# Patient Record
Sex: MALE | Race: WHITE | NOT HISPANIC OR LATINO | Employment: FULL TIME | URBAN - METROPOLITAN AREA
[De-identification: names, ages, dates, MRNs, and addresses within clinical notes are randomized per-mention and may not be internally consistent; named-entity substitution may affect disease eponyms.]

---

## 2018-07-10 ENCOUNTER — HOSPITAL ENCOUNTER (EMERGENCY)
Facility: HOSPITAL | Age: 18
Discharge: HOME | End: 2018-07-10
Attending: EMERGENCY MEDICINE

## 2018-07-10 VITALS
RESPIRATION RATE: 16 BRPM | DIASTOLIC BLOOD PRESSURE: 69 MMHG | HEART RATE: 71 BPM | BODY MASS INDEX: 24.25 KG/M2 | OXYGEN SATURATION: 97 % | TEMPERATURE: 98 F | WEIGHT: 160 LBS | HEIGHT: 68 IN | SYSTOLIC BLOOD PRESSURE: 131 MMHG

## 2018-07-10 DIAGNOSIS — K12.2 UVULITIS: ICD-10-CM

## 2018-07-10 DIAGNOSIS — T78.3XXA ANGIOEDEMA, INITIAL ENCOUNTER: Primary | ICD-10-CM

## 2018-07-10 PROCEDURE — 96374 THER/PROPH/DIAG INJ IV PUSH: CPT

## 2018-07-10 PROCEDURE — 99284 EMERGENCY DEPT VISIT MOD MDM: CPT | Mod: 25

## 2018-07-10 PROCEDURE — 63600175 PHARM REV CODE 636 W HCPCS: Performed by: EMERGENCY MEDICINE

## 2018-07-10 PROCEDURE — 96375 TX/PRO/DX INJ NEW DRUG ADDON: CPT

## 2018-07-10 PROCEDURE — 96372 THER/PROPH/DIAG INJ SC/IM: CPT

## 2018-07-10 PROCEDURE — S0028 INJECTION, FAMOTIDINE, 20 MG: HCPCS | Performed by: EMERGENCY MEDICINE

## 2018-07-10 PROCEDURE — 25000003 PHARM REV CODE 250: Performed by: EMERGENCY MEDICINE

## 2018-07-10 RX ORDER — FAMOTIDINE 10 MG/ML
20 INJECTION INTRAVENOUS
Status: COMPLETED | OUTPATIENT
Start: 2018-07-10 | End: 2018-07-10

## 2018-07-10 RX ORDER — EPINEPHRINE 0.3 MG/.3ML
0.3 INJECTION SUBCUTANEOUS
Status: COMPLETED | OUTPATIENT
Start: 2018-07-10 | End: 2018-07-10

## 2018-07-10 RX ORDER — METHYLPREDNISOLONE 4 MG/1
TABLET ORAL
Qty: 1 PACKAGE | Refills: 0 | Status: SHIPPED | OUTPATIENT
Start: 2018-07-10 | End: 2018-07-31

## 2018-07-10 RX ORDER — DEXAMETHASONE SODIUM PHOSPHATE 10 MG/ML
INJECTION INTRAMUSCULAR; INTRAVENOUS
Status: DISCONTINUED
Start: 2018-07-10 | End: 2018-07-10 | Stop reason: HOSPADM

## 2018-07-10 RX ORDER — EPINEPHRINE 1 MG/ML
INJECTION, SOLUTION INTRACARDIAC; INTRAMUSCULAR; INTRAVENOUS; SUBCUTANEOUS
Status: DISCONTINUED
Start: 2018-07-10 | End: 2018-07-10 | Stop reason: WASHOUT

## 2018-07-10 RX ORDER — DIPHENHYDRAMINE HYDROCHLORIDE 50 MG/ML
25 INJECTION INTRAMUSCULAR; INTRAVENOUS
Status: COMPLETED | OUTPATIENT
Start: 2018-07-10 | End: 2018-07-10

## 2018-07-10 RX ORDER — DEXAMETHASONE SODIUM PHOSPHATE 100 MG/10ML
8 INJECTION INTRAMUSCULAR; INTRAVENOUS
Status: COMPLETED | OUTPATIENT
Start: 2018-07-10 | End: 2018-07-10

## 2018-07-10 RX ORDER — EPINEPHRINE 0.3 MG/.3ML
INJECTION SUBCUTANEOUS
Status: DISCONTINUED
Start: 2018-07-10 | End: 2018-07-10 | Stop reason: HOSPADM

## 2018-07-10 RX ADMIN — DIPHENHYDRAMINE HYDROCHLORIDE 25 MG: 50 INJECTION, SOLUTION INTRAMUSCULAR; INTRAVENOUS at 03:07

## 2018-07-10 RX ADMIN — FAMOTIDINE 20 MG: 10 INJECTION, SOLUTION INTRAVENOUS at 03:07

## 2018-07-10 RX ADMIN — EPINEPHRINE 0.3 MG: 0.3 INJECTION INTRAMUSCULAR at 04:07

## 2018-07-10 RX ADMIN — DEXAMETHASONE SODIUM PHOSPHATE 8 MG: 10 INJECTION, SOLUTION INTRAMUSCULAR; INTRAVENOUS at 03:07

## 2018-07-10 NOTE — ED PROVIDER NOTES
Encounter Date: 7/10/2018       History     Chief Complaint   Patient presents with    Sore Throat     Patient then emergency room with chief complaint of swelling to his throat.  Patient awoke from sleep with difficulty on swallowing.  Patient stated that occurred while he was asleep felt that he had some back of his throat that he could get up.  Patient has been coughing and mom without sputum production. Patient stated it felt like her some pain in the back of his throat. Denies any new foods detergent on patient denies any new medications.          Review of patient's allergies indicates:   Allergen Reactions    Shrimp      History reviewed. No pertinent past medical history.  History reviewed. No pertinent surgical history.  History reviewed. No pertinent family history.  Social History   Substance Use Topics    Smoking status: Never Smoker    Smokeless tobacco: Never Used    Alcohol use No     Review of Systems   Constitutional: Negative for fever.   HENT: Positive for sore throat.    Respiratory: Negative for shortness of breath.    Cardiovascular: Negative for chest pain.   Gastrointestinal: Negative for nausea.   Genitourinary: Negative for dysuria.   Musculoskeletal: Negative for back pain.   Skin: Negative for rash.   Neurological: Negative for weakness.   Hematological: Does not bruise/bleed easily.       Physical Exam     Initial Vitals [07/10/18 0340]   BP Pulse Resp Temp SpO2   (!) 146/91 76 16 98 °F (36.7 °C) 100 %      MAP       --         Physical Exam    Constitutional: Vital signs are normal. He appears well-developed and well-nourished. He appears cachectic. He is Obese .   HENT:   Head: Normocephalic and atraumatic.   Patient did have edematous uvula throat showed no swelling the patient was able to tolerate saliva.   Eyes: Lids are normal. Lids are everted and swept, no foreign bodies found.   Neck: Trachea normal, normal range of motion and phonation normal. Neck supple.    Cardiovascular: Normal rate, regular rhythm and normal pulses.   Pulmonary/Chest: Breath sounds normal. No respiratory distress. He has no wheezes.   Abdominal: Soft. Normal appearance and bowel sounds are normal.   Musculoskeletal: Normal range of motion.   Lymphadenopathy:        Head (right side): No submental, no tonsillar, no preauricular, no posterior auricular and no occipital adenopathy present.        Head (left side): No tonsillar, no preauricular and no posterior auricular adenopathy present.     He has no cervical adenopathy.        Right cervical: No deep cervical adenopathy present.       Left cervical: No superficial cervical adenopathy present.   Neurological: He is alert and oriented to person, place, and time. No cranial nerve deficit or sensory deficit. GCS eye subscore is 4. GCS verbal subscore is 5. GCS motor subscore is 6.   Skin: Skin is warm, dry and intact.   Psychiatric: He has a normal mood and affect. His speech is normal and behavior is normal. Cognition and memory are normal.         ED Course   Procedures  Labs Reviewed - No data to display       Imaging Results    None          Medical Decision Making:   Initial Assessment:   Patient did have a swollen uvula that cause him to cough and have difficulty with swallowing.  Differential Diagnosis:   Uvulitis angioedema pharyngitis  ED Management:  Patient was given Benadryl and Solu-Medrol epi and Decadron as it did reduce to swelling patient was able to swallow without difficulty and swelling was considerably reduced                      Clinical Impression:   The primary encounter diagnosis was Angioedema, initial encounter. A diagnosis of Uvulitis was also pertinent to this visit.                             Seng Jordan DO  07/10/18 5369

## 2019-02-09 ENCOUNTER — HOSPITAL ENCOUNTER (EMERGENCY)
Facility: HOSPITAL | Age: 19
Discharge: HOME | End: 2019-02-09
Attending: EMERGENCY MEDICINE

## 2019-02-09 VITALS
SYSTOLIC BLOOD PRESSURE: 135 MMHG | BODY MASS INDEX: 23.7 KG/M2 | RESPIRATION RATE: 17 BRPM | HEART RATE: 86 BPM | OXYGEN SATURATION: 99 % | HEIGHT: 69 IN | DIASTOLIC BLOOD PRESSURE: 63 MMHG | TEMPERATURE: 98 F | WEIGHT: 160 LBS

## 2019-02-09 DIAGNOSIS — J02.9 PHARYNGITIS, UNSPECIFIED ETIOLOGY: Primary | ICD-10-CM

## 2019-02-09 DIAGNOSIS — R09.81 NASAL CONGESTION: ICD-10-CM

## 2019-02-09 LAB
DEPRECATED S PYO AG THROAT QL EIA: NEGATIVE
INFLUENZA A, MOLECULAR: NEGATIVE
INFLUENZA B, MOLECULAR: NEGATIVE
SPECIMEN SOURCE: NORMAL

## 2019-02-09 PROCEDURE — 87502 INFLUENZA DNA AMP PROBE: CPT

## 2019-02-09 PROCEDURE — 25000242 PHARM REV CODE 250 ALT 637 W/ HCPCS: Performed by: PHYSICIAN ASSISTANT

## 2019-02-09 PROCEDURE — 25000003 PHARM REV CODE 250: Performed by: PHYSICIAN ASSISTANT

## 2019-02-09 PROCEDURE — 99284 EMERGENCY DEPT VISIT MOD MDM: CPT

## 2019-02-09 PROCEDURE — 87880 STREP A ASSAY W/OPTIC: CPT

## 2019-02-09 PROCEDURE — 87081 CULTURE SCREEN ONLY: CPT

## 2019-02-09 RX ORDER — CETIRIZINE HYDROCHLORIDE 10 MG/1
10 TABLET ORAL DAILY
Qty: 30 TABLET | Refills: 0 | Status: SHIPPED | OUTPATIENT
Start: 2019-02-09 | End: 2022-04-13

## 2019-02-09 RX ORDER — CETIRIZINE HYDROCHLORIDE 10 MG/1
10 TABLET ORAL
Status: COMPLETED | OUTPATIENT
Start: 2019-02-09 | End: 2019-02-09

## 2019-02-09 RX ORDER — IBUPROFEN 600 MG/1
600 TABLET ORAL EVERY 8 HOURS PRN
Qty: 20 TABLET | Refills: 0 | Status: SHIPPED | OUTPATIENT
Start: 2019-02-09 | End: 2021-04-16 | Stop reason: CLARIF

## 2019-02-09 RX ORDER — FLUTICASONE PROPIONATE 50 MCG
1 SPRAY, SUSPENSION (ML) NASAL 2 TIMES DAILY PRN
Qty: 15 G | Refills: 0 | Status: SHIPPED | OUTPATIENT
Start: 2019-02-09 | End: 2021-04-16 | Stop reason: CLARIF

## 2019-02-09 RX ORDER — FLUTICASONE PROPIONATE 50 MCG
2 SPRAY, SUSPENSION (ML) NASAL ONCE
Status: COMPLETED | OUTPATIENT
Start: 2019-02-09 | End: 2019-02-09

## 2019-02-09 RX ORDER — IBUPROFEN 600 MG/1
600 TABLET ORAL
Status: COMPLETED | OUTPATIENT
Start: 2019-02-09 | End: 2019-02-09

## 2019-02-09 RX ADMIN — IBUPROFEN 600 MG: 600 TABLET, FILM COATED ORAL at 04:02

## 2019-02-09 RX ADMIN — CETIRIZINE HYDROCHLORIDE 10 MG: 10 TABLET, FILM COATED ORAL at 04:02

## 2019-02-09 RX ADMIN — FLUTICASONE PROPIONATE 100 MCG: 50 SPRAY, METERED NASAL at 04:02

## 2019-02-09 NOTE — ED NOTES
C/o ongoing sore throat with cough and ear fullness x 1.5 weeks states that sore throat has gotten worse today and that he has felt warm but has not measured his temperature. Even non labored respirations. Aware to notify nurse of needs or concerns.

## 2019-02-09 NOTE — ED PROVIDER NOTES
"Encounter Date: 2/9/2019    SCRIBE #1 NOTE: I, Gelacio Chela, am scribing for, and in the presence of, Chika Carrillo PA-C.       History     Chief Complaint   Patient presents with    Sore Throat     s/s x 1 & 1/2 weeks    Fatigue     Time seen by provider: 3:53 PM on 02/09/2019      Agus Horan is a 18 y.o. male with no PMHx who presents to the ED for further evaluation of a sore throat that started 1.5 weeks ago. The patient reports that he started with a left sided sore throat that radiates into the right side as well. He states that it is painful to swallow. He states "I looked back there and they looked swollen." Patient reports that he is having a cough that produces some blood as well. He relays that with the cough, the sore throat worsens as well. He relays that he is having some left ear fullness. Patient also admits to nasal congestion that worsened over the last day. Patient is uncertain about a fever, but admits that he has felt warm during the duration of the sore throat. He also states that he has had some generalized fatigue as well. Patient denies SOB, chest pain, abdominal pain, nausea, vomiting, and headache. The patient has no PSHx.      The history is provided by the patient.     Review of patient's allergies indicates:   Allergen Reactions    Shrimp      History reviewed. No pertinent past medical history.  History reviewed. No pertinent surgical history.  History reviewed. No pertinent family history.  Social History     Tobacco Use    Smoking status: Never Smoker    Smokeless tobacco: Never Used   Substance Use Topics    Alcohol use: No    Drug use: Yes     Frequency: 2.0 times per week     Types: Marijuana     Review of Systems   Constitutional: Positive for fatigue and fever. Negative for chills.   HENT: Positive for congestion, ear pain and sore throat. Negative for facial swelling and trouble swallowing.    Eyes: Negative for discharge.   Respiratory: Positive for cough. " Negative for chest tightness, shortness of breath and wheezing.    Cardiovascular: Negative for chest pain and palpitations.   Gastrointestinal: Negative for abdominal pain, diarrhea, nausea and vomiting.   Genitourinary: Negative for dysuria and hematuria.   Musculoskeletal: Negative for arthralgias, back pain, joint swelling, myalgias, neck pain and neck stiffness.   Skin: Negative for color change, pallor, rash and wound.   Neurological: Negative for dizziness, syncope, weakness, light-headedness, numbness and headaches.   Hematological: Does not bruise/bleed easily.   Psychiatric/Behavioral: The patient is not nervous/anxious.        Physical Exam     Initial Vitals [02/09/19 1529]   BP Pulse Resp Temp SpO2   135/63 86 17 97.9 °F (36.6 °C) 99 %      MAP       --         Physical Exam    Nursing note and vitals reviewed.  Constitutional: He appears well-developed and well-nourished. He is not diaphoretic. No distress.   HENT:   Head: Normocephalic and atraumatic.   Right Ear: External ear normal.   Left Ear: External ear normal.   Nasal congestion and rhinorrhea noted.  Mild erythema noted to posterior oropharynx without edema or exudate.    Eyes: Conjunctivae are normal.   Neck: Normal range of motion. Neck supple.   Cardiovascular: Normal rate, regular rhythm, normal heart sounds and intact distal pulses. Exam reveals no gallop and no friction rub.    No murmur heard.  Pulmonary/Chest: Breath sounds normal. No respiratory distress. He has no wheezes. He has no rhonchi. He has no rales.   Abdominal: Soft. He exhibits no distension and no mass. There is no tenderness.   Musculoskeletal: Normal range of motion. He exhibits no edema or tenderness.   Lymphadenopathy:     He has no cervical adenopathy.   Neurological: He is alert and oriented to person, place, and time. He has normal strength. No sensory deficit.   Skin: Skin is warm and dry. No rash and no abscess noted. No erythema.   Psychiatric: He has a normal  mood and affect.         ED Course   Procedures  Labs Reviewed   THROAT SCREEN, RAPID   INFLUENZA A & B BY MOLECULAR   CULTURE, STREP A,  THROAT          Imaging Results    None          Medical Decision Making:   History:   Old Medical Records: I decided to obtain old medical records.  Differential Diagnosis:   Influenza  Pneumonia  Strep pharyngitis  Meningitis  Viral syndrome    Clinical Tests:   Lab Tests: Ordered and Reviewed       APC / Resident Notes:   Influenza negative.  Rapid strep negative. Symptoms likely viral.  Low suspicion for peritonsillar abscess or other acute bacterial infection and no need for imaging or testing at this time.  He is discharged home to follow up with his primary care provider for re-evaluation and further treatment options.  He voices understanding and is agreeable to the plan.  He is given specific return precautions.       Scribe Attestation:   Scribe #1: I performed the above scribed service and the documentation accurately describes the services I performed. I attest to the accuracy of the note.    Attending Attestation:     Physician Attestation Statement for NP/PA:   I discussed this assessment and plan of this patient with the NP/PA, but I did not personally examine the patient. The face to face encounter was performed by the NP/PA.    Other NP/PA Attestation Additions:    History of Present Illness: I was not called upon to see this patient but was available for consultation and agree with the patient's disposition and management as it was presented to me by the APC.             I, Chika Carrillo PA-C, personally performed the services described in this documentation. All medical record entries made by the scribe were at my direction and in my presence.  I have reviewed the chart and agree that the record reflects my personal performance and is accurate and complete. Chika Carrillo PA-C.  9:55 PM 02/09/2019             Clinical Impression:   The primary encounter  diagnosis was Pharyngitis, unspecified etiology. A diagnosis of Nasal congestion was also pertinent to this visit.      Disposition:   Disposition: Discharged  Condition: Stable                        Chika Carrillo PA-C  02/09/19 9641       Reed Paz MD  02/09/19 5626

## 2019-02-12 LAB — BACTERIA THROAT CULT: NORMAL

## 2019-08-20 ENCOUNTER — HOSPITAL ENCOUNTER (EMERGENCY)
Facility: HOSPITAL | Age: 19
Discharge: HOME | End: 2019-08-20
Attending: EMERGENCY MEDICINE

## 2019-08-20 VITALS
TEMPERATURE: 99 F | OXYGEN SATURATION: 99 % | WEIGHT: 160 LBS | BODY MASS INDEX: 23.7 KG/M2 | DIASTOLIC BLOOD PRESSURE: 77 MMHG | HEIGHT: 69 IN | RESPIRATION RATE: 18 BRPM | HEART RATE: 70 BPM | SYSTOLIC BLOOD PRESSURE: 140 MMHG

## 2019-08-20 DIAGNOSIS — K12.2 UVULITIS: ICD-10-CM

## 2019-08-20 DIAGNOSIS — J02.9 PHARYNGITIS, UNSPECIFIED ETIOLOGY: Primary | ICD-10-CM

## 2019-08-20 LAB — DEPRECATED S PYO AG THROAT QL EIA: NEGATIVE

## 2019-08-20 PROCEDURE — 25000003 PHARM REV CODE 250: Performed by: EMERGENCY MEDICINE

## 2019-08-20 PROCEDURE — 87081 CULTURE SCREEN ONLY: CPT

## 2019-08-20 PROCEDURE — 99285 EMERGENCY DEPT VISIT HI MDM: CPT | Mod: 25

## 2019-08-20 PROCEDURE — 96372 THER/PROPH/DIAG INJ SC/IM: CPT | Mod: 59

## 2019-08-20 PROCEDURE — 63600175 PHARM REV CODE 636 W HCPCS: Performed by: EMERGENCY MEDICINE

## 2019-08-20 PROCEDURE — 87880 STREP A ASSAY W/OPTIC: CPT

## 2019-08-20 RX ORDER — IBUPROFEN 400 MG/1
800 TABLET ORAL
Status: COMPLETED | OUTPATIENT
Start: 2019-08-20 | End: 2019-08-20

## 2019-08-20 RX ORDER — HYDROCODONE BITARTRATE AND ACETAMINOPHEN 7.5; 325 MG/15ML; MG/15ML
15 SOLUTION ORAL EVERY 6 HOURS PRN
Qty: 250 ML | Refills: 0 | Status: ON HOLD | OUTPATIENT
Start: 2019-08-20 | End: 2020-06-24 | Stop reason: HOSPADM

## 2019-08-20 RX ORDER — AMOXICILLIN 875 MG/1
875 TABLET, FILM COATED ORAL 2 TIMES DAILY
Qty: 20 TABLET | Refills: 0 | Status: ON HOLD | OUTPATIENT
Start: 2019-08-20 | End: 2020-06-24 | Stop reason: SDUPTHER

## 2019-08-20 RX ADMIN — IBUPROFEN 800 MG: 400 TABLET ORAL at 10:08

## 2019-08-20 RX ADMIN — METHYLPREDNISOLONE SODIUM SUCCINATE 40 MG: 40 INJECTION, POWDER, FOR SOLUTION INTRAMUSCULAR; INTRAVENOUS at 11:08

## 2019-08-20 NOTE — DISCHARGE INSTRUCTIONS
Watch for any fever, worsening pain and trouble swallowing.  Watch for any trouble breathing.  Return to the ER as needed.

## 2019-08-20 NOTE — ED PROVIDER NOTES
Encounter Date: 8/20/2019       History     Chief Complaint   Patient presents with    Sore Throat     19-year-old male who has a benign past medical history, presents emergency room with a history that he awoke this morning at approximately 2:00 a.m. with difficulty swallowing and noticed that his uvula was swollen.  The patient was given Claritin but states he still has persistent symptoms.  He does complain of dysphagia.  No fever.  No hoarseness.  He does complain of some slight headache.  The patient denies any coughing, shortness of breath, chills. He had 1 episode of vomiting last night after eating but not subsequently.  Bowel habits have been normal. No complaint of neck pain.        Review of patient's allergies indicates:  No Known Allergies  Past Medical History:   Diagnosis Date    Allergy     AR    Asthma     mild intermittent     No past surgical history on file.  Family History   Problem Relation Age of Onset    Asthma Brother     Emphysema Maternal Grandmother     Hyperlipidemia Maternal Grandmother     Asthma Maternal Grandmother     Arthritis Maternal Grandmother     COPD Maternal Grandmother     Asthma Brother      Social History     Tobacco Use    Smoking status: Never Smoker   Substance Use Topics    Alcohol use: Not on file    Drug use: Not on file     Review of Systems   Constitutional: Negative for activity change, chills, diaphoresis, fatigue and fever.   HENT: Negative for congestion, dental problem, drooling, ear pain, facial swelling, mouth sores, postnasal drip, rhinorrhea, sinus pain and sore throat.    Eyes: Negative.  Negative for pain.   Respiratory: Negative for cough, choking, chest tightness, shortness of breath, wheezing and stridor.    Cardiovascular: Negative for chest pain.   Gastrointestinal: Positive for vomiting. Negative for abdominal pain, constipation, diarrhea and nausea.   Genitourinary: Negative for flank pain, frequency and hematuria.   Skin: Negative  for pallor, rash and wound.   Neurological: Negative for headaches.   All other systems reviewed and are negative.      Physical Exam     Initial Vitals [08/20/19 0805]   BP Pulse Resp Temp SpO2   (!) 141/90 93 17 98.8 °F (37.1 °C) 100 %      MAP       --         Physical Exam    Constitutional: He appears well-developed and well-nourished. He is not diaphoretic. No distress.   HENT:   Head: Normocephalic and atraumatic.   Right Ear: External ear normal.   Left Ear: External ear normal.   Nose: Nose normal.   Mouth/Throat: No oropharyngeal exudate.   Throat is erythematous with evidence of uvula edema. No uvular deviation.  No exudate present.   Eyes: Conjunctivae and EOM are normal. Pupils are equal, round, and reactive to light. Right eye exhibits no discharge. Left eye exhibits no discharge. No scleral icterus.   Neck: Normal range of motion. Neck supple. No tracheal deviation present. No JVD present.   Cardiovascular: Normal rate, regular rhythm, normal heart sounds and intact distal pulses. Exam reveals no gallop and no friction rub.    No murmur heard.  Pulmonary/Chest: Breath sounds normal. No stridor. No respiratory distress. He has no wheezes. He has no rhonchi. He has no rales. He exhibits no tenderness.   Abdominal: Soft. Bowel sounds are normal. He exhibits no distension and no mass. There is no tenderness. There is no rebound and no guarding.   Musculoskeletal: Normal range of motion. He exhibits no edema or tenderness.   Lymphadenopathy:     He has no cervical adenopathy.   Neurological: He is alert and oriented to person, place, and time. He has normal strength. No cranial nerve deficit or sensory deficit. GCS score is 15. GCS eye subscore is 4. GCS verbal subscore is 5. GCS motor subscore is 6.   Skin: Skin is warm and dry. Capillary refill takes less than 2 seconds. No rash noted. No erythema. No pallor.   Psychiatric: He has a normal mood and affect. His behavior is normal. Judgment and thought  content normal.         ED Course   Procedures  Labs Reviewed   THROAT SCREEN, RAPID          Imaging Results    None                       Attending Attestation:             Attending ED Notes:   Complaints of sore throat, is a negative strep screen.  Plain soft tissue neck films had some question as to whether 9 any epiglottitis was evident.  The patient was subsequently sent for CT scan which showed no evidence of an abscess of otitis.  The patient will be given a shot of steroids and placed on antibiotics analgesics as an outpatient.  He is to follow up with his primary physician.  Patient is to return to the ER if he has any worsening symptoms.             Clinical Impression:       ICD-10-CM ICD-9-CM   1. Pharyngitis, unspecified etiology J02.9 462   2. Uvulitis K12.2 528.3                                Danny Tamez Jr., MD  08/20/19 1128

## 2019-08-22 LAB — BACTERIA THROAT CULT: NORMAL

## 2019-11-22 ENCOUNTER — CLINICAL SUPPORT (OUTPATIENT)
Dept: FAMILY MEDICINE | Facility: CLINIC | Age: 19
End: 2019-11-22

## 2019-11-22 DIAGNOSIS — Z02.83 EMPLOYMENT-RELATED DRUG TESTING, ENCOUNTER FOR: Primary | ICD-10-CM

## 2019-11-22 PROCEDURE — 99000 PR SPECIMEN HANDLING,DR OFF->LAB: ICD-10-PCS | Mod: ,,, | Performed by: PREVENTIVE MEDICINE

## 2019-11-22 PROCEDURE — 99000 SPECIMEN HANDLING OFFICE-LAB: CPT | Mod: ,,, | Performed by: PREVENTIVE MEDICINE

## 2020-01-22 ENCOUNTER — HOSPITAL ENCOUNTER (EMERGENCY)
Facility: HOSPITAL | Age: 20
Discharge: HOME | End: 2020-01-22
Attending: EMERGENCY MEDICINE

## 2020-01-22 VITALS
WEIGHT: 160 LBS | BODY MASS INDEX: 23.7 KG/M2 | HEART RATE: 79 BPM | SYSTOLIC BLOOD PRESSURE: 141 MMHG | OXYGEN SATURATION: 98 % | RESPIRATION RATE: 17 BRPM | TEMPERATURE: 99 F | HEIGHT: 69 IN | DIASTOLIC BLOOD PRESSURE: 72 MMHG

## 2020-01-22 DIAGNOSIS — J02.9 VIRAL PHARYNGITIS: ICD-10-CM

## 2020-01-22 DIAGNOSIS — J02.9 SORE THROAT: Primary | ICD-10-CM

## 2020-01-22 LAB — DEPRECATED S PYO AG THROAT QL EIA: NEGATIVE

## 2020-01-22 PROCEDURE — 87880 STREP A ASSAY W/OPTIC: CPT

## 2020-01-22 PROCEDURE — 96372 THER/PROPH/DIAG INJ SC/IM: CPT

## 2020-01-22 PROCEDURE — 99284 EMERGENCY DEPT VISIT MOD MDM: CPT | Mod: 25

## 2020-01-22 PROCEDURE — 63600175 PHARM REV CODE 636 W HCPCS: Performed by: EMERGENCY MEDICINE

## 2020-01-22 PROCEDURE — 25000003 PHARM REV CODE 250: Performed by: EMERGENCY MEDICINE

## 2020-01-22 PROCEDURE — 87081 CULTURE SCREEN ONLY: CPT

## 2020-01-22 RX ORDER — IBUPROFEN 400 MG/1
400 TABLET ORAL
Status: COMPLETED | OUTPATIENT
Start: 2020-01-22 | End: 2020-01-22

## 2020-01-22 RX ADMIN — IBUPROFEN 400 MG: 400 TABLET ORAL at 06:01

## 2020-01-22 RX ADMIN — METHYLPREDNISOLONE SODIUM SUCCINATE 40 MG: 40 INJECTION, POWDER, FOR SOLUTION INTRAMUSCULAR; INTRAVENOUS at 06:01

## 2020-01-22 NOTE — ED PROVIDER NOTES
Encounter Date: 1/22/2020       History     Chief Complaint   Patient presents with    Sore Throat     sore throat beginning this am      The history is provided by the patient. No  was used.   Sore Throat   This is a new problem. The current episode started more than 2 days ago. The problem occurs constantly. The problem has been gradually worsening. Pertinent negatives include no chest pain, no abdominal pain, no headaches and no shortness of breath. Nothing aggravates the symptoms. Nothing relieves the symptoms. He has tried nothing for the symptoms. The treatment provided no relief.     Review of patient's allergies indicates:   Allergen Reactions    Shrimp      Past Medical History:   Diagnosis Date    Allergy     AR    Asthma     mild intermittent     No past surgical history on file.  Family History   Problem Relation Age of Onset    Asthma Brother     Emphysema Maternal Grandmother     Hyperlipidemia Maternal Grandmother     Asthma Maternal Grandmother     Arthritis Maternal Grandmother     COPD Maternal Grandmother     Asthma Brother      Social History     Tobacco Use    Smoking status: Never Smoker    Smokeless tobacco: Never Used   Substance Use Topics    Alcohol use: No    Drug use: Yes     Frequency: 2.0 times per week     Types: Marijuana     Review of Systems   Constitutional: Negative for activity change, diaphoresis and fever.   HENT: Positive for sore throat. Negative for dental problem, drooling, ear discharge, facial swelling, rhinorrhea, trouble swallowing and voice change.    Eyes: Negative for pain and visual disturbance.   Respiratory: Negative for cough, shortness of breath and stridor.    Cardiovascular: Negative for chest pain and leg swelling.   Gastrointestinal: Negative for abdominal distention, abdominal pain, constipation and vomiting.   Genitourinary: Negative for discharge, dysuria and hematuria.   Musculoskeletal: Negative for gait problem.    Skin: Negative for rash.   Neurological: Negative for seizures, facial asymmetry and headaches.   Psychiatric/Behavioral: Negative for hallucinations and suicidal ideas.       Physical Exam     Initial Vitals [01/22/20 0546]   BP Pulse Resp Temp SpO2   (!) 141/72 79 17 98.8 °F (37.1 °C) 98 %      MAP       --         Physical Exam    Nursing note and vitals reviewed.  Constitutional: He appears well-developed. No distress.   HENT:   Head: Normocephalic and atraumatic.   Nose: Nose normal.   Mouth/Throat: Uvula is midline, oropharynx is clear and moist and mucous membranes are normal. He does not have dentures. No oral lesions. No trismus in the jaw. Normal dentition. No dental abscesses, uvula swelling, lacerations or dental caries. No oropharyngeal exudate, posterior oropharyngeal edema, posterior oropharyngeal erythema or tonsillar abscesses.   Eyes: EOM are normal.   Neck: Neck supple. No tracheal deviation present. No JVD present.   Cardiovascular: Normal rate, regular rhythm, normal heart sounds and intact distal pulses. Exam reveals no gallop and no friction rub.    No murmur heard.  Pulmonary/Chest: Breath sounds normal. No respiratory distress. He has no wheezes. He has no rhonchi. He has no rales.   Abdominal: Soft. Bowel sounds are normal. There is no tenderness.   Musculoskeletal: Normal range of motion.   Neurological: He is alert and oriented to person, place, and time. No cranial nerve deficit.   Skin: Skin is warm and dry. Capillary refill takes less than 2 seconds. No rash noted.   Psychiatric: He has a normal mood and affect.         ED Course   Procedures  Labs Reviewed   THROAT SCREEN, RAPID   CULTURE, STREP A,  THROAT          Imaging Results    None          Medical Decision Making:   ED Management:  20 yo pt presenting with worsening of sore throat with reassuring exam.  No history of immunocompromise. Nontoxic appearance.  Patient euvolemic with no trismus and no airway compromise. Able to  tolerate PO. Unlikely PTA, RPA, Ludwigs, epiglottitis, acute HIV, or EBV. Neg strep test and centor negative for strep.  Given patient's recurrent episodes of pharyngitis will give referral to [facility].    Plan to DC home with prompt outpatient PCP follow up; return precautions discussed.                                   Clinical Impression:       ICD-10-CM ICD-9-CM   1. Sore throat J02.9 462                             Gamaliel Bush MD  01/22/20 0795

## 2020-01-22 NOTE — LETTER
"  [facility] [address] [place] [address]  Phone: [phone]   January 22, 2020    Patient: Agus Horan (Alex)   YOB: 2000   Date of Visit: 1/22/2020   Patient ID [id number]       To Whom It May Concern:    Agus Horan (Alex) was seen and treated in our emergency department on 1/22/2020. He may return to work on 1/23/20.      Sincerely,          ,       "

## 2020-01-24 LAB — BACTERIA THROAT CULT: NORMAL

## 2020-06-24 ENCOUNTER — ANESTHESIA (OUTPATIENT)
Dept: SURGERY | Facility: HOSPITAL | Age: 20
End: 2020-06-24

## 2020-06-24 ENCOUNTER — ANESTHESIA EVENT (OUTPATIENT)
Dept: SURGERY | Facility: HOSPITAL | Age: 20
End: 2020-06-24

## 2020-06-24 ENCOUNTER — HOSPITAL ENCOUNTER (OUTPATIENT)
Facility: HOSPITAL | Age: 20
Discharge: HOME | End: 2020-06-24
Attending: EMERGENCY MEDICINE | Admitting: ORTHOPAEDIC SURGERY

## 2020-06-24 VITALS
OXYGEN SATURATION: 96 % | RESPIRATION RATE: 15 BRPM | WEIGHT: 155 LBS | DIASTOLIC BLOOD PRESSURE: 89 MMHG | SYSTOLIC BLOOD PRESSURE: 161 MMHG | TEMPERATURE: 99 F | BODY MASS INDEX: 22.89 KG/M2 | HEART RATE: 106 BPM

## 2020-06-24 DIAGNOSIS — S91.312A LACERATION OF LEFT FOOT: ICD-10-CM

## 2020-06-24 DIAGNOSIS — S90.852A FOREIGN BODY IN LEFT FOOT, INITIAL ENCOUNTER: Primary | ICD-10-CM

## 2020-06-24 LAB — SARS-COV-2 RDRP RESP QL NAA+PROBE: NEGATIVE

## 2020-06-24 PROCEDURE — 71000033 HC RECOVERY, INTIAL HOUR: Performed by: ORTHOPAEDIC SURGERY

## 2020-06-24 PROCEDURE — 99204 OFFICE O/P NEW MOD 45 MIN: CPT | Mod: 25,,, | Performed by: ORTHOPAEDIC SURGERY

## 2020-06-24 PROCEDURE — 99900104 DSU ONLY-NO CHARGE-EA ADD'L HR (STAT): Performed by: ORTHOPAEDIC SURGERY

## 2020-06-24 PROCEDURE — U0002 COVID-19 LAB TEST NON-CDC: HCPCS

## 2020-06-24 PROCEDURE — 25000003 PHARM REV CODE 250: Performed by: EMERGENCY MEDICINE

## 2020-06-24 PROCEDURE — D9220A PRA ANESTHESIA: ICD-10-PCS | Mod: ,,, | Performed by: ANESTHESIOLOGY

## 2020-06-24 PROCEDURE — 71000015 HC POSTOP RECOV 1ST HR: Performed by: ORTHOPAEDIC SURGERY

## 2020-06-24 PROCEDURE — 99285 EMERGENCY DEPT VISIT HI MDM: CPT | Mod: 25

## 2020-06-24 PROCEDURE — 63600175 PHARM REV CODE 636 W HCPCS: Performed by: EMERGENCY MEDICINE

## 2020-06-24 PROCEDURE — 90715 TDAP VACCINE 7 YRS/> IM: CPT | Performed by: EMERGENCY MEDICINE

## 2020-06-24 PROCEDURE — 99204 PR OFFICE/OUTPT VISIT, NEW, LEVL IV, 45-59 MIN: ICD-10-PCS | Mod: 25,,, | Performed by: ORTHOPAEDIC SURGERY

## 2020-06-24 PROCEDURE — 90471 IMMUNIZATION ADMIN: CPT | Performed by: EMERGENCY MEDICINE

## 2020-06-24 PROCEDURE — 71000039 HC RECOVERY, EACH ADD'L HOUR: Performed by: ORTHOPAEDIC SURGERY

## 2020-06-24 PROCEDURE — 63600175 PHARM REV CODE 636 W HCPCS: Performed by: NURSE ANESTHETIST, CERTIFIED REGISTERED

## 2020-06-24 PROCEDURE — 25000003 PHARM REV CODE 250

## 2020-06-24 PROCEDURE — 99900103 DSU ONLY-NO CHARGE-INITIAL HR (STAT): Performed by: ORTHOPAEDIC SURGERY

## 2020-06-24 PROCEDURE — 37000009 HC ANESTHESIA EA ADD 15 MINS: Performed by: ORTHOPAEDIC SURGERY

## 2020-06-24 PROCEDURE — 28190 REMOVAL OF FOOT FOREIGN BODY: CPT | Mod: LT,,, | Performed by: ORTHOPAEDIC SURGERY

## 2020-06-24 PROCEDURE — D9220A PRA ANESTHESIA: Mod: ,,, | Performed by: ANESTHESIOLOGY

## 2020-06-24 PROCEDURE — 28190 PR REMV FOOT FOREIGN BODY,SUBCUTANEOUS: ICD-10-PCS | Mod: LT,,, | Performed by: ORTHOPAEDIC SURGERY

## 2020-06-24 PROCEDURE — 36000707: Performed by: ORTHOPAEDIC SURGERY

## 2020-06-24 PROCEDURE — 25000003 PHARM REV CODE 250: Performed by: NURSE ANESTHETIST, CERTIFIED REGISTERED

## 2020-06-24 PROCEDURE — 25000003 PHARM REV CODE 250: Performed by: ANESTHESIOLOGY

## 2020-06-24 PROCEDURE — 63600175 PHARM REV CODE 636 W HCPCS: Performed by: ANESTHESIOLOGY

## 2020-06-24 PROCEDURE — 37000008 HC ANESTHESIA 1ST 15 MINUTES: Performed by: ORTHOPAEDIC SURGERY

## 2020-06-24 PROCEDURE — 36000706: Performed by: ORTHOPAEDIC SURGERY

## 2020-06-24 RX ORDER — DEXAMETHASONE SODIUM PHOSPHATE 4 MG/ML
INJECTION, SOLUTION INTRA-ARTICULAR; INTRALESIONAL; INTRAMUSCULAR; INTRAVENOUS; SOFT TISSUE
Status: DISCONTINUED | OUTPATIENT
Start: 2020-06-24 | End: 2020-06-24

## 2020-06-24 RX ORDER — FENTANYL CITRATE 50 UG/ML
25 INJECTION, SOLUTION INTRAMUSCULAR; INTRAVENOUS EVERY 5 MIN PRN
Status: COMPLETED | OUTPATIENT
Start: 2020-06-24 | End: 2020-06-24

## 2020-06-24 RX ORDER — LIDOCAINE HCL/PF 100 MG/5ML
SYRINGE (ML) INTRAVENOUS
Status: DISCONTINUED | OUTPATIENT
Start: 2020-06-24 | End: 2020-06-24

## 2020-06-24 RX ORDER — ACETAMINOPHEN 10 MG/ML
INJECTION, SOLUTION INTRAVENOUS
Status: DISCONTINUED | OUTPATIENT
Start: 2020-06-24 | End: 2020-06-24

## 2020-06-24 RX ORDER — ONDANSETRON HYDROCHLORIDE 2 MG/ML
INJECTION, SOLUTION INTRAMUSCULAR; INTRAVENOUS
Status: DISCONTINUED | OUTPATIENT
Start: 2020-06-24 | End: 2020-06-24

## 2020-06-24 RX ORDER — OXYCODONE HYDROCHLORIDE 5 MG/1
5 TABLET ORAL ONCE AS NEEDED
Status: COMPLETED | OUTPATIENT
Start: 2020-06-24 | End: 2020-06-24

## 2020-06-24 RX ORDER — CIPROFLOXACIN 500 MG/1
500 TABLET ORAL
Status: COMPLETED | OUTPATIENT
Start: 2020-06-24 | End: 2020-06-24

## 2020-06-24 RX ORDER — AMOXICILLIN 875 MG/1
875 TABLET, FILM COATED ORAL 2 TIMES DAILY
Qty: 20 TABLET | Refills: 0 | Status: SHIPPED | OUTPATIENT
Start: 2020-06-24 | End: 2021-04-16 | Stop reason: CLARIF

## 2020-06-24 RX ORDER — FENTANYL CITRATE 50 UG/ML
INJECTION, SOLUTION INTRAMUSCULAR; INTRAVENOUS
Status: DISCONTINUED | OUTPATIENT
Start: 2020-06-24 | End: 2020-06-24

## 2020-06-24 RX ORDER — LIDOCAINE HYDROCHLORIDE 10 MG/ML
10 INJECTION INFILTRATION; PERINEURAL
Status: DISCONTINUED | OUTPATIENT
Start: 2020-06-24 | End: 2020-06-24 | Stop reason: HOSPADM

## 2020-06-24 RX ORDER — PROPOFOL 10 MG/ML
VIAL (ML) INTRAVENOUS
Status: DISCONTINUED | OUTPATIENT
Start: 2020-06-24 | End: 2020-06-24

## 2020-06-24 RX ORDER — ONDANSETRON 2 MG/ML
4 INJECTION INTRAMUSCULAR; INTRAVENOUS ONCE AS NEEDED
Status: DISCONTINUED | OUTPATIENT
Start: 2020-06-24 | End: 2020-06-24 | Stop reason: HOSPADM

## 2020-06-24 RX ORDER — HYDROCODONE BITARTRATE AND ACETAMINOPHEN 5; 325 MG/1; MG/1
1 TABLET ORAL EVERY 6 HOURS PRN
Qty: 10 TABLET | Refills: 0 | Status: SHIPPED | OUTPATIENT
Start: 2020-06-24 | End: 2020-07-04

## 2020-06-24 RX ORDER — KETOROLAC TROMETHAMINE 30 MG/ML
INJECTION, SOLUTION INTRAMUSCULAR; INTRAVENOUS
Status: DISCONTINUED | OUTPATIENT
Start: 2020-06-24 | End: 2020-06-24

## 2020-06-24 RX ORDER — KETAMINE HYDROCHLORIDE 100 MG/ML
INJECTION, SOLUTION INTRAMUSCULAR; INTRAVENOUS
Status: DISCONTINUED | OUTPATIENT
Start: 2020-06-24 | End: 2020-06-24

## 2020-06-24 RX ORDER — MIDAZOLAM HYDROCHLORIDE 1 MG/ML
INJECTION, SOLUTION INTRAMUSCULAR; INTRAVENOUS
Status: DISCONTINUED | OUTPATIENT
Start: 2020-06-24 | End: 2020-06-24

## 2020-06-24 RX ORDER — CIPROFLOXACIN 500 MG/1
TABLET ORAL
Status: COMPLETED
Start: 2020-06-24 | End: 2020-06-24

## 2020-06-24 RX ADMIN — ACETAMINOPHEN 1000 MG: 10 INJECTION, SOLUTION INTRAVENOUS at 07:06

## 2020-06-24 RX ADMIN — CIPROFLOXACIN 500 MG: 500 TABLET ORAL at 12:06

## 2020-06-24 RX ADMIN — FENTANYL CITRATE 25 MCG: 50 INJECTION INTRAMUSCULAR; INTRAVENOUS at 08:06

## 2020-06-24 RX ADMIN — PROPOFOL 200 MG: 10 INJECTION, EMULSION INTRAVENOUS at 07:06

## 2020-06-24 RX ADMIN — MIDAZOLAM 2 MG: 1 INJECTION INTRAMUSCULAR; INTRAVENOUS at 07:06

## 2020-06-24 RX ADMIN — FENTANYL CITRATE 100 MCG: 50 INJECTION, SOLUTION INTRAMUSCULAR; INTRAVENOUS at 07:06

## 2020-06-24 RX ADMIN — CEFAZOLIN 1 G: 330 INJECTION, POWDER, FOR SOLUTION INTRAMUSCULAR; INTRAVENOUS at 03:06

## 2020-06-24 RX ADMIN — SODIUM CHLORIDE, SODIUM GLUCONATE, SODIUM ACETATE, POTASSIUM CHLORIDE, MAGNESIUM CHLORIDE, SODIUM PHOSPHATE, DIBASIC, AND POTASSIUM PHOSPHATE: .53; .5; .37; .037; .03; .012; .00082 INJECTION, SOLUTION INTRAVENOUS at 07:06

## 2020-06-24 RX ADMIN — ONDANSETRON 4 MG: 2 INJECTION, SOLUTION INTRAMUSCULAR; INTRAVENOUS at 07:06

## 2020-06-24 RX ADMIN — CLOSTRIDIUM TETANI TOXOID ANTIGEN (FORMALDEHYDE INACTIVATED), CORYNEBACTERIUM DIPHTHERIAE TOXOID ANTIGEN (FORMALDEHYDE INACTIVATED), BORDETELLA PERTUSSIS TOXOID ANTIGEN (GLUTARALDEHYDE INACTIVATED), BORDETELLA PERTUSSIS FILAMENTOUS HEMAGGLUTININ ANTIGEN (FORMALDEHYDE INACTIVATED), BORDETELLA PERTUSSIS PERTACTIN ANTIGEN, AND BORDETELLA PERTUSSIS FIMBRIAE 2/3 ANTIGEN 0.5 ML: 5; 2; 2.5; 5; 3; 5 INJECTION, SUSPENSION INTRAMUSCULAR at 12:06

## 2020-06-24 RX ADMIN — CIPROFLOXACIN 500 MG: 500 TABLET, FILM COATED ORAL at 12:06

## 2020-06-24 RX ADMIN — OXYCODONE 5 MG: 5 TABLET ORAL at 08:06

## 2020-06-24 RX ADMIN — LIDOCAINE HYDROCHLORIDE 100 MG: 20 INJECTION, SOLUTION INTRAVENOUS at 07:06

## 2020-06-24 RX ADMIN — DEXAMETHASONE SODIUM PHOSPHATE 4 MG: 4 INJECTION, SOLUTION INTRAMUSCULAR; INTRAVENOUS at 07:06

## 2020-06-24 RX ADMIN — KETAMINE HYDROCHLORIDE 25 MG: 100 INJECTION, SOLUTION, CONCENTRATE INTRAMUSCULAR; INTRAVENOUS at 07:06

## 2020-06-24 RX ADMIN — KETOROLAC TROMETHAMINE 30 MG: 30 INJECTION, SOLUTION INTRAMUSCULAR; INTRAVENOUS at 07:06

## 2020-06-24 NOTE — HPI
A 19-year-old male who stepped on a piece of glass while out last night.  Date of injury was June 23, 2020.  I had pain and difficulty walking.  X-rays showed a piece of glass and the ER doctor was unable to get it out in the emergency room.

## 2020-06-24 NOTE — SUBJECTIVE & OBJECTIVE
Past Medical History:   Diagnosis Date    Allergy     AR    Asthma     mild intermittent       No past surgical history on file.    Review of patient's allergies indicates:   Allergen Reactions    Shrimp        Current Facility-Administered Medications   Medication    lidocaine HCL 10 mg/ml (1%) injection 10 mL     Family History     Problem Relation (Age of Onset)    Arthritis Maternal Grandmother    Asthma Brother, Maternal Grandmother, Brother    COPD Maternal Grandmother    Emphysema Maternal Grandmother    Hyperlipidemia Maternal Grandmother        Tobacco Use    Smoking status: Never Smoker    Smokeless tobacco: Never Used   Substance and Sexual Activity    Alcohol use: No    Drug use: Yes     Frequency: 2.0 times per week     Types: Marijuana    Sexual activity: Not on file     Review of Systems   Constitution: Negative for chills and fever.   HENT: Negative for congestion.    Eyes: Negative for blurred vision.   Cardiovascular: Negative for chest pain and syncope.   Respiratory: Negative for cough and shortness of breath.    Endocrine: Negative for polyuria.   Hematologic/Lymphatic: Negative for bleeding problem. Does not bruise/bleed easily.   Skin: Negative for rash.   Musculoskeletal: Positive for joint pain. Negative for falls, joint swelling, muscle cramps, muscle weakness and myalgias.   Gastrointestinal: Negative for abdominal pain, nausea and vomiting.   Genitourinary: Negative for flank pain.   Neurological: Negative for numbness and seizures.   Psychiatric/Behavioral: Negative for altered mental status.   Allergic/Immunologic: Negative for persistent infections.     Objective:     Vital Signs (Most Recent):  Temp: 98.8 °F (37.1 °C) (06/24/20 1743)  Pulse: 91 (06/24/20 1743)  Resp: 18 (06/24/20 1743)  BP: (!) 140/90 (06/24/20 1743)  SpO2: 97 % (06/24/20 1743) Vital Signs (24h Range):  Temp:  [98.3 °F (36.8 °C)-98.8 °F (37.1 °C)] 98.8 °F (37.1 °C)  Pulse:  [67-91] 91  Resp:  [18] 18  SpO2:   [97 %-99 %] 97 %  BP: (126-142)/(72-94) 140/90     Weight: 70.3 kg (155 lb)     Body mass index is 22.89 kg/m².    No intake or output data in the 24 hours ending 06/24/20 1757    General    Nursing note and vitals reviewed.  Constitutional: He is oriented to person, place, and time. He appears well-developed and well-nourished. No distress.   HENT:   Nose: Nose normal.   Eyes: EOM are normal.   Cardiovascular: Regular rhythm.    Pulmonary/Chest: Effort normal.   Abdominal: Soft.   Neurological: He is alert and oriented to person, place, and time.   Psychiatric: His behavior is normal.         Right Ankle/Foot Exam   Right ankle exam is normal.    Left Ankle/Foot Exam     Pain   The patient exhibits pain of the fifth metatarsal base.    Other   Sensation: normal    Comments:  Small laceration to the plantar surface underneath the MTP joint area of the small toe.  Mild bleeding.      Vascular Exam       Left Pulses  Dorsalis Pedis:      2+              Significant Labs: CBC: No results for input(s): WBC, HGB, HCT, PLT in the last 48 hours.  CMP: No results for input(s): NA, K, CL, CO2, GLU, BUN, CREATININE, CALCIUM, PROT, ALBUMIN, BILITOT, ALKPHOS, AST, ALT, ANIONGAP, EGFRNONAA in the last 48 hours.    Invalid input(s): ESTGFAFRICA  All pertinent labs within the past 24 hours have been reviewed.    Significant Imaging: X-Ray: I have reviewed all pertinent results/findings and my personal findings are:  retained foreign body, looks like glass beneath 5th MTP

## 2020-06-24 NOTE — CONSULTS
[facility]  Orthopedics  Consult Note    Patient Name: gAus Horan  MRN: [id number]  Admission Date: 6/24/2020  Hospital Length of Stay: 0 days  Attending Provider: No att. providers found  Primary Care Provider: Primary Doctor No    Patient information was obtained from patient, past medical records and ER records.     Inpatient consult to Orthopedic Surgery  Consult performed by: Dani Garcia MD  Consult ordered by: Gelacio Spear MD  Reason for consult: L foot laceration        Subjective:     Principal Problem:<principal problem not specified>    Chief Complaint:   Chief Complaint   Patient presents with    Laceration     to left foot last night        HPI: A 19-year-old male who stepped on a piece of glass while out last night.  Date of injury was June 23, 2020.  I had pain and difficulty walking.  X-rays showed a piece of glass and the ER doctor was unable to get it out in the emergency room.    Past Medical History:   Diagnosis Date    Allergy     AR    Asthma     mild intermittent       No past surgical history on file.    Review of patient's allergies indicates:   Allergen Reactions    Shrimp        Current Facility-Administered Medications   Medication    lidocaine HCL 10 mg/ml (1%) injection 10 mL     Family History     Problem Relation (Age of Onset)    Arthritis Maternal Grandmother    Asthma Brother, Maternal Grandmother, Brother    COPD Maternal Grandmother    Emphysema Maternal Grandmother    Hyperlipidemia Maternal Grandmother        Tobacco Use    Smoking status: Never Smoker    Smokeless tobacco: Never Used   Substance and Sexual Activity    Alcohol use: No    Drug use: Yes     Frequency: 2.0 times per week     Types: Marijuana    Sexual activity: Not on file     Review of Systems   Constitution: Negative for chills and fever.   HENT: Negative for congestion.    Eyes: Negative for blurred vision.   Cardiovascular: Negative for chest pain and syncope.    Respiratory: Negative for cough and shortness of breath.    Endocrine: Negative for polyuria.   Hematologic/Lymphatic: Negative for bleeding problem. Does not bruise/bleed easily.   Skin: Negative for rash.   Musculoskeletal: Positive for joint pain. Negative for falls, joint swelling, muscle cramps, muscle weakness and myalgias.   Gastrointestinal: Negative for abdominal pain, nausea and vomiting.   Genitourinary: Negative for flank pain.   Neurological: Negative for numbness and seizures.   Psychiatric/Behavioral: Negative for altered mental status.   Allergic/Immunologic: Negative for persistent infections.     Objective:     Vital Signs (Most Recent):  Temp: 98.8 °F (37.1 °C) (06/24/20 1743)  Pulse: 91 (06/24/20 1743)  Resp: 18 (06/24/20 1743)  BP: (!) 140/90 (06/24/20 1743)  SpO2: 97 % (06/24/20 1743) Vital Signs (24h Range):  Temp:  [98.3 °F (36.8 °C)-98.8 °F (37.1 °C)] 98.8 °F (37.1 °C)  Pulse:  [67-91] 91  Resp:  [18] 18  SpO2:  [97 %-99 %] 97 %  BP: (126-142)/(72-94) 140/90     Weight: 70.3 kg (155 lb)     Body mass index is 22.89 kg/m².    No intake or output data in the 24 hours ending 06/24/20 1757    General    Nursing note and vitals reviewed.  Constitutional: He is oriented to person, place, and time. He appears well-developed and well-nourished. No distress.   HENT:   Nose: Nose normal.   Eyes: EOM are normal.   Cardiovascular: Regular rhythm.    Pulmonary/Chest: Effort normal.   Abdominal: Soft.   Neurological: He is alert and oriented to person, place, and time.   Psychiatric: His behavior is normal.         Right Ankle/Foot Exam   Right ankle exam is normal.    Left Ankle/Foot Exam     Pain   The patient exhibits pain of the fifth metatarsal base.    Other   Sensation: normal    Comments:  Small laceration to the plantar surface underneath the MTP joint area of the small toe.  Mild bleeding.      Vascular Exam       Left Pulses  Dorsalis Pedis:      2+              Significant Labs: CBC: No  results for input(s): WBC, HGB, HCT, PLT in the last 48 hours.  CMP: No results for input(s): NA, K, CL, CO2, GLU, BUN, CREATININE, CALCIUM, PROT, ALBUMIN, BILITOT, ALKPHOS, AST, ALT, ANIONGAP, EGFRNONAA in the last 48 hours.    Invalid input(s): ESTGF[place]  All pertinent labs within the past 24 hours have been reviewed.    Significant Imaging: X-Ray: I have reviewed all pertinent results/findings and my personal findings are:  retained foreign body, looks like glass beneath 5th MTP    Assessment/Plan:     Foreign body in left foot  To OR for foreign body removal        Thank you for your consult. I will follow-up with patient. Please contact us if you have any additional questions.    Dani Garcia MD  Orthopedics  [facility]

## 2020-06-24 NOTE — ED PROVIDER NOTES
Encounter Date: 6/24/2020       History     Chief Complaint   Patient presents with    Laceration     to left foot last night     Patient is a 19-year-old male who presents emergency room for evaluation of a laceration to his left foot that happened last night.  He was wearing flip-flops and is drinking.  Does not feel like he has a foreign body but the lacerations becoming painful.  He is unclear of his last tetanus shot.  Pain is now radiating upwards towards his lateral foot.  Laceration is over the distal left lateral foot        Review of patient's allergies indicates:   Allergen Reactions    Shrimp      Past Medical History:   Diagnosis Date    Allergy     AR    Asthma     mild intermittent     No past surgical history on file.  Family History   Problem Relation Age of Onset    Asthma Brother     Emphysema Maternal Grandmother     Hyperlipidemia Maternal Grandmother     Asthma Maternal Grandmother     Arthritis Maternal Grandmother     COPD Maternal Grandmother     Asthma Brother      Social History     Tobacco Use    Smoking status: Never Smoker    Smokeless tobacco: Never Used   Substance Use Topics    Alcohol use: No    Drug use: Yes     Frequency: 2.0 times per week     Types: Marijuana     Review of Systems   Constitutional: Negative for fever.   Musculoskeletal: Positive for gait problem. Negative for back pain.   Skin: Positive for color change and wound.   Neurological: Negative for weakness and numbness.       Physical Exam     Initial Vitals [06/24/20 1156]   BP Pulse Resp Temp SpO2   (!) 142/94 75 18 98.3 °F (36.8 °C) 99 %      MAP       --         Physical Exam    Nursing note and vitals reviewed.  Constitutional: He appears well-developed and well-nourished. No distress.   Neurological: He is alert and oriented to person, place, and time. He has normal strength. No sensory deficit. GCS score is 15. GCS eye subscore is 4. GCS verbal subscore is 5. GCS motor subscore is 6.   Skin:  Skin is warm and dry.   1 cm laceration to the left foot just lateral to the ball of the foot near the 5th metatarsal.  Slight erythema to the lateral foot near the distal 5th metatarsal.  No purulent drainage from the wound.           ED Course   Foreign Body    Date/Time: 6/24/2020 8:51 PM  Performed by: Gelacio Spear MD  Authorized by: Gelacio Spear MD   Body area: skin  General location: lower extremity  Location details: left foot  Anesthesia: local infiltration    Anesthesia:  Local Anesthetic: lidocaine 1% without epinephrine  Anesthetic total: 5 mL  Patient sedated: no  Patient restrained: no  Localization method: ultrasound  Removal mechanism: hemostat and forceps  Tendon involvement: none  Depth: deep  Complexity: simple  Comments: I was unable to remove glass from the patient's foot.  He had to go to the operating room.  I spoke with the orthopedist, Dr. Garcia who took him to the OR.       Labs Reviewed - No data to display       Imaging Results          X-Ray Foot Complete Left (Final result)  Result time 06/24/20 12:36:44    Final result by Wayne Becerra Jr., MD (06/24/20 12:36:44)                 Impression:      7 mm long radiodense splinter seen in the soft tissues above the 5th metatarsophalangeal joint.      Electronically signed by: Wayne Becerra MD  Date:    06/24/2020  Time:    12:36             Narrative:    EXAMINATION:  XR FOOT COMPLETE 3 VIEW LEFT    CLINICAL HISTORY:  .  Laceration without foreign body, left foot, initial encounter    TECHNIQUE:  AP, lateral and oblique views of the left foot were performed.    COMPARISON:  None    FINDINGS:  A 7 mm long splinter of radiodense material is seen above the 5th metatarsophalangeal joint.  A fracture or other bone loss is not seen.  The rest of the bones of the left foot are intact.  Soft tissue swelling is not noted.                                                   ED Course as of Jun 24 1520   Wed Jun 24, 2020   6294 I spoke  with the orthopedist to will take the patient to the operating room to remove the foreign body.  I was unable to remove it at the bedside.    [JS]      ED Course User Index  [JS] Gelacio Spear MD                Clinical Impression:       ICD-10-CM ICD-9-CM   1. Laceration of left foot  S91.312A 892.0         Disposition:   Disposition: Placed in Observation     ED Disposition Condition    Observation                           Gelacio Spear MD  06/24/20 2052

## 2020-06-25 NOTE — PLAN OF CARE
Discharge instructions given to pt, verbalized understanding and questions answered. Handouts provided. Belongings given back to pt. IV removed. Pain tolerable. Ice pack given to pt. Ambulated well. Rx x2 given to pt. Discharge via wheelchair.

## 2020-06-25 NOTE — PLAN OF CARE
Released from [facility] when criteria met  2045 report to ONOFRE barrera rn pain controlled 3/10 vs reported to dr griffith no new orders   skin w+d No nausea No emesis dsg dry intact aaox4 encouraged deep breaths Pt has all belongings ace wrap to l foot clean and dry

## 2020-06-25 NOTE — DISCHARGE SUMMARY
[facility]  Discharge Note  Short Stay    Admit Date: 6/24/2020    Discharge Date and Time: 6/24/2020    Attending Physician: No att. providers found     Discharge Provider: Dani Garcia    Diagnoses:  Active Hospital Problems    Diagnosis  POA    *Foreign body in left foot [S90.852A]  Yes      Resolved Hospital Problems   No resolved problems to display.       Discharged Condition: good    Hospital Course: Patient was admitted for an outpatient procedure and tolerated the procedure well with no complications.    Final Diagnoses: Same as principal problem.    Disposition: Home or Self Care    Follow up/Patient Instructions:    Medications:  Reconciled Home Medications:      Medication List      START taking these medications    HYDROcodone-acetaminophen 5-325 mg per tablet  Commonly known as: NORCO  Take 1 tablet by mouth every 6 (six) hours as needed for Pain.  Replaces: hydrocodone-apap 7.5-325 MG/15 ML oral solution        CONTINUE taking these medications    albuterol 90 mcg/actuation inhaler  Commonly known as: PROVENTIL/VENTOLIN  Inhale 2 puffs into the lungs every 4 (four) hours as needed for Wheezing (cough).     amoxicillin 875 MG tablet  Commonly known as: AMOXIL  Take 1 tablet (875 mg total) by mouth 2 (two) times daily.     cetirizine 10 MG tablet  Commonly known as: ZYRTEC  Take 1 tablet (10 mg total) by mouth once daily.     fluticasone propionate 50 mcg/actuation nasal spray  Commonly known as: FLONASE  1 spray (50 mcg total) by Each Nare route 2 (two) times daily as needed.     ibuprofen 600 MG tablet  Commonly known as: ADVIL,MOTRIN  Take 1 tablet (600 mg total) by mouth every 8 (eight) hours as needed for Pain.        STOP taking these medications    hydrocodone-apap 7.5-325 MG/15 ML oral solution  Commonly known as: HYCET  Replaced by: HYDROcodone-acetaminophen 5-325 mg per tablet          Discharge Procedure Orders   Diet Adult Regular     Keep surgical extremity  elevated     Ice to affected area     Call MD for:  temperature >100.4     Call MD for:  severe uncontrolled pain     Call MD for:  redness, tenderness, or signs of infection (pain, swelling, redness, odor or green/yellow discharge around incision site)     Change dressing (specify)   Order Comments: Dressing change: One time per day using Waterproof Bandaids.     Remove dressing in 48 hours     Activity as tolerated     Shower on day dressing removed (No bath)     Weight bearing restrictions (specify):     Follow-up Information     Dani Garcia MD In 2 weeks.    Specialties: Sports Medicine, Orthopedic Surgery  Why: For suture removal  Contact information:  [address] [place] [address] [address] Agueda PANIAGUA [address]  [phone]                   Discharge Procedure Orders (must include Diet, Follow-up, Activity):   Discharge Procedure Orders (must include Diet, Follow-up, Activity)   Diet Adult Regular     Keep surgical extremity elevated     Ice to affected area     Call MD for:  temperature >100.4     Call MD for:  severe uncontrolled pain     Call MD for:  redness, tenderness, or signs of infection (pain, swelling, redness, odor or green/yellow discharge around incision site)     Change dressing (specify)   Order Comments: Dressing change: One time per day using Waterproof Bandaids.     Remove dressing in 48 hours     Activity as tolerated     Shower on day dressing removed (No bath)     Weight bearing restrictions (specify):

## 2020-06-25 NOTE — H&P
[facility]  Orthopedics  H&P Note     Patient Name: Agus Horan  MRN: [id number]  Admission Date: 6/24/2020  Hospital Length of Stay: 0 days  Attending Provider: No att. providers found  Primary Care Provider: Primary Doctor No     Patient information was obtained from patient, past medical records and ER records.      Inpatient consult to Orthopedic Surgery  Consult performed by: Dani Garcia MD  Consult ordered by: Gelacio Spear MD  Reason for consult: L foot laceration        Subjective:      Principal Problem:<principal problem not specified>     Chief Complaint:        Chief Complaint   Patient presents with    Laceration       to left foot last night         HPI: A 19-year-old male who stepped on a piece of glass while out last night.  Date of injury was June 23, 2020.  I had pain and difficulty walking.  X-rays showed a piece of glass and the ER doctor was unable to get it out in the emergency room.          Past Medical History:   Diagnosis Date    Allergy       AR    Asthma       mild intermittent         No past surgical history on file.          Review of patient's allergies indicates:   Allergen Reactions    Shrimp               Current Facility-Administered Medications   Medication    lidocaine HCL 10 mg/ml (1%) injection 10 mL           Family History      Problem Relation (Age of Onset)     Arthritis Maternal Grandmother     Asthma Brother, Maternal Grandmother, Brother     COPD Maternal Grandmother     Emphysema Maternal Grandmother     Hyperlipidemia Maternal Grandmother                Tobacco Use    Smoking status: Never Smoker    Smokeless tobacco: Never Used   Substance and Sexual Activity    Alcohol use: No    Drug use: Yes       Frequency: 2.0 times per week       Types: Marijuana    Sexual activity: Not on file      Review of Systems   Constitution: Negative for chills and fever.   HENT: Negative for congestion.    Eyes: Negative for blurred vision.    Cardiovascular: Negative for chest pain and syncope.   Respiratory: Negative for cough and shortness of breath.    Endocrine: Negative for polyuria.   Hematologic/Lymphatic: Negative for bleeding problem. Does not bruise/bleed easily.   Skin: Negative for rash.   Musculoskeletal: Positive for joint pain. Negative for falls, joint swelling, muscle cramps, muscle weakness and myalgias.   Gastrointestinal: Negative for abdominal pain, nausea and vomiting.   Genitourinary: Negative for flank pain.   Neurological: Negative for numbness and seizures.   Psychiatric/Behavioral: Negative for altered mental status.   Allergic/Immunologic: Negative for persistent infections.      Objective:      Vital Signs (Most Recent):  Temp: 98.8 °F (37.1 °C) (06/24/20 1743)  Pulse: 91 (06/24/20 1743)  Resp: 18 (06/24/20 1743)  BP: (!) 140/90 (06/24/20 1743)  SpO2: 97 % (06/24/20 1743) Vital Signs (24h Range):  Temp:  [98.3 °F (36.8 °C)-98.8 °F (37.1 °C)] 98.8 °F (37.1 °C)  Pulse:  [67-91] 91  Resp:  [18] 18  SpO2:  [97 %-99 %] 97 %  BP: (126-142)/(72-94) 140/90      Weight: 70.3 kg (155 lb)     Body mass index is 22.89 kg/m².     No intake or output data in the 24 hours ending 06/24/20 1757     General     Nursing note and vitals reviewed.  Constitutional: He is oriented to person, place, and time. He appears well-developed and well-nourished. No distress.   HENT:   Nose: Nose normal.   Eyes: EOM are normal.   Cardiovascular: Regular rhythm.    Pulmonary/Chest: Effort normal.   Abdominal: Soft.   Neurological: He is alert and oriented to person, place, and time.   Psychiatric: His behavior is normal.            Right Ankle/Foot Exam   Right ankle exam is normal.     Left Ankle/Foot Exam      Pain   The patient exhibits pain of the fifth metatarsal base.     Other   Sensation: normal     Comments:  Small laceration to the plantar surface underneath the MTP joint area of the small toe.  Mild bleeding.        Vascular Exam         Left  Pulses  Dorsalis Pedis:      2+                    Significant Labs: CBC: No results for input(s): WBC, HGB, HCT, PLT in the last 48 hours.  CMP: No results for input(s): NA, K, CL, CO2, GLU, BUN, CREATININE, CALCIUM, PROT, ALBUMIN, BILITOT, ALKPHOS, AST, ALT, ANIONGAP, EGFRNONAA in the last 48 hours.     Invalid input(s): ESTGF[place]  All pertinent labs within the past 24 hours have been reviewed.     Significant Imaging: X-Ray: I have reviewed all pertinent results/findings and my personal findings are:  retained foreign body, looks like glass beneath 5th MTP    Assessment/Plan:      Foreign body in left foot  To OR for foreign body removal          Thank you for your consult. I will follow-up with patient. Please contact us if you have any additional questions.     Dani Garcia MD  Orthopedics  [facility]

## 2020-06-25 NOTE — TRANSFER OF CARE
Anesthesia Transfer of Care Note    Patient: Agus Horan    Procedure(s) Performed: Procedure(s) (LRB):  REMOVAL, FOREIGN BODY, FOOT (Left)    Patient location: PACU    Anesthesia Type: general    Transport from OR: Transported from OR on 2-3 L/min O2 by NC with adequate spontaneous ventilation    Post pain: adequate analgesia    Post assessment: no apparent anesthetic complications and tolerated procedure well    Post vital signs: stable    Level of consciousness: sedated and responds to stimulation    Nausea/Vomiting: no nausea/vomiting    Complications: none    Transfer of care protocol was followed      Last vitals:   Visit Vitals  BP (!) 110/57   Pulse 110   Temp 37.1 °C (98.8 °F)   Resp 14   Wt 70.3 kg (155 lb)   SpO2 99%   BMI 22.89 kg/m²

## 2020-06-25 NOTE — OP NOTE
[facility]  Orthopedic Surgery  Operative Note    SUMMARY     Date of Procedure: 6/24/2020     Procedure: Procedure(s) (LRB):  REMOVAL, FOREIGN BODY, FOOT (Left)       Surgeon(s) and Role:     * Dani Garcia MD - Primary    Assistant:  None    Pre-Operative Diagnosis: Laceration of left foot [S91.312A]  Foreign body left foot    Post-Operative Diagnosis: Post-Op Diagnosis Codes:     * Laceration of left foot [S91.312A]  Foreign body left foot    Anesthesia:  General with LMA        Complications: No    Estimated Blood Loss (EBL): 1 mL  Implants: * No implants in log *    Specimens:   Specimen (12h ago, onward)    None                  Condition: Good    Disposition: PACU - hemodynamically stable.    Attestation: I was present and scrubbed for the entire procedure.    INDICATIONS FOR THE PROCEDURE:  This is a 19-year-old male who the night prior to the procedure stepped on some glass.  They were unable to remove it in the emergency room severe was taken back to the operating room for wound exploration and foreign body removal.    PROCEDURE IN DETAIL: Risks, benefits and alternatives of the procedure were   explained to the patient including, but not limited to damage to nerves,   arteries, blood vessels. Also explained risk of infection, DVT, PE, continued pain due to arthritis,  as well as   anesthetic complications including seizure, stroke, heart attack and death. They  understood this and signed informed consent. The patient's left foot was marked prior to coming to the Operating Room. Once there a formal   timeout was done in which correct patient, procedure and op site were all   correctly identified and confirmed by the entire operating team. Laryngeal mask anesthesia was induced. The   patient's Left lower extremity was prepped and draped in normal sterile fashion.   Patient had a laceration to the bottom of his foot under the 5th metatarsal head.  This likely was extended in the  emergency room.  We brought in fluoroscopy and were able to identify the glass under fluoroscopy and a hemostat was used to remove it without complication.  We then copiously irrigated out the wound.  Wound was then closed with 3 O nylon in simple interrupted fashion.  Sterile dressing was applied.

## 2020-06-25 NOTE — PLAN OF CARE
Called his cousin who he is living with right now and discuss discharge instructions with her, verbalized understanding. Questions answered.

## 2020-06-25 NOTE — ANESTHESIA POSTPROCEDURE EVALUATION
Anesthesia Post Evaluation    Patient: Agus Horan    Procedure(s) Performed: Procedure(s) (LRB):  REMOVAL, FOREIGN BODY, FOOT (Left)    Final Anesthesia Type: general    Patient location during evaluation: PACU  Patient participation: Yes- Able to Participate  Level of consciousness: awake and alert  Post-procedure vital signs: reviewed and stable  Pain management: adequate  Airway patency: patent    PONV status at discharge: No PONV  Anesthetic complications: no      Cardiovascular status: hemodynamically stable  Respiratory status: unassisted and room air  Hydration status: euvolemic  Follow-up not needed.          Vitals Value Taken Time   /91 06/24/20 2058   Temp 37.1 °C (98.8 °F) 06/24/20 2045   Pulse 108 06/24/20 2100   Resp 15 06/24/20 2049   SpO2 96 % 06/24/20 2100   Vitals shown include unvalidated device data.      Event Time   Out of Recovery 20:45:00         Pain/Rhonda Score: Pain Rating Prior to Med Admin: 5 (6/24/2020  8:50 PM)  Pain Rating Post Med Admin: 3 (6/24/2020  8:50 PM)  Rhonda Score: 10 (6/24/2020  9:00 PM)

## 2020-06-25 NOTE — ANESTHESIA PROCEDURE NOTES
Intubation  Performed by: Kristopher Martinez CRNA  Authorized by: Jonnathan Rodriguez MD     Intubation:     Induction:  Intravenous    Intubated:  Postinduction    Mask Ventilation:  N/a    Attempts:  1    Attempted By:  CRNA    Difficult Airway Encountered?: No      Complications:  None    Airway Device:  Supraglottic airway/LMA    Airway Device Size:  4.0    Style/Cuff Inflation:  Cuffed (inflated to minimal occlusive pressure)    Placement Verified By:  Capnometry    Complicating Factors:  None    Findings Post-Intubation:  BS equal bilateral and atraumatic/condition of teeth unchanged

## 2020-06-25 NOTE — DISCHARGE INSTRUCTIONS
"Discharge Instructions: After Your Surgery/Procedure  Youve just had surgery. During surgery you were given medicine called anesthesia to keep you relaxed and free of pain. After surgery you may have some pain or nausea. This is common. Here are some tips for feeling better and getting well after surgery.     Stay on schedule with your medication.   Going home  Your doctor or nurse will show you how to take care of yourself when you go home. He or she will also answer your questions. Have an adult family member or friend drive you home.      For your safety we recommend these precaution for the first 24 hours after your procedure:  · Do not drive or use heavy equipment.  · Do not make important decisions or sign legal papers.  · Do not drink alcohol.  · Have someone stay with you, if needed. He or she can watch for problems and help keep you safe.  · Your concentration, balance, coordination, and judgement may be impaired for many hours after anesthesia.  Use caution when ambulating or standing up.     · You may feel weak and "washed out" after anesthesia and surgery.      Subtle residual effects of general anesthesia or sedation with regional / local anesthesia can last more than 24 hours.  Rest for the remainder of the day or longer if your Doctor/Surgeon has advised you to do so.  Although you may feel normal within the first 24 hours, your reflexes and mental ability may be impaired without you realizing it.  You may feel dizzy, lightheaded or sleepy for 24 hours or longer.      Be sure to go to all follow-up visits with your doctor. And rest after your surgery for as long as your doctor tells you to.  Coping with pain  If you have pain after surgery, pain medicine will help you feel better. Take it as told, before pain becomes severe. Also, ask your doctor or pharmacist about other ways to control pain. This might be with heat, ice, or relaxation. And follow any other instructions your surgeon or nurse gives " you.  Tips for taking pain medicine  To get the best relief possible, remember these points:  · Pain medicines can upset your stomach. Taking them with a little food may help.  · Most pain relievers taken by mouth need at least 20 to 30 minutes to start to work.  · Taking medicine on a schedule can help you remember to take it. Try to time your medicine so that you can take it before starting an activity. This might be before you get dressed, go for a walk, or sit down for dinner.  · Constipation is a common side effect of pain medicines. Call your doctor before taking any medicines such as laxatives or stool softeners to help ease constipation. Also ask if you should skip any foods. Drinking lots of fluids and eating foods such as fruits and vegetables that are high in fiber can also help. Remember, do not take laxatives unless your surgeon has prescribed them.  · Drinking alcohol and taking pain medicine can cause dizziness and slow your breathing. It can even be deadly. Do not drink alcohol while taking pain medicine.  · Pain medicine can make you react more slowly to things. Do not drive or run machinery while taking pain medicine.  Your health care provider may tell you to take acetaminophen to help ease your pain. Ask him or her how much you are supposed to take each day. Acetaminophen or other pain relievers may interact with your prescription medicines or other over-the-counter (OTC) drugs. Some prescription medicines have acetaminophen and other ingredients. Using both prescription and OTC acetaminophen for pain can cause you to overdose. Read the labels on your OTC medicines with care. This will help you to clearly know the list of ingredients, how much to take, and any warnings. It may also help you not take too much acetaminophen. If you have questions or do not understand the information, ask your pharmacist or health care provider to explain it to you before you take the OTC medicine.  Managing  nausea  Some people have an upset stomach after surgery. This is often because of anesthesia, pain, or pain medicine, or the stress of surgery. These tips will help you handle nausea and eat healthy foods as you get better. If you were on a special food plan before surgery, ask your doctor if you should follow it while you get better. These tips may help:  · Do not push yourself to eat. Your body will tell you when to eat and how much.  · Start off with clear liquids and soup. They are easier to digest.  · Next try semi-solid foods, such as mashed potatoes, applesauce, and gelatin, as you feel ready.  · Slowly move to solid foods. Dont eat fatty, rich, or spicy foods at first.  · Do not force yourself to have 3 large meals a day. Instead eat smaller amounts more often.  · Take pain medicines with a small amount of solid food, such as crackers or toast, to avoid nausea.     Call your surgeon if  · You still have pain an hour after taking medicine. The medicine may not be strong enough.  · You feel too sleepy, dizzy, or groggy. The medicine may be too strong.  · You have side effects like nausea, vomiting, or skin changes, such as rash, itching, or hives.       If you have obstructive sleep apnea  You were given anesthesia medicine during surgery to keep you comfortable and free of pain. After surgery, you may have more apnea spells because of this medicine and other medicines you were given. The spells may last longer than usual.   At home:  · Keep using the continuous positive airway pressure (CPAP) device when you sleep. Unless your health care provider tells you not to, use it when you sleep, day or night. CPAP is a common device used to treat obstructive sleep apnea.  · Talk with your provider before taking any pain medicine, muscle relaxants, or sedatives. Your provider will tell you about the possible dangers of taking these medicines.  © 0580-7999 [employer]. [address] [place] [address]  [place] [address]. All rights reserved. This information is not intended as a substitute for professional medical care. Always follow your healthcare professional's instructions.      General Information:    1.  Do not drink alcoholic beverages including beer for 24 hours or as long as you are on pain medication..  2.  Do not drive a motor vehicle, operate machinery or power tools, or signs legal papers for 24 hours or as long as you are on pain medication.   3.  You may experience light-headedness, dizziness, and sleepiness following surgery. Please do not stay alone. A responsible adult should be with you for this 24 hour period.  4.  Go home and rest.    5. Progress slowly to a normal diet unless instructed.  Otherwise, begin with liquids such as soft drinks, then soup and crackers working up to solid foods. Drink plenty of nonalcoholic fluids.  6.  Certain anesthetics and pain medications produce nausea and vomiting in certain       individuals. If nausea becomes a problem at home, call you doctor.    7. A nurse will be calling you sometime after surgery. Do not be alarmed. This is our way of finding out how you are doing.    8. Several times every hour while you are awake, take 2-3 deep breaths and cough. If you had stomach surgery hold a pillow or rolled towel firmly against your stomach before you cough. This will help with any pain the cough might cause.  9. Several times every hour while you are awake, pump and flex your feet 5-6 times and do foot circles. This will help prevent blood clots.    10.Call your doctor for severe pain, bleeding, fever, or signs or symptoms of infection (pain, swelling, redness, foul odor, drainage).    Post op instructions for prevention of DVT  What is deep vein thrombosis?  Deep vein thrombosis (DVT) is the medical term for blood clots in the deep veins of the leg.  These blood clots can be dangerous.  A DVT can block a blood vessel and keep blood from getting where it needs to go.   Another problem is that the clot can travel to other parts of the body such as the lungs.  A clot that travels to the lungs is called a pulmonary embolus (PE) and can cause serious problems with breathing which can lead to death.  Am I at risk for DVT/PE?  If you are not very active, you are at risk of DVT.  Anyone confined to bed, sitting for long periods of time, recovering from surgery, etc. increases the risk of DVT.  Other risk factors are cancer diagnosis, certain medications, estrogen replacement in any form,older age, obesity, pregnancy, smoking, history of clotting disorders, and dehydration.  How will I know if I have a DVT?   Swelling in the lower leg   Pain   Warmth, redness, hardness or bulging of the vein  If you have any of these symptoms, call your doctors office right away.  Some people will not have any symptoms until the clot moves to the lungs.  What are the symptoms of a PE?   Panting, shortness of breath, or trouble breathing   Sharp, knife-like chest pain when you breathe   Coughing or coughing up blood   Rapid heartbeat  If you have any of these symptoms or get worse quickly, call 911 for emergency treatment.  How can I prevent a DVT?   Avoid long periods of inactivity and dont cross your legs--get up and walk around every hour or so.   Stay active--walking after surgery is highly encouraged.  This means you should get out of the house and walk in the neighborhood.  Going up and down stairs will not impair healing (unless advised against such activity by your doctor).     Drink plenty of noncaffeinated, nonalcoholic fluids each day to prevent dehydration.   Wear special support stockings, if they have been advised by your doctor.   If you travel, stop at least once an hour and walk around.   Avoid smoking (assistance with stopping is available through your healthcare provider)  Always notify your doctor if you are not able to follow the post operative instructions that are  given to you at the time of discharge.  It may be necessary to prescribe one of the medications available to prevent DVT.    We hope your stay was comfortable as you heal now, mend and rest.    For we have enjoyed taking care of you by giving your our best.    And as you get better, by regaining your health and strength;   We count it as a privilege to have served you and hope your time at [facility] was well spent.      Thank  You!!!

## 2020-07-09 ENCOUNTER — HOSPITAL ENCOUNTER (EMERGENCY)
Facility: HOSPITAL | Age: 20
Discharge: HOME | End: 2020-07-09
Attending: EMERGENCY MEDICINE

## 2020-07-09 VITALS
BODY MASS INDEX: 24.25 KG/M2 | HEART RATE: 82 BPM | TEMPERATURE: 99 F | OXYGEN SATURATION: 100 % | RESPIRATION RATE: 16 BRPM | WEIGHT: 160 LBS | HEIGHT: 68 IN

## 2020-07-09 DIAGNOSIS — Z48.02 VISIT FOR SUTURE REMOVAL: Primary | ICD-10-CM

## 2020-07-09 PROCEDURE — 99281 EMR DPT VST MAYX REQ PHY/QHP: CPT

## 2020-07-09 NOTE — Clinical Note
Agus Horan was seen and treated in our emergency department on 7/9/2020.  He may return to work on 07/10/2020.       If you have any questions or concerns, please don't hesitate to call.       RN
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
## 2021-04-11 ENCOUNTER — HOSPITAL ENCOUNTER (EMERGENCY)
Facility: HOSPITAL | Age: 21
Discharge: HOME | End: 2021-04-11
Attending: EMERGENCY MEDICINE
Payer: COMMERCIAL

## 2021-04-11 VITALS
TEMPERATURE: 99 F | SYSTOLIC BLOOD PRESSURE: 137 MMHG | RESPIRATION RATE: 20 BRPM | WEIGHT: 168 LBS | OXYGEN SATURATION: 95 % | BODY MASS INDEX: 24.88 KG/M2 | DIASTOLIC BLOOD PRESSURE: 71 MMHG | HEART RATE: 99 BPM | HEIGHT: 69 IN
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
## 2021-04-11 DIAGNOSIS — S09.90XA CLOSED HEAD INJURY, INITIAL ENCOUNTER: ICD-10-CM

## 2021-04-11 DIAGNOSIS — S09.93XA FACIAL INJURY, INITIAL ENCOUNTER: ICD-10-CM

## 2021-04-11 PROCEDURE — 99283 EMERGENCY DEPT VISIT LOW MDM: CPT

## 2021-04-16 ENCOUNTER — HOSPITAL ENCOUNTER (EMERGENCY)
Facility: HOSPITAL | Age: 21
Discharge: HOME | End: 2021-04-17
Attending: FAMILY MEDICINE

## 2021-04-16 DIAGNOSIS — T50.901A OVERDOSE: ICD-10-CM

## 2021-04-16 DIAGNOSIS — T50.901A ACCIDENTAL DRUG OVERDOSE, INITIAL ENCOUNTER: Primary | ICD-10-CM

## 2021-04-16 LAB
ALBUMIN SERPL BCP-MCNC: 4.4 G/DL (ref 3.5–5.2)
ALP SERPL-CCNC: 111 U/L (ref 55–135)
ALT SERPL W/O P-5'-P-CCNC: 365 U/L (ref 10–44)
AMPHET+METHAMPHET UR QL: NEGATIVE
ANION GAP SERPL CALC-SCNC: 14 MMOL/L (ref 8–16)
APAP SERPL-MCNC: <10 UG/ML (ref 10–20)
AST SERPL-CCNC: 424 U/L (ref 10–40)
BARBITURATES UR QL SCN>200 NG/ML: NEGATIVE
BASOPHILS # BLD AUTO: 0.06 K/UL (ref 0–0.2)
BASOPHILS NFR BLD: 0.3 % (ref 0–1.9)
BENZODIAZ UR QL SCN>200 NG/ML: NEGATIVE
BILIRUB SERPL-MCNC: 0.5 MG/DL (ref 0.1–1)
BUN SERPL-MCNC: 19 MG/DL (ref 6–20)
BZE UR QL SCN: NEGATIVE
CALCIUM SERPL-MCNC: 9.2 MG/DL (ref 8.7–10.5)
CANNABINOIDS UR QL SCN: NORMAL
CHLORIDE SERPL-SCNC: 103 MMOL/L (ref 95–110)
CO2 SERPL-SCNC: 25 MMOL/L (ref 23–29)
CREAT SERPL-MCNC: 1.4 MG/DL (ref 0.5–1.4)
CREAT UR-MCNC: 108.1 MG/DL (ref 23–375)
DIFFERENTIAL METHOD: ABNORMAL
EOSINOPHIL # BLD AUTO: 0.1 K/UL (ref 0–0.5)
EOSINOPHIL NFR BLD: 0.6 % (ref 0–8)
ERYTHROCYTE [DISTWIDTH] IN BLOOD BY AUTOMATED COUNT: 11.6 % (ref 11.5–14.5)
EST. GFR  (AFRICAN AMERICAN): >60 ML/MIN/1.73 M^2
EST. GFR  (NON AFRICAN AMERICAN): >60 ML/MIN/1.73 M^2
ETHANOL SERPL-MCNC: 6 MG/DL
GLUCOSE SERPL-MCNC: 233 MG/DL (ref 70–110)
HCT VFR BLD AUTO: 48.1 % (ref 40–54)
HGB BLD-MCNC: 15.7 G/DL (ref 14–18)
IMM GRANULOCYTES # BLD AUTO: 0.23 K/UL (ref 0–0.04)
IMM GRANULOCYTES NFR BLD AUTO: 1.1 % (ref 0–0.5)
LYMPHOCYTES # BLD AUTO: 2.5 K/UL (ref 1–4.8)
LYMPHOCYTES NFR BLD: 11.5 % (ref 18–48)
MCH RBC QN AUTO: 30.3 PG (ref 27–31)
MCHC RBC AUTO-ENTMCNC: 32.6 G/DL (ref 32–36)
MCV RBC AUTO: 93 FL (ref 82–98)
MONOCYTES # BLD AUTO: 0.6 K/UL (ref 0.3–1)
MONOCYTES NFR BLD: 2.7 % (ref 4–15)
NEUTROPHILS # BLD AUTO: 18 K/UL (ref 1.8–7.7)
NEUTROPHILS NFR BLD: 83.8 % (ref 38–73)
NRBC BLD-RTO: 0 /100 WBC
OPIATES UR QL SCN: NEGATIVE
PCP UR QL SCN>25 NG/ML: NEGATIVE
PLATELET # BLD AUTO: 225 K/UL (ref 150–450)
PMV BLD AUTO: 9.5 FL (ref 9.2–12.9)
POTASSIUM SERPL-SCNC: 4.5 MMOL/L (ref 3.5–5.1)
PROT SERPL-MCNC: 7.7 G/DL (ref 6–8.4)
RBC # BLD AUTO: 5.18 M/UL (ref 4.6–6.2)
SALICYLATES SERPL-MCNC: <4 MG/DL (ref 15–30)
SODIUM SERPL-SCNC: 142 MMOL/L (ref 136–145)
TOXICOLOGY INFORMATION: NORMAL
WBC # BLD AUTO: 21.45 K/UL (ref 3.9–12.7)

## 2021-04-16 PROCEDURE — 85025 COMPLETE CBC W/AUTO DIFF WBC: CPT | Performed by: FAMILY MEDICINE

## 2021-04-16 PROCEDURE — 80307 DRUG TEST PRSMV CHEM ANLYZR: CPT | Performed by: FAMILY MEDICINE

## 2021-04-16 PROCEDURE — 99285 EMERGENCY DEPT VISIT HI MDM: CPT | Mod: 25

## 2021-04-16 PROCEDURE — 71045 X-RAY EXAM CHEST 1 VIEW: CPT | Mod: TC,FY

## 2021-04-16 PROCEDURE — 80143 DRUG ASSAY ACETAMINOPHEN: CPT | Performed by: FAMILY MEDICINE

## 2021-04-16 PROCEDURE — 71045 X-RAY EXAM CHEST 1 VIEW: CPT | Mod: 26,,, | Performed by: RADIOLOGY

## 2021-04-16 PROCEDURE — 82077 ASSAY SPEC XCP UR&BREATH IA: CPT | Performed by: FAMILY MEDICINE

## 2021-04-16 PROCEDURE — 93005 ELECTROCARDIOGRAM TRACING: CPT

## 2021-04-16 PROCEDURE — 80053 COMPREHEN METABOLIC PANEL: CPT | Performed by: FAMILY MEDICINE

## 2021-04-16 PROCEDURE — 71045 XR CHEST AP PORTABLE: ICD-10-PCS | Mod: 26,,, | Performed by: RADIOLOGY

## 2021-04-16 PROCEDURE — 80179 DRUG ASSAY SALICYLATE: CPT | Performed by: FAMILY MEDICINE

## 2021-04-17 VITALS
SYSTOLIC BLOOD PRESSURE: 118 MMHG | BODY MASS INDEX: 24.44 KG/M2 | WEIGHT: 165 LBS | TEMPERATURE: 98 F | RESPIRATION RATE: 20 BRPM | OXYGEN SATURATION: 97 % | HEIGHT: 69 IN | HEART RATE: 84 BPM | DIASTOLIC BLOOD PRESSURE: 56 MMHG

## 2021-09-04 ENCOUNTER — HOSPITAL ENCOUNTER (EMERGENCY)
Facility: HOSPITAL | Age: 21
Discharge: HOME | End: 2021-09-04
Attending: EMERGENCY MEDICINE

## 2021-09-04 VITALS
TEMPERATURE: 98 F | WEIGHT: 170 LBS | DIASTOLIC BLOOD PRESSURE: 78 MMHG | HEIGHT: 69 IN | RESPIRATION RATE: 16 BRPM | SYSTOLIC BLOOD PRESSURE: 132 MMHG | OXYGEN SATURATION: 98 % | BODY MASS INDEX: 25.18 KG/M2 | HEART RATE: 95 BPM

## 2021-09-04 DIAGNOSIS — F43.0 ACUTE REACTION TO SITUATIONAL STRESS: ICD-10-CM

## 2021-09-04 DIAGNOSIS — T40.601A OPIATE OVERDOSE, ACCIDENTAL OR UNINTENTIONAL, INITIAL ENCOUNTER: Primary | ICD-10-CM

## 2021-09-04 PROCEDURE — 63600175 PHARM REV CODE 636 W HCPCS: Performed by: EMERGENCY MEDICINE

## 2021-09-04 PROCEDURE — 99284 EMERGENCY DEPT VISIT MOD MDM: CPT | Mod: 25

## 2021-09-04 PROCEDURE — 96374 THER/PROPH/DIAG INJ IV PUSH: CPT

## 2021-09-04 PROCEDURE — 96361 HYDRATE IV INFUSION ADD-ON: CPT

## 2021-09-04 PROCEDURE — 25000003 PHARM REV CODE 250: Performed by: EMERGENCY MEDICINE

## 2021-09-04 RX ORDER — NALOXONE HCL 0.4 MG/ML
0.4 VIAL (ML) INJECTION
Status: COMPLETED | OUTPATIENT
Start: 2021-09-04 | End: 2021-09-04

## 2021-09-04 RX ORDER — NALOXONE HYDROCHLORIDE 4 MG/.1ML
SPRAY NASAL
Qty: 2 EACH | Refills: 0 | Status: SHIPPED | OUTPATIENT
Start: 2021-09-04 | End: 2024-03-27

## 2021-09-04 RX ADMIN — NALOXONE HYDROCHLORIDE 0.4 MG: 0.4 INJECTION, SOLUTION INTRAMUSCULAR; INTRAVENOUS; SUBCUTANEOUS at 12:09

## 2021-09-04 RX ADMIN — SODIUM CHLORIDE 500 ML: 0.9 INJECTION, SOLUTION INTRAVENOUS at 12:09

## 2022-04-09 ENCOUNTER — HOSPITAL ENCOUNTER (EMERGENCY)
Facility: HOSPITAL | Age: 22
Discharge: HOME | End: 2022-04-09
Attending: EMERGENCY MEDICINE
Payer: MEDICAID

## 2022-04-09 VITALS
OXYGEN SATURATION: 97 % | RESPIRATION RATE: 19 BRPM | TEMPERATURE: 98 F | WEIGHT: 170 LBS | HEART RATE: 95 BPM | BODY MASS INDEX: 25.1 KG/M2 | SYSTOLIC BLOOD PRESSURE: 143 MMHG | DIASTOLIC BLOOD PRESSURE: 82 MMHG

## 2022-04-09 DIAGNOSIS — T63.461A WASP STING, ACCIDENTAL OR UNINTENTIONAL, INITIAL ENCOUNTER: ICD-10-CM

## 2022-04-09 DIAGNOSIS — T50.901A ACCIDENTAL DRUG OVERDOSE, INITIAL ENCOUNTER: Primary | ICD-10-CM

## 2022-04-09 PROCEDURE — 36415 COLL VENOUS BLD VENIPUNCTURE: CPT | Performed by: EMERGENCY MEDICINE

## 2022-04-09 PROCEDURE — 86803 HEPATITIS C AB TEST: CPT | Performed by: EMERGENCY MEDICINE

## 2022-04-09 PROCEDURE — 87389 HIV-1 AG W/HIV-1&-2 AB AG IA: CPT | Performed by: EMERGENCY MEDICINE

## 2022-04-09 PROCEDURE — 99283 EMERGENCY DEPT VISIT LOW MDM: CPT

## 2022-04-09 RX ORDER — NALOXONE HYDROCHLORIDE 1 MG/ML
INJECTION INTRAMUSCULAR; INTRAVENOUS; SUBCUTANEOUS
Qty: 2 ML | Refills: 11 | Status: SHIPPED | OUTPATIENT
Start: 2022-04-09 | End: 2022-04-13

## 2022-04-10 ENCOUNTER — HOSPITAL ENCOUNTER (EMERGENCY)
Facility: HOSPITAL | Age: 22
Discharge: HOME | End: 2022-04-10
Attending: EMERGENCY MEDICINE
Payer: MEDICAID

## 2022-04-10 VITALS
RESPIRATION RATE: 19 BRPM | WEIGHT: 170 LBS | BODY MASS INDEX: 25.18 KG/M2 | SYSTOLIC BLOOD PRESSURE: 117 MMHG | HEIGHT: 69 IN | HEART RATE: 63 BPM | OXYGEN SATURATION: 98 % | TEMPERATURE: 97 F | DIASTOLIC BLOOD PRESSURE: 64 MMHG

## 2022-04-10 DIAGNOSIS — S05.12XA PERIORBITAL CONTUSION OF LEFT EYE, INITIAL ENCOUNTER: ICD-10-CM
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
## 2022-04-10 DIAGNOSIS — S86.811A STRAIN OF CALF MUSCLE, RIGHT, INITIAL ENCOUNTER: ICD-10-CM

## 2022-04-10 DIAGNOSIS — S39.012A STRAIN OF LUMBAR REGION, INITIAL ENCOUNTER: Primary | ICD-10-CM

## 2022-04-10 PROCEDURE — 96372 THER/PROPH/DIAG INJ SC/IM: CPT | Performed by: PHYSICIAN ASSISTANT

## 2022-04-10 PROCEDURE — 99285 EMERGENCY DEPT VISIT HI MDM: CPT | Mod: 25

## 2022-04-10 PROCEDURE — 63600175 PHARM REV CODE 636 W HCPCS: Performed by: PHYSICIAN ASSISTANT

## 2022-04-10 PROCEDURE — 25000003 PHARM REV CODE 250: Performed by: PHYSICIAN ASSISTANT

## 2022-04-10 RX ORDER — ONDANSETRON 4 MG/1
8 TABLET, ORALLY DISINTEGRATING ORAL
Status: COMPLETED | OUTPATIENT
Start: 2022-04-10 | End: 2022-04-10

## 2022-04-10 RX ORDER — IBUPROFEN 600 MG/1
600 TABLET ORAL EVERY 8 HOURS PRN
Qty: 20 TABLET | Refills: 0 | Status: SHIPPED | OUTPATIENT
Start: 2022-04-10 | End: 2022-04-13

## 2022-04-10 RX ORDER — ACETAMINOPHEN 500 MG
1000 TABLET ORAL
Status: COMPLETED | OUTPATIENT
Start: 2022-04-10 | End: 2022-04-10

## 2022-04-10 RX ORDER — KETOROLAC TROMETHAMINE 30 MG/ML
30 INJECTION, SOLUTION INTRAMUSCULAR; INTRAVENOUS
Status: COMPLETED | OUTPATIENT
Start: 2022-04-10 | End: 2022-04-10

## 2022-04-10 RX ADMIN — ONDANSETRON 8 MG: 4 TABLET, ORALLY DISINTEGRATING ORAL at 03:04

## 2022-04-10 RX ADMIN — ACETAMINOPHEN 1000 MG: 500 TABLET ORAL at 03:04

## 2022-04-10 RX ADMIN — KETOROLAC TROMETHAMINE 30 MG: 30 INJECTION, SOLUTION INTRAMUSCULAR at 03:04

## 2022-04-10 NOTE — ED NOTES
Airway remained patent; pt aaox4; speech clear; steady gait without assist; significant other present; NADN note.Kaushik Mccauley

## 2022-04-10 NOTE — ED NOTES
Pt appears to be anxious and is hyperventilating. Pt was coached on breathing techniques and encouraged to slow down breathing. Pt notes muscle spasms to legs. Pts breathing improved with coaching.
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
## 2022-04-10 NOTE — ED NOTES
Assumed care:  Agus Horan is awake, alert and oriented x 3, skin warm and dry, in NAD with friend at bedside.  Patient found on the floor this morning with no memory of falling.  CO low back pain and right leg pain.  Right calf painful and hard.    Patient identifiers for Agus Horan checked and correct.  LOC:  Agus Horan is awake, alert, and aware of environment with an appropriate affect. He is oriented x 3 and speaking appropriately.  APPEARANCE:  He is resting comfortably and in no acute distress. He is clean and well groomed, patient's clothing is properly fastened.  SKIN:  The skin is warm and dry. He has normal skin turgor and moist mucus membranes. Skin is intact; no bruising or breakdown noted.  MUSCULOSKELETAL:  He is moving all extremities well, no obvious deformities noted. Pulses intact. RLE pain, low back pain  RESPIRATORY:  Airway is open and patent. Respirations are spontaneous and non-labored with normal effort and rate.  CARDIAC:  He has a normal rate and rhythm. No peripheral edema noted. Capillary refill < 3 seconds.  ABDOMEN:  No distention noted.  Soft and non-tender upon palpation.  NEUROLOGICAL:  PERRL. Facial expression is symmetrical. Hand grasps are equal bilaterally. Normal sensation in all extremities when touched with finger.  Allergies reported:    Review of patient's allergies indicates:   Allergen Reactions    Shrimp      OTHER NOTES:

## 2022-04-10 NOTE — ED PROVIDER NOTES
"Encounter Date: 4/10/2022    SCRIBE #1 NOTE: Onelia DUNLAP, am scribing for, and in the presence of, Chika Carrillo PA-C.       History     Chief Complaint   Patient presents with    Leg Injury    Facial Injury     Fall last night      Time seen by provider: 2:33 PM on 04/10/2022    Agus Horan is a 21 y.o. male who presents to the ED with an onset of RLE pain that is emphasized in the hip, thigh and calf.  Patient was seen last night for LOC secondary to an OD on "Oxy" and was discharged to home after Tx.  Patient's friend reports waking up this morning near 8 am to the patient on the floor and in pain.  Patient states current pain is so great that he feels nauseous.  His friend had to assist him down stairs and in walking secondary to pain.  Patient expresses associated RLE numbness and back pain.  No OTC medications taken for management of pain.  The patient denies vomiting, neck pain or any other symptoms at this time.  Patient has a PMHx of asthma and no pertinent PSHx.      The history is provided by the patient and a friend.     Review of patient's allergies indicates:   Allergen Reactions    Shrimp      Past Medical History:   Diagnosis Date    Allergy     AR    Asthma     mild intermittent     Past Surgical History:   Procedure Laterality Date    REMOVAL OF FOREIGN BODY FROM FOOT Left 6/24/2020    Procedure: REMOVAL, FOREIGN BODY, FOOT;  Surgeon: Dani Garcia MD;  Location: [facility];  Service: Orthopedics;  Laterality: Left;     Family History   Problem Relation Age of Onset    Asthma Brother     Emphysema Maternal Grandmother     Hyperlipidemia Maternal Grandmother     Asthma Maternal Grandmother     Arthritis Maternal Grandmother     COPD Maternal Grandmother     Asthma Brother      Social History     Tobacco Use    Smoking status: Never Smoker    Smokeless tobacco: Never Used   Substance Use Topics    Alcohol use: Yes     Comment: last night    Drug use: Yes     " Frequency: 2.0 times per week     Types: Marijuana     Comment: yesterday     Review of Systems   Constitutional: Negative for chills and fever.   HENT: Negative for facial swelling and trouble swallowing.    Eyes: Negative for photophobia, pain, discharge and redness.   Respiratory: Negative for cough, chest tightness, shortness of breath and wheezing.    Cardiovascular: Negative for chest pain and palpitations.   Gastrointestinal: Positive for nausea. Negative for abdominal pain, diarrhea and vomiting.   Genitourinary: Negative for dysuria and hematuria.   Musculoskeletal: Positive for arthralgias, back pain, gait problem and myalgias. Negative for joint swelling, neck pain and neck stiffness.   Skin: Positive for color change. Negative for pallor, rash and wound.   Neurological: Positive for numbness. Negative for dizziness, syncope, weakness, light-headedness and headaches.   Hematological: Does not bruise/bleed easily.   Psychiatric/Behavioral: The patient is not nervous/anxious.        Physical Exam     Initial Vitals [04/10/22 1423]   BP Pulse Resp Temp SpO2   (!) 93/45 109 20 97.3 °F (36.3 °C) 97 %      MAP       --         Physical Exam    Nursing note and vitals reviewed.  Constitutional: He appears well-developed and well-nourished. He is not diaphoretic. No distress.   HENT:   Head: Normocephalic.   Right Ear: External ear normal.   Left Ear: External ear normal.   Nose: Nose normal.   Mouth/Throat: Oropharynx is clear and moist.   Ecchymosis and mild swelling noted to left periorbital region and left eyebrow.    Eyes: Conjunctivae and EOM are normal. Pupils are equal, round, and reactive to light.   Neck: Neck supple.   No midline spinous process tenderness noted to cervical spine.    Normal range of motion.  Cardiovascular: Normal rate, regular rhythm, normal heart sounds and intact distal pulses. Exam reveals no gallop and no friction rub.    No murmur heard.  Pulmonary/Chest: Breath sounds normal. No  respiratory distress. He has no wheezes. He has no rhonchi. He has no rales.   Abdominal: Abdomen is soft. He exhibits no distension and no mass. There is no abdominal tenderness.   Musculoskeletal:         General: Tenderness present. No edema. Normal range of motion.      Cervical back: Normal range of motion and neck supple.      Comments: Multiple areas of TTP noted to right leg, including right hip, right thigh and right lower leg.  Increased pain with flexion and extension of right lower leg and hip, but no decreased ROM, decreased strength or loss of sensation to RLE.  Palpable 2+ pedal pulse.      Neurological: He is alert and oriented to person, place, and time. He has normal strength. No cranial nerve deficit or sensory deficit. GCS score is 15. GCS eye subscore is 4. GCS verbal subscore is 5. GCS motor subscore is 6.   Skin: Skin is warm and dry. No rash and no abscess noted. No erythema.   Psychiatric: He has a normal mood and affect.         ED Course   Procedures  Labs Reviewed - No data to display       Imaging Results          CT Maxillofacial Without Contrast (Final result)  Result time 04/10/22 15:25:41    Final result by Kira Francis MD (04/10/22 15:25:41)                 Impression:      No facial bone fracture.  Minimal sinus disease.      Electronically signed by: Kira Francis  Date:    04/10/2022  Time:    15:25             Narrative:    EXAMINATION:  CT MAXILLOFACIAL WITHOUT CONTRAST    CLINICAL HISTORY:  Facial trauma, blunt;    TECHNIQUE:  Low dose axial images, sagittal and coronal reformations were obtained through the face.  Contrast was not administered.    COMPARISON:  None    FINDINGS:  No hematoma formation identified in the soft tissues of the face.  There is a suggestion of mild subcutaneous edema over the mid face bilaterally.  The globes are intact.  There is no retrobulbar hematoma.  There is no foreign body.    There are no air-fluid levels in the paranasal  sinuses.  There is minimal mucosal thickening in the left maxillary antrum, also in 1 of the right anterior ethmoid air cells.  There is no evidence of a facial bone fracture.  The mandible in the visualized portions of the cervical spine are intact.                               CT Head Without Contrast (Final result)  Result time 04/10/22 15:23:21    Final result by Kira Francis MD (04/10/22 15:23:21)                 Impression:      No acute abnormality.  Minimal sinus disease.      Electronically signed by: Kira Francis  Date:    04/10/2022  Time:    15:23             Narrative:    EXAMINATION:  CT HEAD WITHOUT CONTRAST    CLINICAL HISTORY:  head trauma, headache;    TECHNIQUE:  Low dose axial images were obtained through the head.  Coronal and sagittal reformations were also performed. Contrast was not administered.    COMPARISON:  None    FINDINGS:  There is no intra or extra-axial hemorrhage.  No mass effect, edema or midline shift is present.  The ventricular system and cortical sulcal markings are appropriate for the patient's age.  There is no acute or chronic infarction.    There is no skull fracture.  The visualized paranasal sinuses are clear aside from 1 small focus of mucosal thickening in the left maxillary antrum..                               X-Ray Hips Bilateral 2 View Incl AP Pelvis (Final result)  Result time 04/10/22 15:20:34    Final result by Kira Francis MD (04/10/22 15:20:34)                 Impression:      Normal study.      Electronically signed by: Kira Francis  Date:    04/10/2022  Time:    15:20             Narrative:    EXAMINATION:  XR HIPS BILATERAL 2 VIEW INCL AP PELVIS    CLINICAL HISTORY:  Unspecified fall, initial encounter    TECHNIQUE:  AP view of the pelvis and frogleg lateral views of both hips were performed.    COMPARISON:  None.    FINDINGS:  The hips, pubic symphysis and sacroiliac joints are intact.  There is no evidence of a pelvic  fracture.  Specifically, there is no evidence of a fracture involving either hip.  There is normal bony mineralization with no significant degenerative changes.                               X-Ray Femur Ap/Lat Right (Final result)  Result time 04/10/22 15:19:42    Final result by Kira Francis MD (04/10/22 15:19:42)                 Impression:      Normal study.      Electronically signed by: Kira Francis  Date:    04/10/2022  Time:    15:19             Narrative:    EXAMINATION:  XR FEMUR 2 VIEW RIGHT    CLINICAL HISTORY:  Unspecified fall, initial encounter    TECHNIQUE:  AP and lateral views of the right femur were performed.    COMPARISON:  None    FINDINGS:  The right femur is intact with no evidence of a fracture.  No abnormalities seen at the hip or knee.  Soft tissues are normal in appearance.                               X-Ray Tibia Fibula 2 View Right (Final result)  Result time 04/10/22 15:18:58    Final result by Kira Francis MD (04/10/22 15:18:58)                 Impression:      Normal study.      Electronically signed by: Kira Francis  Date:    04/10/2022  Time:    15:18             Narrative:    EXAMINATION:  XR TIBIA FIBULA 2 VIEW RIGHT    CLINICAL HISTORY:  Unspecified fall, initial encounter    TECHNIQUE:  AP and lateral views of the right tibia and fibula were performed.    COMPARISON:  08/06/2011    FINDINGS:  The tibia and fibula are intact with no evidence of a fracture.  No abnormalities are seen at the knee or ankle.  There are no foreign bodies in the soft tissues.                               X-Ray Lumbar Spine Ap And Lateral (Final result)  Result time 04/10/22 15:16:54    Final result by Kira Francis MD (04/10/22 15:16:54)                 Impression:      Normal study.      Electronically signed by: Kira Francis  Date:    04/10/2022  Time:    15:16             Narrative:    EXAMINATION:  XR LUMBAR SPINE AP AND LATERAL    CLINICAL  HISTORY:  fall;    TECHNIQUE:  AP, lateral and spot images were performed of the lumbar spine.    COMPARISON:  None    FINDINGS:  There are 5 lumbar type vertebra, normally aligned without fracture, subluxation.  There are no significant degenerative changes.  There is normal bony mineralization.                                 Medications   acetaminophen tablet 1,000 mg (1,000 mg Oral Given 4/10/22 1516)   ketorolac injection 30 mg (30 mg Intramuscular Given 4/10/22 1553)   ondansetron disintegrating tablet 8 mg (8 mg Oral Given 4/10/22 1553)     Medical Decision Making:   History:   Old Medical Records: I decided to obtain old medical records.  Differential Diagnosis:   Fracture  Dislocation  Sprain  Contusion  Strain  Spasm  Closed head injury  Overdose     Independently Interpreted Test(s):   I have ordered and independently interpreted X-rays - see prior notes.  Clinical Tests:   Radiological Study: Ordered and Reviewed       APC / Resident Notes:   Head CT and Maxillofacial CT negative.  Xrays of lumbar spine, bilateral hips, pelvis, right femur and right tib/fib negative for acute bony abnormalities, fractures or dislocations.  He will be discharged home with crutches.  He has had three overdoses on narcotics in the last year, most recently yesterday.  No narcotics given here in the ED.  He is highly encouraged to ONLY take Tylenol or Ibuprofen for any pain.  He is encouraged to seek addiction treatment and counseling from [facility] in [place].  He voices understanding and is agreeable to the plan.  He is given specific return precautions.        Scribe Attestation:   Scribe #1: I performed the above scribed service and the documentation accurately describes the services I performed. I attest to the accuracy of the note.               I, Chika Carrillo PA-C, personally performed the services described in this documentation. All medical record entries made by the scribe were at my direction and in my presence.   I have reviewed the chart and agree that the record reflects my personal performance and is accurate and complete. Chika Carrillo PA-C.  5:35 PM 04/10/2022      Clinical Impression:   Final diagnoses:  [W19.XXXA] Fall  [S39.012A] Strain of lumbar region, initial encounter (Primary)  [S86.811A] Strain of calf muscle, right, initial encounter  [S05.12XA] Periorbital contusion of left eye, initial encounter          ED Disposition Condition    Discharge Stable        ED Prescriptions     Medication Sig Dispense Start Date End Date Auth. Provider    ibuprofen (ADVIL,MOTRIN) 600 MG tablet Take 1 tablet (600 mg total) by mouth every 8 (eight) hours as needed for Pain. 20 tablet 4/10/2022  Chika Carrillo PA-C        Follow-up Information     Follow up With Specialties Details Why Contact Info    [facility] Emergency Medicine  As needed, If symptoms worsen [address] [place] [id number] [phone]    [facility], Psychiatry  for therapy/counseling and drug rehab options [id number] [place] [id number]  [phone]             Chika Carrillo PA-C  04/10/22 1738

## 2022-04-10 NOTE — ED NOTES
D/C instructions and Rx in pt possession along with belongings. No other needs at this time. Pt AAOx4, Abc's intact. NADN. No adverse reaction to medication given. Pt assisted to friends vehicle with his boyfriend.

## 2022-04-11 ENCOUNTER — PATIENT OUTREACH (OUTPATIENT)
Dept: EMERGENCY MEDICINE | Facility: HOSPITAL | Age: 22
End: 2022-04-11

## 2022-04-11 LAB
HCV AB SERPL QL IA: NEGATIVE
HIV 1+2 AB+HIV1 P24 AG SERPL QL IA: NEGATIVE

## 2022-04-13 ENCOUNTER — ANESTHESIA EVENT (OUTPATIENT)
Dept: SURGERY | Facility: HOSPITAL | Age: 22
DRG: 957 | End: 2022-04-13
Payer: MEDICAID

## 2022-04-13 ENCOUNTER — ANESTHESIA (OUTPATIENT)
Dept: SURGERY | Facility: HOSPITAL | Age: 22
DRG: 957 | End: 2022-04-13
Payer: MEDICAID

## 2022-04-13 ENCOUNTER — HOSPITAL ENCOUNTER (INPATIENT)
Facility: HOSPITAL | Age: 22
LOS: 28 days | Discharge: NURSING, REHAB OR LONG-TERM CARE | DRG: 957 | End: 2022-05-11
Attending: EMERGENCY MEDICINE | Admitting: HOSPITALIST
Payer: MEDICAID

## 2022-04-13 DIAGNOSIS — E83.52 HYPERCALCEMIA: ICD-10-CM

## 2022-04-13 DIAGNOSIS — T79.A0XA COMPARTMENT SYNDROME: Primary | ICD-10-CM

## 2022-04-13 DIAGNOSIS — T79.6XXA TRAUMATIC RHABDOMYOLYSIS, INITIAL ENCOUNTER: ICD-10-CM

## 2022-04-13 DIAGNOSIS — R06.02 SHORTNESS OF BREATH: ICD-10-CM

## 2022-04-13 DIAGNOSIS — N17.9 ACUTE RENAL FAILURE, UNSPECIFIED ACUTE RENAL FAILURE TYPE: ICD-10-CM

## 2022-04-13 DIAGNOSIS — R00.0 TACHYCARDIA: ICD-10-CM

## 2022-04-13 DIAGNOSIS — E87.5 HYPERKALEMIA: ICD-10-CM

## 2022-04-13 DIAGNOSIS — N17.9 ACUTE RENAL FAILURE: ICD-10-CM

## 2022-04-13 DIAGNOSIS — M79.A21 NON-TRAUMATIC COMPARTMENT SYNDROME OF RIGHT LOWER EXTREMITY: ICD-10-CM

## 2022-04-13 DIAGNOSIS — N17.0 ACUTE RENAL FAILURE WITH TUBULAR NECROSIS: ICD-10-CM

## 2022-04-13 DIAGNOSIS — N17.9 AKI (ACUTE KIDNEY INJURY): ICD-10-CM

## 2022-04-13 LAB
ALBUMIN SERPL BCP-MCNC: 3 G/DL (ref 3.5–5.2)
ALP SERPL-CCNC: 108 U/L (ref 55–135)
ALT SERPL W/O P-5'-P-CCNC: 1611 U/L (ref 10–44)
ANION GAP SERPL CALC-SCNC: 23 MMOL/L (ref 8–16)
ANION GAP SERPL CALC-SCNC: 25 MMOL/L (ref 8–16)
APTT BLDCRRT: 25.4 SEC (ref 21–32)
AST SERPL-CCNC: 3980 U/L (ref 10–40)
BASOPHILS # BLD AUTO: 0.02 K/UL (ref 0–0.2)
BASOPHILS NFR BLD: 0.1 % (ref 0–1.9)
BILIRUB SERPL-MCNC: 0.5 MG/DL (ref 0.1–1)
BUN SERPL-MCNC: 124 MG/DL (ref 6–20)
BUN SERPL-MCNC: 124 MG/DL (ref 6–20)
BUN SERPL-MCNC: 125 MG/DL (ref 6–20)
BUN SERPL-MCNC: 126 MG/DL (ref 6–20)
CALCIUM SERPL-MCNC: 6.7 MG/DL (ref 8.7–10.5)
CALCIUM SERPL-MCNC: 6.7 MG/DL (ref 8.7–10.5)
CALCIUM SERPL-MCNC: 7.7 MG/DL (ref 8.7–10.5)
CALCIUM SERPL-MCNC: 7.7 MG/DL (ref 8.7–10.5)
CHLORIDE SERPL-SCNC: 85 MMOL/L (ref 95–110)
CHLORIDE SERPL-SCNC: 86 MMOL/L (ref 95–110)
CHLORIDE SERPL-SCNC: 91 MMOL/L (ref 95–110)
CHLORIDE SERPL-SCNC: 91 MMOL/L (ref 95–110)
CK SERPL-CCNC: ABNORMAL U/L (ref 20–200)
CO2 SERPL-SCNC: 12 MMOL/L (ref 23–29)
CO2 SERPL-SCNC: 12 MMOL/L (ref 23–29)
CO2 SERPL-SCNC: 17 MMOL/L (ref 23–29)
CO2 SERPL-SCNC: 19 MMOL/L (ref 23–29)
CREAT SERPL-MCNC: 12.7 MG/DL (ref 0.5–1.4)
CREAT SERPL-MCNC: 12.7 MG/DL (ref 0.5–1.4)
CREAT SERPL-MCNC: 13.4 MG/DL (ref 0.5–1.4)
CREAT SERPL-MCNC: 13.5 MG/DL (ref 0.5–1.4)
DIFFERENTIAL METHOD: ABNORMAL
EOSINOPHIL # BLD AUTO: 0 K/UL (ref 0–0.5)
EOSINOPHIL NFR BLD: 0 % (ref 0–8)
ERYTHROCYTE [DISTWIDTH] IN BLOOD BY AUTOMATED COUNT: 12.3 % (ref 11.5–14.5)
EST. GFR  (AFRICAN AMERICAN): 5 ML/MIN/1.73 M^2
EST. GFR  (AFRICAN AMERICAN): 5 ML/MIN/1.73 M^2
EST. GFR  (AFRICAN AMERICAN): 6 ML/MIN/1.73 M^2
EST. GFR  (AFRICAN AMERICAN): 6 ML/MIN/1.73 M^2
EST. GFR  (NON AFRICAN AMERICAN): 5 ML/MIN/1.73 M^2
GLUCOSE SERPL-MCNC: 48 MG/DL (ref 70–110)
GLUCOSE SERPL-MCNC: 48 MG/DL (ref 70–110)
GLUCOSE SERPL-MCNC: 65 MG/DL (ref 70–110)
GLUCOSE SERPL-MCNC: 93 MG/DL (ref 70–110)
HCT VFR BLD AUTO: 46.4 % (ref 40–54)
HGB BLD-MCNC: 16 G/DL (ref 14–18)
IMM GRANULOCYTES # BLD AUTO: 0.14 K/UL (ref 0–0.04)
IMM GRANULOCYTES NFR BLD AUTO: 0.8 % (ref 0–0.5)
INR PPP: 1 (ref 0.8–1.2)
LYMPHOCYTES # BLD AUTO: 1 K/UL (ref 1–4.8)
LYMPHOCYTES NFR BLD: 5.3 % (ref 18–48)
MCH RBC QN AUTO: 30.1 PG (ref 27–31)
MCHC RBC AUTO-ENTMCNC: 34.5 G/DL (ref 32–36)
MCV RBC AUTO: 87 FL (ref 82–98)
MONOCYTES # BLD AUTO: 1.1 K/UL (ref 0.3–1)
MONOCYTES NFR BLD: 6.1 % (ref 4–15)
NEUTROPHILS # BLD AUTO: 15.9 K/UL (ref 1.8–7.7)
NEUTROPHILS NFR BLD: 87.7 % (ref 38–73)
NRBC BLD-RTO: 0 /100 WBC
PHOSPHATE SERPL-MCNC: 6.9 MG/DL (ref 2.7–4.5)
PLATELET # BLD AUTO: 344 K/UL (ref 150–450)
PMV BLD AUTO: 9.8 FL (ref 9.2–12.9)
POCT GLUCOSE: 118 MG/DL (ref 70–110)
POCT GLUCOSE: 135 MG/DL (ref 70–110)
POCT GLUCOSE: 69 MG/DL (ref 70–110)
POTASSIUM SERPL-SCNC: 6.9 MMOL/L (ref 3.5–5.1)
POTASSIUM SERPL-SCNC: 7.1 MMOL/L (ref 3.5–5.1)
PROT SERPL-MCNC: 7.2 G/DL (ref 6–8.4)
PROTHROMBIN TIME: 11 SEC (ref 9–12.5)
RBC # BLD AUTO: 5.32 M/UL (ref 4.6–6.2)
SODIUM SERPL-SCNC: 127 MMOL/L (ref 136–145)
SODIUM SERPL-SCNC: 128 MMOL/L (ref 136–145)
WBC # BLD AUTO: 18.14 K/UL (ref 3.9–12.7)

## 2022-04-13 PROCEDURE — 20000000 HC ICU ROOM

## 2022-04-13 PROCEDURE — 96374 THER/PROPH/DIAG INJ IV PUSH: CPT

## 2022-04-13 PROCEDURE — 37000008 HC ANESTHESIA 1ST 15 MINUTES: Performed by: ORTHOPAEDIC SURGERY

## 2022-04-13 PROCEDURE — 25000003 PHARM REV CODE 250: Performed by: NURSE ANESTHETIST, CERTIFIED REGISTERED

## 2022-04-13 PROCEDURE — 93005 ELECTROCARDIOGRAM TRACING: CPT

## 2022-04-13 PROCEDURE — 25000003 PHARM REV CODE 250: Performed by: NURSE PRACTITIONER

## 2022-04-13 PROCEDURE — 27894: ICD-10-PCS | Mod: RT,,, | Performed by: ORTHOPAEDIC SURGERY

## 2022-04-13 PROCEDURE — 63600175 PHARM REV CODE 636 W HCPCS: Performed by: NURSE ANESTHETIST, CERTIFIED REGISTERED

## 2022-04-13 PROCEDURE — 85730 THROMBOPLASTIN TIME PARTIAL: CPT | Performed by: EMERGENCY MEDICINE

## 2022-04-13 PROCEDURE — 80048 BASIC METABOLIC PNL TOTAL CA: CPT | Mod: 91,XB | Performed by: ANESTHESIOLOGY

## 2022-04-13 PROCEDURE — 36415 COLL VENOUS BLD VENIPUNCTURE: CPT | Performed by: ANESTHESIOLOGY

## 2022-04-13 PROCEDURE — 25000003 PHARM REV CODE 250: Performed by: EMERGENCY MEDICINE

## 2022-04-13 PROCEDURE — 80053 COMPREHEN METABOLIC PANEL: CPT | Performed by: EMERGENCY MEDICINE

## 2022-04-13 PROCEDURE — 71000033 HC RECOVERY, INTIAL HOUR: Performed by: ORTHOPAEDIC SURGERY

## 2022-04-13 PROCEDURE — 84100 ASSAY OF PHOSPHORUS: CPT | Performed by: HOSPITALIST

## 2022-04-13 PROCEDURE — D9220A PRA ANESTHESIA: ICD-10-PCS | Mod: CRNA,,, | Performed by: NURSE ANESTHETIST, CERTIFIED REGISTERED

## 2022-04-13 PROCEDURE — 82962 GLUCOSE BLOOD TEST: CPT

## 2022-04-13 PROCEDURE — D9220A PRA ANESTHESIA: Mod: ANES,,, | Performed by: ANESTHESIOLOGY

## 2022-04-13 PROCEDURE — 25000003 PHARM REV CODE 250: Performed by: INTERNAL MEDICINE

## 2022-04-13 PROCEDURE — 63600175 PHARM REV CODE 636 W HCPCS: Performed by: EMERGENCY MEDICINE

## 2022-04-13 PROCEDURE — 82550 ASSAY OF CK (CPK): CPT | Performed by: EMERGENCY MEDICINE

## 2022-04-13 PROCEDURE — 36415 COLL VENOUS BLD VENIPUNCTURE: CPT | Performed by: INTERNAL MEDICINE

## 2022-04-13 PROCEDURE — D9220A PRA ANESTHESIA: Mod: CRNA,,, | Performed by: NURSE ANESTHETIST, CERTIFIED REGISTERED

## 2022-04-13 PROCEDURE — 96375 TX/PRO/DX INJ NEW DRUG ADDON: CPT

## 2022-04-13 PROCEDURE — 36000707: Performed by: ORTHOPAEDIC SURGERY

## 2022-04-13 PROCEDURE — 36415 COLL VENOUS BLD VENIPUNCTURE: CPT | Performed by: HOSPITALIST

## 2022-04-13 PROCEDURE — 85025 COMPLETE CBC W/AUTO DIFF WBC: CPT | Performed by: EMERGENCY MEDICINE

## 2022-04-13 PROCEDURE — 87040 BLOOD CULTURE FOR BACTERIA: CPT | Performed by: INTERNAL MEDICINE

## 2022-04-13 PROCEDURE — 36000706: Performed by: ORTHOPAEDIC SURGERY

## 2022-04-13 PROCEDURE — 27894 DECOMPRESSION OF LEG: CPT | Mod: RT,,, | Performed by: ORTHOPAEDIC SURGERY

## 2022-04-13 PROCEDURE — 80048 BASIC METABOLIC PNL TOTAL CA: CPT | Mod: XB | Performed by: HOSPITALIST

## 2022-04-13 PROCEDURE — 25000003 PHARM REV CODE 250: Performed by: ORTHOPAEDIC SURGERY

## 2022-04-13 PROCEDURE — 36415 COLL VENOUS BLD VENIPUNCTURE: CPT | Performed by: EMERGENCY MEDICINE

## 2022-04-13 PROCEDURE — 85610 PROTHROMBIN TIME: CPT | Performed by: EMERGENCY MEDICINE

## 2022-04-13 PROCEDURE — 37000009 HC ANESTHESIA EA ADD 15 MINS: Performed by: ORTHOPAEDIC SURGERY

## 2022-04-13 PROCEDURE — D9220A PRA ANESTHESIA: ICD-10-PCS | Mod: ANES,,, | Performed by: ANESTHESIOLOGY

## 2022-04-13 PROCEDURE — 63600175 PHARM REV CODE 636 W HCPCS: Performed by: HOSPITALIST

## 2022-04-13 PROCEDURE — 99291 CRITICAL CARE FIRST HOUR: CPT | Mod: 25

## 2022-04-13 RX ORDER — HEPARIN 100 UNIT/ML
5 SYRINGE INTRAVENOUS ONCE
Status: COMPLETED | OUTPATIENT
Start: 2022-04-14 | End: 2022-04-14

## 2022-04-13 RX ORDER — CLINDAMYCIN PHOSPHATE 600 MG/50ML
600 INJECTION, SOLUTION INTRAVENOUS
Status: DISCONTINUED | OUTPATIENT
Start: 2022-04-13 | End: 2022-04-23

## 2022-04-13 RX ORDER — FENTANYL CITRATE 50 UG/ML
25 INJECTION, SOLUTION INTRAMUSCULAR; INTRAVENOUS EVERY 5 MIN PRN
Status: DISCONTINUED | OUTPATIENT
Start: 2022-04-13 | End: 2022-04-13 | Stop reason: HOSPADM

## 2022-04-13 RX ORDER — HYDROMORPHONE HYDROCHLORIDE 2 MG/ML
0.2 INJECTION, SOLUTION INTRAMUSCULAR; INTRAVENOUS; SUBCUTANEOUS EVERY 5 MIN PRN
Status: DISCONTINUED | OUTPATIENT
Start: 2022-04-13 | End: 2022-04-13 | Stop reason: HOSPADM

## 2022-04-13 RX ORDER — LIDOCAINE HYDROCHLORIDE 10 MG/ML
0.5 INJECTION, SOLUTION EPIDURAL; INFILTRATION; INTRACAUDAL; PERINEURAL ONCE
Status: DISCONTINUED | OUTPATIENT
Start: 2022-04-13 | End: 2022-04-13 | Stop reason: HOSPADM

## 2022-04-13 RX ORDER — IBUPROFEN 200 MG
16 TABLET ORAL
Status: DISCONTINUED | OUTPATIENT
Start: 2022-04-13 | End: 2022-05-11 | Stop reason: HOSPADM

## 2022-04-13 RX ORDER — LIDOCAINE HCL/PF 100 MG/5ML
SYRINGE (ML) INTRAVENOUS
Status: DISCONTINUED | OUTPATIENT
Start: 2022-04-13 | End: 2022-04-13

## 2022-04-13 RX ORDER — INDOMETHACIN 25 MG/1
50 CAPSULE ORAL
Status: COMPLETED | OUTPATIENT
Start: 2022-04-13 | End: 2022-04-13

## 2022-04-13 RX ORDER — MIDAZOLAM HYDROCHLORIDE 1 MG/ML
INJECTION INTRAMUSCULAR; INTRAVENOUS
Status: DISCONTINUED | OUTPATIENT
Start: 2022-04-13 | End: 2022-04-13

## 2022-04-13 RX ORDER — ONDANSETRON 2 MG/ML
4 INJECTION INTRAMUSCULAR; INTRAVENOUS DAILY PRN
Status: DISCONTINUED | OUTPATIENT
Start: 2022-04-13 | End: 2022-04-13 | Stop reason: HOSPADM

## 2022-04-13 RX ORDER — SODIUM CHLORIDE 9 MG/ML
1000 INJECTION, SOLUTION INTRAVENOUS
Status: COMPLETED | OUTPATIENT
Start: 2022-04-13 | End: 2022-04-13

## 2022-04-13 RX ORDER — CEFAZOLIN SODIUM 1 G/3ML
INJECTION, POWDER, FOR SOLUTION INTRAMUSCULAR; INTRAVENOUS
Status: DISCONTINUED | OUTPATIENT
Start: 2022-04-13 | End: 2022-04-13

## 2022-04-13 RX ORDER — DIPHENHYDRAMINE HYDROCHLORIDE 50 MG/ML
25 INJECTION INTRAMUSCULAR; INTRAVENOUS EVERY 6 HOURS PRN
Status: DISCONTINUED | OUTPATIENT
Start: 2022-04-13 | End: 2022-04-13 | Stop reason: HOSPADM

## 2022-04-13 RX ORDER — SODIUM CHLORIDE 0.9 % (FLUSH) 0.9 %
3 SYRINGE (ML) INJECTION EVERY 8 HOURS
Status: DISCONTINUED | OUTPATIENT
Start: 2022-04-13 | End: 2022-04-13 | Stop reason: HOSPADM

## 2022-04-13 RX ORDER — OXYCODONE HYDROCHLORIDE 5 MG/1
5 TABLET ORAL
Status: DISCONTINUED | OUTPATIENT
Start: 2022-04-13 | End: 2022-04-13 | Stop reason: HOSPADM

## 2022-04-13 RX ORDER — MUPIROCIN 20 MG/G
OINTMENT TOPICAL 2 TIMES DAILY
Status: COMPLETED | OUTPATIENT
Start: 2022-04-13 | End: 2022-04-18

## 2022-04-13 RX ORDER — FENTANYL CITRATE 50 UG/ML
INJECTION, SOLUTION INTRAMUSCULAR; INTRAVENOUS
Status: DISCONTINUED | OUTPATIENT
Start: 2022-04-13 | End: 2022-04-13

## 2022-04-13 RX ORDER — DEXTROSE MONOHYDRATE 100 MG/ML
INJECTION, SOLUTION INTRAVENOUS CONTINUOUS
Status: DISCONTINUED | OUTPATIENT
Start: 2022-04-13 | End: 2022-04-18

## 2022-04-13 RX ORDER — SODIUM CHLORIDE 9 MG/ML
INJECTION, SOLUTION INTRAVENOUS CONTINUOUS
Status: DISCONTINUED | OUTPATIENT
Start: 2022-04-13 | End: 2022-04-15

## 2022-04-13 RX ORDER — NALOXONE HCL 0.4 MG/ML
0.02 VIAL (ML) INJECTION
Status: DISCONTINUED | OUTPATIENT
Start: 2022-04-13 | End: 2022-05-11 | Stop reason: HOSPADM

## 2022-04-13 RX ORDER — CEFAZOLIN SODIUM 2 G/50ML
2 SOLUTION INTRAVENOUS
Status: DISCONTINUED | OUTPATIENT
Start: 2022-04-13 | End: 2022-04-13

## 2022-04-13 RX ORDER — MORPHINE SULFATE 2 MG/ML
6 INJECTION, SOLUTION INTRAMUSCULAR; INTRAVENOUS EVERY 4 HOURS PRN
Status: DISCONTINUED | OUTPATIENT
Start: 2022-04-13 | End: 2022-04-16

## 2022-04-13 RX ORDER — SODIUM CHLORIDE, SODIUM LACTATE, POTASSIUM CHLORIDE, CALCIUM CHLORIDE 600; 310; 30; 20 MG/100ML; MG/100ML; MG/100ML; MG/100ML
INJECTION, SOLUTION INTRAVENOUS CONTINUOUS
Status: DISCONTINUED | OUTPATIENT
Start: 2022-04-13 | End: 2022-04-14

## 2022-04-13 RX ORDER — BUPIVACAINE HYDROCHLORIDE 5 MG/ML
10 INJECTION, SOLUTION EPIDURAL; INTRACAUDAL
Status: DISPENSED | OUTPATIENT
Start: 2022-04-13 | End: 2022-04-14

## 2022-04-13 RX ORDER — SODIUM CHLORIDE 0.9 % (FLUSH) 0.9 %
3 SYRINGE (ML) INJECTION
Status: DISCONTINUED | OUTPATIENT
Start: 2022-04-13 | End: 2022-04-13 | Stop reason: HOSPADM

## 2022-04-13 RX ORDER — CALCIUM GLUCONATE 20 MG/ML
1 INJECTION, SOLUTION INTRAVENOUS ONCE
Status: COMPLETED | OUTPATIENT
Start: 2022-04-13 | End: 2022-04-14

## 2022-04-13 RX ORDER — DEXAMETHASONE SODIUM PHOSPHATE 4 MG/ML
INJECTION, SOLUTION INTRA-ARTICULAR; INTRALESIONAL; INTRAMUSCULAR; INTRAVENOUS; SOFT TISSUE
Status: DISCONTINUED | OUTPATIENT
Start: 2022-04-13 | End: 2022-04-13

## 2022-04-13 RX ORDER — IBUPROFEN 200 MG
24 TABLET ORAL
Status: DISCONTINUED | OUTPATIENT
Start: 2022-04-13 | End: 2022-05-11 | Stop reason: HOSPADM

## 2022-04-13 RX ORDER — PROPOFOL 10 MG/ML
VIAL (ML) INTRAVENOUS
Status: DISCONTINUED | OUTPATIENT
Start: 2022-04-13 | End: 2022-04-13

## 2022-04-13 RX ORDER — ONDANSETRON 2 MG/ML
4 INJECTION INTRAMUSCULAR; INTRAVENOUS EVERY 8 HOURS PRN
Status: DISCONTINUED | OUTPATIENT
Start: 2022-04-13 | End: 2022-05-11 | Stop reason: HOSPADM

## 2022-04-13 RX ORDER — MEPERIDINE HYDROCHLORIDE 50 MG/ML
12.5 INJECTION INTRAMUSCULAR; INTRAVENOUS; SUBCUTANEOUS ONCE AS NEEDED
Status: DISCONTINUED | OUTPATIENT
Start: 2022-04-13 | End: 2022-04-13 | Stop reason: HOSPADM

## 2022-04-13 RX ORDER — ONDANSETRON 2 MG/ML
INJECTION INTRAMUSCULAR; INTRAVENOUS
Status: DISCONTINUED | OUTPATIENT
Start: 2022-04-13 | End: 2022-04-13

## 2022-04-13 RX ORDER — GLUCAGON 1 MG
1 KIT INJECTION
Status: DISCONTINUED | OUTPATIENT
Start: 2022-04-13 | End: 2022-05-11 | Stop reason: HOSPADM

## 2022-04-13 RX ADMIN — SODIUM BICARBONATE 50 MEQ: 84 INJECTION, SOLUTION INTRAVENOUS at 11:04

## 2022-04-13 RX ADMIN — LIDOCAINE HYDROCHLORIDE 100 MG: 20 INJECTION INTRAVENOUS at 07:04

## 2022-04-13 RX ADMIN — MIDAZOLAM HYDROCHLORIDE 2 MG: 1 INJECTION, SOLUTION INTRAMUSCULAR; INTRAVENOUS at 07:04

## 2022-04-13 RX ADMIN — FENTANYL CITRATE 100 MCG: 50 INJECTION, SOLUTION INTRAMUSCULAR; INTRAVENOUS at 07:04

## 2022-04-13 RX ADMIN — SODIUM CHLORIDE, SODIUM GLUCONATE, SODIUM ACETATE, POTASSIUM CHLORIDE, MAGNESIUM CHLORIDE, SODIUM PHOSPHATE, DIBASIC, AND POTASSIUM PHOSPHATE: .53; .5; .37; .037; .03; .012; .00082 INJECTION, SOLUTION INTRAVENOUS at 07:04

## 2022-04-13 RX ADMIN — MUPIROCIN: 20 OINTMENT TOPICAL at 10:04

## 2022-04-13 RX ADMIN — DEXTROSE MONOHYDRATE 25 G: 25 INJECTION, SOLUTION INTRAVENOUS at 10:04

## 2022-04-13 RX ADMIN — CALCIUM GLUCONATE 1 G: 98 INJECTION, SOLUTION INTRAVENOUS at 07:04

## 2022-04-13 RX ADMIN — ONDANSETRON 8 MG: 2 INJECTION INTRAMUSCULAR; INTRAVENOUS at 08:04

## 2022-04-13 RX ADMIN — DEXTROSE MONOHYDRATE 25 G: 25 INJECTION, SOLUTION INTRAVENOUS at 06:04

## 2022-04-13 RX ADMIN — DEXAMETHASONE SODIUM PHOSPHATE 4 MG: 4 INJECTION, SOLUTION INTRA-ARTICULAR; INTRALESIONAL; INTRAMUSCULAR; INTRAVENOUS; SOFT TISSUE at 08:04

## 2022-04-13 RX ADMIN — SODIUM CHLORIDE: 0.9 INJECTION, SOLUTION INTRAVENOUS at 09:04

## 2022-04-13 RX ADMIN — SODIUM CHLORIDE 1000 ML: 0.9 INJECTION, SOLUTION INTRAVENOUS at 06:04

## 2022-04-13 RX ADMIN — FENTANYL CITRATE 50 MCG: 50 INJECTION, SOLUTION INTRAMUSCULAR; INTRAVENOUS at 08:04

## 2022-04-13 RX ADMIN — MORPHINE SULFATE 6 MG: 2 INJECTION, SOLUTION INTRAMUSCULAR; INTRAVENOUS at 11:04

## 2022-04-13 RX ADMIN — SODIUM BICARBONATE 50 MEQ: 84 INJECTION, SOLUTION INTRAVENOUS at 06:04

## 2022-04-13 RX ADMIN — INSULIN HUMAN 5 UNITS: 100 INJECTION, SOLUTION PARENTERAL at 06:04

## 2022-04-13 RX ADMIN — CALCIUM GLUCONATE 1 G: 20 INJECTION, SOLUTION INTRAVENOUS at 11:04

## 2022-04-13 RX ADMIN — ONDANSETRON 4 MG: 2 INJECTION INTRAMUSCULAR; INTRAVENOUS at 11:04

## 2022-04-13 RX ADMIN — PROPOFOL 200 MG: 10 INJECTION, EMULSION INTRAVENOUS at 07:04

## 2022-04-13 RX ADMIN — CEFAZOLIN 2 G: 1 INJECTION, POWDER, FOR SOLUTION INTRAVENOUS at 07:04

## 2022-04-13 NOTE — PHARMACY MED REC
"Admission Medication History     The home medication history was taken by Elizabet Haney CPhT.    Medication history obtained from Patient     You may go to "Admission" then "Reconcile Home Medications" tabs to review and/or act upon these items.      The home medication list has been updated by the Pharmacy department.    Please read ALL comments highlighted in yellow.    Please address this information as you see fit.     Feel free to contact us if you have any questions or require assistance.      The medications listed below were removed from the home medication list.  Please reorder if appropriate:  Patient reports no longer taking the following medication(s):   Albuterol Inhaler   Cetirizine 10mg   Ibuprofen 600mg      Potential issues to be addressed PRIOR TO DISCHARGE   Drug cost interfering with therapy (provide alternatives if possible)   Patient request for refills   Prescription could not be filled prior to admission   Patient lacks understanding of therapy      Elizabte Haney CPhT.  [phone]        .          "

## 2022-04-13 NOTE — ED PROVIDER NOTES
Encounter Date: 4/13/2022    SCRIBE #1 NOTE: IOnelia, am scribing for, and in the presence of, Anil Acevedo MD.       History     Chief Complaint   Patient presents with    Leg Pain     Pain in the right leg muscle calf is hard, nausea vomiting, patient was seen here over the weekend for an overdose      Time seen by provider: 4:53 PM on 04/13/2022    Agus Horan is a 21 y.o. male who presents to the ED with an onset of right leg pain and swelling. Patient was seen here in the ED on 4/9 for a drug overdose of oxycodone. He was discharged home after treatment. The next morning (4/10) patient returned to the ED after he fell and hit his left eye on a piece of furniture but is unsure of a specific injury to the right leg or why he developed the leg pain. Patient had an XR of his R leg at that time which was negative for acute fracture or dislocation. Patient states the current pain in his right thigh and calf is so severe that he feels nauseas. He is unable to ambulate secondary to the pain. He did take Meloxicam today with minimal relief. He is not anticoagulated, no PMHx of PE or DVT. Patient has a PMHx of asthma and no pertinent PSHx. The patient denies vomiting, chest pain, abdominal pain, or any other symptoms at this time.     The history is provided by the patient and a friend.     Review of patient's allergies indicates:   Allergen Reactions    Shrimp      Past Medical History:   Diagnosis Date    Allergy     AR    Asthma     mild intermittent     Past Surgical History:   Procedure Laterality Date    REMOVAL OF FOREIGN BODY FROM FOOT Left 6/24/2020    Procedure: REMOVAL, FOREIGN BODY, FOOT;  Surgeon: Dani Garcia MD;  Location: [facility];  Service: Orthopedics;  Laterality: Left;     Family History   Problem Relation Age of Onset    Asthma Brother     Emphysema Maternal Grandmother     Hyperlipidemia Maternal Grandmother     Asthma Maternal Grandmother     Arthritis Maternal  Grandmother     COPD Maternal Grandmother     Asthma Brother      Social History     Tobacco Use    Smoking status: Never Smoker    Smokeless tobacco: Never Used   Substance Use Topics    Alcohol use: Yes     Comment: last night    Drug use: Yes     Frequency: 2.0 times per week     Types: Marijuana     Comment: yesterday     Review of Systems   Constitutional: Negative for activity change, appetite change, chills, fatigue and fever.   Eyes: Negative for visual disturbance.        Positive for left eye ecchymosis.   Respiratory: Negative for apnea and shortness of breath.    Cardiovascular: Negative for chest pain and palpitations.   Gastrointestinal: Positive for nausea. Negative for abdominal distention, abdominal pain and vomiting.   Genitourinary: Negative for difficulty urinating.   Musculoskeletal: Positive for arthralgias, gait problem, joint swelling and myalgias. Negative for neck pain.        Positive for right leg pain and swelling.   Skin: Negative for pallor and rash.   Neurological: Positive for syncope. Negative for headaches.   Hematological: Does not bruise/bleed easily.   Psychiatric/Behavioral: Negative for agitation.       Physical Exam     Initial Vitals [04/13/22 1632]   BP Pulse Resp Temp SpO2   (!) 180/93 99 20 98 °F (36.7 °C) 99 %      MAP       --         Physical Exam    Nursing note and vitals reviewed.  Constitutional: He appears well-developed and well-nourished.   HENT:   Head: Normocephalic and atraumatic.   Eyes: Conjunctivae are normal. Pupils are equal, round, and reactive to light.   Neck: Neck supple.   Normal range of motion.  Cardiovascular: Normal rate, regular rhythm and normal heart sounds. Exam reveals no gallop and no friction rub.    No murmur heard.  Pulmonary/Chest: Breath sounds normal. No respiratory distress. He has no wheezes. He has no rhonchi. He has no rales.   Abdominal: Abdomen is soft. He exhibits no distension. There is no abdominal tenderness.    Musculoskeletal:         General: Normal range of motion.      Cervical back: Normal range of motion and neck supple.      Right upper leg: Swelling and tenderness present.      Left upper leg: Normal.      Right lower leg: Swelling and tenderness present.      Left lower leg: Normal.      Comments: Right thigh and right calf tenderness and swelling.     Neurological: He is alert and oriented to person, place, and time.   Skin: Skin is warm and dry. Ecchymosis (left periorbital) noted.   Psychiatric: He has a normal mood and affect.         ED Course   Critical Care    Date/Time: 4/13/2022 10:00 PM  Performed by: Anil Acevedo III, MD  Authorized by: Andrei Gar MD   Direct patient critical care time: 120 minutes  Total critical care time (exclusive of procedural time) : 120 minutes  Critical care was necessary to treat or prevent imminent or life-threatening deterioration of the following conditions: renal failure and metabolic crisis (Compartment syndrome).  Critical care was time spent personally by me on the following activities: review of old charts, pulse oximetry, ordering and review of laboratory studies, obtaining history from patient or surrogate, evaluation of patient's response to treatment, discussions with consultants, development of treatment plan with patient or surrogate, discussions with primary provider, examination of patient, ordering and performing treatments and interventions, ordering and review of radiographic studies and re-evaluation of patient's condition.  Subsequent provider of critical care: I assumed direction of critical care for this patient from another provider of my specialty.    Central Line    Date/Time: 4/14/2022 12:07 AM  Performed by: Anil Acevedo III, MD  Authorized by: Andrei Gar MD     Location procedure was performed:  [facility] ICU  Indications:  Hemodialysis  Anesthesia:  Local infiltration  Local anesthetic:  Lidocaine 1% without  epinephrine  Anesthetic total (ml):  2  Preparation:  Skin prepped with ChloraPrep  Skin prep agent dried: Skin prep agent completely dried prior to procedure    Sterile barriers: All five maximal sterile barriers used - gloves, gown, cap, mask and large sterile sheet    Hand hygiene: Hand hygiene performed immediately prior to central venous catheter insertion    Location:  Right internal jugular  Catheter type:  Trialysis  Catheter size:  7.5 Fr  Ultrasound guidance: Yes    Vessel Caliber:  Medium   patent  Comprressibility:  Normal  Doppler:  Not done  Needle advanced into vessel with real time ultrasound guidance.    Steril sheath on probe.    Sterile gel used.  Manometry: No    Number of attempts:  1  Securement:  Line sutured, sterile dressing applied and blood return through all ports  XRay:  Placement verified by x-ray and successful placement  Adverse Events:  NoneTermination Site: superior vena cava      Labs Reviewed   CBC W/ AUTO DIFFERENTIAL - Abnormal; Notable for the following components:       Result Value    WBC 18.14 (*)     Immature Granulocytes 0.8 (*)     Gran # (ANC) 15.9 (*)     Immature Grans (Abs) 0.14 (*)     Mono # 1.1 (*)     Gran % 87.7 (*)     Lymph % 5.3 (*)     All other components within normal limits   COMPREHENSIVE METABOLIC PANEL - Abnormal; Notable for the following components:    Sodium 128 (*)     Potassium 6.9 (*)     Chloride 86 (*)     CO2 19 (*)     Glucose 65 (*)      (*)     Creatinine 13.4 (*)     Calcium 7.7 (*)     Albumin 3.0 (*)     AST 3,980 (*)     ALT 1,611 (*)     Anion Gap 23 (*)     eGFR if  5 (*)     eGFR if non  5 (*)     All other components within normal limits    Narrative:      K  critical result(s) called and verbal readback obtained from Richard Mccauley by [facility] 04/13/2022 18:15   POCT GLUCOSE - Abnormal; Notable for the following components:    POCT Glucose 118 (*)     All other components within normal limits   APTT    PROTIME-INR     EKG Readings: (Independently Interpreted)   Rhythm: Normal Sinus Rhythm. Heart Rate: 70. Ectopy: No Ectopy. Conduction: Normal. ST Segments: Normal ST Segments. T Waves: Normal. Axis: Normal. Clinical Impression: Normal Sinus Rhythm Other Impression: Peaked T-waves       Imaging Results          US Lower Extremity Veins Right (Final result)  Result time 04/13/22 17:38:56    Final result by Bryon Palmer MD (04/13/22 17:38:56)                 Impression:      No evidence of deep venous thrombosis in the right lower extremity.      Electronically signed by: Bryon Palmer  Date:    04/13/2022  Time:    17:38             Narrative:    EXAMINATION:  US LOWER EXTREMITY VEINS RIGHT    CLINICAL HISTORY:  right leg swelling;    TECHNIQUE:  Duplex and color flow Doppler evaluation and graded compression of the right lower extremity veins was performed.    COMPARISON:  None    FINDINGS:  Right thigh veins: The common femoral, femoral, popliteal, upper greater saphenous, and deep femoral veins are patent and free of thrombus. The veins are normally compressible and have normal phasic flow and augmentation response.    Right calf veins: The visualized calf veins are patent.    Contralateral CFV: The contralateral (left) common femoral vein is patent and free of thrombus.    Miscellaneous: None                                 Medications   BUPivacaine (PF) 0.5% (5 mg/mL) injection 50 mg (0 mg Subcutaneous Hold 4/13/22 1800)   naloxone 0.4 mg/mL injection 0.02 mg (has no administration in time range)   morphine injection 6 mg (6 mg Intravenous Given 4/13/22 2357)   ondansetron injection 4 mg (4 mg Intravenous Given 4/13/22 2358)   electrolyte-S (ISOLYTE) ( Intravenous Canceled Entry 4/13/22 2100)   lactated ringers infusion ( Intravenous Canceled Entry 4/13/22 2100)   0.9%  NaCl infusion ( Intravenous Rate/Dose Change 4/13/22 2223)   mupirocin 2 % ointment ( Nasal Given 4/13/22 2216)   sodium zirconium  cyclosilicate packet 5 g (has no administration in time range)   calcium gluconat 1 g in NS IVPB (premixed) (1 g Intravenous New Bag 4/13/22 2342)   vancomycin - pharmacy to dose (has no administration in time range)   clindamycin in D5W 600 mg/50 mL IVPB 600 mg (has no administration in time range)   dextrose 10 % infusion ( Intravenous New Bag 4/14/22 0003)   glucose chewable tablet 16 g (has no administration in time range)   glucose chewable tablet 24 g (has no administration in time range)   glucagon (human recombinant) injection 1 mg (has no administration in time range)   dextrose 10% bolus 125 mL (has no administration in time range)   dextrose 10% bolus 250 mL (has no administration in time range)   vancomycin 1.25 g in dextrose 5% 250 mL IVPB (ready to mix) (has no administration in time range)   piperacillin-tazobactam 3.375 g in dextrose 5 % 50 mL IVPB (ready to mix system) (has no administration in time range)   insulin regular injection 5 Units (5 Units Intravenous Given 4/13/22 1856)   sodium bicarbonate solution 50 mEq (50 mEq Intravenous Given 4/13/22 1853)   dextrose 50% injection 25 g (25 g Intravenous Given 4/13/22 1852)   0.9%  NaCl infusion (1,000 mLs Intravenous New Bag 4/13/22 1853)   calcium gluconate 1 g in dextrose 5 % 50 mL IVPB (0 g Intravenous Stopped 4/13/22 2030)   dextrose 50% injection 25 g (25 g Intravenous Given 4/13/22 2216)   sodium bicarbonate solution 50 mEq (50 mEq Intravenous Given 4/13/22 2340)   heparin, porcine (PF) 100 unit/mL injection flush 500 Units (500 Units Intravenous Given 4/14/22 0004)     Medical Decision Making:   History:   Old Medical Records: I decided to obtain old medical records.  Clinical Tests:   Lab Tests: Ordered and Reviewed  Radiological Study: Ordered and Reviewed  ED Management:  21-year-old male presents with right leg pain and swelling.  The exam is extremely concerning for compartment syndrome.  He is admitted for emergent fasciotomy.  He is  found have acute renal failure from rhabdomyolysis.  CPK is greater than 40,000 with a creatinine of 13.  Initial potassium is 6.9.  He has spiked T-waves but no QRS widening.  He is given sodium bicarbonate, insulin with D50 and calcium gluconate.  I discussed the case with Dr. Castaneda who will admit the patient for close monitoring of potassium and fluid administration.       APC / Resident Notes:   I, Dr. Anil Acevedo III, personally performed the services described in this documentation. All medical record entries made by the scribe were at my direction and in my presence.  I have reviewed the chart and agree that the record reflects my personal performance and is accurate and complete     Scribe Attestation:   Scribe #1: I performed the above scribed service and the documentation accurately describes the services I performed. I attest to the accuracy of the note.                 Clinical Impression:   Final diagnoses:  [E87.5] Hyperkalemia  [T79.A0XA] Compartment syndrome (Primary)  [N17.9] SANDRA (acute kidney injury)  [T79.6XXA] Traumatic rhabdomyolysis, initial encounter          ED Disposition Condition    Admit               Anil Acevedo III, MD  04/13/22 2202       Anil Acevedo III, MD  04/13/22 2203       Anil Acevedo III, MD  04/14/22 0009

## 2022-04-13 NOTE — LETTER
"Agus Velasquez" Marline is currently hospitalized and being treated in [facility],  Was admitted on 4/12/2022. Patient is critical and being treated in the intensive care unit for his condition      If you have any questions or concerns, please don't hesitate to call.          Marcus Johnson  Department of Hospital medicine  [facility] [address]  [place] [id number]    "

## 2022-04-14 PROBLEM — N17.9 ACUTE RENAL FAILURE: Status: ACTIVE | Noted: 2022-04-14

## 2022-04-14 PROBLEM — E87.5 HYPERKALEMIA: Status: ACTIVE | Noted: 2022-04-14

## 2022-04-14 PROBLEM — R74.8 ELEVATED LIVER ENZYMES: Status: ACTIVE | Noted: 2022-04-14

## 2022-04-14 PROBLEM — N17.9 AKI (ACUTE KIDNEY INJURY): Status: ACTIVE | Noted: 2022-04-14

## 2022-04-14 PROBLEM — T79.A0XA COMPARTMENT SYNDROME: Status: ACTIVE | Noted: 2022-04-14

## 2022-04-14 PROBLEM — M62.82 RHABDOMYOLYSIS: Status: ACTIVE | Noted: 2022-04-14

## 2022-04-14 LAB
ALBUMIN SERPL BCP-MCNC: 2.4 G/DL (ref 3.5–5.2)
ALP SERPL-CCNC: 92 U/L (ref 55–135)
ALT SERPL W/O P-5'-P-CCNC: 1120 U/L (ref 10–44)
ANION GAP SERPL CALC-SCNC: 14 MMOL/L (ref 8–16)
ANION GAP SERPL CALC-SCNC: 14 MMOL/L (ref 8–16)
ANION GAP SERPL CALC-SCNC: 15 MMOL/L (ref 8–16)
ANION GAP SERPL CALC-SCNC: 17 MMOL/L (ref 8–16)
AST SERPL-CCNC: 2656 U/L (ref 10–40)
BASOPHILS # BLD AUTO: 0.01 K/UL (ref 0–0.2)
BASOPHILS NFR BLD: 0.1 % (ref 0–1.9)
BILIRUB SERPL-MCNC: 0.5 MG/DL (ref 0.1–1)
BUN SERPL-MCNC: 63 MG/DL (ref 6–20)
BUN SERPL-MCNC: 69 MG/DL (ref 6–20)
BUN SERPL-MCNC: 75 MG/DL (ref 6–20)
BUN SERPL-MCNC: 92 MG/DL (ref 6–20)
CA-I BLDV-SCNC: 0.79 MMOL/L (ref 1.06–1.42)
CALCIUM SERPL-MCNC: 6.7 MG/DL (ref 8.7–10.5)
CALCIUM SERPL-MCNC: 6.9 MG/DL (ref 8.7–10.5)
CHLORIDE SERPL-SCNC: 90 MMOL/L (ref 95–110)
CHLORIDE SERPL-SCNC: 93 MMOL/L (ref 95–110)
CHLORIDE SERPL-SCNC: 93 MMOL/L (ref 95–110)
CHLORIDE SERPL-SCNC: 95 MMOL/L (ref 95–110)
CK SERPL-CCNC: ABNORMAL U/L (ref 20–200)
CK SERPL-CCNC: ABNORMAL U/L (ref 20–200)
CO2 SERPL-SCNC: 24 MMOL/L (ref 23–29)
CO2 SERPL-SCNC: 25 MMOL/L (ref 23–29)
CO2 SERPL-SCNC: 25 MMOL/L (ref 23–29)
CO2 SERPL-SCNC: 28 MMOL/L (ref 23–29)
CREAT SERPL-MCNC: 7.3 MG/DL (ref 0.5–1.4)
CREAT SERPL-MCNC: 8 MG/DL (ref 0.5–1.4)
CREAT SERPL-MCNC: 8.6 MG/DL (ref 0.5–1.4)
CREAT SERPL-MCNC: 9 MG/DL (ref 0.5–1.4)
DIFFERENTIAL METHOD: ABNORMAL
EOSINOPHIL # BLD AUTO: 0 K/UL (ref 0–0.5)
EOSINOPHIL NFR BLD: 0 % (ref 0–8)
ERYTHROCYTE [DISTWIDTH] IN BLOOD BY AUTOMATED COUNT: 12.2 % (ref 11.5–14.5)
EST. GFR  (AFRICAN AMERICAN): 10 ML/MIN/1.73 M^2
EST. GFR  (AFRICAN AMERICAN): 11 ML/MIN/1.73 M^2
EST. GFR  (AFRICAN AMERICAN): 9 ML/MIN/1.73 M^2
EST. GFR  (AFRICAN AMERICAN): 9 ML/MIN/1.73 M^2
EST. GFR  (NON AFRICAN AMERICAN): 10 ML/MIN/1.73 M^2
EST. GFR  (NON AFRICAN AMERICAN): 7 ML/MIN/1.73 M^2
EST. GFR  (NON AFRICAN AMERICAN): 8 ML/MIN/1.73 M^2
EST. GFR  (NON AFRICAN AMERICAN): 9 ML/MIN/1.73 M^2
GLUCOSE SERPL-MCNC: 104 MG/DL (ref 70–110)
GLUCOSE SERPL-MCNC: 66 MG/DL (ref 70–110)
GLUCOSE SERPL-MCNC: 86 MG/DL (ref 70–110)
GLUCOSE SERPL-MCNC: 93 MG/DL (ref 70–110)
HBV CORE AB SERPL QL IA: NEGATIVE
HBV SURFACE AB SER-ACNC: NEGATIVE M[IU]/ML
HBV SURFACE AG SERPL QL IA: NEGATIVE
HCT VFR BLD AUTO: 38.7 % (ref 40–54)
HGB BLD-MCNC: 13.5 G/DL (ref 14–18)
IMM GRANULOCYTES # BLD AUTO: 0.18 K/UL (ref 0–0.04)
IMM GRANULOCYTES NFR BLD AUTO: 1 % (ref 0–0.5)
LYMPHOCYTES # BLD AUTO: 0.7 K/UL (ref 1–4.8)
LYMPHOCYTES NFR BLD: 4.1 % (ref 18–48)
MCH RBC QN AUTO: 30.5 PG (ref 27–31)
MCHC RBC AUTO-ENTMCNC: 34.9 G/DL (ref 32–36)
MCV RBC AUTO: 87 FL (ref 82–98)
MONOCYTES # BLD AUTO: 1.6 K/UL (ref 0.3–1)
MONOCYTES NFR BLD: 8.8 % (ref 4–15)
NEUTROPHILS # BLD AUTO: 15.6 K/UL (ref 1.8–7.7)
NEUTROPHILS NFR BLD: 86 % (ref 38–73)
NRBC BLD-RTO: 0 /100 WBC
PHOSPHATE SERPL-MCNC: 4.9 MG/DL (ref 2.7–4.5)
PHOSPHATE SERPL-MCNC: 6.4 MG/DL (ref 2.7–4.5)
PLATELET # BLD AUTO: 282 K/UL (ref 150–450)
PMV BLD AUTO: 10.2 FL (ref 9.2–12.9)
POCT GLUCOSE: 106 MG/DL (ref 70–110)
POCT GLUCOSE: 107 MG/DL (ref 70–110)
POCT GLUCOSE: 113 MG/DL (ref 70–110)
POCT GLUCOSE: 96 MG/DL (ref 70–110)
POCT GLUCOSE: 99 MG/DL (ref 70–110)
POTASSIUM SERPL-SCNC: 5 MMOL/L (ref 3.5–5.1)
POTASSIUM SERPL-SCNC: 5.2 MMOL/L (ref 3.5–5.1)
POTASSIUM SERPL-SCNC: 5.3 MMOL/L (ref 3.5–5.1)
POTASSIUM SERPL-SCNC: 5.3 MMOL/L (ref 3.5–5.1)
PROT SERPL-MCNC: 5.8 G/DL (ref 6–8.4)
RBC # BLD AUTO: 4.43 M/UL (ref 4.6–6.2)
SODIUM SERPL-SCNC: 131 MMOL/L (ref 136–145)
SODIUM SERPL-SCNC: 133 MMOL/L (ref 136–145)
SODIUM SERPL-SCNC: 134 MMOL/L (ref 136–145)
SODIUM SERPL-SCNC: 135 MMOL/L (ref 136–145)
VANCOMYCIN SERPL-MCNC: 15.5 UG/ML
WBC # BLD AUTO: 18.14 K/UL (ref 3.9–12.7)

## 2022-04-14 PROCEDURE — 63600175 PHARM REV CODE 636 W HCPCS: Performed by: INTERNAL MEDICINE

## 2022-04-14 PROCEDURE — 80048 BASIC METABOLIC PNL TOTAL CA: CPT | Mod: 91,XB | Performed by: INTERNAL MEDICINE

## 2022-04-14 PROCEDURE — 25000003 PHARM REV CODE 250: Performed by: INTERNAL MEDICINE

## 2022-04-14 PROCEDURE — 80100014 HC HEMODIALYSIS 1:1

## 2022-04-14 PROCEDURE — 63600175 PHARM REV CODE 636 W HCPCS: Performed by: ORTHOPAEDIC SURGERY

## 2022-04-14 PROCEDURE — 80048 BASIC METABOLIC PNL TOTAL CA: CPT | Mod: XB | Performed by: INTERNAL MEDICINE

## 2022-04-14 PROCEDURE — A4216 STERILE WATER/SALINE, 10 ML: HCPCS | Performed by: ORTHOPAEDIC SURGERY

## 2022-04-14 PROCEDURE — 25000003 PHARM REV CODE 250: Performed by: ORTHOPAEDIC SURGERY

## 2022-04-14 PROCEDURE — 86704 HEP B CORE ANTIBODY TOTAL: CPT | Performed by: INTERNAL MEDICINE

## 2022-04-14 PROCEDURE — 94761 N-INVAS EAR/PLS OXIMETRY MLT: CPT

## 2022-04-14 PROCEDURE — 85025 COMPLETE CBC W/AUTO DIFF WBC: CPT | Performed by: HOSPITALIST

## 2022-04-14 PROCEDURE — 80202 ASSAY OF VANCOMYCIN: CPT | Performed by: HOSPITALIST

## 2022-04-14 PROCEDURE — 82550 ASSAY OF CK (CPK): CPT | Performed by: INTERNAL MEDICINE

## 2022-04-14 PROCEDURE — 87340 HEPATITIS B SURFACE AG IA: CPT | Performed by: INTERNAL MEDICINE

## 2022-04-14 PROCEDURE — 84100 ASSAY OF PHOSPHORUS: CPT | Mod: 91 | Performed by: INTERNAL MEDICINE

## 2022-04-14 PROCEDURE — 63600175 PHARM REV CODE 636 W HCPCS: Performed by: HOSPITALIST

## 2022-04-14 PROCEDURE — 80053 COMPREHEN METABOLIC PANEL: CPT | Performed by: INTERNAL MEDICINE

## 2022-04-14 PROCEDURE — 20000000 HC ICU ROOM

## 2022-04-14 PROCEDURE — 25000003 PHARM REV CODE 250: Performed by: HOSPITALIST

## 2022-04-14 PROCEDURE — 86706 HEP B SURFACE ANTIBODY: CPT | Performed by: INTERNAL MEDICINE

## 2022-04-14 PROCEDURE — 82330 ASSAY OF CALCIUM: CPT | Performed by: INTERNAL MEDICINE

## 2022-04-14 PROCEDURE — 36415 COLL VENOUS BLD VENIPUNCTURE: CPT | Performed by: INTERNAL MEDICINE

## 2022-04-14 RX ORDER — HYDROMORPHONE HYDROCHLORIDE 1 MG/ML
1 INJECTION, SOLUTION INTRAMUSCULAR; INTRAVENOUS; SUBCUTANEOUS EVERY 4 HOURS PRN
Status: DISCONTINUED | OUTPATIENT
Start: 2022-04-14 | End: 2022-04-16 | Stop reason: SDUPTHER

## 2022-04-14 RX ORDER — SODIUM CHLORIDE 9 MG/ML
INJECTION, SOLUTION INTRAVENOUS ONCE
Status: DISCONTINUED | OUTPATIENT
Start: 2022-04-14 | End: 2022-04-14

## 2022-04-14 RX ORDER — CALCIUM GLUCONATE 20 MG/ML
1 INJECTION, SOLUTION INTRAVENOUS ONCE
Status: COMPLETED | OUTPATIENT
Start: 2022-04-14 | End: 2022-04-14

## 2022-04-14 RX ORDER — CEFAZOLIN SODIUM 1 G/3ML
500 INJECTION, POWDER, FOR SOLUTION INTRAMUSCULAR; INTRAVENOUS
Status: DISCONTINUED | OUTPATIENT
Start: 2022-04-14 | End: 2022-04-14

## 2022-04-14 RX ORDER — LOPERAMIDE HYDROCHLORIDE 2 MG/1
2 CAPSULE ORAL CONTINUOUS PRN
Status: DISCONTINUED | OUTPATIENT
Start: 2022-04-14 | End: 2022-04-21 | Stop reason: SDUPTHER

## 2022-04-14 RX ORDER — HYDROCODONE BITARTRATE AND ACETAMINOPHEN 5; 325 MG/1; MG/1
1 TABLET ORAL EVERY 4 HOURS PRN
Status: DISCONTINUED | OUTPATIENT
Start: 2022-04-14 | End: 2022-04-21 | Stop reason: SDUPTHER

## 2022-04-14 RX ORDER — HEPARIN SODIUM 1000 [USP'U]/ML
4000 INJECTION, SOLUTION INTRAVENOUS; SUBCUTANEOUS
Status: DISCONTINUED | OUTPATIENT
Start: 2022-04-14 | End: 2022-05-11 | Stop reason: HOSPADM

## 2022-04-14 RX ORDER — ONDANSETRON 2 MG/ML
4 INJECTION INTRAMUSCULAR; INTRAVENOUS EVERY 12 HOURS PRN
Status: DISCONTINUED | OUTPATIENT
Start: 2022-04-14 | End: 2022-04-21 | Stop reason: SDUPTHER

## 2022-04-14 RX ORDER — SODIUM CHLORIDE 0.9 % (FLUSH) 0.9 %
2 SYRINGE (ML) INJECTION EVERY 6 HOURS
Status: DISCONTINUED | OUTPATIENT
Start: 2022-04-14 | End: 2022-04-21

## 2022-04-14 RX ORDER — SODIUM CHLORIDE 9 MG/ML
INJECTION, SOLUTION INTRAVENOUS
Status: DISCONTINUED | OUTPATIENT
Start: 2022-04-14 | End: 2022-04-21

## 2022-04-14 RX ORDER — AMOXICILLIN 250 MG
1 CAPSULE ORAL 2 TIMES DAILY
Status: DISCONTINUED | OUTPATIENT
Start: 2022-04-14 | End: 2022-04-21 | Stop reason: SDUPTHER

## 2022-04-14 RX ADMIN — HEPARIN 500 UNITS: 100 SYRINGE at 12:04

## 2022-04-14 RX ADMIN — CLINDAMYCIN IN 5 PERCENT DEXTROSE 600 MG: 12 INJECTION, SOLUTION INTRAVENOUS at 12:04

## 2022-04-14 RX ADMIN — HYDROMORPHONE HYDROCHLORIDE 1 MG: 1 INJECTION, SOLUTION INTRAMUSCULAR; INTRAVENOUS; SUBCUTANEOUS at 08:04

## 2022-04-14 RX ADMIN — SODIUM CHLORIDE: 0.9 INJECTION, SOLUTION INTRAVENOUS at 10:04

## 2022-04-14 RX ADMIN — VANCOMYCIN HYDROCHLORIDE 1250 MG: 1.25 INJECTION, POWDER, LYOPHILIZED, FOR SOLUTION INTRAVENOUS at 12:04

## 2022-04-14 RX ADMIN — Medication 2 ML: at 11:04

## 2022-04-14 RX ADMIN — MORPHINE SULFATE 6 MG: 2 INJECTION, SOLUTION INTRAMUSCULAR; INTRAVENOUS at 03:04

## 2022-04-14 RX ADMIN — DOCUSATE SODIUM AND SENNOSIDES 1 TABLET: 8.6; 5 TABLET, FILM COATED ORAL at 08:04

## 2022-04-14 RX ADMIN — HYDROMORPHONE HYDROCHLORIDE 1 MG: 1 INJECTION, SOLUTION INTRAMUSCULAR; INTRAVENOUS; SUBCUTANEOUS at 10:04

## 2022-04-14 RX ADMIN — CLINDAMYCIN IN 5 PERCENT DEXTROSE 600 MG: 12 INJECTION, SOLUTION INTRAVENOUS at 09:04

## 2022-04-14 RX ADMIN — MUPIROCIN: 20 OINTMENT TOPICAL at 09:04

## 2022-04-14 RX ADMIN — HYDROCODONE BITARTRATE AND ACETAMINOPHEN 1 TABLET: 5; 325 TABLET ORAL at 09:04

## 2022-04-14 RX ADMIN — DEXTROSE 500 MG: 50 INJECTION, SOLUTION INTRAVENOUS at 02:04

## 2022-04-14 RX ADMIN — MORPHINE SULFATE 6 MG: 2 INJECTION, SOLUTION INTRAMUSCULAR; INTRAVENOUS at 12:04

## 2022-04-14 RX ADMIN — DEXTROSE: 10 SOLUTION INTRAVENOUS at 12:04

## 2022-04-14 RX ADMIN — MORPHINE SULFATE 6 MG: 2 INJECTION, SOLUTION INTRAMUSCULAR; INTRAVENOUS at 08:04

## 2022-04-14 RX ADMIN — Medication 2 ML: at 05:04

## 2022-04-14 RX ADMIN — CLINDAMYCIN IN 5 PERCENT DEXTROSE 600 MG: 12 INJECTION, SOLUTION INTRAVENOUS at 03:04

## 2022-04-14 RX ADMIN — SODIUM ZIRCONIUM CYCLOSILICATE 10 G: 10 POWDER, FOR SUSPENSION ORAL at 05:04

## 2022-04-14 RX ADMIN — HYDROCODONE BITARTRATE AND ACETAMINOPHEN 1 TABLET: 5; 325 TABLET ORAL at 10:04

## 2022-04-14 RX ADMIN — MUPIROCIN: 20 OINTMENT TOPICAL at 08:04

## 2022-04-14 RX ADMIN — CALCIUM GLUCONATE 1 G: 20 INJECTION, SOLUTION INTRAVENOUS at 02:04

## 2022-04-14 RX ADMIN — PIPERACILLIN AND TAZOBACTAM 3.38 G: 3; .375 INJECTION, POWDER, LYOPHILIZED, FOR SOLUTION INTRAVENOUS; PARENTERAL at 09:04

## 2022-04-14 NOTE — PROGRESS NOTES
[facility] Hospital Medicine  Progress Note    Patient Name: Agus Horan  MRN: [id number]  Patient Class: IP- Inpatient   Admission Date: 4/13/2022  Length of Stay: 1 days  Attending Physician: Marcus Johnson MD  Primary Care Provider: Primary Doctor No        Subjective:     Principal Problem:Acute renal failure        HPI:  Agus Horan is a 20 y/o male with PMHx of mild asthma who presents with right lower extremity pain and swelling x few days. Patient was seen in the ED on 4/9/22 for oxycodone overdose and again on 4/10/22 for left eye pain after falling on a piece of furniture. Patient states he is unsure how he injured his leg and denies IV drug use. Xray of his right leg did not show acute fracture. Patietn states RLE pain and swelling increased over the past few days and he is unable to walk secondary to pain. US of his lower extremity performed today did not show DVT. Lab work revealed elevated Creatinine 13.5 & K+ 7.1, elevated AST/ALT 3980/1611 & WBC 18.14.  CPK>40,0000.  Patient unable to dorsiflex and plantar flex his RLE and symptoms are concerning for compartment syndrome per ED. ED provider discussed with Dr. Byers and Dr. Fraser, nephrology. Patient was referred to hospital medicine and seen in the ED with his friend at bedside. Patient complaining of RLE pain, tenderness and swelling. He denies SOB, N/V/D, chest pain and headaches.  Patient will be admitted to hospital medicine for orthopedic consultation and further management.      Overview/Hospital Course:  No notes on file    Interval History: s/p  compartment syndrome and was taking to the OR emergently for fasciotomy on 4/13. Had  emergent HD overnight for hyperkalemia and metabolic acidosis refractory to medical management; pain controlled, RLE wrapped, no edema in LLE. Potassium better       Review of Systems   Constitutional:  Negative for activity change, appetite change, chills and fever.   HENT:   Negative for congestion, sore throat and trouble swallowing.    Eyes:  Negative for photophobia and visual disturbance.   Respiratory:  Negative for cough, chest tightness and shortness of breath.    Cardiovascular:  Negative for chest pain, palpitations and leg swelling.   Gastrointestinal:  Negative for abdominal pain, diarrhea and nausea.   Genitourinary:  Negative for dysuria, flank pain and hematuria.   Musculoskeletal:  Positive for gait problem and myalgias. Negative for back pain.   Skin:  Positive for color change.   Neurological:  Negative for dizziness, weakness and headaches.   Psychiatric/Behavioral:  Negative for confusion.    Objective:     Vital Signs (Most Recent):  Temp: 98.2 °F (36.8 °C) (04/14/22 0800)  Pulse: 69 (04/14/22 1030)  Resp: 19 (04/14/22 1030)  BP: (!) 155/85 (04/14/22 1030)  SpO2: 98 % (04/14/22 1030)   Vital Signs (24h Range):  Temp:  [96.1 °F (35.6 °C)-98.8 °F (37.1 °C)] 98.2 °F (36.8 °C)  Pulse:  [53-99] 69  Resp:  [12-38] 19  SpO2:  [96 %-100 %] 98 %  BP: (136-180)/(58-93) 155/85     Weight: 78 kg (171 lb 15.3 oz)  Body mass index is 25.39 kg/m².    Intake/Output Summary (Last 24 hours) at 4/14/2022 1047  Last data filed at 4/14/2022 0640  Gross per 24 hour   Intake 6552.72 ml   Output 300 ml   Net 6252.72 ml      Physical Exam  Vitals and nursing note reviewed.   Constitutional:       Appearance: He is normal weight. He is ill-appearing.   HENT:      Head: Normocephalic.      Mouth/Throat:      Mouth: Mucous membranes are moist.      Pharynx: Oropharynx is clear.   Eyes:      Pupils: Pupils are equal, round, and reactive to light.      Comments: Left orbital ecchymosis   Cardiovascular:      Rate and Rhythm: Regular rhythm. Tachycardia present.      Pulses: Normal pulses.      Heart sounds: Normal heart sounds.   Pulmonary:      Effort: Pulmonary effort is normal.      Breath sounds: Normal breath sounds.   Abdominal:      General: Bowel sounds are normal.      Palpations:  Abdomen is soft.   Musculoskeletal:         General: Normal range of motion.      Cervical back: Normal range of motion.      Right upper leg: Swelling present.      Right lower leg: Swelling present.      Comments: RLE warm and TTP   Skin:     General: Skin is warm and dry.   Neurological:      Mental Status: He is alert and oriented to person, place, and time. Mental status is at baseline.   Psychiatric:         Mood and Affect: Mood normal.       Significant Labs: All pertinent labs within the past 24 hours have been reviewed.  CBC:   Recent Labs   Lab 04/13/22 1700 04/14/22 0339   WBC 18.14* 18.14*   HGB 16.0 13.5*   HCT 46.4 38.7*    282     CMP:   Recent Labs   Lab 04/13/22  1700 04/13/22  1821 04/13/22 2117 04/14/22 0339 04/14/22  0742   *   < > 128*  128* 131* 133*   K 6.9*   < > 7.1*  7.1* 5.3* 5.0   CL 86*   < > 91*  91* 90* 93*   CO2 19*   < > 12*  12* 24 25   GLU 65*   < > 48*  48* 93 86   *   < > 124*  124* 92* 63*   CREATININE 13.4*   < > 12.7*  12.7* 9.0* 7.3*   CALCIUM 7.7*   < > 6.7*  6.7* 6.7* 6.7*   PROT 7.2  --   --  5.8*  --    ALBUMIN 3.0*  --   --  2.4*  --    BILITOT 0.5  --   --  0.5  --    ALKPHOS 108  --   --  92  --    AST 3,980*  --   --  2,656*  --    ALT 1,611*  --   --  1,120*  --    ANIONGAP 23*   < > 25*  25* 17* 15   EGFRNONAA 5*   < > 5*  5* 7* 10*    < > = values in this interval not displayed.       Significant Imaging: I have reviewed all pertinent imaging results/findings within the past 24 hours.      Assessment/Plan:      * Acute renal failure  Cr.13.5 likely due to rhabdomyolysis     nephrology-recommendations appreciated  Avoid nephrotoxins  IV fluids  BMP q4  Telemetry  Underwent emergent HD overnight for hyperkalemia and metabolic acidosis refractory to medical management;        Compartment syndrome  Underwent fasciotomy on 4/13     Elevated liver enzymes  Elevated AST &ALT likely due to rhabdomyolysis  Trending down  Avoid hepatoxic  medications  Monitor CMP  See plan for rhabdomyolysis        Rhabdomyolysis  CPK >40,000 with RLE edema   S/p fasciotomy on 4/13 for compartment syndrome.  Underwent emergent HD overnight for hyperkalemia and metabolic acidosis refractory to medical management;      IV fluids  Monitor BMP   appreciate nephrology recs      Hyperkalemia  Potassium better  Underwent emergent HD overnight for hyperkalemia    nephrology-recommendations appreciated      History of asthma  Hx of asthma, stable    Monitor for acute changes        VTE Risk Mitigation (From admission, onward)         Ordered     heparin (porcine) injection 4,000 Units  As needed (PRN)         04/14/22 0137     Reason for No Pharmacological VTE Prophylaxis  Once        Question:  Reasons:  Answer:  Risk of Bleeding    04/13/22 1913     IP VTE HIGH RISK PATIENT  Once         04/13/22 1913     Place sequential compression device  Until discontinued         04/13/22 1913                Discharge Planning   JARAD:      Code Status: Full Code   Is the patient medically ready for discharge?:     Reason for patient still in hospital (select all that apply): Patient trending condition and Treatment  Discharge Plan A: Home with family            Critical care time spent on the evaluation and treatment of severe organ dysfunction, review of pertinent labs and imaging studies, discussions with consulting providers and discussions with patient/family: 60 minutes.      Marcus Johnson MD  Department of Hospital Medicine   [facility]

## 2022-04-14 NOTE — ANESTHESIA POSTPROCEDURE EVALUATION
Anesthesia Post Evaluation    Patient: Agus Horan    Procedure(s) Performed: Procedure(s) (LRB):  FASCIOTOMY (Right)    Final Anesthesia Type: general      Patient location during evaluation: PACU  Patient participation: Yes- Able to Participate  Level of consciousness: awake and alert and oriented  Post-procedure vital signs: reviewed and stable  Pain management: adequate  Airway patency: patent    PONV status at discharge: No PONV  Anesthetic complications: no      Cardiovascular status: blood pressure returned to baseline and stable  Respiratory status: unassisted and spontaneous ventilation  Hydration status: euvolemic  Follow-up not needed.          Vitals Value Taken Time   /69 04/14/22 0833   Temp 36.7 °C (98.1 °F) 04/14/22 0640   Pulse 64 04/14/22 0833   Resp 22 04/14/22 0833   SpO2 99 % 04/14/22 0833   Vitals shown include unvalidated device data.      Event Time   Out of Recovery 21:05:00         Pain/Rhonda Score: Pain Rating Prior to Med Admin: 8 (4/14/2022  8:25 AM)  Pain Rating Post Med Admin: 4 (4/14/2022  4:21 AM)  Rhonda Score: 8 (4/13/2022  8:55 PM)

## 2022-04-14 NOTE — CONSULTS
INPATIENT NEPHROLOGY CONSULT   [facility]    Patient Name: Agus Horan  Date: 04/14/2022    Reason for consultation: SANDRA    Chief Complaint:   Chief Complaint   Patient presents with    Leg Pain     Pain in the right leg muscle calf is hard, nausea vomiting, patient was seen here over the weekend for an overdose        History of Present Illness:  20 y/o M with a history of asthma recently here on 4/9 with oxycodone overdose who p/w RLE pain and swelling after a fall on 4/10. He reports severe pain, constant, associated with nausea with inability to bear weight.  He took meloxicam without relief. He was found to have compartment syndrome and was taking to the OR emergently for fasciotomy on 4/13. We are consulted for SANDRA with metabolic derangements.    Interval History:  4/14- did emergent HD overnight for hyperkalemia and metabolic acidosis refractory to medical management; pain controlled, RLE wrapped, no edema in LLE    Plan of Care:    Assessment:  Traumatic rhabdomyolysis with compartment syndrome s/p fasciotomy on 4/13  SANDRA due to toxic ATN s/p emergent HD on 4/14   Hyponatremia  Hyperkalemia  Acidosis  Hypocalcemia  Shock liver    Plan:    - Trend daily CK.  - Ordered q4 BMP- will decide if he needs further RRT for clearance.   - Continue NS 200cc/hr. He is currently oliguric. Ok with -160s for renal perfusion. No NSAIDs or IV contrast. Dose meds for CrCl < 10.  - Check Ical.  - Trend daily LFTs.     Thank you for allowing us to participate in this patient's care. We will continue to follow.    Vital Signs:  Temp Readings from Last 3 Encounters:   04/14/22 98.1 °F (36.7 °C) (Oral)   04/10/22 97.3 °F (36.3 °C)   04/09/22 98.4 °F (36.9 °C)       Pulse Readings from Last 3 Encounters:   04/14/22 74   04/10/22 63   04/09/22 95       BP Readings from Last 3 Encounters:   04/14/22 (!) 162/69   04/10/22 117/64   04/09/22 (!) 143/82       Weight:  Wt Readings from Last 3 Encounters:    04/14/22 78 kg (171 lb 15.3 oz)   04/10/22 77.1 kg (170 lb)   04/09/22 77.1 kg (170 lb)       INS/OUTS:  I/O last 3 completed shifts:  In: 6552.7 [I.V.:4025.1; Other:500; IV Piggyback:2027.6]  Out: 300 [Urine:300]  No intake/output data recorded.    Past Medical & Surgical History:  Past Medical History:   Diagnosis Date    Allergy     AR    Asthma     mild intermittent       Past Surgical History:   Procedure Laterality Date    REMOVAL OF FOREIGN BODY FROM FOOT Left 6/24/2020    Procedure: REMOVAL, FOREIGN BODY, FOOT;  Surgeon: Dani Garcia MD;  Location: [facility] to [facility];  Service: Orthopedics;  Laterality: Left;       Past Social History:  Social History     Socioeconomic History    Marital status: Single   Tobacco Use    Smoking status: Never Smoker    Smokeless tobacco: Never Used   Substance and Sexual Activity    Alcohol use: Yes     Comment: last night    Drug use: Yes     Frequency: 2.0 times per week     Types: Marijuana     Comment: yesterday   Social History Narrative    ** Merged History Encounter **         Lives with mom, 2 brothers.  No smokers.  +Dogs/chickens.  9th grader.       Medications:  Scheduled Meds:   sodium chloride 0.9%   Intravenous Once    clindamycin (CLEOCIN) IVPB  600 mg Intravenous Q8H    mupirocin   Nasal BID    piperacillin-tazobactam (ZOSYN) IVPB  3.375 g Intravenous Q12H    senna-docusate 8.6-50 mg  1 tablet Oral BID    sodium chloride 0.9%  2 mL Intravenous Q6H    sodium zirconium cyclosilicate  5 g Oral Once     Continuous Infusions:   sodium chloride 0.9% 200 mL/hr at 04/14/22 0621    dextrose 10 % in water (D10W) 25 mL/hr at 04/14/22 0621    loperamide       PRN Meds:.sodium chloride 0.9%, dextrose 10%, dextrose 10%, glucagon (human recombinant), glucose, glucose, heparin (porcine), HYDROcodone-acetaminophen, HYDROmorphone, loperamide, morphine, naloxone, ondansetron, ondansetron, sodium chloride 0.9%, Pharmacy to dose Vancomycin consult **AND**  "vancomycin - pharmacy to dose  No current facility-administered medications on file prior to encounter.     Current Outpatient Medications on File Prior to Encounter   Medication Sig Dispense Refill    naloxone (NARCAN) 4 mg/actuation Spry 4mg by nasal route as needed for opioid overdose; may repeat every 2-3 minutes in alternating nostrils until medical help arrives. Call 911 2 each 0       Allergies:  Shrimp    Past Family History:  Reviewed; refer to Hospitalist Admission Note    Review of Systems:  Review of Systems - All 14 systems reviewed and negative, except as noted in HPI    Physical Exam:  BP (!) 162/69   Pulse 74   Temp 98.1 °F (36.7 °C) (Oral)   Resp (!) 24   Ht 5' 9" (1.753 m)   Wt 78 kg (171 lb 15.3 oz)   SpO2 98%   BMI 25.39 kg/m²     General Appearance:    NAD, AAO x 3, cooperative, appears stated age   Head:    Normocephalic, atraumatic   Eyes:    PER, EOMI, and conjunctiva/sclera clear bilaterally        Mouth:   Moist mucus membranes, no thrush or oral lesions, normal      dentition   Back:     No CVA tenderness   Lungs:     Clear to auscultation bilaterally, no wheeze, crackles, rales      or rhonchi, symmetric air movement, respirations unlabored   Heart:    Regular rate and rhythm, S1 and S2 normal, no murmur, rub   or gallop   Abdomen:     Soft, non-tender, non-distended, bowel sounds active all four   quadrants, no RT or guarding, no masses, no organomegaly   Extremities:   RLE wrapped, LLE neg for edema   MSK:   RLE tender   Skin:   Skin color, texture, turgor normal, no rashes or lesions   Neurologic/Psychiatric:   CNII-XII intact, normal strength and sensation       throughout, no asterixis; normal affect, memory, judgement     and insight     Results:  Lab Results   Component Value Date     (L) 04/14/2022    K 5.3 (H) 04/14/2022    CL 90 (L) 04/14/2022    CO2 24 04/14/2022    BUN 92 (H) 04/14/2022    CREATININE 9.0 (H) 04/14/2022    CALCIUM 6.7 (LL) 04/14/2022    ANIONGAP " 17 (H) 04/14/2022    ESTGFRAFRICA 9 (A) 04/14/2022    EGFRNONAA 7 (A) 04/14/2022       Lab Results   Component Value Date    CALCIUM 6.7 (LL) 04/14/2022    PHOS 6.9 (H) 04/13/2022       Recent Labs   Lab 04/14/22  0339   WBC 18.14*   RBC 4.43*   HGB 13.5*   HCT 38.7*      MCV 87   MCH 30.5   MCHC 34.9       I have personally reviewed pertinent radiological imaging and reports.    I have spent > 70 minutes providing care for this patient for the above diagnoses. These services have included chart/data/imaging review, evaluation, exam, formulation of plan, , note preparation, and discussions with staff involved in this patient's care.    Chantelle Fraser MD MPH  [facility]  [address] [place] [id number]  [phone] (p)  [phone] (f)

## 2022-04-14 NOTE — PROGRESS NOTES
Agus Horan [id number] is a 21 y.o. male who has been consulted for vancomycin dosing.    Pharmacy consult for vancomycin dosing in no longer required.  Vancomycin was discontinued.    Thank you for allowing us to participate in this patient's care.     Sebastian So, PharmD  [phone]

## 2022-04-14 NOTE — HPI
Agus Horan is a 22 y/o male with PMHx of mild asthma who presents with right lower extremity pain and swelling x few days. Patient was seen in the ED on 4/9/22 for oxycodone overdose and again on 4/10/22 for left eye pain after falling on a piece of furniture. Patient states he is unsure how he injured his leg and denies IV drug use. Xray of his right leg did not show acute fracture. Patietn states RLE pain and swelling increased over the past few days and he is unable to walk secondary to pain. US of his lower extremity performed today did not show DVT. Lab work revealed elevated Creatinine 13.5 & K+ 7.1, elevated AST/ALT 3980/1611 & WBC 18.14.  CPK>40,0000.  Patient unable to dorsiflex and plantar flex his RLE and symptoms are concerning for compartment syndrome per ED. ED provider discussed with Dr. Byers and Dr. Fraser, nephrology. Patient was referred to hospital medicine and seen in the ED with his friend at bedside. Patient complaining of RLE pain, tenderness and swelling. He denies SOB, N/V/D, chest pain and headaches.  Patient will be admitted to hospital medicine for orthopedic consultation and further management.

## 2022-04-14 NOTE — CARE UPDATE
Spoke to Dr. Fraser regarding patient's elevated creatinine and potassium. Per Dr. Fraser, patient will require dialysis tonight

## 2022-04-14 NOTE — TRANSFER OF CARE
"Anesthesia Transfer of Care Note    Patient: Agus Horan    Procedure(s) Performed: Procedure(s) (LRB):  FASCIOTOMY (Right)    Patient location: GI    Anesthesia Type: general    Transport from OR: Transported from OR on 2-3 L/min O2 by NC with adequate spontaneous ventilation    Post pain: adequate analgesia    Post assessment: no apparent anesthetic complications    Post vital signs: stable    Level of consciousness: sedated    Nausea/Vomiting: no nausea/vomiting    Complications: none    Transfer of care protocol was followed      Last vitals:   Visit Vitals  BP (!) 144/63   Pulse 94   Temp 37.1 °C (98.8 °F)   Resp 15   Ht 5' 9" (1.753 m)   Wt 72.6 kg (160 lb)   SpO2 100%   BMI 23.63 kg/m²     "

## 2022-04-14 NOTE — ANESTHESIA PROCEDURE NOTES
Intubation    Date/Time: 4/13/2022 7:53 PM  Performed by: Huey Stone CRNA  Authorized by: Wayne Bautista MD     Intubation:     Induction:  Intravenous    Intubated:  Postinduction    Mask Ventilation:  N/a    Attempts:  1    Attempted By:  CRNA    Difficult Airway Encountered?: No      Complications:  None    Airway Device:  Supraglottic airway/LMA    Airway Device Size:  4.0    Style/Cuff Inflation:  Cuffed    Secured at:  The lips    Placement Verified By:  Capnometry    Complicating Factors:  None    Findings Post-Intubation:  BS equal bilateral

## 2022-04-14 NOTE — ASSESSMENT & PLAN NOTE
Cr.13.5 likely due to rhabdomyolysis     nephrology-recommendations appreciated  Avoid nephrotoxins  IV fluids  BMP q4  Telemetry  Underwent emergent HD overnight for hyperkalemia and metabolic acidosis refractory to medical management;
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
## 2022-04-14 NOTE — H&P
[facility] Hospital Medicine  History & Physical    Patient Name: Agus Horan  MRN: [id number]  Patient Class: IP- Inpatient  Admission Date: 4/13/2022  Attending Physician: Andrei Gar MD   Primary Care Provider: Primary Doctor No         Patient information was obtained from patient, caregiver / friend, past medical records and ER records.     Subjective:     Principal Problem:Acute renal failure    Chief Complaint:   Chief Complaint   Patient presents with    Leg Pain     Pain in the right leg muscle calf is hard, nausea vomiting, patient was seen here over the weekend for an overdose         HPI: Agus Horan is a 22 y/o male with PMHx of mild asthma who presents with right lower extremity pain and swelling x few days. Patient was seen in the ED on 4/9/22 for oxycodone overdose and again on 4/10/22 for left eye pain after falling on a piece of furniture. Patient states he is unsure how he injured his leg and denies IV drug use. Xray of his right leg did not show acute fracture. Patietn states RLE pain and swelling increased over the past few days and he is unable to walk secondary to pain. US of his lower extremity performed today did not show DVT. Lab work revealed elevated Creatinine 13.5 & K+ 7.1, elevated AST/ALT 3980/1611 & WBC 18.14.  CPK>40,0000.  Patient unable to dorsiflex and plantar flex his RLE and symptoms are concerning for compartment syndrome per ED. ED provider discussed with Dr. Byers and Dr. Fraser, nephrology. Patient was referred to hospital medicine and seen in the ED with his friend at bedside. Patient complaining of RLE pain, tenderness and swelling. He denies SOB, N/V/D, chest pain and headaches.  Patient will be admitted to hospital medicine for orthopedic consultation and further management.      Past Medical History:   Diagnosis Date    Allergy     AR    Asthma     mild intermittent       Past Surgical History:   Procedure Laterality Date     REMOVAL OF FOREIGN BODY FROM FOOT Left 6/24/2020    Procedure: REMOVAL, FOREIGN BODY, FOOT;  Surgeon: Dani Garcia MD;  Location: [facility];  Service: Orthopedics;  Laterality: Left;       Review of patient's allergies indicates:   Allergen Reactions    Shrimp        No current facility-administered medications on file prior to encounter.     Current Outpatient Medications on File Prior to Encounter   Medication Sig    naloxone (NARCAN) 4 mg/actuation Spry 4mg by nasal route as needed for opioid overdose; may repeat every 2-3 minutes in alternating nostrils until medical help arrives. Call 911     Family History       Problem Relation (Age of Onset)    Arthritis Maternal Grandmother    Asthma Brother, Maternal Grandmother, Brother    COPD Maternal Grandmother    Emphysema Maternal Grandmother    Hyperlipidemia Maternal Grandmother          Tobacco Use    Smoking status: Never Smoker    Smokeless tobacco: Never Used   Substance and Sexual Activity    Alcohol use: Yes     Comment: last night    Drug use: Yes     Frequency: 2.0 times per week     Types: Marijuana     Comment: yesterday    Sexual activity: Not on file     Review of Systems   Constitutional:  Negative for activity change, appetite change, chills and fever.   HENT:  Negative for congestion, sore throat and trouble swallowing.    Eyes:  Negative for photophobia and visual disturbance.   Respiratory:  Negative for cough, chest tightness and shortness of breath.    Cardiovascular:  Negative for chest pain, palpitations and leg swelling.   Gastrointestinal:  Negative for abdominal pain, diarrhea and nausea.   Genitourinary:  Negative for dysuria, flank pain and hematuria.   Musculoskeletal:  Positive for gait problem and myalgias. Negative for back pain.   Skin:  Positive for color change.   Neurological:  Negative for dizziness, weakness and headaches.   Psychiatric/Behavioral:  Negative for confusion.    Objective:     Vital Signs (Most  Recent):  Temp: 97.8 °F (36.6 °C) (04/13/22 2105)  Pulse: 78 (04/13/22 2105)  Resp: 14 (04/13/22 2357)  BP: (!) 154/71 (04/13/22 2105)  SpO2: 100 % (04/13/22 2105)   Vital Signs (24h Range):  Temp:  [97.8 °F (36.6 °C)-98.8 °F (37.1 °C)] 97.8 °F (36.6 °C)  Pulse:  [67-99] 78  Resp:  [12-20] 14  SpO2:  [98 %-100 %] 100 %  BP: (144-180)/(62-93) 154/71     Weight: 72.6 kg (160 lb)  Body mass index is 23.63 kg/m².    Physical Exam  Vitals reviewed.   Constitutional:       Appearance: He is normal weight. He is ill-appearing.   HENT:      Head: Normocephalic.      Mouth/Throat:      Mouth: Mucous membranes are moist.      Pharynx: Oropharynx is clear.   Eyes:      Pupils: Pupils are equal, round, and reactive to light.      Comments: Left orbital ecchymosis   Cardiovascular:      Rate and Rhythm: Regular rhythm. Tachycardia present.      Pulses: Normal pulses.      Heart sounds: Normal heart sounds.   Pulmonary:      Effort: Pulmonary effort is normal.      Breath sounds: Normal breath sounds.   Abdominal:      General: Bowel sounds are normal.      Palpations: Abdomen is soft.   Musculoskeletal:         General: Normal range of motion.      Cervical back: Normal range of motion.      Right upper leg: Swelling present.      Right lower leg: Swelling present.      Comments: RLE warm and TTP   Skin:     General: Skin is warm and dry.   Neurological:      Mental Status: He is alert and oriented to person, place, and time. Mental status is at baseline.   Psychiatric:         Mood and Affect: Mood normal.         CRANIAL NERVES     CN III, IV, VI   Pupils are equal, round, and reactive to light.     Significant Labs: All pertinent labs within the past 24 hours have been reviewed.  CBC:   Recent Labs   Lab 04/13/22 1700 04/14/22  0339   WBC 18.14* 18.14*   HGB 16.0 13.5*   HCT 46.4 38.7*    282     CMP:   Recent Labs   Lab 04/13/22  1700 04/13/22  1821 04/13/22 2117 04/14/22  0339   * 127* 128*  128* 131*   K  6.9* 7.1* 7.1*  7.1* 5.3*   CL 86* 85* 91*  91* 90*   CO2 19* 17* 12*  12* 24   GLU 65* 93 48*  48* 93   * 126* 124*  124* 92*   CREATININE 13.4* 13.5* 12.7*  12.7* 9.0*   CALCIUM 7.7* 7.7* 6.7*  6.7* 6.7*   PROT 7.2  --   --  5.8*   ALBUMIN 3.0*  --   --  2.4*   BILITOT 0.5  --   --  0.5   ALKPHOS 108  --   --  92   AST 3,980*  --   --  2,656*   ALT 1,611*  --   --  1,120*   ANIONGAP 23* 25* 25*  25* 17*   EGFRNONAA 5* 5* 5*  5* 7*       Significant Imaging: I have reviewed all pertinent imaging results/findings within the past 24 hours.  Imaging Results              US Lower Extremity Veins Right (Final result)  Result time 04/13/22 17:38:56      Final result by Bryon Palmer MD (04/13/22 17:38:56)                   Impression:      No evidence of deep venous thrombosis in the right lower extremity.      Electronically signed by: Bryon Palmer  Date:    04/13/2022  Time:    17:38               Narrative:    EXAMINATION:  US LOWER EXTREMITY VEINS RIGHT    CLINICAL HISTORY:  right leg swelling;    TECHNIQUE:  Duplex and color flow Doppler evaluation and graded compression of the right lower extremity veins was performed.    COMPARISON:  None    FINDINGS:  Right thigh veins: The common femoral, femoral, popliteal, upper greater saphenous, and deep femoral veins are patent and free of thrombus. The veins are normally compressible and have normal phasic flow and augmentation response.    Right calf veins: The visualized calf veins are patent.    Contralateral CFV: The contralateral (left) common femoral vein is patent and free of thrombus.    Miscellaneous: None                                       Assessment/Plan:     * Acute renal failure  Cr.13.5 likely due to rhabdomyolysis    Consult nephrology-recommendations appreciated  Avoid nephrotoxins  IV fluids  BMP q4  Telemetry      Hyperkalemia  K+ 7.1    K+ shifted in ED  Repeat K+   Consult nephrology-recommendations appreciated      Elevated  liver enzymes  Elevated AST &ALT likely due to rhabdomyolysis    Avoid hepatoxic medications  Monitor CMP  See plan for rhabdomyolysis        Rhabdomyolysis  CPK >40,000 with RLE edema     IV fluids  Monitor BMP q4  Consult nephrology      History of asthma  Hx of asthma, stable    Monitor for acute changes        VTE Risk Mitigation (From admission, onward)         Ordered     heparin (porcine) injection 4,000 Units  As needed (PRN)         04/14/22 0137     Reason for No Pharmacological VTE Prophylaxis  Once        Question:  Reasons:  Answer:  Risk of Bleeding    04/13/22 1913     IP VTE HIGH RISK PATIENT  Once         04/13/22 1913     Place sequential compression device  Until discontinued         04/13/22 1913              Critical care time spent on the evaluation and treatment of severe organ dysfunction, review of pertinent labs and imaging studies, discussions with consulting providers and discussions with patient/family: 65 minutes.     Brittni Ann NP  Department of Hospital Medicine   [facility]

## 2022-04-14 NOTE — SUBJECTIVE & OBJECTIVE
Interval History: s/p  compartment syndrome and was taking to the OR emergently for fasciotomy on 4/13. Had  emergent HD overnight for hyperkalemia and metabolic acidosis refractory to medical management; pain controlled, RLE wrapped, no edema in LLE. Potassium better       Review of Systems   Constitutional:  Negative for activity change, appetite change, chills and fever.   HENT:  Negative for congestion, sore throat and trouble swallowing.    Eyes:  Negative for photophobia and visual disturbance.   Respiratory:  Negative for cough, chest tightness and shortness of breath.    Cardiovascular:  Negative for chest pain, palpitations and leg swelling.   Gastrointestinal:  Negative for abdominal pain, diarrhea and nausea.   Genitourinary:  Negative for dysuria, flank pain and hematuria.   Musculoskeletal:  Positive for gait problem and myalgias. Negative for back pain.   Skin:  Positive for color change.   Neurological:  Negative for dizziness, weakness and headaches.   Psychiatric/Behavioral:  Negative for confusion.    Objective:     Vital Signs (Most Recent):  Temp: 98.2 °F (36.8 °C) (04/14/22 0800)  Pulse: 69 (04/14/22 1030)  Resp: 19 (04/14/22 1030)  BP: (!) 155/85 (04/14/22 1030)  SpO2: 98 % (04/14/22 1030)   Vital Signs (24h Range):  Temp:  [96.1 °F (35.6 °C)-98.8 °F (37.1 °C)] 98.2 °F (36.8 °C)  Pulse:  [53-99] 69  Resp:  [12-38] 19  SpO2:  [96 %-100 %] 98 %  BP: (136-180)/(58-93) 155/85     Weight: 78 kg (171 lb 15.3 oz)  Body mass index is 25.39 kg/m².    Intake/Output Summary (Last 24 hours) at 4/14/2022 1047  Last data filed at 4/14/2022 0640  Gross per 24 hour   Intake 6552.72 ml   Output 300 ml   Net 6252.72 ml      Physical Exam  Vitals and nursing note reviewed.   Constitutional:       Appearance: He is normal weight. He is ill-appearing.   HENT:      Head: Normocephalic.      Mouth/Throat:      Mouth: Mucous membranes are moist.      Pharynx: Oropharynx is clear.   Eyes:      Pupils: Pupils are equal,  round, and reactive to light.      Comments: Left orbital ecchymosis   Cardiovascular:      Rate and Rhythm: Regular rhythm. Tachycardia present.      Pulses: Normal pulses.      Heart sounds: Normal heart sounds.   Pulmonary:      Effort: Pulmonary effort is normal.      Breath sounds: Normal breath sounds.   Abdominal:      General: Bowel sounds are normal.      Palpations: Abdomen is soft.   Musculoskeletal:         General: Normal range of motion.      Cervical back: Normal range of motion.      Right upper leg: Swelling present.      Right lower leg: Swelling present.      Comments: RLE warm and TTP   Skin:     General: Skin is warm and dry.   Neurological:      Mental Status: He is alert and oriented to person, place, and time. Mental status is at baseline.   Psychiatric:         Mood and Affect: Mood normal.       Significant Labs: All pertinent labs within the past 24 hours have been reviewed.  CBC:   Recent Labs   Lab 04/13/22  1700 04/14/22  0339   WBC 18.14* 18.14*   HGB 16.0 13.5*   HCT 46.4 38.7*    282     CMP:   Recent Labs   Lab 04/13/22  1700 04/13/22  1821 04/13/22  2117 04/14/22  0339 04/14/22  0742   *   < > 128*  128* 131* 133*   K 6.9*   < > 7.1*  7.1* 5.3* 5.0   CL 86*   < > 91*  91* 90* 93*   CO2 19*   < > 12*  12* 24 25   GLU 65*   < > 48*  48* 93 86   *   < > 124*  124* 92* 63*   CREATININE 13.4*   < > 12.7*  12.7* 9.0* 7.3*   CALCIUM 7.7*   < > 6.7*  6.7* 6.7* 6.7*   PROT 7.2  --   --  5.8*  --    ALBUMIN 3.0*  --   --  2.4*  --    BILITOT 0.5  --   --  0.5  --    ALKPHOS 108  --   --  92  --    AST 3,980*  --   --  2,656*  --    ALT 1,611*  --   --  1,120*  --    ANIONGAP 23*   < > 25*  25* 17* 15   EGFRNONAA 5*   < > 5*  5* 7* 10*    < > = values in this interval not displayed.       Significant Imaging: I have reviewed all pertinent imaging results/findings within the past 24 hours.

## 2022-04-14 NOTE — ASSESSMENT & PLAN NOTE
Cr.13.5 likely due to rhabdomyolysis    Consult nephrology-recommendations appreciated  Avoid nephrotoxins  IV fluids  BMP q4  Telemetry

## 2022-04-14 NOTE — PROGRESS NOTES
HD tx complete, 0L net UF removed. Dressing change not completed during HD d/t patient having pain, site is  post surgery.        04/14/22 0640   Required for all Hemodialysis Patients   Hepatitis Status other (see comments)  (Labs drawn during HD)   Handoff Report   Received From [facility]   Given To Gladys [facility]   Treatment Type   Treatment Type Acute   Vital Signs   Temp 98.1 °F (36.7 °C)   Temp src Oral   Pulse 63   Heart Rate Source Monitor   Resp 15   SpO2 99 %   O2 Device (Oxygen Therapy) room air   BP (!) 162/69   Assessments (Pre/Post)   Consent Obtained yes   Date Hepatitis Profile Obtained 04/14/22   Blood Liters Processed (BLP) 56   Transport Modality bed   Level of Consciousness (AVPU) alert   Dialyzer Clearance moderately streaked   Trialysis (Dialysis) Catheter 04/14/22 right internal jugular   Placement Date: 04/14/22   Present Prior to Hospital Arrival?: No  Location: right internal jugular   Site Assessment No redness;No swelling   Line Securement Device Secured with sutures   Dressing Type Transparent (Tegaderm)   Dressing Status Clean;Dry;Intact   Dressing Intervention Integrity maintained   Date on Dressing 04/13/22   Dressing Due to be Changed 04/14/22   Venous Patency/Care flushed w/o difficulty   Arterial Patency/Care flushed w/o difficulty   Distal Patency/Care normal saline locked   Flows Good   Line Necessity Review CRRT/HD   Post-Hemodialysis Assessment   Rinseback Volume (mL) 250 mL   Blood Volume Processed (Liters) 56 L   Dialyzer Clearance Moderately streaked   Duration of Treatment (minutes) 240 minutes   Additional Fluid Intake (mL) 500 mL   Total UF (mL) 0 mL   Net Fluid Removal -500   Patient Response to Treatment Tolerated well   Post-Treatment Weight 78 kg (171 lb 15.3 oz)   Treatment Weight Change 5.4   Post-Hemodialysis Comments Tx completed per protocol, all blood returned without incident. VSS, NAD.   Edema   Edema generalized

## 2022-04-14 NOTE — SUBJECTIVE & OBJECTIVE
Past Medical History:   Diagnosis Date    Allergy     AR    Asthma     mild intermittent       Past Surgical History:   Procedure Laterality Date    REMOVAL OF FOREIGN BODY FROM FOOT Left 6/24/2020    Procedure: REMOVAL, FOREIGN BODY, FOOT;  Surgeon: Dani Garcia MD;  Location: [facility];  Service: Orthopedics;  Laterality: Left;       Review of patient's allergies indicates:   Allergen Reactions    Shrimp        No current facility-administered medications on file prior to encounter.     Current Outpatient Medications on File Prior to Encounter   Medication Sig    naloxone (NARCAN) 4 mg/actuation Spry 4mg by nasal route as needed for opioid overdose; may repeat every 2-3 minutes in alternating nostrils until medical help arrives. Call 911     Family History       Problem Relation (Age of Onset)    Arthritis Maternal Grandmother    Asthma Brother, Maternal Grandmother, Brother    COPD Maternal Grandmother    Emphysema Maternal Grandmother    Hyperlipidemia Maternal Grandmother          Tobacco Use    Smoking status: Never Smoker    Smokeless tobacco: Never Used   Substance and Sexual Activity    Alcohol use: Yes     Comment: last night    Drug use: Yes     Frequency: 2.0 times per week     Types: Marijuana     Comment: yesterday    Sexual activity: Not on file     Review of Systems   Constitutional:  Negative for activity change, appetite change, chills and fever.   HENT:  Negative for congestion, sore throat and trouble swallowing.    Eyes:  Negative for photophobia and visual disturbance.   Respiratory:  Negative for cough, chest tightness and shortness of breath.    Cardiovascular:  Negative for chest pain, palpitations and leg swelling.   Gastrointestinal:  Negative for abdominal pain, diarrhea and nausea.   Genitourinary:  Negative for dysuria, flank pain and hematuria.   Musculoskeletal:  Positive for gait problem and myalgias. Negative for back pain.   Skin:  Positive for color change.   Neurological:   Negative for dizziness, weakness and headaches.   Psychiatric/Behavioral:  Negative for confusion.    Objective:     Vital Signs (Most Recent):  Temp: 97.8 °F (36.6 °C) (04/13/22 2105)  Pulse: 78 (04/13/22 2105)  Resp: 14 (04/13/22 2357)  BP: (!) 154/71 (04/13/22 2105)  SpO2: 100 % (04/13/22 2105)   Vital Signs (24h Range):  Temp:  [97.8 °F (36.6 °C)-98.8 °F (37.1 °C)] 97.8 °F (36.6 °C)  Pulse:  [67-99] 78  Resp:  [12-20] 14  SpO2:  [98 %-100 %] 100 %  BP: (144-180)/(62-93) 154/71     Weight: 72.6 kg (160 lb)  Body mass index is 23.63 kg/m².    Physical Exam  Vitals reviewed.   Constitutional:       Appearance: He is normal weight. He is ill-appearing.   HENT:      Head: Normocephalic.      Mouth/Throat:      Mouth: Mucous membranes are moist.      Pharynx: Oropharynx is clear.   Eyes:      Pupils: Pupils are equal, round, and reactive to light.      Comments: Left orbital ecchymosis   Cardiovascular:      Rate and Rhythm: Regular rhythm. Tachycardia present.      Pulses: Normal pulses.      Heart sounds: Normal heart sounds.   Pulmonary:      Effort: Pulmonary effort is normal.      Breath sounds: Normal breath sounds.   Abdominal:      General: Bowel sounds are normal.      Palpations: Abdomen is soft.   Musculoskeletal:         General: Normal range of motion.      Cervical back: Normal range of motion.      Right upper leg: Swelling present.      Right lower leg: Swelling present.      Comments: RLE warm and TTP   Skin:     General: Skin is warm and dry.   Neurological:      Mental Status: He is alert and oriented to person, place, and time. Mental status is at baseline.   Psychiatric:         Mood and Affect: Mood normal.         CRANIAL NERVES     CN III, IV, VI   Pupils are equal, round, and reactive to light.     Significant Labs: All pertinent labs within the past 24 hours have been reviewed.  CBC:   Recent Labs   Lab 04/13/22  1700 04/14/22  0339   WBC 18.14* 18.14*   HGB 16.0 13.5*   HCT 46.4 38.7*   PLT  344 282     CMP:   Recent Labs   Lab 04/13/22  1700 04/13/22  1821 04/13/22  2117 04/14/22  0339   * 127* 128*  128* 131*   K 6.9* 7.1* 7.1*  7.1* 5.3*   CL 86* 85* 91*  91* 90*   CO2 19* 17* 12*  12* 24   GLU 65* 93 48*  48* 93   * 126* 124*  124* 92*   CREATININE 13.4* 13.5* 12.7*  12.7* 9.0*   CALCIUM 7.7* 7.7* 6.7*  6.7* 6.7*   PROT 7.2  --   --  5.8*   ALBUMIN 3.0*  --   --  2.4*   BILITOT 0.5  --   --  0.5   ALKPHOS 108  --   --  92   AST 3,980*  --   --  2,656*   ALT 1,611*  --   --  1,120*   ANIONGAP 23* 25* 25*  25* 17*   EGFRNONAA 5* 5* 5*  5* 7*       Significant Imaging: I have reviewed all pertinent imaging results/findings within the past 24 hours.  Imaging Results              US Lower Extremity Veins Right (Final result)  Result time 04/13/22 17:38:56      Final result by Bryon Palmer MD (04/13/22 17:38:56)                   Impression:      No evidence of deep venous thrombosis in the right lower extremity.      Electronically signed by: Bryon Palmer  Date:    04/13/2022  Time:    17:38               Narrative:    EXAMINATION:  US LOWER EXTREMITY VEINS RIGHT    CLINICAL HISTORY:  right leg swelling;    TECHNIQUE:  Duplex and color flow Doppler evaluation and graded compression of the right lower extremity veins was performed.    COMPARISON:  None    FINDINGS:  Right thigh veins: The common femoral, femoral, popliteal, upper greater saphenous, and deep femoral veins are patent and free of thrombus. The veins are normally compressible and have normal phasic flow and augmentation response.    Right calf veins: The visualized calf veins are patent.    Contralateral CFV: The contralateral (left) common femoral vein is patent and free of thrombus.    Miscellaneous: None

## 2022-04-14 NOTE — PLAN OF CARE
Care plan reviewed. Safety maintained. Patient AA&O x4. Medicated several times today for c/o RLE pain. Dressing remains intact, leg elevated up on 3 pillows with bed also elevated. Right foot is warm, toes are pink, patient is able to wriggle them. Noted serosang drainage. Dr. Byers is aware. IV antibiotics for prophylaxis. Afebrile. No new drainage noted. Patient has fair appetite. Brown with only 7 ml dark tea colored urine. Patient to have HD tomorrow. Potassium increased with last lab, calcium remains low, Dr. Fraser notified. No BM this shift. Patients mom came to visit for a while today. She reports that patient has had a total 9 overdoses in two years, with 4 of them the last nine months. Patients S/O visiting. Patient had 3 other friends visit today. Patient seen by Dr. Johnson, Dr. Fraser and Dr. Byers.

## 2022-04-14 NOTE — ASSESSMENT & PLAN NOTE
Potassium better  Underwent emergent HD overnight for hyperkalemia    nephrology-recommendations appreciated

## 2022-04-14 NOTE — ASSESSMENT & PLAN NOTE
Elevated AST &ALT likely due to rhabdomyolysis  Trending down  Avoid hepatoxic medications  Monitor CMP  See plan for rhabdomyolysis

## 2022-04-14 NOTE — PROGRESS NOTES
Postop day 1 his hematocrit is 38 patient has some movement in his toes has good plantar flexion of the toes some slight dorsiflexion of the toe has good capillary filling and has good sensation to the toes of the right foot.  Patient has some serous angle drainage from the fasciotomy sites.  Plan is to continue him with elevation of leg eventually will return to surgery for irrigation of his wounds and possible closure of his fasciotomy sites.  Continue medical management of the patient at this time.

## 2022-04-14 NOTE — ASSESSMENT & PLAN NOTE
CPK >40,000 with RLE edema   S/p fasciotomy on 4/13 for compartment syndrome.  Underwent emergent HD overnight for hyperkalemia and metabolic acidosis refractory to medical management;      IV fluids  Monitor BMP   appreciate nephrology recs

## 2022-04-14 NOTE — OP NOTE
[facility]  Orthopedic Surgery   Operative Note    SUMMARY     Date of Procedure: 4/13/2022     Procedure: Procedure(s) (LRB):  FASCIOTOMY (Right)   dual incision fasciotomy posterior medial incision and anterolateral incision right lower leg    Surgeon(s) and Role:     * Leonardo Byers MD - Primary    Assistant:  None    Pre-Operative Diagnosis: Compartment syndrome [T79.A0XA] right lower extremity    Post-Operative Diagnosis: Post-Op Diagnosis Codes:     * Compartment syndrome [T79.A0XA] right lower extremity    Anesthesia: Choice      Estimated Blood Loss (EBL): * No values recorded between 4/13/2022  8:10 PM and 4/13/2022  8:38 PM *           Implants: * No implants in log *    Specimens:   Specimen (24h ago, onward)            None           Complications:  None           Description of the Procedure:  The patient was given a general anesthetic and placed in a supine position a tourniquet was placed on right upper leg the right lower leg was then prepped Hibiclens and ChloraPrep and draped in a sterile manner inspection of the leg showed a very tense in her old anterior and lateral compartment of the leg a very firm posterior compartment the leg was elevated and a tourniquet was placed to 300 mmHg a lateral incision was then made proximally 2 cm anterior to the fibula it extended about 4 cm from the fibular neck distally about 15 cm the anterior compartment was identified the fascia overlying the anti our anterior compartment was then incised with a knife blade scissors was then used to complete the fasciotomy underneath the skin incision distally and proximally the neurovascular structures were protected throughout the case there was muscle which bulged out of the anterior compartment with a fasciotomy was performed then another incision was made over the lateral compartment with a knife blade and extended with scissors along the length of the incision releasing the lateral  compartment there was also very tight tense lateral compartment a muscle extruded out from the lateral compartment after the fasciotomy the muscle itself looked rather ischemic a Bovie was used to touch the muscle and did not contract a lot of there was some slight contraction but was not a brisk contraction with time the muscle appeared to have a left ski fritz look in had more blood supply quickly the attention was turned to posterior compartment a longitudinal incision was made about 2 cm posterior the posterior medial edge of the tibia the hemostasis was secured with the Bovie the skin flaps were elevated along the anterior and posterior edge of both of the incisions then with a knife blade the posterior superficial compartment was incised approximately 15 cm through the entire part of the incision and then going undermining of the skin for about another 2-3 cm proximally and distally the posterior deep compartment was also released with a knife blade and then scissors the posterior compartment appeared to be adequately decompressed decompressed after the fasciotomies the muscle bellies contracted very well with the Bovie and the blood supply look good into the muscle of the posterior deep and posterior superficial compartment.   The tourniquet was immediately released it was only up for about 10 minutes and then hemostasis was secured with the Bovie the wound was irrigated with normal saline solution and then Adaptic was applied to both of the incisions along with fluffs and ABD pads soft dressing was applied along with a posterior splint the patient was stable to recovery.

## 2022-04-14 NOTE — EICU
EICU BRIEF ADMIT NOTE:    HISTORY: Please refer to H/P and ER notes for detail. Status post fasciotomy.  Currently, denies any pain    CAMERA ASSESSMENT: Two way audiovisual assessment was done: no distress.  Vitals stable    Telemetry was reviewed. Medical records including notes, labs and imaging were reviewed.    DISCUSSED with bedside nurse.    ASSESSMENT AND PLAN:    # Compartment syndrome, right lower leg status post fasciotomy  --pain management  --Blood culture  --Broad-spectrum antibiotics.      # Acute renal failure.  Rhabdomyolysis. Associated with severe hyperkalemia and metabolic acidosis  --nephrology consulted.  ER physician to place dialysis access.  Dialysis planned for tonight  --We will give calcium gluconate and bicarbonate pending dialysis  --follow-up lab post dialysis.   --Continue IV fluids.     # Hypoglycemia.  Hypoglycemia protocol initiated.  Monitor blood glucose levels.    BEST PRACTICES REVIEW:    STRESS ULCER PROPHYLAXIS: not indicated  DVT PROPHYLAXIS:   We will start subcu heparin once okay with surgical service    Thank You for allowing EICU to participate in the care of the patient. Please call as needed      Seth Fallon MD  [facility]  [phone]

## 2022-04-14 NOTE — PLAN OF CARE
Patient admitted to ICU following surgical fasciotomy due to compartment syndrome of the right lower extremity. Patient received with surgical dressing in place, Serosanguinous drainage noted throughout the night. Pad placed. POC reviewed with patient and friend, Rakan. Nephrology consulted due to Acute kidney failure, Spoke with Dr. Oakley who ordered emergent dialysis. Patient's mother, Cheryl Horan, called for consent for trialysis catheter and emergency dialysis due to patient's recent anaesthesia. 2 Nurse verified consent. Trialysis catheter placed by Dr. Acevedo and verified by xray. Emergency dialysis x 4 hours. Patient tolerated well. Repeat labs to be drawn. Patient complaining of continued pain to right lower extremity, PRN pain medication being administered. Accucheck hourly due to hypoglycemia, patient started on D10NS due to NPO status. Safety maintained, bed low and locked, bed alarm activated.       Problem: Adult Inpatient Plan of Care  Goal: Plan of Care Review  Outcome: Ongoing, Progressing  Goal: Patient-Specific Goal (Individualized)  Outcome: Ongoing, Progressing  Goal: Absence of Hospital-Acquired Illness or Injury  Outcome: Ongoing, Progressing  Goal: Optimal Comfort and Wellbeing  Outcome: Ongoing, Progressing  Goal: Readiness for Transition of Care  Outcome: Ongoing, Progressing     Problem: Infection  Goal: Absence of Infection Signs and Symptoms  Outcome: Ongoing, Progressing     Problem: Fall Injury Risk  Goal: Absence of Fall and Fall-Related Injury  Outcome: Ongoing, Progressing     Problem: Skin Injury Risk Increased  Goal: Skin Health and Integrity  Outcome: Ongoing, Progressing     Problem: Device-Related Complication Risk (Hemodialysis)  Goal: Safe, Effective Therapy Delivery  Outcome: Ongoing, Progressing     Problem: Hemodynamic Instability (Hemodialysis)  Goal: Effective Tissue Perfusion  Outcome: Ongoing, Progressing     Problem: Infection (Hemodialysis)  Goal: Absence of  Infection Signs and Symptoms  Outcome: Ongoing, Progressing

## 2022-04-14 NOTE — ASSESSMENT & PLAN NOTE
Elevated AST &ALT likely due to rhabdomyolysis    Avoid hepatoxic medications  Monitor CMP  See plan for rhabdomyolysis

## 2022-04-14 NOTE — CONSULTS
[facility] - Emergency Dept  Consult Note  4/13/2022      Past Medical History:   Diagnosis Date    Allergy     AR    Asthma     mild intermittent       Past Surgical History:   Procedure Laterality Date    REMOVAL OF FOREIGN BODY FROM FOOT Left 6/24/2020    Procedure: REMOVAL, FOREIGN BODY, FOOT;  Surgeon: Dani Garcia MD;  Location: [facility], 1928–1983;  Service: Orthopedics;  Laterality: Left;         Current Facility-Administered Medications:     BUPivacaine (PF) 0.5% (5 mg/mL) injection 50 mg, 10 mL, Subcutaneous, ED 1 Time, Anil Acevedo III, MD    calcium gluconat 1 g 1 g IVPB, 1 g, Intravenous, ED 1 Time, Anil Acevedo III, MD    dextrose 10% bolus 125 mL, 12.5 g, Intravenous, PRN, Anil Acevedo III, MD    dextrose 10% bolus 250 mL, 25 g, Intravenous, PRN, Anil Acevedo III, MD    Current Outpatient Medications:     naloxone (NARCAN) 4 mg/actuation Spry, 4mg by nasal route as needed for opioid overdose; may repeat every 2-3 minutes in alternating nostrils until medical help arrives. Call 911, Disp: 2 each, Rfl: 0    Allergies as of 04/13/2022 - Reviewed 04/13/2022   Allergen Reaction Noted    Shrimp  07/10/2018       Family History   Problem Relation Age of Onset    Asthma Brother     Emphysema Maternal Grandmother     Hyperlipidemia Maternal Grandmother     Asthma Maternal Grandmother     Arthritis Maternal Grandmother     COPD Maternal Grandmother     Asthma Brother        Social History     Socioeconomic History    Marital status: Single   Tobacco Use    Smoking status: Never Smoker    Smokeless tobacco: Never Used   Substance and Sexual Activity    Alcohol use: Yes     Comment: last night    Drug use: Yes     Frequency: 2.0 times per week     Types: Marijuana     Comment: yesterday   Social History Narrative    ** Merged History Encounter **         Lives with mom, 2 brothers.  No smokers.  +Dogs/chickens.  7th grader.         HPI:  Patient is a 21-year-old male who  initially presented to the emergency room on 04/09/2022 after sustaining a drug overdose.  The patient was discharged that day and returned the next day on 04/10/2022 complaining of some right leg pain the patient was discharged that day.  The patient continued to have right leg pain the patient's partner wanted him to return to the emergency room but the patient did not return to the emergency room until today on 04/13/2022.  The patient is now about 5 days status post complaints of pain in his right lower leg he denies any history of any injury.  The patient's partner said that he started complaining of pain in his leg on 04/10/2022.  The patient is now being seen in emergency room with complaint of swelling in his right leg severe pain and inability to dorsiflex and plantar flex his right lower extremity.      Review of Systems   Constitution: Negative for chills and fever.   HENT: Negative for headaches and blurry vision.   Cardiovascular: Negative for chest pain, irregular heartbeat, leg swelling and palpitations.   Respiratory: Negative for cough and shortness of breath.   Endocrine: Negative for polyuria.   Hematologic/Lymphatic: Negative for bleeding problem. Does not bruise/bleed easily.   Skin: Negative for poor wound healing and rash.   Musculoskeletal: Negative for joint pain. Negative for arthritis, joint swelling, muscle weakness and myalgias.   Gastrointestinal: Negative for abdominal pain, heartburn, melena, nausea and vomiting.   Genitourinary: Negative for bladder incontinence and dysuria.   Neurological: Negative for numbness.   Psychiatric/Behavioral: Negative for depression. The patient is not nervous/anxious.     PE:  Temp:  [98 °F (36.7 °C)] 98 °F (36.7 °C)  Pulse:  [67-99] 67  Resp:  [19-20] 19  SpO2:  [98 %-100 %] 100 %  BP: (165-180)/(72-93) 167/75    A&Ox3, NAD  Neck-supple with no pain  RUE-no swelling or deformity  LUE-no swelling or deformity  Pelvis-no pelvic pain hip pain  Back-no  back pain  RLE-examination of the right lower extremity the patient has a swollen right lower extremity his compartment in his posterior compartment of his right lower leg is tense the compartment in his anterior compartment of his right lower leg is tense to palpation the patient complains of pain with passive flexion and passive extension of the ankle patient has good capillary filling in his toes he has palpable pulses in the dorsalis pedis and posterior tibialis.  Patient is unable to dorsiflex nor is he able to plantar flex his ankle actively.  LLE-patient has no swelling or pain in the left lower extremity    Xrays:  X-rays of his right lower extremity which were performed on 04/10/2022 showed no fractures or dislocations  Imaging Results          US Lower Extremity Veins Right (Final result)  Result time 04/13/22 17:38:56    Final result by Bryon Palmer MD (04/13/22 17:38:56)                 Impression:      No evidence of deep venous thrombosis in the right lower extremity.      Electronically signed by: Bryon Palmer  Date:    04/13/2022  Time:    17:38             Narrative:    EXAMINATION:  US LOWER EXTREMITY VEINS RIGHT    CLINICAL HISTORY:  right leg swelling;    TECHNIQUE:  Duplex and color flow Doppler evaluation and graded compression of the right lower extremity veins was performed.    COMPARISON:  None    FINDINGS:  Right thigh veins: The common femoral, femoral, popliteal, upper greater saphenous, and deep femoral veins are patent and free of thrombus. The veins are normally compressible and have normal phasic flow and augmentation response.    Right calf veins: The visualized calf veins are patent.    Contralateral CFV: The contralateral (left) common femoral vein is patent and free of thrombus.    Miscellaneous: None                                Labs:    Recent Results (from the past 24 hour(s))   CBC Auto Differential    Collection Time: 04/13/22  5:00 PM   Result Value Ref Range     WBC 18.14 (H) 3.90 - 12.70 K/uL    RBC 5.32 4.60 - 6.20 M/uL    Hemoglobin 16.0 14.0 - 18.0 g/dL    Hematocrit 46.4 40.0 - 54.0 %    MCV 87 82 - 98 fL    MCH 30.1 27.0 - 31.0 pg    MCHC 34.5 32.0 - 36.0 g/dL    RDW 12.3 11.5 - 14.5 %    Platelets 344 150 - 450 K/uL    MPV 9.8 9.2 - 12.9 fL    Immature Granulocytes 0.8 (H) 0.0 - 0.5 %    Gran # (ANC) 15.9 (H) 1.8 - 7.7 K/uL    Immature Grans (Abs) 0.14 (H) 0.00 - 0.04 K/uL    Lymph # 1.0 1.0 - 4.8 K/uL    Mono # 1.1 (H) 0.3 - 1.0 K/uL    Eos # 0.0 0.0 - 0.5 K/uL    Baso # 0.02 0.00 - 0.20 K/uL    nRBC 0 0 /100 WBC    Gran % 87.7 (H) 38.0 - 73.0 %    Lymph % 5.3 (L) 18.0 - 48.0 %    Mono % 6.1 4.0 - 15.0 %    Eosinophil % 0.0 0.0 - 8.0 %    Basophil % 0.1 0.0 - 1.9 %    Differential Method Automated    Comprehensive Metabolic Panel    Collection Time: 04/13/22  5:00 PM   Result Value Ref Range    Sodium 128 (L) 136 - 145 mmol/L    Potassium 6.9 (HH) 3.5 - 5.1 mmol/L    Chloride 86 (L) 95 - 110 mmol/L    CO2 19 (L) 23 - 29 mmol/L    Glucose 65 (L) 70 - 110 mg/dL     (H) 6 - 20 mg/dL    Creatinine 13.4 (H) 0.5 - 1.4 mg/dL    Calcium 7.7 (L) 8.7 - 10.5 mg/dL    Total Protein 7.2 6.0 - 8.4 g/dL    Albumin 3.0 (L) 3.5 - 5.2 g/dL    Total Bilirubin 0.5 0.1 - 1.0 mg/dL    Alkaline Phosphatase 108 55 - 135 U/L    AST 3,980 (H) 10 - 40 U/L    ALT 1,611 (H) 10 - 44 U/L    Anion Gap 23 (H) 8 - 16 mmol/L    eGFR if African American 5 (A) >60 mL/min/1.73 m^2    eGFR if non African American 5 (A) >60 mL/min/1.73 m^2   APTT    Collection Time: 04/13/22  5:00 PM   Result Value Ref Range    aPTT 25.4 21.0 - 32.0 sec   Protime-INR    Collection Time: 04/13/22  5:00 PM   Result Value Ref Range    Prothrombin Time 11.0 9.0 - 12.5 sec    INR 1.0 0.8 - 1.2   POCT glucose    Collection Time: 04/13/22  6:49 PM   Result Value Ref Range    POCT Glucose 118 (H) 70 - 110 mg/dL       Assessment/Plan:  Impression is that of a compartment syndrome to the right lower leg patient has inability  to dorsiflex or plantar flex his ankle he has good capillary filling and pulses to the lower extremity but again has no muscle activity he has very tense posterior anterior and lateral compartments of the right lower leg.  Plan is to bring this patient to surgery for emergency fasciotomy of his right lower extremity.  The patient understands the procedure and has signed a consent to go to surgery.

## 2022-04-14 NOTE — CONSULTS
Pharmacokinetic Initial Assessment: IV Vancomycin    Assessment/Plan:    Initiate intravenous vancomycin with dose of 1250 mg once with subsequent doses when random concentrations are less than 20 mcg/mL  Desired empiric serum trough concentration is 15 to 20 mcg/mL  Draw vancomycin random level on 04/14/22 at 1600.  Pharmacy will continue to follow and monitor vancomycin.      Please contact pharmacy at extension [phone] with any questions regarding this assessment.     Thank you for the consult,   Donny Santosuyen       Patient brief summary:  Agus Horan is a 21 y.o. male initiated on antimicrobial therapy with IV Vancomycin for treatment of suspected skin & soft tissue infection    Drug Allergies:   Review of patient's allergies indicates:   Allergen Reactions    Shrimp        Actual Body Weight:   72.6kg    Renal Function:   Estimated Creatinine Clearance: 9 mL/min (A) (based on SCr of 12.7 mg/dL (H)).,     Dialysis Method (if applicable):  intermittent HD    CBC (last 72 hours):  Recent Labs   Lab Result Units 04/13/22  1700   WBC K/uL 18.14*   Hemoglobin g/dL 16.0   Hematocrit % 46.4   Platelets K/uL 344   Gran % % 87.7*   Lymph % % 5.3*   Mono % % 6.1   Eosinophil % % 0.0   Basophil % % 0.1   Differential Method  Automated       Metabolic Panel (last 72 hours):  Recent Labs   Lab Result Units 04/13/22  1700 04/13/22  1821 04/13/22  2117   Sodium mmol/L 128* 127* 128*  128*   Potassium mmol/L 6.9* 7.1* 7.1*  7.1*   Chloride mmol/L 86* 85* 91*  91*   CO2 mmol/L 19* 17* 12*  12*   Glucose mg/dL 65* 93 48*  48*   BUN mg/dL 125* 126* 124*  124*   Creatinine mg/dL 13.4* 13.5* 12.7*  12.7*   Albumin g/dL 3.0*  --   --    Total Bilirubin mg/dL 0.5  --   --    Alkaline Phosphatase U/L 108  --   --    AST U/L 3,980*  --   --    ALT U/L 1,611*  --   --    Phosphorus mg/dL  --  6.9*  --        Drug levels (last 3 results):  No results for input(s): VANCOMYCINRA, VANCOMYCINPE, VANCOMYCINTR in the last 72  hours.    Microbiologic Results:  Microbiology Results (last 7 days)       Procedure Component Value Units Date/Time    Blood culture [[id number]] Collected: 04/13/22 2337    Order Status: Sent Specimen: Blood from Antecubital, Right Hand Updated: 04/13/22 2337

## 2022-04-14 NOTE — ANESTHESIA PREPROCEDURE EVALUATION
04/13/2022  Agus Horan is a 21 y.o., male.      Pre-op Assessment    I have reviewed the Patient Summary Reports.     I have reviewed the Nursing Notes. I have reviewed the NPO Status.   I have reviewed the Medications.     Review of Systems  Anesthesia Hx:  No problems with previous Anesthesia    Social:  Non-Smoker    Cardiovascular:  Cardiovascular Normal     Pulmonary:   Asthma asymptomatic    Renal/:  Renal/ Normal     Neurological:  Neurology Normal    Endocrine:  Endocrine Normal        Physical Exam  General: Well nourished, Cooperative, Alert and Oriented    Airway:  Mallampati: I   Mouth Opening: Normal  Neck ROM: Normal ROM        Anesthesia Plan  Type of Anesthesia, risks & benefits discussed:    Anesthesia Type: Gen ETT, Gen Supraglottic Airway, Gen Natural Airway, MAC  Intra-op Monitoring Plan: Standard ASA Monitors  Post Op Pain Control Plan: multimodal analgesia  Induction:  IV  Airway Plan: Direct, Video and Fiberoptic, Post-Induction  Informed Consent: Informed consent signed with the Patient and all parties understand the risks and agree with anesthesia plan.  All questions answered.   ASA Score: 2 Emergent    Ready For Surgery From Anesthesia Perspective.     .

## 2022-04-15 ENCOUNTER — TELEPHONE (OUTPATIENT)
Dept: RADIOLOGY | Facility: HOSPITAL | Age: 22
End: 2022-04-15

## 2022-04-15 LAB
ANION GAP SERPL CALC-SCNC: 14 MMOL/L (ref 8–16)
ANION GAP SERPL CALC-SCNC: 15 MMOL/L (ref 8–16)
BASOPHILS # BLD AUTO: 0.01 K/UL (ref 0–0.2)
BASOPHILS NFR BLD: 0.1 % (ref 0–1.9)
BUN SERPL-MCNC: 84 MG/DL (ref 6–20)
BUN SERPL-MCNC: 90 MG/DL (ref 6–20)
CA-I BLDV-SCNC: 0.85 MMOL/L (ref 1.06–1.42)
CALCIUM SERPL-MCNC: 6.7 MG/DL (ref 8.7–10.5)
CALCIUM SERPL-MCNC: 6.8 MG/DL (ref 8.7–10.5)
CHLORIDE SERPL-SCNC: 94 MMOL/L (ref 95–110)
CHLORIDE SERPL-SCNC: 95 MMOL/L (ref 95–110)
CK SERPL-CCNC: ABNORMAL U/L (ref 20–200)
CO2 SERPL-SCNC: 22 MMOL/L (ref 23–29)
CO2 SERPL-SCNC: 25 MMOL/L (ref 23–29)
CREAT SERPL-MCNC: 9.5 MG/DL (ref 0.5–1.4)
CREAT SERPL-MCNC: 9.6 MG/DL (ref 0.5–1.4)
DIFFERENTIAL METHOD: ABNORMAL
EOSINOPHIL # BLD AUTO: 0.1 K/UL (ref 0–0.5)
EOSINOPHIL NFR BLD: 0.5 % (ref 0–8)
ERYTHROCYTE [DISTWIDTH] IN BLOOD BY AUTOMATED COUNT: 12.4 % (ref 11.5–14.5)
EST. GFR  (AFRICAN AMERICAN): 8 ML/MIN/1.73 M^2
EST. GFR  (AFRICAN AMERICAN): 8 ML/MIN/1.73 M^2
EST. GFR  (NON AFRICAN AMERICAN): 7 ML/MIN/1.73 M^2
EST. GFR  (NON AFRICAN AMERICAN): 7 ML/MIN/1.73 M^2
GLUCOSE SERPL-MCNC: 101 MG/DL (ref 70–110)
GLUCOSE SERPL-MCNC: 95 MG/DL (ref 70–110)
HCT VFR BLD AUTO: 43.9 % (ref 40–54)
HGB BLD-MCNC: 14.6 G/DL (ref 14–18)
IMM GRANULOCYTES # BLD AUTO: 0.1 K/UL (ref 0–0.04)
IMM GRANULOCYTES NFR BLD AUTO: 0.7 % (ref 0–0.5)
LYMPHOCYTES # BLD AUTO: 1.4 K/UL (ref 1–4.8)
LYMPHOCYTES NFR BLD: 10.3 % (ref 18–48)
MCH RBC QN AUTO: 29.8 PG (ref 27–31)
MCHC RBC AUTO-ENTMCNC: 33.3 G/DL (ref 32–36)
MCV RBC AUTO: 90 FL (ref 82–98)
MONOCYTES # BLD AUTO: 0.9 K/UL (ref 0.3–1)
MONOCYTES NFR BLD: 6.3 % (ref 4–15)
NEUTROPHILS # BLD AUTO: 11.5 K/UL (ref 1.8–7.7)
NEUTROPHILS NFR BLD: 82.1 % (ref 38–73)
NRBC BLD-RTO: 0 /100 WBC
PLATELET # BLD AUTO: 236 K/UL (ref 150–450)
PMV BLD AUTO: 9.7 FL (ref 9.2–12.9)
POCT GLUCOSE: 104 MG/DL (ref 70–110)
POCT GLUCOSE: 110 MG/DL (ref 70–110)
POTASSIUM SERPL-SCNC: 4.9 MMOL/L (ref 3.5–5.1)
POTASSIUM SERPL-SCNC: 5.1 MMOL/L (ref 3.5–5.1)
RBC # BLD AUTO: 4.9 M/UL (ref 4.6–6.2)
SODIUM SERPL-SCNC: 132 MMOL/L (ref 136–145)
SODIUM SERPL-SCNC: 133 MMOL/L (ref 136–145)
WBC # BLD AUTO: 13.96 K/UL (ref 3.9–12.7)

## 2022-04-15 PROCEDURE — 63600175 PHARM REV CODE 636 W HCPCS: Performed by: INTERNAL MEDICINE

## 2022-04-15 PROCEDURE — 82550 ASSAY OF CK (CPK): CPT | Performed by: INTERNAL MEDICINE

## 2022-04-15 PROCEDURE — 25000003 PHARM REV CODE 250: Performed by: INTERNAL MEDICINE

## 2022-04-15 PROCEDURE — 80048 BASIC METABOLIC PNL TOTAL CA: CPT | Performed by: INTERNAL MEDICINE

## 2022-04-15 PROCEDURE — 94761 N-INVAS EAR/PLS OXIMETRY MLT: CPT

## 2022-04-15 PROCEDURE — 63600175 PHARM REV CODE 636 W HCPCS: Performed by: ORTHOPAEDIC SURGERY

## 2022-04-15 PROCEDURE — 25000003 PHARM REV CODE 250: Performed by: ORTHOPAEDIC SURGERY

## 2022-04-15 PROCEDURE — 99223 1ST HOSP IP/OBS HIGH 75: CPT | Mod: ,,, | Performed by: ORTHOPAEDIC SURGERY

## 2022-04-15 PROCEDURE — 80100014 HC HEMODIALYSIS 1:1

## 2022-04-15 PROCEDURE — 36415 COLL VENOUS BLD VENIPUNCTURE: CPT | Performed by: INTERNAL MEDICINE

## 2022-04-15 PROCEDURE — 36415 COLL VENOUS BLD VENIPUNCTURE: CPT | Performed by: ORTHOPAEDIC SURGERY

## 2022-04-15 PROCEDURE — 82330 ASSAY OF CALCIUM: CPT | Performed by: INTERNAL MEDICINE

## 2022-04-15 PROCEDURE — 80048 BASIC METABOLIC PNL TOTAL CA: CPT | Mod: 91 | Performed by: INTERNAL MEDICINE

## 2022-04-15 PROCEDURE — 20000000 HC ICU ROOM

## 2022-04-15 PROCEDURE — 85025 COMPLETE CBC W/AUTO DIFF WBC: CPT | Performed by: ORTHOPAEDIC SURGERY

## 2022-04-15 PROCEDURE — A4216 STERILE WATER/SALINE, 10 ML: HCPCS | Performed by: ORTHOPAEDIC SURGERY

## 2022-04-15 PROCEDURE — 99223 PR INITIAL HOSPITAL CARE,LEVL III: ICD-10-PCS | Mod: ,,, | Performed by: ORTHOPAEDIC SURGERY

## 2022-04-15 RX ORDER — SODIUM CHLORIDE 9 MG/ML
INJECTION, SOLUTION INTRAVENOUS ONCE
Status: CANCELLED | OUTPATIENT
Start: 2022-04-15 | End: 2022-04-15

## 2022-04-15 RX ADMIN — CLINDAMYCIN IN 5 PERCENT DEXTROSE 600 MG: 12 INJECTION, SOLUTION INTRAVENOUS at 08:04

## 2022-04-15 RX ADMIN — Medication 2 ML: at 11:04

## 2022-04-15 RX ADMIN — HYDROCODONE BITARTRATE AND ACETAMINOPHEN 1 TABLET: 5; 325 TABLET ORAL at 04:04

## 2022-04-15 RX ADMIN — MORPHINE SULFATE 6 MG: 2 INJECTION, SOLUTION INTRAMUSCULAR; INTRAVENOUS at 10:04

## 2022-04-15 RX ADMIN — DEXTROSE: 10 SOLUTION INTRAVENOUS at 07:04

## 2022-04-15 RX ADMIN — Medication 2 ML: at 06:04

## 2022-04-15 RX ADMIN — CLINDAMYCIN IN 5 PERCENT DEXTROSE 600 MG: 12 INJECTION, SOLUTION INTRAVENOUS at 12:04

## 2022-04-15 RX ADMIN — CLINDAMYCIN IN 5 PERCENT DEXTROSE 600 MG: 12 INJECTION, SOLUTION INTRAVENOUS at 04:04

## 2022-04-15 RX ADMIN — HEPARIN SODIUM 4000 UNITS: 1000 INJECTION, SOLUTION INTRAVENOUS; SUBCUTANEOUS at 03:04

## 2022-04-15 RX ADMIN — DEXTROSE 500 MG: 50 INJECTION, SOLUTION INTRAVENOUS at 02:04

## 2022-04-15 RX ADMIN — MORPHINE SULFATE 6 MG: 2 INJECTION, SOLUTION INTRAMUSCULAR; INTRAVENOUS at 07:04

## 2022-04-15 RX ADMIN — Medication 2 ML: at 07:04

## 2022-04-15 RX ADMIN — ONDANSETRON 4 MG: 2 INJECTION INTRAMUSCULAR; INTRAVENOUS at 12:04

## 2022-04-15 RX ADMIN — Medication 2 ML: at 12:04

## 2022-04-15 RX ADMIN — MUPIROCIN: 20 OINTMENT TOPICAL at 08:04

## 2022-04-15 RX ADMIN — SODIUM CHLORIDE: 0.9 INJECTION, SOLUTION INTRAVENOUS at 12:04

## 2022-04-15 RX ADMIN — HYDROCODONE BITARTRATE AND ACETAMINOPHEN 1 TABLET: 5; 325 TABLET ORAL at 11:04

## 2022-04-15 RX ADMIN — DEXTROSE 500 MG: 50 INJECTION, SOLUTION INTRAVENOUS at 03:04

## 2022-04-15 RX ADMIN — MORPHINE SULFATE 6 MG: 2 INJECTION, SOLUTION INTRAMUSCULAR; INTRAVENOUS at 02:04

## 2022-04-15 RX ADMIN — CLINDAMYCIN IN 5 PERCENT DEXTROSE 600 MG: 12 INJECTION, SOLUTION INTRAVENOUS at 11:04

## 2022-04-15 RX ADMIN — ONDANSETRON 4 MG: 2 INJECTION INTRAMUSCULAR; INTRAVENOUS at 02:04

## 2022-04-15 NOTE — TELEPHONE ENCOUNTER
Ultrasound tech wanting clarification of orders. Spoke with Zeny. Due to fasciotomies- limited repeat US of upper leg. Exam began after clarification.

## 2022-04-15 NOTE — TELEPHONE ENCOUNTER
9:30a Spoke with Xuan (nurse). Confirmed that Dr. Byers wanted MRI of right leg instead of ultrasound. MRI ordered.

## 2022-04-15 NOTE — ASSESSMENT & PLAN NOTE
due to rhabdomyolysis  Trend CPK daily, will need HD till CPK < 5000   nephrology-recommendations appreciated  Avoid nephrotoxins  IV fluids stopped due to increase in swelling at right thigh  Telemetry  Underwent emergent HD overnight for hyperkalemia and metabolic acidosis refractory to medical management;

## 2022-04-15 NOTE — PROGRESS NOTES
INPATIENT NEPHROLOGY Progress Note  [facility]    Patient Name: Agus Horan  Date: 04/15/2022    Reason for consultation: SANDRA    Chief Complaint:   Chief Complaint   Patient presents with    Leg Pain     Pain in the right leg muscle calf is hard, nausea vomiting, patient was seen here over the weekend for an overdose        History of Present Illness:  20 y/o M with a history of asthma recently here on 4/9 with oxycodone overdose who p/w RLE pain and swelling after a fall on 4/10. He reports severe pain, constant, associated with nausea with inability to bear weight.  He took meloxicam without relief. He was found to have compartment syndrome and was taking to the OR emergently for fasciotomy on 4/13. We are consulted for SANDRA with metabolic derangements.    Interval History:  4/14- did emergent HD overnight for hyperkalemia and metabolic acidosis refractory to medical management; pain controlled, RLE wrapped, no edema in LLE  4/15- worsening pain/edema in RLE- going for MRI- oligoanuric- stop NS IVFs- will do HD/UF today and tomorrow    Plan of Care:    Assessment:  Traumatic rhabdomyolysis with compartment syndrome s/p fasciotomy on 4/13  SANRDA due to toxic ATN s/p emergent HD on 4/14- remains oligoanuric and HD dependent  Edema  Hyponatremia  Hyperkalemia  Acidosis  Hypocalcemia  Shock liver    Plan:    - Trend daily CK. Will continue RRT until CPK < 5000.   - He remains HD dependent. Ordered HD today and tomorrow. Ordered renal u/s and UA for completeness.   - Stop IVFs since he is oligoanuric and developing edema. Continue grier through the weekend. Start some UF with HD. Ok with -160s for renal perfusion. No NSAIDs or IV contrast. Dose meds for CrCl < 10.  - Advise renal diet with 1.5L fluid restriction. Will adjust dialysate for all metabolic derangements. Will replete calcium PRN.  - Trend daily LFTs.   - Change to daily labs.    Thank you for allowing us to participate in this  "patient's care. We will continue to follow.    Vital Signs:  Temp Readings from Last 3 Encounters:   04/15/22 98.7 °F (37.1 °C) (Oral)   04/10/22 97.3 °F (36.3 °C)   04/09/22 98.4 °F (36.9 °C)       Pulse Readings from Last 3 Encounters:   04/15/22 62   04/10/22 63   04/09/22 95       BP Readings from Last 3 Encounters:   04/15/22 (!) 157/84   04/10/22 117/64   04/09/22 (!) 143/82       Weight:  Wt Readings from Last 3 Encounters:   04/14/22 78 kg (171 lb 15.3 oz)   04/10/22 77.1 kg (170 lb)   04/09/22 77.1 kg (170 lb)       INS/OUTS:  I/O last 3 completed shifts:  In: 61304.9 [I.V.:8087.6; Other:500; IV Piggyback:2275.4]  Out: 338 [Urine:338]  No intake/output data recorded.    Medications:  Scheduled Meds:   ceFAZolin (ANCEF) IVPB  500 mg Intravenous Q12H    clindamycin (CLEOCIN) IVPB  600 mg Intravenous Q8H    mupirocin   Nasal BID    senna-docusate 8.6-50 mg  1 tablet Oral BID    sodium chloride 0.9%  2 mL Intravenous Q6H     Continuous Infusions:   sodium chloride 0.9% 100 mL/hr at 04/15/22 0628    dextrose 10 % in water (D10W) 25 mL/hr at 04/15/22 0628    loperamide       PRN Meds:.sodium chloride 0.9%, dextrose 10%, dextrose 10%, glucagon (human recombinant), glucose, glucose, heparin (porcine), HYDROcodone-acetaminophen, HYDROmorphone, loperamide, morphine, naloxone, ondansetron, ondansetron, sodium chloride 0.9%  No current facility-administered medications on file prior to encounter.     Current Outpatient Medications on File Prior to Encounter   Medication Sig Dispense Refill    naloxone (NARCAN) 4 mg/actuation Spry 4mg by nasal route as needed for opioid overdose; may repeat every 2-3 minutes in alternating nostrils until medical help arrives. Call 911 2 each 0       Review of Systems:  Neg    Physical Exam:  BP (!) 157/84   Pulse 62   Temp 98.7 °F (37.1 °C) (Oral)   Resp 15   Ht 5' 9" (1.753 m)   Wt 78 kg (171 lb 15.3 oz)   SpO2 100%   BMI 25.39 kg/m²     Constitutional: nad, aao x 3, " depressed affect  Heart: rrr, no m/r/g, wwp, RLE eedema  Lungs: ctab, no w/r/r/c, no lb  Abdomen: s/nt/nd, +BS    Results:  Lab Results   Component Value Date     (L) 04/15/2022    K 4.9 04/15/2022    CL 95 04/15/2022    CO2 22 (L) 04/15/2022    BUN 90 (H) 04/15/2022    CREATININE 9.6 (H) 04/15/2022    CALCIUM 6.8 (LL) 04/15/2022    ANIONGAP 15 04/15/2022    ESTGFRAFRICA 8 (A) 04/15/2022    EGFRNONAA 7 (A) 04/15/2022       Lab Results   Component Value Date    CALCIUM 6.8 (LL) 04/15/2022    PHOS 6.4 (H) 04/14/2022       Recent Labs   Lab 04/15/22  0354   WBC 13.96*   RBC 4.90   HGB 14.6   HCT 43.9      MCV 90   MCH 29.8   MCHC 33.3       I have personally reviewed pertinent radiological imaging and reports.    I have spent > 35 minutes providing care for this patient for the above diagnoses. These services have included chart/data/imaging review, evaluation, exam, formulation of plan, , note preparation, and discussions with staff involved in this patient's care.    Chantelle Fraser MD MPH  [facility] [address]  [place] [address]  [phone] (p)  [phone] (f)

## 2022-04-15 NOTE — PROGRESS NOTES
[facility] Medicine  Progress Note    Patient Name: Agus Horan  MRN: [id number]  Patient Class: IP- Inpatient   Admission Date: 4/13/2022  Length of Stay: 2 days  Attending Physician: Marcus Johnson MD  Primary Care Provider: Primary Doctor No        Subjective:     Principal Problem:Acute renal failure        HPI:  Agus Horan is a 20 y/o male with PMHx of mild asthma who presents with right lower extremity pain and swelling x few days. Patient was seen in the ED on 4/9/22 for oxycodone overdose and again on 4/10/22 for left eye pain after falling on a piece of furniture. Patient states he is unsure how he injured his leg and denies IV drug use. Xray of his right leg did not show acute fracture. Patietn states RLE pain and swelling increased over the past few days and he is unable to walk secondary to pain. US of his lower extremity performed today did not show DVT. Lab work revealed elevated Creatinine 13.5 & K+ 7.1, elevated AST/ALT 3980/1611 & WBC 18.14.  CPK>40,0000.  Patient unable to dorsiflex and plantar flex his RLE and symptoms are concerning for compartment syndrome per ED. ED provider discussed with Dr. Byers and Dr. Fraser, nephrology. Patient was referred to hospital medicine and seen in the ED with his friend at bedside. Patient complaining of RLE pain, tenderness and swelling. He denies SOB, N/V/D, chest pain and headaches.  Patient will be admitted to hospital medicine for orthopedic consultation and further management.      Overview/Hospital Course:  No notes on file    Interval History: S/p fasciotomy day 2 , last night pt started complaining of worsening pain/edema in RLE.  MRI was done showed - oligoanuric- off   IVFs      Review of Systems   Constitutional:  Negative for activity change, appetite change, chills and fever.   HENT:  Negative for congestion, sore throat and trouble swallowing.    Eyes:  Negative for photophobia and visual disturbance.    Respiratory:  Negative for cough, chest tightness and shortness of breath.    Cardiovascular:  Negative for chest pain, palpitations and leg swelling.   Gastrointestinal:  Negative for abdominal pain, diarrhea and nausea.   Genitourinary:  Negative for dysuria, flank pain and hematuria.   Musculoskeletal:  Positive for gait problem and myalgias. Negative for back pain.   Skin:  Positive for color change.   Neurological:  Negative for dizziness, weakness and headaches.   Psychiatric/Behavioral:  Negative for confusion.    Objective:     Vital Signs (Most Recent):  Temp: 98.4 °F (36.9 °C) (04/15/22 1210)  Pulse: 75 (04/15/22 1245)  Resp: (P) 18 (04/15/22 1248)  BP: (!) 180/84 (04/15/22 1245)  SpO2: 99 % (04/15/22 1245)   Vital Signs (24h Range):  Temp:  [97.9 °F (36.6 °C)-99.1 °F (37.3 °C)] 98.4 °F (36.9 °C)  Pulse:  [53-78] 75  Resp:  [9-37] (P) 18  SpO2:  [93 %-100 %] 99 %  BP: (140-199)/(65-96) 180/84     Weight: 78 kg (171 lb 15.3 oz)  Body mass index is 25.39 kg/m².    Intake/Output Summary (Last 24 hours) at 4/15/2022 1317  Last data filed at 4/15/2022 0628  Gross per 24 hour   Intake 3920.02 ml   Output 31 ml   Net 3889.02 ml      Physical Exam  Vitals and nursing note reviewed.   Constitutional:       Appearance: He is normal weight. He is ill-appearing.   HENT:      Head: Normocephalic.      Mouth/Throat:      Mouth: Mucous membranes are moist.      Pharynx: Oropharynx is clear.   Eyes:      Pupils: Pupils are equal, round, and reactive to light.      Comments: Left orbital ecchymosis   Cardiovascular:      Rate and Rhythm: Regular rhythm. Tachycardia present.      Pulses: Normal pulses.      Heart sounds: Normal heart sounds.   Pulmonary:      Effort: Pulmonary effort is normal.      Breath sounds: Normal breath sounds.   Abdominal:      General: Bowel sounds are normal.      Palpations: Abdomen is soft.   Musculoskeletal:         General: Swelling (r Thigh swellin noted) present. Normal range of motion.       Cervical back: Normal range of motion.      Right upper leg: Swelling present.      Right lower leg: Swelling present.      Comments:     good plantar flexion of his toes actively.  he has no pain on passive extension of the toe the patient has very little extension actively of the toe great toes and smaller toes.    Skin:     General: Skin is warm and dry.   Neurological:      Mental Status: He is alert and oriented to person, place, and time. Mental status is at baseline.   Psychiatric:         Mood and Affect: Mood normal.       Significant Labs: All pertinent labs within the past 24 hours have been reviewed.  CBC:   Recent Labs   Lab 04/13/22  1700 04/14/22  0339 04/15/22  0354   WBC 18.14* 18.14* 13.96*   HGB 16.0 13.5* 14.6   HCT 46.4 38.7* 43.9    282 236     CMP:   Recent Labs   Lab 04/13/22  1700 04/13/22  1821 04/14/22  0339 04/14/22  0742 04/14/22  1522 04/15/22  0033 04/15/22  0733   *   < > 131*   < > 135* 133* 132*   K 6.9*   < > 5.3*   < > 5.3* 5.1 4.9   CL 86*   < > 90*   < > 93* 94* 95   CO2 19*   < > 24   < > 28 25 22*   GLU 65*   < > 93   < > 66* 101 95   *   < > 92*   < > 75* 84* 90*   CREATININE 13.4*   < > 9.0*   < > 8.6* 9.5* 9.6*   CALCIUM 7.7*   < > 6.7*   < > 6.9* 6.7* 6.8*   PROT 7.2  --  5.8*  --   --   --   --    ALBUMIN 3.0*  --  2.4*  --   --   --   --    BILITOT 0.5  --  0.5  --   --   --   --    ALKPHOS 108  --  92  --   --   --   --    AST 3,980*  --  2,656*  --   --   --   --    ALT 1,611*  --  1,120*  --   --   --   --    ANIONGAP 23*   < > 17*   < > 14 14 15   EGFRNONAA 5*   < > 7*   < > 8* 7* 7*    < > = values in this interval not displayed.       Significant Imaging: I have reviewed all pertinent imaging results/findings within the past 24 hours.      Assessment/Plan:      * Acute renal failure   due to rhabdomyolysis  Trend CPK daily, will need HD till CPK < 5000   nephrology-recommendations appreciated  Avoid nephrotoxins  IV fluids stopped due to  increase in swelling at right thigh  Telemetry  Underwent emergent HD overnight for hyperkalemia and metabolic acidosis refractory to medical management;        Compartment syndrome  Underwent fasciotomy on 4/13   MRI right thigh done, results pending    Elevated liver enzymes  Elevated AST &ALT likely due to rhabdomyolysis  Trending down  Avoid hepatoxic medications  Monitor CMP  See plan for rhabdomyolysis        Rhabdomyolysis  CPK >40,000 with RLE edema   S/p fasciotomy on 4/13 for compartment syndrome.  Underwent emergent HD overnight for hyperkalemia and metabolic acidosis refractory to medical management;      IV fluids  Monitor BMP   appreciate nephrology recs      Hyperkalemia  Potassium better  Underwent emergent HD overnight for hyperkalemia    nephrology-recommendations appreciated      History of asthma  Hx of asthma, stable    Monitor for acute changes        VTE Risk Mitigation (From admission, onward)         Ordered     heparin (porcine) injection 4,000 Units  As needed (PRN)         04/14/22 0137     Reason for No Pharmacological VTE Prophylaxis  Once        Question:  Reasons:  Answer:  Risk of Bleeding    04/13/22 1913     IP VTE HIGH RISK PATIENT  Once         04/13/22 1913     Place sequential compression device  Until discontinued         04/13/22 1913                Discharge Planning   JARAD: 4/18/2022     Code Status: Full Code   Is the patient medically ready for discharge?:     Reason for patient still in hospital (select all that apply): Patient trending condition and Treatment  Discharge Plan A: Home with family            Critical care time spent on the evaluation and treatment of severe organ dysfunction, review of pertinent labs and imaging studies, discussions with consulting providers and discussions with patient/family: 60 minutes.      Marcus Johnson MD  Department of Hospital Medicine   [facility]

## 2022-04-15 NOTE — PROGRESS NOTES
Had an ultrasound done just now reviewed the ultrasound patient appears to have no deep vein thrombosis has good arterial pulses in the popliteus in the posterior tibial and anterior tibialis has no evidence of any DVT in the lower calf region.  The patient on his physical examination has good plantar flexion of his toes actively.  he has no pain on passive extension of the toe the patient has very little extension actively of the toe great toes and smaller toes.  He does not express any pain on passive dorsiflexion of the toe or plantar flexion of the toes he has good capillary filling to the toes.  Patient is able to lift his leg from the bed with no pain I am able to flex his knee he has no anterior thigh pain I am able to extend the knee and he has no sh no pain in the posterior thigh.  Also the ultrasound showed some slight edema in the proximal thigh muscles no significant edema in the muscles of the anterior and poor posterior thigh he had some edema in the subcutaneous tissues.  The patient's anterior posterior and medial compartments of his thigh feels supple he has no pain on deep compression of the of any of his compartments in his right thigh.  We will continue to monitor him keep his leg elevated.

## 2022-04-15 NOTE — PROGRESS NOTES
MRI of the buttocks and right thigh with the radiologist the patient has some diffused involvement of the gluteus muscle of the right pelvis he also has involvement of a myositis in the medial compartment and partially in the anterior compartment partially in the posterior compartment of the thigh.  Clinically the patient's thigh is not tense the patient is sitting up in bed comfortably requesting the eat something he is not complaining of a lot of pain he is able to flex his knee and his and able to extend the knee he has no severe pain on passive flexion or on extension of the knee.  Good pulses in his lower foot he has good sensation in his foot he is able to flex the toes well he does not have any extension of his toes he has no severe pain with hyperextension of flexion of the toes or ankle.    At this time I do not see any evidence of a compartment syndrome in his thigh or in his buttocks area.  Patient is presently undergoing dialysis at this time and is not complaining of severe pain.

## 2022-04-15 NOTE — PLAN OF CARE
POC reviewed with patient. Patient aware that he is to receive dialysis again today. Patient continues to c/o pain to his right lower extremity, right upper leg, thigh and groin area that is severe at times. Receiving moderate relief with pain medication. Patient's upper thigh, buttocks, groin and scrutum with increased swelling and pain. Spoke with Dr. Byers about it, continue to monitor and check cap refill and pulses to RLE. Dr. Byers to round early in the morning to evaluate swellling. Trending labs continued. Poor urine output noted. Urine color improving. IV fluid rate decreased per Dr. Oakley due to swelling. Patient remains on D10NS, Receiving IV antibiotics as ordered. Dressing remains intact to RLE, serosanguinous drainage noted. Safety maintained, bed low and locked, bed alarm on.       Problem: Adult Inpatient Plan of Care  Goal: Plan of Care Review  Outcome: Ongoing, Progressing  Goal: Patient-Specific Goal (Individualized)  Outcome: Ongoing, Progressing  Goal: Absence of Hospital-Acquired Illness or Injury  Outcome: Ongoing, Progressing  Goal: Optimal Comfort and Wellbeing  Outcome: Ongoing, Progressing  Goal: Readiness for Transition of Care  Outcome: Ongoing, Progressing     Problem: Infection  Goal: Absence of Infection Signs and Symptoms  Outcome: Ongoing, Progressing     Problem: Fall Injury Risk  Goal: Absence of Fall and Fall-Related Injury  Outcome: Ongoing, Progressing     Problem: Skin Injury Risk Increased  Goal: Skin Health and Integrity  Outcome: Ongoing, Progressing     Problem: Device-Related Complication Risk (Hemodialysis)  Goal: Safe, Effective Therapy Delivery  Outcome: Ongoing, Progressing     Problem: Hemodynamic Instability (Hemodialysis)  Goal: Effective Tissue Perfusion  Outcome: Ongoing, Progressing     Problem: Infection (Hemodialysis)  Goal: Absence of Infection Signs and Symptoms  Outcome: Ongoing, Progressing     Problem: Fluid and Electrolyte Imbalance (Acute Kidney  Injury/Impairment)  Goal: Fluid and Electrolyte Balance  Outcome: Ongoing, Progressing     Problem: Oral Intake Inadequate (Acute Kidney Injury/Impairment)  Goal: Optimal Nutrition Intake  Outcome: Ongoing, Progressing     Problem: Renal Function Impairment (Acute Kidney Injury/Impairment)  Goal: Effective Renal Function  Outcome: Ongoing, Progressing

## 2022-04-15 NOTE — CARE UPDATE
04/15/22 0740   Patient Assessment/Suction   Level of Consciousness (AVPU) alert   PRE-TX-O2   O2 Device (Oxygen Therapy) room air   SpO2 100 %   Pulse Oximetry Type Continuous   $ Pulse Oximetry - Multiple Charge Pulse Oximetry - Multiple   Pulse 62   Resp 15

## 2022-04-15 NOTE — NURSING
Consent and hep b status verified, removed 1000 uf net, no issues with catheter, dressing CDI, patient stable throughout treatment, report given to HAMILTON Noonan

## 2022-04-15 NOTE — CONSULTS
CC:  21-year-old male with a history of drug abuse and recent overdose to has unfortunately developed rhabdomyolysis and compartment syndrome in his right lower extremity.  He required fasciotomies done by Dr. Byers a few days ago.  The patient has had some swelling in the right thigh and an MRI was obtained of the buttock and right thigh that showed some diffuse involvement of the gluteus muscle the right pelvis and myositis of the right thigh.  There was concern if the patient need to be returned to the OR for fasciotomies of his thigh and buttock.  Dr. Byers asked me to evaluate the patient for 2nd opinion on whether this patient might have a compartment syndrome of his thigh and buttock.    Past Medical History:   Diagnosis Date    Allergy     AR    Asthma     mild intermittent       Past Surgical History:   Procedure Laterality Date    FASCIOTOMY Right 4/13/2022    Procedure: FASCIOTOMY;  Surgeon: Leonardo Byers MD;  Location: [facility];  Service: Orthopedics;  Laterality: Right;    REMOVAL OF FOREIGN BODY FROM FOOT Left 6/24/2020    Procedure: REMOVAL, FOREIGN BODY, FOOT;  Surgeon: Dani Garcia MD;  Location: [facility] OR;  Service: Orthopedics;  Laterality: Left;       No current facility-administered medications on file prior to encounter.     Current Outpatient Medications on File Prior to Encounter   Medication Sig Dispense Refill    naloxone (NARCAN) 4 mg/actuation Spry 4mg by nasal route as needed for opioid overdose; may repeat every 2-3 minutes in alternating nostrils until medical help arrives. Call 911 2 each 0       ROS:    Constitution: Denies chills, fever, and sweats.  HENT: Denies headaches or blurry vision.  Cardiovascular: Denies chest pain or irregular heart beat.  Respiratory: Denies cough or shortness of breath.  Gastrointestinal: Denies abdominal pain, nausea, or vomiting.  Genitourinary:  Denies urinary incontinence, bladder and kidney issues  Musculoskeletal:  Denies  muscle cramps.  Neurological: Denies dizziness or focal weakness.  Psychiatric/Behavioral: Normal mental status.  Hematologic/Lymphatic: Denies bleeding problem or easy bruising/bleeding.  Skin: Denies rash or suspicious lesions.    Physical examination     Gen - No acute distress, well nourished, well groomed   Eyes - Extraoccular motions intact, pupils equally round and reactive to light and accommodation   ENT - normocephalic, atruamtic, oropharynx clear   Neck - Supple, no abnormal masses   Cardiovascular - regular rate and rhythm   Pulmonary - clear to auscultation bilaterally, no wheezes, ronchi, or rales   Abdomen - soft, non-tender, non-distended, positive bowel sounds   Psych - The patient is alert and oriented x3 with normal mood and affect    RLE:  Bandages from the patient's lower extremity fasciotomy are clean, dry, and intact  Grossly intact motor and sensory function in the right lower extremity  Plus 2 distal pulses  Swelling of the right thigh and but noted  No excessive tenderness to palpation over the right thigh or buttock  No excessive pain with passive or active range of motion of the knee and hip    Dx:  No evidence of compartment syndrome of the thigh buttock at this time    Plan:  Recommendations for continued observation.  The patient is currently undergoing dialysis as part of his treatment for rhabdomyolysis.

## 2022-04-15 NOTE — SUBJECTIVE & OBJECTIVE
Interval History: S/p fasciotomy day 2 , last night pt started complaining of worsening pain/edema in RLE.  MRI was done showed - oligoanuric- off   IVFs      Review of Systems   Constitutional:  Negative for activity change, appetite change, chills and fever.   HENT:  Negative for congestion, sore throat and trouble swallowing.    Eyes:  Negative for photophobia and visual disturbance.   Respiratory:  Negative for cough, chest tightness and shortness of breath.    Cardiovascular:  Negative for chest pain, palpitations and leg swelling.   Gastrointestinal:  Negative for abdominal pain, diarrhea and nausea.   Genitourinary:  Negative for dysuria, flank pain and hematuria.   Musculoskeletal:  Positive for gait problem and myalgias. Negative for back pain.   Skin:  Positive for color change.   Neurological:  Negative for dizziness, weakness and headaches.   Psychiatric/Behavioral:  Negative for confusion.    Objective:     Vital Signs (Most Recent):  Temp: 98.4 °F (36.9 °C) (04/15/22 1210)  Pulse: 75 (04/15/22 1245)  Resp: (P) 18 (04/15/22 1248)  BP: (!) 180/84 (04/15/22 1245)  SpO2: 99 % (04/15/22 1245)   Vital Signs (24h Range):  Temp:  [97.9 °F (36.6 °C)-99.1 °F (37.3 °C)] 98.4 °F (36.9 °C)  Pulse:  [53-78] 75  Resp:  [9-37] (P) 18  SpO2:  [93 %-100 %] 99 %  BP: (140-199)/(65-96) 180/84     Weight: 78 kg (171 lb 15.3 oz)  Body mass index is 25.39 kg/m².    Intake/Output Summary (Last 24 hours) at 4/15/2022 1317  Last data filed at 4/15/2022 0628  Gross per 24 hour   Intake 3920.02 ml   Output 31 ml   Net 3889.02 ml      Physical Exam  Vitals and nursing note reviewed.   Constitutional:       Appearance: He is normal weight. He is ill-appearing.   HENT:      Head: Normocephalic.      Mouth/Throat:      Mouth: Mucous membranes are moist.      Pharynx: Oropharynx is clear.   Eyes:      Pupils: Pupils are equal, round, and reactive to light.      Comments: Left orbital ecchymosis   Cardiovascular:      Rate and Rhythm:  Problem: Chemotherapy Effects (Adult)  Goal: Signs and Symptoms of Listed Potential Problems Will be Absent, Minimized or Managed (Chemotherapy Effects)  Signs and symptoms of listed potential problems will be absent, minimized or managed by discharge/transition of care (reference Chemotherapy Effects (Adult) CPG).   Outcome: Ongoing (interventions implemented as appropriate)  Patient here for [place].  Assessment complete and labs reviewed.  VSS.  Chair reclined and blanket offered.  No needs expressed at this time.  Will continue to monitor.        Regular rhythm. Tachycardia present.      Pulses: Normal pulses.      Heart sounds: Normal heart sounds.   Pulmonary:      Effort: Pulmonary effort is normal.      Breath sounds: Normal breath sounds.   Abdominal:      General: Bowel sounds are normal.      Palpations: Abdomen is soft.   Musculoskeletal:         General: Swelling (r Thigh swellin noted) present. Normal range of motion.      Cervical back: Normal range of motion.      Right upper leg: Swelling present.      Right lower leg: Swelling present.      Comments:     good plantar flexion of his toes actively.  he has no pain on passive extension of the toe the patient has very little extension actively of the toe great toes and smaller toes.    Skin:     General: Skin is warm and dry.   Neurological:      Mental Status: He is alert and oriented to person, place, and time. Mental status is at baseline.   Psychiatric:         Mood and Affect: Mood normal.       Significant Labs: All pertinent labs within the past 24 hours have been reviewed.  CBC:   Recent Labs   Lab 04/13/22  1700 04/14/22  0339 04/15/22  0354   WBC 18.14* 18.14* 13.96*   HGB 16.0 13.5* 14.6   HCT 46.4 38.7* 43.9    282 236     CMP:   Recent Labs   Lab 04/13/22  1700 04/13/22  1821 04/14/22  0339 04/14/22  0742 04/14/22  1522 04/15/22  0033 04/15/22  0733   *   < > 131*   < > 135* 133* 132*   K 6.9*   < > 5.3*   < > 5.3* 5.1 4.9   CL 86*   < > 90*   < > 93* 94* 95   CO2 19*   < > 24   < > 28 25 22*   GLU 65*   < > 93   < > 66* 101 95   *   < > 92*   < > 75* 84* 90*   CREATININE 13.4*   < > 9.0*   < > 8.6* 9.5* 9.6*   CALCIUM 7.7*   < > 6.7*   < > 6.9* 6.7* 6.8*   PROT 7.2  --  5.8*  --   --   --   --    ALBUMIN 3.0*  --  2.4*  --   --   --   --    BILITOT 0.5  --  0.5  --   --   --   --    ALKPHOS 108  --  92  --   --   --   --    AST 3,980*  --  2,656*  --   --   --   --    ALT 1,611*  --  1,120*  --   --   --   --    ANIONGAP 23*   < > 17*   < > 14 14 15   EGFRNONAA 5*    < > 7*   < > 8* 7* 7*    < > = values in this interval not displayed.       Significant Imaging: I have reviewed all pertinent imaging results/findings within the past 24 hours.

## 2022-04-15 NOTE — NURSING
Spoke with Dr. Byers. Orders received for STAT MRI of the right thigh and hip.  Notified radiology, spoke with Dawit.  States that MRI tech is in route and aware of order.

## 2022-04-15 NOTE — PROGRESS NOTES
On further examination of the patient's buttock region he has some tenderness over the lateral buttocks area there is some swelling in the subcutaneous tissues the compartment itself in the buttocks appears to be supple does not have the feeling and if any tenseness.  As mentioned before the patient does have active plantar flexion flexion of the toes.

## 2022-04-15 NOTE — PROGRESS NOTES
Is postop day 2 secondary to his fasciotomies to his right lower leg patient has been having some swelling in the right upper thigh region and into the scrotal area the patient is able lift his leg off the bed I am able to passively flex his knee and extend his knee he does not have any pain he has hamstring function and quad function.  His foot compartments in the upper thigh in the thigh area do not feel tense he does have some edema in the legs though.  Examined his fasciotomy wounds the wounds appear to be clean his fasciotomies are open there is no purulent drainage dressings were changed patient does have a lot of edema and swelling in the right lower leg he has good capillary filling in the toes he has palpable pulses he has good sensation to the foot.  I am going to go order a venous Doppler to his right lower extremity rule out DVT.  I do not think the patient has of thigh compartment syndrome at this time.

## 2022-04-16 PROBLEM — E87.5 HYPERKALEMIA: Status: RESOLVED | Noted: 2022-04-14 | Resolved: 2022-04-16

## 2022-04-16 LAB
ALBUMIN SERPL BCP-MCNC: 1.8 G/DL (ref 3.5–5.2)
AMORPH CRY URNS QL MICRO: ABNORMAL
ANION GAP SERPL CALC-SCNC: 14 MMOL/L (ref 8–16)
BACTERIA #/AREA URNS HPF: ABNORMAL /HPF
BASOPHILS # BLD AUTO: 0.02 K/UL (ref 0–0.2)
BASOPHILS NFR BLD: 0.1 % (ref 0–1.9)
BILIRUB UR QL STRIP: ABNORMAL
BUN SERPL-MCNC: 69 MG/DL (ref 6–20)
CALCIUM SERPL-MCNC: 6.8 MG/DL (ref 8.7–10.5)
CHLORIDE SERPL-SCNC: 92 MMOL/L (ref 95–110)
CK SERPL-CCNC: ABNORMAL U/L (ref 20–200)
CLARITY UR: ABNORMAL
CO2 SERPL-SCNC: 25 MMOL/L (ref 23–29)
COLOR UR: ABNORMAL
CREAT SERPL-MCNC: 8.6 MG/DL (ref 0.5–1.4)
DIFFERENTIAL METHOD: ABNORMAL
EOSINOPHIL # BLD AUTO: 0.2 K/UL (ref 0–0.5)
EOSINOPHIL NFR BLD: 1.3 % (ref 0–8)
ERYTHROCYTE [DISTWIDTH] IN BLOOD BY AUTOMATED COUNT: 12.2 % (ref 11.5–14.5)
EST. GFR  (AFRICAN AMERICAN): 9 ML/MIN/1.73 M^2
EST. GFR  (NON AFRICAN AMERICAN): 8 ML/MIN/1.73 M^2
GLUCOSE SERPL-MCNC: 102 MG/DL (ref 70–110)
GLUCOSE UR QL STRIP: ABNORMAL
HCT VFR BLD AUTO: 42.7 % (ref 40–54)
HGB BLD-MCNC: 14.3 G/DL (ref 14–18)
HGB UR QL STRIP: ABNORMAL
HYALINE CASTS #/AREA URNS LPF: 0 /LPF
IMM GRANULOCYTES # BLD AUTO: 0.19 K/UL (ref 0–0.04)
IMM GRANULOCYTES NFR BLD AUTO: 1.3 % (ref 0–0.5)
KETONES UR QL STRIP: NEGATIVE
LEUKOCYTE ESTERASE UR QL STRIP: ABNORMAL
LYMPHOCYTES # BLD AUTO: 1.5 K/UL (ref 1–4.8)
LYMPHOCYTES NFR BLD: 10.6 % (ref 18–48)
MCH RBC QN AUTO: 30.1 PG (ref 27–31)
MCHC RBC AUTO-ENTMCNC: 33.5 G/DL (ref 32–36)
MCV RBC AUTO: 90 FL (ref 82–98)
MICROSCOPIC COMMENT: ABNORMAL
MONOCYTES # BLD AUTO: 1 K/UL (ref 0.3–1)
MONOCYTES NFR BLD: 6.9 % (ref 4–15)
NEUTROPHILS # BLD AUTO: 11.5 K/UL (ref 1.8–7.7)
NEUTROPHILS NFR BLD: 79.8 % (ref 38–73)
NITRITE UR QL STRIP: NEGATIVE
NRBC BLD-RTO: 0 /100 WBC
PH UR STRIP: 6 [PH] (ref 5–8)
PHOSPHATE SERPL-MCNC: 6.9 MG/DL (ref 2.7–4.5)
PLATELET # BLD AUTO: 201 K/UL (ref 150–450)
PMV BLD AUTO: 9.7 FL (ref 9.2–12.9)
POTASSIUM SERPL-SCNC: 5 MMOL/L (ref 3.5–5.1)
PROT UR QL STRIP: ABNORMAL
RBC # BLD AUTO: 4.75 M/UL (ref 4.6–6.2)
RBC #/AREA URNS HPF: >100 /HPF (ref 0–4)
SODIUM SERPL-SCNC: 131 MMOL/L (ref 136–145)
SP GR UR STRIP: >=1.03 (ref 1–1.03)
SQUAMOUS #/AREA URNS HPF: 7 /HPF
URATE CRY URNS QL MICRO: ABNORMAL
URN SPEC COLLECT METH UR: ABNORMAL
UROBILINOGEN UR STRIP-ACNC: NEGATIVE EU/DL
WBC # BLD AUTO: 14.41 K/UL (ref 3.9–12.7)
WBC #/AREA URNS HPF: >100 /HPF (ref 0–5)

## 2022-04-16 PROCEDURE — 36415 COLL VENOUS BLD VENIPUNCTURE: CPT | Performed by: INTERNAL MEDICINE

## 2022-04-16 PROCEDURE — 85025 COMPLETE CBC W/AUTO DIFF WBC: CPT | Performed by: ORTHOPAEDIC SURGERY

## 2022-04-16 PROCEDURE — 25000003 PHARM REV CODE 250: Performed by: ORTHOPAEDIC SURGERY

## 2022-04-16 PROCEDURE — 63600175 PHARM REV CODE 636 W HCPCS: Performed by: ORTHOPAEDIC SURGERY

## 2022-04-16 PROCEDURE — 80069 RENAL FUNCTION PANEL: CPT | Performed by: INTERNAL MEDICINE

## 2022-04-16 PROCEDURE — 63600175 PHARM REV CODE 636 W HCPCS: Performed by: INTERNAL MEDICINE

## 2022-04-16 PROCEDURE — 25000003 PHARM REV CODE 250: Performed by: INTERNAL MEDICINE

## 2022-04-16 PROCEDURE — 80100014 HC HEMODIALYSIS 1:1

## 2022-04-16 PROCEDURE — 94761 N-INVAS EAR/PLS OXIMETRY MLT: CPT

## 2022-04-16 PROCEDURE — 82550 ASSAY OF CK (CPK): CPT | Performed by: INTERNAL MEDICINE

## 2022-04-16 PROCEDURE — 81000 URINALYSIS NONAUTO W/SCOPE: CPT | Performed by: INTERNAL MEDICINE

## 2022-04-16 PROCEDURE — A4216 STERILE WATER/SALINE, 10 ML: HCPCS | Performed by: ORTHOPAEDIC SURGERY

## 2022-04-16 PROCEDURE — 20000000 HC ICU ROOM

## 2022-04-16 RX ORDER — MORPHINE SULFATE 2 MG/ML
2 INJECTION, SOLUTION INTRAMUSCULAR; INTRAVENOUS EVERY 4 HOURS PRN
Status: DISCONTINUED | OUTPATIENT
Start: 2022-04-16 | End: 2022-04-21

## 2022-04-16 RX ADMIN — LOPERAMIDE HYDROCHLORIDE 2 MG: 2 CAPSULE ORAL at 07:04

## 2022-04-16 RX ADMIN — Medication 2 ML: at 05:04

## 2022-04-16 RX ADMIN — Medication 2 ML: at 06:04

## 2022-04-16 RX ADMIN — HEPARIN SODIUM 4000 UNITS: 1000 INJECTION, SOLUTION INTRAVENOUS; SUBCUTANEOUS at 04:04

## 2022-04-16 RX ADMIN — DEXTROSE 500 MG: 50 INJECTION, SOLUTION INTRAVENOUS at 01:04

## 2022-04-16 RX ADMIN — HYDROCODONE BITARTRATE AND ACETAMINOPHEN 1 TABLET: 5; 325 TABLET ORAL at 10:04

## 2022-04-16 RX ADMIN — HYDROCODONE BITARTRATE AND ACETAMINOPHEN 1 TABLET: 5; 325 TABLET ORAL at 05:04

## 2022-04-16 RX ADMIN — MUPIROCIN: 20 OINTMENT TOPICAL at 09:04

## 2022-04-16 RX ADMIN — MORPHINE SULFATE 6 MG: 2 INJECTION, SOLUTION INTRAMUSCULAR; INTRAVENOUS at 11:04

## 2022-04-16 RX ADMIN — HYDROMORPHONE HYDROCHLORIDE 1 MG: 1 INJECTION, SOLUTION INTRAMUSCULAR; INTRAVENOUS; SUBCUTANEOUS at 12:04

## 2022-04-16 RX ADMIN — MORPHINE SULFATE 2 MG: 2 INJECTION, SOLUTION INTRAMUSCULAR; INTRAVENOUS at 07:04

## 2022-04-16 RX ADMIN — CLINDAMYCIN IN 5 PERCENT DEXTROSE 600 MG: 12 INJECTION, SOLUTION INTRAVENOUS at 05:04

## 2022-04-16 RX ADMIN — CLINDAMYCIN IN 5 PERCENT DEXTROSE 600 MG: 12 INJECTION, SOLUTION INTRAVENOUS at 07:04

## 2022-04-16 RX ADMIN — DEXTROSE 500 MG: 50 INJECTION, SOLUTION INTRAVENOUS at 02:04

## 2022-04-16 RX ADMIN — HYDROMORPHONE HYDROCHLORIDE 1 MG: 1 INJECTION, SOLUTION INTRAMUSCULAR; INTRAVENOUS; SUBCUTANEOUS at 08:04

## 2022-04-16 RX ADMIN — Medication 2 ML: at 11:04

## 2022-04-16 NOTE — PLAN OF CARE
POC reviewed with patient. Alert and oriented. RLE dressing with moist serosang dressing , +pulses. C/o pain moderately relieved with PRN pain medication. Reports having a bad dream this night - bit his lip while asleep, mild swelling noted and scab; mouth cleaned, vaseline applied. Trialysis dressing changed d/t old dressing coming off. Brown catheter in place, 10ml UOP - sent for ordered UA. Swelling noted to RLE to hip and also noted to lower back. X2 BM this shift. Renal diet. 1500ml fluid restriction. Plan for HD today. Safety measures in place.      Latest Reference Range & Units 04/16/22 03:44   Calcium 8.7 - 10.5 mg/dL 6.8 (LL) [1]   (LL): Data is critically low  [1] Calcium    critical result(s) called and verbal readback obtained    from Denia Mendez RN.  by TH1 04/16/2022 05:01     Called in to nephrology on call services, and left voicemail about above critical results. Patient planned for HD today, and above result appears to have been trending similar to this result.

## 2022-04-16 NOTE — PROGRESS NOTES
[facility] Hospital Medicine  Progress Note    Patient Name: Agus Horan  MRN: [id number]  Patient Class: IP- Inpatient   Admission Date: 4/13/2022  Length of Stay: 3 days  Attending Physician: Marcus Johnson MD  Primary Care Provider: Primary Doctor No        Subjective:     Principal Problem:Acute renal failure        HPI:  Agus Horan is a 20 y/o male with PMHx of mild asthma who presents with right lower extremity pain and swelling x few days. Patient was seen in the ED on 4/9/22 for oxycodone overdose and again on 4/10/22 for left eye pain after falling on a piece of furniture. Patient states he is unsure how he injured his leg and denies IV drug use. Xray of his right leg did not show acute fracture. Patietn states RLE pain and swelling increased over the past few days and he is unable to walk secondary to pain. US of his lower extremity performed today did not show DVT. Lab work revealed elevated Creatinine 13.5 & K+ 7.1, elevated AST/ALT 3980/1611 & WBC 18.14.  CPK>40,0000.  Patient unable to dorsiflex and plantar flex his RLE and symptoms are concerning for compartment syndrome per ED. ED provider discussed with Dr. Byers and Dr. Fraser, nephrology. Patient was referred to hospital medicine and seen in the ED with his friend at bedside. Patient complaining of RLE pain, tenderness and swelling. He denies SOB, N/V/D, chest pain and headaches.  Patient will be admitted to hospital medicine for orthopedic consultation and further management.      Overview/Hospital Course:  No notes on file    Interval History: CPK trending down, Vitals stable. Edema in right thigh better. WBC 14. Sodium 131. Denies any new symptoms    Review of Systems   Constitutional:  Negative for activity change, appetite change, chills and fever.   HENT:  Negative for congestion, sore throat and trouble swallowing.    Eyes:  Negative for photophobia and visual disturbance.   Respiratory:  Negative for  cough, chest tightness and shortness of breath.    Cardiovascular:  Negative for chest pain, palpitations and leg swelling.   Gastrointestinal:  Negative for abdominal pain, diarrhea and nausea.   Genitourinary:  Negative for dysuria, flank pain and hematuria.   Musculoskeletal:  Positive for gait problem and myalgias. Negative for back pain.   Skin:  Positive for color change.   Neurological:  Negative for dizziness, weakness and headaches.   Psychiatric/Behavioral:  Negative for confusion.    Objective:     Vital Signs (Most Recent):  Temp: 98.7 °F (37.1 °C) (04/16/22 0800)  Pulse: 78 (04/16/22 0900)  Resp: 20 (04/16/22 1105)  BP: (!) 166/71 (04/16/22 0900)  SpO2: 95 % (04/16/22 0900)   Vital Signs (24h Range):  Temp:  [97.9 °F (36.6 °C)-98.7 °F (37.1 °C)] 98.7 °F (37.1 °C)  Pulse:  [66-85] 78  Resp:  [11-24] 20  SpO2:  [95 %-100 %] 95 %  BP: (146-204)/(59-89) 166/71     Weight: 82.5 kg (181 lb 14.1 oz)  Body mass index is 26.86 kg/m².    Intake/Output Summary (Last 24 hours) at 4/16/2022 1110  Last data filed at 4/16/2022 1000  Gross per 24 hour   Intake 2648.65 ml   Output 1518 ml   Net 1130.65 ml      Physical Exam  Vitals and nursing note reviewed.   Constitutional:       Appearance: He is normal weight. He is ill-appearing.   HENT:      Head: Normocephalic.      Mouth/Throat:      Mouth: Mucous membranes are moist.      Pharynx: Oropharynx is clear.   Eyes:      Pupils: Pupils are equal, round, and reactive to light.      Comments: Left orbital ecchymosis   Cardiovascular:      Rate and Rhythm: Regular rhythm. Tachycardia present.      Pulses: Normal pulses.      Heart sounds: Normal heart sounds.   Pulmonary:      Effort: Pulmonary effort is normal.      Breath sounds: Normal breath sounds.   Abdominal:      General: Bowel sounds are normal.      Palpations: Abdomen is soft.   Musculoskeletal:         General: Swelling (r Thigh swellin noted) present. Normal range of motion.      Cervical back: Normal range  of motion.      Right upper leg: Swelling present.      Right lower leg: Swelling present.      Comments:     good plantar flexion of his toes actively.  he has no pain on passive extension of the toe the patient has very little extension actively of the toe great toes and smaller toes.    Skin:     General: Skin is warm and dry.   Neurological:      Mental Status: He is alert and oriented to person, place, and time. Mental status is at baseline.   Psychiatric:         Mood and Affect: Mood normal.       Significant Labs: All pertinent labs within the past 24 hours have been reviewed.  CBC:   Recent Labs   Lab 04/15/22  0354 04/16/22  0344   WBC 13.96* 14.41*   HGB 14.6 14.3   HCT 43.9 42.7    201     CMP:   Recent Labs   Lab 04/15/22  0033 04/15/22  0733 04/16/22  0344   * 132* 131*   K 5.1 4.9 5.0   CL 94* 95 92*   CO2 25 22* 25    95 102   BUN 84* 90* 69*   CREATININE 9.5* 9.6* 8.6*   CALCIUM 6.7* 6.8* 6.8*   ALBUMIN  --   --  1.8*   ANIONGAP 14 15 14   EGFRNONAA 7* 7* 8*       Significant Imaging: I have reviewed all pertinent imaging results/findings within the past 24 hours.      Assessment/Plan:      * Acute renal failure   due to rhabdomyolysis  Trend CPK daily, will need HD till CPK < 5000   nephrology-recommendations appreciated  Avoid nephrotoxins  IV fluids stopped due to increase in swelling at right thigh  Telemetry       Compartment syndrome  Underwent fasciotomy on 4/13   Appreciate ortho recs    Elevated liver enzymes  Elevated AST &ALT likely due to rhabdomyolysis  Trending down  Avoid hepatoxic medications  Monitor CMP  See plan for rhabdomyolysis        Rhabdomyolysis  CPK trending down  S/p fasciotomy on 4/13 for compartment syndrome.  Underwent emergent HD overnight for hyperkalemia and metabolic acidosis refractory to medical management;      IV fluids  Monitor BMP   appreciate nephrology recs      History of asthma  Hx of asthma, stable    Monitor for acute  changes        VTE Risk Mitigation (From admission, onward)         Ordered     heparin (porcine) injection 4,000 Units  As needed (PRN)         04/14/22 0137     Reason for No Pharmacological VTE Prophylaxis  Once        Question:  Reasons:  Answer:  Risk of Bleeding    04/13/22 1913     IP VTE HIGH RISK PATIENT  Once         04/13/22 1913     Place sequential compression device  Until discontinued         04/13/22 1913                Discharge Planning   JARAD: 4/18/2022     Code Status: Full Code   Is the patient medically ready for discharge?:     Reason for patient still in hospital (select all that apply): Patient trending condition and Treatment  Discharge Plan A: Home with family            Critical care time spent on the evaluation and treatment of severe organ dysfunction, review of pertinent labs and imaging studies, discussions with consulting providers and discussions with patient/family: 60 minutes.      Marcus Johnson MD  Department of Hospital Medicine   [facility]

## 2022-04-16 NOTE — SUBJECTIVE & OBJECTIVE
Interval History: CPK trending down, Vitals stable. Edema in right thigh better. WBC 14. Sodium 131. Denies any new symptoms    Review of Systems   Constitutional:  Negative for activity change, appetite change, chills and fever.   HENT:  Negative for congestion, sore throat and trouble swallowing.    Eyes:  Negative for photophobia and visual disturbance.   Respiratory:  Negative for cough, chest tightness and shortness of breath.    Cardiovascular:  Negative for chest pain, palpitations and leg swelling.   Gastrointestinal:  Negative for abdominal pain, diarrhea and nausea.   Genitourinary:  Negative for dysuria, flank pain and hematuria.   Musculoskeletal:  Positive for gait problem and myalgias. Negative for back pain.   Skin:  Positive for color change.   Neurological:  Negative for dizziness, weakness and headaches.   Psychiatric/Behavioral:  Negative for confusion.    Objective:     Vital Signs (Most Recent):  Temp: 98.7 °F (37.1 °C) (04/16/22 0800)  Pulse: 78 (04/16/22 0900)  Resp: 20 (04/16/22 1105)  BP: (!) 166/71 (04/16/22 0900)  SpO2: 95 % (04/16/22 0900)   Vital Signs (24h Range):  Temp:  [97.9 °F (36.6 °C)-98.7 °F (37.1 °C)] 98.7 °F (37.1 °C)  Pulse:  [66-85] 78  Resp:  [11-24] 20  SpO2:  [95 %-100 %] 95 %  BP: (146-204)/(59-89) 166/71     Weight: 82.5 kg (181 lb 14.1 oz)  Body mass index is 26.86 kg/m².    Intake/Output Summary (Last 24 hours) at 4/16/2022 1110  Last data filed at 4/16/2022 1000  Gross per 24 hour   Intake 2648.65 ml   Output 1518 ml   Net 1130.65 ml      Physical Exam  Vitals and nursing note reviewed.   Constitutional:       Appearance: He is normal weight. He is ill-appearing.   HENT:      Head: Normocephalic.      Mouth/Throat:      Mouth: Mucous membranes are moist.      Pharynx: Oropharynx is clear.   Eyes:      Pupils: Pupils are equal, round, and reactive to light.      Comments: Left orbital ecchymosis   Cardiovascular:      Rate and Rhythm: Regular rhythm. Tachycardia  present.      Pulses: Normal pulses.      Heart sounds: Normal heart sounds.   Pulmonary:      Effort: Pulmonary effort is normal.      Breath sounds: Normal breath sounds.   Abdominal:      General: Bowel sounds are normal.      Palpations: Abdomen is soft.   Musculoskeletal:         General: Swelling (r Thigh swellin noted) present. Normal range of motion.      Cervical back: Normal range of motion.      Right upper leg: Swelling present.      Right lower leg: Swelling present.      Comments:     good plantar flexion of his toes actively.  he has no pain on passive extension of the toe the patient has very little extension actively of the toe great toes and smaller toes.    Skin:     General: Skin is warm and dry.   Neurological:      Mental Status: He is alert and oriented to person, place, and time. Mental status is at baseline.   Psychiatric:         Mood and Affect: Mood normal.       Significant Labs: All pertinent labs within the past 24 hours have been reviewed.  CBC:   Recent Labs   Lab 04/15/22  0354 04/16/22  0344   WBC 13.96* 14.41*   HGB 14.6 14.3   HCT 43.9 42.7    201     CMP:   Recent Labs   Lab 04/15/22  0033 04/15/22  0733 04/16/22  0344   * 132* 131*   K 5.1 4.9 5.0   CL 94* 95 92*   CO2 25 22* 25    95 102   BUN 84* 90* 69*   CREATININE 9.5* 9.6* 8.6*   CALCIUM 6.7* 6.8* 6.8*   ALBUMIN  --   --  1.8*   ANIONGAP 14 15 14   EGFRNONAA 7* 7* 8*       Significant Imaging: I have reviewed all pertinent imaging results/findings within the past 24 hours.

## 2022-04-16 NOTE — PROGRESS NOTES
04/16/22 0735   Patient Assessment/Suction   Level of Consciousness (AVPU) alert   Respiratory Effort Normal;Unlabored   Expansion/Accessory Muscles/Retractions expansion symmetric   All Lung Fields Breath Sounds clear   Rhythm/Pattern, Respiratory depth regular;pattern regular   PRE-TX-O2   O2 Device (Oxygen Therapy) room air   SpO2 100 %   Pulse Oximetry Type Continuous   $ Pulse Oximetry - Multiple Charge Pulse Oximetry - Multiple   Oximetry Probe Site Intact   Pulse 82   Resp 20   BP (!) 159/74

## 2022-04-16 NOTE — PROGRESS NOTES
HD; consent and hepatitis status confirmed prior to HD, pt stable, tx completed, lines flushed, clamped, and capped. NET UF: 2L. Report given to Baylee.

## 2022-04-16 NOTE — CARE UPDATE
04/15/22 1932   Patient Assessment/Suction   Level of Consciousness (AVPU) alert   Respiratory Effort Normal   Expansion/Accessory Muscles/Retractions no use of accessory muscles   PRE-TX-O2   O2 Device (Oxygen Therapy) room air   SpO2 100 %   Pulse 79   Resp 14

## 2022-04-16 NOTE — PROGRESS NOTES
Resting comfortably requiring minimal pain medications patient neurologically is unchanged he has good sensation to his toes he is able to plantar flex his toes well he does not have any dorsiflexion of his toes patient is having some serous drainage from his fasciotomy sites he is presently afebrile his compartments in his thigh appeared to be less swollen there is no tenseness in the anterior posterior or medial compartment he is able to actively flex the hamstrings and extend his quad he has no pain on flexion and extension of the knee passively.  Patient is buttocks area he does have some subcutaneous edema the compartments do not feel in the buttocks tight any head has no severe pain in the area.  Patient is presently being treated for his renal failure plans are to continue medical management of the patient we are going to continues elevate his right leg and plans are to bring him back on Monday for a washout of his fasciotomy incisions and possible closure.

## 2022-04-16 NOTE — ASSESSMENT & PLAN NOTE
due to rhabdomyolysis  Trend CPK daily, will need HD till CPK < 5000   nephrology-recommendations appreciated  Avoid nephrotoxins  IV fluids stopped due to increase in swelling at right thigh  Telemetry

## 2022-04-16 NOTE — PLAN OF CARE
Problem: Adult Inpatient Plan of Care  Goal: Plan of Care Review  Outcome: Ongoing, Progressing  POC reviewed with pt who verbalized understanding, trialysis to right side of neck, 1L fluid removed during dialysis. Dr Fraser aware of decrease in urinary output and dark straw color. edema noted to right leg, buttock, and scrotum. ultrasound and MRI done of thigh and hip done on today results discussed with pt by Dr Freedman. Pt started on renal diet with 1500ml fluid restriction. Pain controlled with pain meds, call light and personal items at bedside in reach, safety maintained

## 2022-04-16 NOTE — ASSESSMENT & PLAN NOTE
CPK trending down  S/p fasciotomy on 4/13 for compartment syndrome.  Underwent emergent HD overnight for hyperkalemia and metabolic acidosis refractory to medical management;      IV fluids  Monitor BMP   appreciate nephrology recs

## 2022-04-16 NOTE — PROGRESS NOTES
INPATIENT NEPHROLOGY Progress Note  [facility]    Patient Name: Agus Horan  Date: 04/16/2022    Reason for consultation: SANDRA    Chief Complaint:   Chief Complaint   Patient presents with    Leg Pain     Pain in the right leg muscle calf is hard, nausea vomiting, patient was seen here over the weekend for an overdose        History of Present Illness:  22 y/o M with a history of asthma recently here on 4/9 with oxycodone overdose who p/w RLE pain and swelling after a fall on 4/10. He reports severe pain, constant, associated with nausea with inability to bear weight.  He took meloxicam without relief. He was found to have compartment syndrome and was taking to the OR emergently for fasciotomy on 4/13. We are consulted for SANDRA with metabolic derangements.    Interval History:  4/14- did emergent HD overnight for hyperkalemia and metabolic acidosis refractory to medical management; pain controlled, RLE wrapped, no edema in LLE  4/15- worsening pain/edema in RLE- going for MRI- oligoanuric- stop NS IVFs- will do HD/UF today and tomorrow  4/16  Seen on dialysis.  No distress    Plan of Care:    Assessment:  Traumatic rhabdomyolysis with compartment syndrome s/p fasciotomy on 4/13  SANDRA due to toxic ATN s/p emergent HD on 4/14- remains oligoanuric and HD dependent  Edema  Hyponatremia  Hyperkalemia  Acidosis  Hypocalcemia  Shock liver    Plan:    - Trend daily CK. Will continue RRT until CPK < 5000.   - He remains HD dependent. Ordered HD today and tomorrow. Ordered renal u/s and UA for completeness.   - Stop IVFs since he is oligoanuric and developing edema. Continue grier through the weekend. Start some UF with HD. Ok with -160s for renal perfusion. No NSAIDs or IV contrast. Dose meds for CrCl < 10.  - Advise renal diet with 1.5L fluid restriction. Will adjust dialysate for all metabolic derangements. Will replete calcium PRN.  - Trend daily LFTs.   - Change to daily labs.    Thank you  "for allowing us to participate in this patient's care. We will continue to follow.    Vital Signs:  Temp Readings from Last 3 Encounters:   04/16/22 98.5 °F (36.9 °C)   04/10/22 97.3 °F (36.3 °C)   04/09/22 98.4 °F (36.9 °C)       Pulse Readings from Last 3 Encounters:   04/16/22 86   04/10/22 63   04/09/22 95       BP Readings from Last 3 Encounters:   04/16/22 (!) 165/79   04/10/22 117/64   04/09/22 (!) 143/82       Weight:  Wt Readings from Last 3 Encounters:   04/16/22 82.5 kg (181 lb 14.1 oz)   04/10/22 77.1 kg (170 lb)   04/09/22 77.1 kg (170 lb)       INS/OUTS:  I/O last 3 completed shifts:  In: 6056.6 [P.O.:735; I.V.:4524.5; Other:500; IV Piggyback:297.2]  Out: 1544 [Urine:44; Other:1500]  I/O this shift:  In: 512 [I.V.:366.4; IV Piggyback:145.6]  Out: 3 [Urine:3]    Medications:  Scheduled Meds:   ceFAZolin (ANCEF) IVPB  500 mg Intravenous Q12H    clindamycin (CLEOCIN) IVPB  600 mg Intravenous Q8H    mupirocin   Nasal BID    senna-docusate 8.6-50 mg  1 tablet Oral BID    sodium chloride 0.9%  2 mL Intravenous Q6H     Continuous Infusions:   dextrose 10 % in water (D10W) 25 mL/hr at 04/16/22 1000    loperamide       PRN Meds:.sodium chloride 0.9%, dextrose 10%, dextrose 10%, glucagon (human recombinant), glucose, glucose, heparin (porcine), HYDROcodone-acetaminophen, loperamide, morphine, naloxone, ondansetron, ondansetron, sodium chloride 0.9%  No current facility-administered medications on file prior to encounter.     Current Outpatient Medications on File Prior to Encounter   Medication Sig Dispense Refill    naloxone (NARCAN) 4 mg/actuation Spry 4mg by nasal route as needed for opioid overdose; may repeat every 2-3 minutes in alternating nostrils until medical help arrives. Call 911 2 each 0       Review of Systems:  Neg    Physical Exam:  BP (!) 165/79   Pulse 86   Temp 98.5 °F (36.9 °C)   Resp 12   Ht 5' 9" (1.753 m)   Wt 82.5 kg (181 lb 14.1 oz)   SpO2 96%   BMI 26.86 kg/m² "     Constitutional: nad, aao x 3, depressed affect  Heart: rrr, no m/r/g, wwp, RLE eedema  Lungs: ctab, no w/r/r/c, no lb  Abdomen: s/nt/nd, +BS    Results:  Lab Results   Component Value Date     (L) 04/16/2022    K 5.0 04/16/2022    CL 92 (L) 04/16/2022    CO2 25 04/16/2022    BUN 69 (H) 04/16/2022    CREATININE 8.6 (H) 04/16/2022    CALCIUM 6.8 (LL) 04/16/2022    ANIONGAP 14 04/16/2022    ESTGFRAFRICA 9 (A) 04/16/2022    EGFRNONAA 8 (A) 04/16/2022       Lab Results   Component Value Date    CALCIUM 6.8 (LL) 04/16/2022    PHOS 6.9 (H) 04/16/2022       Recent Labs   Lab 04/16/22  0344   WBC 14.41*   RBC 4.75   HGB 14.3   HCT 42.7      MCV 90   MCH 30.1   MCHC 33.5       I have personally reviewed pertinent radiological imaging and reports.    I have spent > 35 minutes providing care for this patient for the above diagnoses. These services have included chart/data/imaging review, evaluation, exam, formulation of plan, , note preparation, and discussions with staff involved in this patient's care.    Chantelle Fraser MD MPH  [facility] [address] [address]  [phone] (p)  [phone] (f)

## 2022-04-17 LAB
ALBUMIN SERPL BCP-MCNC: 1.8 G/DL (ref 3.5–5.2)
ALP SERPL-CCNC: 53 U/L (ref 55–135)
ALT SERPL W/O P-5'-P-CCNC: 102 U/L (ref 10–44)
ANION GAP SERPL CALC-SCNC: 14 MMOL/L (ref 8–16)
AST SERPL-CCNC: 572 U/L (ref 10–40)
BASOPHILS # BLD AUTO: 0.03 K/UL (ref 0–0.2)
BASOPHILS NFR BLD: 0.2 % (ref 0–1.9)
BILIRUB SERPL-MCNC: 0.5 MG/DL (ref 0.1–1)
BUN SERPL-MCNC: 67 MG/DL (ref 6–20)
CALCIUM SERPL-MCNC: 7.2 MG/DL (ref 8.7–10.5)
CHLORIDE SERPL-SCNC: 95 MMOL/L (ref 95–110)
CK SERPL-CCNC: ABNORMAL U/L (ref 20–200)
CO2 SERPL-SCNC: 21 MMOL/L (ref 23–29)
CREAT SERPL-MCNC: 9.1 MG/DL (ref 0.5–1.4)
DIFFERENTIAL METHOD: ABNORMAL
EOSINOPHIL # BLD AUTO: 0.3 K/UL (ref 0–0.5)
EOSINOPHIL NFR BLD: 2 % (ref 0–8)
ERYTHROCYTE [DISTWIDTH] IN BLOOD BY AUTOMATED COUNT: 12.3 % (ref 11.5–14.5)
EST. GFR  (AFRICAN AMERICAN): 8 ML/MIN/1.73 M^2
EST. GFR  (NON AFRICAN AMERICAN): 7 ML/MIN/1.73 M^2
GLUCOSE SERPL-MCNC: 102 MG/DL (ref 70–110)
HCT VFR BLD AUTO: 40.5 % (ref 40–54)
HGB BLD-MCNC: 13.6 G/DL (ref 14–18)
IMM GRANULOCYTES # BLD AUTO: 0.44 K/UL (ref 0–0.04)
IMM GRANULOCYTES NFR BLD AUTO: 3 % (ref 0–0.5)
LYMPHOCYTES # BLD AUTO: 1.5 K/UL (ref 1–4.8)
LYMPHOCYTES NFR BLD: 10.4 % (ref 18–48)
MCH RBC QN AUTO: 30.4 PG (ref 27–31)
MCHC RBC AUTO-ENTMCNC: 33.6 G/DL (ref 32–36)
MCV RBC AUTO: 91 FL (ref 82–98)
MONOCYTES # BLD AUTO: 1.2 K/UL (ref 0.3–1)
MONOCYTES NFR BLD: 8 % (ref 4–15)
NEUTROPHILS # BLD AUTO: 11.2 K/UL (ref 1.8–7.7)
NEUTROPHILS NFR BLD: 76.4 % (ref 38–73)
NRBC BLD-RTO: 0 /100 WBC
PLATELET # BLD AUTO: 150 K/UL (ref 150–450)
PMV BLD AUTO: 9.3 FL (ref 9.2–12.9)
POTASSIUM SERPL-SCNC: 5.3 MMOL/L (ref 3.5–5.1)
PROT SERPL-MCNC: 5.1 G/DL (ref 6–8.4)
RBC # BLD AUTO: 4.47 M/UL (ref 4.6–6.2)
SODIUM SERPL-SCNC: 130 MMOL/L (ref 136–145)
WBC # BLD AUTO: 14.66 K/UL (ref 3.9–12.7)

## 2022-04-17 PROCEDURE — 80053 COMPREHEN METABOLIC PANEL: CPT | Performed by: NURSE PRACTITIONER

## 2022-04-17 PROCEDURE — 25000003 PHARM REV CODE 250: Performed by: INTERNAL MEDICINE

## 2022-04-17 PROCEDURE — 94761 N-INVAS EAR/PLS OXIMETRY MLT: CPT

## 2022-04-17 PROCEDURE — 25000003 PHARM REV CODE 250: Performed by: ORTHOPAEDIC SURGERY

## 2022-04-17 PROCEDURE — 36415 COLL VENOUS BLD VENIPUNCTURE: CPT | Performed by: NURSE PRACTITIONER

## 2022-04-17 PROCEDURE — 82550 ASSAY OF CK (CPK): CPT | Performed by: NURSE PRACTITIONER

## 2022-04-17 PROCEDURE — A4216 STERILE WATER/SALINE, 10 ML: HCPCS | Performed by: ORTHOPAEDIC SURGERY

## 2022-04-17 PROCEDURE — 20000000 HC ICU ROOM

## 2022-04-17 PROCEDURE — 63600175 PHARM REV CODE 636 W HCPCS: Performed by: INTERNAL MEDICINE

## 2022-04-17 PROCEDURE — 85025 COMPLETE CBC W/AUTO DIFF WBC: CPT | Performed by: NURSE PRACTITIONER

## 2022-04-17 RX ORDER — HEPARIN SODIUM 5000 [USP'U]/ML
5000 INJECTION, SOLUTION INTRAVENOUS; SUBCUTANEOUS
Status: CANCELLED | OUTPATIENT
Start: 2022-04-17

## 2022-04-17 RX ORDER — SODIUM CHLORIDE 9 MG/ML
INJECTION, SOLUTION INTRAVENOUS ONCE
Status: CANCELLED | OUTPATIENT
Start: 2022-04-17 | End: 2022-04-17

## 2022-04-17 RX ORDER — SODIUM CHLORIDE 9 MG/ML
INJECTION, SOLUTION INTRAVENOUS
Status: CANCELLED | OUTPATIENT
Start: 2022-04-17

## 2022-04-17 RX ADMIN — Medication 2 ML: at 12:04

## 2022-04-17 RX ADMIN — HYDROCODONE BITARTRATE AND ACETAMINOPHEN 1 TABLET: 5; 325 TABLET ORAL at 08:04

## 2022-04-17 RX ADMIN — CLINDAMYCIN IN 5 PERCENT DEXTROSE 600 MG: 12 INJECTION, SOLUTION INTRAVENOUS at 07:04

## 2022-04-17 RX ADMIN — MUPIROCIN: 20 OINTMENT TOPICAL at 08:04

## 2022-04-17 RX ADMIN — CLINDAMYCIN IN 5 PERCENT DEXTROSE 600 MG: 12 INJECTION, SOLUTION INTRAVENOUS at 03:04

## 2022-04-17 RX ADMIN — MORPHINE SULFATE 2 MG: 2 INJECTION, SOLUTION INTRAMUSCULAR; INTRAVENOUS at 07:04

## 2022-04-17 RX ADMIN — MORPHINE SULFATE 2 MG: 2 INJECTION, SOLUTION INTRAMUSCULAR; INTRAVENOUS at 01:04

## 2022-04-17 RX ADMIN — DEXTROSE 500 MG: 50 INJECTION, SOLUTION INTRAVENOUS at 03:04

## 2022-04-17 RX ADMIN — CLINDAMYCIN IN 5 PERCENT DEXTROSE 600 MG: 12 INJECTION, SOLUTION INTRAVENOUS at 12:04

## 2022-04-17 RX ADMIN — HYDROCODONE BITARTRATE AND ACETAMINOPHEN 1 TABLET: 5; 325 TABLET ORAL at 03:04

## 2022-04-17 RX ADMIN — MORPHINE SULFATE 2 MG: 2 INJECTION, SOLUTION INTRAMUSCULAR; INTRAVENOUS at 04:04

## 2022-04-17 RX ADMIN — Medication 2 ML: at 06:04

## 2022-04-17 RX ADMIN — DEXTROSE 500 MG: 50 INJECTION, SOLUTION INTRAVENOUS at 02:04

## 2022-04-17 NOTE — PROGRESS NOTES
[facility] Hospital Medicine  Progress Note    Patient Name: Agus Horan  MRN: [id number]  Patient Class: IP- Inpatient   Admission Date: 4/13/2022  Length of Stay: 4 days  Attending Physician: Marcus Johnson MD  Primary Care Provider: Primary Doctor No        Subjective:     Principal Problem:Acute renal failure        HPI:  Agus Horan is a 20 y/o male with PMHx of mild asthma who presents with right lower extremity pain and swelling x few days. Patient was seen in the ED on 4/9/22 for oxycodone overdose and again on 4/10/22 for left eye pain after falling on a piece of furniture. Patient states he is unsure how he injured his leg and denies IV drug use. Xray of his right leg did not show acute fracture. Patietn states RLE pain and swelling increased over the past few days and he is unable to walk secondary to pain. US of his lower extremity performed today did not show DVT. Lab work revealed elevated Creatinine 13.5 & K+ 7.1, elevated AST/ALT 3980/1611 & WBC 18.14.  CPK>40,0000.  Patient unable to dorsiflex and plantar flex his RLE and symptoms are concerning for compartment syndrome per ED. ED provider discussed with Dr. Byers and Dr. Fraser, nephrology. Patient was referred to hospital medicine and seen in the ED with his friend at bedside. Patient complaining of RLE pain, tenderness and swelling. He denies SOB, N/V/D, chest pain and headaches.  Patient will be admitted to hospital medicine for orthopedic consultation and further management.      Overview/Hospital Course:  No notes on file    Interval History: CPK trending down. LFTs trending down. Scheduled for a washout tomorrow. Serosang drainage continues. No acute events overnight. Patient had several episodes of diarrhea today    Review of Systems   Constitutional:  Negative for activity change, appetite change, chills and fever.   HENT:  Negative for congestion, sore throat and trouble swallowing.    Eyes:  Negative for  photophobia and visual disturbance.   Respiratory:  Negative for cough, chest tightness and shortness of breath.    Cardiovascular:  Negative for chest pain, palpitations and leg swelling.   Gastrointestinal:  Negative for abdominal pain, diarrhea and nausea.   Genitourinary:  Negative for dysuria, flank pain and hematuria.   Musculoskeletal:  Positive for gait problem and myalgias. Negative for back pain.   Skin:  Positive for color change.   Neurological:  Negative for dizziness, weakness and headaches.   Psychiatric/Behavioral:  Negative for confusion.    Objective:     Vital Signs (Most Recent):  Temp: 98.9 °F (37.2 °C) (04/17/22 1200)  Pulse: 76 (04/17/22 1209)  Resp: 19 (04/17/22 1209)  BP: (!) 164/76 (04/17/22 1200)  SpO2: 99 % (04/17/22 1209)   Vital Signs (24h Range):  Temp:  [98.4 °F (36.9 °C)-99.1 °F (37.3 °C)] 98.9 °F (37.2 °C)  Pulse:  [72-93] 76  Resp:  [11-38] 19  SpO2:  [95 %-100 %] 99 %  BP: (145-178)/(65-87) 164/76     Weight: 84.1 kg (185 lb 6.5 oz)  Body mass index is 27.38 kg/m².    Intake/Output Summary (Last 24 hours) at 4/17/2022 1338  Last data filed at 4/17/2022 1000  Gross per 24 hour   Intake 1512.17 ml   Output 2507 ml   Net -994.83 ml      Physical Exam  Vitals and nursing note reviewed.   Constitutional:       Appearance: He is normal weight. He is ill-appearing.   HENT:      Head: Normocephalic.      Mouth/Throat:      Mouth: Mucous membranes are moist.      Pharynx: Oropharynx is clear.   Eyes:      Pupils: Pupils are equal, round, and reactive to light.      Comments: Left orbital ecchymosis   Cardiovascular:      Rate and Rhythm: Regular rhythm. Tachycardia present.      Pulses: Normal pulses.      Heart sounds: Normal heart sounds.   Pulmonary:      Effort: Pulmonary effort is normal.      Breath sounds: Normal breath sounds.   Abdominal:      General: Bowel sounds are normal.      Palpations: Abdomen is soft.   Musculoskeletal:         General: Swelling (r Thigh swellin noted)  present. Normal range of motion.      Cervical back: Normal range of motion.      Right upper leg: Swelling present.      Right lower leg: Swelling present.      Comments:     good plantar flexion of his toes actively.  he has no pain on passive extension of the toe the patient has very little extension actively of the toe great toes and smaller toes.    Skin:     General: Skin is warm and dry.   Neurological:      Mental Status: He is alert and oriented to person, place, and time. Mental status is at baseline.   Psychiatric:         Mood and Affect: Mood normal.       Significant Labs: All pertinent labs within the past 24 hours have been reviewed.  CBC:   Recent Labs   Lab 04/16/22 0344 04/17/22 0428   WBC 14.41* 14.66*   HGB 14.3 13.6*   HCT 42.7 40.5    150     CMP:   Recent Labs   Lab 04/16/22 0344 04/17/22 0428   * 130*   K 5.0 5.3*   CL 92* 95   CO2 25 21*    102   BUN 69* 67*   CREATININE 8.6* 9.1*   CALCIUM 6.8* 7.2*   PROT  --  5.1*   ALBUMIN 1.8* 1.8*   BILITOT  --  0.5   ALKPHOS  --  53*   AST  --  572*   ALT  --  102*   ANIONGAP 14 14   EGFRNONAA 8* 7*       Significant Imaging: I have reviewed all pertinent imaging results/findings within the past 24 hours.      Assessment/Plan:      * Acute renal failure   due to rhabdomyolysis  Trend CPK daily, will need HD till CPK < 5000   nephrology-recommendations appreciated  Avoid nephrotoxins  IV fluids stopped due to increase in swelling at right thigh  Telemetry       Compartment syndrome  Underwent fasciotomy on 4/13   Appreciate ortho recs    Elevated liver enzymes  Elevated AST &ALT likely due to rhabdomyolysis  Trending down  Avoid hepatoxic medications  Monitor CMP  See plan for rhabdomyolysis        Rhabdomyolysis  CPK trending down  S/p fasciotomy on 4/13 for compartment syndrome.  Underwent emergent HD overnight for hyperkalemia and metabolic acidosis refractory to medical management;    IV fluids  Monitor BMP   appreciate  nephrology recs      History of asthma  Hx of asthma, stable    Monitor for acute changes        VTE Risk Mitigation (From admission, onward)         Ordered     heparin (porcine) injection 4,000 Units  As needed (PRN)         04/14/22 0137     Reason for No Pharmacological VTE Prophylaxis  Once        Question:  Reasons:  Answer:  Risk of Bleeding    04/13/22 1913     IP VTE HIGH RISK PATIENT  Once         04/13/22 1913     Place sequential compression device  Until discontinued         04/13/22 1913                Discharge Planning   JARAD: 4/18/2022     Code Status: Full Code   Is the patient medically ready for discharge?:     Reason for patient still in hospital (select all that apply): Patient trending condition and Treatment  Discharge Plan A: Home with family            Critical care time spent on the evaluation and treatment of severe organ dysfunction, review of pertinent labs and imaging studies, discussions with consulting providers and discussions with patient/family: 60 minutes.      Marcus Johnson MD  Department of Hospital Medicine   [facility]

## 2022-04-17 NOTE — SUBJECTIVE & OBJECTIVE
Interval History: CPK trending down. LFTs trending down. Scheduled for a washout tomorrow. Serosang drainage continues. No acute events overnight. Patient had several episodes of diarrhea today    Review of Systems   Constitutional:  Negative for activity change, appetite change, chills and fever.   HENT:  Negative for congestion, sore throat and trouble swallowing.    Eyes:  Negative for photophobia and visual disturbance.   Respiratory:  Negative for cough, chest tightness and shortness of breath.    Cardiovascular:  Negative for chest pain, palpitations and leg swelling.   Gastrointestinal:  Negative for abdominal pain, diarrhea and nausea.   Genitourinary:  Negative for dysuria, flank pain and hematuria.   Musculoskeletal:  Positive for gait problem and myalgias. Negative for back pain.   Skin:  Positive for color change.   Neurological:  Negative for dizziness, weakness and headaches.   Psychiatric/Behavioral:  Negative for confusion.    Objective:     Vital Signs (Most Recent):  Temp: 98.9 °F (37.2 °C) (04/17/22 1200)  Pulse: 76 (04/17/22 1209)  Resp: 19 (04/17/22 1209)  BP: (!) 164/76 (04/17/22 1200)  SpO2: 99 % (04/17/22 1209)   Vital Signs (24h Range):  Temp:  [98.4 °F (36.9 °C)-99.1 °F (37.3 °C)] 98.9 °F (37.2 °C)  Pulse:  [72-93] 76  Resp:  [11-38] 19  SpO2:  [95 %-100 %] 99 %  BP: (145-178)/(65-87) 164/76     Weight: 84.1 kg (185 lb 6.5 oz)  Body mass index is 27.38 kg/m².    Intake/Output Summary (Last 24 hours) at 4/17/2022 1338  Last data filed at 4/17/2022 1000  Gross per 24 hour   Intake 1512.17 ml   Output 2507 ml   Net -994.83 ml      Physical Exam  Vitals and nursing note reviewed.   Constitutional:       Appearance: He is normal weight. He is ill-appearing.   HENT:      Head: Normocephalic.      Mouth/Throat:      Mouth: Mucous membranes are moist.      Pharynx: Oropharynx is clear.   Eyes:      Pupils: Pupils are equal, round, and reactive to light.      Comments: Left orbital ecchymosis    Cardiovascular:      Rate and Rhythm: Regular rhythm. Tachycardia present.      Pulses: Normal pulses.      Heart sounds: Normal heart sounds.   Pulmonary:      Effort: Pulmonary effort is normal.      Breath sounds: Normal breath sounds.   Abdominal:      General: Bowel sounds are normal.      Palpations: Abdomen is soft.   Musculoskeletal:         General: Swelling (r Thigh swellin noted) present. Normal range of motion.      Cervical back: Normal range of motion.      Right upper leg: Swelling present.      Right lower leg: Swelling present.      Comments:     good plantar flexion of his toes actively.  he has no pain on passive extension of the toe the patient has very little extension actively of the toe great toes and smaller toes.    Skin:     General: Skin is warm and dry.   Neurological:      Mental Status: He is alert and oriented to person, place, and time. Mental status is at baseline.   Psychiatric:         Mood and Affect: Mood normal.       Significant Labs: All pertinent labs within the past 24 hours have been reviewed.  CBC:   Recent Labs   Lab 04/16/22 0344 04/17/22  0428   WBC 14.41* 14.66*   HGB 14.3 13.6*   HCT 42.7 40.5    150     CMP:   Recent Labs   Lab 04/16/22  0344 04/17/22  0428   * 130*   K 5.0 5.3*   CL 92* 95   CO2 25 21*    102   BUN 69* 67*   CREATININE 8.6* 9.1*   CALCIUM 6.8* 7.2*   PROT  --  5.1*   ALBUMIN 1.8* 1.8*   BILITOT  --  0.5   ALKPHOS  --  53*   AST  --  572*   ALT  --  102*   ANIONGAP 14 14   EGFRNONAA 8* 7*       Significant Imaging: I have reviewed all pertinent imaging results/findings within the past 24 hours.

## 2022-04-17 NOTE — PROGRESS NOTES
INPATIENT NEPHROLOGY Progress Note  [facility]    Patient Name: Agus Horan  Date: 04/17/2022    Reason for consultation: SANDRA    Chief Complaint:   Chief Complaint   Patient presents with    Leg Pain     Pain in the right leg muscle calf is hard, nausea vomiting, patient was seen here over the weekend for an overdose        History of Present Illness:  22 y/o M with a history of asthma recently here on 4/9 with oxycodone overdose who p/w RLE pain and swelling after a fall on 4/10. He reports severe pain, constant, associated with nausea with inability to bear weight.  He took meloxicam without relief. He was found to have compartment syndrome and was taking to the OR emergently for fasciotomy on 4/13. We are consulted for SANDRA with metabolic derangements.    Interval History:  4/14- did emergent HD overnight for hyperkalemia and metabolic acidosis refractory to medical management; pain controlled, RLE wrapped, no edema in LLE  4/15- worsening pain/edema in RLE- going for MRI- oligoanuric- stop NS IVFs- will do HD/UF today and tomorrow  4/16  Seen on dialysis.  No distress  4/17  Remains oliguric.  AFVSS.  Has back pain.  Leg pain a little better        Plan of Care:    Assessment:  Traumatic rhabdomyolysis with compartment syndrome s/p fasciotomy on 4/13  SANDRA due to toxic ATN s/p emergent HD on 4/14- remains oligoanuric and HD dependent  Edema  Hyponatremia  Hyperkalemia  Acidosis  Hypocalcemia  Shock liver    Plan:    - Trend daily CK.   cpk is coming down  - He remains HD dependent. Ordered HD for tomorrow. - Stop IVFs since he is oligoanuric and developing edema. Continue grier  Ok with -160s for renal perfusion. No NSAIDs or IV contrast. Dose meds for CrCl < 10.  - Advise renal diet with 1.5L fluid restriction. Will adjust dialysate for all metabolic derangements. Will replete calcium PRN.  - Trend daily LFTs.   - Change to daily labs.    Thank you for allowing us to participate  "in this patient's care. We will continue to follow.    Vital Signs:  Temp Readings from Last 3 Encounters:   04/17/22 98.9 °F (37.2 °C) (Oral)   04/10/22 97.3 °F (36.3 °C)   04/09/22 98.4 °F (36.9 °C)       Pulse Readings from Last 3 Encounters:   04/17/22 80   04/10/22 63   04/09/22 95       BP Readings from Last 3 Encounters:   04/17/22 (!) 178/81   04/10/22 117/64   04/09/22 (!) 143/82       Weight:  Wt Readings from Last 3 Encounters:   04/17/22 84.1 kg (185 lb 6.5 oz)   04/10/22 77.1 kg (170 lb)   04/09/22 77.1 kg (170 lb)       INS/OUTS:  I/O last 3 completed shifts:  In: 3235.9 [P.O.:600; I.V.:1726.5; Other:500; IV Piggyback:409.4]  Out: 2520 [Urine:20; Other:2500]  I/O this shift:  In: 100 [P.O.:100]  Out: -     Medications:  Scheduled Meds:   ceFAZolin (ANCEF) IVPB  500 mg Intravenous Q12H    clindamycin (CLEOCIN) IVPB  600 mg Intravenous Q8H    mupirocin   Nasal BID    senna-docusate 8.6-50 mg  1 tablet Oral BID    sodium chloride 0.9%  2 mL Intravenous Q6H     Continuous Infusions:   dextrose 10 % in water (D10W) 25 mL/hr at 04/17/22 0631    loperamide       PRN Meds:.sodium chloride 0.9%, dextrose 10%, dextrose 10%, glucagon (human recombinant), glucose, glucose, heparin (porcine), HYDROcodone-acetaminophen, loperamide, morphine, naloxone, ondansetron, ondansetron, sodium chloride 0.9%  No current facility-administered medications on file prior to encounter.     Current Outpatient Medications on File Prior to Encounter   Medication Sig Dispense Refill    naloxone (NARCAN) 4 mg/actuation Spry 4mg by nasal route as needed for opioid overdose; may repeat every 2-3 minutes in alternating nostrils until medical help arrives. Call 911 2 each 0       Review of Systems:  Neg    Physical Exam:  BP (!) 178/81   Pulse 80   Temp 98.9 °F (37.2 °C) (Oral)   Resp 14   Ht 5' 9" (1.753 m)   Wt 84.1 kg (185 lb 6.5 oz)   SpO2 97%   BMI 27.38 kg/m²     Constitutional: nad, aao x 3, depressed affect  Heart: " rrr, no m/r/g, wwp, RLE eedema  Lungs: ctab, no w/r/r/c, no lb  Abdomen: s/nt/nd, +BS    Results:  Lab Results   Component Value Date     (L) 04/17/2022    K 5.3 (H) 04/17/2022    CL 95 04/17/2022    CO2 21 (L) 04/17/2022    BUN 67 (H) 04/17/2022    CREATININE 9.1 (H) 04/17/2022    CALCIUM 7.2 (L) 04/17/2022    ANIONGAP 14 04/17/2022    ESTGFRAFRICA 8 (A) 04/17/2022    EGFRNONAA 7 (A) 04/17/2022       Lab Results   Component Value Date    CALCIUM 7.2 (L) 04/17/2022    PHOS 6.9 (H) 04/16/2022       Recent Labs   Lab 04/17/22  0428   WBC 14.66*   RBC 4.47*   HGB 13.6*   HCT 40.5      MCV 91   MCH 30.4   MCHC 33.6       I have personally reviewed pertinent radiological imaging and reports.    I have spent > 35 minutes providing care for this patient for the above diagnoses. These services have included chart/data/imaging review, evaluation, exam, formulation of plan, , note preparation, and discussions with staff involved in this patient's care.    Chantelle Fraser MD MPH  [facility] [address] [address]  [phone] (p)  [phone] (f)

## 2022-04-17 NOTE — PLAN OF CARE
Care plan reviewed. Safety maintained. Patient had HD today. 2500 ml removed. Patient testicles elevated for comfort. RLE remains elevated up on pillows. Serosang drainage continues. Plan remains for patient to go back to surgery on Monday. See Dr. Vela notes. Patient had several episodes of diarrhea today, Imodium given per PRN order. Dr. Johnson is aware. Patient with fair appetite.  Patient had multiple visitors today. Patient seen by Dr. Johnson, Dr. Ríos and Dr. Byers.

## 2022-04-17 NOTE — CARE UPDATE
04/16/22 2104   Patient Assessment/Suction   Level of Consciousness (AVPU) alert   Respiratory Effort Normal;Unlabored   Rhythm/Pattern, Respiratory unlabored;pattern regular   PRE-TX-O2   O2 Device (Oxygen Therapy) room air   SpO2 98 %   Pulse Oximetry Type Continuous   $ Pulse Oximetry - Multiple Charge Pulse Oximetry - Multiple   Pulse 82   Resp 14   BP (!) 177/87

## 2022-04-17 NOTE — PLAN OF CARE
CICU DAILY GOALS       A: Awake    RASS: Goal -  0 alert and calm  Actual - RASS (Munoz Agitation-Sedation Scale): 0-->alert and calm   Restraint necessity:  NA  B: Breath   SBT: Not intubated; Room air  C: Coordinate A & B, analgesics/sedatives   Pain: managed    SAT: Not intubated  D: Delirium   CAM-ICU: Overall CAM-ICU: Negative  E: Early(intubated/ Progressive (non-intubated) Mobility   MOVE Screen:  total bedrest   Activity: Activity Management: Arm raise - L1  FAS: Feeding/Nutrition   Diet order: Diet/Nutrition Received: restrict fluids (1.5 L), Specialty Diet/Nutrition Received: renal diet Fluid restriction:  1.5L  T: Thrombus   DVT prophylaxis: VTE Required Core Measure: (TEDs) Compression stocking therapy initiated/maintained  H: HOB Elevation   Head of Bed (HOB) Positioning: HOB at 30 degrees  U: Ulcer Prophylaxis   GI: yes  G: Glucose control   managed    S: Skin   Bundle compliance: yes    Bathing/Skin Care: dressed/undressed; CHG bath complete Date: 4/17/2022  B: Bowel Function   diarrhea; LBM 4/16/2022  I: Indwelling Catheters   Brown necessity:      Urethral Catheter 04/13/22 Straight-tip;Latex 16 Fr.-Reason for Continuing Urinary Catheterization: Critically ill in ICU and requiring hourly monitoring of intake/output   CVC necessity: Yes; HD     D: De-escalation Antibx   Yes  Plan for the day   Manage pain. Antibiotics.   Family/Goals of care/Code Status   Code Status: Full Code     No acute events throughout day, VS and assessment per flow sheet, patient progressing towards goals as tolerated, plan of care reviewed with Agus Horan and family, all concerns addressed, will continue to monitor.

## 2022-04-17 NOTE — CARE UPDATE
04/17/22 0758   Patient Assessment/Suction   Level of Consciousness (AVPU) alert   Respiratory Effort Normal;Unlabored   Rhythm/Pattern, Respiratory unlabored   PRE-TX-O2   O2 Device (Oxygen Therapy) room air   SpO2 99 %   Pulse Oximetry Type Continuous   $ Pulse Oximetry - Multiple Charge Pulse Oximetry - Multiple   Pulse 73   Resp 14

## 2022-04-17 NOTE — PLAN OF CARE
POC reviewed w/ pt. Demonstrated understanding. Pt rested comfortably most of the day. Pt denies numbness or tingling in RLE. Cap refill <3 sec, palpable pulses, warm toes. Pt able to wiggle toes. Pain meds appear to be adequate. VSS, afebrile. Poor appetite, BM x 1 today - diarrhea. Brown in place - approx 15cc UO this shift so far, light brown/jassi in color. Dialysis planned for tomorrow. Pt also supposed to return to OR for washout and possible closure tomorrow. Pt in locked and low bed, bed alarm on, call light in reach, side rails up.     Problem: Adult Inpatient Plan of Care  Goal: Plan of Care Review  Outcome: Ongoing, Progressing  Goal: Absence of Hospital-Acquired Illness or Injury  Outcome: Ongoing, Progressing  Goal: Optimal Comfort and Wellbeing  Outcome: Ongoing, Progressing     Problem: Fall Injury Risk  Goal: Absence of Fall and Fall-Related Injury  Outcome: Ongoing, Progressing     Problem: Skin Injury Risk Increased  Goal: Skin Health and Integrity  Outcome: Ongoing, Progressing     Problem: Device-Related Complication Risk (Hemodialysis)  Goal: Safe, Effective Therapy Delivery  Outcome: Ongoing, Progressing     Problem: Hemodynamic Instability (Hemodialysis)  Goal: Effective Tissue Perfusion  Outcome: Ongoing, Progressing     Problem: Infection (Hemodialysis)  Goal: Absence of Infection Signs and Symptoms  Outcome: Ongoing, Progressing

## 2022-04-18 ENCOUNTER — ANESTHESIA (OUTPATIENT)
Dept: SURGERY | Facility: HOSPITAL | Age: 22
DRG: 957 | End: 2022-04-18
Payer: MEDICAID

## 2022-04-18 ENCOUNTER — ANESTHESIA EVENT (OUTPATIENT)
Dept: SURGERY | Facility: HOSPITAL | Age: 22
DRG: 957 | End: 2022-04-18
Payer: MEDICAID

## 2022-04-18 LAB
ALBUMIN SERPL BCP-MCNC: 1.8 G/DL (ref 3.5–5.2)
ALP SERPL-CCNC: 49 U/L (ref 55–135)
ALT SERPL W/O P-5'-P-CCNC: 55 U/L (ref 10–44)
ANION GAP SERPL CALC-SCNC: 14 MMOL/L (ref 8–16)
AST SERPL-CCNC: 482 U/L (ref 10–40)
BASOPHILS # BLD AUTO: 0.03 K/UL (ref 0–0.2)
BASOPHILS NFR BLD: 0.2 % (ref 0–1.9)
BILIRUB SERPL-MCNC: 0.4 MG/DL (ref 0.1–1)
BUN SERPL-MCNC: 98 MG/DL (ref 6–20)
CALCIUM SERPL-MCNC: 6.6 MG/DL (ref 8.7–10.5)
CHLORIDE SERPL-SCNC: 93 MMOL/L (ref 95–110)
CK SERPL-CCNC: ABNORMAL U/L (ref 20–200)
CO2 SERPL-SCNC: 20 MMOL/L (ref 23–29)
CREAT SERPL-MCNC: 11.5 MG/DL (ref 0.5–1.4)
DIFFERENTIAL METHOD: ABNORMAL
EOSINOPHIL # BLD AUTO: 0.4 K/UL (ref 0–0.5)
EOSINOPHIL NFR BLD: 2.8 % (ref 0–8)
ERYTHROCYTE [DISTWIDTH] IN BLOOD BY AUTOMATED COUNT: 12.1 % (ref 11.5–14.5)
EST. GFR  (AFRICAN AMERICAN): 6 ML/MIN/1.73 M^2
EST. GFR  (NON AFRICAN AMERICAN): 6 ML/MIN/1.73 M^2
GLUCOSE SERPL-MCNC: 102 MG/DL (ref 70–110)
HCT VFR BLD AUTO: 36.2 % (ref 40–54)
HGB BLD-MCNC: 12.5 G/DL (ref 14–18)
IMM GRANULOCYTES # BLD AUTO: 0.52 K/UL (ref 0–0.04)
IMM GRANULOCYTES NFR BLD AUTO: 3.5 % (ref 0–0.5)
LYMPHOCYTES # BLD AUTO: 1.5 K/UL (ref 1–4.8)
LYMPHOCYTES NFR BLD: 9.7 % (ref 18–48)
MCH RBC QN AUTO: 30.3 PG (ref 27–31)
MCHC RBC AUTO-ENTMCNC: 34.5 G/DL (ref 32–36)
MCV RBC AUTO: 88 FL (ref 82–98)
MONOCYTES # BLD AUTO: 1.3 K/UL (ref 0.3–1)
MONOCYTES NFR BLD: 8.8 % (ref 4–15)
NEUTROPHILS # BLD AUTO: 11.3 K/UL (ref 1.8–7.7)
NEUTROPHILS NFR BLD: 75 % (ref 38–73)
NRBC BLD-RTO: 0 /100 WBC
PLATELET # BLD AUTO: 119 K/UL (ref 150–450)
PMV BLD AUTO: 9.5 FL (ref 9.2–12.9)
POTASSIUM SERPL-SCNC: 5.5 MMOL/L (ref 3.5–5.1)
PROT SERPL-MCNC: 5 G/DL (ref 6–8.4)
RBC # BLD AUTO: 4.12 M/UL (ref 4.6–6.2)
SODIUM SERPL-SCNC: 127 MMOL/L (ref 136–145)
WBC # BLD AUTO: 15.06 K/UL (ref 3.9–12.7)

## 2022-04-18 PROCEDURE — 71000033 HC RECOVERY, INTIAL HOUR: Performed by: ORTHOPAEDIC SURGERY

## 2022-04-18 PROCEDURE — 20000000 HC ICU ROOM

## 2022-04-18 PROCEDURE — 36000706: Performed by: ORTHOPAEDIC SURGERY

## 2022-04-18 PROCEDURE — 88305 TISSUE EXAM BY PATHOLOGIST: ICD-10-PCS | Mod: 26,,, | Performed by: PATHOLOGY

## 2022-04-18 PROCEDURE — 99900035 HC TECH TIME PER 15 MIN (STAT)

## 2022-04-18 PROCEDURE — 80100014 HC HEMODIALYSIS 1:1

## 2022-04-18 PROCEDURE — A4216 STERILE WATER/SALINE, 10 ML: HCPCS | Performed by: ORTHOPAEDIC SURGERY

## 2022-04-18 PROCEDURE — 63600175 PHARM REV CODE 636 W HCPCS: Performed by: ORTHOPAEDIC SURGERY

## 2022-04-18 PROCEDURE — 63600175 PHARM REV CODE 636 W HCPCS: Performed by: REGISTERED NURSE

## 2022-04-18 PROCEDURE — 94761 N-INVAS EAR/PLS OXIMETRY MLT: CPT

## 2022-04-18 PROCEDURE — 25000003 PHARM REV CODE 250: Performed by: REGISTERED NURSE

## 2022-04-18 PROCEDURE — 80053 COMPREHEN METABOLIC PANEL: CPT | Performed by: ORTHOPAEDIC SURGERY

## 2022-04-18 PROCEDURE — 88305 TISSUE EXAM BY PATHOLOGIST: CPT | Performed by: PATHOLOGY

## 2022-04-18 PROCEDURE — 27894: ICD-10-PCS | Mod: 58,RT,, | Performed by: ORTHOPAEDIC SURGERY

## 2022-04-18 PROCEDURE — 36000707: Performed by: ORTHOPAEDIC SURGERY

## 2022-04-18 PROCEDURE — 63600175 PHARM REV CODE 636 W HCPCS: Performed by: INTERNAL MEDICINE

## 2022-04-18 PROCEDURE — 27894 DECOMPRESSION OF LEG: CPT | Mod: 58,RT,, | Performed by: ORTHOPAEDIC SURGERY

## 2022-04-18 PROCEDURE — A4216 STERILE WATER/SALINE, 10 ML: HCPCS | Performed by: REGISTERED NURSE

## 2022-04-18 PROCEDURE — 85025 COMPLETE CBC W/AUTO DIFF WBC: CPT | Performed by: ORTHOPAEDIC SURGERY

## 2022-04-18 PROCEDURE — 25000003 PHARM REV CODE 250: Performed by: ORTHOPAEDIC SURGERY

## 2022-04-18 PROCEDURE — 37000008 HC ANESTHESIA 1ST 15 MINUTES: Performed by: ORTHOPAEDIC SURGERY

## 2022-04-18 PROCEDURE — 37000009 HC ANESTHESIA EA ADD 15 MINS: Performed by: ORTHOPAEDIC SURGERY

## 2022-04-18 PROCEDURE — 71000039 HC RECOVERY, EACH ADD'L HOUR: Performed by: ORTHOPAEDIC SURGERY

## 2022-04-18 PROCEDURE — D9220A PRA ANESTHESIA: ICD-10-PCS | Mod: CRNA,,, | Performed by: REGISTERED NURSE

## 2022-04-18 PROCEDURE — 82550 ASSAY OF CK (CPK): CPT | Performed by: ORTHOPAEDIC SURGERY

## 2022-04-18 PROCEDURE — 88305 TISSUE EXAM BY PATHOLOGIST: CPT | Mod: 26,,, | Performed by: PATHOLOGY

## 2022-04-18 PROCEDURE — D9220A PRA ANESTHESIA: Mod: ANES,,, | Performed by: ANESTHESIOLOGY

## 2022-04-18 PROCEDURE — 94799 UNLISTED PULMONARY SVC/PX: CPT

## 2022-04-18 PROCEDURE — 25000003 PHARM REV CODE 250: Performed by: INTERNAL MEDICINE

## 2022-04-18 PROCEDURE — D9220A PRA ANESTHESIA: Mod: CRNA,,, | Performed by: REGISTERED NURSE

## 2022-04-18 PROCEDURE — D9220A PRA ANESTHESIA: ICD-10-PCS | Mod: ANES,,, | Performed by: ANESTHESIOLOGY

## 2022-04-18 RX ORDER — CALCIUM GLUCONATE 98 MG/ML
INJECTION, SOLUTION INTRAVENOUS
Status: DISCONTINUED | OUTPATIENT
Start: 2022-04-18 | End: 2022-04-18

## 2022-04-18 RX ORDER — DIPHENHYDRAMINE HYDROCHLORIDE 50 MG/ML
12.5 INJECTION INTRAMUSCULAR; INTRAVENOUS ONCE AS NEEDED
Status: DISCONTINUED | OUTPATIENT
Start: 2022-04-18 | End: 2022-04-20

## 2022-04-18 RX ORDER — HYDROMORPHONE HYDROCHLORIDE 2 MG/ML
0.2 INJECTION, SOLUTION INTRAMUSCULAR; INTRAVENOUS; SUBCUTANEOUS EVERY 5 MIN PRN
Status: DISCONTINUED | OUTPATIENT
Start: 2022-04-18 | End: 2022-04-20

## 2022-04-18 RX ORDER — DEXMEDETOMIDINE HYDROCHLORIDE 100 UG/ML
INJECTION, SOLUTION INTRAVENOUS
Status: DISCONTINUED | OUTPATIENT
Start: 2022-04-18 | End: 2022-04-18

## 2022-04-18 RX ORDER — LORAZEPAM 2 MG/ML
0.25 INJECTION INTRAMUSCULAR ONCE AS NEEDED
Status: DISCONTINUED | OUTPATIENT
Start: 2022-04-18 | End: 2022-04-20

## 2022-04-18 RX ORDER — CALCIUM GLUCONATE 20 MG/ML
1 INJECTION, SOLUTION INTRAVENOUS ONCE
Status: DISCONTINUED | OUTPATIENT
Start: 2022-04-18 | End: 2022-04-23

## 2022-04-18 RX ORDER — ONDANSETRON 2 MG/ML
4 INJECTION INTRAMUSCULAR; INTRAVENOUS ONCE AS NEEDED
Status: DISCONTINUED | OUTPATIENT
Start: 2022-04-18 | End: 2022-04-20

## 2022-04-18 RX ORDER — HYDROMORPHONE HYDROCHLORIDE 1 MG/ML
1 INJECTION, SOLUTION INTRAMUSCULAR; INTRAVENOUS; SUBCUTANEOUS EVERY 6 HOURS PRN
Status: DISCONTINUED | OUTPATIENT
Start: 2022-04-18 | End: 2022-04-26

## 2022-04-18 RX ORDER — PROPOFOL 10 MG/ML
VIAL (ML) INTRAVENOUS CONTINUOUS PRN
Status: DISCONTINUED | OUTPATIENT
Start: 2022-04-18 | End: 2022-04-18

## 2022-04-18 RX ORDER — KETAMINE HYDROCHLORIDE 10 MG/ML
INJECTION, SOLUTION INTRAMUSCULAR; INTRAVENOUS
Status: DISCONTINUED | OUTPATIENT
Start: 2022-04-18 | End: 2022-04-18

## 2022-04-18 RX ORDER — OXYCODONE HYDROCHLORIDE 5 MG/1
5 TABLET ORAL ONCE AS NEEDED
Status: COMPLETED | OUTPATIENT
Start: 2022-04-18 | End: 2022-04-19

## 2022-04-18 RX ORDER — MEPERIDINE HYDROCHLORIDE 50 MG/ML
12.5 INJECTION INTRAMUSCULAR; INTRAVENOUS; SUBCUTANEOUS ONCE AS NEEDED
Status: ACTIVE | OUTPATIENT
Start: 2022-04-18 | End: 2022-04-19

## 2022-04-18 RX ORDER — FENTANYL CITRATE 50 UG/ML
INJECTION, SOLUTION INTRAMUSCULAR; INTRAVENOUS
Status: DISCONTINUED | OUTPATIENT
Start: 2022-04-18 | End: 2022-04-18

## 2022-04-18 RX ORDER — PROCHLORPERAZINE EDISYLATE 5 MG/ML
5 INJECTION INTRAMUSCULAR; INTRAVENOUS EVERY 30 MIN PRN
Status: DISCONTINUED | OUTPATIENT
Start: 2022-04-18 | End: 2022-04-20

## 2022-04-18 RX ORDER — FENTANYL CITRATE 50 UG/ML
25 INJECTION, SOLUTION INTRAMUSCULAR; INTRAVENOUS EVERY 5 MIN PRN
Status: DISCONTINUED | OUTPATIENT
Start: 2022-04-18 | End: 2022-04-20

## 2022-04-18 RX ADMIN — MORPHINE SULFATE 2 MG: 2 INJECTION, SOLUTION INTRAMUSCULAR; INTRAVENOUS at 02:04

## 2022-04-18 RX ADMIN — HYDROMORPHONE HYDROCHLORIDE 1 MG: 1 INJECTION, SOLUTION INTRAMUSCULAR; INTRAVENOUS; SUBCUTANEOUS at 03:04

## 2022-04-18 RX ADMIN — FENTANYL CITRATE 50 MCG: 50 INJECTION, SOLUTION INTRAMUSCULAR; INTRAVENOUS at 08:04

## 2022-04-18 RX ADMIN — HYDROCODONE BITARTRATE AND ACETAMINOPHEN 1 TABLET: 5; 325 TABLET ORAL at 11:04

## 2022-04-18 RX ADMIN — HYDROCODONE BITARTRATE AND ACETAMINOPHEN 1 TABLET: 5; 325 TABLET ORAL at 05:04

## 2022-04-18 RX ADMIN — CLINDAMYCIN IN 5 PERCENT DEXTROSE 600 MG: 12 INJECTION, SOLUTION INTRAVENOUS at 03:04

## 2022-04-18 RX ADMIN — FENTANYL CITRATE 25 MCG: 50 INJECTION, SOLUTION INTRAMUSCULAR; INTRAVENOUS at 09:04

## 2022-04-18 RX ADMIN — PROPOFOL 100 MCG/KG/MIN: 10 INJECTION, EMULSION INTRAVENOUS at 09:04

## 2022-04-18 RX ADMIN — KETAMINE HYDROCHLORIDE 20 MG: 10 INJECTION, SOLUTION INTRAMUSCULAR; INTRAVENOUS at 09:04

## 2022-04-18 RX ADMIN — DEXMEDETOMIDINE HYDROCHLORIDE 0.7 MCG/KG/HR: 100 INJECTION, SOLUTION, CONCENTRATE INTRAVENOUS at 09:04

## 2022-04-18 RX ADMIN — MORPHINE SULFATE 2 MG: 2 INJECTION, SOLUTION INTRAMUSCULAR; INTRAVENOUS at 12:04

## 2022-04-18 RX ADMIN — CALCIUM GLUCONATE 1 G: 98 INJECTION, SOLUTION INTRAVENOUS at 08:04

## 2022-04-18 RX ADMIN — DEXTROSE 500 MG: 50 INJECTION, SOLUTION INTRAVENOUS at 02:04

## 2022-04-18 RX ADMIN — HYDROCODONE BITARTRATE AND ACETAMINOPHEN 1 TABLET: 5; 325 TABLET ORAL at 07:04

## 2022-04-18 RX ADMIN — Medication 2 ML: at 06:04

## 2022-04-18 RX ADMIN — Medication 2 ML: at 12:04

## 2022-04-18 RX ADMIN — MORPHINE SULFATE 2 MG: 2 INJECTION, SOLUTION INTRAMUSCULAR; INTRAVENOUS at 06:04

## 2022-04-18 RX ADMIN — MORPHINE SULFATE 2 MG: 2 INJECTION, SOLUTION INTRAMUSCULAR; INTRAVENOUS at 10:04

## 2022-04-18 RX ADMIN — MUPIROCIN: 20 OINTMENT TOPICAL at 08:04

## 2022-04-18 RX ADMIN — MORPHINE SULFATE 2 MG: 2 INJECTION, SOLUTION INTRAMUSCULAR; INTRAVENOUS at 08:04

## 2022-04-18 RX ADMIN — ONDANSETRON 4 MG: 2 INJECTION INTRAMUSCULAR; INTRAVENOUS at 03:04

## 2022-04-18 RX ADMIN — MORPHINE SULFATE 2 MG: 2 INJECTION, SOLUTION INTRAMUSCULAR; INTRAVENOUS at 04:04

## 2022-04-18 RX ADMIN — CLINDAMYCIN IN 5 PERCENT DEXTROSE 600 MG: 12 INJECTION, SOLUTION INTRAVENOUS at 07:04

## 2022-04-18 RX ADMIN — DEXMEDETOMIDINE HYDROCHLORIDE 80 MCG: 100 INJECTION, SOLUTION INTRAVENOUS at 08:04

## 2022-04-18 RX ADMIN — CLINDAMYCIN IN 5 PERCENT DEXTROSE 600 MG: 12 INJECTION, SOLUTION INTRAVENOUS at 11:04

## 2022-04-18 RX ADMIN — DEXTROSE 500 MG: 50 INJECTION, SOLUTION INTRAVENOUS at 01:04

## 2022-04-18 RX ADMIN — CLINDAMYCIN IN 5 PERCENT DEXTROSE 600 MG: 12 INJECTION, SOLUTION INTRAVENOUS at 12:04

## 2022-04-18 RX ADMIN — HYDROCODONE BITARTRATE AND ACETAMINOPHEN 1 TABLET: 5; 325 TABLET ORAL at 02:04

## 2022-04-18 RX ADMIN — SODIUM CHLORIDE: 0.9 INJECTION, SOLUTION INTRAVENOUS at 08:04

## 2022-04-18 NOTE — CARE UPDATE
04/17/22 1936   Patient Assessment/Suction   Level of Consciousness (AVPU) alert   Respiratory Effort Normal;Unlabored   Expansion/Accessory Muscles/Retractions no use of accessory muscles   PRE-TX-O2   O2 Device (Oxygen Therapy) room air   SpO2 99 %   Pulse Oximetry Type Continuous   $ Pulse Oximetry - Multiple Charge Pulse Oximetry - Multiple   Pulse 86   Resp 15

## 2022-04-18 NOTE — PROGRESS NOTES
INPATIENT NEPHROLOGY Progress Note  [facility]    Patient Name: Agus Horan  Date: 04/18/2022    Reason for consultation: SANDRA    Chief Complaint:   Chief Complaint   Patient presents with    Leg Pain     Pain in the right leg muscle calf is hard, nausea vomiting, patient was seen here over the weekend for an overdose        History of Present Illness:  20 y/o M with a history of asthma recently here on 4/9 with oxycodone overdose who p/w RLE pain and swelling after a fall on 4/10. He reports severe pain, constant, associated with nausea with inability to bear weight.  He took meloxicam without relief. He was found to have compartment syndrome and was taking to the OR emergently for fasciotomy on 4/13. We are consulted for SANDRA with metabolic derangements.    Interval History:  4/14- did emergent HD overnight for hyperkalemia and metabolic acidosis refractory to medical management; pain controlled, RLE wrapped, no edema in LLE  4/15- worsening pain/edema in RLE- going for MRI- oligoanuric- stop NS IVFs- will do HD/UF today and tomorrow  4/16  Seen on dialysis.  No distress  4/17  Remains oliguric.  AFVSS.  Has back pain.  Leg pain a little better  4/18  In the OR.  Still oliguric.        Plan of Care:    Assessment:  Traumatic rhabdomyolysis with compartment syndrome s/p fasciotomy on 4/13  SANDRA due to toxic ATN s/p emergent HD on 4/14- remains oligoanuric and HD dependent  Edema  Hyponatremia  Hyperkalemia  Acidosis  Hypocalcemia  Shock liver    Plan:    - Trend daily CK.   cpk slowing coming down  - He remains HD dependent.  . - Stop IVFs since he is oligoanuric and developing edema. Continue grier  Ok with -160s for renal perfusion. No NSAIDs or IV contrast. Dose meds for CrCl < 10.  - Advise renal diet with 1.5L fluid restriction. Will adjust dialysate for all metabolic derangements. Will replete calcium PRN.  - Trend daily LFTs.   - Change to daily labs.  --replete calcium.  Need  to do calcium repletion judiciously in pt with rhabdomyolysis.  Will check ionized calcium in the AM    Thank you for allowing us to participate in this patient's care. We will continue to follow.    Vital Signs:  Temp Readings from Last 3 Encounters:   04/18/22 97.9 °F (36.6 °C)   04/10/22 97.3 °F (36.3 °C)   04/09/22 98.4 °F (36.9 °C)       Pulse Readings from Last 3 Encounters:   04/18/22 74   04/10/22 63   04/09/22 95       BP Readings from Last 3 Encounters:   04/18/22 (!) 110/54   04/10/22 117/64   04/09/22 (!) 143/82       Weight:  Wt Readings from Last 3 Encounters:   04/18/22 84.7 kg (186 lb 11.7 oz)   04/10/22 77.1 kg (170 lb)   04/09/22 77.1 kg (170 lb)       INS/OUTS:  I/O last 3 completed shifts:  In: 1612 [P.O.:340; I.V.:920.1; IV Piggyback:351.9]  Out: 45 [Urine:45]  I/O this shift:  In: 184.5 [I.V.:35.9; IV Piggyback:148.6]  Out: 5 [Urine:5]    Medications:  Scheduled Meds:   calcium gluconate IVPB  1 g Intravenous Once    ceFAZolin (ANCEF) IVPB  500 mg Intravenous Q12H    clindamycin (CLEOCIN) IVPB  600 mg Intravenous Q8H    senna-docusate 8.6-50 mg  1 tablet Oral BID    sodium chloride 0.9%  2 mL Intravenous Q6H     Continuous Infusions:   dextrose 10 % in water (D10W) Stopped (04/18/22 0835)    loperamide       PRN Meds:.sodium chloride 0.9%, dextrose 10%, dextrose 10%, diphenhydrAMINE, fentaNYL, glucagon (human recombinant), glucose, glucose, heparin (porcine), HYDROcodone-acetaminophen, HYDROmorphone, loperamide, lorazepam, meperidine, morphine, naloxone, ondansetron, ondansetron, ondansetron, oxyCODONE, prochlorperazine, sodium chloride 0.9%  No current facility-administered medications on file prior to encounter.     Current Outpatient Medications on File Prior to Encounter   Medication Sig Dispense Refill    naloxone (NARCAN) 4 mg/actuation Spry 4mg by nasal route as needed for opioid overdose; may repeat every 2-3 minutes in alternating nostrils until medical help arrives. Call [phone]  "each 0       Review of Systems:  Neg    Physical Exam:  BP (!) 110/54   Pulse 74   Temp 97.9 °F (36.6 °C)   Resp 13   Ht 5' 9" (1.753 m)   Wt 84.7 kg (186 lb 11.7 oz)   SpO2 99%   BMI 27.58 kg/m²     Constitutional: nad, aao x 3, depressed affect  Heart: rrr, no m/r/g, wwp, RLE eedema  Lungs: ctab, no w/r/r/c, no lb  Abdomen: s/nt/nd, +BS    Results:  Lab Results   Component Value Date     (L) 04/18/2022    K 5.5 (H) 04/18/2022    CL 93 (L) 04/18/2022    CO2 20 (L) 04/18/2022    BUN 98 (H) 04/18/2022    CREATININE 11.5 (H) 04/18/2022    CALCIUM 6.6 (LL) 04/18/2022    ANIONGAP 14 04/18/2022    ESTGFRAFRICA 6 (A) 04/18/2022    EGFRNONAA 6 (A) 04/18/2022       Lab Results   Component Value Date    CALCIUM 6.6 (LL) 04/18/2022    PHOS 6.9 (H) 04/16/2022       Recent Labs   Lab 04/18/22  0418   WBC 15.06*   RBC 4.12*   HGB 12.5*   HCT 36.2*   *   MCV 88   MCH 30.3   MCHC 34.5       I have personally reviewed pertinent radiological imaging and reports.    I have spent > 35 minutes providing care for this patient for the above diagnoses. These services have included chart/data/imaging review, evaluation, exam, formulation of plan, , note preparation, and discussions with staff involved in this patient's care.    Chantelle Fraser MD MPH  [facility] [address] [address]  [phone] (p)  [phone] (f)  "

## 2022-04-18 NOTE — CONSULTS
"  [facility]  Adult Nutrition  Consult Note    SUMMARY   Intervention: sodium/phosphorus/potassium modified diet     Recommendations  Recommendation/Intervention:   1.) When appropriate per MD, advance to Renal diet + Novasource renal BID   Goals: 1.) diet will advance within 48 hrs  Nutrition Goal Status: new  Communication of RD Recs: reviewed with RN    1. Compartment syndrome    2. Hyperkalemia    3. SANDRA (acute kidney injury)    4. Traumatic rhabdomyolysis, initial encounter    5. Acute renal failure    6. Non-traumatic compartment syndrome of right lower extremity      Past Medical History:   Diagnosis Date    Allergy     AR    Asthma     mild intermittent    Hyperkalemia 2022         Assessment and Plan  Nutrition Problem  altered nutrition related lab values    Related to (etiology):   Renal dysfunction    Signs and Symptoms (as evidenced by):   Phos: 6.9   K: 5.5  Na: 127    Interventions/Recommendations (treatment strategy):  Advance to renal diet, educate on diet on f/u     Nutrition Diagnosis Status:   New           Malnutrition Assessment  Unable to assess (pt out of room)       Reason for Assessment  Reason For Assessment: consult  General Information Comments: admits with acute renal failure. Pt in OR during RD rounds for washout. RD consulted for new HD pt. Provided renal diet education to RN. Will f/u with education. unable to assess any weight loss.    Nutrition Risk Screen  Nutrition Risk Screen: no indicators present    Nutrition/Diet History  Food Allergies: NKFA  Factors Affecting Nutritional Intake: NPO    Anthropometrics  Temp: 97.9 °F (36.6 °C)  Height Method: Stated  Height: 5' 9"  Height (inches): 69 in  Weight Method: Bed Scale  Weight: 84.7 kg (186 lb 11.7 oz)  Weight (lb): 186.73 lb  Ideal Body Weight (IBW), Male: 160 lb  % Ideal Body Weight, Male (lb): 116.71 %  BMI (Calculated): 27.6  BMI Grade: 25 - 29.9 - overweight  Usual Body Weight (UBW), k.1 kg " (April 2022)  % Usual Body Weight: 110.09  % Weight Change From Usual Weight: 9.86 %       Lab/Procedures/Meds  Pertinent Labs Reviewed: reviewed  BMP  Lab Results   Component Value Date     (L) 04/18/2022    K 5.5 (H) 04/18/2022    CL 93 (L) 04/18/2022    CO2 20 (L) 04/18/2022    BUN 98 (H) 04/18/2022    CREATININE 11.5 (H) 04/18/2022    CALCIUM 6.6 (LL) 04/18/2022    ANIONGAP 14 04/18/2022    ESTGFRAFRICA 6 (A) 04/18/2022    EGFRNONAA 6 (A) 04/18/2022     Lab Results   Component Value Date    ALBUMIN 1.8 (L) 04/18/2022     Lab Results   Component Value Date    CALCIUM 6.6 (LL) 04/18/2022    PHOS 6.9 (H) 04/16/2022     No results for input(s): POCTGLUCOSE in the last 24 hours.    Pertinent Medications Reviewed: reviewed  Scheduled Meds:   calcium gluconate IVPB  1 g Intravenous Once    ceFAZolin (ANCEF) IVPB  500 mg Intravenous Q12H    clindamycin (CLEOCIN) IVPB  600 mg Intravenous Q8H    senna-docusate 8.6-50 mg  1 tablet Oral BID    sodium chloride 0.9%  2 mL Intravenous Q6H     Continuous Infusions:   dextrose 10 % in water (D10W) Stopped (04/18/22 0835)    loperamide         Estimated/Assessed Needs  Weight Used For Calorie Calculations: 78 kg (171 lb 15.3 oz) (NHANES II)  Energy Calorie Requirements (kcal): 5229-1503 kcals  Energy Need Method: Kcal/kg  Protein Requirements: 80-95g  Weight Used For Protein Calculations: 78 kg (171 lb 15.3 oz) (NHANES II)  Fluid Requirements (mL): UOP + 1000 mls  Estimated Fluid Requirement Method: other (see comments)  RDA Method (mL): 2300         Nutrition Prescription Ordered  Current Diet Order: NPO  Nutrition Order Comments: for procedure today    Evaluation of Received Nutrient/Fluid Intake  Energy Calories Required: not meeting needs  % Intake of Estimated Energy Needs: 0 - 25 %  % Meal Intake: NPO    Nutrition Risk  Level of Risk/Frequency of Follow-up:  (2 weekly)       Monitor and Evaluation  Food and Nutrient Intake: energy intake, food and beverage  intake  Food and Nutrient Adminstration: diet order  Knowledge/Beliefs/Attitudes: food and nutrition knowledge/skill  Anthropometric Measurements: weight, weight change, body mass index  Biochemical Data, Medical Tests and Procedures: electrolyte and renal panel, glucose/endocrine profile, lipid profile  Nutrition-Focused Physical Findings: overall appearance       Nutrition Follow-Up  RD Follow-up?: Yes

## 2022-04-18 NOTE — ANESTHESIA PREPROCEDURE EVALUATION
04/18/2022  Agus Horan is a 21 y.o., male.      Pre-op Assessment    I have reviewed the Patient Summary Reports.     I have reviewed the Nursing Notes. I have reviewed the NPO Status.   I have reviewed the Medications.     Review of Systems  Anesthesia Hx:  No problems with previous Anesthesia    Social:  Non-Smoker    Cardiovascular:  Cardiovascular Normal     Pulmonary:   Asthma asymptomatic    Renal/:  Renal/ Normal     Neurological:  Neurology Normal    Endocrine:  Endocrine Normal        Physical Exam  General: Well nourished, Cooperative, Alert and Oriented    Airway:  Mallampati: I   Mouth Opening: Normal  Neck ROM: Normal ROM        Anesthesia Plan  Type of Anesthesia, risks & benefits discussed:    Anesthesia Type: Gen ETT, Gen Supraglottic Airway, Gen Natural Airway, MAC  Intra-op Monitoring Plan: Standard ASA Monitors  Post Op Pain Control Plan: multimodal analgesia  Induction:  IV  Airway Plan: Direct, Video and Fiberoptic, Post-Induction  Informed Consent: Informed consent signed with the Patient and all parties understand the risks and agree with anesthesia plan.  All questions answered.   ASA Score: 2 Emergent    Ready For Surgery From Anesthesia Perspective.     .                                                                                                                 04/18/2022  Agus Horan is a 21 y.o., male.      Pre-op Assessment    I have reviewed the Patient Summary Reports.     I have reviewed the Nursing Notes. I have reviewed the NPO Status.   I have reviewed the Medications.     Review of Systems  Neurological:   Neuromuscular Disease,        Physical Exam  General: Well nourished, Cooperative, Alert and Oriented    Airway:  Mallampati: II / II  Mouth Opening: Normal  TM Distance: 4 - 6 cm  Tongue: Normal    Dental:  Intact    Chest/Lungs:  Clear to  auscultation, Normal Respiratory Rate    Heart:  Rate: Normal  Rhythm: Regular Rhythm  Sounds: Normal        Anesthesia Plan  Type of Anesthesia, risks & benefits discussed:    Anesthesia Type: Gen Natural Airway, Gen Supraglottic Airway  Intra-op Monitoring Plan: Standard ASA Monitors  Induction:  IV  Informed Consent: Informed consent signed with the Patient and all parties understand the risks and agree with anesthesia plan.  All questions answered.   ASA Score: 2 Emergent    Ready For Surgery From Anesthesia Perspective.     .

## 2022-04-18 NOTE — ANESTHESIA POSTPROCEDURE EVALUATION
Anesthesia Post Evaluation    Patient: Agus Horan    Procedure(s) Performed: Procedure(s) (LRB):  DEBRIDEMENT, LOWER EXTREMITY (Right)    Final Anesthesia Type: MAC      Patient location during evaluation: PACU  Patient participation: Yes- Able to Participate  Level of consciousness: awake and alert and oriented  Post-procedure vital signs: reviewed and stable  Pain management: adequate  Airway patency: patent    PONV status at discharge: No PONV  Anesthetic complications: no      Cardiovascular status: blood pressure returned to baseline  Respiratory status: unassisted, spontaneous ventilation and room air  Hydration status: euvolemic  Follow-up not needed.          Vitals Value Taken Time   /77 04/18/22 1117   Temp 35.8 °C (96.44 °F) 04/18/22 1107   Pulse 68 04/18/22 1126   Resp 11 04/18/22 1126   SpO2 100 % 04/18/22 1126   Vitals shown include unvalidated device data.      Event Time   Out of Recovery 04/18/2022 10:55:00         Pain/Rhonda Score: Pain Rating Prior to Med Admin: 10 (4/18/2022 11:19 AM)  Pain Rating Post Med Admin: 5 (4/18/2022  9:01 AM)  Rhonda Score: 10 (4/18/2022 10:50 AM)

## 2022-04-18 NOTE — TRANSFER OF CARE
"Anesthesia Transfer of Care Note    Patient: Agus Horan    Procedure(s) Performed: Procedure(s) (LRB):  DEBRIDEMENT, LOWER EXTREMITY (Right)    Patient location: PACU    Anesthesia Type: general    Transport from OR: Transported from OR on 6-10 L/min O2 by face mask with adequate spontaneous ventilation    Post pain: adequate analgesia    Post assessment: tolerated procedure well and no apparent anesthetic complications    Post vital signs: stable    Level of consciousness: sedated    Nausea/Vomiting: no nausea/vomiting    Complications: none    Transfer of care protocol was followed      Last vitals:   Visit Vitals  BP (!) 109/50   Pulse 80   Temp 36.6 °C (97.9 °F)   Resp (!) 21   Ht 5' 9" (1.753 m)   Wt 84.7 kg (186 lb 11.7 oz)   SpO2 100%   BMI 27.58 kg/m²     "

## 2022-04-18 NOTE — CARE UPDATE
04/18/22 1146   Patient Assessment/Suction   Level of Consciousness (AVPU) alert   Respiratory Effort Normal;Unlabored   Expansion/Accessory Muscles/Retractions expansion symmetric   All Lung Fields Breath Sounds clear;diminished   Rhythm/Pattern, Respiratory depth regular;pattern regular   PRE-TX-O2   O2 Device (Oxygen Therapy) room air   SpO2 100 %   Pulse Oximetry Type Continuous   $ Pulse Oximetry - Multiple Charge Pulse Oximetry - Multiple   Oximetry Probe Site Intact   Pulse 71   Resp 13   Incentive Spirometer   $ Incentive Spirometer Charges postop instruction;done with encouragement   Incentive Spirometer Predicted Level (mL) 3350   Administration (IS) instruction provided, initial   Number of Repetitions (IS) 10   Level Incentive Spirometer (mL) 1000   Patient Tolerance (IS) good

## 2022-04-18 NOTE — PROGRESS NOTES
Patient arrived from PACU via ICU bed. Waking up slightly. Patient lightly moaning. Requested water. Call light in reach.

## 2022-04-18 NOTE — PLAN OF CARE
Intervention: sodium/phosphorus/potassium modified diet      Recommendations  Recommendation/Intervention:   1.) When appropriate per MD, advance to Renal diet + Novasource renal BID   Goals: 1.) diet will advance within 48 hrs  Nutrition Goal Status: new  Communication of RD Recs: reviewed with RN

## 2022-04-18 NOTE — NURSING
0845: OR nurse at bedside to take pt for right leg clean out/posisble closure of fasciotomy. Spoke with Dr. Ríos; pt has HD scheduled today for electrolyte correction. Okay to give 1g Ca gluconate during surgery - sent with crna.

## 2022-04-18 NOTE — OP NOTE
[facility]  Orthopedic Surgery   Operative Note    SUMMARY     Date of Procedure: 4/18/2022     Procedure: Procedure(s) (LRB):  DEBRIDEMENT, LOWER EXTREMITY (Right)   closure of medial fasciotomy incision right leg debridement of lateral fasciotomy with biopsy of anterior muscle compartment removal of deep muscle anterior compartment right lower leg    Surgeon(s) and Role:     * Leonardo Byers MD - Primary    Assistant:  Tierney Ruffin    Pre-Operative Diagnosis: Hyperkalemia [E87.5] status post fasciotomy of the right lower extremity secondary to compartment syndrome  Acute renal failure [N17.9]    Post-Operative Diagnosis: Post-Op Diagnosis Codes:     * Hyperkalemia [E87.5]     * Acute renal failure [N17.9]  Compartment syndrome right lower extremity  Anesthesia: General      Estimated Blood Loss (EBL): * No values recorded between 4/18/2022  9:30 AM and 4/18/2022 10:06 AM *           Implants: * No implants in log *    Specimens:   Specimen (24h ago, onward)             Start     Ordered    04/18/22 0937  Specimen to Pathology, Surgery Orthopedics  Once        Comments: Pre-op Diagnosis: Hyperkalemia [E87.5]Acute renal failure [N17.9]Procedure(s):FASCIOTOMYDEBRIDEMENT, LOWER EXTREMITY Number of specimens: 1Name of specimens: 1. MUSCLE ANTERIOR COMPARTMENT RIGHT LOWER LEG     References:    Click here for ordering Quick Tip   Question Answer Comment   Procedure Type: Orthopedics    Specimen Class: Routine/Screening    Release to patient Immediate        04/18/22 0959                 Complications:  None           Description of the Procedure:  The patient was given a ketamine anesthesia.  The dressing was removed from the right lower leg the right leg was then prepped with Hibiclens and draped in a sterile manner inspection of the medial wound the medial wound looked clean the muscle appeared to be viable and contracted with the Bovie the lateral compartments inspected the lateral  musculature especially in the anterior compartment appeared to be grayish in the dark in appearance it did not contract well with the Bovie the lateral compartment musculature appear to contract and appeared more viable than the anterior compartment a muscle biopsy was sent in of the anterior musculature of the anterior compartment also some debridement of the muscle was for appeared to be nonviable.  Hemostasis was secured with the Bovie and then both wounds were then irrigated with normal saline solution the medial complete a medial incision was able to be closed with interrupted 2. Nylon stitch the incision did not appear to be tight in appeared to close nicely.  The lateral incision was unable to be closed due to severe swellin Adaptic 4 x 4 was and then a posterior splint was applied to the leg to support the foot in neutral position.  g the wound was irrigated with normal saline solution and then the incision was covered with

## 2022-04-18 NOTE — PLAN OF CARE
POC reviewed. VSS, afebrile. To OR this morning for wash out & possible closure of fasciotomy. Surgeon only able to close medial fasc; biopsy of lateral, anterior muscle sent. Plans to go back to OR Friday with Dr. Byers. Pain controlled with prn meds. Ca+ replaced with 1g IVPB per nephro orders - orders for ionized calcium in am. HD adjusted per nephrology for electrolyte management. Brown in place - oliguric. IVF dc'd per nephro. Right leg/hip/scrotum continue with significant edema; tender to touch. Phlebitis noted to left arm where his am above IV - blood return present/easily flushed but causing discomfort. New PIV started. Tolerating diet. 2x episode of loose bm. Comfort promoted, safety maintained.

## 2022-04-18 NOTE — PLAN OF CARE
poc reviewed. Alert and oriented. Afebrile. Pain  Medication given around the clock - leg and back hurting. Spoke with Dr. Byers early in shift and updated on patient current status, and to have patient NPO as he will try to get patient in to OR this AM for washout and closure. HD ordered for today. Patient has been NPO since midnight. 20 ml brown UOP overnight. CHG bath given in prep for surgery. RLE elevated. Safety maintained.

## 2022-04-18 NOTE — NURSING
Pt back to  via bed. VSS. Moaning - complaining of 10/10 pain. Hydrocodone admin. Dressing to RLE (gauze, ACE wrap) CDI. Will continue to monitor.

## 2022-04-19 LAB
ALBUMIN SERPL BCP-MCNC: 1.9 G/DL (ref 3.5–5.2)
ALP SERPL-CCNC: 55 U/L (ref 55–135)
ALT SERPL W/O P-5'-P-CCNC: 43 U/L (ref 10–44)
ANION GAP SERPL CALC-SCNC: 16 MMOL/L (ref 8–16)
AST SERPL-CCNC: 383 U/L (ref 10–40)
BACTERIA BLD CULT: NORMAL
BASOPHILS # BLD AUTO: 0.04 K/UL (ref 0–0.2)
BASOPHILS NFR BLD: 0.2 % (ref 0–1.9)
BILIRUB SERPL-MCNC: 0.4 MG/DL (ref 0.1–1)
BUN SERPL-MCNC: 93 MG/DL (ref 6–20)
CA-I BLDV-SCNC: 0.8 MMOL/L (ref 1.06–1.42)
CALCIUM SERPL-MCNC: 6.4 MG/DL (ref 8.7–10.5)
CHLORIDE SERPL-SCNC: 93 MMOL/L (ref 95–110)
CO2 SERPL-SCNC: 19 MMOL/L (ref 23–29)
CREAT SERPL-MCNC: 10.9 MG/DL (ref 0.5–1.4)
DIFFERENTIAL METHOD: ABNORMAL
EOSINOPHIL # BLD AUTO: 0.3 K/UL (ref 0–0.5)
EOSINOPHIL NFR BLD: 1.6 % (ref 0–8)
ERYTHROCYTE [DISTWIDTH] IN BLOOD BY AUTOMATED COUNT: 12.1 % (ref 11.5–14.5)
EST. GFR  (AFRICAN AMERICAN): 7 ML/MIN/1.73 M^2
EST. GFR  (NON AFRICAN AMERICAN): 6 ML/MIN/1.73 M^2
GLUCOSE SERPL-MCNC: 99 MG/DL (ref 70–110)
HCT VFR BLD AUTO: 33.2 % (ref 40–54)
HGB BLD-MCNC: 11.6 G/DL (ref 14–18)
IMM GRANULOCYTES # BLD AUTO: 0.64 K/UL (ref 0–0.04)
IMM GRANULOCYTES NFR BLD AUTO: 3.8 % (ref 0–0.5)
LYMPHOCYTES # BLD AUTO: 1.2 K/UL (ref 1–4.8)
LYMPHOCYTES NFR BLD: 7.2 % (ref 18–48)
MCH RBC QN AUTO: 30.8 PG (ref 27–31)
MCHC RBC AUTO-ENTMCNC: 34.9 G/DL (ref 32–36)
MCV RBC AUTO: 88 FL (ref 82–98)
MONOCYTES # BLD AUTO: 1.6 K/UL (ref 0.3–1)
MONOCYTES NFR BLD: 9.4 % (ref 4–15)
NEUTROPHILS # BLD AUTO: 13.1 K/UL (ref 1.8–7.7)
NEUTROPHILS NFR BLD: 77.8 % (ref 38–73)
NRBC BLD-RTO: 0 /100 WBC
PLATELET # BLD AUTO: 128 K/UL (ref 150–450)
PMV BLD AUTO: 9.9 FL (ref 9.2–12.9)
POTASSIUM SERPL-SCNC: 5.6 MMOL/L (ref 3.5–5.1)
PROT SERPL-MCNC: 5.1 G/DL (ref 6–8.4)
RBC # BLD AUTO: 3.77 M/UL (ref 4.6–6.2)
SODIUM SERPL-SCNC: 128 MMOL/L (ref 136–145)
WBC # BLD AUTO: 16.86 K/UL (ref 3.9–12.7)

## 2022-04-19 PROCEDURE — 63600175 PHARM REV CODE 636 W HCPCS: Performed by: INTERNAL MEDICINE

## 2022-04-19 PROCEDURE — A4216 STERILE WATER/SALINE, 10 ML: HCPCS | Performed by: ORTHOPAEDIC SURGERY

## 2022-04-19 PROCEDURE — 25000003 PHARM REV CODE 250: Performed by: ANESTHESIOLOGY

## 2022-04-19 PROCEDURE — 94799 UNLISTED PULMONARY SVC/PX: CPT

## 2022-04-19 PROCEDURE — 99900035 HC TECH TIME PER 15 MIN (STAT)

## 2022-04-19 PROCEDURE — 25000003 PHARM REV CODE 250: Performed by: INTERNAL MEDICINE

## 2022-04-19 PROCEDURE — 20000000 HC ICU ROOM

## 2022-04-19 PROCEDURE — 25000003 PHARM REV CODE 250: Performed by: ORTHOPAEDIC SURGERY

## 2022-04-19 PROCEDURE — 82330 ASSAY OF CALCIUM: CPT | Performed by: INTERNAL MEDICINE

## 2022-04-19 PROCEDURE — 85025 COMPLETE CBC W/AUTO DIFF WBC: CPT | Performed by: NURSE PRACTITIONER

## 2022-04-19 PROCEDURE — 80100014 HC HEMODIALYSIS 1:1

## 2022-04-19 PROCEDURE — 80053 COMPREHEN METABOLIC PANEL: CPT | Performed by: NURSE PRACTITIONER

## 2022-04-19 PROCEDURE — 94761 N-INVAS EAR/PLS OXIMETRY MLT: CPT

## 2022-04-19 RX ORDER — SODIUM CHLORIDE 9 MG/ML
INJECTION, SOLUTION INTRAVENOUS
Status: DISCONTINUED | OUTPATIENT
Start: 2022-04-19 | End: 2022-04-21

## 2022-04-19 RX ORDER — CALCIUM GLUCONATE 20 MG/ML
1 INJECTION, SOLUTION INTRAVENOUS ONCE
Status: COMPLETED | OUTPATIENT
Start: 2022-04-19 | End: 2022-04-19

## 2022-04-19 RX ORDER — SODIUM CHLORIDE 9 MG/ML
INJECTION, SOLUTION INTRAVENOUS
Status: DISCONTINUED | OUTPATIENT
Start: 2022-04-19 | End: 2022-04-30

## 2022-04-19 RX ORDER — SODIUM CHLORIDE 9 MG/ML
INJECTION, SOLUTION INTRAVENOUS ONCE
Status: DISCONTINUED | OUTPATIENT
Start: 2022-04-19 | End: 2022-04-21

## 2022-04-19 RX ADMIN — MORPHINE SULFATE 2 MG: 2 INJECTION, SOLUTION INTRAMUSCULAR; INTRAVENOUS at 01:04

## 2022-04-19 RX ADMIN — MORPHINE SULFATE 2 MG: 2 INJECTION, SOLUTION INTRAMUSCULAR; INTRAVENOUS at 08:04

## 2022-04-19 RX ADMIN — LOPERAMIDE HYDROCHLORIDE 2 MG: 2 CAPSULE ORAL at 08:04

## 2022-04-19 RX ADMIN — CLINDAMYCIN IN 5 PERCENT DEXTROSE 600 MG: 12 INJECTION, SOLUTION INTRAVENOUS at 04:04

## 2022-04-19 RX ADMIN — DEXTROSE 500 MG: 50 INJECTION, SOLUTION INTRAVENOUS at 02:04

## 2022-04-19 RX ADMIN — CALCIUM GLUCONATE 1 G: 20 INJECTION, SOLUTION INTRAVENOUS at 09:04

## 2022-04-19 RX ADMIN — HYDROCODONE BITARTRATE AND ACETAMINOPHEN 1 TABLET: 5; 325 TABLET ORAL at 04:04

## 2022-04-19 RX ADMIN — OXYCODONE 5 MG: 5 TABLET ORAL at 02:04

## 2022-04-19 RX ADMIN — MORPHINE SULFATE 2 MG: 2 INJECTION, SOLUTION INTRAMUSCULAR; INTRAVENOUS at 06:04

## 2022-04-19 RX ADMIN — HYDROCODONE BITARTRATE AND ACETAMINOPHEN 1 TABLET: 5; 325 TABLET ORAL at 09:04

## 2022-04-19 RX ADMIN — Medication 2 ML: at 06:04

## 2022-04-19 RX ADMIN — CLINDAMYCIN IN 5 PERCENT DEXTROSE 600 MG: 12 INJECTION, SOLUTION INTRAVENOUS at 08:04

## 2022-04-19 RX ADMIN — HEPARIN SODIUM 4000 UNITS: 1000 INJECTION, SOLUTION INTRAVENOUS; SUBCUTANEOUS at 12:04

## 2022-04-19 NOTE — PLAN OF CARE
Patient is alert & oriented, vital signs stable except tachycardia and elevated blood pressure, on room air, denies distress, received prn pain medication x2 for complain of right leg pain, x2 incontinences bowel movements, grier in place, trialysis catheter & PIV also in place, on fluid restrictions, dialysis yesterday at 1900, will re-evaluates today, needs assessed, safety sweep done, will continue to monitor.

## 2022-04-19 NOTE — PROGRESS NOTES
INPATIENT NEPHROLOGY Progress Note  [facility]    Patient Name: Agus Horan  Date: 04/19/2022    Reason for consultation: SANDRA    Chief Complaint:   Chief Complaint   Patient presents with    Leg Pain     Pain in the right leg muscle calf is hard, nausea vomiting, patient was seen here over the weekend for an overdose        History of Present Illness:  22 y/o M with a history of asthma recently here on 4/9 with oxycodone overdose who p/w RLE pain and swelling after a fall on 4/10. He reports severe pain, constant, associated with nausea with inability to bear weight.  He took meloxicam without relief. He was found to have compartment syndrome and was taking to the OR emergently for fasciotomy on 4/13. We are consulted for SANDRA with metabolic derangements.    Interval History:  4/14- did emergent HD overnight for hyperkalemia and metabolic acidosis refractory to medical management; pain controlled, RLE wrapped, no edema in LLE  4/15- worsening pain/edema in RLE- going for MRI- oligoanuric- stop NS IVFs- will do HD/UF today and tomorrow  4/16  Seen on dialysis.  No distress  4/17  Remains oliguric.  AFVSS.  Has back pain.  Leg pain a little better  4/18  In the OR.  Still oliguric.    4/19  Seen on dialysis.  No distress.        Plan of Care:    Assessment:  Traumatic rhabdomyolysis with compartment syndrome s/p fasciotomy on 4/13  SANDRA due to toxic ATN s/p emergent HD on 4/14- remains oligoanuric and HD dependent  Edema  Hyponatremia  Hyperkalemia  Acidosis  Hypocalcemia  Shock liver    Plan:    - Trend daily CK.  Not done today.   cpk slowing coming down  - He remains HD dependent.  . - Stop IVFs since he is oligoanuric and developing edema. Continue grier  Ok with -160s for renal perfusion. No NSAIDs or IV contrast. Dose meds for CrCl < 10.  - Advise renal diet with 1.5L fluid restriction. Will adjust dialysate for all metabolic derangements. Will replete calcium PRN.  - Trend daily  LFTs.   - Change to daily labs.  --replete calcium.  Need to do calcium repletion judiciously in pt with rhabdomyolysis.  Will check ionized   --11 liters positive.   Supplemental isolated uf tomorrow    Thank you for allowing us to participate in this patient's care. We will continue to follow.    Vital Signs:  Temp Readings from Last 3 Encounters:   04/19/22 98.3 °F (36.8 °C) (Oral)   04/10/22 97.3 °F (36.3 °C)   04/09/22 98.4 °F (36.9 °C)       Pulse Readings from Last 3 Encounters:   04/19/22 98   04/10/22 63   04/09/22 95       BP Readings from Last 3 Encounters:   04/19/22 (!) 161/81   04/10/22 117/64   04/09/22 (!) 143/82       Weight:  Wt Readings from Last 3 Encounters:   04/19/22 84.2 kg (185 lb 10 oz)   04/10/22 77.1 kg (170 lb)   04/09/22 77.1 kg (170 lb)       INS/OUTS:  I/O last 3 completed shifts:  In: 2393 [P.O.:620; I.V.:817.9; Other:375; IV Piggyback:580.1]  Out: 1777 [Urine:52; Other:1725]  No intake/output data recorded.    Medications:  Scheduled Meds:   sodium chloride 0.9%   Intravenous Once    calcium gluconate IVPB  1 g Intravenous Once    ceFAZolin (ANCEF) IVPB  500 mg Intravenous Q12H    clindamycin (CLEOCIN) IVPB  600 mg Intravenous Q8H    senna-docusate 8.6-50 mg  1 tablet Oral BID    sodium chloride 0.9%  2 mL Intravenous Q6H     Continuous Infusions:   loperamide       PRN Meds:.sodium chloride 0.9%, sodium chloride 0.9%, dextrose 10%, dextrose 10%, diphenhydrAMINE, fentaNYL, glucagon (human recombinant), glucose, glucose, heparin (porcine), HYDROcodone-acetaminophen, HYDROmorphone, HYDROmorphone, loperamide, lorazepam, morphine, naloxone, ondansetron, ondansetron, ondansetron, prochlorperazine, sodium chloride 0.9%, sodium chloride 0.9%  No current facility-administered medications on file prior to encounter.     Current Outpatient Medications on File Prior to Encounter   Medication Sig Dispense Refill    naloxone (NARCAN) 4 mg/actuation Spry 4mg by nasal route as needed for  "opioid overdose; may repeat every 2-3 minutes in alternating nostrils until medical help arrives. Call 911 2 each 0       Review of Systems:  Neg    Physical Exam:  BP (!) 161/81   Pulse 98   Temp 98.3 °F (36.8 °C) (Oral)   Resp (!) 22   Ht 5' 9" (1.753 m)   Wt 84.2 kg (185 lb 10 oz)   SpO2 98%   BMI 27.41 kg/m²     Constitutional: nad, aao x 3, depressed affect  Heart: rrr, no m/r/g, wwp, RLE eedema  Lungs: ctab, no w/r/r/c, no lb  Abdomen: s/nt/nd, +BS    Results:  Lab Results   Component Value Date     (L) 04/19/2022    K 5.6 (H) 04/19/2022    CL 93 (L) 04/19/2022    CO2 19 (L) 04/19/2022    BUN 93 (H) 04/19/2022    CREATININE 10.9 (H) 04/19/2022    CALCIUM 6.4 (LL) 04/19/2022    ANIONGAP 16 04/19/2022    ESTGFRAFRICA 7 (A) 04/19/2022    EGFRNONAA 6 (A) 04/19/2022       Lab Results   Component Value Date    CALCIUM 6.4 (LL) 04/19/2022    PHOS 6.9 (H) 04/16/2022       Recent Labs   Lab 04/19/22  0432   WBC 16.86*   RBC 3.77*   HGB 11.6*   HCT 33.2*   *   MCV 88   MCH 30.8   MCHC 34.9       I have personally reviewed pertinent radiological imaging and reports.    I have spent > 35 minutes providing care for this patient for the above diagnoses. These services have included chart/data/imaging review, evaluation, exam, formulation of plan, , note preparation, and discussions with staff involved in this patient's care.    Chantelle Farser MD MPH  [facility] [place]  [address] [place] [id number]  [phone] (p)  [phone] (f)  "

## 2022-04-19 NOTE — PROGRESS NOTES
[facility] Hospital Medicine  Progress Note    Patient Name: Agus Horan  MRN: [id number]  Patient Class: IP- Inpatient   Admission Date: 4/13/2022  Length of Stay: 5 days  Attending Physician: Marcus Johnson MD  Primary Care Provider: Primary Doctor No        Subjective:     Principal Problem:Acute renal failure        HPI:  Agus Horan is a 22 y/o male with PMHx of mild asthma who presents with right lower extremity pain and swelling x few days. Patient was seen in the ED on 4/9/22 for oxycodone overdose and again on 4/10/22 for left eye pain after falling on a piece of furniture. Patient states he is unsure how he injured his leg and denies IV drug use. Xray of his right leg did not show acute fracture. Patietn states RLE pain and swelling increased over the past few days and he is unable to walk secondary to pain. US of his lower extremity performed today did not show DVT. Lab work revealed elevated Creatinine 13.5 & K+ 7.1, elevated AST/ALT 3980/1611 & WBC 18.14.  CPK>40,0000.  Patient unable to dorsiflex and plantar flex his RLE and symptoms are concerning for compartment syndrome per ED. ED provider discussed with Dr. Byers and Dr. Fraser, nephrology. Patient was referred to hospital medicine and seen in the ED with his friend at bedside. Patient complaining of RLE pain, tenderness and swelling. He denies SOB, N/V/D, chest pain and headaches.  Patient will be admitted to hospital medicine for orthopedic consultation and further management.      Overview/Hospital Course:  No notes on file    Interval History: Underwent DEBRIDEMENT, LOWER EXTREMITY (Right)   closure of medial fasciotomy incision right leg debridement of lateral fasciotomy with biopsy of anterior muscle compartment removal of deep muscle anterior compartment right lower leg. Afebrile   Still oliguric. LFTs improved    Review of Systems   Unable to perform ROS: Mental status change (post op)   Objective:     Vital  Signs (Most Recent):  Temp: 98.6 °F (37 °C) (04/19/22 1315)  Pulse: 100 (04/19/22 1315)  Resp: (P) 16 (04/19/22 1359)  BP: (!) 165/69 (04/19/22 1315)  SpO2: 99 % (04/19/22 1315)   Vital Signs (24h Range):  Temp:  [97.5 °F (36.4 °C)-98.8 °F (37.1 °C)] 98.6 °F (37 °C)  Pulse:  [] 100  Resp:  [10-41] (P) 16  SpO2:  [98 %-100 %] 99 %  BP: (145-201)/(67-93) 165/69     Weight: 84.2 kg (185 lb 10 oz)  Body mass index is 27.41 kg/m².    Intake/Output Summary (Last 24 hours) at 4/19/2022 1533  Last data filed at 4/19/2022 1315  Gross per 24 hour   Intake 1644.78 ml   Output 5352 ml   Net -3707.22 ml      Physical Exam  Vitals and nursing note reviewed.   Constitutional:       Appearance: He is normal weight. He is ill-appearing.   HENT:      Head: Normocephalic.      Mouth/Throat:      Mouth: Mucous membranes are moist.      Pharynx: Oropharynx is clear.   Eyes:      Pupils: Pupils are equal, round, and reactive to light.      Comments: Left orbital ecchymosis   Cardiovascular:      Rate and Rhythm: Regular rhythm. Tachycardia present.      Pulses: Normal pulses.      Heart sounds: Normal heart sounds.   Pulmonary:      Effort: Pulmonary effort is normal.      Breath sounds: Normal breath sounds.   Abdominal:      General: Bowel sounds are normal.      Palpations: Abdomen is soft.   Musculoskeletal:         General: Swelling (r Thigh swellin noted) present. Normal range of motion.      Cervical back: Normal range of motion.      Right upper leg: Swelling present.      Right lower leg: Swelling present.      Comments:     good plantar flexion of his toes actively.  he has no pain on passive extension of the toe the patient has very little extension actively of the toe great toes and smaller toes.    Skin:     General: Skin is warm and dry.   Neurological:      Mental Status: He is alert and oriented to person, place, and time. Mental status is at baseline.   Psychiatric:         Mood and Affect: Mood normal.        Significant Labs: All pertinent labs within the past 24 hours have been reviewed.  CBC:   Recent Labs   Lab 04/18/22 0418 04/19/22  0432   WBC 15.06* 16.86*   HGB 12.5* 11.6*   HCT 36.2* 33.2*   * 128*     CMP:   Recent Labs   Lab 04/18/22 0418 04/19/22  0432   * 128*   K 5.5* 5.6*   CL 93* 93*   CO2 20* 19*    99   BUN 98* 93*   CREATININE 11.5* 10.9*   CALCIUM 6.6* 6.4*   PROT 5.0* 5.1*   ALBUMIN 1.8* 1.9*   BILITOT 0.4 0.4   ALKPHOS 49* 55   * 383*   ALT 55* 43   ANIONGAP 14 16   EGFRNONAA 6* 6*       Significant Imaging: I have reviewed all pertinent imaging results/findings within the past 24 hours.      Assessment/Plan:      * Acute renal failure   due to rhabdomyolysis  Trend CPK daily, will need HD till CPK < 5000   nephrology-recommendations appreciated  Avoid nephrotoxins  IV fluids stopped due to increase in swelling at right thigh  Telemetry       Compartment syndrome  Underwent fasciotomy on 4/13   Appreciate ortho recs    Elevated liver enzymes  Elevated AST &ALT likely due to rhabdomyolysis  Trending down  Avoid hepatoxic medications  Monitor CMP  See plan for rhabdomyolysis        Rhabdomyolysis  CPK trending down  S/p fasciotomy on 4/13 for compartment syndrome.  Underwent emergent HD overnight for hyperkalemia and metabolic acidosis refractory to medical management;    IV fluids  Monitor BMP   appreciate nephrology recs      History of asthma  Hx of asthma, stable    Monitor for acute changes        VTE Risk Mitigation (From admission, onward)         Ordered     heparin (porcine) injection 4,000 Units  As needed (PRN)         04/14/22 0137     Reason for No Pharmacological VTE Prophylaxis  Once        Question:  Reasons:  Answer:  Risk of Bleeding    04/13/22 1913     IP VTE HIGH RISK PATIENT  Once         04/13/22 1913     Place sequential compression device  Until discontinued         04/13/22 1913                Discharge Planning   JARAD:      Code Status: Full  Code   Is the patient medically ready for discharge?:     Reason for patient still in hospital (select all that apply): Patient trending condition and Treatment  Discharge Plan A: Home with family            Critical care time spent on the evaluation and treatment of severe organ dysfunction, review of pertinent labs and imaging studies, discussions with consulting providers and discussions with patient/family: 60 minutes.      Marcus Johnson MD  Department of Hospital Medicine   [facility]

## 2022-04-19 NOTE — SUBJECTIVE & OBJECTIVE
Interval History: Underwent DEBRIDEMENT, LOWER EXTREMITY (Right)   closure of medial fasciotomy incision right leg debridement of lateral fasciotomy with biopsy of anterior muscle compartment removal of deep muscle anterior compartment right lower leg. Afebrile   Still oliguric. LFTs improved    Review of Systems   Unable to perform ROS: Mental status change (post op)   Objective:     Vital Signs (Most Recent):  Temp: 98.6 °F (37 °C) (04/19/22 1315)  Pulse: 100 (04/19/22 1315)  Resp: (P) 16 (04/19/22 1359)  BP: (!) 165/69 (04/19/22 1315)  SpO2: 99 % (04/19/22 1315)   Vital Signs (24h Range):  Temp:  [97.5 °F (36.4 °C)-98.8 °F (37.1 °C)] 98.6 °F (37 °C)  Pulse:  [] 100  Resp:  [10-41] (P) 16  SpO2:  [98 %-100 %] 99 %  BP: (145-201)/(67-93) 165/69     Weight: 84.2 kg (185 lb 10 oz)  Body mass index is 27.41 kg/m².    Intake/Output Summary (Last 24 hours) at 4/19/2022 1533  Last data filed at 4/19/2022 1315  Gross per 24 hour   Intake 1644.78 ml   Output 5352 ml   Net -3707.22 ml      Physical Exam  Vitals and nursing note reviewed.   Constitutional:       Appearance: He is normal weight. He is ill-appearing.   HENT:      Head: Normocephalic.      Mouth/Throat:      Mouth: Mucous membranes are moist.      Pharynx: Oropharynx is clear.   Eyes:      Pupils: Pupils are equal, round, and reactive to light.      Comments: Left orbital ecchymosis   Cardiovascular:      Rate and Rhythm: Regular rhythm. Tachycardia present.      Pulses: Normal pulses.      Heart sounds: Normal heart sounds.   Pulmonary:      Effort: Pulmonary effort is normal.      Breath sounds: Normal breath sounds.   Abdominal:      General: Bowel sounds are normal.      Palpations: Abdomen is soft.   Musculoskeletal:         General: Swelling (r Thigh swellin noted) present. Normal range of motion.      Cervical back: Normal range of motion.      Right upper leg: Swelling present.      Right lower leg: Swelling present.      Comments:     good  plantar flexion of his toes actively.  he has no pain on passive extension of the toe the patient has very little extension actively of the toe great toes and smaller toes.    Skin:     General: Skin is warm and dry.   Neurological:      Mental Status: He is alert and oriented to person, place, and time. Mental status is at baseline.   Psychiatric:         Mood and Affect: Mood normal.       Significant Labs: All pertinent labs within the past 24 hours have been reviewed.  CBC:   Recent Labs   Lab 04/18/22 0418 04/19/22 0432   WBC 15.06* 16.86*   HGB 12.5* 11.6*   HCT 36.2* 33.2*   * 128*     CMP:   Recent Labs   Lab 04/18/22 0418 04/19/22 0432   * 128*   K 5.5* 5.6*   CL 93* 93*   CO2 20* 19*    99   BUN 98* 93*   CREATININE 11.5* 10.9*   CALCIUM 6.6* 6.4*   PROT 5.0* 5.1*   ALBUMIN 1.8* 1.9*   BILITOT 0.4 0.4   ALKPHOS 49* 55   * 383*   ALT 55* 43   ANIONGAP 14 16   EGFRNONAA 6* 6*       Significant Imaging: I have reviewed all pertinent imaging results/findings within the past 24 hours.

## 2022-04-19 NOTE — CARE UPDATE
04/18/22 1913   Patient Assessment/Suction   Level of Consciousness (AVPU) alert   Respiratory Effort Unlabored;Normal   Expansion/Accessory Muscles/Retractions no use of accessory muscles;expansion symmetric;no retractions   Rhythm/Pattern, Respiratory unlabored;pattern regular;depth regular   PRE-TX-O2   O2 Device (Oxygen Therapy) room air   Pulse Oximetry Type Continuous   $ Pulse Oximetry - Multiple Charge Pulse Oximetry - Multiple   Incentive Spirometer   $ Incentive Spirometer Charges unable to perform  (currently getting dialysis)

## 2022-04-19 NOTE — PLAN OF CARE
POC reviewed. VSS, afebrile. Constant pain moderately controlled with prn meds. RLE dressing CDI. Scant serosanguinous drainage noted on pad. Per Dr. Byers do not change dressing; leave on for 2-3 more days then can be changed. Plans to take pt back to OR later this week or early next week for another clean out. HD completed (3L). 1g calcium gluconate given per nephrology orders. Brown to gravity - remains oliguric. Difficulty getting comfortable - complaining of back pain/cramps. Repositioning/heat packs utilized. Remains edematous to RLE/hip/scrotum. Tolerating diet. Loose bm x2; imodium given this am. Comfort promoted, safety maintained.     Problem: Adult Inpatient Plan of Care  Goal: Plan of Care Review  Outcome: Ongoing, Progressing

## 2022-04-19 NOTE — ASSESSMENT & PLAN NOTE
CPK trending down  S/p fasciotomy on 4/13 for compartment syndrome.  On 4/18 S/p DEBRIDEMENT, LOWER EXTREMITY (Right)   closure of medial fasciotomy incision right leg debridement of lateral fasciotomy with biopsy of anterior muscle compartment removal of deep muscle anterior compartment right lower leg  Scheduled for HD hyperkalemia and metabolic acidosis refractory to medical management;    IV fluids  Monitor BMP   appreciate nephrology recs

## 2022-04-19 NOTE — PROGRESS NOTES
[facility] Hospital Medicine  Progress Note    Patient Name: Agus Horan  MRN: [id number]  Patient Class: IP- Inpatient   Admission Date: 4/13/2022  Length of Stay: 6 days  Attending Physician: Marcus Johnson MD  Primary Care Provider: Primary Doctor No        Subjective:     Principal Problem:Acute renal failure        HPI:  Agus Horan is a 20 y/o male with PMHx of mild asthma who presents with right lower extremity pain and swelling x few days. Patient was seen in the ED on 4/9/22 for oxycodone overdose and again on 4/10/22 for left eye pain after falling on a piece of furniture. Patient states he is unsure how he injured his leg and denies IV drug use. Xray of his right leg did not show acute fracture. Patietn states RLE pain and swelling increased over the past few days and he is unable to walk secondary to pain. US of his lower extremity performed today did not show DVT. Lab work revealed elevated Creatinine 13.5 & K+ 7.1, elevated AST/ALT 3980/1611 & WBC 18.14.  CPK>40,0000.  Patient unable to dorsiflex and plantar flex his RLE and symptoms are concerning for compartment syndrome per ED. ED provider discussed with Dr. Byers and Dr. Fraser, nephrology. Patient was referred to hospital medicine and seen in the ED with his friend at bedside. Patient complaining of RLE pain, tenderness and swelling. He denies SOB, N/V/D, chest pain and headaches.  Patient will be admitted to hospital medicine for orthopedic consultation and further management.      Overview/Hospital Course:  No notes on file    Interval History: Post DEBRIDEMENT, LOWER EXTREMITY (Right)   closure of medial fasciotomy incision right leg debridement of lateral fasciotomy with biopsy of anterior muscle compartment removal of deep muscle anterior compartment right lower leg done 4/18. LFT trending down. CPK trending down    Review of Systems   Reason unable to perform ROS: post op.   Constitutional:  Negative for  activity change, appetite change, chills and fever.   HENT:  Negative for congestion, sore throat and trouble swallowing.    Eyes:  Negative for photophobia and visual disturbance.   Respiratory:  Negative for cough, chest tightness and shortness of breath.    Cardiovascular:  Negative for chest pain, palpitations and leg swelling.   Gastrointestinal:  Negative for abdominal pain, diarrhea and nausea.   Genitourinary:  Negative for dysuria, flank pain and hematuria.   Musculoskeletal:  Positive for gait problem and myalgias. Negative for back pain.   Skin:  Positive for color change.   Neurological:  Negative for dizziness, weakness and headaches.   Psychiatric/Behavioral:  Negative for confusion.    Objective:     Vital Signs (Most Recent):  Temp: 98.6 °F (37 °C) (04/19/22 1315)  Pulse: 100 (04/19/22 1315)  Resp: (P) 16 (04/19/22 1359)  BP: (!) 165/69 (04/19/22 1315)  SpO2: 99 % (04/19/22 1315)   Vital Signs (24h Range):  Temp:  [97.5 °F (36.4 °C)-98.8 °F (37.1 °C)] 98.6 °F (37 °C)  Pulse:  [] 100  Resp:  [10-41] (P) 16  SpO2:  [98 %-100 %] 99 %  BP: (145-201)/(67-93) 165/69     Weight: 84.2 kg (185 lb 10 oz)  Body mass index is 27.41 kg/m².    Intake/Output Summary (Last 24 hours) at 4/19/2022 1547  Last data filed at 4/19/2022 1315  Gross per 24 hour   Intake 1644.78 ml   Output 5352 ml   Net -3707.22 ml      Physical Exam  Vitals and nursing note reviewed.   Constitutional:       Appearance: He is normal weight. He is ill-appearing.   HENT:      Head: Normocephalic.      Mouth/Throat:      Mouth: Mucous membranes are moist.      Pharynx: Oropharynx is clear.   Eyes:      Pupils: Pupils are equal, round, and reactive to light.      Comments: Left orbital ecchymosis   Cardiovascular:      Rate and Rhythm: Regular rhythm. Tachycardia present.      Pulses: Normal pulses.      Heart sounds: Normal heart sounds.   Pulmonary:      Effort: Pulmonary effort is normal.      Breath sounds: Normal breath sounds.    Abdominal:      General: Bowel sounds are normal.      Palpations: Abdomen is soft.   Musculoskeletal:         General: Swelling (r Thigh swellin noted) present. Normal range of motion.      Cervical back: Normal range of motion.      Right upper leg: Swelling present.      Right lower leg: Swelling present.      Comments:     good plantar flexion of his toes actively.  he has no pain on passive extension of the toe the patient has very little extension actively of the toe great toes and smaller toes.    Skin:     General: Skin is warm and dry.   Neurological:      Mental Status: He is alert and oriented to person, place, and time. Mental status is at baseline.   Psychiatric:         Mood and Affect: Mood normal.       Significant Labs: All pertinent labs within the past 24 hours have been reviewed.  CBC:   Recent Labs   Lab 04/18/22 0418 04/19/22  0432   WBC 15.06* 16.86*   HGB 12.5* 11.6*   HCT 36.2* 33.2*   * 128*     CMP:   Recent Labs   Lab 04/18/22 0418 04/19/22 0432   * 128*   K 5.5* 5.6*   CL 93* 93*   CO2 20* 19*    99   BUN 98* 93*   CREATININE 11.5* 10.9*   CALCIUM 6.6* 6.4*   PROT 5.0* 5.1*   ALBUMIN 1.8* 1.9*   BILITOT 0.4 0.4   ALKPHOS 49* 55   * 383*   ALT 55* 43   ANIONGAP 14 16   EGFRNONAA 6* 6*       Significant Imaging: I have reviewed all pertinent imaging results/findings within the past 24 hours.      Assessment/Plan:      * Acute renal failure  Continues to be ollguric   due to rhabdomyolysis  Trend CPK daily, will need HD till CPK < 5000   nephrology-recommendations appreciated  Avoid nephrotoxins  IV fluids stopped due to increase in swelling at right thigh  Telemetry       Compartment syndrome  Underwent fasciotomy on 4/13   Appreciate ortho recs    Elevated liver enzymes  Elevated AST &ALT likely due to rhabdomyolysis  Trending down  Avoid hepatoxic medications  Monitor CMP  See plan for rhabdomyolysis        Rhabdomyolysis  CPK trending down  S/p fasciotomy  on 4/13 for compartment syndrome.  On 4/18 S/p DEBRIDEMENT, LOWER EXTREMITY (Right)   closure of medial fasciotomy incision right leg debridement of lateral fasciotomy with biopsy of anterior muscle compartment removal of deep muscle anterior compartment right lower leg  Scheduled for HD hyperkalemia and metabolic acidosis refractory to medical management;    IV fluids  Monitor BMP   appreciate nephrology recs      History of asthma  Hx of asthma, stable    Monitor for acute changes        VTE Risk Mitigation (From admission, onward)         Ordered     heparin (porcine) injection 4,000 Units  As needed (PRN)         04/14/22 0137     Reason for No Pharmacological VTE Prophylaxis  Once        Question:  Reasons:  Answer:  Risk of Bleeding    04/13/22 1913     IP VTE HIGH RISK PATIENT  Once         04/13/22 1913     Place sequential compression device  Until discontinued         04/13/22 1913                Discharge Planning   JARAD:      Code Status: Full Code   Is the patient medically ready for discharge?:     Reason for patient still in hospital (select all that apply): Patient trending condition and Treatment  Discharge Plan A: Home with family            Critical care time spent on the evaluation and treatment of severe organ dysfunction, review of pertinent labs and imaging studies, discussions with consulting providers and discussions with patient/family: 60 minutes.      Marcus Johnson MD  Department of Hospital Medicine   [facility]

## 2022-04-19 NOTE — PROGRESS NOTES
1425ml net uf removed, system clotted, Dr Fraser notified, stated we could stop and reassess in am   04/18/22 2115   Vital Signs   Temp 97.5 °F (36.4 °C)   Pulse 85   Resp 18   SpO2 100 %   BP (!) 173/84   MAP (mmHg) 120   Post-Hemodialysis Assessment   Rinseback Volume (mL) 125 mL   Blood Volume Processed (Liters) 30.1 L   Dialyzer Clearance Heavily streaked   Duration of Treatment (minutes) 120 minutes   Additional Fluid Intake (mL) 375 mL   Total UF (mL) 1725 mL   Net Fluid Removal 1425   Patient Response to Treatment tolerated   Post-Treatment Weight 79 kg (174 lb 2.6 oz)   Treatment Weight Change -5.7   Post-Hemodialysis Comments system clotted, tx stopped

## 2022-04-19 NOTE — PROGRESS NOTES
Tolerated HD well  Net UF 3L       04/19/22 1315   Handoff Report   Received From [facility]   Given To [facility]   Vital Signs   Temp 98.6 °F (37 °C)   Temp src Oral   Pulse 100   Resp 16   SpO2 99 %   BP (!) 165/69   MAP (mmHg) 106   Assessments (Pre/Post)   Blood Liters Processed (BLP) 67   Transport Modality not applicable   Level of Consciousness (AVPU) alert   Dialyzer Clearance moderately streaked   Trialysis (Dialysis) Catheter 04/14/22 right internal jugular   Placement Date: 04/14/22   Present Prior to Hospital Arrival?: No  Location: right internal jugular   Dressing Status Clean;Dry;Intact   Venous Patency/Care flushed w/o difficulty;heparin locked   Arterial Patency/Care flushed w/o difficulty;heparin locked   Post-Hemodialysis Assessment   Rinseback Volume (mL) 250 mL   Blood Volume Processed (Liters) 67 L   Dialyzer Clearance Moderately streaked   Duration of Treatment (minutes) 180 minutes   Additional Fluid Intake (mL) 600 mL   Total UF (mL) 3600 mL   Net Fluid Removal 3000   Patient Response to Treatment tolerated well   Post-Treatment Weight 81.2 kg (179 lb 0.2 oz)   Treatment Weight Change -3   Post-Hemodialysis Comments Tx complete, pt reinfused per protocol

## 2022-04-19 NOTE — ASSESSMENT & PLAN NOTE
Continues to be ollguric   due to rhabdomyolysis  Trend CPK daily, will need HD till CPK < 5000   nephrology-recommendations appreciated  Avoid nephrotoxins  IV fluids stopped due to increase in swelling at right thigh  Telemetry

## 2022-04-19 NOTE — SUBJECTIVE & OBJECTIVE
Interval History: Post DEBRIDEMENT, LOWER EXTREMITY (Right)   closure of medial fasciotomy incision right leg debridement of lateral fasciotomy with biopsy of anterior muscle compartment removal of deep muscle anterior compartment right lower leg done 4/18. LFT trending down. CPK trending down    Review of Systems   Reason unable to perform ROS: post op.   Constitutional:  Negative for activity change, appetite change, chills and fever.   HENT:  Negative for congestion, sore throat and trouble swallowing.    Eyes:  Negative for photophobia and visual disturbance.   Respiratory:  Negative for cough, chest tightness and shortness of breath.    Cardiovascular:  Negative for chest pain, palpitations and leg swelling.   Gastrointestinal:  Negative for abdominal pain, diarrhea and nausea.   Genitourinary:  Negative for dysuria, flank pain and hematuria.   Musculoskeletal:  Positive for gait problem and myalgias. Negative for back pain.   Skin:  Positive for color change.   Neurological:  Negative for dizziness, weakness and headaches.   Psychiatric/Behavioral:  Negative for confusion.    Objective:     Vital Signs (Most Recent):  Temp: 98.6 °F (37 °C) (04/19/22 1315)  Pulse: 100 (04/19/22 1315)  Resp: (P) 16 (04/19/22 1359)  BP: (!) 165/69 (04/19/22 1315)  SpO2: 99 % (04/19/22 1315)   Vital Signs (24h Range):  Temp:  [97.5 °F (36.4 °C)-98.8 °F (37.1 °C)] 98.6 °F (37 °C)  Pulse:  [] 100  Resp:  [10-41] (P) 16  SpO2:  [98 %-100 %] 99 %  BP: (145-201)/(67-93) 165/69     Weight: 84.2 kg (185 lb 10 oz)  Body mass index is 27.41 kg/m².    Intake/Output Summary (Last 24 hours) at 4/19/2022 1547  Last data filed at 4/19/2022 1315  Gross per 24 hour   Intake 1644.78 ml   Output 5352 ml   Net -3707.22 ml      Physical Exam  Vitals and nursing note reviewed.   Constitutional:       Appearance: He is normal weight. He is ill-appearing.   HENT:      Head: Normocephalic.      Mouth/Throat:      Mouth: Mucous membranes are moist.       Pharynx: Oropharynx is clear.   Eyes:      Pupils: Pupils are equal, round, and reactive to light.      Comments: Left orbital ecchymosis   Cardiovascular:      Rate and Rhythm: Regular rhythm. Tachycardia present.      Pulses: Normal pulses.      Heart sounds: Normal heart sounds.   Pulmonary:      Effort: Pulmonary effort is normal.      Breath sounds: Normal breath sounds.   Abdominal:      General: Bowel sounds are normal.      Palpations: Abdomen is soft.   Musculoskeletal:         General: Swelling (r Thigh swellin noted) present. Normal range of motion.      Cervical back: Normal range of motion.      Right upper leg: Swelling present.      Right lower leg: Swelling present.      Comments:     good plantar flexion of his toes actively.  he has no pain on passive extension of the toe the patient has very little extension actively of the toe great toes and smaller toes.    Skin:     General: Skin is warm and dry.   Neurological:      Mental Status: He is alert and oriented to person, place, and time. Mental status is at baseline.   Psychiatric:         Mood and Affect: Mood normal.       Significant Labs: All pertinent labs within the past 24 hours have been reviewed.  CBC:   Recent Labs   Lab 04/18/22 0418 04/19/22 0432   WBC 15.06* 16.86*   HGB 12.5* 11.6*   HCT 36.2* 33.2*   * 128*     CMP:   Recent Labs   Lab 04/18/22 0418 04/19/22 0432   * 128*   K 5.5* 5.6*   CL 93* 93*   CO2 20* 19*    99   BUN 98* 93*   CREATININE 11.5* 10.9*   CALCIUM 6.6* 6.4*   PROT 5.0* 5.1*   ALBUMIN 1.8* 1.9*   BILITOT 0.4 0.4   ALKPHOS 49* 55   * 383*   ALT 55* 43   ANIONGAP 14 16   EGFRNONAA 6* 6*       Significant Imaging: I have reviewed all pertinent imaging results/findings within the past 24 hours.

## 2022-04-20 LAB
ALBUMIN SERPL BCP-MCNC: 2 G/DL (ref 3.5–5.2)
ALP SERPL-CCNC: 54 U/L (ref 55–135)
ALT SERPL W/O P-5'-P-CCNC: 27 U/L (ref 10–44)
ANION GAP SERPL CALC-SCNC: 15 MMOL/L (ref 8–16)
AST SERPL-CCNC: 261 U/L (ref 10–40)
BASOPHILS # BLD AUTO: 0.05 K/UL (ref 0–0.2)
BASOPHILS NFR BLD: 0.4 % (ref 0–1.9)
BILIRUB SERPL-MCNC: 0.3 MG/DL (ref 0.1–1)
BUN SERPL-MCNC: 76 MG/DL (ref 6–20)
CALCIUM SERPL-MCNC: 6.3 MG/DL (ref 8.7–10.5)
CHLORIDE SERPL-SCNC: 96 MMOL/L (ref 95–110)
CK SERPL-CCNC: 8494 U/L (ref 20–200)
CO2 SERPL-SCNC: 22 MMOL/L (ref 23–29)
CREAT SERPL-MCNC: 9.6 MG/DL (ref 0.5–1.4)
DIFFERENTIAL METHOD: ABNORMAL
EOSINOPHIL # BLD AUTO: 0.4 K/UL (ref 0–0.5)
EOSINOPHIL NFR BLD: 2.5 % (ref 0–8)
ERYTHROCYTE [DISTWIDTH] IN BLOOD BY AUTOMATED COUNT: 12.2 % (ref 11.5–14.5)
EST. GFR  (AFRICAN AMERICAN): 8 ML/MIN/1.73 M^2
EST. GFR  (NON AFRICAN AMERICAN): 7 ML/MIN/1.73 M^2
GLUCOSE SERPL-MCNC: 104 MG/DL (ref 70–110)
HCT VFR BLD AUTO: 29.5 % (ref 40–54)
HGB BLD-MCNC: 10.1 G/DL (ref 14–18)
IMM GRANULOCYTES # BLD AUTO: 0.37 K/UL (ref 0–0.04)
IMM GRANULOCYTES NFR BLD AUTO: 2.6 % (ref 0–0.5)
LYMPHOCYTES # BLD AUTO: 0.9 K/UL (ref 1–4.8)
LYMPHOCYTES NFR BLD: 6.1 % (ref 18–48)
MCH RBC QN AUTO: 30.5 PG (ref 27–31)
MCHC RBC AUTO-ENTMCNC: 34.2 G/DL (ref 32–36)
MCV RBC AUTO: 89 FL (ref 82–98)
MONOCYTES # BLD AUTO: 1.6 K/UL (ref 0.3–1)
MONOCYTES NFR BLD: 11.7 % (ref 4–15)
NEUTROPHILS # BLD AUTO: 10.8 K/UL (ref 1.8–7.7)
NEUTROPHILS NFR BLD: 76.7 % (ref 38–73)
NRBC BLD-RTO: 0 /100 WBC
PLATELET # BLD AUTO: 152 K/UL (ref 150–450)
PMV BLD AUTO: 9.7 FL (ref 9.2–12.9)
POTASSIUM SERPL-SCNC: 4.9 MMOL/L (ref 3.5–5.1)
PROT SERPL-MCNC: 5.3 G/DL (ref 6–8.4)
RBC # BLD AUTO: 3.31 M/UL (ref 4.6–6.2)
SODIUM SERPL-SCNC: 133 MMOL/L (ref 136–145)
WBC # BLD AUTO: 14.07 K/UL (ref 3.9–12.7)

## 2022-04-20 PROCEDURE — 25000003 PHARM REV CODE 250: Performed by: ORTHOPAEDIC SURGERY

## 2022-04-20 PROCEDURE — 94799 UNLISTED PULMONARY SVC/PX: CPT

## 2022-04-20 PROCEDURE — 82550 ASSAY OF CK (CPK): CPT | Performed by: INTERNAL MEDICINE

## 2022-04-20 PROCEDURE — 80100014 HC HEMODIALYSIS 1:1

## 2022-04-20 PROCEDURE — A4216 STERILE WATER/SALINE, 10 ML: HCPCS | Performed by: ORTHOPAEDIC SURGERY

## 2022-04-20 PROCEDURE — 25000003 PHARM REV CODE 250: Performed by: INTERNAL MEDICINE

## 2022-04-20 PROCEDURE — 85025 COMPLETE CBC W/AUTO DIFF WBC: CPT | Performed by: INTERNAL MEDICINE

## 2022-04-20 PROCEDURE — 11000001 HC ACUTE MED/SURG PRIVATE ROOM

## 2022-04-20 PROCEDURE — 94761 N-INVAS EAR/PLS OXIMETRY MLT: CPT

## 2022-04-20 PROCEDURE — 63600175 PHARM REV CODE 636 W HCPCS: Performed by: INTERNAL MEDICINE

## 2022-04-20 PROCEDURE — 63600175 PHARM REV CODE 636 W HCPCS: Performed by: ORTHOPAEDIC SURGERY

## 2022-04-20 PROCEDURE — 80053 COMPREHEN METABOLIC PANEL: CPT | Performed by: INTERNAL MEDICINE

## 2022-04-20 RX ORDER — HYDRALAZINE HYDROCHLORIDE 25 MG/1
25 TABLET, FILM COATED ORAL EVERY 8 HOURS
Status: DISCONTINUED | OUTPATIENT
Start: 2022-04-20 | End: 2022-05-05

## 2022-04-20 RX ORDER — LABETALOL HYDROCHLORIDE 5 MG/ML
20 INJECTION, SOLUTION INTRAVENOUS EVERY 4 HOURS PRN
Status: DISCONTINUED | OUTPATIENT
Start: 2022-04-20 | End: 2022-05-11 | Stop reason: HOSPADM

## 2022-04-20 RX ORDER — CYCLOBENZAPRINE HCL 5 MG
10 TABLET ORAL 3 TIMES DAILY PRN
Status: DISCONTINUED | OUTPATIENT
Start: 2022-04-20 | End: 2022-05-11 | Stop reason: HOSPADM

## 2022-04-20 RX ORDER — SODIUM CHLORIDE 0.9 % (FLUSH) 0.9 %
2 SYRINGE (ML) INJECTION EVERY 6 HOURS
Status: DISCONTINUED | OUTPATIENT
Start: 2022-04-20 | End: 2022-05-11 | Stop reason: HOSPADM

## 2022-04-20 RX ADMIN — Medication 2 ML: at 11:04

## 2022-04-20 RX ADMIN — CYCLOBENZAPRINE 10 MG: 10 TABLET, FILM COATED ORAL at 03:04

## 2022-04-20 RX ADMIN — Medication 2 ML: at 06:04

## 2022-04-20 RX ADMIN — MORPHINE SULFATE 2 MG: 2 INJECTION, SOLUTION INTRAMUSCULAR; INTRAVENOUS at 08:04

## 2022-04-20 RX ADMIN — MORPHINE SULFATE 2 MG: 2 INJECTION, SOLUTION INTRAMUSCULAR; INTRAVENOUS at 02:04

## 2022-04-20 RX ADMIN — HEPARIN SODIUM 4000 UNITS: 1000 INJECTION, SOLUTION INTRAVENOUS; SUBCUTANEOUS at 11:04

## 2022-04-20 RX ADMIN — Medication 2 ML: at 12:04

## 2022-04-20 RX ADMIN — LOPERAMIDE HYDROCHLORIDE 2 MG: 2 CAPSULE ORAL at 02:04

## 2022-04-20 RX ADMIN — LOPERAMIDE HYDROCHLORIDE 2 MG: 2 CAPSULE ORAL at 07:04

## 2022-04-20 RX ADMIN — HYDRALAZINE HYDROCHLORIDE 25 MG: 25 TABLET, FILM COATED ORAL at 02:04

## 2022-04-20 RX ADMIN — ONDANSETRON 4 MG: 2 INJECTION INTRAMUSCULAR; INTRAVENOUS at 03:04

## 2022-04-20 RX ADMIN — CLINDAMYCIN IN 5 PERCENT DEXTROSE 600 MG: 12 INJECTION, SOLUTION INTRAVENOUS at 04:04

## 2022-04-20 RX ADMIN — LOPERAMIDE HYDROCHLORIDE 2 MG: 2 CAPSULE ORAL at 12:04

## 2022-04-20 RX ADMIN — HYDROCODONE BITARTRATE AND ACETAMINOPHEN 1 TABLET: 5; 325 TABLET ORAL at 04:04

## 2022-04-20 RX ADMIN — HYDROMORPHONE HYDROCHLORIDE 1 MG: 1 INJECTION, SOLUTION INTRAMUSCULAR; INTRAVENOUS; SUBCUTANEOUS at 07:04

## 2022-04-20 RX ADMIN — DOCUSATE SODIUM AND SENNOSIDES 1 TABLET: 8.6; 5 TABLET, FILM COATED ORAL at 08:04

## 2022-04-20 RX ADMIN — CLINDAMYCIN IN 5 PERCENT DEXTROSE 600 MG: 12 INJECTION, SOLUTION INTRAVENOUS at 11:04

## 2022-04-20 RX ADMIN — CLINDAMYCIN IN 5 PERCENT DEXTROSE 600 MG: 12 INJECTION, SOLUTION INTRAVENOUS at 07:04

## 2022-04-20 RX ADMIN — HYDROMORPHONE HYDROCHLORIDE 1 MG: 1 INJECTION, SOLUTION INTRAMUSCULAR; INTRAVENOUS; SUBCUTANEOUS at 02:04

## 2022-04-20 RX ADMIN — HYDROCODONE BITARTRATE AND ACETAMINOPHEN 1 TABLET: 5; 325 TABLET ORAL at 12:04

## 2022-04-20 RX ADMIN — HYDRALAZINE HYDROCHLORIDE 25 MG: 25 TABLET, FILM COATED ORAL at 09:04

## 2022-04-20 RX ADMIN — CLINDAMYCIN IN 5 PERCENT DEXTROSE 600 MG: 12 INJECTION, SOLUTION INTRAVENOUS at 12:04

## 2022-04-20 NOTE — PLAN OF CARE
POC reviewed. VSS, afebrile. In better spirits today. Dr. Byers assessing daily; likely taking pt early next week for second wash out/closure attempt. Pain to RLE moderately controlled with PRN meds. Pt asked that PO hydrocodone be adjusted; MD notified. Hydralazine started for htn. Dr. Ríos ordered ionized calcium for am - no replacement needed today. Continues with creamy, frequent stools. Imodium prn. Tolerating diet. Dressing to RLE CDI. Left arm redness improving. Edema ethan but is improving. Visitors throughout day. Comfort promoted, safety maintained.     Problem: Adult Inpatient Plan of Care  Goal: Plan of Care Review  Outcome: Ongoing, Progressing

## 2022-04-20 NOTE — PROGRESS NOTES
Patient is presently afebrile he is resting comfortably in bed he has decreased swelling in his right thigh he is able to plantar flex his toes he still cannot dorsiflex his toes he has good capillary filling in the right foot.    The patient has swelling in his leg appears to be subsiding.  We sent a muscle biopsy recently during his last debridement we are waiting results.  A lot of the muscle in the anterior compartment looked rather ischemic during the debridement.  The patient also has a lot of swelling in the anterior and lateral compartment the muscles in the lateral compartment and posterior compartment appeared to be functioning I am concerned though about the anterior compartment.  We will continue to elevate his right leg he will continue his dialysis and plans are to bring him back to surgery on Monday for debridement and possible closure of his lateral fasciotomy wound.

## 2022-04-20 NOTE — PLAN OF CARE
Patient is alert & oriented, vital signs stable except elevated blood pressure, on room air, denies distress, received prn pain medication x2 for complain of right leg pain, no bowel movements overnight, grier in place, trialysis catheter & PIV also in place, on fluid restrictions, daily dialysis, needs assessed, safety sweep done, will continue to monitor.

## 2022-04-20 NOTE — PROGRESS NOTES
3000 ml net uf removed   04/20/22 1215   Vital Signs   Temp 98.6 °F (37 °C)   Pulse 79   Resp 12   SpO2 100 %   BP (!) 203/86   Pre-Hemodialysis Assessment   Treatment Status Completed   Trialysis (Dialysis) Catheter 04/14/22 right internal jugular   Placement Date: 04/14/22   Present Prior to Hospital Arrival?: No  Location: right internal jugular   Venous Patency/Care heparin locked   Arterial Patency/Care heparin locked   Post-Hemodialysis Assessment   Rinseback Volume (mL) 250 mL   Blood Volume Processed (Liters) 0 L   Dialyzer Clearance Moderately streaked   Duration of Treatment (minutes) 180 minutes   Additional Fluid Intake (mL) 500 mL   Total UF (mL) 3500 mL   Net Fluid Removal 3000   Patient Response to Treatment tolerated well   Post-Treatment Weight 78.3 kg (172 lb 9.9 oz)   Treatment Weight Change -3   Post-Hemodialysis Comments tx completed

## 2022-04-20 NOTE — ASSESSMENT & PLAN NOTE
Continues to be anuric  Had a cycle of hemodialysis this morning   due to rhabdomyolysis  Trend CPK daily, will need HD till CPK < 5000   nephrology-recommendations appreciated  Avoid nephrotoxins  IV fluids stopped due to increase in swelling at right thigh  Telemetry

## 2022-04-20 NOTE — SUBJECTIVE & OBJECTIVE
Interval History: LFT trending down. CPK trending down.  No acute events overnight.  Urine output in the last 24 hours 48 cc.  Patient had hemodialysis this morning net fluid removal 3 L    Review of Systems   Constitutional:  Negative for activity change, appetite change, chills and fever.   HENT:  Negative for congestion, sore throat and trouble swallowing.    Eyes:  Negative for photophobia and visual disturbance.   Respiratory:  Negative for cough, chest tightness and shortness of breath.    Cardiovascular:  Negative for chest pain, palpitations and leg swelling.   Gastrointestinal:  Negative for abdominal pain, diarrhea and nausea.   Genitourinary:  Negative for dysuria, flank pain and hematuria.   Musculoskeletal:  Positive for gait problem and myalgias. Negative for back pain.   Skin:  Positive for color change.   Neurological:  Negative for dizziness, weakness and headaches.   Psychiatric/Behavioral:  Negative for confusion.    Objective:     Vital Signs (Most Recent):  Temp: 98.6 °F (37 °C) (04/20/22 1215)  Pulse: 79 (04/20/22 1215)  Resp: 18 (04/20/22 1217)  BP: (!) 203/86 (04/20/22 1215)  SpO2: 100 % (04/20/22 1215)   Vital Signs (24h Range):  Temp:  [98.6 °F (37 °C)-99.2 °F (37.3 °C)] 98.6 °F (37 °C)  Pulse:  [] 79  Resp:  [11-34] 18  SpO2:  [98 %-100 %] 100 %  BP: (152-237)/() 203/86     Weight: 81.3 kg (179 lb 3.7 oz)  Body mass index is 26.47 kg/m².    Intake/Output Summary (Last 24 hours) at 4/20/2022 1352  Last data filed at 4/20/2022 1215  Gross per 24 hour   Intake 1511.57 ml   Output 3560 ml   Net -2048.43 ml      Physical Exam  Vitals and nursing note reviewed.   Constitutional:       Appearance: He is normal weight. He is ill-appearing.   HENT:      Head: Normocephalic.      Mouth/Throat:      Mouth: Mucous membranes are moist.      Pharynx: Oropharynx is clear.   Eyes:      Pupils: Pupils are equal, round, and reactive to light.      Comments: Left orbital ecchymosis    Cardiovascular:      Rate and Rhythm: Regular rhythm. Tachycardia present.      Pulses: Normal pulses.      Heart sounds: Normal heart sounds.   Pulmonary:      Effort: Pulmonary effort is normal.      Breath sounds: Normal breath sounds.   Abdominal:      General: Bowel sounds are normal.      Palpations: Abdomen is soft.   Musculoskeletal:         General: Swelling (r Thigh swellin noted) present. Normal range of motion.      Cervical back: Normal range of motion.      Right upper leg: Swelling present.      Right lower leg: Swelling present.      Comments:     good plantar flexion of his toes actively.  he has no pain on passive extension of the toe the patient has very little extension actively of the toe great toes and smaller toes.    Skin:     General: Skin is warm and dry.   Neurological:      Mental Status: He is alert and oriented to person, place, and time. Mental status is at baseline.   Psychiatric:         Mood and Affect: Mood normal.       Significant Labs: All pertinent labs within the past 24 hours have been reviewed.  CBC:   Recent Labs   Lab 04/19/22  0432 04/20/22  0834   WBC 16.86* 14.07*   HGB 11.6* 10.1*   HCT 33.2* 29.5*   * 152     CMP:   Recent Labs   Lab 04/19/22  0432 04/20/22  0834   * 133*   K 5.6* 4.9   CL 93* 96   CO2 19* 22*   GLU 99 104   BUN 93* 76*   CREATININE 10.9* 9.6*   CALCIUM 6.4* 6.3*   PROT 5.1* 5.3*   ALBUMIN 1.9* 2.0*   BILITOT 0.4 0.3   ALKPHOS 55 54*   * 261*   ALT 43 27   ANIONGAP 16 15   EGFRNONAA 6* 7*       Significant Imaging: I have reviewed all pertinent imaging results/findings within the past 24 hours.

## 2022-04-20 NOTE — PHYSICIAN QUERY
"PT Name: Agus Horan  MR #: [id number]    DOCUMENTATION CLARIFICATION     CDS/: Janina Alanis RN              Contact information: [email or web address]  This form is a permanent document in the medical record.    Query Date: April 20, 2022  By submitting this query, we are merely seeking further clarification of documentation. Please utilize your independent clinical judgment when addressing the question(s) below.    The Medical Record contains the following:   Indicator Supporting Clinical Findings Location in Medical Record   x Documentation of "Debridement" Procedure: Procedure:  DEBRIDEMENT, LOWER EXTREMITY (Right)       closure of medial fasciotomy incision right leg debridement of lateral fasciotomy with biopsy of anterior muscle compartment removal of deep muscle anterior compartment right lower leg       Post-Operative Diagnosis: Post-Op Diagnosis Codes:   Hyperkalemia  Acute renal failure   Compartment syndrome right lower extremity        4/18 OP Note    Documentation of "I&D"      Other       Excisional debridement is the surgical removal or cutting away of such tissue, necrosis, or slough and is classified to the root operation "Excision." Use of a sharp instrument does not always indicate that an excisional debridement was performed. Minor removal of loose fragments with scissors or using a sharp instrument to scrape away tissue is not an excisional debridement. Excisional debridement involves the use of a scalpel to remove devitalized tissue.    Nonexcisional debridement is the nonoperative brushing, irrigating, scrubbing, or washing of devitalized tissue, necrosis, slough, or foreign material. Most nonexcisional debridement procedures are classified to the root operation "Extraction" (pulling or stripping out or off all or a portion of a body part by the use of force).     Provider, please provide further clarification on the procedure performed on Right Lower Extremity :    [   x] " Excisional Debridement of skin   [  x ] Excisional Debridement of subcutaneous tissue/fascia   [   x] Excisional Debridement of muscle        [   ] Nonexcisional Debridement of skin   [   ] Nonexcisional Debridement of subcutaneous tissue/fascia   [   ] Nonexcisional Debridement of muscle       [   ] Other Procedure (please specify): _____________   [  ] Clinically Undetermined     Reference:    ICD-10-CM/PCS Coding Clinic Third Quarter ICD-10, Effective with discharges: October 7, 2015 [facility] [employer] § Excisional and nonexcisional debridement (2015).    Form No. 65866

## 2022-04-20 NOTE — PROGRESS NOTES
[facility] Hospital Medicine  Progress Note    Patient Name: Agus Horan  MRN: [id number]  Patient Class: IP- Inpatient   Admission Date: 4/13/2022  Length of Stay: 7 days  Attending Physician: Marcus Johnson MD  Primary Care Provider: Primary Doctor No        Subjective:     Principal Problem:Acute renal failure        HPI:  Agus Horan is a 22 y/o male with PMHx of mild asthma who presents with right lower extremity pain and swelling x few days. Patient was seen in the ED on 4/9/22 for oxycodone overdose and again on 4/10/22 for left eye pain after falling on a piece of furniture. Patient states he is unsure how he injured his leg and denies IV drug use. Xray of his right leg did not show acute fracture. Patietn states RLE pain and swelling increased over the past few days and he is unable to walk secondary to pain. US of his lower extremity performed today did not show DVT. Lab work revealed elevated Creatinine 13.5 & K+ 7.1, elevated AST/ALT 3980/1611 & WBC 18.14.  CPK>40,0000.  Patient unable to dorsiflex and plantar flex his RLE and symptoms are concerning for compartment syndrome per ED. ED provider discussed with Dr. Byers and Dr. Fraser, nephrology. Patient was referred to hospital medicine and seen in the ED with his friend at bedside. Patient complaining of RLE pain, tenderness and swelling. He denies SOB, N/V/D, chest pain and headaches.  Patient will be admitted to hospital medicine for orthopedic consultation and further management.      Overview/Hospital Course:  No notes on file    Interval History: LFT trending down. CPK trending down.  No acute events overnight.  Urine output in the last 24 hours 48 cc.  Patient had hemodialysis this morning net fluid removal 3 L    Review of Systems   Constitutional:  Negative for activity change, appetite change, chills and fever.   HENT:  Negative for congestion, sore throat and trouble swallowing.    Eyes:  Negative for  photophobia and visual disturbance.   Respiratory:  Negative for cough, chest tightness and shortness of breath.    Cardiovascular:  Negative for chest pain, palpitations and leg swelling.   Gastrointestinal:  Negative for abdominal pain, diarrhea and nausea.   Genitourinary:  Negative for dysuria, flank pain and hematuria.   Musculoskeletal:  Positive for gait problem and myalgias. Negative for back pain.   Skin:  Positive for color change.   Neurological:  Negative for dizziness, weakness and headaches.   Psychiatric/Behavioral:  Negative for confusion.    Objective:     Vital Signs (Most Recent):  Temp: 98.6 °F (37 °C) (04/20/22 1215)  Pulse: 79 (04/20/22 1215)  Resp: 18 (04/20/22 1217)  BP: (!) 203/86 (04/20/22 1215)  SpO2: 100 % (04/20/22 1215)   Vital Signs (24h Range):  Temp:  [98.6 °F (37 °C)-99.2 °F (37.3 °C)] 98.6 °F (37 °C)  Pulse:  [] 79  Resp:  [11-34] 18  SpO2:  [98 %-100 %] 100 %  BP: (152-237)/() 203/86     Weight: 81.3 kg (179 lb 3.7 oz)  Body mass index is 26.47 kg/m².    Intake/Output Summary (Last 24 hours) at 4/20/2022 1352  Last data filed at 4/20/2022 1215  Gross per 24 hour   Intake 1511.57 ml   Output 3560 ml   Net -2048.43 ml      Physical Exam  Vitals and nursing note reviewed.   Constitutional:       Appearance: He is normal weight. He is ill-appearing.   HENT:      Head: Normocephalic.      Mouth/Throat:      Mouth: Mucous membranes are moist.      Pharynx: Oropharynx is clear.   Eyes:      Pupils: Pupils are equal, round, and reactive to light.      Comments: Left orbital ecchymosis   Cardiovascular:      Rate and Rhythm: Regular rhythm. Tachycardia present.      Pulses: Normal pulses.      Heart sounds: Normal heart sounds.   Pulmonary:      Effort: Pulmonary effort is normal.      Breath sounds: Normal breath sounds.   Abdominal:      General: Bowel sounds are normal.      Palpations: Abdomen is soft.   Musculoskeletal:         General: Swelling (r Thigh swellin noted)  present. Normal range of motion.      Cervical back: Normal range of motion.      Right upper leg: Swelling present.      Right lower leg: Swelling present.      Comments:     good plantar flexion of his toes actively.  he has no pain on passive extension of the toe the patient has very little extension actively of the toe great toes and smaller toes.    Skin:     General: Skin is warm and dry.   Neurological:      Mental Status: He is alert and oriented to person, place, and time. Mental status is at baseline.   Psychiatric:         Mood and Affect: Mood normal.       Significant Labs: All pertinent labs within the past 24 hours have been reviewed.  CBC:   Recent Labs   Lab 04/19/22 0432 04/20/22  0834   WBC 16.86* 14.07*   HGB 11.6* 10.1*   HCT 33.2* 29.5*   * 152     CMP:   Recent Labs   Lab 04/19/22 0432 04/20/22  0834   * 133*   K 5.6* 4.9   CL 93* 96   CO2 19* 22*   GLU 99 104   BUN 93* 76*   CREATININE 10.9* 9.6*   CALCIUM 6.4* 6.3*   PROT 5.1* 5.3*   ALBUMIN 1.9* 2.0*   BILITOT 0.4 0.3   ALKPHOS 55 54*   * 261*   ALT 43 27   ANIONGAP 16 15   EGFRNONAA 6* 7*       Significant Imaging: I have reviewed all pertinent imaging results/findings within the past 24 hours.      Assessment/Plan:      * Acute renal failure  Continues to be anuric  Had a cycle of hemodialysis this morning   due to rhabdomyolysis  Trend CPK daily, will need HD till CPK < 5000   nephrology-recommendations appreciated  Avoid nephrotoxins  IV fluids stopped due to increase in swelling at right thigh  Telemetry       Compartment syndrome  Underwent fasciotomy on 4/13   Appreciate ortho recs    Elevated liver enzymes  Elevated AST &ALT likely due to rhabdomyolysis  Trending down  Avoid hepatoxic medications  Monitor CMP  See plan for rhabdomyolysis        Rhabdomyolysis  CPK trending down  S/p fasciotomy on 4/13 for compartment syndrome.  On 4/18 S/p DEBRIDEMENT, LOWER EXTREMITY (Right)   closure of medial fasciotomy  incision right leg debridement of lateral fasciotomy with biopsy of anterior muscle compartment removal of deep muscle anterior compartment right lower leg  Scheduled for HD hyperkalemia and metabolic acidosis refractory to medical management;    IV fluids  Monitor BMP   appreciate nephrology recs      History of asthma  Hx of asthma, stable    Monitor for acute changes        VTE Risk Mitigation (From admission, onward)         Ordered     heparin (porcine) injection 4,000 Units  As needed (PRN)         04/14/22 0137     Reason for No Pharmacological VTE Prophylaxis  Once        Question:  Reasons:  Answer:  Risk of Bleeding    04/13/22 1913     IP VTE HIGH RISK PATIENT  Once         04/13/22 1913     Place sequential compression device  Until discontinued         04/13/22 1913                Discharge Planning   JARAD: 4/22/2022     Code Status: Full Code   Is the patient medically ready for discharge?:     Reason for patient still in hospital (select all that apply): Patient trending condition and Treatment  Discharge Plan A: Home with family            Critical care time spent on the evaluation and treatment of severe organ dysfunction, review of pertinent labs and imaging studies, discussions with consulting providers and discussions with patient/family: 60 minutes.      Marcus Johnson MD  Department of Hospital Medicine   [facility]

## 2022-04-20 NOTE — HOSPITAL COURSE
Pt admitted for SANDRA, rhabdomyolisis, and RLE compartment syndrome. Pt was taken to the OR by Dr. Byers for emergent fasciotomy on 4/13. CPK at the time of arrival was more than 40,000. Pt was initially started on IV fluids and required emergent hemodialysis on 4/14 for hyperkalemia and metabolic acidosis refractory to medical management. Pt returned to OR with Dr. Byers on 4/18 and underwent debridement of RLE with closure of medial and lateral fasciotomy incision and biopsy of anterior muscle compartment. Patient's CPK level was monitored closely and continued to trend down.  Patient underwent repeat debridement of deep right lateral fasciotomy incision right lower leg with excisional  debridement of dead anterior tibialis muscle and biopsy of muscle with application of wound VAC on April 25, 2022. Pts pain was not well controlled and medications were adjusted. On 5/1 pt developed persistent tachycardia with mild hypoxemia with room air sat 90%.  Patient unable to get CTA chest due to acute renal failure and hemodialysis.  Chest x-ray obtained and negative.  Patient encouraged to utilize IS and O2 sats improved.  US of bilat LE obtained and neg for DVT with some limitation to RLE due to drsg intact. Decision was made to initiate heparin drip for high suspicion of PE until V/Q scan could be performed.  Patient also developed low-grade fever and Infectious Disease was consulted for further assistance and recommendations.  Patient was continued on IV antibiotics.  V/Q scan was obtained on 05/02 and neg for PE.  Heparin drip was stopped evening of 5/2 and pt was placed on SQ heparin for VTE prophylaixis.   Wound VAC was changed on 05/02 by Dr. Byers and wound care nurse with findings of some necrotic tissue with purulent drainage.  Antibiotics were tailored by Infectious Disease.  Patient to OR for right lower extremity surgical site washout on 05/05/2022. Monitored Kidney function throughout course of stay. Kidney  function improved throughout hospital course. Received 1 unit of RBC for low H&H on 5/10. H&H stable at time of discharge. Hemosplit removed on 5/10 with General surgery once cleared by nephrology. PICC line inserted on 5/11 for IV antibiotics following discharge. Patient pain controlled with little prn pain medication at the time of discharge. Wound vac removed on day of discharge and is to be replaced at LTAC. Patient was cleared for discharge to LTAC by ID and ortho. Plan to send patient to [facility] in [place]. Patient scheduled to follow up with Dr. Corbin in one week and ID on 5/25/22. Patient verbalized understanding of discharge instructions and return precautions.     Physical Exam:  General- Patient alert and oriented x3 in NAD  HEENT- PERRLA, EOMI, OP clear, MMM  CV- Regular rate and rhythm, No Murmur/kesha/rubs  Resp- Lungs CTA Bilaterally, No increased WOB  GI- Non tender/non-distended, BS normoactive x4 quads, no HSM  Extrem- No cyanosis, clubbing, edema. Pulses 2+ and symmetric  Neuro- Strength 5/5 flexors/extensors, DTRs 2+ and symmetric, Intact sensation to light touch grossly  Skin-  Wound vac in place to RLE. Braced and dressings CDI

## 2022-04-20 NOTE — NURSING TRANSFER
Nursing Transfer Note      4/20/2022     Reason patient is being transferred: step down    Transfer from 513 to 215    Transfer via bed    Transfer with personal belongings, phone, chargers, $5 cash    Transported by HAMILTON Coburn    Medicines sent: n/a    Any special needs or follow-up needed: pain     Chart send with patient: yes    Notified: HAMILTON Lozano / HAMILTON Wong    Patient reassessed at: 1740    Upon arrival to floor: Rn at bedside. Bedside report done. Pt oriented to new room. Call light within reach.

## 2022-04-20 NOTE — PROGRESS NOTES
INPATIENT NEPHROLOGY Progress Note  [facility]    Patient Name: Agus Horan  Date: 04/20/2022    Reason for consultation: SANDRA    Chief Complaint:   Chief Complaint   Patient presents with    Leg Pain     Pain in the right leg muscle calf is hard, nausea vomiting, patient was seen here over the weekend for an overdose        History of Present Illness:  22 y/o M with a history of asthma recently here on 4/9 with oxycodone overdose who p/w RLE pain and swelling after a fall on 4/10. He reports severe pain, constant, associated with nausea with inability to bear weight.  He took meloxicam without relief. He was found to have compartment syndrome and was taking to the OR emergently for fasciotomy on 4/13. We are consulted for SANDRA with metabolic derangements.    Interval History:  4/14- did emergent HD overnight for hyperkalemia and metabolic acidosis refractory to medical management; pain controlled, RLE wrapped, no edema in LLE  4/15- worsening pain/edema in RLE- going for MRI- oligoanuric- stop NS IVFs- will do HD/UF today and tomorrow  4/16  Seen on dialysis.  No distress  4/17  Remains oliguric.  AFVSS.  Has back pain.  Leg pain a little better  4/18  In the OR.  Still oliguric.    4/19  Seen on dialysis.  No distress.    4/20  Still not making much urine.  cpk coming down.  Cp very high      Plan of Care:    Assessment:  Traumatic rhabdomyolysis with compartment syndrome s/p fasciotomy on 4/13  SANDRA due to toxic ATN s/p emergent HD on 4/14- remains oligoanuric and HD dependent  Edema  Hyponatremia  Hyperkalemia  Acidosis  Hypocalcemia  Shock liver    Plan:    - Trend daily CK.  Not done today.   cpk slowing coming down  - He remains HD dependent.  . - Stop IVFs since he is oligoanuric and developing edema. Continue grier  Ok with -160s for renal perfusion. No NSAIDs or IV contrast. Dose meds for CrCl < 10.  - Advise renal diet with 1.5L fluid restriction. Will adjust dialysate for  all metabolic derangements. Will replete calcium PRN.  - Trend daily LFTs.   - Change to daily labs.  --replete calcium.  Need to do calcium repletion judiciously in pt with rhabdomyolysis.  Will check ionized   --add hydralazine    Thank you for allowing us to participate in this patient's care. We will continue to follow.    Vital Signs:  Temp Readings from Last 3 Encounters:   04/20/22 98.6 °F (37 °C)   04/10/22 97.3 °F (36.3 °C)   04/09/22 98.4 °F (36.9 °C)       Pulse Readings from Last 3 Encounters:   04/20/22 79   04/10/22 63   04/09/22 95       BP Readings from Last 3 Encounters:   04/20/22 (!) 203/86   04/10/22 117/64   04/09/22 (!) 143/82       Weight:  Wt Readings from Last 3 Encounters:   04/20/22 81.3 kg (179 lb 3.7 oz)   04/10/22 77.1 kg (170 lb)   04/09/22 77.1 kg (170 lb)       INS/OUTS:  I/O last 3 completed shifts:  In: 2446.6 [P.O.:1000; I.V.:175; Other:975; IV Piggyback:296.6]  Out: 5397 [Urine:60; Other:5325; Blood:12]  I/O this shift:  In: 870 [P.O.:370; Other:500]  Out: 3500 [Other:3500]    Medications:  Scheduled Meds:   sodium chloride 0.9%   Intravenous Once    sodium chloride 0.9%   Intravenous Once    calcium gluconate IVPB  1 g Intravenous Once    clindamycin (CLEOCIN) IVPB  600 mg Intravenous Q8H    senna-docusate 8.6-50 mg  1 tablet Oral BID    sodium chloride 0.9%  2 mL Intravenous Q6H     Continuous Infusions:   loperamide       PRN Meds:.sodium chloride 0.9%, sodium chloride 0.9%, sodium chloride 0.9%, dextrose 10%, dextrose 10%, diphenhydrAMINE, fentaNYL, glucagon (human recombinant), glucose, glucose, heparin (porcine), HYDROcodone-acetaminophen, HYDROmorphone, HYDROmorphone, loperamide, lorazepam, morphine, naloxone, ondansetron, ondansetron, ondansetron, prochlorperazine, sodium chloride 0.9%, sodium chloride 0.9%, sodium chloride 0.9%  No current facility-administered medications on file prior to encounter.     Current Outpatient Medications on File Prior to Encounter  "  Medication Sig Dispense Refill    naloxone (NARCAN) 4 mg/actuation Spry 4mg by nasal route as needed for opioid overdose; may repeat every 2-3 minutes in alternating nostrils until medical help arrives. Call 911 2 each 0       Review of Systems:  Neg    Physical Exam:  BP (!) 203/86   Pulse 79   Temp 98.6 °F (37 °C)   Resp 18   Ht 5' 9" (1.753 m)   Wt 81.3 kg (179 lb 3.7 oz)   SpO2 100%   BMI 26.47 kg/m²     Constitutional: nad, aao x 3, depressed affect  Heart: rrr, no m/r/g, wwp, RLE eedema  Lungs: ctab, no w/r/r/c, no lb  Abdomen: s/nt/nd, +BS    Results:  Lab Results   Component Value Date     (L) 04/20/2022    K 4.9 04/20/2022    CL 96 04/20/2022    CO2 22 (L) 04/20/2022    BUN 76 (H) 04/20/2022    CREATININE 9.6 (H) 04/20/2022    CALCIUM 6.3 (LL) 04/20/2022    ANIONGAP 15 04/20/2022    ESTGFRAFRICA 8 (A) 04/20/2022    EGFRNONAA 7 (A) 04/20/2022       Lab Results   Component Value Date    CALCIUM 6.3 (LL) 04/20/2022    PHOS 6.9 (H) 04/16/2022       Recent Labs   Lab 04/20/22  0834   WBC 14.07*   RBC 3.31*   HGB 10.1*   HCT 29.5*      MCV 89   MCH 30.5   MCHC 34.2       I have personally reviewed pertinent radiological imaging and reports.    I have spent > 35 minutes providing care for this patient for the above diagnoses. These services have included chart/data/imaging review, evaluation, exam, formulation of plan, , note preparation, and discussions with staff involved in this patient's care.    Chantelle Fraser MD MPH  [facility] [address]  [place] [address]  [phone] (p)  [phone] (f)  "

## 2022-04-21 LAB
ANION GAP SERPL CALC-SCNC: 15 MMOL/L (ref 8–16)
BASOPHILS # BLD AUTO: 0.06 K/UL (ref 0–0.2)
BASOPHILS NFR BLD: 0.4 % (ref 0–1.9)
BUN SERPL-MCNC: 101 MG/DL (ref 6–20)
CA-I BLDV-SCNC: 0.84 MMOL/L (ref 1.06–1.42)
CALCIUM SERPL-MCNC: 6.4 MG/DL (ref 8.7–10.5)
CHLORIDE SERPL-SCNC: 95 MMOL/L (ref 95–110)
CK SERPL-CCNC: 6200 U/L (ref 20–200)
CO2 SERPL-SCNC: 19 MMOL/L (ref 23–29)
CREAT SERPL-MCNC: 11.8 MG/DL (ref 0.5–1.4)
DIFFERENTIAL METHOD: ABNORMAL
EOSINOPHIL # BLD AUTO: 0.5 K/UL (ref 0–0.5)
EOSINOPHIL NFR BLD: 3.5 % (ref 0–8)
ERYTHROCYTE [DISTWIDTH] IN BLOOD BY AUTOMATED COUNT: 12.5 % (ref 11.5–14.5)
EST. GFR  (AFRICAN AMERICAN): 6 ML/MIN/1.73 M^2
EST. GFR  (NON AFRICAN AMERICAN): 5 ML/MIN/1.73 M^2
GLUCOSE SERPL-MCNC: 92 MG/DL (ref 70–110)
HCT VFR BLD AUTO: 27.1 % (ref 40–54)
HGB BLD-MCNC: 9.3 G/DL (ref 14–18)
IMM GRANULOCYTES # BLD AUTO: 0.32 K/UL (ref 0–0.04)
IMM GRANULOCYTES NFR BLD AUTO: 2.2 % (ref 0–0.5)
LYMPHOCYTES # BLD AUTO: 1.2 K/UL (ref 1–4.8)
LYMPHOCYTES NFR BLD: 7.9 % (ref 18–48)
MCH RBC QN AUTO: 31 PG (ref 27–31)
MCHC RBC AUTO-ENTMCNC: 34.3 G/DL (ref 32–36)
MCV RBC AUTO: 90 FL (ref 82–98)
MONOCYTES # BLD AUTO: 1.5 K/UL (ref 0.3–1)
MONOCYTES NFR BLD: 10.5 % (ref 4–15)
NEUTROPHILS # BLD AUTO: 11 K/UL (ref 1.8–7.7)
NEUTROPHILS NFR BLD: 75.5 % (ref 38–73)
NRBC BLD-RTO: 0 /100 WBC
PLATELET # BLD AUTO: 182 K/UL (ref 150–450)
PMV BLD AUTO: 9.8 FL (ref 9.2–12.9)
POTASSIUM SERPL-SCNC: 5.4 MMOL/L (ref 3.5–5.1)
RBC # BLD AUTO: 3 M/UL (ref 4.6–6.2)
SODIUM SERPL-SCNC: 129 MMOL/L (ref 136–145)
WBC # BLD AUTO: 14.6 K/UL (ref 3.9–12.7)

## 2022-04-21 PROCEDURE — 63600175 PHARM REV CODE 636 W HCPCS: Performed by: ORTHOPAEDIC SURGERY

## 2022-04-21 PROCEDURE — 25000003 PHARM REV CODE 250: Performed by: ORTHOPAEDIC SURGERY

## 2022-04-21 PROCEDURE — 94799 UNLISTED PULMONARY SVC/PX: CPT

## 2022-04-21 PROCEDURE — 82330 ASSAY OF CALCIUM: CPT | Performed by: INTERNAL MEDICINE

## 2022-04-21 PROCEDURE — 36415 COLL VENOUS BLD VENIPUNCTURE: CPT | Performed by: INTERNAL MEDICINE

## 2022-04-21 PROCEDURE — 94761 N-INVAS EAR/PLS OXIMETRY MLT: CPT

## 2022-04-21 PROCEDURE — 25000003 PHARM REV CODE 250: Performed by: INTERNAL MEDICINE

## 2022-04-21 PROCEDURE — 80100014 HC HEMODIALYSIS 1:1

## 2022-04-21 PROCEDURE — 11000001 HC ACUTE MED/SURG PRIVATE ROOM

## 2022-04-21 PROCEDURE — 85025 COMPLETE CBC W/AUTO DIFF WBC: CPT | Performed by: ORTHOPAEDIC SURGERY

## 2022-04-21 PROCEDURE — A4216 STERILE WATER/SALINE, 10 ML: HCPCS | Performed by: ORTHOPAEDIC SURGERY

## 2022-04-21 PROCEDURE — 80048 BASIC METABOLIC PNL TOTAL CA: CPT | Performed by: ORTHOPAEDIC SURGERY

## 2022-04-21 PROCEDURE — 51798 US URINE CAPACITY MEASURE: CPT

## 2022-04-21 PROCEDURE — 82550 ASSAY OF CK (CPK): CPT | Performed by: INTERNAL MEDICINE

## 2022-04-21 PROCEDURE — 63600175 PHARM REV CODE 636 W HCPCS: Performed by: INTERNAL MEDICINE

## 2022-04-21 RX ORDER — AMOXICILLIN 250 MG
1 CAPSULE ORAL 2 TIMES DAILY
Status: DISCONTINUED | OUTPATIENT
Start: 2022-04-21 | End: 2022-04-21

## 2022-04-21 RX ORDER — LOPERAMIDE HYDROCHLORIDE 2 MG/1
2 CAPSULE ORAL CONTINUOUS PRN
Status: DISCONTINUED | OUTPATIENT
Start: 2022-04-21 | End: 2022-04-21

## 2022-04-21 RX ORDER — HYDROMORPHONE HYDROCHLORIDE 1 MG/ML
1 INJECTION, SOLUTION INTRAMUSCULAR; INTRAVENOUS; SUBCUTANEOUS EVERY 4 HOURS PRN
Status: DISCONTINUED | OUTPATIENT
Start: 2022-04-21 | End: 2022-04-21

## 2022-04-21 RX ORDER — HYDROCODONE BITARTRATE AND ACETAMINOPHEN 5; 325 MG/1; MG/1
1 TABLET ORAL EVERY 4 HOURS PRN
Status: DISCONTINUED | OUTPATIENT
Start: 2022-04-21 | End: 2022-04-22

## 2022-04-21 RX ORDER — ONDANSETRON 2 MG/ML
4 INJECTION INTRAMUSCULAR; INTRAVENOUS EVERY 12 HOURS PRN
Status: DISCONTINUED | OUTPATIENT
Start: 2022-04-21 | End: 2022-04-21

## 2022-04-21 RX ORDER — LOPERAMIDE HYDROCHLORIDE 2 MG/1
2 CAPSULE ORAL CONTINUOUS PRN
Status: DISCONTINUED | OUTPATIENT
Start: 2022-04-21 | End: 2022-05-11 | Stop reason: HOSPADM

## 2022-04-21 RX ADMIN — HEPARIN SODIUM 4000 UNITS: 1000 INJECTION, SOLUTION INTRAVENOUS; SUBCUTANEOUS at 12:04

## 2022-04-21 RX ADMIN — HYDRALAZINE HYDROCHLORIDE 25 MG: 25 TABLET, FILM COATED ORAL at 06:04

## 2022-04-21 RX ADMIN — CYCLOBENZAPRINE 10 MG: 10 TABLET, FILM COATED ORAL at 08:04

## 2022-04-21 RX ADMIN — CYCLOBENZAPRINE 10 MG: 10 TABLET, FILM COATED ORAL at 02:04

## 2022-04-21 RX ADMIN — Medication 2 ML: at 05:04

## 2022-04-21 RX ADMIN — ONDANSETRON 4 MG: 2 INJECTION INTRAMUSCULAR; INTRAVENOUS at 02:04

## 2022-04-21 RX ADMIN — HYDRALAZINE HYDROCHLORIDE 25 MG: 25 TABLET, FILM COATED ORAL at 09:04

## 2022-04-21 RX ADMIN — CYCLOBENZAPRINE 10 MG: 10 TABLET, FILM COATED ORAL at 10:04

## 2022-04-21 RX ADMIN — Medication 2 ML: at 11:04

## 2022-04-21 RX ADMIN — HYDROMORPHONE HYDROCHLORIDE 1 MG: 1 INJECTION, SOLUTION INTRAMUSCULAR; INTRAVENOUS; SUBCUTANEOUS at 09:04

## 2022-04-21 RX ADMIN — Medication 2 ML: at 06:04

## 2022-04-21 RX ADMIN — HYDROCODONE BITARTRATE AND ACETAMINOPHEN 1 TABLET: 5; 325 TABLET ORAL at 06:04

## 2022-04-21 RX ADMIN — MORPHINE SULFATE 2 MG: 2 INJECTION, SOLUTION INTRAMUSCULAR; INTRAVENOUS at 02:04

## 2022-04-21 RX ADMIN — HYDROCODONE BITARTRATE AND ACETAMINOPHEN 1 TABLET: 5; 325 TABLET ORAL at 10:04

## 2022-04-21 RX ADMIN — HYDROCODONE BITARTRATE AND ACETAMINOPHEN 1 TABLET: 5; 325 TABLET ORAL at 02:04

## 2022-04-21 RX ADMIN — HYDROMORPHONE HYDROCHLORIDE 1 MG: 1 INJECTION, SOLUTION INTRAMUSCULAR; INTRAVENOUS; SUBCUTANEOUS at 08:04

## 2022-04-21 RX ADMIN — HYDRALAZINE HYDROCHLORIDE 25 MG: 25 TABLET, FILM COATED ORAL at 02:04

## 2022-04-21 RX ADMIN — CLINDAMYCIN IN 5 PERCENT DEXTROSE 600 MG: 12 INJECTION, SOLUTION INTRAVENOUS at 05:04

## 2022-04-21 RX ADMIN — HYDROMORPHONE HYDROCHLORIDE 1 MG: 1 INJECTION, SOLUTION INTRAMUSCULAR; INTRAVENOUS; SUBCUTANEOUS at 03:04

## 2022-04-21 NOTE — PLAN OF CARE
Pt in bed awake and alert, no distress noted. Pt had 100ml  of urine output this shift, bladder scan is done and residual is 56ml. Pt was dialyzed yesterday  Problem: Adult Inpatient Plan of Care  Goal: Plan of Care Review  4/21/2022 0636 by Pilar Ramirez RN  Outcome: Ongoing, Progressing  4/21/2022 0636 by Pilar Ramirez RN  Outcome: Ongoing, Progressing  Goal: Patient-Specific Goal (Individualized)  4/21/2022 0636 by Pilar Ramirez RN  Outcome: Ongoing, Progressing  4/21/2022 0636 by Pilar Ramirez RN  Outcome: Ongoing, Progressing  Goal: Absence of Hospital-Acquired Illness or Injury  4/21/2022 0636 by Pilar Ramirez RN  Outcome: Ongoing, Progressing  4/21/2022 0636 by Pilar Ramirez RN  Outcome: Ongoing, Progressing  Goal: Optimal Comfort and Wellbeing  4/21/2022 0636 by Pilar Ramirez RN  Outcome: Ongoing, Progressing  4/21/2022 0636 by Pilar Ramirez RN  Outcome: Ongoing, Progressing  Goal: Readiness for Transition of Care  4/21/2022 0636 by Pilar Ramirez RN  Outcome: Ongoing, Progressing  4/21/2022 0636 by Pilar Ramirez RN  Outcome: Ongoing, Progressing     Problem: Infection  Goal: Absence of Infection Signs and Symptoms  4/21/2022 0636 by Pilar Ramirez RN  Outcome: Ongoing, Progressing  4/21/2022 0636 by Pilar Ramirez RN  Outcome: Ongoing, Progressing     Problem: Fall Injury Risk  Goal: Absence of Fall and Fall-Related Injury  4/21/2022 0636 by Pilar Ramirez RN  Outcome: Ongoing, Progressing  4/21/2022 0636 by Pilar Ramirez RN  Outcome: Ongoing, Progressing     Problem: Skin Injury Risk Increased  Goal: Skin Health and Integrity  4/21/2022 0636 by Pilar Ramirez RN  Outcome: Ongoing, Progressing  4/21/2022 0636 by Pilar Ramirez RN  Outcome: Ongoing, Progressing     Problem: Device-Related Complication Risk (Hemodialysis)  Goal: Safe, Effective Therapy Delivery  4/21/2022 0636 by Pilar Ramirez RN  Outcome: Ongoing,  Progressing  4/21/2022 0636 by Pilar Ramirez RN  Outcome: Ongoing, Progressing     Problem: Hemodynamic Instability (Hemodialysis)  Goal: Effective Tissue Perfusion  4/21/2022 0636 by Pilar Ramirez RN  Outcome: Ongoing, Progressing  4/21/2022 0636 by Pilar Ramirez RN  Outcome: Ongoing, Progressing     Problem: Infection (Hemodialysis)  Goal: Absence of Infection Signs and Symptoms  4/21/2022 0636 by Pilar Ramirez RN  Outcome: Ongoing, Progressing  4/21/2022 0636 by Pilar Ramirez RN  Outcome: Ongoing, Progressing     Problem: Fluid and Electrolyte Imbalance (Acute Kidney Injury/Impairment)  Goal: Fluid and Electrolyte Balance  4/21/2022 0636 by Pilar Ramirez RN  Outcome: Ongoing, Progressing  4/21/2022 0636 by Pilar Ramirez RN  Outcome: Ongoing, Progressing     Problem: Oral Intake Inadequate (Acute Kidney Injury/Impairment)  Goal: Optimal Nutrition Intake  4/21/2022 0636 by Pilar Ramirez RN  Outcome: Ongoing, Progressing  4/21/2022 0636 by Pilar Ramirez RN  Outcome: Ongoing, Progressing     Problem: Renal Function Impairment (Acute Kidney Injury/Impairment)  Goal: Effective Renal Function  4/21/2022 0636 by Pilar Ramirez RN  Outcome: Ongoing, Progressing  4/21/2022 0636 by Pilar Ramirez RN  Outcome: Ongoing, Progressing   , 4/20/22 and three liters of fluid taken out. Pt was also medicated for pain on the right foot and medication is effective.

## 2022-04-21 NOTE — NURSING
Spoke with Dr. Byers regarding wound plan of care. At this time plan to meet with Dr. Byers at bedside tomorrow at 1pm to do dressing change and assess right leg. Wound care nurse will coordinate with Dr. Byers if any changes.

## 2022-04-21 NOTE — PROGRESS NOTES
INPATIENT NEPHROLOGY Progress Note  [facility]    Patient Name: Agus Horan  Date: 04/21/2022    Reason for consultation: SANDRA    Chief Complaint:   Chief Complaint   Patient presents with    Leg Pain     Pain in the right leg muscle calf is hard, nausea vomiting, patient was seen here over the weekend for an overdose        History of Present Illness:  20 y/o M with a history of asthma recently here on 4/9 with oxycodone overdose who p/w RLE pain and swelling after a fall on 4/10. He reports severe pain, constant, associated with nausea with inability to bear weight.  He took meloxicam without relief. He was found to have compartment syndrome and was taking to the OR emergently for fasciotomy on 4/13. We are consulted for SANDRA with metabolic derangements.    Interval History:  4/14- did emergent HD overnight for hyperkalemia and metabolic acidosis refractory to medical management; pain controlled, RLE wrapped, no edema in LLE  4/15- worsening pain/edema in RLE- going for MRI- oligoanuric- stop NS IVFs- will do HD/UF today and tomorrow  4/16  Seen on dialysis.  No distress  4/17  Remains oliguric.  AFVSS.  Has back pain.  Leg pain a little better  4/18  In the OR.  Still oliguric.    4/19  Seen on dialysis.  No distress.    4/20  Still not making much urine.  cpk coming down.     4/21  Afebrile.  BP a little better.  uop up a bit    Plan of Care:    Assessment:  Traumatic rhabdomyolysis with compartment syndrome s/p fasciotomy on 4/13  SANDRA due to toxic ATN s/p emergent HD on 4/14- remains oligoanuric and HD dependent  Edema  Hyponatremia  Hyperkalemia  Acidosis  Hypocalcemia  Shock liver    Plan:    - Trend daily CK.     cpk slowing coming down  - He remains HD dependent.  . - Stop IVFs since he is oligoanuric and developing edema. Continue grier  Ok with -160s for renal perfusion. No NSAIDs or IV contrast. Dose meds for CrCl < 10.  - Advise renal diet with 1.5L fluid restriction. Will  adjust dialysate for all metabolic derangements. Will replete calcium PRN.  - Trend daily LFTs.   - Change to daily labs.  --replete calcium.  Need to do calcium repletion judiciously in pt with rhabdomyolysis.    --added hydralazine    Thank you for allowing us to participate in this patient's care. We will continue to follow.    Vital Signs:  Temp Readings from Last 3 Encounters:   04/21/22 98.8 °F (37.1 °C) (Oral)   04/10/22 97.3 °F (36.3 °C)   04/09/22 98.4 °F (36.9 °C)       Pulse Readings from Last 3 Encounters:   04/21/22 92   04/10/22 63   04/09/22 95       BP Readings from Last 3 Encounters:   04/21/22 (!) 158/75   04/10/22 117/64   04/09/22 (!) 143/82       Weight:  Wt Readings from Last 3 Encounters:   04/20/22 81.3 kg (179 lb 3.7 oz)   04/10/22 77.1 kg (170 lb)   04/09/22 77.1 kg (170 lb)       INS/OUTS:  I/O last 3 completed shifts:  In: 2760.5 [P.O.:1290; I.V.:125; Other:1100; IV Piggyback:245.5]  Out: 7185 [Urine:73; Other:7100; Blood:12]  I/O this shift:  In: 100 [P.O.:100]  Out: 200 [Urine:200]    Medications:  Scheduled Meds:   sodium chloride 0.9%   Intravenous Once    sodium chloride 0.9%   Intravenous Once    calcium gluconate IVPB  1 g Intravenous Once    clindamycin (CLEOCIN) IVPB  600 mg Intravenous Q8H    hydrALAZINE  25 mg Oral Q8H    senna-docusate 8.6-50 mg  1 tablet Oral BID    sodium chloride 0.9%  2 mL Intravenous Q6H    sodium chloride 0.9%  2 mL Intravenous Q6H     Continuous Infusions:   loperamide       PRN Meds:.sodium chloride 0.9%, sodium chloride 0.9%, sodium chloride 0.9%, cyclobenzaprine, dextrose 10%, dextrose 10%, glucagon (human recombinant), glucose, glucose, heparin (porcine), HYDROcodone-acetaminophen, HYDROmorphone, labetaloL, loperamide, morphine, naloxone, ondansetron, ondansetron, sodium chloride 0.9%, sodium chloride 0.9%, sodium chloride 0.9%  No current facility-administered medications on file prior to encounter.     Current Outpatient Medications on  "File Prior to Encounter   Medication Sig Dispense Refill    naloxone (NARCAN) 4 mg/actuation Spry 4mg by nasal route as needed for opioid overdose; may repeat every 2-3 minutes in alternating nostrils until medical help arrives. Call 911 2 each 0       Review of Systems:  Neg    Physical Exam:  BP (!) 158/75 (BP Location: Left arm, Patient Position: Lying)   Pulse 92   Temp 98.8 °F (37.1 °C) (Oral)   Resp 18   Ht 5' 9" (1.753 m)   Wt 81.3 kg (179 lb 3.7 oz)   SpO2 99%   BMI 26.47 kg/m²     Constitutional: nad, aao x 3, depressed affect  Heart: rrr, no m/r/g, wwp, RLE eedema  Lungs: ctab, no w/r/r/c, no lb  Abdomen: s/nt/nd, +BS    Results:  Lab Results   Component Value Date     (L) 04/20/2022    K 4.9 04/20/2022    CL 96 04/20/2022    CO2 22 (L) 04/20/2022    BUN 76 (H) 04/20/2022    CREATININE 9.6 (H) 04/20/2022    CALCIUM 6.3 (LL) 04/20/2022    ANIONGAP 15 04/20/2022    ESTGFRAFRICA 8 (A) 04/20/2022    EGFRNONAA 7 (A) 04/20/2022       Lab Results   Component Value Date    CALCIUM 6.3 (LL) 04/20/2022    PHOS 6.9 (H) 04/16/2022       Recent Labs   Lab 04/20/22  0834   WBC 14.07*   RBC 3.31*   HGB 10.1*   HCT 29.5*      MCV 89   MCH 30.5   MCHC 34.2       I have personally reviewed pertinent radiological imaging and reports.    I have spent > 35 minutes providing care for this patient for the above diagnoses. These services have included chart/data/imaging review, evaluation, exam, formulation of plan, , note preparation, and discussions with staff involved in this patient's care.    Chantelle Fraser MD MPH  [facility] [address] [place] [address]  [phone] (p)  [phone] (f)  "

## 2022-04-21 NOTE — ASSESSMENT & PLAN NOTE
Underwent fasciotomy on 4/13   Appreciate ortho rec  Bedside nurse notified surgeon to re-evaluate the RLE wound, consulting WC nurse as well.

## 2022-04-21 NOTE — CARE UPDATE
04/20/22 1950   Patient Assessment/Suction   Level of Consciousness (AVPU) alert   PRE-TX-O2   O2 Device (Oxygen Therapy) room air   SpO2 99 %   Pulse Oximetry Type Intermittent   Incentive Spirometer   $ Incentive Spirometer Charges done with encouragement   Administration (IS) proper technique demonstrated;self-administered   Number of Repetitions (IS) 10   Level Incentive Spirometer (mL) 2500   Patient Tolerance (IS) good

## 2022-04-21 NOTE — CONSULTS
Consulted for wound care recommendations to right lower leg. Patient is being seen by orthopedics Dr. Byers for compartment syndrome and has had gone to surgery twice thus far this hospital stay. Messaged Dr Byers and per Dr. Byers wet to dry dressings daily and wound care to follow for further recommendations. Wound care will see this patient today.

## 2022-04-21 NOTE — PLAN OF CARE
Per MDR's pt is still requiring Hemodialysis and discharge will likely not be until next week. CM following for discharge needs.    04/21/22 1236   Discharge Assessment   Assessment Type Discharge Planning Reassessment

## 2022-04-21 NOTE — PROGRESS NOTES
[facility] Hospital Medicine  Progress Note    Patient Name: Agus Horan  MRN: [id number]  Patient Class: IP- Inpatient   Admission Date: 4/13/2022  Length of Stay: 8 days  Attending Physician: Owen Zamorano MD  Primary Care Provider: Primary Doctor No        Subjective:     Principal Problem:Acute renal failure        HPI:  Agus Horan is a 20 y/o male with PMHx of mild asthma who presents with right lower extremity pain and swelling x few days. Patient was seen in the ED on 4/9/22 for oxycodone overdose and again on 4/10/22 for left eye pain after falling on a piece of furniture. Patient states he is unsure how he injured his leg and denies IV drug use. Xray of his right leg did not show acute fracture. Patietn states RLE pain and swelling increased over the past few days and he is unable to walk secondary to pain. US of his lower extremity performed today did not show DVT. Lab work revealed elevated Creatinine 13.5 & K+ 7.1, elevated AST/ALT 3980/1611 & WBC 18.14.  CPK>40,0000.  Patient unable to dorsiflex and plantar flex his RLE and symptoms are concerning for compartment syndrome per ED. ED provider discussed with Dr. Byers and Dr. Fraser, nephrology. Patient was referred to hospital medicine and seen in the ED with his friend at bedside. Patient complaining of RLE pain, tenderness and swelling. He denies SOB, N/V/D, chest pain and headaches.  Patient will be admitted to hospital medicine for orthopedic consultation and further management.      Overview/Hospital Course:  He was found to have compartment syndrome and was taking to the OR emergently for fasciotomy on 4/13. We are consulted for SANDRA with metabolic derangements.  Cpk at the time of arrival was more than 40,000. Initially was started on IV fluids and started on hemodialysis. on 4/14- did emergent HD overnight for hyperkalemia and metabolic acidosis refractory to medical management; pain controlled, patient underwent  DEBRIDEMENT, LOWER EXTREMITY (Right)   closure of medial fasciotomy incision right leg debridement of lateral fasciotomy with biopsy of anterior muscle compartment removal of deep muscle anterior compartment right lower leg on 04/18/2022.  Patient's CPK h continue to trend down.  Was continued on hemodialysis      Interval History:  Patient is currently receiving hemodialysis therapy.  Complaining of for right lower extremity pain and possible numbness.  No discoloration noted.  Capillary refill of right lower foot adequate, toes are pink.  Denies any chest pain or shortness of breath.  Slight improvement in urine output noted.  CPK level trending down.      Review of Systems   Constitutional:  Negative for activity change, appetite change, chills and fever.   HENT:  Negative for congestion, sore throat and trouble swallowing.    Eyes:  Negative for photophobia and visual disturbance.   Respiratory:  Negative for cough, chest tightness and shortness of breath.    Cardiovascular:  Negative for chest pain, palpitations and leg swelling.   Gastrointestinal:  Negative for abdominal pain, diarrhea and nausea.   Genitourinary:  Negative for dysuria, flank pain and hematuria.   Musculoskeletal:  Positive for gait problem and myalgias. Negative for back pain.   Skin:  Positive for color change.   Neurological:  Negative for dizziness, weakness and headaches.   Psychiatric/Behavioral:  Negative for confusion.    Objective:     Vital Signs (Most Recent):  Temp: 97 °F (36.1 °C) (04/21/22 0745)  Pulse: 95 (04/21/22 0745)  Resp: 17 (04/21/22 0811)  BP: (!) 140/75 (04/21/22 0745)  SpO2: 97 % (04/21/22 0745)   Vital Signs (24h Range):  Temp:  [97 °F (36.1 °C)-99.1 °F (37.3 °C)] 97 °F (36.1 °C)  Pulse:  [] 95  Resp:  [12-39] 17  SpO2:  [97 %-100 %] 97 %  BP: (140-237)/() 140/75     Weight: 81.3 kg (179 lb 3.7 oz)  Body mass index is 26.47 kg/m².    Intake/Output Summary (Last 24 hours) at 4/21/2022 0897  Last data filed  at 4/21/2022 0600  Gross per 24 hour   Intake 1008.91 ml   Output 3725 ml   Net -2716.09 ml        Physical Exam  Vitals and nursing note reviewed.   Constitutional:       Appearance: He is normal weight. He is ill-appearing.   HENT:      Head: Normocephalic.      Mouth/Throat:      Mouth: Mucous membranes are moist.      Pharynx: Oropharynx is clear.   Eyes:      Pupils: Pupils are equal, round, and reactive to light.      Comments: Left orbital ecchymosis   Cardiovascular:      Rate and Rhythm: Regular rhythm. Tachycardia present.      Pulses: Normal pulses.      Heart sounds: Normal heart sounds.   Pulmonary:      Effort: Pulmonary effort is normal.      Breath sounds: Normal breath sounds.   Abdominal:      General: Bowel sounds are normal.      Palpations: Abdomen is soft.   Musculoskeletal:         General: Swelling (r Thigh swellin noted) present. Normal range of motion.      Cervical back: Normal range of motion.      Right upper leg: Swelling present.      Right lower leg: Swelling present.      Comments:     good plantar flexion of his toes actively.  he has no pain on passive extension of the toe the patient has very little extension actively of the toe great toes and smaller toes.    Skin:     General: Skin is warm and dry.   Neurological:      Mental Status: He is alert and oriented to person, place, and time. Mental status is at baseline.   Psychiatric:         Mood and Affect: Mood normal.       Significant Labs: All pertinent labs within the past 24 hours have been reviewed.  CBC:   Recent Labs   Lab 04/20/22  0834   WBC 14.07*   HGB 10.1*   HCT 29.5*          CMP:   Recent Labs   Lab 04/20/22  0834   *   K 4.9   CL 96   CO2 22*      BUN 76*   CREATININE 9.6*   CALCIUM 6.3*   PROT 5.3*   ALBUMIN 2.0*   BILITOT 0.3   ALKPHOS 54*   *   ALT 27   ANIONGAP 15   EGFRNONAA 7*       Microbiology Results (last 7 days)       Procedure Component Value Units Date/Time    Blood culture  [[id number]] Collected: 04/13/22 2337    Order Status: Completed Specimen: Blood from Antecubital, Right Hand Updated: 04/19/22 1212     Blood Culture, Routine No growth after 5 days.          Significant Imaging:   X-ray lumbar spine: Normal study.    Right tibia and fibula: Normal study.    Right femur x-ray: Normal study.    Pelvis x-ray: Normal study.    CT Head: No acute abnormality.  Minimal sinus disease.    CT Maxillofacial scan: No facial bone fracture.  Minimal sinus disease.    RLE venous doppler scan: No evidence of deep venous thrombosis in the right lower extremity.    CXR: Central line placement with the tip over the SVC.  No acute detrimental changes.    RLE venous doppler scan:  No evidence of deep venous thrombosis in the right lower extremity. Edema in the soft tissues of the right lower extremity      Assessment/Plan:      * Acute renal failure  Continues to be anuric  Had a cycle of hemodialysis this morning   due to rhabdomyolysis  Trend CPK daily, will need HD till CPK < 5000   nephrology-recommendations appreciated  Avoid nephrotoxins  IV fluids stopped due to increase in swelling at right thigh  Telemetry       Compartment syndrome  Underwent fasciotomy on 4/13   Appreciate ortho rec  Bedside nurse notified surgeon to re-evaluate the RLE wound, consulting WC nurse as well.     Elevated liver enzymes  Elevated AST &ALT likely due to rhabdomyolysis  Trending down  Avoid hepatoxic medications  Monitor CMP  See plan for rhabdomyolysis        Rhabdomyolysis  CPK trending down  S/p fasciotomy on 4/13 for compartment syndrome.  On 4/18 S/p DEBRIDEMENT, LOWER EXTREMITY (Right)   closure of medial fasciotomy incision right leg debridement of lateral fasciotomy with biopsy of anterior muscle compartment removal of deep muscle anterior compartment right lower leg  Scheduled for HD hyperkalemia and metabolic acidosis refractory to medical management;    IV fluids  Monitor BMP   appreciate nephrology  recs      History of asthma  Hx of asthma, stable    Monitor for acute changes        VTE Risk Mitigation (From admission, onward)         Ordered     heparin (porcine) injection 4,000 Units  As needed (PRN)         04/14/22 0137     Reason for No Pharmacological VTE Prophylaxis  Once        Question:  Reasons:  Answer:  Risk of Bleeding    04/13/22 1913     IP VTE HIGH RISK PATIENT  Once         04/13/22 1913     Place sequential compression device  Until discontinued         04/13/22 1913                Discharge Planning   JARAD: 4/24/2022     Code Status: Full Code   Is the patient medically ready for discharge?:     Reason for patient still in hospital (select all that apply): Patient trending condition  Discharge Plan A: Home with family        Owen Zamorano MD  Department of Hospital Medicine   [facility]

## 2022-04-21 NOTE — CARE UPDATE
04/21/22 0717   Patient Assessment/Suction   Level of Consciousness (AVPU) alert   Respiratory Effort Normal;Unlabored   Expansion/Accessory Muscles/Retractions no use of accessory muscles   PRE-TX-O2   O2 Device (Oxygen Therapy) room air   SpO2 99 %   Pulse Oximetry Type Intermittent   $ Pulse Oximetry - Multiple Charge Pulse Oximetry - Multiple   Pulse 89   Resp 18   Incentive Spirometer   $ Incentive Spirometer Charges done with encouragement   Incentive Spirometer Predicted Level (mL) 3350   Administration (IS) self-administered

## 2022-04-21 NOTE — SUBJECTIVE & OBJECTIVE
Interval History:  Patient is currently receiving hemodialysis therapy.  Complaining of for right lower extremity pain and possible numbness.  No discoloration noted.  Capillary refill of right lower foot adequate, toes are pink.  Denies any chest pain or shortness of breath.  Slight improvement in urine output noted.  CPK level trending down.      Review of Systems   Constitutional:  Negative for activity change, appetite change, chills and fever.   HENT:  Negative for congestion, sore throat and trouble swallowing.    Eyes:  Negative for photophobia and visual disturbance.   Respiratory:  Negative for cough, chest tightness and shortness of breath.    Cardiovascular:  Negative for chest pain, palpitations and leg swelling.   Gastrointestinal:  Negative for abdominal pain, diarrhea and nausea.   Genitourinary:  Negative for dysuria, flank pain and hematuria.   Musculoskeletal:  Positive for gait problem and myalgias. Negative for back pain.   Skin:  Positive for color change.   Neurological:  Negative for dizziness, weakness and headaches.   Psychiatric/Behavioral:  Negative for confusion.    Objective:     Vital Signs (Most Recent):  Temp: 97 °F (36.1 °C) (04/21/22 0745)  Pulse: 95 (04/21/22 0745)  Resp: 17 (04/21/22 0811)  BP: (!) 140/75 (04/21/22 0745)  SpO2: 97 % (04/21/22 0745)   Vital Signs (24h Range):  Temp:  [97 °F (36.1 °C)-99.1 °F (37.3 °C)] 97 °F (36.1 °C)  Pulse:  [] 95  Resp:  [12-39] 17  SpO2:  [97 %-100 %] 97 %  BP: (140-237)/() 140/75     Weight: 81.3 kg (179 lb 3.7 oz)  Body mass index is 26.47 kg/m².    Intake/Output Summary (Last 24 hours) at 4/21/2022 0858  Last data filed at 4/21/2022 0600  Gross per 24 hour   Intake 1008.91 ml   Output 3725 ml   Net -2716.09 ml        Physical Exam  Vitals and nursing note reviewed.   Constitutional:       Appearance: He is normal weight. He is ill-appearing.   HENT:      Head: Normocephalic.      Mouth/Throat:      Mouth: Mucous membranes are  moist.      Pharynx: Oropharynx is clear.   Eyes:      Pupils: Pupils are equal, round, and reactive to light.      Comments: Left orbital ecchymosis   Cardiovascular:      Rate and Rhythm: Regular rhythm. Tachycardia present.      Pulses: Normal pulses.      Heart sounds: Normal heart sounds.   Pulmonary:      Effort: Pulmonary effort is normal.      Breath sounds: Normal breath sounds.   Abdominal:      General: Bowel sounds are normal.      Palpations: Abdomen is soft.   Musculoskeletal:         General: Swelling (r Thigh swellin noted) present. Normal range of motion.      Cervical back: Normal range of motion.      Right upper leg: Swelling present.      Right lower leg: Swelling present.      Comments:     good plantar flexion of his toes actively.  he has no pain on passive extension of the toe the patient has very little extension actively of the toe great toes and smaller toes.    Skin:     General: Skin is warm and dry.   Neurological:      Mental Status: He is alert and oriented to person, place, and time. Mental status is at baseline.   Psychiatric:         Mood and Affect: Mood normal.       Significant Labs: All pertinent labs within the past 24 hours have been reviewed.  CBC:   Recent Labs   Lab 04/20/22  0834   WBC 14.07*   HGB 10.1*   HCT 29.5*          CMP:   Recent Labs   Lab 04/20/22  0834   *   K 4.9   CL 96   CO2 22*      BUN 76*   CREATININE 9.6*   CALCIUM 6.3*   PROT 5.3*   ALBUMIN 2.0*   BILITOT 0.3   ALKPHOS 54*   *   ALT 27   ANIONGAP 15   EGFRNONAA 7*       Microbiology Results (last 7 days)       Procedure Component Value Units Date/Time    Blood culture [[id number]] Collected: 04/13/22 1527    Order Status: Completed Specimen: Blood from Antecubital, Right Hand Updated: 04/19/22 1212     Blood Culture, Routine No growth after 5 days.          Significant Imaging:   X-ray lumbar spine: Normal study.    Right tibia and fibula: Normal study.    Right femur x-ray:  Normal study.    Pelvis x-ray: Normal study.    CT Head: No acute abnormality.  Minimal sinus disease.    CT Maxillofacial scan: No facial bone fracture.  Minimal sinus disease.    RLE venous doppler scan: No evidence of deep venous thrombosis in the right lower extremity.    CXR: Central line placement with the tip over the SVC.  No acute detrimental changes.    RLE venous doppler scan:  No evidence of deep venous thrombosis in the right lower extremity. Edema in the soft tissues of the right lower extremity

## 2022-04-21 NOTE — PROGRESS NOTES
[facility]  Adult Nutrition  Consult Note    SUMMARY   Intervention: sodium/phosphorus/potassium modified diet     Recommendations  Recommendation/Intervention:   1.) Continue with Renal diet + Novasource renal BID   Goals: 1.) diet will advance within 48 hrs 2.) pt will meet >50% of EEN during admit   Nutrition Goal Status: 1.) goal met 2.) new   Communication of RD Recs: reviewed with RN    1. Compartment syndrome    2. Hyperkalemia    3. SANDRA (acute kidney injury)    4. Traumatic rhabdomyolysis, initial encounter    5. Acute renal failure    6. Non-traumatic compartment syndrome of right lower extremity      Past Medical History:   Diagnosis Date    Allergy     AR    Asthma     mild intermittent    Hyperkalemia 4/14/2022         Assessment and Plan  Nutrition Problem  altered nutrition related lab values    Related to (etiology):   Renal dysfunction    Signs and Symptoms (as evidenced by):   Phos: 6.9   K: 5.5  Na: 127    Interventions/Recommendations (treatment strategy):  Advance to renal diet, educate on diet on f/u     Nutrition Diagnosis Status:   Continues           Malnutrition Assessment  Stable weight.       Reason for Assessment  Reason For Assessment: consult  General Information Comments: admits with acute renal failure. Pt in OR during RD rounds for washout. RD consulted for new HD pt. Provided renal diet education to RN. Will f/u with education. unable to assess any weight loss.  4/21: pt receiving HD during RD rounds. Just receive Irish toast for breakfast which he was excited about. States he's willing to try novasource with meals. Encouraged protein intake with meals and importance of maintaining his nutrition status while receiving HD. Reports a UBW of ~165lbs. Provided SANDRA diet education, left at bedside with RD email.     Nutrition Risk Screen  Nutrition Risk Screen: no indicators present    Nutrition/Diet History  Food Allergies: shrimp  Factors Affecting Nutritional  "Intake: NPO    Anthropometrics  Temp: 98.2 °F (36.8 °C)  Height Method: Stated  Height: 5' 9"  Height (inches): 69 in  Weight Method: Bed Scale  Weight: 81.3 kg (179 lb 3.7 oz)  Weight (lb): 179.24 lb  Ideal Body Weight (IBW), Male: 160 lb  % Ideal Body Weight, Male (lb): 116.71 %  BMI (Calculated): 26.5  BMI Grade: 25 - 29.9 - overweight  Usual Body Weight (UBW), k.1 kg (2022)  % Usual Body Weight: 110.09  % Weight Change From Usual Weight: 9.86 %       Lab/Procedures/Meds  Pertinent Labs Reviewed: reviewed  BMP  Lab Results   Component Value Date     (L) 2022    K 5.4 (H) 2022    CL 95 2022    CO2 19 (L) 2022     (H) 2022    CREATININE 11.8 (H) 2022    CALCIUM 6.4 (LL) 2022    ANIONGAP 15 2022    ESTGFRAFRICA 6 (A) 2022    EGFRNONAA 5 (A) 2022     Lab Results   Component Value Date    ALBUMIN 2.0 (L) 2022     Lab Results   Component Value Date    CALCIUM 6.4 (LL) 2022    PHOS 6.9 (H) 2022     No results for input(s): POCTGLUCOSE in the last 24 hours.    Pertinent Medications Reviewed: reviewed  Scheduled Meds:   calcium gluconate IVPB  1 g Intravenous Once    clindamycin (CLEOCIN) IVPB  600 mg Intravenous Q8H    hydrALAZINE  25 mg Oral Q8H    sodium chloride 0.9%  2 mL Intravenous Q6H     Continuous Infusions:   loperamide         Estimated/Assessed Needs  Weight Used For Calorie Calculations: 78 kg (171 lb 15.3 oz) (NHANES II)  Energy Calorie Requirements (kcal): 4162-8462 kcals  Energy Need Method: Kcal/kg  Protein Requirements: 80-95g  Weight Used For Protein Calculations: 78 kg (171 lb 15.3 oz) (NHANES II)  Fluid Requirements (mL): UOP + 1000 mls  Estimated Fluid Requirement Method: other (see comments)  RDA Method (mL): 2300         Nutrition Prescription Ordered  Current Diet Order: Renal diet, 1500 mL fluid restriction      Evaluation of Received Nutrient/Fluid Intake  Energy Calories Required: not " meeting needs  % Intake of Estimated Energy Needs: 50 - 75 %  % Meal Intake: 50 - 75 %    Nutrition Risk  Level of Risk/Frequency of Follow-up:  (2 weekly)       Monitor and Evaluation  Food and Nutrient Intake: energy intake, food and beverage intake  Food and Nutrient Adminstration: diet order  Knowledge/Beliefs/Attitudes: food and nutrition knowledge/skill  Anthropometric Measurements: weight, weight change, body mass index  Biochemical Data, Medical Tests and Procedures: electrolyte and renal panel, glucose/endocrine profile, lipid profile  Nutrition-Focused Physical Findings: overall appearance       Nutrition Follow-Up  RD Follow-up?: Yes

## 2022-04-22 LAB
ALBUMIN SERPL BCP-MCNC: 2.2 G/DL (ref 3.5–5.2)
ALP SERPL-CCNC: 56 U/L (ref 55–135)
ALT SERPL W/O P-5'-P-CCNC: 18 U/L (ref 10–44)
ANION GAP SERPL CALC-SCNC: 11 MMOL/L (ref 8–16)
AST SERPL-CCNC: 137 U/L (ref 10–40)
BASOPHILS # BLD AUTO: 0.04 K/UL (ref 0–0.2)
BASOPHILS NFR BLD: 0.3 % (ref 0–1.9)
BILIRUB SERPL-MCNC: 0.3 MG/DL (ref 0.1–1)
BUN SERPL-MCNC: 77 MG/DL (ref 6–20)
CA-I BLDV-SCNC: 0.92 MMOL/L (ref 1.06–1.42)
CALCIUM SERPL-MCNC: 7 MG/DL (ref 8.7–10.5)
CHLORIDE SERPL-SCNC: 100 MMOL/L (ref 95–110)
CO2 SERPL-SCNC: 22 MMOL/L (ref 23–29)
CREAT SERPL-MCNC: 9.9 MG/DL (ref 0.5–1.4)
DIFFERENTIAL METHOD: ABNORMAL
EOSINOPHIL # BLD AUTO: 0.5 K/UL (ref 0–0.5)
EOSINOPHIL NFR BLD: 3.3 % (ref 0–8)
ERYTHROCYTE [DISTWIDTH] IN BLOOD BY AUTOMATED COUNT: 12.6 % (ref 11.5–14.5)
EST. GFR  (AFRICAN AMERICAN): 8 ML/MIN/1.73 M^2
EST. GFR  (NON AFRICAN AMERICAN): 7 ML/MIN/1.73 M^2
FINAL PATHOLOGIC DIAGNOSIS: NORMAL
GLUCOSE SERPL-MCNC: 97 MG/DL (ref 70–110)
GROSS: NORMAL
HCT VFR BLD AUTO: 25.9 % (ref 40–54)
HGB BLD-MCNC: 8.8 G/DL (ref 14–18)
IMM GRANULOCYTES # BLD AUTO: 0.29 K/UL (ref 0–0.04)
IMM GRANULOCYTES NFR BLD AUTO: 2 % (ref 0–0.5)
LYMPHOCYTES # BLD AUTO: 1 K/UL (ref 1–4.8)
LYMPHOCYTES NFR BLD: 6.9 % (ref 18–48)
Lab: NORMAL
MAGNESIUM SERPL-MCNC: 2.3 MG/DL (ref 1.6–2.6)
MCH RBC QN AUTO: 30.9 PG (ref 27–31)
MCHC RBC AUTO-ENTMCNC: 34 G/DL (ref 32–36)
MCV RBC AUTO: 91 FL (ref 82–98)
MONOCYTES # BLD AUTO: 1.5 K/UL (ref 0.3–1)
MONOCYTES NFR BLD: 10.3 % (ref 4–15)
NEUTROPHILS # BLD AUTO: 11 K/UL (ref 1.8–7.7)
NEUTROPHILS NFR BLD: 77.2 % (ref 38–73)
NRBC BLD-RTO: 0 /100 WBC
PHOSPHATE SERPL-MCNC: 7.4 MG/DL (ref 2.7–4.5)
PLATELET # BLD AUTO: 211 K/UL (ref 150–450)
PMV BLD AUTO: 9.4 FL (ref 9.2–12.9)
POCT GLUCOSE: 102 MG/DL (ref 70–110)
POTASSIUM SERPL-SCNC: 5.2 MMOL/L (ref 3.5–5.1)
PROT SERPL-MCNC: 5.8 G/DL (ref 6–8.4)
RBC # BLD AUTO: 2.85 M/UL (ref 4.6–6.2)
SODIUM SERPL-SCNC: 133 MMOL/L (ref 136–145)
WBC # BLD AUTO: 14.26 K/UL (ref 3.9–12.7)

## 2022-04-22 PROCEDURE — 25000003 PHARM REV CODE 250: Performed by: ORTHOPAEDIC SURGERY

## 2022-04-22 PROCEDURE — 63600175 PHARM REV CODE 636 W HCPCS: Performed by: INTERNAL MEDICINE

## 2022-04-22 PROCEDURE — 99900035 HC TECH TIME PER 15 MIN (STAT)

## 2022-04-22 PROCEDURE — A4216 STERILE WATER/SALINE, 10 ML: HCPCS | Performed by: ORTHOPAEDIC SURGERY

## 2022-04-22 PROCEDURE — 25000003 PHARM REV CODE 250: Performed by: INTERNAL MEDICINE

## 2022-04-22 PROCEDURE — 97530 THERAPEUTIC ACTIVITIES: CPT

## 2022-04-22 PROCEDURE — 80053 COMPREHEN METABOLIC PANEL: CPT | Performed by: INTERNAL MEDICINE

## 2022-04-22 PROCEDURE — 36415 COLL VENOUS BLD VENIPUNCTURE: CPT | Performed by: INTERNAL MEDICINE

## 2022-04-22 PROCEDURE — 82330 ASSAY OF CALCIUM: CPT | Performed by: INTERNAL MEDICINE

## 2022-04-22 PROCEDURE — 94761 N-INVAS EAR/PLS OXIMETRY MLT: CPT

## 2022-04-22 PROCEDURE — 25000003 PHARM REV CODE 250

## 2022-04-22 PROCEDURE — 94799 UNLISTED PULMONARY SVC/PX: CPT

## 2022-04-22 PROCEDURE — 84100 ASSAY OF PHOSPHORUS: CPT | Performed by: INTERNAL MEDICINE

## 2022-04-22 PROCEDURE — 83735 ASSAY OF MAGNESIUM: CPT | Performed by: INTERNAL MEDICINE

## 2022-04-22 PROCEDURE — 85025 COMPLETE CBC W/AUTO DIFF WBC: CPT | Performed by: INTERNAL MEDICINE

## 2022-04-22 PROCEDURE — 97161 PT EVAL LOW COMPLEX 20 MIN: CPT

## 2022-04-22 PROCEDURE — 11000001 HC ACUTE MED/SURG PRIVATE ROOM

## 2022-04-22 RX ORDER — SODIUM CHLORIDE 9 MG/ML
INJECTION, SOLUTION INTRAVENOUS ONCE
Status: CANCELLED | OUTPATIENT
Start: 2022-04-22 | End: 2022-04-22

## 2022-04-22 RX ORDER — SODIUM CHLORIDE 9 MG/ML
INJECTION, SOLUTION INTRAVENOUS
Status: CANCELLED | OUTPATIENT
Start: 2022-04-22

## 2022-04-22 RX ORDER — DIPHENHYDRAMINE HCL 25 MG
25 CAPSULE ORAL EVERY 6 HOURS PRN
Status: DISCONTINUED | OUTPATIENT
Start: 2022-04-22 | End: 2022-05-11 | Stop reason: HOSPADM

## 2022-04-22 RX ORDER — HYDROCODONE BITARTRATE AND ACETAMINOPHEN 10; 325 MG/1; MG/1
1 TABLET ORAL EVERY 4 HOURS PRN
Status: DISCONTINUED | OUTPATIENT
Start: 2022-04-22 | End: 2022-04-25 | Stop reason: SDUPTHER

## 2022-04-22 RX ADMIN — HYDROCODONE BITARTRATE AND ACETAMINOPHEN 1 TABLET: 5; 325 TABLET ORAL at 09:04

## 2022-04-22 RX ADMIN — CYCLOBENZAPRINE 10 MG: 10 TABLET, FILM COATED ORAL at 09:04

## 2022-04-22 RX ADMIN — HYDROCODONE BITARTRATE AND ACETAMINOPHEN 1 TABLET: 10; 325 TABLET ORAL at 02:04

## 2022-04-22 RX ADMIN — DIPHENHYDRAMINE HYDROCHLORIDE 25 MG: 25 CAPSULE ORAL at 10:04

## 2022-04-22 RX ADMIN — Medication 2 ML: at 06:04

## 2022-04-22 RX ADMIN — HYDRALAZINE HYDROCHLORIDE 25 MG: 25 TABLET, FILM COATED ORAL at 02:04

## 2022-04-22 RX ADMIN — CLINDAMYCIN IN 5 PERCENT DEXTROSE 600 MG: 12 INJECTION, SOLUTION INTRAVENOUS at 12:04

## 2022-04-22 RX ADMIN — CLINDAMYCIN IN 5 PERCENT DEXTROSE 600 MG: 12 INJECTION, SOLUTION INTRAVENOUS at 09:04

## 2022-04-22 RX ADMIN — HYDROMORPHONE HYDROCHLORIDE 1 MG: 1 INJECTION, SOLUTION INTRAMUSCULAR; INTRAVENOUS; SUBCUTANEOUS at 06:04

## 2022-04-22 RX ADMIN — CLINDAMYCIN IN 5 PERCENT DEXTROSE 600 MG: 12 INJECTION, SOLUTION INTRAVENOUS at 04:04

## 2022-04-22 RX ADMIN — HYDROCODONE BITARTRATE AND ACETAMINOPHEN 1 TABLET: 10; 325 TABLET ORAL at 09:04

## 2022-04-22 RX ADMIN — HYDROMORPHONE HYDROCHLORIDE 1 MG: 1 INJECTION, SOLUTION INTRAMUSCULAR; INTRAVENOUS; SUBCUTANEOUS at 12:04

## 2022-04-22 RX ADMIN — Medication 2 ML: at 12:04

## 2022-04-22 RX ADMIN — HYDRALAZINE HYDROCHLORIDE 25 MG: 25 TABLET, FILM COATED ORAL at 09:04

## 2022-04-22 RX ADMIN — HYDRALAZINE HYDROCHLORIDE 25 MG: 25 TABLET, FILM COATED ORAL at 06:04

## 2022-04-22 RX ADMIN — HYDROCODONE BITARTRATE AND ACETAMINOPHEN 1 TABLET: 5; 325 TABLET ORAL at 04:04

## 2022-04-22 NOTE — PROGRESS NOTES
[facility] Hospital Medicine  Progress Note    Patient Name: Agus Horan  MRN: [id number]  Patient Class: IP- Inpatient   Admission Date: 4/13/2022  Length of Stay: 9 days  Attending Physician: Owen Zamorano MD  Primary Care Provider: Primary Doctor No        Subjective:     Principal Problem:Acute renal failure        HPI:  Agus Horan is a 20 y/o male with PMHx of mild asthma who presents with right lower extremity pain and swelling x few days. Patient was seen in the ED on 4/9/22 for oxycodone overdose and again on 4/10/22 for left eye pain after falling on a piece of furniture. Patient states he is unsure how he injured his leg and denies IV drug use. Xray of his right leg did not show acute fracture. Patietn states RLE pain and swelling increased over the past few days and he is unable to walk secondary to pain. US of his lower extremity performed today did not show DVT. Lab work revealed elevated Creatinine 13.5 & K+ 7.1, elevated AST/ALT 3980/1611 & WBC 18.14.  CPK>40,0000.  Patient unable to dorsiflex and plantar flex his RLE and symptoms are concerning for compartment syndrome per ED. ED provider discussed with Dr. Byers and Dr. Fraser, nephrology. Patient was referred to hospital medicine and seen in the ED with his friend at bedside. Patient complaining of RLE pain, tenderness and swelling. He denies SOB, N/V/D, chest pain and headaches.  Patient will be admitted to hospital medicine for orthopedic consultation and further management.      Overview/Hospital Course:  He was found to have compartment syndrome and was taking to the OR emergently for fasciotomy on 4/13. We are consulted for SANDRA with metabolic derangements.  Cpk at the time of arrival was more than 40,000. Initially was started on IV fluids and started on hemodialysis. on 4/14- did emergent HD overnight for hyperkalemia and metabolic acidosis refractory to medical management; pain controlled, patient underwent  DEBRIDEMENT, LOWER EXTREMITY (Right)   closure of medial fasciotomy incision right leg debridement of lateral fasciotomy with biopsy of anterior muscle compartment removal of deep muscle anterior compartment right lower leg on 04/18/2022.  Patient's CPK h continue to trend down.  Was continued on hemodialysis      Interval History:  No new complaints reported overnight.  Patient is tolerating hemodialysis therapy.  Slowly urine output is improving.  Patient is expected to undergo orthopedic surgery for possible wound closure on Monday by Dr. Byers.  Patient continues to complain right leg pain requiring narcotics for pain control.  Patient denies chest pain or shortness of breath.    Review of Systems   Constitutional:  Negative for activity change, appetite change, chills and fever.   HENT:  Negative for congestion, sore throat and trouble swallowing.    Eyes:  Negative for photophobia and visual disturbance.   Respiratory:  Negative for cough, chest tightness and shortness of breath.    Cardiovascular:  Negative for chest pain, palpitations and leg swelling.   Gastrointestinal:  Negative for abdominal pain, diarrhea and nausea.   Genitourinary:  Negative for dysuria, flank pain and hematuria.   Musculoskeletal:  Positive for gait problem and myalgias. Negative for back pain.   Skin:  Positive for color change.   Neurological:  Negative for dizziness, weakness and headaches.   Psychiatric/Behavioral:  Negative for confusion.    Objective:     Vital Signs (Most Recent):  Temp: 98 °F (36.7 °C) (04/22/22 0736)  Pulse: 105 (04/22/22 0736)  Resp: 17 (04/22/22 0736)  BP: 136/65 (04/22/22 0736)  SpO2: 98 % (04/22/22 0736)   Vital Signs (24h Range):  Temp:  [97 °F (36.1 °C)-98.4 °F (36.9 °C)] 98 °F (36.7 °C)  Pulse:  [] 105  Resp:  [14-18] 17  SpO2:  [97 %-100 %] 98 %  BP: (126-175)/(64-99) 136/65     Weight: 81.3 kg (179 lb 3.7 oz)  Body mass index is 26.47 kg/m².    Intake/Output Summary (Last 24 hours) at  4/22/2022 0821  Last data filed at 4/21/2022 1800  Gross per 24 hour   Intake 980 ml   Output 3600 ml   Net -2620 ml        Physical Exam  Vitals and nursing note reviewed.   Constitutional:       Appearance: He is normal weight. He is ill-appearing.   HENT:      Head: Normocephalic.      Mouth/Throat:      Mouth: Mucous membranes are moist.      Pharynx: Oropharynx is clear.   Eyes:      Pupils: Pupils are equal, round, and reactive to light.      Comments: Left orbital ecchymosis   Cardiovascular:      Rate and Rhythm: Regular rhythm. Tachycardia present.      Pulses: Normal pulses.      Heart sounds: Normal heart sounds.   Pulmonary:      Effort: Pulmonary effort is normal.      Breath sounds: Normal breath sounds.   Abdominal:      General: Bowel sounds are normal.      Palpations: Abdomen is soft.   Musculoskeletal:         General: Swelling (r Thigh swellin noted) present. Normal range of motion.      Cervical back: Normal range of motion.      Right upper leg: Swelling present.      Right lower leg: Swelling present.      Comments:     good plantar flexion of his toes actively.  he has no pain on passive extension of the toe the patient has very little extension actively of the toe great toes and smaller toes.    Skin:     General: Skin is warm and dry.   Neurological:      Mental Status: He is alert and oriented to person, place, and time. Mental status is at baseline.   Psychiatric:         Mood and Affect: Mood normal.       Significant Labs: All pertinent labs within the past 24 hours have been reviewed.  CBC:   Recent Labs   Lab 04/20/22  0834 04/21/22  0901   WBC 14.07* 14.60*   HGB 10.1* 9.3*   HCT 29.5* 27.1*    182       CMP:   Recent Labs   Lab 04/20/22  0834 04/21/22  0901   * 129*   K 4.9 5.4*   CL 96 95   CO2 22* 19*    92   BUN 76* 101*   CREATININE 9.6* 11.8*   CALCIUM 6.3* 6.4*   PROT 5.3*  --    ALBUMIN 2.0*  --    BILITOT 0.3  --    ALKPHOS 54*  --    *  --    ALT  27  --    ANIONGAP 15 15   EGFRNONAA 7* 5*       Microbiology Results (last 7 days)       Procedure Component Value Units Date/Time    Blood culture [[id number]] Collected: 04/13/22 0757    Order Status: Completed Specimen: Blood from Antecubital, Right Hand Updated: 04/19/22 1212     Blood Culture, Routine No growth after 5 days.          Significant Imaging:   X-ray lumbar spine: Normal study.    Right tibia and fibula: Normal study.    Right femur x-ray: Normal study.    Pelvis x-ray: Normal study.    CT Head: No acute abnormality.  Minimal sinus disease.    CT Maxillofacial scan: No facial bone fracture.  Minimal sinus disease.    RLE venous doppler scan: No evidence of deep venous thrombosis in the right lower extremity.    CXR: Central line placement with the tip over the SVC.  No acute detrimental changes.    RLE venous doppler scan:  No evidence of deep venous thrombosis in the right lower extremity. Edema in the soft tissues of the right lower extremity      Assessment/Plan:      * Acute renal failure  Continues to be anuric  Hemodialysis therapy as per Nephrology team.   due to rhabdomyolysis  Trend CPK daily.  Follow Nephrology recommendations.  Avoid nephrotoxins  Telemetry       Compartment syndrome  Underwent fasciotomy on 4/13   Appreciate ortho rec.  Discussed with Dr. Byers who is planning to perform wound closure surgery possibly on Monday.  Wound care nurse performing dressing changes as per recommendations by Orthopedics.    Elevated liver enzymes  Elevated AST &ALT likely due to rhabdomyolysis  Trending down  Avoid hepatoxic medications  Monitor CMP  See plan for rhabdomyolysis        Rhabdomyolysis  CPK trending down  S/p fasciotomy on 4/13 for compartment syndrome.  On 4/18 S/p DEBRIDEMENT, LOWER EXTREMITY (Right)   closure of medial fasciotomy incision right leg debridement of lateral fasciotomy with biopsy of anterior muscle compartment removal of deep muscle anterior compartment right lower  leg  Scheduled for HD hyperkalemia and metabolic acidosis refractory to medical management;    IV fluids  Monitor BMP   appreciate nephrology recs      History of asthma  Hx of asthma, stable    Monitor for acute changes        VTE Risk Mitigation (From admission, onward)         Ordered     heparin (porcine) injection 4,000 Units  As needed (PRN)         04/14/22 0137     Reason for No Pharmacological VTE Prophylaxis  Once        Question:  Reasons:  Answer:  Risk of Bleeding    04/13/22 1913     IP VTE HIGH RISK PATIENT  Once         04/13/22 1913     Place sequential compression device  Until discontinued         04/13/22 1913                Discharge Planning   JARAD: 4/25/2022     Code Status: Full Code   Is the patient medically ready for discharge?:     Reason for patient still in hospital (select all that apply): Patient trending condition and Consult recommendations  Discharge Plan A: Home with family                  Owen Zamorano MD  Department of Hospital Medicine   [facility]

## 2022-04-22 NOTE — PLAN OF CARE
Per [facility]'s pt has a repeat surgery Monday and is not ready for discharge. CM following.    04/22/22 3482   Discharge Assessment   Assessment Type Discharge Planning Reassessment

## 2022-04-22 NOTE — SUBJECTIVE & OBJECTIVE
Interval History:  No new complaints reported overnight.  Patient is tolerating hemodialysis therapy.  Slowly urine output is improving.  Patient is expected to undergo orthopedic surgery for possible wound closure on Monday by Dr. Byers.  Patient continues to complain right leg pain requiring narcotics for pain control.  Patient denies chest pain or shortness of breath.    Review of Systems   Constitutional:  Negative for activity change, appetite change, chills and fever.   HENT:  Negative for congestion, sore throat and trouble swallowing.    Eyes:  Negative for photophobia and visual disturbance.   Respiratory:  Negative for cough, chest tightness and shortness of breath.    Cardiovascular:  Negative for chest pain, palpitations and leg swelling.   Gastrointestinal:  Negative for abdominal pain, diarrhea and nausea.   Genitourinary:  Negative for dysuria, flank pain and hematuria.   Musculoskeletal:  Positive for gait problem and myalgias. Negative for back pain.   Skin:  Positive for color change.   Neurological:  Negative for dizziness, weakness and headaches.   Psychiatric/Behavioral:  Negative for confusion.    Objective:     Vital Signs (Most Recent):  Temp: 98 °F (36.7 °C) (04/22/22 0736)  Pulse: 105 (04/22/22 0736)  Resp: 17 (04/22/22 0736)  BP: 136/65 (04/22/22 0736)  SpO2: 98 % (04/22/22 0736)   Vital Signs (24h Range):  Temp:  [97 °F (36.1 °C)-98.4 °F (36.9 °C)] 98 °F (36.7 °C)  Pulse:  [] 105  Resp:  [14-18] 17  SpO2:  [97 %-100 %] 98 %  BP: (126-175)/(64-99) 136/65     Weight: 81.3 kg (179 lb 3.7 oz)  Body mass index is 26.47 kg/m².    Intake/Output Summary (Last 24 hours) at 4/22/2022 0821  Last data filed at 4/21/2022 1800  Gross per 24 hour   Intake 980 ml   Output 3600 ml   Net -2620 ml        Physical Exam  Vitals and nursing note reviewed.   Constitutional:       Appearance: He is normal weight. He is ill-appearing.   HENT:      Head: Normocephalic.      Mouth/Throat:      Mouth: Mucous  membranes are moist.      Pharynx: Oropharynx is clear.   Eyes:      Pupils: Pupils are equal, round, and reactive to light.      Comments: Left orbital ecchymosis   Cardiovascular:      Rate and Rhythm: Regular rhythm. Tachycardia present.      Pulses: Normal pulses.      Heart sounds: Normal heart sounds.   Pulmonary:      Effort: Pulmonary effort is normal.      Breath sounds: Normal breath sounds.   Abdominal:      General: Bowel sounds are normal.      Palpations: Abdomen is soft.   Musculoskeletal:         General: Swelling (r Thigh swellin noted) present. Normal range of motion.      Cervical back: Normal range of motion.      Right upper leg: Swelling present.      Right lower leg: Swelling present.      Comments:     good plantar flexion of his toes actively.  he has no pain on passive extension of the toe the patient has very little extension actively of the toe great toes and smaller toes.    Skin:     General: Skin is warm and dry.   Neurological:      Mental Status: He is alert and oriented to person, place, and time. Mental status is at baseline.   Psychiatric:         Mood and Affect: Mood normal.       Significant Labs: All pertinent labs within the past 24 hours have been reviewed.  CBC:   Recent Labs   Lab 04/20/22  0834 04/21/22  0901   WBC 14.07* 14.60*   HGB 10.1* 9.3*   HCT 29.5* 27.1*    182       CMP:   Recent Labs   Lab 04/20/22  0834 04/21/22  0901   * 129*   K 4.9 5.4*   CL 96 95   CO2 22* 19*    92   BUN 76* 101*   CREATININE 9.6* 11.8*   CALCIUM 6.3* 6.4*   PROT 5.3*  --    ALBUMIN 2.0*  --    BILITOT 0.3  --    ALKPHOS 54*  --    *  --    ALT 27  --    ANIONGAP 15 15   EGFRNONAA 7* 5*       Microbiology Results (last 7 days)       Procedure Component Value Units Date/Time    Blood culture [[id number]] Collected: 04/13/22 4717    Order Status: Completed Specimen: Blood from Antecubital, Right Hand Updated: 04/19/22 1212     Blood Culture, Routine No growth after  5 days.          Significant Imaging:   X-ray lumbar spine: Normal study.    Right tibia and fibula: Normal study.    Right femur x-ray: Normal study.    Pelvis x-ray: Normal study.    CT Head: No acute abnormality.  Minimal sinus disease.    CT Maxillofacial scan: No facial bone fracture.  Minimal sinus disease.    RLE venous doppler scan: No evidence of deep venous thrombosis in the right lower extremity.    CXR: Central line placement with the tip over the SVC.  No acute detrimental changes.    RLE venous doppler scan:  No evidence of deep venous thrombosis in the right lower extremity. Edema in the soft tissues of the right lower extremity

## 2022-04-22 NOTE — ASSESSMENT & PLAN NOTE
Underwent fasciotomy on 4/13   Appreciate ortho rec.  Discussed with Dr. Byers who is planning to perform wound closure surgery possibly on Monday.  Wound care nurse performing dressing changes as per recommendations by Orthopedics.

## 2022-04-22 NOTE — PLAN OF CARE
Problem: Physical Therapy  Goal: Physical Therapy Goal  Description: Goals to be met by: 5/15/2022     Patient will increase functional independence with mobility by performin). Supine to sit with Modified [place]  2). Sit to supine with Modified [place]  3). Sit to stand transfer with Modified [place]  4). Bed to chair transfer with Modified [place] using LRAD.  5). Gait  x > 25 feet with Modified [place] using LRAD.    Outcome: Ongoing, Progressing

## 2022-04-22 NOTE — PT/OT/SLP EVAL
Physical Therapy Evaluation    Patient Name:  Agus Horan   MRN:  [id number]    Recommendations:     Discharge Recommendations:  home   Discharge Equipment Recommendations:  (TBD)   Barriers to discharge: not yet entirely safe ambulating NWB RLE, still at risk of fall, will probably progress quickly due to age    Assessment:     Agus Horan is a 21 y.o. male admitted with a medical diagnosis of Acute renal failure.  He presents with the following impairments/functional limitations:  weakness, impaired endurance, orthopedic precautions, impaired functional mobilty, gait instability, impaired balance, decreased lower extremity function  .    Rehab Prognosis: Good; patient would benefit from acute skilled PT services to address these deficits and reach maximum level of function.    Recent Surgery: Procedure(s) (LRB):  DEBRIDEMENT, LOWER EXTREMITY (Right) 4 Days Post-Op    Plan:     During this hospitalization, patient to be seen  (1-2x/day) to address the identified rehab impairments via gait training, therapeutic activities, therapeutic exercises and progress toward the following goals:    · Plan of Care Expires:  05/15/22    Subjective     Patient/Family Comments/goals: wants to get to bathroom    Living Environment:  Will live with father in [place], 1 small step to enter house  Prior to admission, patients level of function was independent.  Equipment used at home: crutches, axillary (had fall using crutches).   Upon discharge, patient will have assistance from family.    Objective:     Communicated with charge RN (Meir) prior to session.  Patient found supine with grier catheter  upon PT entry to room.    General Precautions: Standard, fall   Orthopedic Precautions:RLE non weight bearing   Braces:  (R ankle splint)  Respiratory Status: Room air    Functional Mobility training:  · Bed Mobility:     · Rolling Left:  minimum assistance  · Supine to Sit: minimum assistance  · Transfers:     · Sit to Stand:   minimum assistance with rolling walker and  x 3 reps  · Bed to Chair: minimum assistance with  rolling walker and NWB RLE  using  Stand Pivot  · Toilet Transfer: minimum assistance with  rolling walker and grab bars  using  Stand Pivot and NWB RLE (to/from toilet)  · Gait: 10' x 2 with RW with min A NWB RLE    Therapeutic Activities and Exercises:   mobility training as above with cues for technique including NWB RLE at all times, verbalizes understanding  Also rec'd to elevate RLE    AM-PAC 6 CLICK MOBILITY  Total Score:16     Patient left up in chair with RLE elevated on chair and pillows all lines intact, call button in reach, chair alarm on and nursing student present.    GOALS:   Multidisciplinary Problems     Physical Therapy Goals        Problem: Physical Therapy    Goal Priority Disciplines Outcome Goal Variances Interventions   Physical Therapy Goal     PT, PT/OT Ongoing, Progressing     Description: Goals to be met by: 5/15/2022     Patient will increase functional independence with mobility by performin). Supine to sit with Modified [place]  2). Sit to supine with Modified [place]  3). Sit to stand transfer with Modified [place]  4). Bed to chair transfer with Modified [place] using LRAD.  5). Gait  x > 25 feet with Modified [place] using LRAD.                     History:     Past Medical History:   Diagnosis Date    Allergy     AR    Asthma     mild intermittent    Hyperkalemia 2022       Past Surgical History:   Procedure Laterality Date    DEBRIDEMENT OF LOWER EXTREMITY Right 2022    Procedure: DEBRIDEMENT, LOWER EXTREMITY;  Surgeon: Leonardo Byers MD;  Location: [facility] OR;  Service: Orthopedics;  Laterality: Right;    FASCIOTOMY Right 2022    Procedure: FASCIOTOMY;  Surgeon: Leonardo Byers MD;  Location: [facility] OR;  Service: Orthopedics;  Laterality: Right;    REMOVAL OF FOREIGN BODY FROM FOOT Left 2020    Procedure: REMOVAL, FOREIGN  BODY, FOOT;  Surgeon: Dani Garcia MD;  Location: [facility];  Service: Orthopedics;  Laterality: Left;       Time Tracking:     PT Received On: 04/22/22  PT Start Time: 0835     PT Stop Time: 0905  PT Total Time (min): 30 min     Billable Minutes: Evaluation 15 and Therapeutic Activity 10      04/22/2022

## 2022-04-22 NOTE — PROGRESS NOTES
INPATIENT NEPHROLOGY Progress Note  [facility]    Patient Name: Agus Horan  Date: 04/22/2022    Reason for consultation: SANDRA    Chief Complaint:   Chief Complaint   Patient presents with    Leg Pain     Pain in the right leg muscle calf is hard, nausea vomiting, patient was seen here over the weekend for an overdose        History of Present Illness:  20 y/o M with a history of asthma recently here on 4/9 with oxycodone overdose who p/w RLE pain and swelling after a fall on 4/10. He reports severe pain, constant, associated with nausea with inability to bear weight.  He took meloxicam without relief. He was found to have compartment syndrome and was taking to the OR emergently for fasciotomy on 4/13. We are consulted for SANDRA with metabolic derangements.    Interval History:  4/14- did emergent HD overnight for hyperkalemia and metabolic acidosis refractory to medical management; pain controlled, RLE wrapped, no edema in LLE  4/15- worsening pain/edema in RLE- going for MRI- oligoanuric- stop NS IVFs- will do HD/UF today and tomorrow  4/16  Seen on dialysis.  No distress  4/17  Remains oliguric.  AFVSS.  Has back pain.  Leg pain a little better  4/18  In the OR.  Still oliguric.    4/19  Seen on dialysis.  No distress.    4/20  Still not making much urine.  cpk coming down.     4/21  Afebrile.  BP a little better.  uop up a bit  422   Sleeping.  AFVSS.  I/Os not appropriately recorded despite being ordered for twice      Plan of Care:    Assessment:  Traumatic rhabdomyolysis with compartment syndrome s/p fasciotomy on 4/13  SANDRA due to toxic ATN s/p emergent HD on 4/14- remains oligoanuric and HD dependent  Edema  Hyponatremia  Hyperkalemia  Acidosis  Hypocalcemia  Shock liver    Plan:    - Trend daily CK.     cpk slowing coming down  - He remains HD dependent.  . - Stop IVFs since he is oligoanuric and developing edema. Continue grier  Ok with -160s for renal perfusion. No NSAIDs or  IV contrast. Dose meds for CrCl < 10.  - Advise renal diet with 1.5L fluid restriction. Will adjust dialysate for all metabolic derangements. Will replete calcium PRN.  - Trend daily LFTs.   - Change to daily labs.  --replete calcium.  Need to do calcium repletion judiciously in pt with rhabdomyolysis.    --added hydralazine  --dialysis in am    Will reorder accurate I/Os.  Hopefully it will get done.  I am trying to monitor for renal recovery and assessment of urine output is very important.  Fingers crossed the orders will get followed....    Thank you for allowing us to participate in this patient's care. We will continue to follow.    Vital Signs:  Temp Readings from Last 3 Encounters:   04/22/22 99 °F (37.2 °C) (Oral)   04/10/22 97.3 °F (36.3 °C)   04/09/22 98.4 °F (36.9 °C)       Pulse Readings from Last 3 Encounters:   04/22/22 108   04/10/22 63   04/09/22 95       BP Readings from Last 3 Encounters:   04/22/22 139/69   04/10/22 117/64   04/09/22 (!) 143/82       Weight:  Wt Readings from Last 3 Encounters:   04/20/22 81.3 kg (179 lb 3.7 oz)   04/10/22 77.1 kg (170 lb)   04/09/22 77.1 kg (170 lb)       INS/OUTS:  I/O last 3 completed shifts:  In: 1440 [P.O.:940; Other:500]  Out: 3800 [Urine:300; Other:3500]  I/O this shift:  In: 251 [P.O.:241; I.V.:10]  Out: -     Medications:  Scheduled Meds:   calcium gluconate IVPB  1 g Intravenous Once    clindamycin (CLEOCIN) IVPB  600 mg Intravenous Q8H    hydrALAZINE  25 mg Oral Q8H    sodium chloride 0.9%  2 mL Intravenous Q6H     Continuous Infusions:   loperamide       PRN Meds:.sodium chloride 0.9%, cyclobenzaprine, dextrose 10%, dextrose 10%, glucagon (human recombinant), glucose, glucose, heparin (porcine), HYDROcodone-acetaminophen, HYDROmorphone, labetaloL, loperamide, naloxone, ondansetron, sodium chloride 0.9%  No current facility-administered medications on file prior to encounter.     Current Outpatient Medications on File Prior to Encounter  "  Medication Sig Dispense Refill    naloxone (NARCAN) 4 mg/actuation Spry 4mg by nasal route as needed for opioid overdose; may repeat every 2-3 minutes in alternating nostrils until medical help arrives. Call 911 2 each 0       Review of Systems:  Neg    Physical Exam:  /69   Pulse 108   Temp 99 °F (37.2 °C) (Oral)   Resp 14   Ht 5' 9" (1.753 m)   Wt 81.3 kg (179 lb 3.7 oz)   SpO2 100%   BMI 26.47 kg/m²     Constitutional: nad, aao x 3, depressed affect  Heart: rrr, no m/r/g, wwp, RLE eedema  Lungs: ctab, no w/r/r/c, no lb  Abdomen: s/nt/nd, +BS    Results:  Lab Results   Component Value Date     (L) 04/22/2022    K 5.2 (H) 04/22/2022     04/22/2022    CO2 22 (L) 04/22/2022    BUN 77 (H) 04/22/2022    CREATININE 9.9 (H) 04/22/2022    CALCIUM 7.0 (L) 04/22/2022    ANIONGAP 11 04/22/2022    ESTGFRAFRICA 8 (A) 04/22/2022    EGFRNONAA 7 (A) 04/22/2022       Lab Results   Component Value Date    CALCIUM 7.0 (L) 04/22/2022    PHOS 7.4 (H) 04/22/2022       Recent Labs   Lab 04/22/22  0833   WBC 14.26*   RBC 2.85*   HGB 8.8*   HCT 25.9*      MCV 91   MCH 30.9   MCHC 34.0       I have personally reviewed pertinent radiological imaging and reports.    I have spent > 35 minutes providing care for this patient for the above diagnoses. These services have included chart/data/imaging review, evaluation, exam, formulation of plan, , note preparation, and discussions with staff involved in this patient's care.    Chantelle Fraser MD MPH  [facility] [address]  [place] [address]  [phone] (p)  [phone] (f)  "

## 2022-04-22 NOTE — PLAN OF CARE
Problem: Adult Inpatient Plan of Care  Goal: Plan of Care Review  Outcome: Ongoing, Progressing  Goal: Patient-Specific Goal (Individualized)  Outcome: Ongoing, Progressing  Goal: Absence of Hospital-Acquired Illness or Injury  Outcome: Ongoing, Progressing  Goal: Optimal Comfort and Wellbeing  Outcome: Ongoing, Progressing  Goal: Readiness for Transition of Care  Outcome: Ongoing, Progressing     Problem: Infection  Goal: Absence of Infection Signs and Symptoms  Outcome: Ongoing, Progressing     Problem: Fall Injury Risk  Goal: Absence of Fall and Fall-Related Injury  Outcome: Ongoing, Progressing     Problem: Skin Injury Risk Increased  Goal: Skin Health and Integrity  Outcome: Ongoing, Progressing     Problem: Device-Related Complication Risk (Hemodialysis)  Goal: Safe, Effective Therapy Delivery  Outcome: Ongoing, Progressing     Problem: Hemodynamic Instability (Hemodialysis)  Goal: Effective Tissue Perfusion  Outcome: Ongoing, Progressing     Problem: Infection (Hemodialysis)  Goal: Absence of Infection Signs and Symptoms  Outcome: Ongoing, Progressing     Problem: Fluid and Electrolyte Imbalance (Acute Kidney Injury/Impairment)  Goal: Fluid and Electrolyte Balance  Outcome: Ongoing, Progressing     Problem: Oral Intake Inadequate (Acute Kidney Injury/Impairment)  Goal: Optimal Nutrition Intake  Outcome: Ongoing, Progressing     Problem: Renal Function Impairment (Acute Kidney Injury/Impairment)  Goal: Effective Renal Function  Outcome: Ongoing, Progressing

## 2022-04-22 NOTE — PROGRESS NOTES
Remove the dressing from the right lower leg and inspected the wounds today.  The patient appears to have more motor activity in the anterior compartment today the muscle appeared to be viable there was some areas of some darkened muscle both the most part looked better the lateral compartment muscle looked good.  Medial fasciotomy is healing well with no drainage.  The patient appears to have less edema in the lower extremity.  Also the thigh is less swollen.  He has good quadriceps and hamstring motor function.    Plans are to wash the fasciotomy wound today and dress the fasciotomy wound.  We will continue elevation of the right leg.  And will bring the patient to surgery on Monday to washout the fasciotomy and try to do some closure of the fasciotomy incision.

## 2022-04-22 NOTE — CARE UPDATE
04/22/22 0805   Patient Assessment/Suction   Level of Consciousness (AVPU) alert   PRE-TX-O2   O2 Device (Oxygen Therapy) room air   SpO2 100 %   Pulse Oximetry Type Intermittent   $ Pulse Oximetry - Multiple Charge Pulse Oximetry - Multiple   Incentive Spirometer   $ Incentive Spirometer Charges done with encouragement   Administration (IS) self-administered

## 2022-04-22 NOTE — NURSING
At bedside with Dr. Byers to assess the wounds to the right lower leg. Removed the dressing from the right leg. There is good blood flow to the right lateral open wound. Cleaned wounds with normal saline and gently patted dry. Applied Urgotul (silver mesh) non adherent dressing over the right lateral lower leg and right medial lower leg then covered with abd pads and kerlix. Then re-applied the right lower leg splint and secured with with ace wraps x2. This dressing is to remain in place. Dr. Byers plans to take the patient to surgery on Monday. Plan of care discussed with the patient and patient verbalized understanding. Wound care will follow this patient for wound care needs in coordination with Dr. Byers.      Right lateral lower leg    Right medial lower leg

## 2022-04-22 NOTE — ASSESSMENT & PLAN NOTE
Continues to be anuric  Hemodialysis therapy as per Nephrology team.   due to rhabdomyolysis  Trend CPK daily.  Follow Nephrology recommendations.  Avoid nephrotoxins  Telemetry

## 2022-04-22 NOTE — PLAN OF CARE
Patient AOX4 resting in bed, appears asleep, eyes closed, respirations even and unlabored. NAD. Denies SOB. Repeated requests for pain medication. VSS. Afebrile. Cardiac monitoring maintained. Normal SR. Up with assistance. Room air. Medications administered per MD/NP order. Bed alarm active. Side Rails up X2. Plan of care reviewed with patient. Verbalizes understanding. Frequent checks performed Q2H. Call light in reach. Pt free from fall or injury. Will monitor.

## 2022-04-23 PROBLEM — L27.0 DRUG ERUPTION: Status: ACTIVE | Noted: 2022-04-23

## 2022-04-23 LAB
25(OH)D3+25(OH)D2 SERPL-MCNC: 16 NG/ML (ref 30–96)
ALBUMIN SERPL BCP-MCNC: 2 G/DL (ref 3.5–5.2)
ALP SERPL-CCNC: 55 U/L (ref 55–135)
ALT SERPL W/O P-5'-P-CCNC: 21 U/L (ref 10–44)
ANION GAP SERPL CALC-SCNC: 13 MMOL/L (ref 8–16)
AST SERPL-CCNC: 111 U/L (ref 10–40)
BASOPHILS # BLD AUTO: 0.03 K/UL (ref 0–0.2)
BASOPHILS NFR BLD: 0.2 % (ref 0–1.9)
BILIRUB SERPL-MCNC: 0.3 MG/DL (ref 0.1–1)
BUN SERPL-MCNC: 102 MG/DL (ref 6–20)
CA-I BLDV-SCNC: 0.95 MMOL/L (ref 1.06–1.42)
CALCIUM SERPL-MCNC: 7 MG/DL (ref 8.7–10.5)
CHLORIDE SERPL-SCNC: 99 MMOL/L (ref 95–110)
CK SERPL-CCNC: 3079 U/L (ref 20–200)
CO2 SERPL-SCNC: 20 MMOL/L (ref 23–29)
CREAT SERPL-MCNC: 11.8 MG/DL (ref 0.5–1.4)
DIFFERENTIAL METHOD: ABNORMAL
EOSINOPHIL # BLD AUTO: 0.5 K/UL (ref 0–0.5)
EOSINOPHIL NFR BLD: 3.2 % (ref 0–8)
ERYTHROCYTE [DISTWIDTH] IN BLOOD BY AUTOMATED COUNT: 12.6 % (ref 11.5–14.5)
EST. GFR  (AFRICAN AMERICAN): 6 ML/MIN/1.73 M^2
EST. GFR  (NON AFRICAN AMERICAN): 5 ML/MIN/1.73 M^2
GLUCOSE SERPL-MCNC: 97 MG/DL (ref 70–110)
HCT VFR BLD AUTO: 23.8 % (ref 40–54)
HGB BLD-MCNC: 8 G/DL (ref 14–18)
IMM GRANULOCYTES # BLD AUTO: 0.21 K/UL (ref 0–0.04)
IMM GRANULOCYTES NFR BLD AUTO: 1.4 % (ref 0–0.5)
LYMPHOCYTES # BLD AUTO: 1 K/UL (ref 1–4.8)
LYMPHOCYTES NFR BLD: 6.6 % (ref 18–48)
MAGNESIUM SERPL-MCNC: 2.3 MG/DL (ref 1.6–2.6)
MCH RBC QN AUTO: 30.7 PG (ref 27–31)
MCHC RBC AUTO-ENTMCNC: 33.6 G/DL (ref 32–36)
MCV RBC AUTO: 91 FL (ref 82–98)
MONOCYTES # BLD AUTO: 1.3 K/UL (ref 0.3–1)
MONOCYTES NFR BLD: 8.7 % (ref 4–15)
NEUTROPHILS # BLD AUTO: 11.6 K/UL (ref 1.8–7.7)
NEUTROPHILS NFR BLD: 79.9 % (ref 38–73)
NRBC BLD-RTO: 0 /100 WBC
PHOSPHATE SERPL-MCNC: 8 MG/DL (ref 2.7–4.5)
PLATELET # BLD AUTO: 235 K/UL (ref 150–450)
PMV BLD AUTO: 9.3 FL (ref 9.2–12.9)
POCT GLUCOSE: 103 MG/DL (ref 70–110)
POTASSIUM SERPL-SCNC: 5.5 MMOL/L (ref 3.5–5.1)
PROT SERPL-MCNC: 5.6 G/DL (ref 6–8.4)
PTH-INTACT SERPL-MCNC: 901.6 PG/ML (ref 9–77)
RBC # BLD AUTO: 2.61 M/UL (ref 4.6–6.2)
SODIUM SERPL-SCNC: 132 MMOL/L (ref 136–145)
WBC # BLD AUTO: 14.55 K/UL (ref 3.9–12.7)

## 2022-04-23 PROCEDURE — 25000003 PHARM REV CODE 250: Performed by: INTERNAL MEDICINE

## 2022-04-23 PROCEDURE — 83970 ASSAY OF PARATHORMONE: CPT | Performed by: INTERNAL MEDICINE

## 2022-04-23 PROCEDURE — 11000001 HC ACUTE MED/SURG PRIVATE ROOM

## 2022-04-23 PROCEDURE — 63600175 PHARM REV CODE 636 W HCPCS: Performed by: INTERNAL MEDICINE

## 2022-04-23 PROCEDURE — 36415 COLL VENOUS BLD VENIPUNCTURE: CPT | Performed by: INTERNAL MEDICINE

## 2022-04-23 PROCEDURE — 25000003 PHARM REV CODE 250: Performed by: ORTHOPAEDIC SURGERY

## 2022-04-23 PROCEDURE — 94761 N-INVAS EAR/PLS OXIMETRY MLT: CPT

## 2022-04-23 PROCEDURE — 83735 ASSAY OF MAGNESIUM: CPT | Performed by: INTERNAL MEDICINE

## 2022-04-23 PROCEDURE — 82550 ASSAY OF CK (CPK): CPT | Performed by: INTERNAL MEDICINE

## 2022-04-23 PROCEDURE — 99900035 HC TECH TIME PER 15 MIN (STAT)

## 2022-04-23 PROCEDURE — 82330 ASSAY OF CALCIUM: CPT | Performed by: INTERNAL MEDICINE

## 2022-04-23 PROCEDURE — 80100014 HC HEMODIALYSIS 1:1

## 2022-04-23 PROCEDURE — 82306 VITAMIN D 25 HYDROXY: CPT | Performed by: INTERNAL MEDICINE

## 2022-04-23 PROCEDURE — A4216 STERILE WATER/SALINE, 10 ML: HCPCS | Performed by: ORTHOPAEDIC SURGERY

## 2022-04-23 PROCEDURE — 84100 ASSAY OF PHOSPHORUS: CPT | Performed by: INTERNAL MEDICINE

## 2022-04-23 PROCEDURE — 63600175 PHARM REV CODE 636 W HCPCS: Performed by: ORTHOPAEDIC SURGERY

## 2022-04-23 PROCEDURE — 80053 COMPREHEN METABOLIC PANEL: CPT | Performed by: INTERNAL MEDICINE

## 2022-04-23 PROCEDURE — 85025 COMPLETE CBC W/AUTO DIFF WBC: CPT | Performed by: INTERNAL MEDICINE

## 2022-04-23 PROCEDURE — 25000003 PHARM REV CODE 250

## 2022-04-23 PROCEDURE — 94799 UNLISTED PULMONARY SVC/PX: CPT

## 2022-04-23 RX ORDER — DIPHENHYDRAMINE HYDROCHLORIDE 50 MG/ML
25 INJECTION INTRAMUSCULAR; INTRAVENOUS ONCE
Status: COMPLETED | OUTPATIENT
Start: 2022-04-23 | End: 2022-04-23

## 2022-04-23 RX ORDER — SEVELAMER CARBONATE 800 MG/1
800 TABLET, FILM COATED ORAL
Status: DISCONTINUED | OUTPATIENT
Start: 2022-04-23 | End: 2022-05-05

## 2022-04-23 RX ORDER — METHYLPREDNISOLONE SOD SUCC 125 MG
125 VIAL (EA) INJECTION ONCE
Status: COMPLETED | OUTPATIENT
Start: 2022-04-23 | End: 2022-04-23

## 2022-04-23 RX ORDER — DOCUSATE SODIUM 100 MG/1
100 CAPSULE, LIQUID FILLED ORAL DAILY
Status: DISCONTINUED | OUTPATIENT
Start: 2022-04-23 | End: 2022-04-25 | Stop reason: SDUPTHER

## 2022-04-23 RX ADMIN — Medication 2 ML: at 06:04

## 2022-04-23 RX ADMIN — HYDROMORPHONE HYDROCHLORIDE 1 MG: 1 INJECTION, SOLUTION INTRAMUSCULAR; INTRAVENOUS; SUBCUTANEOUS at 12:04

## 2022-04-23 RX ADMIN — CYCLOBENZAPRINE 10 MG: 10 TABLET, FILM COATED ORAL at 02:04

## 2022-04-23 RX ADMIN — CLINDAMYCIN IN 5 PERCENT DEXTROSE 600 MG: 12 INJECTION, SOLUTION INTRAVENOUS at 12:04

## 2022-04-23 RX ADMIN — HYDROCODONE BITARTRATE AND ACETAMINOPHEN 1 TABLET: 10; 325 TABLET ORAL at 06:04

## 2022-04-23 RX ADMIN — HYDRALAZINE HYDROCHLORIDE 25 MG: 25 TABLET, FILM COATED ORAL at 02:04

## 2022-04-23 RX ADMIN — HYDROMORPHONE HYDROCHLORIDE 1 MG: 1 INJECTION, SOLUTION INTRAMUSCULAR; INTRAVENOUS; SUBCUTANEOUS at 08:04

## 2022-04-23 RX ADMIN — HYDROCODONE BITARTRATE AND ACETAMINOPHEN 1 TABLET: 10; 325 TABLET ORAL at 05:04

## 2022-04-23 RX ADMIN — Medication 2 ML: at 12:04

## 2022-04-23 RX ADMIN — DOCUSATE SODIUM 100 MG: 100 CAPSULE, LIQUID FILLED ORAL at 02:04

## 2022-04-23 RX ADMIN — CEFAZOLIN 500 MG: 1 INJECTION, POWDER, FOR SOLUTION INTRAVENOUS at 02:04

## 2022-04-23 RX ADMIN — DIPHENHYDRAMINE HYDROCHLORIDE 25 MG: 25 CAPSULE ORAL at 05:04

## 2022-04-23 RX ADMIN — CYCLOBENZAPRINE 10 MG: 10 TABLET, FILM COATED ORAL at 09:04

## 2022-04-23 RX ADMIN — DIPHENHYDRAMINE HYDROCHLORIDE 25 MG: 25 CAPSULE ORAL at 09:04

## 2022-04-23 RX ADMIN — HYDROCODONE BITARTRATE AND ACETAMINOPHEN 1 TABLET: 10; 325 TABLET ORAL at 09:04

## 2022-04-23 RX ADMIN — SEVELAMER CARBONATE 800 MG: 800 TABLET, FILM COATED ORAL at 05:04

## 2022-04-23 RX ADMIN — HYDRALAZINE HYDROCHLORIDE 25 MG: 25 TABLET, FILM COATED ORAL at 09:04

## 2022-04-23 RX ADMIN — HYDRALAZINE HYDROCHLORIDE 25 MG: 25 TABLET, FILM COATED ORAL at 06:04

## 2022-04-23 RX ADMIN — DIPHENHYDRAMINE HYDROCHLORIDE 25 MG: 50 INJECTION, SOLUTION INTRAMUSCULAR; INTRAVENOUS at 10:04

## 2022-04-23 RX ADMIN — METHYLPREDNISOLONE SODIUM SUCCINATE 125 MG: 125 INJECTION, POWDER, FOR SOLUTION INTRAMUSCULAR; INTRAVENOUS at 12:04

## 2022-04-23 RX ADMIN — HYDROCODONE BITARTRATE AND ACETAMINOPHEN 1 TABLET: 10; 325 TABLET ORAL at 12:04

## 2022-04-23 RX ADMIN — HEPARIN SODIUM 4000 UNITS: 1000 INJECTION, SOLUTION INTRAVENOUS; SUBCUTANEOUS at 11:04

## 2022-04-23 NOTE — NURSING
Pt with rash multiple areas of body left ac left upper arm entire back abdomen RLE.  Pt c/o itching entire body x 2 days.  Dr Zamorano called will see pt.

## 2022-04-23 NOTE — SUBJECTIVE & OBJECTIVE
Interval History:  Patient is complaining of severe generalized itching and since yesterday has developed diffuse maculopapular rash all over.  Patient historically has allergy to shrimp ingestion but no medication allergy.  Patient has been receiving intravenous clindamycin during hospital stay.  No lip or tongue swelling or difficulty breathing or swallowing reported.  Right lower extremity pain is stable. Patient is tolerating hemodialysis therapy. Patient is expected to undergo Lower extremity fasciotomy wound washing on Monday by Dr. Byers.  Patient continues to complain right leg pain requiring narcotics for pain control.  Patient denies chest pain or shortness of breath.    Review of Systems   Constitutional:  Negative for activity change, appetite change, chills and fever.   HENT:  Negative for congestion, sore throat and trouble swallowing.    Eyes:  Negative for photophobia and visual disturbance.   Respiratory:  Negative for cough, chest tightness and shortness of breath.    Cardiovascular:  Negative for chest pain, palpitations and leg swelling.   Gastrointestinal:  Negative for abdominal pain, diarrhea and nausea.   Genitourinary:  Negative for dysuria, flank pain and hematuria.   Musculoskeletal:  Positive for gait problem and myalgias. Negative for back pain.   Skin:  Positive for color change.   Neurological:  Negative for dizziness, weakness and headaches.   Psychiatric/Behavioral:  Negative for confusion.    Objective:     Vital Signs (Most Recent):  Temp: 97.8 °F (36.6 °C) (04/23/22 0756)  Pulse: 94 (04/23/22 0756)  Resp: 16 (04/23/22 0756)  BP: 133/63 (04/23/22 0756)  SpO2: 97 % (04/23/22 0756)   Vital Signs (24h Range):  Temp:  [97.8 °F (36.6 °C)-99 °F (37.2 °C)] 97.8 °F (36.6 °C)  Pulse:  [] 94  Resp:  [14-20] 16  SpO2:  [97 %-100 %] 97 %  BP: (129-166)/(63-93) 133/63     Weight: 81.3 kg (179 lb 3.7 oz)  Body mass index is 26.47 kg/m².    Intake/Output Summary (Last 24 hours) at  4/23/2022 0812  Last data filed at 4/23/2022 0627  Gross per 24 hour   Intake 841 ml   Output 225 ml   Net 616 ml        Physical Exam  Vitals and nursing note reviewed.   Constitutional:       Appearance: He is normal weight. He is ill-appearing.   HENT:      Head: Normocephalic.      Mouth/Throat:      Mouth: Mucous membranes are moist.      Pharynx: Oropharynx is clear.   Eyes:      Pupils: Pupils are equal, round, and reactive to light.      Comments: Left orbital ecchymosis   Cardiovascular:      Rate and Rhythm: Regular rhythm. Tachycardia present.      Pulses: Normal pulses.      Heart sounds: Normal heart sounds.   Pulmonary:      Effort: Pulmonary effort is normal.      Breath sounds: Normal breath sounds.   Abdominal:      General: Bowel sounds are normal.      Palpations: Abdomen is soft.   Musculoskeletal:         General: Swelling (r Thigh swellin noted) present. Normal range of motion.      Cervical back: Normal range of motion.      Right upper leg: Swelling present.      Right lower leg: Swelling present.      Comments:     good plantar flexion of his toes actively.  he has no pain on passive extension of the toe the patient has very little extension actively of the toe great toes and smaller toes.    Skin:     General: Skin is warm and dry.   Neurological:      Mental Status: He is alert and oriented to person, place, and time. Mental status is at baseline.   Psychiatric:         Mood and Affect: Mood normal.       Significant Labs: All pertinent labs within the past 24 hours have been reviewed.  CBC:   Recent Labs   Lab 04/21/22  0901 04/22/22  0833 04/23/22  0305   WBC 14.60* 14.26* 14.55*   HGB 9.3* 8.8* 8.0*   HCT 27.1* 25.9* 23.8*    211 235       CMP:   Recent Labs   Lab 04/21/22  0901 04/22/22  0833 04/23/22  0305   * 133* 132*   K 5.4* 5.2* 5.5*   CL 95 100 99   CO2 19* 22* 20*   GLU 92 97 97   * 77* 102*   CREATININE 11.8* 9.9* 11.8*   CALCIUM 6.4* 7.0* 7.0*   PROT  --   5.8* 5.6*   ALBUMIN  --  2.2* 2.0*   BILITOT  --  0.3 0.3   ALKPHOS  --  56 55   AST  --  137* 111*   ALT  --  18 21   ANIONGAP 15 11 13   EGFRNONAA 5* 7* 5*       Microbiology Results (last 7 days)       Procedure Component Value Units Date/Time    Blood culture [[id number]] Collected: 04/13/22 3352    Order Status: Completed Specimen: Blood from Antecubital, Right Hand Updated: 04/19/22 1212     Blood Culture, Routine No growth after 5 days.          Significant Imaging:   X-ray lumbar spine: Normal study.    Right tibia and fibula: Normal study.    Right femur x-ray: Normal study.    Pelvis x-ray: Normal study.    CT Head: No acute abnormality.  Minimal sinus disease.    CT Maxillofacial scan: No facial bone fracture.  Minimal sinus disease.    RLE venous doppler scan: No evidence of deep venous thrombosis in the right lower extremity.    CXR: Central line placement with the tip over the SVC.  No acute detrimental changes.    RLE venous doppler scan:  No evidence of deep venous thrombosis in the right lower extremity. Edema in the soft tissues of the right lower extremity

## 2022-04-23 NOTE — PROGRESS NOTES
[facility] Hospital Medicine  Progress Note    Patient Name: Agus Horan  MRN: [id number]  Patient Class: IP- Inpatient   Admission Date: 4/13/2022  Length of Stay: 10 days  Attending Physician: Owen Zamorano MD  Primary Care Provider: Primary Doctor No        Subjective:     Principal Problem:Acute renal failure        HPI:  Agus Horan is a 22 y/o male with PMHx of mild asthma who presents with right lower extremity pain and swelling x few days. Patient was seen in the ED on 4/9/22 for oxycodone overdose and again on 4/10/22 for left eye pain after falling on a piece of furniture. Patient states he is unsure how he injured his leg and denies IV drug use. Xray of his right leg did not show acute fracture. Patietn states RLE pain and swelling increased over the past few days and he is unable to walk secondary to pain. US of his lower extremity performed today did not show DVT. Lab work revealed elevated Creatinine 13.5 & K+ 7.1, elevated AST/ALT 3980/1611 & WBC 18.14.  CPK>40,0000.  Patient unable to dorsiflex and plantar flex his RLE and symptoms are concerning for compartment syndrome per ED. ED provider discussed with Dr. Byers and Dr. Fraser, nephrology. Patient was referred to hospital medicine and seen in the ED with his friend at bedside. Patient complaining of RLE pain, tenderness and swelling. He denies SOB, N/V/D, chest pain and headaches.  Patient will be admitted to hospital medicine for orthopedic consultation and further management.      Overview/Hospital Course:  He was found to have compartment syndrome and was taking to the OR emergently for fasciotomy on 4/13. We are consulted for SANDRA with metabolic derangements.  Cpk at the time of arrival was more than 40,000. Initially was started on IV fluids and started on hemodialysis. on 4/14- did emergent HD overnight for hyperkalemia and metabolic acidosis refractory to medical management; pain controlled, patient underwent  DEBRIDEMENT, LOWER EXTREMITY (Right)   closure of medial fasciotomy incision right leg debridement of lateral fasciotomy with biopsy of anterior muscle compartment removal of deep muscle anterior compartment right lower leg on 04/18/2022.  Patient's CPK h continue to trend down.  Was continued on hemodialysis.       Interval History:  Patient is complaining of severe generalized itching and since yesterday has developed diffuse maculopapular rash all over.  Patient historically has allergy to shrimp ingestion but no medication allergy.  Patient has been receiving intravenous clindamycin during hospital stay.  No lip or tongue swelling or difficulty breathing or swallowing reported.  Right lower extremity pain is stable. Patient is tolerating hemodialysis therapy. Patient is expected to undergo Lower extremity fasciotomy wound washing on Monday by Dr. Byers.  Patient continues to complain right leg pain requiring narcotics for pain control.  Patient denies chest pain or shortness of breath.    Review of Systems   Constitutional:  Negative for activity change, appetite change, chills and fever.   HENT:  Negative for congestion, sore throat and trouble swallowing.    Eyes:  Negative for photophobia and visual disturbance.   Respiratory:  Negative for cough, chest tightness and shortness of breath.    Cardiovascular:  Negative for chest pain, palpitations and leg swelling.   Gastrointestinal:  Negative for abdominal pain, diarrhea and nausea.   Genitourinary:  Negative for dysuria, flank pain and hematuria.   Musculoskeletal:  Positive for gait problem and myalgias. Negative for back pain.   Skin:  Positive for color change.   Neurological:  Negative for dizziness, weakness and headaches.   Psychiatric/Behavioral:  Negative for confusion.    Objective:     Vital Signs (Most Recent):  Temp: 97.8 °F (36.6 °C) (04/23/22 0756)  Pulse: 94 (04/23/22 0756)  Resp: 16 (04/23/22 0756)  BP: 133/63 (04/23/22 0756)  SpO2: 97 %  (04/23/22 0756)   Vital Signs (24h Range):  Temp:  [97.8 °F (36.6 °C)-99 °F (37.2 °C)] 97.8 °F (36.6 °C)  Pulse:  [] 94  Resp:  [14-20] 16  SpO2:  [97 %-100 %] 97 %  BP: (129-166)/(63-93) 133/63     Weight: 81.3 kg (179 lb 3.7 oz)  Body mass index is 26.47 kg/m².    Intake/Output Summary (Last 24 hours) at 4/23/2022 0812  Last data filed at 4/23/2022 0627  Gross per 24 hour   Intake 841 ml   Output 225 ml   Net 616 ml        Physical Exam  Vitals and nursing note reviewed.   Constitutional:       Appearance: He is normal weight. He is ill-appearing.   HENT:      Head: Normocephalic.      Mouth/Throat:      Mouth: Mucous membranes are moist.      Pharynx: Oropharynx is clear.   Eyes:      Pupils: Pupils are equal, round, and reactive to light.      Comments: Left orbital ecchymosis   Cardiovascular:      Rate and Rhythm: Regular rhythm. Tachycardia present.      Pulses: Normal pulses.      Heart sounds: Normal heart sounds.   Pulmonary:      Effort: Pulmonary effort is normal.      Breath sounds: Normal breath sounds.   Abdominal:      General: Bowel sounds are normal.      Palpations: Abdomen is soft.   Musculoskeletal:         General: Swelling (r Thigh swellin noted) present. Normal range of motion.      Cervical back: Normal range of motion.      Right upper leg: Swelling present.      Right lower leg: Swelling present.      Comments:     good plantar flexion of his toes actively.  he has no pain on passive extension of the toe the patient has very little extension actively of the toe great toes and smaller toes.    Skin:     General: Skin is warm and dry.   Neurological:      Mental Status: He is alert and oriented to person, place, and time. Mental status is at baseline.   Psychiatric:         Mood and Affect: Mood normal.       Significant Labs: All pertinent labs within the past 24 hours have been reviewed.  CBC:   Recent Labs   Lab 04/21/22  0901 04/22/22  0833 04/23/22  0305   WBC 14.60* 14.26* 14.55*    HGB 9.3* 8.8* 8.0*   HCT 27.1* 25.9* 23.8*    211 235       CMP:   Recent Labs   Lab 04/21/22  0901 04/22/22  0833 04/23/22  0305   * 133* 132*   K 5.4* 5.2* 5.5*   CL 95 100 99   CO2 19* 22* 20*   GLU 92 97 97   * 77* 102*   CREATININE 11.8* 9.9* 11.8*   CALCIUM 6.4* 7.0* 7.0*   PROT  --  5.8* 5.6*   ALBUMIN  --  2.2* 2.0*   BILITOT  --  0.3 0.3   ALKPHOS  --  56 55   AST  --  137* 111*   ALT  --  18 21   ANIONGAP 15 11 13   EGFRNONAA 5* 7* 5*       Microbiology Results (last 7 days)       Procedure Component Value Units Date/Time    Blood culture [[id number]] Collected: 04/13/22 1402    Order Status: Completed Specimen: Blood from Antecubital, Right Hand Updated: 04/19/22 1212     Blood Culture, Routine No growth after 5 days.          Significant Imaging:   X-ray lumbar spine: Normal study.    Right tibia and fibula: Normal study.    Right femur x-ray: Normal study.    Pelvis x-ray: Normal study.    CT Head: No acute abnormality.  Minimal sinus disease.    CT Maxillofacial scan: No facial bone fracture.  Minimal sinus disease.    RLE venous doppler scan: No evidence of deep venous thrombosis in the right lower extremity.    CXR: Central line placement with the tip over the SVC.  No acute detrimental changes.    RLE venous doppler scan:  No evidence of deep venous thrombosis in the right lower extremity. Edema in the soft tissues of the right lower extremity      Assessment/Plan:      * Acute renal failure  Continues to be anuric  Hemodialysis therapy as per Nephrology team.   due to rhabdomyolysis  Trend CPK daily.  Follow Nephrology recommendations.  Avoid nephrotoxins  Telemetry       Drug eruption  No definite etiology apparent.  Possibly clindamycin use.  Will discontinue intravenous clindamycin and replace with intravenous Ancef.  Patient to receive a dose of intravenous Solu-Medrol 125 mg and Benadryl 25 mg IV now.  Subsequently oral Benadryl 25 mg q.6 hours p.r.n. patient will be  monitored closely.      Compartment syndrome  Underwent fasciotomy on 4/13   Appreciate ortho rec.  Discussed with Dr. Byers who is planning to perform wound closure surgery possibly on Monday.  Wound care nurse performing dressing changes as per recommendations by Orthopedics.    Elevated liver enzymes  Elevated AST &ALT likely due to rhabdomyolysis  Trending down  Avoid hepatoxic medications  Monitor CMP  See plan for rhabdomyolysis        Rhabdomyolysis  CPK trending down  S/p fasciotomy on 4/13 for compartment syndrome.  On 4/18 S/p DEBRIDEMENT, LOWER EXTREMITY (Right)   closure of medial fasciotomy incision right leg debridement of lateral fasciotomy with biopsy of anterior muscle compartment removal of deep muscle anterior compartment right lower leg  Scheduled for HD hyperkalemia and metabolic acidosis refractory to medical management;    IV fluids  Monitor BMP   appreciate nephrology recs      History of asthma  Hx of asthma, stable    Monitor for acute changes        VTE Risk Mitigation (From admission, onward)         Ordered     heparin (porcine) injection 4,000 Units  As needed (PRN)         04/14/22 0137     Reason for No Pharmacological VTE Prophylaxis  Once        Question:  Reasons:  Answer:  Risk of Bleeding    04/13/22 1913     IP VTE HIGH RISK PATIENT  Once         04/13/22 1913     Place sequential compression device  Until discontinued         04/13/22 1913                Discharge Planning   JARAD: 4/27/2022     Code Status: Full Code   Is the patient medically ready for discharge?:     Reason for patient still in hospital (select all that apply): Patient trending condition  Discharge Plan A: Home with family                  Owen Zamorano MD  Department of Hospital Medicine   [facility]

## 2022-04-23 NOTE — CARE UPDATE
04/23/22 0800   Patient Assessment/Suction   Level of Consciousness (AVPU) alert   PRE-TX-O2   O2 Device (Oxygen Therapy) room air   SpO2 100 %   Pulse Oximetry Type Intermittent   $ Pulse Oximetry - Multiple Charge Pulse Oximetry - Multiple   Incentive Spirometer   $ Incentive Spirometer Charges done with encouragement   Administration (IS) self-administered

## 2022-04-23 NOTE — PROGRESS NOTES
INPATIENT NEPHROLOGY Progress Note  [facility]    Patient Name: Agus Horan  Date: 04/23/2022    Reason for consultation: SANDRA    Chief Complaint:   Chief Complaint   Patient presents with    Leg Pain     Pain in the right leg muscle calf is hard, nausea vomiting, patient was seen here over the weekend for an overdose        History of Present Illness:  20 y/o M with a history of asthma recently here on 4/9 with oxycodone overdose who p/w RLE pain and swelling after a fall on 4/10. He reports severe pain, constant, associated with nausea with inability to bear weight.  He took meloxicam without relief. He was found to have compartment syndrome and was taking to the OR emergently for fasciotomy on 4/13. We are consulted for SANDRA with metabolic derangements.    Interval History:  4/14- did emergent HD overnight for hyperkalemia and metabolic acidosis refractory to medical management; pain controlled, RLE wrapped, no edema in LLE  4/15- worsening pain/edema in RLE- going for MRI- oligoanuric- stop NS IVFs- will do HD/UF today and tomorrow  4/16  Seen on dialysis.  No distress  4/17  Remains oliguric.  AFVSS.  Has back pain.  Leg pain a little better  4/18  In the OR.  Still oliguric.    4/19  Seen on dialysis.  No distress.    4/20  Still not making much urine.  cpk coming down.     4/21  Afebrile.  BP a little better.  uop up a bit  422   Sleeping.  AFVSS.  I/Os not appropriately recorded despite being ordered for twice  4/23  225 cc UOP recorded.  K+ 5.5, HD today.  No renal recovery, CPK improving.  C/o Dr. Lona allison at bedside placed orders.  Seen on dialysis, tolerating tx well.      Plan of Care:    Assessment:  Traumatic rhabdomyolysis with compartment syndrome s/p fasciotomy on 4/13  SANDRA due to toxic ATN s/p emergent HD on 4/14- remains oligoanuric and HD dependent  Edema  Hyponatremia  Hyperkalemia  Acidosis  Hypocalcemia  Shock liver    Plan:    - Trend daily CK.     cpk slowing  coming down  - He remains HD dependent. Continue grier.  Ok with -160s for renal perfusion. No NSAIDs or IV contrast. Dose meds for CrCl < 10.  - Advise renal diet with 1.5L fluid restriction. Will adjust dialysate for all metabolic derangements. Will replete calcium PRN.  - Trend daily LFTs.   - Change to daily labs.  --replete calcium.  Need to do calcium repletion judiciously in pt with rhabdomyolysis.    --added hydralazine  --dialysis as needed, monitor for recovery    Will reorder accurate I/Os.  Hopefully it will get done.  I am trying to monitor for renal recovery and assessment of urine output is very important.  Fingers crossed the orders will get followed....    Thank you for allowing us to participate in this patient's care. We will continue to follow.    Vital Signs:  Temp Readings from Last 3 Encounters:   04/23/22 97.8 °F (36.6 °C) (Oral)   04/10/22 97.3 °F (36.3 °C)   04/09/22 98.4 °F (36.9 °C)       Pulse Readings from Last 3 Encounters:   04/23/22 94   04/10/22 63   04/09/22 95       BP Readings from Last 3 Encounters:   04/23/22 133/63   04/10/22 117/64   04/09/22 (!) 143/82       Weight:  Wt Readings from Last 3 Encounters:   04/20/22 81.3 kg (179 lb 3.7 oz)   04/10/22 77.1 kg (170 lb)   04/09/22 77.1 kg (170 lb)       INS/OUTS:  I/O last 3 completed shifts:  In: 842 [P.O.:822; I.V.:20]  Out: 225 [Urine:225]  No intake/output data recorded.    Medications:  Scheduled Meds:   calcium gluconate IVPB  1 g Intravenous Once    clindamycin (CLEOCIN) IVPB  600 mg Intravenous Q8H    hydrALAZINE  25 mg Oral Q8H    sodium chloride 0.9%  2 mL Intravenous Q6H     Continuous Infusions:   loperamide       PRN Meds:.sodium chloride 0.9%, cyclobenzaprine, dextrose 10%, dextrose 10%, diphenhydrAMINE, glucagon (human recombinant), glucose, glucose, heparin (porcine), HYDROcodone-acetaminophen, HYDROmorphone, labetaloL, loperamide, naloxone, ondansetron, sodium chloride 0.9%  No current  "facility-administered medications on file prior to encounter.     Current Outpatient Medications on File Prior to Encounter   Medication Sig Dispense Refill    naloxone (NARCAN) 4 mg/actuation Spry 4mg by nasal route as needed for opioid overdose; may repeat every 2-3 minutes in alternating nostrils until medical help arrives. Call 911 2 each 0       Review of Systems:  Neg    Physical Exam:  /63 (BP Location: Left arm, Patient Position: Lying)   Pulse 94   Temp 97.8 °F (36.6 °C) (Oral)   Resp 16   Ht 5' 9" (1.753 m)   Wt 81.3 kg (179 lb 3.7 oz)   SpO2 100%   BMI 26.47 kg/m²     Constitutional: nad, aao x 3, depressed affect  Heart: rrr, no m/r/g, wwp, RLE eedema  Lungs: ctab, no w/r/r/c, no lb  Abdomen: s/nt/nd, +BS    Results:  Lab Results   Component Value Date     (L) 04/23/2022    K 5.5 (H) 04/23/2022    CL 99 04/23/2022    CO2 20 (L) 04/23/2022     (H) 04/23/2022    CREATININE 11.8 (H) 04/23/2022    CALCIUM 7.0 (L) 04/23/2022    ANIONGAP 13 04/23/2022    ESTGFRAFRICA 6 (A) 04/23/2022    EGFRNONAA 5 (A) 04/23/2022       Lab Results   Component Value Date    CALCIUM 7.0 (L) 04/23/2022    PHOS 8.0 (H) 04/23/2022       Recent Labs   Lab 04/23/22  0305   WBC 14.55*   RBC 2.61*   HGB 8.0*   HCT 23.8*      MCV 91   MCH 30.7   MCHC 33.6       I have personally reviewed pertinent radiological imaging and reports.    I have spent > 35 minutes providing care for this patient for the above diagnoses. These services have included chart/data/imaging review, evaluation, exam, formulation of plan, , note preparation, and discussions with staff involved in this patient's care.    Chantelle Fraser MD MPH  [facility] [place] [address]  [phone] (p)  [phone] (f)  "

## 2022-04-23 NOTE — PT/OT/SLP PROGRESS
Physical Therapy      Patient Name:  Agus Horan   MRN:  [id number]    Patient not seen today secondary to Dialysis.  Will follow-up 4/24.

## 2022-04-23 NOTE — PLAN OF CARE
Pt in bed awake and alert, no distress noted. Pt has generalized rashes and c/o itching, the hospitalist on call was notified and Benadryl was ordered as per pt's request. Medication is effective.  Problem: Adult Inpatient Plan of Care  Goal: Plan of Care Review  Outcome: Ongoing, Progressing  Goal: Patient-Specific Goal (Individualized)  Outcome: Ongoing, Progressing  Goal: Absence of Hospital-Acquired Illness or Injury  Outcome: Ongoing, Progressing  Goal: Optimal Comfort and Wellbeing  Outcome: Ongoing, Progressing  Goal: Readiness for Transition of Care  Outcome: Ongoing, Progressing     Problem: Infection  Goal: Absence of Infection Signs and Symptoms  Outcome: Ongoing, Progressing     Problem: Fall Injury Risk  Goal: Absence of Fall and Fall-Related Injury  Outcome: Ongoing, Progressing     Problem: Skin Injury Risk Increased  Goal: Skin Health and Integrity  Outcome: Ongoing, Progressing     Problem: Device-Related Complication Risk (Hemodialysis)  Goal: Safe, Effective Therapy Delivery  Outcome: Ongoing, Progressing     Problem: Hemodynamic Instability (Hemodialysis)  Goal: Effective Tissue Perfusion  Outcome: Ongoing, Progressing     Problem: Infection (Hemodialysis)  Goal: Absence of Infection Signs and Symptoms  Outcome: Ongoing, Progressing     Problem: Fluid and Electrolyte Imbalance (Acute Kidney Injury/Impairment)  Goal: Fluid and Electrolyte Balance  Outcome: Ongoing, Progressing     Problem: Oral Intake Inadequate (Acute Kidney Injury/Impairment)  Goal: Optimal Nutrition Intake  Outcome: Ongoing, Progressing     Problem: Renal Function Impairment (Acute Kidney Injury/Impairment)  Goal: Effective Renal Function  Outcome: Ongoing, Progressing

## 2022-04-23 NOTE — ASSESSMENT & PLAN NOTE
No definite etiology apparent.  Possibly clindamycin use.  Will discontinue intravenous clindamycin and replace with intravenous Ancef.  Patient to receive a dose of intravenous Solu-Medrol 125 mg and Benadryl 25 mg IV now.  Subsequently oral Benadryl 25 mg q.6 hours p.r.n. patient will be monitored closely.

## 2022-04-23 NOTE — PROGRESS NOTES
3000 ml net uf removed   04/23/22 1200   Vital Signs   Temp 98.2 °F (36.8 °C)   Pulse 92   Resp 18   BP (!) 198/104   Pre-Hemodialysis Assessment   Treatment Status Completed   Trialysis (Dialysis) Catheter 04/14/22 right internal jugular   Placement Date: 04/14/22   Present Prior to Hospital Arrival?: No  Location: right internal jugular   Venous Patency/Care heparin locked   Arterial Patency/Care heparin locked   Post-Hemodialysis Assessment   Rinseback Volume (mL) 250 mL   Blood Volume Processed (Liters) 55.7 L   Dialyzer Clearance Lightly streaked   Duration of Treatment (minutes) 180 minutes   Additional Fluid Intake (mL) 500 mL   Total UF (mL) 3500 mL   Net Fluid Removal 3000   Patient Response to Treatment tolerated well   Post-Treatment Weight 78.3 kg (172 lb 9.9 oz)   Treatment Weight Change -3

## 2022-04-24 LAB — CA-I BLDV-SCNC: 0.89 MMOL/L (ref 1.06–1.42)

## 2022-04-24 PROCEDURE — 63600175 PHARM REV CODE 636 W HCPCS: Performed by: INTERNAL MEDICINE

## 2022-04-24 PROCEDURE — A4216 STERILE WATER/SALINE, 10 ML: HCPCS | Performed by: ORTHOPAEDIC SURGERY

## 2022-04-24 PROCEDURE — 99221 PR INITIAL HOSPITAL CARE,LEVL I: ICD-10-PCS | Mod: ,,, | Performed by: SURGERY

## 2022-04-24 PROCEDURE — 25000003 PHARM REV CODE 250: Performed by: INTERNAL MEDICINE

## 2022-04-24 PROCEDURE — 94761 N-INVAS EAR/PLS OXIMETRY MLT: CPT

## 2022-04-24 PROCEDURE — 36415 COLL VENOUS BLD VENIPUNCTURE: CPT | Performed by: INTERNAL MEDICINE

## 2022-04-24 PROCEDURE — 25000003 PHARM REV CODE 250

## 2022-04-24 PROCEDURE — 99900035 HC TECH TIME PER 15 MIN (STAT)

## 2022-04-24 PROCEDURE — 11000001 HC ACUTE MED/SURG PRIVATE ROOM

## 2022-04-24 PROCEDURE — 82330 ASSAY OF CALCIUM: CPT | Performed by: INTERNAL MEDICINE

## 2022-04-24 PROCEDURE — 25000003 PHARM REV CODE 250: Performed by: ORTHOPAEDIC SURGERY

## 2022-04-24 PROCEDURE — 99221 1ST HOSP IP/OBS SF/LOW 40: CPT | Mod: ,,, | Performed by: SURGERY

## 2022-04-24 PROCEDURE — 97116 GAIT TRAINING THERAPY: CPT

## 2022-04-24 RX ORDER — ERGOCALCIFEROL 1.25 MG/1
50000 CAPSULE ORAL
Status: DISCONTINUED | OUTPATIENT
Start: 2022-04-25 | End: 2022-04-30

## 2022-04-24 RX ORDER — CALCITRIOL 0.25 UG/1
0.25 CAPSULE ORAL DAILY
Status: DISCONTINUED | OUTPATIENT
Start: 2022-04-25 | End: 2022-04-30

## 2022-04-24 RX ADMIN — CEFAZOLIN 500 MG: 1 INJECTION, POWDER, FOR SOLUTION INTRAVENOUS at 03:04

## 2022-04-24 RX ADMIN — CYCLOBENZAPRINE 10 MG: 10 TABLET, FILM COATED ORAL at 09:04

## 2022-04-24 RX ADMIN — SEVELAMER CARBONATE 800 MG: 800 TABLET, FILM COATED ORAL at 09:04

## 2022-04-24 RX ADMIN — HYDROMORPHONE HYDROCHLORIDE 1 MG: 1 INJECTION, SOLUTION INTRAMUSCULAR; INTRAVENOUS; SUBCUTANEOUS at 07:04

## 2022-04-24 RX ADMIN — Medication 2 ML: at 05:04

## 2022-04-24 RX ADMIN — DIPHENHYDRAMINE HYDROCHLORIDE 25 MG: 25 CAPSULE ORAL at 03:04

## 2022-04-24 RX ADMIN — DIPHENHYDRAMINE HYDROCHLORIDE 25 MG: 25 CAPSULE ORAL at 09:04

## 2022-04-24 RX ADMIN — HYDROCODONE BITARTRATE AND ACETAMINOPHEN 1 TABLET: 10; 325 TABLET ORAL at 02:04

## 2022-04-24 RX ADMIN — Medication 2 ML: at 12:04

## 2022-04-24 RX ADMIN — Medication 2 ML: at 06:04

## 2022-04-24 RX ADMIN — HYDRALAZINE HYDROCHLORIDE 25 MG: 25 TABLET, FILM COATED ORAL at 09:04

## 2022-04-24 RX ADMIN — SEVELAMER CARBONATE 800 MG: 800 TABLET, FILM COATED ORAL at 12:04

## 2022-04-24 RX ADMIN — Medication 2 ML: at 01:04

## 2022-04-24 RX ADMIN — HYDRALAZINE HYDROCHLORIDE 25 MG: 25 TABLET, FILM COATED ORAL at 05:04

## 2022-04-24 RX ADMIN — CEFAZOLIN 500 MG: 1 INJECTION, POWDER, FOR SOLUTION INTRAVENOUS at 02:04

## 2022-04-24 RX ADMIN — HYDROMORPHONE HYDROCHLORIDE 1 MG: 1 INJECTION, SOLUTION INTRAMUSCULAR; INTRAVENOUS; SUBCUTANEOUS at 05:04

## 2022-04-24 RX ADMIN — CYCLOBENZAPRINE 10 MG: 10 TABLET, FILM COATED ORAL at 05:04

## 2022-04-24 RX ADMIN — HYDROMORPHONE HYDROCHLORIDE 1 MG: 1 INJECTION, SOLUTION INTRAMUSCULAR; INTRAVENOUS; SUBCUTANEOUS at 12:04

## 2022-04-24 RX ADMIN — HYDRALAZINE HYDROCHLORIDE 25 MG: 25 TABLET, FILM COATED ORAL at 02:04

## 2022-04-24 RX ADMIN — DOCUSATE SODIUM 100 MG: 100 CAPSULE, LIQUID FILLED ORAL at 09:04

## 2022-04-24 RX ADMIN — DIPHENHYDRAMINE HYDROCHLORIDE 25 MG: 25 CAPSULE ORAL at 05:04

## 2022-04-24 RX ADMIN — HYDROCODONE BITARTRATE AND ACETAMINOPHEN 1 TABLET: 10; 325 TABLET ORAL at 09:04

## 2022-04-24 RX ADMIN — SEVELAMER CARBONATE 800 MG: 800 TABLET, FILM COATED ORAL at 05:04

## 2022-04-24 NOTE — CONSULTS
GENERAL SURGERY  INPATIENT CONSULT    REASON FOR CONSULT:  Tunneled hemodialysis catheter placement    HPI: Agus Horan is a 21 y.o. male with history asthma and oxycodone use who presented to the emergency room with worsening right lower extremity pain and swelling.  Found have right lower extremity compartment syndrome and underwent emergent fasciotomy on 04/13.  Also developed an SANDRA and required emergent hemodialysis for hyperkalemia and metabolic acidosis through a right IJ Trialysis catheter.  Underwent re-exploration on 04/18 over the right lower extremity.  Has ongoing need for hemodialysis and General surgery has been consulted for placement of a tunneled line.  Patient is tentative plans to undergo washout of right lower extremity tomorrow with orthopedics.  Patient is questioning how long he will need dialysis.  I informed minor uncertain of his prognosis at this time but we did discussed indication for the tunneled line.    I have reviewed the patient's chart including prior progress notes, procedures and testing.     ROS:   Review of Systems   Constitutional: Positive for activity change. Negative for fever.   Respiratory: Negative for apnea, chest tightness and shortness of breath.    Cardiovascular: Positive for leg swelling. Negative for chest pain and palpitations.   Gastrointestinal: Negative for abdominal distention and abdominal pain.   Musculoskeletal: Positive for arthralgias and myalgias.   Skin: Positive for rash and wound.       PROBLEM LIST:  Patient Active Problem List   Diagnosis    Allergy    Asthma    History of asthma    Foreign body in left foot    Rhabdomyolysis    Acute renal failure    Elevated liver enzymes    Compartment syndrome    Drug eruption         HISTORY  Past Medical History:   Diagnosis Date    Allergy     AR    Asthma     mild intermittent    Hyperkalemia 4/14/2022       Past Surgical History:   Procedure Laterality Date    DEBRIDEMENT OF LOWER  EXTREMITY Right 4/18/2022    Procedure: DEBRIDEMENT, LOWER EXTREMITY;  Surgeon: Leonardo Byers MD;  Location: [facility] OR;  Service: Orthopedics;  Laterality: Right;    FASCIOTOMY Right 4/13/2022    Procedure: FASCIOTOMY;  Surgeon: Leonardo Byers MD;  Location: [facility] OR;  Service: Orthopedics;  Laterality: Right;    REMOVAL OF FOREIGN BODY FROM FOOT Left 6/24/2020    Procedure: REMOVAL, FOREIGN BODY, FOOT;  Surgeon: Dani Garcia MD;  Location: [facility] OR;  Service: Orthopedics;  Laterality: Left;       Social History     Tobacco Use    Smoking status: Never Smoker    Smokeless tobacco: Never Used   Substance Use Topics    Alcohol use: Yes     Comment: last night    Drug use: Yes     Frequency: 2.0 times per week     Types: Marijuana     Comment: yesterday       Family History   Problem Relation Age of Onset    Asthma Brother     Emphysema Maternal Grandmother     Hyperlipidemia Maternal Grandmother     Asthma Maternal Grandmother     Arthritis Maternal Grandmother     COPD Maternal Grandmother     Asthma Brother          MEDS:  No current facility-administered medications on file prior to encounter.     Current Outpatient Medications on File Prior to Encounter   Medication Sig Dispense Refill    naloxone (NARCAN) 4 mg/actuation Spry 4mg by nasal route as needed for opioid overdose; may repeat every 2-3 minutes in alternating nostrils until medical help arrives. Call 911 2 each 0       ALLERGIES:  Review of patient's allergies indicates:   Allergen Reactions    Shrimp          VITALS:  Temp:  [97.6 °F (36.4 °C)-98.9 °F (37.2 °C)] 97.6 °F (36.4 °C)  Pulse:  [] 95  Resp:  [14-19] 18  SpO2:  [96 %-100 %] 96 %  BP: (128-161)/(62-93) 129/82    I/O last 3 completed shifts:  In: 1040 [P.O.:540; Other:500]  Out: 4075 [Urine:575; Other:3500]      PHYSICAL EXAM:  Physical Exam  Vitals reviewed.   Constitutional:       General: He is not in acute distress.     Appearance: Normal appearance. He  is well-developed.   HENT:      Head: Normocephalic and atraumatic.   Eyes:      General: No scleral icterus.  Neck:      Trachea: No tracheal deviation.      Comments: Right IJ Trialysis catheter in place without signs of infection  Cardiovascular:      Rate and Rhythm: Normal rate and regular rhythm.      Pulses: Normal pulses.   Pulmonary:      Effort: Pulmonary effort is normal. No respiratory distress.      Breath sounds: Normal breath sounds.   Abdominal:      General: There is no distension.      Palpations: Abdomen is soft.      Tenderness: There is no abdominal tenderness.   Musculoskeletal:         General: Signs of injury (Right lower extremity bandage) present. No swelling or tenderness. Normal range of motion.      Cervical back: Normal range of motion and neck supple. No rigidity.   Skin:     General: Skin is warm and dry.      Coloration: Skin is not jaundiced.      Findings: Erythema and rash present.   Neurological:      General: No focal deficit present.      Mental Status: He is alert and oriented to person, place, and time. He is not disoriented.      Motor: No weakness or abnormal muscle tone.   Psychiatric:         Mood and Affect: Mood normal.         Behavior: Behavior normal.         Thought Content: Thought content normal.         Judgment: Judgment normal.           LABS:  Lab Results   Component Value Date    WBC 14.55 (H) 04/23/2022    RBC 2.61 (L) 04/23/2022    HGB 8.0 (L) 04/23/2022    HCT 23.8 (L) 04/23/2022     04/23/2022     Lab Results   Component Value Date    GLU 97 04/23/2022     (L) 04/23/2022    K 5.5 (H) 04/23/2022    CL 99 04/23/2022    CO2 20 (L) 04/23/2022     (H) 04/23/2022    CREATININE 11.8 (H) 04/23/2022    CALCIUM 7.0 (L) 04/23/2022     Lab Results   Component Value Date    ALT 21 04/23/2022     (H) 04/23/2022    ALKPHOS 55 04/23/2022    BILITOT 0.3 04/23/2022     Lab Results   Component Value Date    MG 2.3 04/23/2022    PHOS 8.0 (H)  04/23/2022       STUDIES:  Images and reports were personally reviewed.    Chest x-ray 04/13/2022  FINDINGS:  There is been placement of a right jugular central line with the tip overlying the SVC.  There is no evidence of pneumothorax.     The cardiomediastinal silhouette is stable and nonenlarged.  The lungs appear clear.  There is no pleural effusion.     Impression:     Central line placement with the tip over the SVC.  No acute detrimental changes.    ASSESSMENT & PLAN:  21 y.o. male with right lower extremity compartment syndrome, rhabdomyolysis, acute kidney injury with renal failure  - discussed with the patient conversion of Trialysis catheter to tunneled hemodialysis catheter  - hopefully this can be performed under the same anesthetic event as the washout with orthopedics  - will post for right IJ tunneled catheter tomorrow  - NPO at midnight    Please call with any questions or concerns.    Sundeep Rahman MD  General Surgery  [phone]

## 2022-04-24 NOTE — ASSESSMENT & PLAN NOTE
No definite etiology apparent.  Possibly clindamycin use.  Off intravenous clindamycin.  Use oral Benadryl 25 mg q.6 hours p.r.n. patient will be monitored closely.

## 2022-04-24 NOTE — CARE UPDATE
04/24/22 0830   Patient Assessment/Suction   Level of Consciousness (AVPU) alert   PRE-TX-O2   O2 Device (Oxygen Therapy) room air   SpO2 100 %   Pulse Oximetry Type Intermittent   $ Pulse Oximetry - Multiple Charge Pulse Oximetry - Multiple   Incentive Spirometer   $ Incentive Spirometer Charges done independently per patient

## 2022-04-24 NOTE — ASSESSMENT & PLAN NOTE
Continues to be anuric  Hemodialysis therapy as per Nephrology team.   due to rhabdomyolysis  Trend CPK daily.  Follow Nephrology recommendations.  Avoid nephrotoxins  Telemetry.  Consulting general surgeon for he must plate catheter placement tomorrow.

## 2022-04-24 NOTE — SUBJECTIVE & OBJECTIVE
Interval History: Generalized itching has markedly improved however diffuse maculopapular rash is still present.  Patient denies any further itching.  Right lower extremity pain is stable. Patient is tolerating hemodialysis therapy. Patient is expected to undergo Lower extremity fasciotomy wound washing on Monday by Dr. Byers.  Patient continues to complain right leg pain requiring narcotics for pain control.  Patient denies chest pain or shortness of breath.    Review of Systems   Constitutional:  Negative for activity change, appetite change, chills and fever.   HENT:  Negative for congestion, sore throat and trouble swallowing.    Eyes:  Negative for photophobia and visual disturbance.   Respiratory:  Negative for cough, chest tightness and shortness of breath.    Cardiovascular:  Negative for chest pain, palpitations and leg swelling.   Gastrointestinal:  Negative for abdominal pain, diarrhea and nausea.   Genitourinary:  Negative for dysuria, flank pain and hematuria.   Musculoskeletal:  Positive for gait problem and myalgias. Negative for back pain.   Skin:  Positive for color change.   Neurological:  Negative for dizziness, weakness and headaches.   Psychiatric/Behavioral:  Negative for confusion.    Objective:     Vital Signs (Most Recent):  Temp: 98 °F (36.7 °C) (04/24/22 0755)  Pulse: 88 (04/24/22 0755)  Resp: 18 (04/24/22 0755)  BP: (!) 161/93 (04/24/22 0755)  SpO2: 100 % (04/24/22 0755)   Vital Signs (24h Range):  Temp:  [97.9 °F (36.6 °C)-98.9 °F (37.2 °C)] 98 °F (36.7 °C)  Pulse:  [] 88  Resp:  [18-19] 18  SpO2:  [96 %-100 %] 100 %  BP: (128-203)/() 161/93     Weight: 81.3 kg (179 lb 3.7 oz)  Body mass index is 26.47 kg/m².    Intake/Output Summary (Last 24 hours) at 4/24/2022 0847  Last data filed at 4/24/2022 0600  Gross per 24 hour   Intake 700 ml   Output 3950 ml   Net -3250 ml        Physical Exam  Vitals and nursing note reviewed.   Constitutional:       Appearance: He is normal  weight. He is ill-appearing.   HENT:      Head: Normocephalic.      Mouth/Throat:      Mouth: Mucous membranes are moist.      Pharynx: Oropharynx is clear.   Eyes:      Pupils: Pupils are equal, round, and reactive to light.      Comments: Left orbital ecchymosis   Cardiovascular:      Rate and Rhythm: Regular rhythm. Tachycardia present.      Pulses: Normal pulses.      Heart sounds: Normal heart sounds.   Pulmonary:      Effort: Pulmonary effort is normal.      Breath sounds: Normal breath sounds.   Abdominal:      General: Bowel sounds are normal.      Palpations: Abdomen is soft.   Musculoskeletal:         General: Swelling (r Thigh swellin noted) present. Normal range of motion.      Cervical back: Normal range of motion.      Right upper leg: Swelling present.      Right lower leg: Swelling present.      Comments:     good plantar flexion of his toes actively.  he has no pain on passive extension of the toe the patient has very little extension actively of the toe great toes and smaller toes.    Skin:     General: Skin is warm and dry.      Comments: Diffuse fine maculopapular rash all over extremities and trunk.   Neurological:      Mental Status: He is alert and oriented to person, place, and time. Mental status is at baseline.   Psychiatric:         Mood and Affect: Mood normal.       Significant Labs: All pertinent labs within the past 24 hours have been reviewed.  CBC:   Recent Labs   Lab 04/23/22  0305   WBC 14.55*   HGB 8.0*   HCT 23.8*          CMP:   Recent Labs   Lab 04/23/22  0305   *   K 5.5*   CL 99   CO2 20*   GLU 97   *   CREATININE 11.8*   CALCIUM 7.0*   PROT 5.6*   ALBUMIN 2.0*   BILITOT 0.3   ALKPHOS 55   *   ALT 21   ANIONGAP 13   EGFRNONAA 5*       Microbiology Results (last 7 days)       Procedure Component Value Units Date/Time    Blood culture [[id number]] Collected: 04/13/22 6788    Order Status: Completed Specimen: Blood from Antecubital, Right Hand Updated:  04/19/22 1212     Blood Culture, Routine No growth after 5 days.          Significant Imaging:   X-ray lumbar spine: Normal study.    Right tibia and fibula: Normal study.    Right femur x-ray: Normal study.    Pelvis x-ray: Normal study.    CT Head: No acute abnormality.  Minimal sinus disease.    CT Maxillofacial scan: No facial bone fracture.  Minimal sinus disease.    RLE venous doppler scan: No evidence of deep venous thrombosis in the right lower extremity.    CXR: Central line placement with the tip over the SVC.  No acute detrimental changes.    RLE venous doppler scan:  No evidence of deep venous thrombosis in the right lower extremity. Edema in the soft tissues of the right lower extremity

## 2022-04-24 NOTE — ASSESSMENT & PLAN NOTE
Underwent fasciotomy on 4/13   Appreciate ortho rec.  Wound wash and wound closure surgery possibly on Monday.  NPO after midnight.  Wound care nurse performing dressing changes as per recommendations by Orthopedics.

## 2022-04-24 NOTE — PROGRESS NOTES
INPATIENT NEPHROLOGY Progress Note  [facility]    Patient Name: Agus Horan  Date: 04/24/2022    Reason for consultation: SANDRA    Chief Complaint:   Chief Complaint   Patient presents with    Leg Pain     Pain in the right leg muscle calf is hard, nausea vomiting, patient was seen here over the weekend for an overdose        History of Present Illness:  22 y/o M with a history of asthma recently here on 4/9 with oxycodone overdose who p/w RLE pain and swelling after a fall on 4/10. He reports severe pain, constant, associated with nausea with inability to bear weight.  He took meloxicam without relief. He was found to have compartment syndrome and was taking to the OR emergently for fasciotomy on 4/13. We are consulted for SANDRA with metabolic derangements.    Interval History:  4/14- did emergent HD overnight for hyperkalemia and metabolic acidosis refractory to medical management; pain controlled, RLE wrapped, no edema in LLE  4/15- worsening pain/edema in RLE- going for MRI- oligoanuric- stop NS IVFs- will do HD/UF today and tomorrow  4/16  Seen on dialysis.  No distress  4/17  Remains oliguric.  AFVSS.  Has back pain.  Leg pain a little better  4/18  In the OR.  Still oliguric.    4/19  Seen on dialysis.  No distress.    4/20  Still not making much urine.  cpk coming down.     4/21  Afebrile.  BP a little better.  uop up a bit  422   Sleeping.  AFVSS.  I/Os not appropriately recorded despite being ordered for twice  4/23  225 cc UOP recorded.  K+ 5.5, HD today.  No renal recovery, CPK improving.  C/o rash, Dr. Zamorano at bedside placed orders.  Seen on dialysis, tolerating tx well.  4/24   No new lab results, next lab draw in am.   Still has rash and c/o itching.  No other complaints.  450 cc UOP recorded.      Plan of Care:    Assessment:  Traumatic rhabdomyolysis with compartment syndrome s/p fasciotomy on 4/13  SANDRA due to toxic ATN s/p emergent HD on 4/14- remains oligoanuric and HD  dependent  Edema  Hyponatremia  Hyperkalemia  Acidosis  Hypocalcemia  Shock liver    Plan:    - Trend daily CK.     cpk slowing coming down  - He remains HD dependent. Continue grier.  Ok with -160s for renal perfusion. No NSAIDs or IV contrast. Dose meds for CrCl < 10.  MWF dialysis  - Advise renal diet with 1.5L fluid restriction. Will adjust dialysate for all metabolic derangements. Will replete calcium PRN.  - Trend LFTs.   - f/u labs  --replete calcium.  Need to do calcium repletion judiciously in pt with rhabdomyolysis.    --added hydralazine  --dialysis as needed, monitor for recovery    Will reorder accurate I/Os.  Hopefully it will get done.  I am trying to monitor for renal recovery and assessment of urine output is very important.  Fingers crossed the orders will get followed....    Thank you for allowing us to participate in this patient's care. We will continue to follow.    Vital Signs:  Temp Readings from Last 3 Encounters:   04/24/22 98 °F (36.7 °C)   04/10/22 97.3 °F (36.3 °C)   04/09/22 98.4 °F (36.9 °C)       Pulse Readings from Last 3 Encounters:   04/24/22 88   04/10/22 63   04/09/22 95       BP Readings from Last 3 Encounters:   04/24/22 (!) 161/93   04/10/22 117/64   04/09/22 (!) 143/82       Weight:  Wt Readings from Last 3 Encounters:   04/20/22 81.3 kg (179 lb 3.7 oz)   04/10/22 77.1 kg (170 lb)   04/09/22 77.1 kg (170 lb)       INS/OUTS:  I/O last 3 completed shifts:  In: 1040 [P.O.:540; Other:500]  Out: 4075 [Urine:575; Other:3500]  No intake/output data recorded.    Medications:  Scheduled Meds:   ceFAZolin (ANCEF) IVPB  500 mg Intravenous Q12H    docusate sodium  100 mg Oral Daily    [START ON 4/25/2022] epoetin leidy-epbx  50 Units/kg Intravenous Every Mon, Wed, Fri    hydrALAZINE  25 mg Oral Q8H    sevelamer carbonate  800 mg Oral TID WM    sodium chloride 0.9%  2 mL Intravenous Q6H     Continuous Infusions:   loperamide       PRN Meds:.sodium chloride 0.9%,  "cyclobenzaprine, dextrose 10%, dextrose 10%, diphenhydrAMINE, glucagon (human recombinant), glucose, glucose, heparin (porcine), HYDROcodone-acetaminophen, HYDROmorphone, labetaloL, loperamide, naloxone, ondansetron, sodium chloride 0.9%  No current facility-administered medications on file prior to encounter.     Current Outpatient Medications on File Prior to Encounter   Medication Sig Dispense Refill    naloxone (NARCAN) 4 mg/actuation Spry 4mg by nasal route as needed for opioid overdose; may repeat every 2-3 minutes in alternating nostrils until medical help arrives. Call 911 2 each 0       Review of Systems:  Neg    Physical Exam:  BP (!) 161/93   Pulse 88   Temp 98 °F (36.7 °C)   Resp 18   Ht 5' 9" (1.753 m)   Wt 81.3 kg (179 lb 3.7 oz)   SpO2 100%   BMI 26.47 kg/m²     Constitutional: nad, aao x 3, depressed affect  Heart: rrr, no m/r/g, wwp, RLE eedema  Lungs: ctab, no w/r/r/c, no lb  Abdomen: s/nt/nd, +BS    Results:  Lab Results   Component Value Date     (L) 04/23/2022    K 5.5 (H) 04/23/2022    CL 99 04/23/2022    CO2 20 (L) 04/23/2022     (H) 04/23/2022    CREATININE 11.8 (H) 04/23/2022    CALCIUM 7.0 (L) 04/23/2022    ANIONGAP 13 04/23/2022    ESTGFRAFRICA 6 (A) 04/23/2022    EGFRNONAA 5 (A) 04/23/2022       Lab Results   Component Value Date    CALCIUM 7.0 (L) 04/23/2022    PHOS 8.0 (H) 04/23/2022       No results for input(s): WBC, RBC, HGB, HCT, PLT, MCV, MCH, MCHC in the last 24 hours.    I have personally reviewed pertinent radiological imaging and reports.    I have spent > 35 minutes providing care for this patient for the above diagnoses. These services have included chart/data/imaging review, evaluation, exam, formulation of plan, , note preparation, and discussions with staff involved in this patient's care.    Chantelle Fraser MD MPH  [facility] [address] [place] [address]  [phone] (p)  [phone] (f)  "

## 2022-04-24 NOTE — PROGRESS NOTES
[facility] Hospital Medicine  Progress Note    Patient Name: Agus Horan  MRN: [id number]  Patient Class: IP- Inpatient   Admission Date: 4/13/2022  Length of Stay: 11 days  Attending Physician: Owen Zamorano MD  Primary Care Provider: Primary Doctor No        Subjective:     Principal Problem:Acute renal failure        HPI:  Agus Horan is a 20 y/o male with PMHx of mild asthma who presents with right lower extremity pain and swelling x few days. Patient was seen in the ED on 4/9/22 for oxycodone overdose and again on 4/10/22 for left eye pain after falling on a piece of furniture. Patient states he is unsure how he injured his leg and denies IV drug use. Xray of his right leg did not show acute fracture. Patietn states RLE pain and swelling increased over the past few days and he is unable to walk secondary to pain. US of his lower extremity performed today did not show DVT. Lab work revealed elevated Creatinine 13.5 & K+ 7.1, elevated AST/ALT 3980/1611 & WBC 18.14.  CPK>40,0000.  Patient unable to dorsiflex and plantar flex his RLE and symptoms are concerning for compartment syndrome per ED. ED provider discussed with Dr. Byers and Dr. Fraser, nephrology. Patient was referred to hospital medicine and seen in the ED with his friend at bedside. Patient complaining of RLE pain, tenderness and swelling. He denies SOB, N/V/D, chest pain and headaches.  Patient will be admitted to hospital medicine for orthopedic consultation and further management.      Overview/Hospital Course:  He was found to have compartment syndrome and was taking to the OR emergently for fasciotomy on 4/13. We are consulted for SANDRA with metabolic derangements.  Cpk at the time of arrival was more than 40,000. Initially was started on IV fluids and started on hemodialysis. on 4/14- did emergent HD overnight for hyperkalemia and metabolic acidosis refractory to medical management; pain controlled, patient underwent  DEBRIDEMENT, LOWER EXTREMITY (Right)   closure of medial fasciotomy incision right leg debridement of lateral fasciotomy with biopsy of anterior muscle compartment removal of deep muscle anterior compartment right lower leg on 04/18/2022.  Patient's CPK h continue to trend down.  Was continued on hemodialysis.       Interval History: Generalized itching has markedly improved however diffuse maculopapular rash is still present.  Patient denies any further itching.  Right lower extremity pain is stable. Patient is tolerating hemodialysis therapy. Patient is expected to undergo Lower extremity fasciotomy wound washing on Monday by Dr. Byers.  Patient continues to complain right leg pain requiring narcotics for pain control.  Patient denies chest pain or shortness of breath.    Review of Systems   Constitutional:  Negative for activity change, appetite change, chills and fever.   HENT:  Negative for congestion, sore throat and trouble swallowing.    Eyes:  Negative for photophobia and visual disturbance.   Respiratory:  Negative for cough, chest tightness and shortness of breath.    Cardiovascular:  Negative for chest pain, palpitations and leg swelling.   Gastrointestinal:  Negative for abdominal pain, diarrhea and nausea.   Genitourinary:  Negative for dysuria, flank pain and hematuria.   Musculoskeletal:  Positive for gait problem and myalgias. Negative for back pain.   Skin:  Positive for color change.   Neurological:  Negative for dizziness, weakness and headaches.   Psychiatric/Behavioral:  Negative for confusion.    Objective:     Vital Signs (Most Recent):  Temp: 98 °F (36.7 °C) (04/24/22 0755)  Pulse: 88 (04/24/22 0755)  Resp: 18 (04/24/22 0755)  BP: (!) 161/93 (04/24/22 0755)  SpO2: 100 % (04/24/22 0755)   Vital Signs (24h Range):  Temp:  [97.9 °F (36.6 °C)-98.9 °F (37.2 °C)] 98 °F (36.7 °C)  Pulse:  [] 88  Resp:  [18-19] 18  SpO2:  [96 %-100 %] 100 %  BP: (128-203)/() 161/93     Weight: 81.3 kg  (179 lb 3.7 oz)  Body mass index is 26.47 kg/m².    Intake/Output Summary (Last 24 hours) at 4/24/2022 0847  Last data filed at 4/24/2022 0600  Gross per 24 hour   Intake 700 ml   Output 3950 ml   Net -3250 ml        Physical Exam  Vitals and nursing note reviewed.   Constitutional:       Appearance: He is normal weight. He is ill-appearing.   HENT:      Head: Normocephalic.      Mouth/Throat:      Mouth: Mucous membranes are moist.      Pharynx: Oropharynx is clear.   Eyes:      Pupils: Pupils are equal, round, and reactive to light.      Comments: Left orbital ecchymosis   Cardiovascular:      Rate and Rhythm: Regular rhythm. Tachycardia present.      Pulses: Normal pulses.      Heart sounds: Normal heart sounds.   Pulmonary:      Effort: Pulmonary effort is normal.      Breath sounds: Normal breath sounds.   Abdominal:      General: Bowel sounds are normal.      Palpations: Abdomen is soft.   Musculoskeletal:         General: Swelling (r Thigh swellin noted) present. Normal range of motion.      Cervical back: Normal range of motion.      Right upper leg: Swelling present.      Right lower leg: Swelling present.      Comments:     good plantar flexion of his toes actively.  he has no pain on passive extension of the toe the patient has very little extension actively of the toe great toes and smaller toes.    Skin:     General: Skin is warm and dry.      Comments: Diffuse fine maculopapular rash all over extremities and trunk.   Neurological:      Mental Status: He is alert and oriented to person, place, and time. Mental status is at baseline.   Psychiatric:         Mood and Affect: Mood normal.       Significant Labs: All pertinent labs within the past 24 hours have been reviewed.  CBC:   Recent Labs   Lab 04/23/22  0305   WBC 14.55*   HGB 8.0*   HCT 23.8*          CMP:   Recent Labs   Lab 04/23/22  0305   *   K 5.5*   CL 99   CO2 20*   GLU 97   *   CREATININE 11.8*   CALCIUM 7.0*   PROT 5.6*    ALBUMIN 2.0*   BILITOT 0.3   ALKPHOS 55   *   ALT 21   ANIONGAP 13   EGFRNONAA 5*       Microbiology Results (last 7 days)       Procedure Component Value Units Date/Time    Blood culture [[id number]] Collected: 04/13/22 7519    Order Status: Completed Specimen: Blood from Antecubital, Right Hand Updated: 04/19/22 1212     Blood Culture, Routine No growth after 5 days.          Significant Imaging:   X-ray lumbar spine: Normal study.    Right tibia and fibula: Normal study.    Right femur x-ray: Normal study.    Pelvis x-ray: Normal study.    CT Head: No acute abnormality.  Minimal sinus disease.    CT Maxillofacial scan: No facial bone fracture.  Minimal sinus disease.    RLE venous doppler scan: No evidence of deep venous thrombosis in the right lower extremity.    CXR: Central line placement with the tip over the SVC.  No acute detrimental changes.    RLE venous doppler scan:  No evidence of deep venous thrombosis in the right lower extremity. Edema in the soft tissues of the right lower extremity      Assessment/Plan:      * Acute renal failure  Continues to be anuric  Hemodialysis therapy as per Nephrology team.   due to rhabdomyolysis  Trend CPK daily.  Follow Nephrology recommendations.  Avoid nephrotoxins  Telemetry.  Consulting general surgeon for he must plate catheter placement tomorrow.       Drug eruption  No definite etiology apparent.  Possibly clindamycin use.  Off intravenous clindamycin.  Use oral Benadryl 25 mg q.6 hours p.r.n. patient will be monitored closely.      Compartment syndrome  Underwent fasciotomy on 4/13   Appreciate ortho rec.  Wound wash and wound closure surgery possibly on Monday.  NPO after midnight.  Wound care nurse performing dressing changes as per recommendations by Orthopedics.    Elevated liver enzymes  Elevated AST &ALT likely due to rhabdomyolysis  Trending down  Avoid hepatoxic medications  Monitor CMP  See plan for rhabdomyolysis        Rhabdomyolysis  CPK  trending down  S/p fasciotomy on 4/13 for compartment syndrome.  On 4/18 S/p DEBRIDEMENT, LOWER EXTREMITY (Right)   closure of medial fasciotomy incision right leg debridement of lateral fasciotomy with biopsy of anterior muscle compartment removal of deep muscle anterior compartment right lower leg  Scheduled for HD hyperkalemia and metabolic acidosis refractory to medical management;    IV fluids  Monitor BMP   appreciate nephrology recs      History of asthma  Hx of asthma, stable    Monitor for acute changes      VTE Risk Mitigation (From admission, onward)         Ordered     heparin (porcine) injection 4,000 Units  As needed (PRN)         04/14/22 0137     Reason for No Pharmacological VTE Prophylaxis  Once        Question:  Reasons:  Answer:  Risk of Bleeding    04/13/22 1913     IP VTE HIGH RISK PATIENT  Once         04/13/22 1913     Place sequential compression device  Until discontinued         04/13/22 1913                Discharge Planning   JARAD: 4/25/2022     Code Status: Full Code   Is the patient medically ready for discharge?:     Reason for patient still in hospital (select all that apply): Patient trending condition  Discharge Plan A: Home with family                  Owen Zamorano MD  Department of Hospital Medicine   [facility]

## 2022-04-24 NOTE — PT/OT/SLP PROGRESS
"Physical Therapy Treatment    Patient Name:  Agus Horan   MRN:  [id number]    Recommendations:     Discharge Recommendations:  home   Discharge Equipment Recommendations:  (TBD)     Assessment:     Agus Horan is a 21 y.o. male admitted with a medical diagnosis of Acute renal failure.  He presents with the following impairments/functional limitations:  weakness, impaired endurance, orthopedic precautions, impaired functional mobilty, decreased lower extremity function, gait instability, impaired balance  .    Rehab Prognosis: Good; patient would benefit from acute skilled PT services to address these deficits and reach maximum level of function.    Recent Surgery: Procedure(s) (LRB):  DEBRIDEMENT, LOWER EXTREMITY (Right) 6 Days Post-Op    Plan:     During this hospitalization, patient to be seen  (1-2 x/day) to address the identified rehab impairments via gait training, therapeutic activities, therapeutic exercises and progress toward the following goals:    · Plan of Care Expires:  05/15/22    Subjective     Patient/Family Comments/goals: agrees to work with PT to walk in dan[place]; reports that he lifted right leg and bent and straightened knee/hip "40 times"    Objective:     Communicated with nurse (Sylvia) prior to session.  Patient found HOB elevated with grier catheter, bed alarm upon PT entry to room.     General Precautions: Standard, fall   Orthopedic Precautions:RLE non weight bearing   Braces:  (R ankle splint in place throughout session)  Respiratory Status: Room air     Functional Mobility training:  · Bed Mobility:     · Rolling Left:  stand by assistance  · Supine to Sit: stand by assistance  · Sit to Supine: stand by assistance  · Transfers:     · Sit to Stand:  contact guard assistance with rolling walker and from mod height bed  · Gait: 35' with RW NWB RLE with CG and cues      AM-PAC 6 CLICK MOBILITY  Turning over in bed (including adjusting bedclothes, sheets and blankets)?: 3  Sitting down " on and standing up from a chair with arms (e.g., wheelchair, bedside commode, etc.): 3  Moving from lying on back to sitting on the side of the bed?: 3  Moving to and from a bed to a chair (including a wheelchair)?: 3  Need to walk in hospital room?: 3  Climbing 3-5 steps with a railing?: 1  Basic Mobility Total Score: 16       Therapeutic Activities and Exercises:   mobility training as above with cues for technique  Declines second walk.  Instructed NWB RLE at all times, keep RLE elevated on pillows in bed    Patient left supine with all lines intact, call button in reach, bed alarm on and RLE elevated on multiple pillows.    GOALS:   Multidisciplinary Problems     Physical Therapy Goals        Problem: Physical Therapy    Goal Priority Disciplines Outcome Goal Variances Interventions   Physical Therapy Goal     PT, PT/OT Ongoing, Progressing     Description: Goals to be met by: 5/15/2022     Patient will increase functional independence with mobility by performin). Supine to sit with Modified [place]  2). Sit to supine with Modified [place]  3). Sit to stand transfer with Modified [place]  4). Bed to chair transfer with Modified [place] using LRAD.  5). Gait  x > 25 feet with Modified [place] using LRAD.                     Time Tracking:     PT Received On: 22  PT Start Time: 827     PT Stop Time: 843  PT Total Time (min): 16 min     Billable Minutes: Gait Training 15    Treatment Type: Treatment  PT/PTA: PT     PTA Visit Number: 0     2022

## 2022-04-24 NOTE — PLAN OF CARE
Pt in bed awake and alert, no distress noted. Pain meds are effective and rash is improving.  Problem: Adult Inpatient Plan of Care  Goal: Plan of Care Review  Outcome: Ongoing, Progressing  Goal: Patient-Specific Goal (Individualized)  Outcome: Ongoing, Progressing  Goal: Absence of Hospital-Acquired Illness or Injury  Outcome: Ongoing, Progressing  Goal: Optimal Comfort and Wellbeing  Outcome: Ongoing, Progressing  Goal: Readiness for Transition of Care  Outcome: Ongoing, Progressing     Problem: Infection  Goal: Absence of Infection Signs and Symptoms  Outcome: Ongoing, Progressing     Problem: Fall Injury Risk  Goal: Absence of Fall and Fall-Related Injury  Outcome: Ongoing, Progressing     Problem: Skin Injury Risk Increased  Goal: Skin Health and Integrity  Outcome: Ongoing, Progressing     Problem: Device-Related Complication Risk (Hemodialysis)  Goal: Safe, Effective Therapy Delivery  Outcome: Ongoing, Progressing     Problem: Hemodynamic Instability (Hemodialysis)  Goal: Effective Tissue Perfusion  Outcome: Ongoing, Progressing     Problem: Infection (Hemodialysis)  Goal: Absence of Infection Signs and Symptoms  Outcome: Ongoing, Progressing     Problem: Fluid and Electrolyte Imbalance (Acute Kidney Injury/Impairment)  Goal: Fluid and Electrolyte Balance  Outcome: Ongoing, Progressing     Problem: Oral Intake Inadequate (Acute Kidney Injury/Impairment)  Goal: Optimal Nutrition Intake  Outcome: Ongoing, Progressing     Problem: Renal Function Impairment (Acute Kidney Injury/Impairment)  Goal: Effective Renal Function  Outcome: Ongoing, Progressing

## 2022-04-25 ENCOUNTER — ANESTHESIA EVENT (OUTPATIENT)
Dept: SURGERY | Facility: HOSPITAL | Age: 22
DRG: 957 | End: 2022-04-25
Payer: MEDICAID

## 2022-04-25 ENCOUNTER — ANESTHESIA (OUTPATIENT)
Dept: SURGERY | Facility: HOSPITAL | Age: 22
DRG: 957 | End: 2022-04-25
Payer: MEDICAID

## 2022-04-25 LAB
ALBUMIN SERPL BCP-MCNC: 2.3 G/DL (ref 3.5–5.2)
ALP SERPL-CCNC: 61 U/L (ref 55–135)
ALT SERPL W/O P-5'-P-CCNC: 17 U/L (ref 10–44)
ANION GAP SERPL CALC-SCNC: 16 MMOL/L (ref 8–16)
AST SERPL-CCNC: 65 U/L (ref 10–40)
BASOPHILS # BLD AUTO: 0.04 K/UL (ref 0–0.2)
BASOPHILS NFR BLD: 0.3 % (ref 0–1.9)
BILIRUB SERPL-MCNC: 0.3 MG/DL (ref 0.1–1)
BUN SERPL-MCNC: 110 MG/DL (ref 6–20)
CA-I BLDV-SCNC: 0.97 MMOL/L (ref 1.06–1.42)
CALCIUM SERPL-MCNC: 7.2 MG/DL (ref 8.7–10.5)
CHLORIDE SERPL-SCNC: 98 MMOL/L (ref 95–110)
CO2 SERPL-SCNC: 19 MMOL/L (ref 23–29)
CREAT SERPL-MCNC: 11.2 MG/DL (ref 0.5–1.4)
DIFFERENTIAL METHOD: ABNORMAL
EOSINOPHIL # BLD AUTO: 0.8 K/UL (ref 0–0.5)
EOSINOPHIL NFR BLD: 5.8 % (ref 0–8)
ERYTHROCYTE [DISTWIDTH] IN BLOOD BY AUTOMATED COUNT: 12.5 % (ref 11.5–14.5)
EST. GFR  (AFRICAN AMERICAN): 7 ML/MIN/1.73 M^2
EST. GFR  (NON AFRICAN AMERICAN): 6 ML/MIN/1.73 M^2
GLUCOSE SERPL-MCNC: 88 MG/DL (ref 70–110)
HCT VFR BLD AUTO: 22.3 % (ref 40–54)
HGB BLD-MCNC: 7.3 G/DL (ref 14–18)
IMM GRANULOCYTES # BLD AUTO: 0.11 K/UL (ref 0–0.04)
IMM GRANULOCYTES NFR BLD AUTO: 0.8 % (ref 0–0.5)
LYMPHOCYTES # BLD AUTO: 1 K/UL (ref 1–4.8)
LYMPHOCYTES NFR BLD: 7.4 % (ref 18–48)
MAGNESIUM SERPL-MCNC: 2.3 MG/DL (ref 1.6–2.6)
MCH RBC QN AUTO: 30 PG (ref 27–31)
MCHC RBC AUTO-ENTMCNC: 32.7 G/DL (ref 32–36)
MCV RBC AUTO: 92 FL (ref 82–98)
MONOCYTES # BLD AUTO: 1.2 K/UL (ref 0.3–1)
MONOCYTES NFR BLD: 9.2 % (ref 4–15)
NEUTROPHILS # BLD AUTO: 9.9 K/UL (ref 1.8–7.7)
NEUTROPHILS NFR BLD: 76.5 % (ref 38–73)
NRBC BLD-RTO: 0 /100 WBC
PHOSPHATE SERPL-MCNC: 8.9 MG/DL (ref 2.7–4.5)
PLATELET # BLD AUTO: 272 K/UL (ref 150–450)
PMV BLD AUTO: 8.9 FL (ref 9.2–12.9)
POTASSIUM SERPL-SCNC: 5.3 MMOL/L (ref 3.5–5.1)
PROT SERPL-MCNC: 6 G/DL (ref 6–8.4)
RBC # BLD AUTO: 2.43 M/UL (ref 4.6–6.2)
SODIUM SERPL-SCNC: 133 MMOL/L (ref 136–145)
WBC # BLD AUTO: 12.97 K/UL (ref 3.9–12.7)

## 2022-04-25 PROCEDURE — 11043 PR DEBRIDEMENT, SKIN, SUB-Q TISSUE,MUSCLE,=<20 SQ CM: ICD-10-PCS | Mod: 58,,, | Performed by: ORTHOPAEDIC SURGERY

## 2022-04-25 PROCEDURE — 80053 COMPREHEN METABOLIC PANEL: CPT | Performed by: INTERNAL MEDICINE

## 2022-04-25 PROCEDURE — A4216 STERILE WATER/SALINE, 10 ML: HCPCS | Performed by: ORTHOPAEDIC SURGERY

## 2022-04-25 PROCEDURE — 36589 PR REMOVAL TUNNELED CV CATH W/O SUBQ PORT OR PUMP: ICD-10-PCS | Mod: RT,,, | Performed by: SURGERY

## 2022-04-25 PROCEDURE — C1750 CATH, HEMODIALYSIS,LONG-TERM: HCPCS | Performed by: ORTHOPAEDIC SURGERY

## 2022-04-25 PROCEDURE — 27200651 HC AIRWAY, LMA: Performed by: ANESTHESIOLOGY

## 2022-04-25 PROCEDURE — 25000003 PHARM REV CODE 250: Performed by: NURSE ANESTHETIST, CERTIFIED REGISTERED

## 2022-04-25 PROCEDURE — 11000001 HC ACUTE MED/SURG PRIVATE ROOM

## 2022-04-25 PROCEDURE — 63600175 PHARM REV CODE 636 W HCPCS: Performed by: INTERNAL MEDICINE

## 2022-04-25 PROCEDURE — 63600175 PHARM REV CODE 636 W HCPCS: Performed by: ORTHOPAEDIC SURGERY

## 2022-04-25 PROCEDURE — 36000706: Performed by: ORTHOPAEDIC SURGERY

## 2022-04-25 PROCEDURE — 84100 ASSAY OF PHOSPHORUS: CPT | Performed by: INTERNAL MEDICINE

## 2022-04-25 PROCEDURE — 88305 TISSUE EXAM BY PATHOLOGIST: CPT | Mod: 26,,, | Performed by: PATHOLOGY

## 2022-04-25 PROCEDURE — 25000003 PHARM REV CODE 250: Performed by: ORTHOPAEDIC SURGERY

## 2022-04-25 PROCEDURE — 63600175 PHARM REV CODE 636 W HCPCS: Performed by: NURSE ANESTHETIST, CERTIFIED REGISTERED

## 2022-04-25 PROCEDURE — 88305 TISSUE EXAM BY PATHOLOGIST: CPT | Performed by: PATHOLOGY

## 2022-04-25 PROCEDURE — D9220A PRA ANESTHESIA: ICD-10-PCS | Mod: ANES,,, | Performed by: ANESTHESIOLOGY

## 2022-04-25 PROCEDURE — D9220A PRA ANESTHESIA: Mod: ANES,,, | Performed by: ANESTHESIOLOGY

## 2022-04-25 PROCEDURE — 97605 PR NEG PRESS WOUND THERAPY (NPWT) W/NON-DISPOSABLE WOUND VAC DEVICE (DME), <=50 CM: ICD-10-PCS | Mod: ,,, | Performed by: ORTHOPAEDIC SURGERY

## 2022-04-25 PROCEDURE — 11043 DBRDMT MUSC&/FSCA 1ST 20/<: CPT | Mod: 58,,, | Performed by: ORTHOPAEDIC SURGERY

## 2022-04-25 PROCEDURE — D9220A PRA ANESTHESIA: Mod: CRNA,,, | Performed by: NURSE ANESTHETIST, CERTIFIED REGISTERED

## 2022-04-25 PROCEDURE — 37000009 HC ANESTHESIA EA ADD 15 MINS: Performed by: ORTHOPAEDIC SURGERY

## 2022-04-25 PROCEDURE — 71000039 HC RECOVERY, EACH ADD'L HOUR: Performed by: ORTHOPAEDIC SURGERY

## 2022-04-25 PROCEDURE — 80100014 HC HEMODIALYSIS 1:1

## 2022-04-25 PROCEDURE — 25000003 PHARM REV CODE 250: Performed by: INTERNAL MEDICINE

## 2022-04-25 PROCEDURE — 25000003 PHARM REV CODE 250

## 2022-04-25 PROCEDURE — 88305 TISSUE EXAM BY PATHOLOGIST: ICD-10-PCS | Mod: 26,,, | Performed by: PATHOLOGY

## 2022-04-25 PROCEDURE — 99900035 HC TECH TIME PER 15 MIN (STAT)

## 2022-04-25 PROCEDURE — 36000707: Performed by: ORTHOPAEDIC SURGERY

## 2022-04-25 PROCEDURE — 85025 COMPLETE CBC W/AUTO DIFF WBC: CPT | Performed by: INTERNAL MEDICINE

## 2022-04-25 PROCEDURE — 82330 ASSAY OF CALCIUM: CPT | Performed by: INTERNAL MEDICINE

## 2022-04-25 PROCEDURE — 36589 REMOVAL TUNNELED CV CATH: CPT | Mod: RT,,, | Performed by: SURGERY

## 2022-04-25 PROCEDURE — 97605 NEG PRS WND THER DME<=50SQCM: CPT | Mod: ,,, | Performed by: ORTHOPAEDIC SURGERY

## 2022-04-25 PROCEDURE — 94761 N-INVAS EAR/PLS OXIMETRY MLT: CPT

## 2022-04-25 PROCEDURE — 71000033 HC RECOVERY, INTIAL HOUR: Performed by: ORTHOPAEDIC SURGERY

## 2022-04-25 PROCEDURE — 99900103 DSU ONLY-NO CHARGE-INITIAL HR (STAT): Performed by: ORTHOPAEDIC SURGERY

## 2022-04-25 PROCEDURE — D9220A PRA ANESTHESIA: ICD-10-PCS | Mod: CRNA,,, | Performed by: NURSE ANESTHETIST, CERTIFIED REGISTERED

## 2022-04-25 PROCEDURE — 99900104 DSU ONLY-NO CHARGE-EA ADD'L HR (STAT): Performed by: ORTHOPAEDIC SURGERY

## 2022-04-25 PROCEDURE — 37000008 HC ANESTHESIA 1ST 15 MINUTES: Performed by: ORTHOPAEDIC SURGERY

## 2022-04-25 PROCEDURE — 94799 UNLISTED PULMONARY SVC/PX: CPT

## 2022-04-25 PROCEDURE — 83735 ASSAY OF MAGNESIUM: CPT | Performed by: INTERNAL MEDICINE

## 2022-04-25 DEVICE — KIT CATH HEMOSPLIT 14FR 23CM: Type: IMPLANTABLE DEVICE | Site: NECK | Status: FUNCTIONAL

## 2022-04-25 RX ORDER — HYDROCODONE BITARTRATE AND ACETAMINOPHEN 5; 325 MG/1; MG/1
1 TABLET ORAL EVERY 4 HOURS PRN
Status: DISCONTINUED | OUTPATIENT
Start: 2022-04-25 | End: 2022-04-28

## 2022-04-25 RX ORDER — AMOXICILLIN 250 MG
1 CAPSULE ORAL 2 TIMES DAILY
Status: DISCONTINUED | OUTPATIENT
Start: 2022-04-25 | End: 2022-05-11 | Stop reason: HOSPADM

## 2022-04-25 RX ORDER — HEPARIN SODIUM 1000 [USP'U]/ML
INJECTION, SOLUTION INTRAVENOUS; SUBCUTANEOUS
Status: DISCONTINUED | OUTPATIENT
Start: 2022-04-25 | End: 2022-04-25 | Stop reason: HOSPADM

## 2022-04-25 RX ORDER — PROPOFOL 10 MG/ML
VIAL (ML) INTRAVENOUS
Status: DISCONTINUED | OUTPATIENT
Start: 2022-04-25 | End: 2022-04-25

## 2022-04-25 RX ORDER — HYDROMORPHONE HYDROCHLORIDE 2 MG/ML
INJECTION, SOLUTION INTRAMUSCULAR; INTRAVENOUS; SUBCUTANEOUS
Status: DISCONTINUED | OUTPATIENT
Start: 2022-04-25 | End: 2022-04-25

## 2022-04-25 RX ORDER — CALCIUM GLUCONATE 20 MG/ML
1 INJECTION, SOLUTION INTRAVENOUS ONCE
Status: DISCONTINUED | OUTPATIENT
Start: 2022-04-25 | End: 2022-04-30

## 2022-04-25 RX ORDER — DEXAMETHASONE SODIUM PHOSPHATE 4 MG/ML
INJECTION, SOLUTION INTRA-ARTICULAR; INTRALESIONAL; INTRAMUSCULAR; INTRAVENOUS; SOFT TISSUE
Status: DISCONTINUED | OUTPATIENT
Start: 2022-04-25 | End: 2022-04-25

## 2022-04-25 RX ORDER — ONDANSETRON 2 MG/ML
INJECTION INTRAMUSCULAR; INTRAVENOUS
Status: DISCONTINUED | OUTPATIENT
Start: 2022-04-25 | End: 2022-04-25

## 2022-04-25 RX ORDER — DIPHENHYDRAMINE HYDROCHLORIDE 50 MG/ML
INJECTION INTRAMUSCULAR; INTRAVENOUS
Status: DISCONTINUED | OUTPATIENT
Start: 2022-04-25 | End: 2022-04-25

## 2022-04-25 RX ORDER — BUPIVACAINE HYDROCHLORIDE 2.5 MG/ML
INJECTION, SOLUTION EPIDURAL; INFILTRATION; INTRACAUDAL
Status: DISCONTINUED | OUTPATIENT
Start: 2022-04-25 | End: 2022-04-25 | Stop reason: HOSPADM

## 2022-04-25 RX ORDER — ONDANSETRON 2 MG/ML
4 INJECTION INTRAMUSCULAR; INTRAVENOUS ONCE AS NEEDED
Status: DISCONTINUED | OUTPATIENT
Start: 2022-04-25 | End: 2022-04-25 | Stop reason: HOSPADM

## 2022-04-25 RX ORDER — FENTANYL CITRATE 50 UG/ML
INJECTION, SOLUTION INTRAMUSCULAR; INTRAVENOUS
Status: DISCONTINUED | OUTPATIENT
Start: 2022-04-25 | End: 2022-04-25

## 2022-04-25 RX ORDER — FENTANYL CITRATE 50 UG/ML
25 INJECTION, SOLUTION INTRAMUSCULAR; INTRAVENOUS EVERY 5 MIN PRN
Status: DISCONTINUED | OUTPATIENT
Start: 2022-04-25 | End: 2022-04-25 | Stop reason: HOSPADM

## 2022-04-25 RX ORDER — MIDAZOLAM HYDROCHLORIDE 1 MG/ML
INJECTION INTRAMUSCULAR; INTRAVENOUS
Status: DISCONTINUED | OUTPATIENT
Start: 2022-04-25 | End: 2022-04-25

## 2022-04-25 RX ORDER — SODIUM CHLORIDE 0.9 % (FLUSH) 0.9 %
2 SYRINGE (ML) INJECTION EVERY 6 HOURS
Status: DISCONTINUED | OUTPATIENT
Start: 2022-04-25 | End: 2022-04-30

## 2022-04-25 RX ORDER — OXYCODONE HYDROCHLORIDE 5 MG/1
5 TABLET ORAL ONCE AS NEEDED
Status: DISCONTINUED | OUTPATIENT
Start: 2022-04-25 | End: 2022-04-25 | Stop reason: HOSPADM

## 2022-04-25 RX ORDER — LOPERAMIDE HYDROCHLORIDE 2 MG/1
2 CAPSULE ORAL CONTINUOUS PRN
Status: DISCONTINUED | OUTPATIENT
Start: 2022-04-25 | End: 2022-04-25 | Stop reason: SDUPTHER

## 2022-04-25 RX ORDER — OXYCODONE HYDROCHLORIDE 10 MG/1
10 TABLET ORAL EVERY 4 HOURS PRN
Status: DISCONTINUED | OUTPATIENT
Start: 2022-04-25 | End: 2022-04-26

## 2022-04-25 RX ORDER — LIDOCAINE HCL/PF 100 MG/5ML
SYRINGE (ML) INTRAVENOUS
Status: DISCONTINUED | OUTPATIENT
Start: 2022-04-25 | End: 2022-04-25

## 2022-04-25 RX ORDER — ONDANSETRON 2 MG/ML
4 INJECTION INTRAMUSCULAR; INTRAVENOUS EVERY 12 HOURS PRN
Status: DISCONTINUED | OUTPATIENT
Start: 2022-04-25 | End: 2022-04-25 | Stop reason: SDUPTHER

## 2022-04-25 RX ADMIN — DIPHENHYDRAMINE HYDROCHLORIDE 25 MG: 50 INJECTION INTRAMUSCULAR; INTRAVENOUS at 10:04

## 2022-04-25 RX ADMIN — HYDROCODONE BITARTRATE AND ACETAMINOPHEN 1 TABLET: 10; 325 TABLET ORAL at 03:04

## 2022-04-25 RX ADMIN — Medication 2 ML: at 12:04

## 2022-04-25 RX ADMIN — HYDROCODONE BITARTRATE AND ACETAMINOPHEN 1 TABLET: 10; 325 TABLET ORAL at 07:04

## 2022-04-25 RX ADMIN — SEVELAMER CARBONATE 800 MG: 800 TABLET, FILM COATED ORAL at 07:04

## 2022-04-25 RX ADMIN — HYDRALAZINE HYDROCHLORIDE 25 MG: 25 TABLET, FILM COATED ORAL at 09:04

## 2022-04-25 RX ADMIN — DEXAMETHASONE SODIUM PHOSPHATE 4 MG: 4 INJECTION, SOLUTION INTRA-ARTICULAR; INTRALESIONAL; INTRAMUSCULAR; INTRAVENOUS; SOFT TISSUE at 10:04

## 2022-04-25 RX ADMIN — HYDROMORPHONE HYDROCHLORIDE 1 MG: 1 INJECTION, SOLUTION INTRAMUSCULAR; INTRAVENOUS; SUBCUTANEOUS at 04:04

## 2022-04-25 RX ADMIN — Medication 2 ML: at 06:04

## 2022-04-25 RX ADMIN — Medication 2 ML: at 11:04

## 2022-04-25 RX ADMIN — DOCUSATE SODIUM AND SENNOSIDES 1 TABLET: 8.6; 5 TABLET, FILM COATED ORAL at 10:04

## 2022-04-25 RX ADMIN — CEFAZOLIN 500 MG: 1 INJECTION, POWDER, FOR SOLUTION INTRAVENOUS at 07:04

## 2022-04-25 RX ADMIN — FENTANYL CITRATE 50 MCG: 50 INJECTION, SOLUTION INTRAMUSCULAR; INTRAVENOUS at 10:04

## 2022-04-25 RX ADMIN — DIPHENHYDRAMINE HYDROCHLORIDE 25 MG: 25 CAPSULE ORAL at 07:04

## 2022-04-25 RX ADMIN — HYDROMORPHONE HYDROCHLORIDE 1 MG: 2 INJECTION INTRAMUSCULAR; INTRAVENOUS; SUBCUTANEOUS at 11:04

## 2022-04-25 RX ADMIN — ONDANSETRON 4 MG: 2 INJECTION INTRAMUSCULAR; INTRAVENOUS at 10:04

## 2022-04-25 RX ADMIN — HYDROMORPHONE HYDROCHLORIDE 1 MG: 2 INJECTION INTRAMUSCULAR; INTRAVENOUS; SUBCUTANEOUS at 10:04

## 2022-04-25 RX ADMIN — SODIUM CHLORIDE, SODIUM GLUCONATE, SODIUM ACETATE, POTASSIUM CHLORIDE, MAGNESIUM CHLORIDE, SODIUM PHOSPHATE, DIBASIC, AND POTASSIUM PHOSPHATE: .53; .5; .37; .037; .03; .012; .00082 INJECTION, SOLUTION INTRAVENOUS at 09:04

## 2022-04-25 RX ADMIN — CYCLOBENZAPRINE 10 MG: 10 TABLET, FILM COATED ORAL at 07:04

## 2022-04-25 RX ADMIN — DIPHENHYDRAMINE HYDROCHLORIDE 25 MG: 25 CAPSULE ORAL at 05:04

## 2022-04-25 RX ADMIN — HYDRALAZINE HYDROCHLORIDE 25 MG: 25 TABLET, FILM COATED ORAL at 05:04

## 2022-04-25 RX ADMIN — MIDAZOLAM HYDROCHLORIDE 2 MG: 1 INJECTION, SOLUTION INTRAMUSCULAR; INTRAVENOUS at 09:04

## 2022-04-25 RX ADMIN — HYDROMORPHONE HYDROCHLORIDE 1 MG: 1 INJECTION, SOLUTION INTRAMUSCULAR; INTRAVENOUS; SUBCUTANEOUS at 10:04

## 2022-04-25 RX ADMIN — LIDOCAINE HYDROCHLORIDE 100 MG: 20 INJECTION INTRAVENOUS at 10:04

## 2022-04-25 RX ADMIN — PROPOFOL 200 MG: 10 INJECTION, EMULSION INTRAVENOUS at 10:04

## 2022-04-25 RX ADMIN — HEPARIN SODIUM 4000 UNITS: 1000 INJECTION, SOLUTION INTRAVENOUS; SUBCUTANEOUS at 06:04

## 2022-04-25 RX ADMIN — GLYCOPYRROLATE 0.2 MG: 0.2 INJECTION, SOLUTION INTRAMUSCULAR; INTRAVITREAL at 09:04

## 2022-04-25 RX ADMIN — CEFAZOLIN 500 MG: 1 INJECTION, POWDER, FOR SOLUTION INTRAVENOUS at 03:04

## 2022-04-25 RX ADMIN — EPOETIN ALFA-EPBX 4100 UNITS: 10000 INJECTION, SOLUTION INTRAVENOUS; SUBCUTANEOUS at 06:04

## 2022-04-25 NOTE — PT/OT/SLP DISCHARGE
Physical Therapy Discharge Summary    Name: Agus Horan  MRN: [id number]   Principal Problem: Acute renal failure     Patient Discharged from acute Physical Therapy on 2022.  Please refer to prior PT noted date on 22 for functional status.     Assessment:     Patient with change in status with patient status post fasciotomy of right lower leg today. Patient will need new PT orders if appropriate to continue with skilled therapy. RN notified.    Objective:     GOALS:   Multidisciplinary Problems     Physical Therapy Goals        Problem: Physical Therapy    Goal Priority Disciplines Outcome Goal Variances Interventions   Physical Therapy Goal     PT, PT/OT Ongoing, Progressing     Description: Goals to be met by: 5/15/2022     Patient will increase functional independence with mobility by performin). Supine to sit with Modified [place]  2). Sit to supine with Modified [place]  3). Sit to stand transfer with Modified [place]  4). Bed to chair transfer with Modified [place] using LRAD.  5). Gait  x > 25 feet with Modified [place] using LRAD.                     Reasons for Discontinuation of Therapy Services  Change in status      Plan:     Patient Discharged to: In house with nursing care.      2022

## 2022-04-25 NOTE — PROGRESS NOTES
[facility]  Adult Nutrition  Consult Note    SUMMARY   Intervention: sodium/phosphorus/potassium modified diet     Recommendations  Recommendation/Intervention:   1.) Continue with Renal diet + Novasource renal BID   Goals: 1.) diet will advance within 48 hrs 2.) pt will meet >50% of EEN during admit   Nutrition Goal Status: 1.) goal met 2.) progressing toward goal   Communication of RD Recs: reviewed with RN    1. Compartment syndrome    2. Hyperkalemia    3. SANDRA (acute kidney injury)    4. Traumatic rhabdomyolysis, initial encounter    5. Acute renal failure    6. Non-traumatic compartment syndrome of right lower extremity    7. Acute renal failure, unspecified acute renal failure type      Past Medical History:   Diagnosis Date    Allergy     AR    Asthma     mild intermittent    Hyperkalemia 4/14/2022         Assessment and Plan  Nutrition Problem  altered nutrition related lab values    Related to (etiology):   Renal dysfunction    Signs and Symptoms (as evidenced by):   Phos: 8.9   K: 5.3      Interventions/Recommendations (treatment strategy):  Advance to renal diet, educate on diet on f/u     Nutrition Diagnosis Status:   Continues           Malnutrition Assessment  Stable weight.       Reason for Assessment  Reason For Assessment: consult  General Information Comments: admits with acute renal failure. Pt in OR during RD rounds for washout. RD consulted for new HD pt. Provided renal diet education to RN. Will f/u with education. unable to assess any weight loss.  4/21: pt receiving HD during RD rounds. Just receive Uzbek toast for breakfast which he was excited about. States he's willing to try novasource with meals. Encouraged protein intake with meals and importance of maintaining his nutrition status while receiving HD. Reports a UBW of ~165lbs. Provided SANDRA diet education, left at bedside with RD email.  4/25: pt in periop for surgery. Diet just advanced back to renal. Per chart  "review, pt with adequate intake over the weekend (75% at most meals). Pt will continue with HD outpatient.     Nutrition Risk Screen  Nutrition Risk Screen: no indicators present    Nutrition/Diet History  Food Allergies: shrimp  Factors Affecting Nutritional Intake: NPO-- resolved.     Anthropometrics  Temp: 97.7 °F (36.5 °C)  Height Method: Stated  Height: 5' 9"  Height (inches): 69 in  Weight Method: Bed Scale  Weight: 81.3 kg (179 lb 3.7 oz)  Weight (lb): 179.24 lb  Ideal Body Weight (IBW), Male: 160 lb  % Ideal Body Weight, Male (lb): 116.71 %  BMI (Calculated): 26.5  BMI Grade: 25 - 29.9 - overweight  Usual Body Weight (UBW), k.1 kg (2022)  % Usual Body Weight: 110.09  % Weight Change From Usual Weight: 9.86 %       Lab/Procedures/Meds  Pertinent Labs Reviewed: reviewed  BMP  Lab Results   Component Value Date     (L) 2022    K 5.3 (H) 2022    CL 98 2022    CO2 19 (L) 2022     (H) 2022    CREATININE 11.2 (H) 2022    CALCIUM 7.2 (L) 2022    ANIONGAP 16 2022    ESTGFRAFRICA 7 (A) 2022    EGFRNONAA 6 (A) 2022     Lab Results   Component Value Date    ALBUMIN 2.3 (L) 2022     Lab Results   Component Value Date    CALCIUM 7.2 (L) 2022    PHOS 8.9 (H) 2022     No results for input(s): POCTGLUCOSE in the last 24 hours.    Pertinent Medications Reviewed: reviewed  Scheduled Meds:   calcitRIOL  0.25 mcg Oral Daily    calcium gluconate IVPB  1 g Intravenous Once    ceFAZolin (ANCEF) IVPB  500 mg Intravenous Q12H    docusate sodium  100 mg Oral Daily    epoetin leidy-epbx  50 Units/kg Intravenous Every Mon, Wed, Fri    ergocalciferol  50,000 Units Oral Q7 Days    hydrALAZINE  25 mg Oral Q8H    sevelamer carbonate  800 mg Oral TID WM    sodium chloride 0.9%  2 mL Intravenous Q6H    sodium chloride 0.9%  2 mL Intravenous Q6H     Continuous Infusions:   electrolyte-S (pH 7.4) Stopped (22 1255)    loperamide "         Estimated/Assessed Needs  Weight Used For Calorie Calculations: 78 kg (171 lb 15.3 oz) (NHANES II)  Energy Calorie Requirements (kcal): 5523-4198 kcals  Energy Need Method: Kcal/kg  Protein Requirements: 80-95g  Weight Used For Protein Calculations: 78 kg (171 lb 15.3 oz) (NHANES II)  Fluid Requirements (mL): UOP + 1000 mls  Estimated Fluid Requirement Method: other (see comments)  RDA Method (mL): 2300         Nutrition Prescription Ordered  Current Diet Order: Renal diet  Oral Nutrition Supplements: Novasource renal       Evaluation of Received Nutrient/Fluid Intake  Energy Calories Required: not meeting needs  % Intake of Estimated Energy Needs: 50 - 75 %  % Meal Intake: 0 - 25 % (diet just advanced)    Nutrition Risk  Level of Risk/Frequency of Follow-up:  (2 weekly)       Monitor and Evaluation  Food and Nutrient Intake: energy intake, food and beverage intake  Food and Nutrient Adminstration: diet order  Knowledge/Beliefs/Attitudes: food and nutrition knowledge/skill  Anthropometric Measurements: weight, weight change, body mass index  Biochemical Data, Medical Tests and Procedures: electrolyte and renal panel, glucose/endocrine profile, lipid profile  Nutrition-Focused Physical Findings: overall appearance       Nutrition Follow-Up  RD Follow-up?: Yes

## 2022-04-25 NOTE — PROGRESS NOTES
2000 ml net uf removed   04/25/22 1840   Vital Signs   Temp 97.2 °F (36.2 °C)   Pulse 91   Resp 18   BP (!) 173/87   Pre-Hemodialysis Assessment   Treatment Status Completed        Hemodialysis Catheter 04/25/22 1119 right internal jugular   Placement Date/Time: 04/25/22 1119   Hand Hygiene: Performed  Barrier Precautions: Performed  Skin Antisepsis: ChloraPrep  Location: right internal jugular  Catheter Size (Fr): 14.5 Fr   Venous Patency/Care heparin locked   Arterial Patency/Care heparin locked   Post-Hemodialysis Assessment   Rinseback Volume (mL) 250 mL   Blood Volume Processed (Liters) 41 L   Dialyzer Clearance Heavily streaked   Duration of Treatment (minutes) 210 minutes   Additional Fluid Intake (mL) 500 mL   Total UF (mL) 2500 mL   Net Fluid Removal 2000   Patient Response to Treatment very anxious   Post-Treatment Weight 79.3 kg (174 lb 13.2 oz)   Treatment Weight Change -2   Post-Hemodialysis Comments treatment completed

## 2022-04-25 NOTE — PLAN OF CARE
Message received from Landon with [facility] that he has received packet and request for new Dialysis #[id number] and is working on getting pt set up on MWF shift 3 . He will keep me updated . CM following.    04/25/22 8108   Post-Acute Status   Post-Acute Authorization Dialysis   Diaylsis Status Referrals Sent

## 2022-04-25 NOTE — PLAN OF CARE
Intervention: sodium/phosphorus/potassium modified diet      Recommendations  Recommendation/Intervention:   1.) Continue with Renal diet + Novasource renal BID   Goals: 1.) diet will advance within 48 hrs 2.) pt will meet >50% of EEN during admit   Nutrition Goal Status: 1.) goal met 2.) progressing toward goal   Communication of RD Recs: reviewed with RN

## 2022-04-25 NOTE — PLAN OF CARE
Problem: Adult Inpatient Plan of Care  Goal: Plan of Care Review  4/25/2022 1819 by Sylvia Zaman LPN  Outcome: Ongoing, Progressing  4/25/2022 1818 by Sylvia Zaman LPN  Outcome: Ongoing, Progressing  Goal: Patient-Specific Goal (Individualized)  4/25/2022 1819 by Sylvia Zaman LPN  Outcome: Ongoing, Progressing  4/25/2022 1818 by Sylvia Zaman LPN  Outcome: Ongoing, Progressing  Goal: Absence of Hospital-Acquired Illness or Injury  4/25/2022 1819 by Sylvia Zaman LPN  Outcome: Ongoing, Progressing  4/25/2022 1818 by Sylvia Zaman LPN  Outcome: Ongoing, Progressing  Goal: Optimal Comfort and Wellbeing  4/25/2022 1819 by Sylvia Zaman LPN  Outcome: Ongoing, Progressing  4/25/2022 1818 by Sylvia Zaman LPN  Outcome: Ongoing, Progressing  Goal: Readiness for Transition of Care  4/25/2022 1819 by Sylvia Zaman LPN  Outcome: Ongoing, Progressing  4/25/2022 1818 by Sylvia Zaman LPN  Outcome: Ongoing, Progressing     Problem: Infection  Goal: Absence of Infection Signs and Symptoms  4/25/2022 1819 by Sylvia Zaman LPN  Outcome: Ongoing, Progressing  4/25/2022 1818 by Sylvia Zaman LPN  Outcome: Ongoing, Progressing     Problem: Fall Injury Risk  Goal: Absence of Fall and Fall-Related Injury  4/25/2022 1819 by Sylvia Zaman LPN  Outcome: Ongoing, Progressing  4/25/2022 1818 by Sylvia Zaman LPN  Outcome: Ongoing, Progressing     Problem: Skin Injury Risk Increased  Goal: Skin Health and Integrity  4/25/2022 1819 by Sylvia Zaman LPN  Outcome: Ongoing, Progressing  4/25/2022 1818 by Sylvia Zaman LPN  Outcome: Ongoing, Progressing     Problem: Device-Related Complication Risk (Hemodialysis)  Goal: Safe, Effective Therapy Delivery  4/25/2022 1819 by Sylvia Zaman LPN  Outcome: Ongoing, Progressing  4/25/2022 1818 by Sylvia Zaman LPN  Outcome: Ongoing, Progressing     Problem: Hemodynamic Instability (Hemodialysis)  Goal: Effective Tissue Perfusion  4/25/2022 1819 by Sylvia Zaman LPN  Outcome: Ongoing,  Progressing  4/25/2022 1818 by Sylvia Zaman LPN  Outcome: Ongoing, Progressing     Problem: Infection (Hemodialysis)  Goal: Absence of Infection Signs and Symptoms  4/25/2022 1819 by Sylvia Zaman LPN  Outcome: Ongoing, Progressing  4/25/2022 1818 by Sylvia Zaman LPN  Outcome: Ongoing, Progressing     Problem: Fluid and Electrolyte Imbalance (Acute Kidney Injury/Impairment)  Goal: Fluid and Electrolyte Balance  4/25/2022 1819 by Sylvia Zaman LPN  Outcome: Ongoing, Progressing  4/25/2022 1818 by Sylvia Zaman LPN  Outcome: Ongoing, Progressing     Problem: Oral Intake Inadequate (Acute Kidney Injury/Impairment)  Goal: Optimal Nutrition Intake  4/25/2022 1819 by Sylvia Zaman LPN  Outcome: Ongoing, Progressing  4/25/2022 1818 by Sylvia Zaman LPN  Outcome: Ongoing, Progressing     Problem: Renal Function Impairment (Acute Kidney Injury/Impairment)  Goal: Effective Renal Function  4/25/2022 1819 by Sylvia Zaman LPN  Outcome: Ongoing, Progressing  4/25/2022 1818 by Sylvia Zaman LPN  Outcome: Ongoing, Progressing

## 2022-04-25 NOTE — ASSESSMENT & PLAN NOTE
Underwent fasciotomy on 4/13   Appreciate ortho rec.  Wound wash and wound closure surgery today.    Wound care nurse performing dressing changes as per recommendations by Orthopedics.

## 2022-04-25 NOTE — ANESTHESIA PREPROCEDURE EVALUATION
04/25/2022  Agus Horan is a 21 y.o., male.      Pre-op Assessment    I have reviewed the Patient Summary Reports.     I have reviewed the Nursing Notes. I have reviewed the NPO Status.   I have reviewed the Medications.     Review of Systems  Anesthesia Hx:  No problems with previous Anesthesia    Social:  Non-Smoker    Cardiovascular:  Cardiovascular Normal     Pulmonary:   Asthma asymptomatic    Renal/:   Chronic Renal Disease    Musculoskeletal:   Compartment syndrome    Neurological:   Neuromuscular Disease,    Endocrine:  Endocrine Normal        Physical Exam  General: Well nourished, Cooperative, Alert and Oriented    Airway:  Mallampati: I   Mouth Opening: Normal  Neck ROM: Normal ROM    Dental:  Intact    Chest/Lungs:  Normal Respiratory Rate, Clear to auscultation    Heart:  Rate: Normal  Rhythm: Regular Rhythm        Anesthesia Plan  Type of Anesthesia, risks & benefits discussed:    Anesthesia Type: Gen Supraglottic Airway  Intra-op Monitoring Plan: Standard ASA Monitors  Post Op Pain Control Plan: multimodal analgesia  Induction:  IV  Airway Plan: , Post-Induction  Informed Consent: Informed consent signed with the Patient and all parties understand the risks and agree with anesthesia plan.  All questions answered.   ASA Score: 2  Day of Surgery Review of History & Physical: H&P Update referred to the surgeon/provider.    Ready For Surgery From Anesthesia Perspective.     .

## 2022-04-25 NOTE — PLAN OF CARE
Pt in bed awake and calm, no distress noted. Pt was medicated three times for pain and Benadryl for given for itching.   Problem: Adult Inpatient Plan of Care  Goal: Plan of Care Review  Outcome: Ongoing, Progressing  Goal: Patient-Specific Goal (Individualized)  Outcome: Ongoing, Progressing  Goal: Absence of Hospital-Acquired Illness or Injury  Outcome: Ongoing, Progressing  Goal: Optimal Comfort and Wellbeing  Outcome: Ongoing, Progressing  Goal: Readiness for Transition of Care  Outcome: Ongoing, Progressing     Problem: Infection  Goal: Absence of Infection Signs and Symptoms  Outcome: Ongoing, Progressing     Problem: Fall Injury Risk  Goal: Absence of Fall and Fall-Related Injury  Outcome: Ongoing, Progressing     Problem: Skin Injury Risk Increased  Goal: Skin Health and Integrity  Outcome: Ongoing, Progressing     Problem: Device-Related Complication Risk (Hemodialysis)  Goal: Safe, Effective Therapy Delivery  Outcome: Ongoing, Progressing     Problem: Hemodynamic Instability (Hemodialysis)  Goal: Effective Tissue Perfusion  Outcome: Ongoing, Progressing     Problem: Infection (Hemodialysis)  Goal: Absence of Infection Signs and Symptoms  Outcome: Ongoing, Progressing     Problem: Fluid and Electrolyte Imbalance (Acute Kidney Injury/Impairment)  Goal: Fluid and Electrolyte Balance  Outcome: Ongoing, Progressing     Problem: Oral Intake Inadequate (Acute Kidney Injury/Impairment)  Goal: Optimal Nutrition Intake  Outcome: Ongoing, Progressing     Problem: Renal Function Impairment (Acute Kidney Injury/Impairment)  Goal: Effective Renal Function  Outcome: Ongoing, Progressing   Meds were effective. Pt has been NPO except meds after midnight and CHG bath is done.

## 2022-04-25 NOTE — PLAN OF CARE
AAOx3. NAD. VSS. Calm and cooperative. Speech appropriate. Refusing clear liquids. Denies nausea. NSR. Resp E/U. BBS clear. 98% on RA. RT tunneled cath intact. 20G IV to LT wrist  Saline locked with dressing CDI. No swelling or redness. Dressing to RT lower leg moist with clear fluid. Dr. Byers notified. Wound vac in place and set to continuous @ 125.  All safety measures taken. Bed in low position. Bedrails up x2. Bed locked.

## 2022-04-25 NOTE — OP NOTE
DATE OF PROCEDURE: 04/25/2022    PREOPERATIVE DIAGNOSIS:   1.  Right lower extremity compartment syndrome  2. Renal failure    POSTOPERATIVE DIAGNOSIS:  Same    PROCEDURE:  Removal of right IJ temporary hemodialysis access catheter placement of a tunneled right IJ hemodialysis catheter    * separate procedure performed by Orthopedics, see the procedure note for further details    SURGEON: Sundeep Rahman M.D    ASSISTANT: None    ANESTHESIA:  General    ESTIMATED BLOOD LOSS:  20 cc    SPECIMEN:  None    CONDITION:  Stable    COMPLICATIONS: None    FINDINGS:   Existing right IJ catheter removed over wire  Wire positioning confirmed with fluoroscopy  Tunneled hemodialysis catheter placed    INDICATIONS: The patient is a 21-year-old male with right lower extremity compartment syndrome he developed rhabdomyolysis and subsequent renal failure.  Had been receiving temporary hemodialysis through a right IJ Trialysis catheter.  General surgery was consulted for placement of a tunneled hemodialysis catheter.    PROCEDURE IN DETAIL:  The patient was already under general anesthesia in the operating room after undergoing debridement of his right lower extremity fasciotomy.  Please see orthopedic procedure note for further details.  Patient had received perioperative antibiotics.  His right neck including the existing catheter were prepped and draped in typical sterile fashion.  Time-out was performed for this portion of the procedure.  I began by passing a wire down the Trialysis catheter and confirming position under fluoroscopic guidance.  The existing catheter was then removed over the wire and a dilator and sheath was placed over the wire.  The area was re-prepped and gloves were exchanged.  Our catheter exit site on the chest was marked out and local anesthetic was injected.  Stab incision was made in the right chest.  I then tunneled the HemoSplit catheter from the right chest stab excision to the neck  incisions/insertion site.  The wire and dilator were then removed and the catheter was threaded down the sheath.  Of note the sheath had a crack in it seal and leaked some venous blood but this was only minimal.  The sheath was peeled away.  The catheter tip was confirmed in appropriate position.  There was a kink at the curvature of the catheter in the neck but this was able to be manipulated.  At this point that catheter ports aspirated and flushed with ease.  Hep saline was used to infuse the catheters afterwards and sterile caps were placed.  The catheter was then secured to the skin with nylon sutures.  The neck incision site was closed with 4-0 Monocryl subcuticular stitch and Dermabond.  A sterile occlusive dressing including a Biopatch was placed around the catheter exit site.  Patient was then aroused from sedation, extubated taken to recovery room stable condition having suffered no complications.  All counts were correct for my portion of the case x2.    DISPO:  PACU

## 2022-04-25 NOTE — OP NOTE
[facility]  Orthopedic Surgery   Operative Note    SUMMARY     Date of Procedure: 4/25/2022     Procedure:  Debridement deep right lateral fasciotomy incision right lower leg with excisional  debridement of dead anterior tibialis muscle and biopsy of muscle application of wound VAC right lower leg      Surgeon(s) and Role:  Leonardo Byers m.d. application of wound VAC debridement deep muscle right lower leg irrigation deep debridement fasciotomy incision right lower leg          Assistant:  Tierney Ruffin    Pre-Operative Diagnosis:  Status post post fasciotomy right lower leg    Post-Operative Diagnosis:  Status post fasciotomy right lower leg         Anesthesia: General      Estimated Blood Loss (EBL): 30 mL           Implants: * No implants in log *    Specimens:   Specimen (24h ago, onward)            None           Complications:  None           Description of the Procedure:  The patient was given a general anesthetic and placed in the supine position the splint was removed and dressing removed from the right lower leg inspection of the fasciotomy incision the lateral fasciotomy incision still had a lot of swollen muscle in the compartments there was no gross pus present from the wounds the lower leg was then prepped Betadine solution scrub and draped in a sterile manner the wound the anterior compartment of the lower leg in the the lateral compartment muscle had good granulation tissue viable it did contract with the Bovie .  The anterior compartment muscle appeared to be ischemic quality and did not contract with the Bovie.  A knife blade was used to debride some of this muscle down to some bleeding surface most of the anterior compartment was covered with granulation tissue but as mentioned the muscle appeared to be ischemic and nonfunctional.  We debrided what appeared to be some necrotic muscle and sent in for biopsy.  We then irrigated the wound with normal saline solution with a  Pulsavac hemostasis was secured with the Bovie.  We then applied a wound VAC to the wound we had good suction on the wound VAC in appear to be functioning well.  As mentioned before will unable to close the fasciotomy due to this swelling which was still present.

## 2022-04-25 NOTE — PLAN OF CARE
New HD packet, facesheet, labs, consults and progress notes sent to [facility] central scheduling at [phone] and [facility] central scheduling at [phone] to review for new HD chair time. CM following.    04/25/22 1324   Post-Acute Status   Post-Acute Authorization Dialysis   Diaylsis Status Referrals Sent

## 2022-04-25 NOTE — ANESTHESIA POSTPROCEDURE EVALUATION
Anesthesia Post Evaluation    Patient: Agus Horan    Procedure(s) Performed: Procedure(s) (LRB):  FASCIOTOMY (Right)  INSERTION, CATHETER, TUNNELED (Right)    Final Anesthesia Type: general      Patient location during evaluation: PACU  Patient participation: Yes- Able to Participate  Level of consciousness: awake and alert  Post-procedure vital signs: reviewed and stable  Pain management: adequate  Airway patency: patent    PONV status at discharge: No PONV  Anesthetic complications: no      Cardiovascular status: hemodynamically stable  Respiratory status: unassisted and room air  Hydration status: euvolemic  Follow-up not needed.          Vitals Value Taken Time   /91 04/25/22 1645   Temp 36.1 °C (97 °F) 04/25/22 1500   Pulse 98 04/25/22 1645   Resp 18 04/25/22 1500   SpO2 98 % 04/25/22 1615         Event Time   Out of Recovery 04/25/2022 12:55:00         Pain/Rhonda Score: Pain Rating Prior to Med Admin: 6 (4/25/2022  4:57 AM)  Pain Rating Post Med Admin: 0 (4/25/2022  5:27 AM)  Rhonda Score: 9 (4/25/2022 12:45 PM)

## 2022-04-25 NOTE — PROGRESS NOTES
[facility] Hospital Medicine  Progress Note    Patient Name: Agus Horan  MRN: [id number]  Patient Class: IP- Inpatient   Admission Date: 4/13/2022  Length of Stay: 12 days  Attending Physician: Owen Zamorano MD  Primary Care Provider: Primary Doctor No        Subjective:     Principal Problem:Acute renal failure        HPI:  Agus Horan is a 20 y/o male with PMHx of mild asthma who presents with right lower extremity pain and swelling x few days. Patient was seen in the ED on 4/9/22 for oxycodone overdose and again on 4/10/22 for left eye pain after falling on a piece of furniture. Patient states he is unsure how he injured his leg and denies IV drug use. Xray of his right leg did not show acute fracture. Patietn states RLE pain and swelling increased over the past few days and he is unable to walk secondary to pain. US of his lower extremity performed today did not show DVT. Lab work revealed elevated Creatinine 13.5 & K+ 7.1, elevated AST/ALT 3980/1611 & WBC 18.14.  CPK>40,0000.  Patient unable to dorsiflex and plantar flex his RLE and symptoms are concerning for compartment syndrome per ED. ED provider discussed with Dr. Byers and Dr. Fraser, nephrology. Patient was referred to hospital medicine and seen in the ED with his friend at bedside. Patient complaining of RLE pain, tenderness and swelling. He denies SOB, N/V/D, chest pain and headaches.  Patient will be admitted to hospital medicine for orthopedic consultation and further management.      Overview/Hospital Course:  He was found to have compartment syndrome and was taking to the OR emergently for fasciotomy on 4/13. We are consulted for SANDRA with metabolic derangements.  Cpk at the time of arrival was more than 40,000. Initially was started on IV fluids and started on hemodialysis. on 4/14- did emergent HD overnight for hyperkalemia and metabolic acidosis refractory to medical management; pain controlled, patient underwent  DEBRIDEMENT, LOWER EXTREMITY (Right)   closure of medial fasciotomy incision right leg debridement of lateral fasciotomy with biopsy of anterior muscle compartment removal of deep muscle anterior compartment right lower leg on 04/18/2022.  Patient's CPK h continue to trend down.  Was continued on hemodialysis.       Interval History: No new complaints noted overnight. Patient is tolerating hemodialysis therapy. Patient is expected to undergo Lower extremity fasciotomy wound washing today by Dr. Byers and also hemodialysis catheter - hemosplit placement.  Patient continues to complain right leg pain requiring narcotics for pain control.  Patient denies chest pain or shortness of breath.    Review of Systems   Constitutional:  Negative for activity change, appetite change, chills and fever.   HENT:  Negative for congestion, sore throat and trouble swallowing.    Eyes:  Negative for photophobia and visual disturbance.   Respiratory:  Negative for cough, chest tightness and shortness of breath.    Cardiovascular:  Negative for chest pain, palpitations and leg swelling.   Gastrointestinal:  Negative for abdominal pain, diarrhea and nausea.   Genitourinary:  Negative for dysuria, flank pain and hematuria.   Musculoskeletal:  Positive for gait problem and myalgias. Negative for back pain.   Skin:  Positive for color change.   Neurological:  Negative for dizziness, weakness and headaches.   Psychiatric/Behavioral:  Negative for confusion.    Objective:     Vital Signs (Most Recent):  Temp: 98 °F (36.7 °C) (04/25/22 0820)  Pulse: 96 (04/25/22 0820)  Resp: 18 (04/25/22 0820)  BP: (!) 145/76 (04/25/22 0820)  SpO2: 97 % (04/25/22 0820)   Vital Signs (24h Range):  Temp:  [96.3 °F (35.7 °C)-98 °F (36.7 °C)] 98 °F (36.7 °C)  Pulse:  [87-98] 96  Resp:  [14-18] 18  SpO2:  [95 %-100 %] 97 %  BP: (119-170)/(69-93) 145/76     Weight: 81.3 kg (179 lb 3.7 oz)  Body mass index is 26.47 kg/m².    Intake/Output Summary (Last 24 hours) at  4/25/2022 0841  Last data filed at 4/25/2022 0505  Gross per 24 hour   Intake 442 ml   Output 965 ml   Net -523 ml        Physical Exam  Vitals and nursing note reviewed.   Constitutional:       Appearance: He is normal weight. He is ill-appearing.   HENT:      Head: Normocephalic.      Mouth/Throat:      Mouth: Mucous membranes are moist.      Pharynx: Oropharynx is clear.   Eyes:      Pupils: Pupils are equal, round, and reactive to light.      Comments: Left orbital ecchymosis   Cardiovascular:      Rate and Rhythm: Regular rhythm. Tachycardia present.      Pulses: Normal pulses.      Heart sounds: Normal heart sounds.   Pulmonary:      Effort: Pulmonary effort is normal.      Breath sounds: Normal breath sounds.   Abdominal:      General: Bowel sounds are normal.      Palpations: Abdomen is soft.   Musculoskeletal:         General: Swelling (r Thigh swellin noted) present. Normal range of motion.      Cervical back: Normal range of motion.      Right upper leg: Swelling present.      Right lower leg: Swelling present.      Comments:     good plantar flexion of his toes actively.  he has no pain on passive extension of the toe the patient has very little extension actively of the toe great toes and smaller toes.    Skin:     General: Skin is warm and dry.      Comments: Diffuse fine maculopapular rash all over extremities and trunk.   Neurological:      Mental Status: He is alert and oriented to person, place, and time. Mental status is at baseline.   Psychiatric:         Mood and Affect: Mood normal.       Significant Labs: All pertinent labs within the past 24 hours have been reviewed.  CBC:   Recent Labs   Lab 04/25/22  0541   WBC 12.97*   HGB 7.3*   HCT 22.3*          CMP:   Recent Labs   Lab 04/25/22  0541   *   K 5.3*   CL 98   CO2 19*   GLU 88   *   CREATININE 11.2*   CALCIUM 7.2*   PROT 6.0   ALBUMIN 2.3*   BILITOT 0.3   ALKPHOS 61   AST 65*   ALT 17   ANIONGAP 16   EGFRNONAA 6*        Microbiology Results (last 7 days)       Procedure Component Value Units Date/Time    Blood culture [[id number]] Collected: 04/13/22 3560    Order Status: Completed Specimen: Blood from Antecubital, Right Hand Updated: 04/19/22 1212     Blood Culture, Routine No growth after 5 days.          Significant Imaging:   X-ray lumbar spine: Normal study.    Right tibia and fibula: Normal study.    Right femur x-ray: Normal study.    Pelvis x-ray: Normal study.    CT Head: No acute abnormality.  Minimal sinus disease.    CT Maxillofacial scan: No facial bone fracture.  Minimal sinus disease.    RLE venous doppler scan: No evidence of deep venous thrombosis in the right lower extremity.    CXR: Central line placement with the tip over the SVC.  No acute detrimental changes.    RLE venous doppler scan:  No evidence of deep venous thrombosis in the right lower extremity. Edema in the soft tissues of the right lower extremity      Assessment/Plan:      * Acute renal failure  Continues to be anuric  Hemodialysis therapy as per Nephrology team.   due to rhabdomyolysis  Trend CPK daily.  Follow Nephrology recommendations.  Avoid nephrotoxins  Telemetry.  Consulting general surgeon for he must plate catheter placement tomorrow.       Drug eruption  No definite etiology apparent.  Possibly clindamycin use.  Off intravenous clindamycin.  Use oral Benadryl 25 mg q.6 hours p.r.n. patient will be monitored closely.      Compartment syndrome  Underwent fasciotomy on 4/13   Appreciate ortho rec.  Wound wash and wound closure surgery today.    Wound care nurse performing dressing changes as per recommendations by Orthopedics.    Elevated liver enzymes  Elevated AST &ALT likely due to rhabdomyolysis  Trending down  Avoid hepatoxic medications  Monitor CMP  See plan for rhabdomyolysis        Rhabdomyolysis  CPK trending down  S/p fasciotomy on 4/13 for compartment syndrome.  On 4/18 S/p DEBRIDEMENT, LOWER EXTREMITY (Right)   closure of  medial fasciotomy incision right leg debridement of lateral fasciotomy with biopsy of anterior muscle compartment removal of deep muscle anterior compartment right lower leg  Scheduled for HD hyperkalemia and metabolic acidosis refractory to medical management;    IV fluids  Monitor BMP   appreciate nephrology recs      History of asthma  Hx of asthma, stable    Monitor for acute changes        VTE Risk Mitigation (From admission, onward)         Ordered     heparin (porcine) injection  As needed (PRN)         04/25/22 1125     heparin (porcine) injection 4,000 Units  As needed (PRN)         04/14/22 0137     Reason for No Pharmacological VTE Prophylaxis  Once        Question:  Reasons:  Answer:  Risk of Bleeding    04/13/22 1913     IP VTE HIGH RISK PATIENT  Once         04/13/22 1913     Place sequential compression device  Until discontinued         04/13/22 1913                Discharge Planning   JARAD: 4/27/2022     Code Status: Full Code   Is the patient medically ready for discharge?:     Reason for patient still in hospital (select all that apply): Patient trending condition  Discharge Plan A: Home with family                  Owen Zamorano MD  Department of Hospital Medicine   [facility]

## 2022-04-25 NOTE — SUBJECTIVE & OBJECTIVE
Interval History: No new complaints noted overnight. Patient is tolerating hemodialysis therapy. Patient is expected to undergo Lower extremity fasciotomy wound washing today by Dr. Byers and also hemodialysis catheter - hemosplit placement.  Patient continues to complain right leg pain requiring narcotics for pain control.  Patient denies chest pain or shortness of breath.    Review of Systems   Constitutional:  Negative for activity change, appetite change, chills and fever.   HENT:  Negative for congestion, sore throat and trouble swallowing.    Eyes:  Negative for photophobia and visual disturbance.   Respiratory:  Negative for cough, chest tightness and shortness of breath.    Cardiovascular:  Negative for chest pain, palpitations and leg swelling.   Gastrointestinal:  Negative for abdominal pain, diarrhea and nausea.   Genitourinary:  Negative for dysuria, flank pain and hematuria.   Musculoskeletal:  Positive for gait problem and myalgias. Negative for back pain.   Skin:  Positive for color change.   Neurological:  Negative for dizziness, weakness and headaches.   Psychiatric/Behavioral:  Negative for confusion.    Objective:     Vital Signs (Most Recent):  Temp: 98 °F (36.7 °C) (04/25/22 0820)  Pulse: 96 (04/25/22 0820)  Resp: 18 (04/25/22 0820)  BP: (!) 145/76 (04/25/22 0820)  SpO2: 97 % (04/25/22 0820)   Vital Signs (24h Range):  Temp:  [96.3 °F (35.7 °C)-98 °F (36.7 °C)] 98 °F (36.7 °C)  Pulse:  [87-98] 96  Resp:  [14-18] 18  SpO2:  [95 %-100 %] 97 %  BP: (119-170)/(69-93) 145/76     Weight: 81.3 kg (179 lb 3.7 oz)  Body mass index is 26.47 kg/m².    Intake/Output Summary (Last 24 hours) at 4/25/2022 0841  Last data filed at 4/25/2022 0505  Gross per 24 hour   Intake 442 ml   Output 965 ml   Net -523 ml        Physical Exam  Vitals and nursing note reviewed.   Constitutional:       Appearance: He is normal weight. He is ill-appearing.   HENT:      Head: Normocephalic.      Mouth/Throat:      Mouth:  Mucous membranes are moist.      Pharynx: Oropharynx is clear.   Eyes:      Pupils: Pupils are equal, round, and reactive to light.      Comments: Left orbital ecchymosis   Cardiovascular:      Rate and Rhythm: Regular rhythm. Tachycardia present.      Pulses: Normal pulses.      Heart sounds: Normal heart sounds.   Pulmonary:      Effort: Pulmonary effort is normal.      Breath sounds: Normal breath sounds.   Abdominal:      General: Bowel sounds are normal.      Palpations: Abdomen is soft.   Musculoskeletal:         General: Swelling (r Thigh swellin noted) present. Normal range of motion.      Cervical back: Normal range of motion.      Right upper leg: Swelling present.      Right lower leg: Swelling present.      Comments:     good plantar flexion of his toes actively.  he has no pain on passive extension of the toe the patient has very little extension actively of the toe great toes and smaller toes.    Skin:     General: Skin is warm and dry.      Comments: Diffuse fine maculopapular rash all over extremities and trunk.   Neurological:      Mental Status: He is alert and oriented to person, place, and time. Mental status is at baseline.   Psychiatric:         Mood and Affect: Mood normal.       Significant Labs: All pertinent labs within the past 24 hours have been reviewed.  CBC:   Recent Labs   Lab 04/25/22  0541   WBC 12.97*   HGB 7.3*   HCT 22.3*          CMP:   Recent Labs   Lab 04/25/22  0541   *   K 5.3*   CL 98   CO2 19*   GLU 88   *   CREATININE 11.2*   CALCIUM 7.2*   PROT 6.0   ALBUMIN 2.3*   BILITOT 0.3   ALKPHOS 61   AST 65*   ALT 17   ANIONGAP 16   EGFRNONAA 6*       Microbiology Results (last 7 days)       Procedure Component Value Units Date/Time    Blood culture [[id number]] Collected: 04/13/22 4386    Order Status: Completed Specimen: Blood from Antecubital, Right Hand Updated: 04/19/22 1212     Blood Culture, Routine No growth after 5 days.          Significant Imaging:    X-ray lumbar spine: Normal study.    Right tibia and fibula: Normal study.    Right femur x-ray: Normal study.    Pelvis x-ray: Normal study.    CT Head: No acute abnormality.  Minimal sinus disease.    CT Maxillofacial scan: No facial bone fracture.  Minimal sinus disease.    RLE venous doppler scan: No evidence of deep venous thrombosis in the right lower extremity.    CXR: Central line placement with the tip over the SVC.  No acute detrimental changes.    RLE venous doppler scan:  No evidence of deep venous thrombosis in the right lower extremity. Edema in the soft tissues of the right lower extremity

## 2022-04-25 NOTE — TRANSFER OF CARE
"Anesthesia Transfer of Care Note    Patient: Agus Horan    Procedure(s) Performed: Procedure(s) (LRB):  FASCIOTOMY (Right)  INSERTION, CATHETER, TUNNELED (Right)    Patient location: PACU    Anesthesia Type: general    Transport from OR: Transported from OR on 2-3 L/min O2 by NC with adequate spontaneous ventilation    Post pain: adequate analgesia    Post assessment: no apparent anesthetic complications    Post vital signs: stable    Level of consciousness: awake    Nausea/Vomiting: no nausea/vomiting    Complications: none    Transfer of care protocol was followed      Last vitals:   Visit Vitals  BP (!) 145/76 (BP Location: Left arm, Patient Position: Lying)   Pulse 96   Temp 36.7 °C (98 °F) (Skin)   Resp 18   Ht 5' 9" (1.753 m)   Wt 81.3 kg (179 lb 3.7 oz)   SpO2 97%   BMI 26.47 kg/m²     "

## 2022-04-25 NOTE — PROGRESS NOTES
INPATIENT NEPHROLOGY Progress Note  [facility]    Patient Name: Agus Horan  Date: 04/25/2022    Reason for consultation: SANDRA    Chief Complaint:   Chief Complaint   Patient presents with    Leg Pain     Pain in the right leg muscle calf is hard, nausea vomiting, patient was seen here over the weekend for an overdose        History of Present Illness:  20 y/o M with a history of asthma recently here on 4/9 with oxycodone overdose who p/w RLE pain and swelling after a fall on 4/10. He reports severe pain, constant, associated with nausea with inability to bear weight.  He took meloxicam without relief. He was found to have compartment syndrome and was taking to the OR emergently for fasciotomy on 4/13. We are consulted for SANDRA with metabolic derangements.    Interval History:  4/14- did emergent HD overnight for hyperkalemia and metabolic acidosis refractory to medical management; pain controlled, RLE wrapped, no edema in LLE  4/15- worsening pain/edema in RLE- going for MRI- oligoanuric- stop NS IVFs- will do HD/UF today and tomorrow  4/16  Seen on dialysis.  No distress  4/17  Remains oliguric.  AFVSS.  Has back pain.  Leg pain a little better  4/18  In the OR.  Still oliguric.    4/19  Seen on dialysis.  No distress.    4/20  Still not making much urine.  cpk coming down.     4/21  Afebrile.  BP a little better.  uop up a bit  422   Sleeping.  AFVSS.  I/Os not appropriately recorded despite being ordered for twice  4/23  225 cc UOP recorded.  K+ 5.5, HD today.  No renal recovery, CPK improving.  C/o rash, Dr. Zamorano at bedside placed orders.  Seen on dialysis, tolerating tx well.  4/24   No new lab results, next lab draw in am.   Still has rash and c/o itching.  No other complaints.  450 cc UOP recorded.  4/25  In the OR.  Plan for hd today    Plan of Care:    Assessment:  Traumatic rhabdomyolysis with compartment syndrome s/p fasciotomy on 4/13  SANDRA due to toxic ATN s/p emergent HD on  4/14- remains oligoanuric and HD dependent  Edema  Hyponatremia  Hyperkalemia  Acidosis  Hypocalcemia  Shock liver    Plan:    - Trend daily CK.     cpk slowing coming down  - He remains HD dependent. Continue grier.  Ok with -160s for renal perfusion. No NSAIDs or IV contrast. Dose meds for CrCl < 10.  MWF dialysis  - Advise renal diet with 1.5L fluid restriction. Will adjust dialysate for all metabolic derangements. Will replete calcium PRN.  - Trend LFTs.   - f/u labs  --replete calcium.  Need to do calcium repletion judiciously in pt with rhabdomyolysis.    --added hydralazine  --dialysis as needed, monitor for recovery    Will reorder accurate I/Os.  Hopefully it will get done.  I am trying to monitor for renal recovery and assessment of urine output is very important.  Fingers crossed the orders will get followed....    Thank you for allowing us to participate in this patient's care. We will continue to follow.    Vital Signs:  Temp Readings from Last 3 Encounters:   04/25/22 98 °F (36.7 °C) (Skin)   04/10/22 97.3 °F (36.3 °C)   04/09/22 98.4 °F (36.9 °C)       Pulse Readings from Last 3 Encounters:   04/25/22 96   04/10/22 63   04/09/22 95       BP Readings from Last 3 Encounters:   04/25/22 (!) 145/76   04/10/22 117/64   04/09/22 (!) 143/82       Weight:  Wt Readings from Last 3 Encounters:   04/20/22 81.3 kg (179 lb 3.7 oz)   04/10/22 77.1 kg (170 lb)   04/09/22 77.1 kg (170 lb)       INS/OUTS:  I/O last 3 completed shifts:  In: 643 [P.O.:633; I.V.:10]  Out: 1215 [Urine:1215]  No intake/output data recorded.    Medications:  Scheduled Meds:   calcitRIOL  0.25 mcg Oral Daily    ceFAZolin (ANCEF) IVPB  500 mg Intravenous Q12H    docusate sodium  100 mg Oral Daily    epoetin leidy-epbx  50 Units/kg Intravenous Every Mon, Wed, Fri    ergocalciferol  50,000 Units Oral Q7 Days    hydrALAZINE  25 mg Oral Q8H    sevelamer carbonate  800 mg Oral TID WM    sodium chloride 0.9%  2 mL Intravenous Q6H  "    Continuous Infusions:   loperamide       PRN Meds:.sodium chloride 0.9%, cyclobenzaprine, dextrose 10%, dextrose 10%, diphenhydrAMINE, glucagon (human recombinant), glucose, glucose, heparin (porcine), HYDROcodone-acetaminophen, HYDROmorphone, labetaloL, loperamide, naloxone, ondansetron, sodium chloride 0.9%  No current facility-administered medications on file prior to encounter.     Current Outpatient Medications on File Prior to Encounter   Medication Sig Dispense Refill    naloxone (NARCAN) 4 mg/actuation Spry 4mg by nasal route as needed for opioid overdose; may repeat every 2-3 minutes in alternating nostrils until medical help arrives. Call 911 2 each 0       Review of Systems:  Neg    Physical Exam:  BP (!) 145/76 (BP Location: Left arm, Patient Position: Lying)   Pulse 96   Temp 98 °F (36.7 °C) (Skin)   Resp 18   Ht 5' 9" (1.753 m)   Wt 81.3 kg (179 lb 3.7 oz)   SpO2 97%   BMI 26.47 kg/m²     Constitutional: nad, aao x 3, depressed affect  Heart: rrr, no m/r/g, wwp, RLE eedema  Lungs: ctab, no w/r/r/c, no lb  Abdomen: s/nt/nd, +BS    Results:  Lab Results   Component Value Date     (L) 04/25/2022    K 5.3 (H) 04/25/2022    CL 98 04/25/2022    CO2 19 (L) 04/25/2022     (H) 04/25/2022    CREATININE 11.2 (H) 04/25/2022    CALCIUM 7.2 (L) 04/25/2022    ANIONGAP 16 04/25/2022    ESTGFRAFRICA 7 (A) 04/25/2022    EGFRNONAA 6 (A) 04/25/2022       Lab Results   Component Value Date    CALCIUM 7.2 (L) 04/25/2022    PHOS 8.9 (H) 04/25/2022       Recent Labs   Lab 04/25/22  0541   WBC 12.97*   RBC 2.43*   HGB 7.3*   HCT 22.3*      MCV 92   MCH 30.0   MCHC 32.7       I have personally reviewed pertinent radiological imaging and reports.    Darian Ríos MD  Nephrology  [facility] [place]  [phone]    "

## 2022-04-25 NOTE — ANESTHESIA PROCEDURE NOTES
Intubation    Date/Time: 4/25/2022 10:14 AM  Performed by: Albin Chino CRNA  Authorized by: Jonnathan Rodriguez MD     Intubation:     Induction:  Intravenous    Attempts:  1    Attempted By:  CRNA    Difficult Airway Encountered?: No      Airway Device:  Supraglottic airway/LMA    Airway Device Size:  4.0    Style/Cuff Inflation:  Cuffed (inflated to minimal occlusive pressure)    Placement Verified By:  Capnometry    Complicating Factors:  None

## 2022-04-26 PROBLEM — D63.8 ANEMIA OF CHRONIC DISEASE: Status: ACTIVE | Noted: 2022-04-26

## 2022-04-26 LAB
ABO GROUP BLD: NORMAL
ANION GAP SERPL CALC-SCNC: 13 MMOL/L (ref 8–16)
BASOPHILS # BLD AUTO: 0.04 K/UL (ref 0–0.2)
BASOPHILS NFR BLD: 0.3 % (ref 0–1.9)
BLD GP AB SCN CELLS X3 SERPL QL: NORMAL
BUN SERPL-MCNC: 89 MG/DL (ref 6–20)
CA-I BLDV-SCNC: 1 MMOL/L (ref 1.06–1.42)
CALCIUM SERPL-MCNC: 7.8 MG/DL (ref 8.7–10.5)
CHLORIDE SERPL-SCNC: 99 MMOL/L (ref 95–110)
CK SERPL-CCNC: 1060 U/L (ref 20–200)
CO2 SERPL-SCNC: 23 MMOL/L (ref 23–29)
CREAT SERPL-MCNC: 8.8 MG/DL (ref 0.5–1.4)
DIFFERENTIAL METHOD: ABNORMAL
EOSINOPHIL # BLD AUTO: 0.5 K/UL (ref 0–0.5)
EOSINOPHIL NFR BLD: 3.7 % (ref 0–8)
ERYTHROCYTE [DISTWIDTH] IN BLOOD BY AUTOMATED COUNT: 12.5 % (ref 11.5–14.5)
EST. GFR  (AFRICAN AMERICAN): 9 ML/MIN/1.73 M^2
EST. GFR  (NON AFRICAN AMERICAN): 8 ML/MIN/1.73 M^2
GLUCOSE SERPL-MCNC: 93 MG/DL (ref 70–110)
HCT VFR BLD AUTO: 21.4 % (ref 40–54)
HGB BLD-MCNC: 7 G/DL (ref 14–18)
IMM GRANULOCYTES # BLD AUTO: 0.09 K/UL (ref 0–0.04)
IMM GRANULOCYTES NFR BLD AUTO: 0.6 % (ref 0–0.5)
LYMPHOCYTES # BLD AUTO: 1.2 K/UL (ref 1–4.8)
LYMPHOCYTES NFR BLD: 8.6 % (ref 18–48)
MCH RBC QN AUTO: 30.2 PG (ref 27–31)
MCHC RBC AUTO-ENTMCNC: 32.7 G/DL (ref 32–36)
MCV RBC AUTO: 92 FL (ref 82–98)
MONOCYTES # BLD AUTO: 1.3 K/UL (ref 0.3–1)
MONOCYTES NFR BLD: 9.4 % (ref 4–15)
NEUTROPHILS # BLD AUTO: 11 K/UL (ref 1.8–7.7)
NEUTROPHILS NFR BLD: 77.4 % (ref 38–73)
NRBC BLD-RTO: 0 /100 WBC
PLATELET # BLD AUTO: 300 K/UL (ref 150–450)
PMV BLD AUTO: 9.2 FL (ref 9.2–12.9)
POCT GLUCOSE: 102 MG/DL (ref 70–110)
POTASSIUM SERPL-SCNC: 5.4 MMOL/L (ref 3.5–5.1)
RBC # BLD AUTO: 2.32 M/UL (ref 4.6–6.2)
RH BLD: NORMAL
SODIUM SERPL-SCNC: 135 MMOL/L (ref 136–145)
WBC # BLD AUTO: 14.21 K/UL (ref 3.9–12.7)

## 2022-04-26 PROCEDURE — 86900 BLOOD TYPING SEROLOGIC ABO: CPT | Performed by: INTERNAL MEDICINE

## 2022-04-26 PROCEDURE — 25000003 PHARM REV CODE 250: Performed by: ORTHOPAEDIC SURGERY

## 2022-04-26 PROCEDURE — 97116 GAIT TRAINING THERAPY: CPT

## 2022-04-26 PROCEDURE — 82550 ASSAY OF CK (CPK): CPT | Performed by: ORTHOPAEDIC SURGERY

## 2022-04-26 PROCEDURE — 97164 PT RE-EVAL EST PLAN CARE: CPT

## 2022-04-26 PROCEDURE — 36415 COLL VENOUS BLD VENIPUNCTURE: CPT | Performed by: INTERNAL MEDICINE

## 2022-04-26 PROCEDURE — 86920 COMPATIBILITY TEST SPIN: CPT | Performed by: INTERNAL MEDICINE

## 2022-04-26 PROCEDURE — 63600175 PHARM REV CODE 636 W HCPCS: Performed by: ORTHOPAEDIC SURGERY

## 2022-04-26 PROCEDURE — 82330 ASSAY OF CALCIUM: CPT | Performed by: ORTHOPAEDIC SURGERY

## 2022-04-26 PROCEDURE — 25000003 PHARM REV CODE 250: Performed by: INTERNAL MEDICINE

## 2022-04-26 PROCEDURE — 86901 BLOOD TYPING SEROLOGIC RH(D): CPT | Performed by: INTERNAL MEDICINE

## 2022-04-26 PROCEDURE — 86850 RBC ANTIBODY SCREEN: CPT | Performed by: INTERNAL MEDICINE

## 2022-04-26 PROCEDURE — A4216 STERILE WATER/SALINE, 10 ML: HCPCS | Performed by: ORTHOPAEDIC SURGERY

## 2022-04-26 PROCEDURE — 63600175 PHARM REV CODE 636 W HCPCS: Performed by: INTERNAL MEDICINE

## 2022-04-26 PROCEDURE — 63600175 PHARM REV CODE 636 W HCPCS

## 2022-04-26 PROCEDURE — 85025 COMPLETE CBC W/AUTO DIFF WBC: CPT | Performed by: ORTHOPAEDIC SURGERY

## 2022-04-26 PROCEDURE — 94799 UNLISTED PULMONARY SVC/PX: CPT

## 2022-04-26 PROCEDURE — 99900035 HC TECH TIME PER 15 MIN (STAT)

## 2022-04-26 PROCEDURE — 11000001 HC ACUTE MED/SURG PRIVATE ROOM

## 2022-04-26 PROCEDURE — 36415 COLL VENOUS BLD VENIPUNCTURE: CPT | Performed by: ORTHOPAEDIC SURGERY

## 2022-04-26 PROCEDURE — 97530 THERAPEUTIC ACTIVITIES: CPT

## 2022-04-26 PROCEDURE — 94761 N-INVAS EAR/PLS OXIMETRY MLT: CPT

## 2022-04-26 PROCEDURE — 80048 BASIC METABOLIC PNL TOTAL CA: CPT | Performed by: ORTHOPAEDIC SURGERY

## 2022-04-26 RX ORDER — HYDROCODONE BITARTRATE AND ACETAMINOPHEN 500; 5 MG/1; MG/1
TABLET ORAL
Status: DISCONTINUED | OUTPATIENT
Start: 2022-04-26 | End: 2022-04-30

## 2022-04-26 RX ORDER — MORPHINE SULFATE 4 MG/ML
4 INJECTION, SOLUTION INTRAMUSCULAR; INTRAVENOUS EVERY 6 HOURS PRN
Status: DISCONTINUED | OUTPATIENT
Start: 2022-04-26 | End: 2022-04-30

## 2022-04-26 RX ORDER — HYDROMORPHONE HYDROCHLORIDE 1 MG/ML
0.5 INJECTION, SOLUTION INTRAMUSCULAR; INTRAVENOUS; SUBCUTANEOUS ONCE
Status: COMPLETED | OUTPATIENT
Start: 2022-04-26 | End: 2022-04-26

## 2022-04-26 RX ADMIN — MORPHINE SULFATE 4 MG: 4 INJECTION INTRAVENOUS at 06:04

## 2022-04-26 RX ADMIN — HYDROMORPHONE HYDROCHLORIDE 0.5 MG: 1 INJECTION, SOLUTION INTRAMUSCULAR; INTRAVENOUS; SUBCUTANEOUS at 09:04

## 2022-04-26 RX ADMIN — HYDROCODONE BITARTRATE AND ACETAMINOPHEN 1 TABLET: 5; 325 TABLET ORAL at 05:04

## 2022-04-26 RX ADMIN — HYDROCODONE BITARTRATE AND ACETAMINOPHEN 1 TABLET: 5; 325 TABLET ORAL at 02:04

## 2022-04-26 RX ADMIN — HYDROCODONE BITARTRATE AND ACETAMINOPHEN 1 TABLET: 5; 325 TABLET ORAL at 11:04

## 2022-04-26 RX ADMIN — SODIUM ZIRCONIUM CYCLOSILICATE 10 G: 10 POWDER, FOR SUSPENSION ORAL at 11:04

## 2022-04-26 RX ADMIN — DOCUSATE SODIUM AND SENNOSIDES 1 TABLET: 8.6; 5 TABLET, FILM COATED ORAL at 09:04

## 2022-04-26 RX ADMIN — SEVELAMER CARBONATE 800 MG: 800 TABLET, FILM COATED ORAL at 08:04

## 2022-04-26 RX ADMIN — SEVELAMER CARBONATE 800 MG: 800 TABLET, FILM COATED ORAL at 11:04

## 2022-04-26 RX ADMIN — DOCUSATE SODIUM AND SENNOSIDES 1 TABLET: 8.6; 5 TABLET, FILM COATED ORAL at 08:04

## 2022-04-26 RX ADMIN — MORPHINE SULFATE 4 MG: 4 INJECTION INTRAVENOUS at 12:04

## 2022-04-26 RX ADMIN — HYDRALAZINE HYDROCHLORIDE 25 MG: 25 TABLET, FILM COATED ORAL at 09:04

## 2022-04-26 RX ADMIN — HYDROMORPHONE HYDROCHLORIDE 1 MG: 1 INJECTION, SOLUTION INTRAMUSCULAR; INTRAVENOUS; SUBCUTANEOUS at 04:04

## 2022-04-26 RX ADMIN — Medication 2 ML: at 12:04

## 2022-04-26 RX ADMIN — CEFAZOLIN 500 MG: 1 INJECTION, POWDER, FOR SOLUTION INTRAVENOUS at 05:04

## 2022-04-26 RX ADMIN — Medication 2 ML: at 05:04

## 2022-04-26 RX ADMIN — HYDRALAZINE HYDROCHLORIDE 25 MG: 25 TABLET, FILM COATED ORAL at 06:04

## 2022-04-26 RX ADMIN — HYDRALAZINE HYDROCHLORIDE 25 MG: 25 TABLET, FILM COATED ORAL at 05:04

## 2022-04-26 RX ADMIN — CYCLOBENZAPRINE 10 MG: 10 TABLET, FILM COATED ORAL at 05:04

## 2022-04-26 RX ADMIN — CALCITRIOL CAPSULES 0.25 MCG 0.25 MCG: 0.25 CAPSULE ORAL at 08:04

## 2022-04-26 RX ADMIN — SEVELAMER CARBONATE 800 MG: 800 TABLET, FILM COATED ORAL at 05:04

## 2022-04-26 RX ADMIN — CYCLOBENZAPRINE 10 MG: 10 TABLET, FILM COATED ORAL at 08:04

## 2022-04-26 RX ADMIN — HYDROMORPHONE HYDROCHLORIDE 1 MG: 1 INJECTION, SOLUTION INTRAMUSCULAR; INTRAVENOUS; SUBCUTANEOUS at 08:04

## 2022-04-26 RX ADMIN — CEFAZOLIN 500 MG: 1 INJECTION, POWDER, FOR SOLUTION INTRAVENOUS at 02:04

## 2022-04-26 NOTE — ASSESSMENT & PLAN NOTE
Continues to be anuric  Hemodialysis therapy as per Nephrology team.   due to rhabdomyolysis  Trend CPK daily.  Follow Nephrology recommendations.  Avoid nephrotoxins  Telemetry.  Hemo split catheter was placed on April 25, 2022.

## 2022-04-26 NOTE — ASSESSMENT & PLAN NOTE
Patient is now with symptomatic anemia.  Patient will benefit with 1 unit of packed red blood cell transfusion with hemodialysis.  Follow CBC and transfuse as needed.

## 2022-04-26 NOTE — PT/OT/SLP PROGRESS
Physical Therapy Treatment    Patient Name:  Agus Horan   MRN:  [id number]    Recommendations:     Discharge Recommendations:  LTACH (long-term acute care hospital) for wound vac/antibiotics versus home health PT   Discharge Equipment Recommendations: walker, rolling, bedside commode   Barriers to discharge: None    Assessment:     Agus Horan is a 21 y.o. male admitted with a medical diagnosis of Acute renal failure.  He presents with the following impairments/functional limitations:  weakness, impaired endurance, impaired functional mobilty, gait instability, impaired balance, decreased lower extremity function, decreased safety awareness, pain, orthopedic precautions, impaired skin, edema. Per RN patient requesting to return to bed from chair. He requires CGA for sit to stand transfer from chair with RW. He requests to use the restroom and ambulated 10' x2 to/from bathroom with RW, CGA, and RLE NWB. He returned to bed with bed alarm on, LEs elevated, and RN notified.     Rehab Prognosis: Good; patient would benefit from acute skilled PT services to address these deficits and reach maximum level of function.    Recent Surgery: Procedure(s) (LRB):  FASCIOTOMY (Right)  INSERTION, CATHETER, TUNNELED (Right) 1 Day Post-Op    Plan:     During this hospitalization, patient to be seen  (1-2x/day) to address the identified rehab impairments via gait training, therapeutic activities, therapeutic exercises and progress toward the following goals:    · Plan of Care Expires:  05/15/22    Subjective     Chief Complaint: needs to have BM  Patient/Family Comments/goals: walk to bathroom then get back in bed   Pain/Comfort:  · Pain Rating 1:  (no pain verbalized)  · Location - Side 1: Right  · Location 1: leg  · Pain Addressed 1: Pre-medicate for activity      Objective:     Communicated with HAMILTON Burns prior to session.  Patient found up in chair with chair check, telemetry, wound vac upon PT entry to room.     General  Precautions: Standard, fall   Orthopedic Precautions:RLE non weight bearing   Braces: N/A  Respiratory Status: Room air     Functional Mobility:  · Bed Mobility:     · Sit to Supine: stand by assistance  · Transfers:     · Sit to Stand:  contact guard assistance with rolling walker  · Gait: 10' x2 to/from bathroom with RW, CGA, and RLE NWB      AM-PAC 6 CLICK MOBILITY  Turning over in bed (including adjusting bedclothes, sheets and blankets)?: 4  Sitting down on and standing up from a chair with arms (e.g., wheelchair, bedside commode, etc.): 4  Moving from lying on back to sitting on the side of the bed?: 4  Moving to and from a bed to a chair (including a wheelchair)?: 4  Need to walk in hospital room?: 4  Climbing 3-5 steps with a railing?: 1  Basic Mobility Total Score: 21       Therapeutic Activities and Exercises:   Patient was educated on the importance of OOB activity and functional mobility to negate negative effects of prolonged bed rest during hospitalization, safe transfers and ambulation, and D/C planning     Patient requires CGA and VC for toilet transfer; he is able to perform hygiene independently with motivation provided     Patient left HOB elevated with all lines intact, call button in reach, bed alarm on and RN notified..    GOALS:   Multidisciplinary Problems     Physical Therapy Goals        Problem: Physical Therapy    Goal Priority Disciplines Outcome Goal Variances Interventions   Physical Therapy Goal     PT, PT/OT Ongoing, Progressing     Description: Goals to be met by: 5/15/2022     Patient will increase functional independence with mobility by performin). Supine to sit with Modified [place]  2). Sit to stand transfer with Modified [place] maintaining RLE NWB using axillary crutches or rolling walker  3). Bed to chair transfer with Modified [place] maintaining RLE NWB using axillary crutches or rolling walker  5). Gait  x 150 feet with Modified [place]  maintaining RLE NWB using axillary crutches or rolling walker                     Time Tracking:     PT Received On: 04/26/22  PT Start Time: 1114     PT Stop Time: 1137  PT Total Time (min): 23 min     Billable Minutes: Gait Training 13 and Therapeutic Activity 10    Treatment Type: Treatment  PT/PTA: PT     PTA Visit Number: 0     04/26/2022

## 2022-04-26 NOTE — PROGRESS NOTES
INPATIENT NEPHROLOGY Progress Note  [facility]    Patient Name: Agus Horan  Date: 04/26/2022    Reason for consultation: SANDRA    Chief Complaint:   Chief Complaint   Patient presents with    Leg Pain     Pain in the right leg muscle calf is hard, nausea vomiting, patient was seen here over the weekend for an overdose        History of Present Illness:  22 y/o M with a history of asthma recently here on 4/9 with oxycodone overdose who p/w RLE pain and swelling after a fall on 4/10. He reports severe pain, constant, associated with nausea with inability to bear weight.  He took meloxicam without relief. He was found to have compartment syndrome and was taking to the OR emergently for fasciotomy on 4/13. We are consulted for SANDRA with metabolic derangements.    Interval History:  4/14- did emergent HD overnight for hyperkalemia and metabolic acidosis refractory to medical management; pain controlled, RLE wrapped, no edema in LLE  4/15- worsening pain/edema in RLE- going for MRI- oligoanuric- stop NS IVFs- will do HD/UF today and tomorrow  4/16  Seen on dialysis.  No distress  4/17  Remains oliguric.  AFVSS.  Has back pain.  Leg pain a little better  4/18  In the OR.  Still oliguric.    4/19  Seen on dialysis.  No distress.    4/20  Still not making much urine.  cpk coming down.     4/21  Afebrile.  BP a little better.  uop up a bit  422   Sleeping.  AFVSS.  I/Os not appropriately recorded despite being ordered for twice  4/23  225 cc UOP recorded.  K+ 5.5, HD today.  No renal recovery, CPK improving.  C/o rash, Dr. Zamorano at bedside placed orders.  Seen on dialysis, tolerating tx well.  4/24   No new lab results, next lab draw in am.   Still has rash and c/o itching.  No other complaints.  450 cc UOP recorded.  4/25  In the OR.  Plan for hd today  4/26  AFVSS.  Urine output better.  No complaints specified today    Plan of Care:    Assessment:  Traumatic rhabdomyolysis with compartment syndrome  s/p fasciotomy on 4/13  SANDRA due to toxic ATN s/p emergent HD on 4/14- remains oligoanuric and HD dependent  Edema  Hyponatremia  Hyperkalemia  Acidosis  Hypocalcemia  Shock liver    Plan:    - Trend daily CK.     cpk slowing coming down  - He remains HD dependent. Continue grier.  Ok with -160s for renal perfusion. No NSAIDs or IV contrast. Dose meds for CrCl < 10.  MWF dialysis  - Advise renal diet with 1.5L fluid restriction. Will adjust dialysate for all metabolic derangements. Will replete calcium PRN.  - Trend LFTs.   - f/u labs  --replete calcium.  Need to do calcium repletion judiciously in pt with rhabdomyolysis.    --added hydralazine  --dialysis as needed, monitor for recovery  --[facility] today         Thank you for allowing us to participate in this patient's care. We will continue to follow.    Vital Signs:  Temp Readings from Last 3 Encounters:   04/26/22 98.4 °F (36.9 °C)   04/10/22 97.3 °F (36.3 °C)   04/09/22 98.4 °F (36.9 °C)       Pulse Readings from Last 3 Encounters:   04/26/22 102   04/10/22 63   04/09/22 95       BP Readings from Last 3 Encounters:   04/26/22 (!) 164/77   04/10/22 117/64   04/09/22 (!) 143/82       Weight:  Wt Readings from Last 3 Encounters:   04/20/22 81.3 kg (179 lb 3.7 oz)   04/10/22 77.1 kg (170 lb)   04/09/22 77.1 kg (170 lb)       INS/OUTS:  I/O last 3 completed shifts:  In: 1938 [P.O.:728; I.V.:210; Other:500; IV Piggyback:500]  Out: 4150 [Urine:1550; Other:2500; Blood:100]  No intake/output data recorded.    Medications:  Scheduled Meds:   calcitRIOL  0.25 mcg Oral Daily    calcium gluconate IVPB  1 g Intravenous Once    ceFAZolin (ANCEF) IVPB  500 mg Intravenous Q12H    epoetin leidy-epbx  50 Units/kg Intravenous Every Mon, Wed, Fri    ergocalciferol  50,000 Units Oral Q7 Days    hydrALAZINE  25 mg Oral Q8H    senna-docusate 8.6-50 mg  1 tablet Oral BID    sevelamer carbonate  800 mg Oral TID WM    sodium chloride 0.9%  2 mL Intravenous Q6H    sodium  "chloride 0.9%  2 mL Intravenous Q6H    sodium zirconium cyclosilicate  10 g Oral Once     Continuous Infusions:   electrolyte-S (pH 7.4) Stopped (04/25/22 1255)    loperamide       PRN Meds:.sodium chloride, sodium chloride 0.9%, cyclobenzaprine, dextrose 10%, dextrose 10%, diphenhydrAMINE, glucagon (human recombinant), glucose, glucose, heparin (porcine), HYDROcodone-acetaminophen, HYDROmorphone, labetaloL, loperamide, naloxone, ondansetron, oxyCODONE, sodium chloride 0.9%  No current facility-administered medications on file prior to encounter.     Current Outpatient Medications on File Prior to Encounter   Medication Sig Dispense Refill    naloxone (NARCAN) 4 mg/actuation Spry 4mg by nasal route as needed for opioid overdose; may repeat every 2-3 minutes in alternating nostrils until medical help arrives. Call 911 2 each 0       Review of Systems:  Neg    Physical Exam:  BP (!) 164/77   Pulse 102   Temp 98.4 °F (36.9 °C)   Resp 19   Ht 5' 9" (1.753 m)   Wt 81.3 kg (179 lb 3.7 oz)   SpO2 99%   BMI 26.47 kg/m²     Constitutional: nad, aao x 3, depressed affect  Heart: rrr, no m/r/g, wwp, RLE eedema  Lungs: ctab, no w/r/r/c, no lb  Abdomen: s/nt/nd, +BS    Results:  Lab Results   Component Value Date     (L) 04/26/2022    K 5.4 (H) 04/26/2022    CL 99 04/26/2022    CO2 23 04/26/2022    BUN 89 (H) 04/26/2022    CREATININE 8.8 (H) 04/26/2022    CALCIUM 7.8 (L) 04/26/2022    ANIONGAP 13 04/26/2022    ESTGFRAFRICA 9 (A) 04/26/2022    EGFRNONAA 8 (A) 04/26/2022       Lab Results   Component Value Date    CALCIUM 7.8 (L) 04/26/2022    PHOS 8.9 (H) 04/25/2022       Recent Labs   Lab 04/26/22  0616   WBC 14.21*   RBC 2.32*   HGB 7.0*   HCT 21.4*      MCV 92   MCH 30.2   MCHC 32.7       I have personally reviewed pertinent radiological imaging and reports.      Darian Ríos MD  Nephrology  [facility] [place]  [phone]    "

## 2022-04-26 NOTE — SUBJECTIVE & OBJECTIVE
Interval History: s/p Debridement deep right lateral fasciotomy incision right lower leg with excisional  debridement of dead anterior tibialis muscle and biopsy of muscle application of wound VAC right lower leg and hemo split catheter placement yesterday.  No new complaints noted overnight. Patient is tolerating hemodialysis therapy. Patient denies chest pain or shortness of breath.  Patient is asking for more pain medication.  Patient's significant other present in the room.    Review of Systems   Constitutional:  Negative for activity change, appetite change, chills and fever.   HENT:  Negative for congestion, sore throat and trouble swallowing.    Eyes:  Negative for photophobia and visual disturbance.   Respiratory:  Negative for cough, chest tightness and shortness of breath.    Cardiovascular:  Negative for chest pain, palpitations and leg swelling.   Gastrointestinal:  Negative for abdominal pain, diarrhea and nausea.   Genitourinary:  Negative for dysuria, flank pain and hematuria.   Musculoskeletal:  Positive for gait problem and myalgias. Negative for back pain.   Skin:  Positive for color change.   Neurological:  Negative for dizziness, weakness and headaches.   Psychiatric/Behavioral:  Negative for confusion.    Objective:     Vital Signs (Most Recent):  Temp: 98.4 °F (36.9 °C) (04/26/22 0751)  Pulse: 102 (04/26/22 0751)  Resp: 19 (04/26/22 0814)  BP: (!) 164/77 (04/26/22 0751)  SpO2: 99 % (04/26/22 0751)   Vital Signs (24h Range):  Temp:  [97 °F (36.1 °C)-98.4 °F (36.9 °C)] 98.4 °F (36.9 °C)  Pulse:  [] 102  Resp:  [8-19] 19  SpO2:  [95 %-100 %] 99 %  BP: (140-191)/() 164/77     Weight: 81.3 kg (179 lb 3.7 oz)  Body mass index is 26.47 kg/m².    Intake/Output Summary (Last 24 hours) at 4/26/2022 0842  Last data filed at 4/26/2022 0629  Gross per 24 hour   Intake 1848 ml   Output 3800 ml   Net -1952 ml        Physical Exam  Vitals and nursing note reviewed.   Constitutional:        Appearance: He is normal weight. He is ill-appearing.   HENT:      Head: Normocephalic.      Mouth/Throat:      Mouth: Mucous membranes are moist.      Pharynx: Oropharynx is clear.   Eyes:      Pupils: Pupils are equal, round, and reactive to light.      Comments: Left orbital ecchymosis   Cardiovascular:      Rate and Rhythm: Regular rhythm. Tachycardia present.      Pulses: Normal pulses.      Heart sounds: Normal heart sounds.   Pulmonary:      Effort: Pulmonary effort is normal.      Breath sounds: Normal breath sounds.   Abdominal:      General: Bowel sounds are normal.      Palpations: Abdomen is soft.   Musculoskeletal:         General: Swelling (r Thigh swellin noted) present. Normal range of motion.      Cervical back: Normal range of motion.      Right upper leg: Swelling present.      Right lower leg: Swelling present.      Comments:     good plantar flexion of his toes actively.  he has no pain on passive extension of the toe the patient has very little extension actively of the toe great toes and smaller toes.    Skin:     General: Skin is warm and dry.      Comments: Diffuse fine maculopapular rash all over extremities and trunk.   Neurological:      Mental Status: He is alert and oriented to person, place, and time. Mental status is at baseline.   Psychiatric:         Mood and Affect: Mood normal.       Significant Labs: All pertinent labs within the past 24 hours have been reviewed.  CBC:   Recent Labs   Lab 04/25/22  0541 04/26/22  0616   WBC 12.97* 14.21*   HGB 7.3* 7.0*   HCT 22.3* 21.4*    300       CMP:   Recent Labs   Lab 04/25/22  0541 04/26/22  0616   * 135*   K 5.3* 5.4*   CL 98 99   CO2 19* 23   GLU 88 93   * 89*   CREATININE 11.2* 8.8*   CALCIUM 7.2* 7.8*   PROT 6.0  --    ALBUMIN 2.3*  --    BILITOT 0.3  --    ALKPHOS 61  --    AST 65*  --    ALT 17  --    ANIONGAP 16 13   EGFRNONAA 6* 8*       Microbiology Results (last 7 days)       Procedure Component Value Units  Date/Time    Blood culture [[id number]] Collected: 04/13/22 7807    Order Status: Completed Specimen: Blood from Antecubital, Right Hand Updated: 04/19/22 1212     Blood Culture, Routine No growth after 5 days.          Significant Imaging:   X-ray lumbar spine: Normal study.    Right tibia and fibula: Normal study.    Right femur x-ray: Normal study.    Pelvis x-ray: Normal study.    CT Head: No acute abnormality.  Minimal sinus disease.    CT Maxillofacial scan: No facial bone fracture.  Minimal sinus disease.    RLE venous doppler scan: No evidence of deep venous thrombosis in the right lower extremity.    CXR: Central line placement with the tip over the SVC.  No acute detrimental changes.    RLE venous doppler scan:  No evidence of deep venous thrombosis in the right lower extremity. Edema in the soft tissues of the right lower extremity.    CXR:   No acute process.  Right IJ catheter in place.

## 2022-04-26 NOTE — PLAN OF CARE
Message received from Rachel with [facility] patient admissions stating that the pt has a new chair time of T,TH,Saturday second shift beginning April 28th and needs to be there at 10:45 to complete paperwork. After that his normal start time will be 11:15. CM following.    04/26/22 1353   Post-Acute Status   Post-Acute Authorization Dialysis   Diaylsis Status Set-up Complete/Auth obtained

## 2022-04-26 NOTE — ASSESSMENT & PLAN NOTE
CPK trending down  S/p fasciotomy on 4/13 for compartment syndrome.  On 4/18 S/p DEBRIDEMENT, LOWER EXTREMITY (Right)   closure of medial fasciotomy incision right leg debridement of lateral fasciotomy with biopsy of anterior muscle compartment removal of deep muscle anterior compartment right lower leg. Subsequently underwent Debridement deep right lateral fasciotomy incision right lower leg with excisional  debridement of dead anterior tibialis muscle and biopsy of muscle application of wound VAC right lower leg on 04/25/22.  Monitor BMP   Follow Nephrology recommendations.

## 2022-04-26 NOTE — PLAN OF CARE
Problem: Physical Therapy  Goal: Physical Therapy Goal  Description: Goals to be met by: 5/15/2022     Patient will increase functional independence with mobility by performin). Supine to sit with Modified [place]  2). Sit to stand transfer with Modified [place] maintaining RLE NWB using axillary crutches or rolling walker  3). Bed to chair transfer with Modified [place] maintaining RLE NWB using axillary crutches or rolling walker  5). Gait  x 150 feet with Modified [place] maintaining RLE NWB using axillary crutches or rolling walker    Outcome: Ongoing, Progressing     PT goals revised upon PT re-eval on 22

## 2022-04-26 NOTE — ASSESSMENT & PLAN NOTE
Underwent fasciotomy on 4/13   S/p Debridement deep right lateral fasciotomy incision right lower leg with excisional  debridement of dead anterior tibialis muscle and biopsy of muscle application of wound VAC right lower leg  - 04/25/22.  Wound care nurse performing dressing changes as per recommendations by Orthopedics.

## 2022-04-26 NOTE — PLAN OF CARE
Pt awake and alert, pt was medicated several times for pain. Distraction is taught to pt to help with the pain, pt verbalized understanding and effectiveness  Problem: Adult Inpatient Plan of Care  Goal: Plan of Care Review  Outcome: Ongoing, Progressing  Goal: Patient-Specific Goal (Individualized)  Outcome: Ongoing, Progressing  Goal: Absence of Hospital-Acquired Illness or Injury  Outcome: Ongoing, Progressing  Goal: Optimal Comfort and Wellbeing  Outcome: Ongoing, Progressing  Goal: Readiness for Transition of Care  Outcome: Ongoing, Progressing     Problem: Infection  Goal: Absence of Infection Signs and Symptoms  Outcome: Ongoing, Progressing     Problem: Fall Injury Risk  Goal: Absence of Fall and Fall-Related Injury  Outcome: Ongoing, Progressing     Problem: Skin Injury Risk Increased  Goal: Skin Health and Integrity  Outcome: Ongoing, Progressing     Problem: Device-Related Complication Risk (Hemodialysis)  Goal: Safe, Effective Therapy Delivery  Outcome: Ongoing, Progressing     Problem: Hemodynamic Instability (Hemodialysis)  Goal: Effective Tissue Perfusion  Outcome: Ongoing, Progressing     Problem: Infection (Hemodialysis)  Goal: Absence of Infection Signs and Symptoms  Outcome: Ongoing, Progressing     Problem: Fluid and Electrolyte Imbalance (Acute Kidney Injury/Impairment)  Goal: Fluid and Electrolyte Balance  Outcome: Ongoing, Progressing     Problem: Oral Intake Inadequate (Acute Kidney Injury/Impairment)  Goal: Optimal Nutrition Intake  Outcome: Ongoing, Progressing     Problem: Renal Function Impairment (Acute Kidney Injury/Impairment)  Goal: Effective Renal Function  Outcome: Ongoing, Progressing   .

## 2022-04-26 NOTE — PROGRESS NOTES
[facility] Hospital Medicine  Progress Note    Patient Name: Agus Horan  MRN: [id number]  Patient Class: IP- Inpatient   Admission Date: 4/13/2022  Length of Stay: 13 days  Attending Physician: Owen Zamorano MD  Primary Care Provider: Primary Doctor No        Subjective:     Principal Problem:Acute renal failure        HPI:  Agus Horan is a 22 y/o male with PMHx of mild asthma who presents with right lower extremity pain and swelling x few days. Patient was seen in the ED on 4/9/22 for oxycodone overdose and again on 4/10/22 for left eye pain after falling on a piece of furniture. Patient states he is unsure how he injured his leg and denies IV drug use. Xray of his right leg did not show acute fracture. Patietn states RLE pain and swelling increased over the past few days and he is unable to walk secondary to pain. US of his lower extremity performed today did not show DVT. Lab work revealed elevated Creatinine 13.5 & K+ 7.1, elevated AST/ALT 3980/1611 & WBC 18.14.  CPK>40,0000.  Patient unable to dorsiflex and plantar flex his RLE and symptoms are concerning for compartment syndrome per ED. ED provider discussed with Dr. Byers and Dr. Fraser, nephrology. Patient was referred to hospital medicine and seen in the ED with his friend at bedside. Patient complaining of RLE pain, tenderness and swelling. He denies SOB, N/V/D, chest pain and headaches.  Patient will be admitted to hospital medicine for orthopedic consultation and further management.      Overview/Hospital Course:  He was found to have compartment syndrome and was taking to the OR emergently for fasciotomy on 4/13. We are consulted for SANDRA with metabolic derangements.  Cpk at the time of arrival was more than 40,000. Initially was started on IV fluids and started on hemodialysis. on 4/14- did emergent HD overnight for hyperkalemia and metabolic acidosis refractory to medical management; pain controlled, patient underwent  DEBRIDEMENT, LOWER EXTREMITY (Right)   closure of medial fasciotomy incision right leg debridement of lateral fasciotomy with biopsy of anterior muscle compartment removal of deep muscle anterior compartment right lower leg on 04/18/2022.  Patient's CPK level continue to trend down.  Patient underwent debridement deep right lateral fasciotomy incision right lower leg with excisional  debridement of dead anterior tibialis muscle and biopsy of muscle application of wound VAC right lower leg  on April 25, 2022.       Interval History: s/p Debridement deep right lateral fasciotomy incision right lower leg with excisional  debridement of dead anterior tibialis muscle and biopsy of muscle application of wound VAC right lower leg and hemo split catheter placement yesterday.  No new complaints noted overnight. Patient is tolerating hemodialysis therapy. Patient denies chest pain or shortness of breath.  Patient is asking for more pain medication.  Patient's significant other present in the room.    Review of Systems   Constitutional:  Negative for activity change, appetite change, chills and fever.   HENT:  Negative for congestion, sore throat and trouble swallowing.    Eyes:  Negative for photophobia and visual disturbance.   Respiratory:  Negative for cough, chest tightness and shortness of breath.    Cardiovascular:  Negative for chest pain, palpitations and leg swelling.   Gastrointestinal:  Negative for abdominal pain, diarrhea and nausea.   Genitourinary:  Negative for dysuria, flank pain and hematuria.   Musculoskeletal:  Positive for gait problem and myalgias. Negative for back pain.   Skin:  Positive for color change.   Neurological:  Negative for dizziness, weakness and headaches.   Psychiatric/Behavioral:  Negative for confusion.    Objective:     Vital Signs (Most Recent):  Temp: 98.4 °F (36.9 °C) (04/26/22 0751)  Pulse: 102 (04/26/22 0751)  Resp: 19 (04/26/22 0814)  BP: (!) 164/77 (04/26/22 0751)  SpO2: 99 %  (04/26/22 0751)   Vital Signs (24h Range):  Temp:  [97 °F (36.1 °C)-98.4 °F (36.9 °C)] 98.4 °F (36.9 °C)  Pulse:  [] 102  Resp:  [8-19] 19  SpO2:  [95 %-100 %] 99 %  BP: (140-191)/() 164/77     Weight: 81.3 kg (179 lb 3.7 oz)  Body mass index is 26.47 kg/m².    Intake/Output Summary (Last 24 hours) at 4/26/2022 0842  Last data filed at 4/26/2022 0629  Gross per 24 hour   Intake 1848 ml   Output 3800 ml   Net -1952 ml        Physical Exam  Vitals and nursing note reviewed.   Constitutional:       Appearance: He is normal weight. He is ill-appearing.   HENT:      Head: Normocephalic.      Mouth/Throat:      Mouth: Mucous membranes are moist.      Pharynx: Oropharynx is clear.   Eyes:      Pupils: Pupils are equal, round, and reactive to light.      Comments: Left orbital ecchymosis   Cardiovascular:      Rate and Rhythm: Regular rhythm. Tachycardia present.      Pulses: Normal pulses.      Heart sounds: Normal heart sounds.   Pulmonary:      Effort: Pulmonary effort is normal.      Breath sounds: Normal breath sounds.   Abdominal:      General: Bowel sounds are normal.      Palpations: Abdomen is soft.   Musculoskeletal:         General: Swelling (r Thigh swellin noted) present. Normal range of motion.      Cervical back: Normal range of motion.      Right upper leg: Swelling present.      Right lower leg: Swelling present.      Comments:     good plantar flexion of his toes actively.  he has no pain on passive extension of the toe the patient has very little extension actively of the toe great toes and smaller toes.    Skin:     General: Skin is warm and dry.      Comments: Diffuse fine maculopapular rash all over extremities and trunk.   Neurological:      Mental Status: He is alert and oriented to person, place, and time. Mental status is at baseline.   Psychiatric:         Mood and Affect: Mood normal.       Significant Labs: All pertinent labs within the past 24 hours have been reviewed.  CBC:    Recent Labs   Lab 04/25/22  0541 04/26/22  0616   WBC 12.97* 14.21*   HGB 7.3* 7.0*   HCT 22.3* 21.4*    300       CMP:   Recent Labs   Lab 04/25/22  0541 04/26/22  0616   * 135*   K 5.3* 5.4*   CL 98 99   CO2 19* 23   GLU 88 93   * 89*   CREATININE 11.2* 8.8*   CALCIUM 7.2* 7.8*   PROT 6.0  --    ALBUMIN 2.3*  --    BILITOT 0.3  --    ALKPHOS 61  --    AST 65*  --    ALT 17  --    ANIONGAP 16 13   EGFRNONAA 6* 8*       Microbiology Results (last 7 days)       Procedure Component Value Units Date/Time    Blood culture [[id number]] Collected: 04/13/22 7618    Order Status: Completed Specimen: Blood from Antecubital, Right Hand Updated: 04/19/22 1212     Blood Culture, Routine No growth after 5 days.          Significant Imaging:   X-ray lumbar spine: Normal study.    Right tibia and fibula: Normal study.    Right femur x-ray: Normal study.    Pelvis x-ray: Normal study.    CT Head: No acute abnormality.  Minimal sinus disease.    CT Maxillofacial scan: No facial bone fracture.  Minimal sinus disease.    RLE venous doppler scan: No evidence of deep venous thrombosis in the right lower extremity.    CXR: Central line placement with the tip over the SVC.  No acute detrimental changes.    RLE venous doppler scan:  No evidence of deep venous thrombosis in the right lower extremity. Edema in the soft tissues of the right lower extremity.    CXR:   No acute process.  Right IJ catheter in place.      Assessment/Plan:      * Acute renal failure  Continues to be anuric  Hemodialysis therapy as per Nephrology team.   due to rhabdomyolysis  Trend CPK daily.  Follow Nephrology recommendations.  Avoid nephrotoxins  Telemetry.  Hemo split catheter was placed on April 25, 2022.        Anemia of chronic disease  Patient is now with symptomatic anemia.  Patient will benefit with 1 unit of packed red blood cell transfusion with hemodialysis.  Follow CBC and transfuse as needed.      Drug eruption  No definite  etiology apparent.  Possibly clindamycin use.  Off intravenous clindamycin.  Use oral Benadryl 25 mg q.6 hours p.r.n. patient will be monitored closely.      Compartment syndrome  Underwent fasciotomy on 4/13   S/p Debridement deep right lateral fasciotomy incision right lower leg with excisional  debridement of dead anterior tibialis muscle and biopsy of muscle application of wound VAC right lower leg  - 04/25/22.  Wound care nurse performing dressing changes as per recommendations by Orthopedics.    Elevated liver enzymes  Elevated AST &ALT likely due to rhabdomyolysis  Trending down  Avoid hepatoxic medications  Monitor CMP  See plan for rhabdomyolysis        Rhabdomyolysis  CPK trending down  S/p fasciotomy on 4/13 for compartment syndrome.  On 4/18 S/p DEBRIDEMENT, LOWER EXTREMITY (Right)   closure of medial fasciotomy incision right leg debridement of lateral fasciotomy with biopsy of anterior muscle compartment removal of deep muscle anterior compartment right lower leg. Subsequently underwent Debridement deep right lateral fasciotomy incision right lower leg with excisional  debridement of dead anterior tibialis muscle and biopsy of muscle application of wound VAC right lower leg on 04/25/22.  Monitor BMP   Follow Nephrology recommendations.      History of asthma  Hx of asthma, stable    Monitor for acute changes      Patient reports morphine works better than intravenous Dilaudid which will be switched according to patient's request.  VTE Risk Mitigation (From admission, onward)         Ordered     heparin (porcine) injection 4,000 Units  As needed (PRN)         04/14/22 0137     Reason for No Pharmacological VTE Prophylaxis  Once        Question:  Reasons:  Answer:  Risk of Bleeding    04/13/22 1913     IP VTE HIGH RISK PATIENT  Once         04/13/22 1913     Place sequential compression device  Until discontinued         04/13/22 1913                Discharge Planning   JARAD: 4/28/2022     Code Status:  Full Code   Is the patient medically ready for discharge?:     Reason for patient still in hospital (select all that apply): Patient trending condition and Consult recommendations  Discharge Plan A: Home with family                  Owen Zamorano MD  Department of Hospital Medicine   [facility]

## 2022-04-26 NOTE — PROGRESS NOTES
The patient is resting quietly he is in no acute distress.  The wound VAC is functioning well.  Plans are to continue the wound VAC possibly change the wound VAC at the bedside on Friday with wound care.  Patient will requires skin grafting to the fasciotomy site I was unable to close the fasciotomy.  Patient can be transferred to a rehab facility when cleared medically.

## 2022-04-26 NOTE — PLAN OF CARE
Problem: Adult Inpatient Plan of Care  Goal: Plan of Care Review  Outcome: Ongoing, Progressing  Goal: Patient-Specific Goal (Individualized)  Outcome: Ongoing, Progressing  Goal: Absence of Hospital-Acquired Illness or Injury  Outcome: Ongoing, Progressing  Goal: Optimal Comfort and Wellbeing  Outcome: Ongoing, Progressing  Goal: Readiness for Transition of Care  Outcome: Ongoing, Progressing     Problem: Infection  Goal: Absence of Infection Signs and Symptoms  Outcome: Ongoing, Progressing     Problem: Fall Injury Risk  Goal: Absence of Fall and Fall-Related Injury  Outcome: Ongoing, Progressing     Problem: Skin Injury Risk Increased  Goal: Skin Health and Integrity  Outcome: Ongoing, Progressing     Problem: Device-Related Complication Risk (Hemodialysis)  Goal: Safe, Effective Therapy Delivery  Outcome: Ongoing, Progressing     Problem: Hemodynamic Instability (Hemodialysis)  Goal: Effective Tissue Perfusion  Outcome: Ongoing, Progressing     Problem: Infection (Hemodialysis)  Goal: Absence of Infection Signs and Symptoms  Outcome: Ongoing, Progressing     Problem: Fluid and Electrolyte Imbalance (Acute Kidney Injury/Impairment)  Goal: Fluid and Electrolyte Balance  Outcome: Ongoing, Progressing     Problem: Oral Intake Inadequate (Acute Kidney Injury/Impairment)  Goal: Optimal Nutrition Intake  Outcome: Ongoing, Progressing     Problem: Renal Function Impairment (Acute Kidney Injury/Impairment)  Goal: Effective Renal Function  Outcome: Ongoing, Progressing   Assessment completed per flow sheet. Skin warm and dry to touch with respiration even and unlabored. Pt observed in room resting quietly with eyes closed. No s/s of distress discomfort or adverse reactions noted at this time. Able to verbalize demands as needed. Pt remains on telemetry with box 8718. Normal sinus rhythm at this time. PT/OT with patient today ambulating with        . Tolerated well. Meds administered whole with no adverse reactions  noted. IV 20g noted to left forearm. Remains on  ABX ancef. Continues on  renal diet.   Edema noted to bilateral lower extremities. Bowel sounds active in all quads. Pt verbalized no pain or SOB at this time. Educated pt to use call light for any transfers or ambulation assist. Verbalized understanding. Will continue to monitor for change of condition not noted at this time. Right foot NWB status. Hd cath to right IJ. HD [id number]. Continue on woundvac at 125.

## 2022-04-26 NOTE — PT/OT/SLP RE-EVAL
Physical Therapy Re-evaluation    Patient Name:  Agus Horan   MRN:  [id number]    Recommendations:     Discharge Recommendations:  LTACH (long-term acute care hospital) for wound vac/antibiotics versus home health PT   Discharge Equipment Recommendations: walker, rolling, bedside commode   Barriers to discharge: None    Assessment:     Agus Horan is a 21 y.o. male admitted with a medical diagnosis of Acute renal failure.  He presents with the following impairments/functional limitations:  weakness, impaired endurance, impaired functional mobilty, gait instability, impaired balance, decreased lower extremity function, decreased safety awareness, pain, orthopedic precautions, impaired skin, edema. Patient is agreeable to participation with PT re-evaluation. He reports 8/10 right LE pain at rest. Has has axillary crutches at home. He was educated on RLE NWB. He requires SBA for supine to sit transfer. He requires ModA for sit to stand transfer with axillary crutches. Upon standing patient shaky and reports back spasm extending into right hip rated a 10/10. He returned to sitting EOB, but notes this makes back pain worse and returned to standing using RW for increased stability. Upon standing, patient extended spine noting this gave him some relief. He began performing a step transfer from EOB to chair with RW, Mandie, and RLE NWB when noting he began to feel lightheaded. He sat in chair with LEs elevated and back reclined with BP taken of 176/83. He is agreeable to remain sitting up in chair with chair alarm on, significant other present, and RN notified.     Rehab Prognosis:  Good; patient would benefit from acute skilled PT services to address these deficits and reach maximum level of function.      Recent Surgery: Procedure(s) (LRB):  FASCIOTOMY (Right)  INSERTION, CATHETER, TUNNELED (Right) 1 Day Post-Op    Plan:     During this hospitalization, patient to be seen  (1-2x/day) to address the above listed  problems via gait training, therapeutic activities, therapeutic exercises  · Plan of Care Expires:  05/15/22   Plan of Care Reviewed with: patient    Subjective     Communicated with HAMILTON Burns prior to session.  Patient found HOB elevated with bed alarm, peripheral IV, telemetry, wound vac upon PT entry to room, agreeable to evaluation.      Chief Complaint: RLE pain and back spasm   Patient comments/goals: get more pain medicine   Pain/Comfort:  · Pain Rating 1: 8/10  · Location - Side 1: Right  · Location 1: leg  · Pain Addressed 1: Pre-medicate for activity    Patients cultural, spiritual, [employer]ian conflicts given the current situation:        Objective:     Patient found with: bed alarm, peripheral IV, telemetry, wound vac     General Precautions: Standard, fall   Orthopedic Precautions:RLE non weight bearing   Braces: N/A  Respiratory Status: Room air    Exams:  · Skin Integrity/Edema:      · -       RLE with lower leg dressing in place with patient POD #1 faciotomy with wound vac placement  · LLE Strength: WFL    Functional Mobility:  · Bed Mobility:     · Supine to Sit: stand by assistance  · Transfers:     · Sit to Stand:  minimum assistance with RW and moderate assistance with axillary crutches   · Gait: step transfer to chair with RW, Mandie, RLE NWB, and wound vac in tow    AM-PAC 6 CLICK MOBILITY  Total Score:18       Therapeutic Activities and Exercises:   Patient was educated on the importance of OOB activity and functional mobility to negate negative effects of prolonged bed rest during hospitalization, safe transfers and ambulation, RLE NWB, axillary crutches versus RW use, and D/C planning     Patient left up in chair with all lines intact, call button in reach, chair alarm on, RN notified and S.O. present.    GOALS:   Multidisciplinary Problems     Physical Therapy Goals        Problem: Physical Therapy    Goal Priority Disciplines Outcome Goal Variances Interventions   Physical Therapy Goal      PT, PT/OT Ongoing, Progressing     Description: Goals to be met by: 5/15/2022     Patient will increase functional independence with mobility by performin). Supine to sit with Modified [place]  2). Sit to stand transfer with Modified [place] maintaining RLE NWB using axillary crutches or rolling walker  3). Bed to chair transfer with Modified [place] maintaining RLE NWB using axillary crutches or rolling walker  5). Gait  x 150 feet with Modified [place] maintaining RLE NWB using axillary crutches or rolling walker                     History:     Past Medical History:   Diagnosis Date    Allergy     AR    Asthma     mild intermittent    Hyperkalemia 2022       Past Surgical History:   Procedure Laterality Date    DEBRIDEMENT OF LOWER EXTREMITY Right 2022    Procedure: DEBRIDEMENT, LOWER EXTREMITY;  Surgeon: Leonardo Byers MD;  Location: [facility] OR;  Service: Orthopedics;  Laterality: Right;    FASCIOTOMY Right 2022    Procedure: FASCIOTOMY;  Surgeon: Leonardo Byers MD;  Location: [facility] OR;  Service: Orthopedics;  Laterality: Right;    REMOVAL OF FOREIGN BODY FROM FOOT Left 2020    Procedure: REMOVAL, FOREIGN BODY, FOOT;  Surgeon: Dani Garcia MD;  Location: [facility] OR;  Service: Orthopedics;  Laterality: Left;       Time Tracking:     PT Received On: 22  PT Start Time: 938     PT Stop Time: 1003  PT Total Time (min): 25 min     Billable Minutes: Re-eval 10 and Gait Training 15      2022

## 2022-04-27 LAB
ALBUMIN SERPL BCP-MCNC: 2.1 G/DL (ref 3.5–5.2)
ALP SERPL-CCNC: 58 U/L (ref 55–135)
ALT SERPL W/O P-5'-P-CCNC: 10 U/L (ref 10–44)
ANION GAP SERPL CALC-SCNC: 15 MMOL/L (ref 8–16)
AST SERPL-CCNC: 33 U/L (ref 10–40)
BASOPHILS # BLD AUTO: 0.02 K/UL (ref 0–0.2)
BASOPHILS NFR BLD: 0.1 % (ref 0–1.9)
BILIRUB SERPL-MCNC: 0.2 MG/DL (ref 0.1–1)
BLD PROD TYP BPU: NORMAL
BLOOD UNIT EXPIRATION DATE: NORMAL
BLOOD UNIT TYPE CODE: 6200
BLOOD UNIT TYPE: NORMAL
BUN SERPL-MCNC: 102 MG/DL (ref 6–20)
CA-I BLDV-SCNC: 1.16 MMOL/L (ref 1.06–1.42)
CALCIUM SERPL-MCNC: 8.4 MG/DL (ref 8.7–10.5)
CHLORIDE SERPL-SCNC: 101 MMOL/L (ref 95–110)
CO2 SERPL-SCNC: 21 MMOL/L (ref 23–29)
CODING SYSTEM: NORMAL
CREAT SERPL-MCNC: 8.4 MG/DL (ref 0.5–1.4)
DIFFERENTIAL METHOD: ABNORMAL
DISPENSE STATUS: NORMAL
EOSINOPHIL # BLD AUTO: 0.3 K/UL (ref 0–0.5)
EOSINOPHIL NFR BLD: 2 % (ref 0–8)
ERYTHROCYTE [DISTWIDTH] IN BLOOD BY AUTOMATED COUNT: 12.4 % (ref 11.5–14.5)
EST. GFR  (AFRICAN AMERICAN): 9 ML/MIN/1.73 M^2
EST. GFR  (NON AFRICAN AMERICAN): 8 ML/MIN/1.73 M^2
GLUCOSE SERPL-MCNC: 114 MG/DL (ref 70–110)
HCT VFR BLD AUTO: 20.1 % (ref 40–54)
HGB BLD-MCNC: 6.6 G/DL (ref 14–18)
IMM GRANULOCYTES # BLD AUTO: 0.16 K/UL (ref 0–0.04)
IMM GRANULOCYTES NFR BLD AUTO: 1.1 % (ref 0–0.5)
LYMPHOCYTES # BLD AUTO: 1 K/UL (ref 1–4.8)
LYMPHOCYTES NFR BLD: 7 % (ref 18–48)
MAGNESIUM SERPL-MCNC: 2.1 MG/DL (ref 1.6–2.6)
MCH RBC QN AUTO: 30.6 PG (ref 27–31)
MCHC RBC AUTO-ENTMCNC: 32.8 G/DL (ref 32–36)
MCV RBC AUTO: 93 FL (ref 82–98)
MONOCYTES # BLD AUTO: 1.1 K/UL (ref 0.3–1)
MONOCYTES NFR BLD: 7.4 % (ref 4–15)
NEUTROPHILS # BLD AUTO: 11.7 K/UL (ref 1.8–7.7)
NEUTROPHILS NFR BLD: 82.4 % (ref 38–73)
NRBC BLD-RTO: 0 /100 WBC
NUM UNITS TRANS PACKED RBC: NORMAL
PHOSPHATE SERPL-MCNC: 8 MG/DL (ref 2.7–4.5)
PLATELET # BLD AUTO: 279 K/UL (ref 150–450)
PMV BLD AUTO: 9.2 FL (ref 9.2–12.9)
POTASSIUM SERPL-SCNC: 4.5 MMOL/L (ref 3.5–5.1)
PROT SERPL-MCNC: 5.7 G/DL (ref 6–8.4)
RBC # BLD AUTO: 2.16 M/UL (ref 4.6–6.2)
SODIUM SERPL-SCNC: 137 MMOL/L (ref 136–145)
WBC # BLD AUTO: 14.17 K/UL (ref 3.9–12.7)

## 2022-04-27 PROCEDURE — 63600175 PHARM REV CODE 636 W HCPCS: Performed by: INTERNAL MEDICINE

## 2022-04-27 PROCEDURE — 63600175 PHARM REV CODE 636 W HCPCS: Performed by: ORTHOPAEDIC SURGERY

## 2022-04-27 PROCEDURE — 94799 UNLISTED PULMONARY SVC/PX: CPT

## 2022-04-27 PROCEDURE — 25000003 PHARM REV CODE 250: Performed by: ORTHOPAEDIC SURGERY

## 2022-04-27 PROCEDURE — 36415 COLL VENOUS BLD VENIPUNCTURE: CPT | Performed by: ORTHOPAEDIC SURGERY

## 2022-04-27 PROCEDURE — 36415 COLL VENOUS BLD VENIPUNCTURE: CPT | Performed by: INTERNAL MEDICINE

## 2022-04-27 PROCEDURE — 11000001 HC ACUTE MED/SURG PRIVATE ROOM

## 2022-04-27 PROCEDURE — 36430 TRANSFUSION BLD/BLD COMPNT: CPT

## 2022-04-27 PROCEDURE — A4216 STERILE WATER/SALINE, 10 ML: HCPCS | Performed by: ORTHOPAEDIC SURGERY

## 2022-04-27 PROCEDURE — P9016 RBC LEUKOCYTES REDUCED: HCPCS | Performed by: INTERNAL MEDICINE

## 2022-04-27 PROCEDURE — 82330 ASSAY OF CALCIUM: CPT | Performed by: ORTHOPAEDIC SURGERY

## 2022-04-27 PROCEDURE — 94761 N-INVAS EAR/PLS OXIMETRY MLT: CPT

## 2022-04-27 PROCEDURE — 99900035 HC TECH TIME PER 15 MIN (STAT)

## 2022-04-27 PROCEDURE — 83735 ASSAY OF MAGNESIUM: CPT | Performed by: INTERNAL MEDICINE

## 2022-04-27 PROCEDURE — 85025 COMPLETE CBC W/AUTO DIFF WBC: CPT | Performed by: INTERNAL MEDICINE

## 2022-04-27 PROCEDURE — 80053 COMPREHEN METABOLIC PANEL: CPT | Performed by: INTERNAL MEDICINE

## 2022-04-27 PROCEDURE — 84100 ASSAY OF PHOSPHORUS: CPT | Performed by: INTERNAL MEDICINE

## 2022-04-27 RX ADMIN — SEVELAMER CARBONATE 800 MG: 800 TABLET, FILM COATED ORAL at 11:04

## 2022-04-27 RX ADMIN — HYDROCODONE BITARTRATE AND ACETAMINOPHEN 1 TABLET: 5; 325 TABLET ORAL at 05:04

## 2022-04-27 RX ADMIN — Medication 2 ML: at 12:04

## 2022-04-27 RX ADMIN — HYDRALAZINE HYDROCHLORIDE 25 MG: 25 TABLET, FILM COATED ORAL at 05:04

## 2022-04-27 RX ADMIN — MORPHINE SULFATE 4 MG: 4 INJECTION INTRAVENOUS at 10:04

## 2022-04-27 RX ADMIN — CYCLOBENZAPRINE 10 MG: 10 TABLET, FILM COATED ORAL at 05:04

## 2022-04-27 RX ADMIN — DOCUSATE SODIUM AND SENNOSIDES 1 TABLET: 8.6; 5 TABLET, FILM COATED ORAL at 08:04

## 2022-04-27 RX ADMIN — HYDROCODONE BITARTRATE AND ACETAMINOPHEN 1 TABLET: 5; 325 TABLET ORAL at 12:04

## 2022-04-27 RX ADMIN — MORPHINE SULFATE 4 MG: 4 INJECTION INTRAVENOUS at 04:04

## 2022-04-27 RX ADMIN — Medication 2 ML: at 05:04

## 2022-04-27 RX ADMIN — CYCLOBENZAPRINE 10 MG: 10 TABLET, FILM COATED ORAL at 02:04

## 2022-04-27 RX ADMIN — HYDROCODONE BITARTRATE AND ACETAMINOPHEN 1 TABLET: 5; 325 TABLET ORAL at 10:04

## 2022-04-27 RX ADMIN — MORPHINE SULFATE 4 MG: 4 INJECTION INTRAVENOUS at 01:04

## 2022-04-27 RX ADMIN — CEFAZOLIN 500 MG: 1 INJECTION, POWDER, FOR SOLUTION INTRAVENOUS at 05:04

## 2022-04-27 RX ADMIN — SEVELAMER CARBONATE 800 MG: 800 TABLET, FILM COATED ORAL at 08:04

## 2022-04-27 RX ADMIN — CALCITRIOL CAPSULES 0.25 MCG 0.25 MCG: 0.25 CAPSULE ORAL at 08:04

## 2022-04-27 RX ADMIN — HYDROCODONE BITARTRATE AND ACETAMINOPHEN 1 TABLET: 5; 325 TABLET ORAL at 02:04

## 2022-04-27 RX ADMIN — SEVELAMER CARBONATE 800 MG: 800 TABLET, FILM COATED ORAL at 06:04

## 2022-04-27 RX ADMIN — Medication 2 ML: at 06:04

## 2022-04-27 RX ADMIN — HYDRALAZINE HYDROCHLORIDE 25 MG: 25 TABLET, FILM COATED ORAL at 02:04

## 2022-04-27 RX ADMIN — HEPARIN SODIUM 4000 UNITS: 1000 INJECTION, SOLUTION INTRAVENOUS; SUBCUTANEOUS at 08:04

## 2022-04-27 RX ADMIN — HYDRALAZINE HYDROCHLORIDE 25 MG: 25 TABLET, FILM COATED ORAL at 10:04

## 2022-04-27 RX ADMIN — DOCUSATE SODIUM AND SENNOSIDES 1 TABLET: 8.6; 5 TABLET, FILM COATED ORAL at 10:04

## 2022-04-27 RX ADMIN — CEFAZOLIN 500 MG: 1 INJECTION, POWDER, FOR SOLUTION INTRAVENOUS at 06:04

## 2022-04-27 RX ADMIN — EPOETIN ALFA-EPBX 4100 UNITS: 10000 INJECTION, SOLUTION INTRAVENOUS; SUBCUTANEOUS at 09:04

## 2022-04-27 RX ADMIN — MORPHINE SULFATE 4 MG: 4 INJECTION INTRAVENOUS at 08:04

## 2022-04-27 NOTE — CARE UPDATE
04/26/22 2020   Patient Assessment/Suction   Level of Consciousness (AVPU) alert   Respiratory Effort Unlabored   PRE-TX-O2   O2 Device (Oxygen Therapy) room air   SpO2 99 %   Pulse Oximetry Type Intermittent   Incentive Spirometer   $ Incentive Spirometer Charges done independently per patient

## 2022-04-27 NOTE — PROGRESS NOTES
INPATIENT NEPHROLOGY Progress Note  [facility]    Patient Name: Agus Horan  Date: 04/27/2022    Reason for consultation: SANDRA    Chief Complaint:   Chief Complaint   Patient presents with    Leg Pain     Pain in the right leg muscle calf is hard, nausea vomiting, patient was seen here over the weekend for an overdose        History of Present Illness:  20 y/o M with a history of asthma recently here on 4/9 with oxycodone overdose who p/w RLE pain and swelling after a fall on 4/10. He reports severe pain, constant, associated with nausea with inability to bear weight.  He took meloxicam without relief. He was found to have compartment syndrome and was taking to the OR emergently for fasciotomy on 4/13. We are consulted for SANDRA with metabolic derangements.    Interval History:  4/14- did emergent HD overnight for hyperkalemia and metabolic acidosis refractory to medical management; pain controlled, RLE wrapped, no edema in LLE  4/15- worsening pain/edema in RLE- going for MRI- oligoanuric- stop NS IVFs- will do HD/UF today and tomorrow  4/16  Seen on dialysis.  No distress  4/17  Remains oliguric.  AFVSS.  Has back pain.  Leg pain a little better  4/18  In the OR.  Still oliguric.    4/19  Seen on dialysis.  No distress.    4/20  Still not making much urine.  cpk coming down.     4/21  Afebrile.  BP a little better.  uop up a bit  422   Sleeping.  AFVSS.  I/Os not appropriately recorded despite being ordered for twice  4/23  225 cc UOP recorded.  K+ 5.5, HD today.  No renal recovery, CPK improving.  C/o rash, Dr. Zamorano at bedside placed orders.  Seen on dialysis, tolerating tx well.  4/24   No new lab results, next lab draw in am.   Still has rash and c/o itching.  No other complaints.  450 cc UOP recorded.  4/25  In the OR.  Plan for hd today  4/26  AFVSS.  Urine output better.  No complaints specified today  4/27  Sleeping.  Output not recorded despite being ordered      Plan of  Care:    Assessment:  Traumatic rhabdomyolysis with compartment syndrome s/p fasciotomy on 4/13  SANDRA due to toxic ATN s/p emergent HD on 4/14- remains oligoanuric and HD dependent  Edema  Hyponatremia  Hyperkalemia  Acidosis  Hypocalcemia  Shock liver    Plan:    - Trend daily CK.     cpk slowing coming down  - He remains HD dependent. Continue grier.  Ok with -160s for renal perfusion. No NSAIDs or IV contrast. Dose meds for CrCl < 10.  MWF dialysis  - Advise renal diet with 1.5L fluid restriction. Will adjust dialysate for all metabolic derangements. Will replete calcium PRN.  - Trend LFTs.   --replete calcium.  Need to do calcium repletion judiciously in pt with rhabdomyolysis.    --added hydralazine  --dialysis as needed, monitor for recovery   --need accurate I/Os            Thank you for allowing us to participate in this patient's care. We will continue to follow.    Vital Signs:  Temp Readings from Last 3 Encounters:   04/27/22 98.2 °F (36.8 °C) (Oral)   04/10/22 97.3 °F (36.3 °C)   04/09/22 98.4 °F (36.9 °C)       Pulse Readings from Last 3 Encounters:   04/27/22 104   04/10/22 63   04/09/22 95       BP Readings from Last 3 Encounters:   04/27/22 (!) 156/73   04/10/22 117/64   04/09/22 (!) 143/82       Weight:  Wt Readings from Last 3 Encounters:   04/20/22 81.3 kg (179 lb 3.7 oz)   04/10/22 77.1 kg (170 lb)   04/09/22 77.1 kg (170 lb)       INS/OUTS:  I/O last 3 completed shifts:  In: 652 [P.O.:652]  Out: 850 [Urine:850]  I/O this shift:  In: 152 [P.O.:152]  Out: 350 [Urine:350]    Medications:  Scheduled Meds:   calcitRIOL  0.25 mcg Oral Daily    calcium gluconate IVPB  1 g Intravenous Once    ceFAZolin (ANCEF) IVPB  500 mg Intravenous Q12H    epoetin leidy-epbx  50 Units/kg Intravenous Every Mon, Wed, Fri    ergocalciferol  50,000 Units Oral Q7 Days    hydrALAZINE  25 mg Oral Q8H    senna-docusate 8.6-50 mg  1 tablet Oral BID    sevelamer carbonate  800 mg Oral TID WM    sodium chloride  "0.9%  2 mL Intravenous Q6H    sodium chloride 0.9%  2 mL Intravenous Q6H     Continuous Infusions:   electrolyte-S (pH 7.4) Stopped (04/25/22 1255)    loperamide       PRN Meds:.sodium chloride, sodium chloride 0.9%, cyclobenzaprine, dextrose 10%, dextrose 10%, diphenhydrAMINE, glucagon (human recombinant), glucose, glucose, heparin (porcine), HYDROcodone-acetaminophen, labetaloL, loperamide, morphine, naloxone, ondansetron, sodium chloride 0.9%  No current facility-administered medications on file prior to encounter.     Current Outpatient Medications on File Prior to Encounter   Medication Sig Dispense Refill    naloxone (NARCAN) 4 mg/actuation Spry 4mg by nasal route as needed for opioid overdose; may repeat every 2-3 minutes in alternating nostrils until medical help arrives. Call 911 2 each 0       Review of Systems:  Neg    Physical Exam:  BP (!) 156/73 (BP Location: Left arm, Patient Position: Lying)   Pulse 104   Temp 98.2 °F (36.8 °C) (Oral)   Resp 18   Ht 5' 9" (1.753 m)   Wt 81.3 kg (179 lb 3.7 oz)   SpO2 97%   BMI 26.47 kg/m²     Constitutional: nad, aao x 3, depressed affect  Heart: rrr, no m/r/g, wwp, RLE eedema  Lungs: ctab, no w/r/r/c, no lb  Abdomen: s/nt/nd, +BS    Results:  Lab Results   Component Value Date     04/27/2022    K 4.5 04/27/2022     04/27/2022    CO2 21 (L) 04/27/2022     (H) 04/27/2022    CREATININE 8.4 (H) 04/27/2022    CALCIUM 8.4 (L) 04/27/2022    ANIONGAP 15 04/27/2022    ESTGFRAFRICA 9 (A) 04/27/2022    EGFRNONAA 8 (A) 04/27/2022       Lab Results   Component Value Date    CALCIUM 8.4 (L) 04/27/2022    PHOS 8.0 (H) 04/27/2022       Recent Labs   Lab 04/27/22  0912   WBC 14.17*   RBC 2.16*   HGB 6.6*   HCT 20.1*      MCV 93   MCH 30.6   MCHC 32.8       I have personally reviewed pertinent radiological imaging and reports.    I have spent > 35 minutes providing care for this patient for the above diagnoses. These services have included " chart/data/imaging review, evaluation, exam, formulation of plan, , note preparation, and discussions with staff involved in this patient's care.  Darian Ríos MD  Nephrology  [facility]  [phone]

## 2022-04-27 NOTE — PT/OT/SLP PROGRESS
Physical Therapy      Patient Name:  Agus Horan   MRN:  [id number]    Patient not seen today secondary to Patient fatigue, Patient unwilling to participate.   First attempt 09:11, pt sleeping soundly and states did not sleep well prior night; refused OOB activity but agreed to ambulate with therapist later.  Second attempt 11:13, pt again sleeping and reports independent LE exercises, ambulation to bathroom today x4, and refused further ambulation or therex. Therapist encouraged participation prior to anticipated HD treatment today, and pt continued to decline.    Will follow-up tomorrow.

## 2022-04-27 NOTE — PLAN OF CARE
[facility] sent medical records to [facility] and [facility] via [employer].     04/27/22 2050   Post-Acute Status   Post-Acute Authorization Placement   Post-Acute Placement Status Referrals Sent   Discharge Plan   Discharge Plan A Long-term acute care facility (LTAC)

## 2022-04-27 NOTE — PLAN OF CARE
Cm completed medical rounding.  Dr. Zamorano communicated with pt about going to LTAC. Pt in agreement. Cm will follow-up with referrals. Cm will continue to follow-up.

## 2022-04-27 NOTE — PROGRESS NOTES
[facility] Hospital Medicine  Progress Note    Patient Name: Agus Horan  MRN: [id number]  Patient Class: IP- Inpatient   Admission Date: 4/13/2022  Length of Stay: 14 days  Attending Physician: Owen Zamorano MD  Primary Care Provider: Primary Doctor No        Subjective:     Principal Problem:Acute renal failure        HPI:  Agus Horan is a 20 y/o male with PMHx of mild asthma who presents with right lower extremity pain and swelling x few days. Patient was seen in the ED on 4/9/22 for oxycodone overdose and again on 4/10/22 for left eye pain after falling on a piece of furniture. Patient states he is unsure how he injured his leg and denies IV drug use. Xray of his right leg did not show acute fracture. Patietn states RLE pain and swelling increased over the past few days and he is unable to walk secondary to pain. US of his lower extremity performed today did not show DVT. Lab work revealed elevated Creatinine 13.5 & K+ 7.1, elevated AST/ALT 3980/1611 & WBC 18.14.  CPK>40,0000.  Patient unable to dorsiflex and plantar flex his RLE and symptoms are concerning for compartment syndrome per ED. ED provider discussed with Dr. Byers and Dr. Fraser, nephrology. Patient was referred to hospital medicine and seen in the ED with his friend at bedside. Patient complaining of RLE pain, tenderness and swelling. He denies SOB, N/V/D, chest pain and headaches.  Patient will be admitted to hospital medicine for orthopedic consultation and further management.      Overview/Hospital Course:  He was found to have compartment syndrome and was taking to the OR emergently for fasciotomy on 4/13. We are consulted for SANDRA with metabolic derangements.  Cpk at the time of arrival was more than 40,000. Initially was started on IV fluids and started on hemodialysis. on 4/14- did emergent HD overnight for hyperkalemia and metabolic acidosis refractory to medical management; pain controlled, patient underwent  DEBRIDEMENT, LOWER EXTREMITY (Right)   closure of medial fasciotomy incision right leg debridement of lateral fasciotomy with biopsy of anterior muscle compartment removal of deep muscle anterior compartment right lower leg on 04/18/2022.  Patient's CPK level continue to trend down.  Patient underwent debridement deep right lateral fasciotomy incision right lower leg with excisional  debridement of dead anterior tibialis muscle and biopsy of muscle application of wound VAC right lower leg  on April 25, 2022.       Interval History:  Patient is without any new complaints.  s/p Debridement deep right lateral fasciotomy incision right lower leg with excisional  debridement of dead anterior tibialis muscle and biopsy of muscle application of wound VAC right lower leg and hemo split catheter placement.  No new complaints noted overnight. Patient denies chest pain or shortness of breath.      Review of Systems   Constitutional:  Negative for activity change, appetite change, chills and fever.   HENT:  Negative for congestion, sore throat and trouble swallowing.    Eyes:  Negative for photophobia and visual disturbance.   Respiratory:  Negative for cough, chest tightness and shortness of breath.    Cardiovascular:  Negative for chest pain, palpitations and leg swelling.   Gastrointestinal:  Negative for abdominal pain, diarrhea and nausea.   Genitourinary:  Negative for dysuria, flank pain and hematuria.   Musculoskeletal:  Positive for gait problem and myalgias. Negative for back pain.   Skin:  Positive for color change.   Neurological:  Negative for dizziness, weakness and headaches.   Psychiatric/Behavioral:  Negative for confusion.    Objective:     Vital Signs (Most Recent):  Temp: 99.3 °F (37.4 °C) (04/27/22 0734)  Pulse: 108 (04/27/22 0734)  Resp: 16 (04/27/22 0825)  BP: (!) 162/73 (04/27/22 0734)  SpO2: 95 % (04/27/22 0734)   Vital Signs (24h Range):  Temp:  [96.6 °F (35.9 °C)-99.3 °F (37.4 °C)] 99.3 °F (37.4  °C)  Pulse:  [107-116] 108  Resp:  [14-19] 16  SpO2:  [95 %-100 %] 95 %  BP: (139-174)/(68-88) 162/73     Weight: 81.3 kg (179 lb 3.7 oz)  Body mass index is 26.47 kg/m².    Intake/Output Summary (Last 24 hours) at 4/27/2022 0906  Last data filed at 4/27/2022 0825  Gross per 24 hour   Intake 403 ml   Output 550 ml   Net -147 ml        Physical Exam  Vitals and nursing note reviewed.   Constitutional:       Appearance: He is normal weight. He is ill-appearing.   HENT:      Head: Normocephalic.      Mouth/Throat:      Mouth: Mucous membranes are moist.      Pharynx: Oropharynx is clear.   Eyes:      Pupils: Pupils are equal, round, and reactive to light.      Comments: Left orbital ecchymosis   Cardiovascular:      Rate and Rhythm: Regular rhythm. Tachycardia present.      Pulses: Normal pulses.      Heart sounds: Normal heart sounds.   Pulmonary:      Effort: Pulmonary effort is normal.      Breath sounds: Normal breath sounds.   Abdominal:      General: Bowel sounds are normal.      Palpations: Abdomen is soft.   Musculoskeletal:         General: Swelling (r Thigh swellin noted) present. Normal range of motion.      Cervical back: Normal range of motion.      Right upper leg: Swelling present.      Right lower leg: Swelling present.      Comments:     good plantar flexion of his toes actively.  he has no pain on passive extension of the toe the patient has very little extension actively of the toe great toes and smaller toes.    Skin:     General: Skin is warm and dry.      Comments: Diffuse fine maculopapular rash all over extremities and trunk.   Neurological:      Mental Status: He is alert and oriented to person, place, and time. Mental status is at baseline.   Psychiatric:         Mood and Affect: Mood normal.       Significant Labs: All pertinent labs within the past 24 hours have been reviewed.  CBC:   Recent Labs   Lab 04/26/22  0616   WBC 14.21*   HGB 7.0*   HCT 21.4*          CMP:   Recent Labs    Lab 04/26/22  0616   *   K 5.4*   CL 99   CO2 23   GLU 93   BUN 89*   CREATININE 8.8*   CALCIUM 7.8*   ANIONGAP 13   EGFRNONAA 8*       Microbiology Results (last 7 days)       ** No results found for the last 168 hours. **          Significant Imaging:   X-ray lumbar spine: Normal study.    Right tibia and fibula: Normal study.    Right femur x-ray: Normal study.    Pelvis x-ray: Normal study.    CT Head: No acute abnormality.  Minimal sinus disease.    CT Maxillofacial scan: No facial bone fracture.  Minimal sinus disease.    RLE venous doppler scan: No evidence of deep venous thrombosis in the right lower extremity.    CXR: Central line placement with the tip over the SVC.  No acute detrimental changes.    RLE venous doppler scan:  No evidence of deep venous thrombosis in the right lower extremity. Edema in the soft tissues of the right lower extremity.    CXR:   No acute process.  Right IJ catheter in place.      Assessment/Plan:      * Acute renal failure  Continues to be anuric  Hemodialysis therapy as per Nephrology team.   due to rhabdomyolysis  Trend CPK daily.  Follow Nephrology recommendations.  Avoid nephrotoxins  Telemetry.  Hemo split catheter was placed on April 25, 2022.        Anemia of chronic disease  Patient is now with symptomatic anemia.  Patient will benefit with 1 unit of packed red blood cell transfusion with hemodialysis.  Follow CBC and transfuse as needed.      Drug eruption  No definite etiology apparent.  Possibly clindamycin use.  Off intravenous clindamycin.  Use oral Benadryl 25 mg q.6 hours p.r.n. patient will be monitored closely.      Compartment syndrome  Underwent fasciotomy on 4/13   S/p Debridement deep right lateral fasciotomy incision right lower leg with excisional  debridement of dead anterior tibialis muscle and biopsy of muscle application of wound VAC right lower leg  - 04/25/22.  Wound care nurse performing dressing changes as per recommendations by  Orthopedics.    Elevated liver enzymes  Elevated AST &ALT likely due to rhabdomyolysis  Trending down  Avoid hepatoxic medications  Monitor CMP  See plan for rhabdomyolysis        Rhabdomyolysis  CPK trending down  S/p fasciotomy on 4/13 for compartment syndrome.  On 4/18 S/p DEBRIDEMENT, LOWER EXTREMITY (Right)   closure of medial fasciotomy incision right leg debridement of lateral fasciotomy with biopsy of anterior muscle compartment removal of deep muscle anterior compartment right lower leg. Subsequently underwent Debridement deep right lateral fasciotomy incision right lower leg with excisional  debridement of dead anterior tibialis muscle and biopsy of muscle application of wound VAC right lower leg on 04/25/22.  Monitor BMP   Follow Nephrology recommendations.      History of asthma  Hx of asthma, stable    Monitor for acute changes      Discussed with patient and , patient will require long-term acute care facility placement.  VTE Risk Mitigation (From admission, onward)         Ordered     heparin (porcine) injection 4,000 Units  As needed (PRN)         04/14/22 0137     Reason for No Pharmacological VTE Prophylaxis  Once        Question:  Reasons:  Answer:  Risk of Bleeding    04/13/22 1913     IP VTE HIGH RISK PATIENT  Once         04/13/22 1913     Place sequential compression device  Until discontinued         04/13/22 1913                Discharge Planning   JARAD: 4/28/22     Code Status: Full Code   Is the patient medically ready for discharge?:     Reason for patient still in hospital (select all that apply): Patient trending condition and Consult recommendations  Discharge Plan A: LTAC                 Owen Zamorano MD  Department of Hospital Medicine   [facility]

## 2022-04-28 LAB
ALBUMIN SERPL BCP-MCNC: 2.1 G/DL (ref 3.5–5.2)
ALP SERPL-CCNC: 71 U/L (ref 55–135)
ALT SERPL W/O P-5'-P-CCNC: 8 U/L (ref 10–44)
ANION GAP SERPL CALC-SCNC: 9 MMOL/L (ref 8–16)
AST SERPL-CCNC: 39 U/L (ref 10–40)
BASOPHILS # BLD AUTO: 0.01 K/UL (ref 0–0.2)
BASOPHILS NFR BLD: 0.1 % (ref 0–1.9)
BILIRUB SERPL-MCNC: 0.2 MG/DL (ref 0.1–1)
BUN SERPL-MCNC: 60 MG/DL (ref 6–20)
CA-I BLDV-SCNC: 1.47 MMOL/L (ref 1.06–1.42)
CALCIUM SERPL-MCNC: 10.4 MG/DL (ref 8.7–10.5)
CHLORIDE SERPL-SCNC: 101 MMOL/L (ref 95–110)
CO2 SERPL-SCNC: 27 MMOL/L (ref 23–29)
CREAT SERPL-MCNC: 5.3 MG/DL (ref 0.5–1.4)
DIFFERENTIAL METHOD: ABNORMAL
EOSINOPHIL # BLD AUTO: 0.4 K/UL (ref 0–0.5)
EOSINOPHIL NFR BLD: 2.3 % (ref 0–8)
ERYTHROCYTE [DISTWIDTH] IN BLOOD BY AUTOMATED COUNT: 14.2 % (ref 11.5–14.5)
EST. GFR  (AFRICAN AMERICAN): 16 ML/MIN/1.73 M^2
EST. GFR  (NON AFRICAN AMERICAN): 14 ML/MIN/1.73 M^2
FINAL PATHOLOGIC DIAGNOSIS: NORMAL
GLUCOSE SERPL-MCNC: 102 MG/DL (ref 70–110)
GROSS: NORMAL
HCT VFR BLD AUTO: 24 % (ref 40–54)
HGB BLD-MCNC: 7.7 G/DL (ref 14–18)
IMM GRANULOCYTES # BLD AUTO: 0.33 K/UL (ref 0–0.04)
IMM GRANULOCYTES NFR BLD AUTO: 2.1 % (ref 0–0.5)
LYMPHOCYTES # BLD AUTO: 1.4 K/UL (ref 1–4.8)
LYMPHOCYTES NFR BLD: 8.8 % (ref 18–48)
Lab: NORMAL
MAGNESIUM SERPL-MCNC: 2 MG/DL (ref 1.6–2.6)
MCH RBC QN AUTO: 29.7 PG (ref 27–31)
MCHC RBC AUTO-ENTMCNC: 32.1 G/DL (ref 32–36)
MCV RBC AUTO: 93 FL (ref 82–98)
MONOCYTES # BLD AUTO: 1.1 K/UL (ref 0.3–1)
MONOCYTES NFR BLD: 7.2 % (ref 4–15)
NEUTROPHILS # BLD AUTO: 12.3 K/UL (ref 1.8–7.7)
NEUTROPHILS NFR BLD: 79.5 % (ref 38–73)
NRBC BLD-RTO: 0 /100 WBC
PHOSPHATE SERPL-MCNC: 6.8 MG/DL (ref 2.7–4.5)
PLATELET # BLD AUTO: 331 K/UL (ref 150–450)
PMV BLD AUTO: 9.2 FL (ref 9.2–12.9)
POTASSIUM SERPL-SCNC: 5.1 MMOL/L (ref 3.5–5.1)
PROT SERPL-MCNC: 6 G/DL (ref 6–8.4)
RBC # BLD AUTO: 2.59 M/UL (ref 4.6–6.2)
SODIUM SERPL-SCNC: 137 MMOL/L (ref 136–145)
WBC # BLD AUTO: 15.5 K/UL (ref 3.9–12.7)

## 2022-04-28 PROCEDURE — 97530 THERAPEUTIC ACTIVITIES: CPT | Mod: CQ

## 2022-04-28 PROCEDURE — 85025 COMPLETE CBC W/AUTO DIFF WBC: CPT | Performed by: INTERNAL MEDICINE

## 2022-04-28 PROCEDURE — 97605 NEG PRS WND THER DME<=50SQCM: CPT

## 2022-04-28 PROCEDURE — 94761 N-INVAS EAR/PLS OXIMETRY MLT: CPT

## 2022-04-28 PROCEDURE — 80053 COMPREHEN METABOLIC PANEL: CPT | Performed by: INTERNAL MEDICINE

## 2022-04-28 PROCEDURE — 25000003 PHARM REV CODE 250: Performed by: ORTHOPAEDIC SURGERY

## 2022-04-28 PROCEDURE — 36415 COLL VENOUS BLD VENIPUNCTURE: CPT | Performed by: ORTHOPAEDIC SURGERY

## 2022-04-28 PROCEDURE — 84100 ASSAY OF PHOSPHORUS: CPT | Performed by: INTERNAL MEDICINE

## 2022-04-28 PROCEDURE — 83735 ASSAY OF MAGNESIUM: CPT | Performed by: INTERNAL MEDICINE

## 2022-04-28 PROCEDURE — 82330 ASSAY OF CALCIUM: CPT | Performed by: ORTHOPAEDIC SURGERY

## 2022-04-28 PROCEDURE — 63600175 PHARM REV CODE 636 W HCPCS: Performed by: INTERNAL MEDICINE

## 2022-04-28 PROCEDURE — 94799 UNLISTED PULMONARY SVC/PX: CPT

## 2022-04-28 PROCEDURE — 63600175 PHARM REV CODE 636 W HCPCS: Performed by: ORTHOPAEDIC SURGERY

## 2022-04-28 PROCEDURE — 25000003 PHARM REV CODE 250: Performed by: INTERNAL MEDICINE

## 2022-04-28 PROCEDURE — 11000001 HC ACUTE MED/SURG PRIVATE ROOM

## 2022-04-28 PROCEDURE — 99900035 HC TECH TIME PER 15 MIN (STAT)

## 2022-04-28 PROCEDURE — 97116 GAIT TRAINING THERAPY: CPT | Mod: CQ

## 2022-04-28 PROCEDURE — A4216 STERILE WATER/SALINE, 10 ML: HCPCS | Performed by: ORTHOPAEDIC SURGERY

## 2022-04-28 RX ORDER — ACETAMINOPHEN 325 MG/1
650 TABLET ORAL EVERY 6 HOURS PRN
Status: DISCONTINUED | OUTPATIENT
Start: 2022-04-28 | End: 2022-05-06

## 2022-04-28 RX ORDER — LIDOCAINE HYDROCHLORIDE 20 MG/ML
JELLY TOPICAL
Status: DISCONTINUED | OUTPATIENT
Start: 2022-04-28 | End: 2022-05-11 | Stop reason: HOSPADM

## 2022-04-28 RX ORDER — OXYCODONE HYDROCHLORIDE 5 MG/1
5 TABLET ORAL EVERY 6 HOURS PRN
Status: DISCONTINUED | OUTPATIENT
Start: 2022-04-28 | End: 2022-05-01

## 2022-04-28 RX ADMIN — Medication 2 ML: at 06:04

## 2022-04-28 RX ADMIN — HYDROCODONE BITARTRATE AND ACETAMINOPHEN 1 TABLET: 5; 325 TABLET ORAL at 06:04

## 2022-04-28 RX ADMIN — DOCUSATE SODIUM AND SENNOSIDES 1 TABLET: 8.6; 5 TABLET, FILM COATED ORAL at 08:04

## 2022-04-28 RX ADMIN — SEVELAMER CARBONATE 800 MG: 800 TABLET, FILM COATED ORAL at 08:04

## 2022-04-28 RX ADMIN — CEFAZOLIN 500 MG: 1 INJECTION, POWDER, FOR SOLUTION INTRAVENOUS at 05:04

## 2022-04-28 RX ADMIN — Medication 2 ML: at 12:04

## 2022-04-28 RX ADMIN — HYDRALAZINE HYDROCHLORIDE 25 MG: 25 TABLET, FILM COATED ORAL at 06:04

## 2022-04-28 RX ADMIN — MORPHINE SULFATE 4 MG: 4 INJECTION INTRAVENOUS at 04:04

## 2022-04-28 RX ADMIN — HYDRALAZINE HYDROCHLORIDE 25 MG: 25 TABLET, FILM COATED ORAL at 10:04

## 2022-04-28 RX ADMIN — HYDRALAZINE HYDROCHLORIDE 25 MG: 25 TABLET, FILM COATED ORAL at 03:04

## 2022-04-28 RX ADMIN — OXYCODONE 5 MG: 5 TABLET ORAL at 10:04

## 2022-04-28 RX ADMIN — HYDROCODONE BITARTRATE AND ACETAMINOPHEN 1 TABLET: 5; 325 TABLET ORAL at 12:04

## 2022-04-28 RX ADMIN — CYCLOBENZAPRINE 10 MG: 10 TABLET, FILM COATED ORAL at 12:04

## 2022-04-28 RX ADMIN — CEFAZOLIN 500 MG: 1 INJECTION, POWDER, FOR SOLUTION INTRAVENOUS at 04:04

## 2022-04-28 RX ADMIN — SEVELAMER CARBONATE 800 MG: 800 TABLET, FILM COATED ORAL at 05:04

## 2022-04-28 RX ADMIN — CALCITRIOL CAPSULES 0.25 MCG 0.25 MCG: 0.25 CAPSULE ORAL at 08:04

## 2022-04-28 RX ADMIN — SEVELAMER CARBONATE 800 MG: 800 TABLET, FILM COATED ORAL at 11:04

## 2022-04-28 RX ADMIN — ACETAMINOPHEN 650 MG: 325 TABLET ORAL at 10:04

## 2022-04-28 RX ADMIN — MORPHINE SULFATE 4 MG: 4 INJECTION INTRAVENOUS at 11:04

## 2022-04-28 RX ADMIN — LIDOCAINE HYDROCHLORIDE: 20 JELLY TOPICAL at 11:04

## 2022-04-28 RX ADMIN — CYCLOBENZAPRINE 10 MG: 10 TABLET, FILM COATED ORAL at 08:04

## 2022-04-28 RX ADMIN — MORPHINE SULFATE 4 MG: 4 INJECTION INTRAVENOUS at 08:04

## 2022-04-28 RX ADMIN — OXYCODONE 5 MG: 5 TABLET ORAL at 05:04

## 2022-04-28 NOTE — PROGRESS NOTES
[facility]  Adult Nutrition  Consult Note    SUMMARY   Intervention: sodium/phosphorus/potassium modified diet     Recommendations  Recommendation/Intervention:   1.) Continue with Renal diet + Novasource renal BID   Goals: 1.) diet will advance within 48 hrs 2.) pt will meet >50% of EEN during admit   Nutrition Goal Status: 1.) goal met 2.) goal met/ongoing   Communication of RD Recs: reviewed with RN    1. Compartment syndrome    2. Hyperkalemia    3. SANDRA (acute kidney injury)    4. Traumatic rhabdomyolysis, initial encounter    5. Acute renal failure    6. Non-traumatic compartment syndrome of right lower extremity    7. Acute renal failure, unspecified acute renal failure type      Past Medical History:   Diagnosis Date    Allergy     AR    Asthma     mild intermittent    Hyperkalemia 4/14/2022         Assessment and Plan  Nutrition Problem  altered nutrition related lab values    Related to (etiology):   Renal dysfunction    Signs and Symptoms (as evidenced by):   Phos: 8.9   K: 5.3      Interventions/Recommendations (treatment strategy):  Advance to renal diet, educate on diet on f/u     Nutrition Diagnosis Status:   Continues           Malnutrition Assessment  Stable weight.       Reason for Assessment  Reason For Assessment: consult  General Information Comments: admits with acute renal failure. Pt in OR during RD rounds for washout. RD consulted for new HD pt. Provided renal diet education to RN. Will f/u with education. unable to assess any weight loss.  4/21: pt receiving HD during RD rounds. Just receive Citizen of [place] toast for breakfast which he was excited about. States he's willing to try novasource with meals. Encouraged protein intake with meals and importance of maintaining his nutrition status while receiving HD. Reports a UBW of ~165lbs. Provided SANDRA diet education, left at bedside with RD email.  4/25: pt in periop for surgery. Diet just advanced back to renal. Per chart  "review, pt with adequate intake over the weekend (75% at most meals). Pt will continue with HD outpatient.   : HD yesterday. Tolerating diet, consumed ~50% of breakfast this morning. Drinking about 1 novasource renal a day.     Nutrition Risk Screen  Nutrition Risk Screen: no indicators present    Nutrition/Diet History  Food Allergies: shrimp  Factors Affecting Nutritional Intake: NPO-- resolved.     Anthropometrics  Temp: 98.4 °F (36.9 °C)  Height Method: Stated  Height: 5' 9"  Height (inches): 69 in  Weight Method: Bed Scale  Weight: 81.3 kg (179 lb 3.7 oz)  Weight (lb): 179.24 lb  Ideal Body Weight (IBW), Male: 160 lb  % Ideal Body Weight, Male (lb): 116.71 %  BMI (Calculated): 26.5  BMI Grade: 25 - 29.9 - overweight  Usual Body Weight (UBW), k.1 kg (2022)  % Usual Body Weight: 110.09  % Weight Change From Usual Weight: 9.86 %       Lab/Procedures/Meds  Pertinent Labs Reviewed: reviewed  BMP  Lab Results   Component Value Date     2022    K 5.1 2022     2022    CO2 27 2022    BUN 60 (H) 2022    CREATININE 5.3 (H) 2022    CALCIUM 10.4 2022    ANIONGAP 9 2022    ESTGFRAFRICA 16 (A) 2022    EGFRNONAA 14 (A) 2022     Lab Results   Component Value Date    ALBUMIN 2.1 (L) 2022     Lab Results   Component Value Date    CALCIUM 10.4 2022    PHOS 6.8 (H) 2022     No results for input(s): POCTGLUCOSE in the last 24 hours.    Pertinent Medications Reviewed: reviewed  Scheduled Meds:   calcitRIOL  0.25 mcg Oral Daily    calcium gluconate IVPB  1 g Intravenous Once    ceFAZolin (ANCEF) IVPB  500 mg Intravenous Q12H    epoetin leidy-epbx  50 Units/kg Intravenous Every Mon, Wed, Fri    ergocalciferol  50,000 Units Oral Q7 Days    hydrALAZINE  25 mg Oral Q8H    LIDOcaine HCL 2%   Topical (Top) Every     senna-docusate 8.6-50 mg  1 tablet Oral BID    sevelamer carbonate  800 mg Oral TID WM    sodium " chloride 0.9%  2 mL Intravenous Q6H    sodium chloride 0.9%  2 mL Intravenous Q6H     Continuous Infusions:   electrolyte-S (pH 7.4) Stopped (04/25/22 5735)    loperamide         Estimated/Assessed Needs  Weight Used For Calorie Calculations: 78 kg (171 lb 15.3 oz) (NHANES II)  Energy Calorie Requirements (kcal): 3535-9126 kcals  Energy Need Method: Kcal/kg  Protein Requirements: 80-95g  Weight Used For Protein Calculations: 78 kg (171 lb 15.3 oz) (NHANES II)  Fluid Requirements (mL): UOP + 1000 mls  Estimated Fluid Requirement Method: other (see comments)  RDA Method (mL): 2300         Nutrition Prescription Ordered  Current Diet Order: Renal diet  Oral Nutrition Supplements: Novasource renal       Evaluation of Received Nutrient/Fluid Intake  Energy Calories Required: not meeting needs  % Intake of Estimated Energy Needs: 50 - 75 %  % Meal Intake: 50 - 75 %     Nutrition Risk  Level of Risk/Frequency of Follow-up:  (2 weekly)       Monitor and Evaluation  Food and Nutrient Intake: energy intake, food and beverage intake  Food and Nutrient Adminstration: diet order  Knowledge/Beliefs/Attitudes: food and nutrition knowledge/skill  Anthropometric Measurements: weight, weight change, body mass index  Biochemical Data, Medical Tests and Procedures: electrolyte and renal panel, glucose/endocrine profile, lipid profile  Nutrition-Focused Physical Findings: overall appearance       Nutrition Follow-Up  RD Follow-up?: Yes

## 2022-04-28 NOTE — PLAN OF CARE
Problem: Physical Therapy  Goal: Physical Therapy Goal  Description: Goals to be met by: 5/15/2022     Patient will increase functional independence with mobility by performin). Supine to sit with Modified [place]  2). Sit to stand transfer with Modified [place] maintaining RLE NWB using axillary crutches or rolling walker  3). Bed to chair transfer with Modified [place] maintaining RLE NWB using axillary crutches or rolling walker  5). Gait  x 150 feet with Modified [place] maintaining RLE NWB using axillary crutches or rolling walker    Outcome: Ongoing, Progressing   Ambulate with rw and assistance for safety.

## 2022-04-28 NOTE — SUBJECTIVE & OBJECTIVE
Interval History:  No new events noted overnight.  s/p Debridement deep right lateral fasciotomy incision right lower leg with excisional  debridement of dead anterior tibialis muscle and biopsy of muscle application of wound VAC right lower leg and hemo split catheter placement.  Asking for more pain medicine, requesting change of hydrocodone to oxycodone.  Patient denies chest pain or shortness of breath.      Review of Systems   Constitutional:  Negative for activity change, appetite change, chills and fever.   HENT:  Negative for congestion, sore throat and trouble swallowing.    Eyes:  Negative for photophobia and visual disturbance.   Respiratory:  Negative for cough, chest tightness and shortness of breath.    Cardiovascular:  Negative for chest pain, palpitations and leg swelling.   Gastrointestinal:  Negative for abdominal pain, diarrhea and nausea.   Genitourinary:  Negative for dysuria, flank pain and hematuria.   Musculoskeletal:  Positive for gait problem and myalgias. Negative for back pain.   Skin:  Positive for color change.   Neurological:  Negative for dizziness, weakness and headaches.   Psychiatric/Behavioral:  Negative for confusion.    Objective:     Vital Signs (Most Recent):  Temp: 99.1 °F (37.3 °C) (04/28/22 0738)  Pulse: 109 (04/28/22 0738)  Resp: 18 (04/28/22 0738)  BP: (!) 146/73 (04/28/22 0738)  SpO2: 99 % (04/28/22 0840)   Vital Signs (24h Range):  Temp:  [96.7 °F (35.9 °C)-99.2 °F (37.3 °C)] 99.1 °F (37.3 °C)  Pulse:  [104-121] 109  Resp:  [14-18] 18  SpO2:  [95 %-99 %] 99 %  BP: (140-174)/(73-98) 146/73     Weight: 81.3 kg (179 lb 3.7 oz)  Body mass index is 26.47 kg/m².    Intake/Output Summary (Last 24 hours) at 4/28/2022 0914  Last data filed at 4/28/2022 0800  Gross per 24 hour   Intake 1804 ml   Output 4700 ml   Net -2896 ml        Physical Exam  Vitals and nursing note reviewed.   Constitutional:       Appearance: He is normal weight. He is ill-appearing.   HENT:      Head:  Normocephalic.      Mouth/Throat:      Mouth: Mucous membranes are moist.      Pharynx: Oropharynx is clear.   Eyes:      Pupils: Pupils are equal, round, and reactive to light.      Comments: Left orbital ecchymosis   Cardiovascular:      Rate and Rhythm: Regular rhythm. Tachycardia present.      Pulses: Normal pulses.      Heart sounds: Normal heart sounds.   Pulmonary:      Effort: Pulmonary effort is normal.      Breath sounds: Normal breath sounds.   Abdominal:      General: Bowel sounds are normal.      Palpations: Abdomen is soft.   Musculoskeletal:         General: Swelling (r Thigh swellin noted) present. Normal range of motion.      Cervical back: Normal range of motion.      Right upper leg: Swelling present.      Right lower leg: Swelling present.      Comments:     good plantar flexion of his toes actively.  he has no pain on passive extension of the toe the patient has very little extension actively of the toe great toes and smaller toes.    Skin:     General: Skin is warm and dry.      Comments: Diffuse fine maculopapular rash all over extremities and trunk.   Neurological:      Mental Status: He is alert and oriented to person, place, and time. Mental status is at baseline.   Psychiatric:         Mood and Affect: Mood normal.       Significant Labs: All pertinent labs within the past 24 hours have been reviewed.  CBC:   Recent Labs   Lab 04/27/22  0912 04/28/22  0535   WBC 14.17* 15.50*   HGB 6.6* 7.7*   HCT 20.1* 24.0*    331       CMP:   Recent Labs   Lab 04/27/22  0912 04/28/22  0535    137   K 4.5 5.1    101   CO2 21* 27   * 102   * 60*   CREATININE 8.4* 5.3*   CALCIUM 8.4* 10.4   PROT 5.7* 6.0   ALBUMIN 2.1* 2.1*   BILITOT 0.2 0.2   ALKPHOS 58 71   AST 33 39   ALT 10 8*   ANIONGAP 15 9   EGFRNONAA 8* 14*       Microbiology Results (last 7 days)       ** No results found for the last 168 hours. **          Significant Imaging:   X-ray lumbar spine: Normal  study.    Right tibia and fibula: Normal study.    Right femur x-ray: Normal study.    Pelvis x-ray: Normal study.    CT Head: No acute abnormality.  Minimal sinus disease.    CT Maxillofacial scan: No facial bone fracture.  Minimal sinus disease.    RLE venous doppler scan: No evidence of deep venous thrombosis in the right lower extremity.    CXR: Central line placement with the tip over the SVC.  No acute detrimental changes.    RLE venous doppler scan:  No evidence of deep venous thrombosis in the right lower extremity. Edema in the soft tissues of the right lower extremity.    CXR:   No acute process.  Right IJ catheter in place.

## 2022-04-28 NOTE — PROGRESS NOTES
[facility] Hospital Medicine  Progress Note    Patient Name: Agus Horan  MRN: [id number]  Patient Class: IP- Inpatient   Admission Date: 4/13/2022  Length of Stay: 15 days  Attending Physician: Owen Zamorano MD  Primary Care Provider: Primary Doctor No        Subjective:     Principal Problem:Acute renal failure        HPI:  Agus Horan is a 20 y/o male with PMHx of mild asthma who presents with right lower extremity pain and swelling x few days. Patient was seen in the ED on 4/9/22 for oxycodone overdose and again on 4/10/22 for left eye pain after falling on a piece of furniture. Patient states he is unsure how he injured his leg and denies IV drug use. Xray of his right leg did not show acute fracture. Patietn states RLE pain and swelling increased over the past few days and he is unable to walk secondary to pain. US of his lower extremity performed today did not show DVT. Lab work revealed elevated Creatinine 13.5 & K+ 7.1, elevated AST/ALT 3980/1611 & WBC 18.14.  CPK>40,0000.  Patient unable to dorsiflex and plantar flex his RLE and symptoms are concerning for compartment syndrome per ED. ED provider discussed with Dr. Byers and Dr. Fraser, nephrology. Patient was referred to hospital medicine and seen in the ED with his friend at bedside. Patient complaining of RLE pain, tenderness and swelling. He denies SOB, N/V/D, chest pain and headaches.  Patient will be admitted to hospital medicine for orthopedic consultation and further management.      Overview/Hospital Course:  He was found to have compartment syndrome and was taking to the OR emergently for fasciotomy on 4/13. We are consulted for SANDRA with metabolic derangements.  Cpk at the time of arrival was more than 40,000. Initially was started on IV fluids and started on hemodialysis. on 4/14- did emergent HD overnight for hyperkalemia and metabolic acidosis refractory to medical management; pain controlled, patient underwent  DEBRIDEMENT, LOWER EXTREMITY (Right)   closure of medial fasciotomy incision right leg debridement of lateral fasciotomy with biopsy of anterior muscle compartment removal of deep muscle anterior compartment right lower leg on 04/18/2022.  Patient's CPK level continue to trend down.  Patient underwent debridement deep right lateral fasciotomy incision right lower leg with excisional  debridement of dead anterior tibialis muscle and biopsy of muscle application of wound VAC right lower leg  on April 25, 2022.       Interval History:  No new events noted overnight.  s/p Debridement deep right lateral fasciotomy incision right lower leg with excisional  debridement of dead anterior tibialis muscle and biopsy of muscle application of wound VAC right lower leg and hemo split catheter placement.  Asking for more pain medicine, requesting change of hydrocodone to oxycodone.  Patient denies chest pain or shortness of breath.      Review of Systems   Constitutional:  Negative for activity change, appetite change, chills and fever.   HENT:  Negative for congestion, sore throat and trouble swallowing.    Eyes:  Negative for photophobia and visual disturbance.   Respiratory:  Negative for cough, chest tightness and shortness of breath.    Cardiovascular:  Negative for chest pain, palpitations and leg swelling.   Gastrointestinal:  Negative for abdominal pain, diarrhea and nausea.   Genitourinary:  Negative for dysuria, flank pain and hematuria.   Musculoskeletal:  Positive for gait problem and myalgias. Negative for back pain.   Skin:  Positive for color change.   Neurological:  Negative for dizziness, weakness and headaches.   Psychiatric/Behavioral:  Negative for confusion.    Objective:     Vital Signs (Most Recent):  Temp: 99.1 °F (37.3 °C) (04/28/22 0738)  Pulse: 109 (04/28/22 0738)  Resp: 18 (04/28/22 0738)  BP: (!) 146/73 (04/28/22 0738)  SpO2: 99 % (04/28/22 0840)   Vital Signs (24h Range):  Temp:  [96.7 °F (35.9  °C)-99.2 °F (37.3 °C)] 99.1 °F (37.3 °C)  Pulse:  [104-121] 109  Resp:  [14-18] 18  SpO2:  [95 %-99 %] 99 %  BP: (140-174)/(73-98) 146/73     Weight: 81.3 kg (179 lb 3.7 oz)  Body mass index is 26.47 kg/m².    Intake/Output Summary (Last 24 hours) at 4/28/2022 0914  Last data filed at 4/28/2022 0800  Gross per 24 hour   Intake 1804 ml   Output 4700 ml   Net -2896 ml        Physical Exam  Vitals and nursing note reviewed.   Constitutional:       Appearance: He is normal weight. He is ill-appearing.   HENT:      Head: Normocephalic.      Mouth/Throat:      Mouth: Mucous membranes are moist.      Pharynx: Oropharynx is clear.   Eyes:      Pupils: Pupils are equal, round, and reactive to light.      Comments: Left orbital ecchymosis   Cardiovascular:      Rate and Rhythm: Regular rhythm. Tachycardia present.      Pulses: Normal pulses.      Heart sounds: Normal heart sounds.   Pulmonary:      Effort: Pulmonary effort is normal.      Breath sounds: Normal breath sounds.   Abdominal:      General: Bowel sounds are normal.      Palpations: Abdomen is soft.   Musculoskeletal:         General: Swelling (r Thigh swellin noted) present. Normal range of motion.      Cervical back: Normal range of motion.      Right upper leg: Swelling present.      Right lower leg: Swelling present.      Comments:     good plantar flexion of his toes actively.  he has no pain on passive extension of the toe the patient has very little extension actively of the toe great toes and smaller toes.    Skin:     General: Skin is warm and dry.      Comments: Diffuse fine maculopapular rash all over extremities and trunk.   Neurological:      Mental Status: He is alert and oriented to person, place, and time. Mental status is at baseline.   Psychiatric:         Mood and Affect: Mood normal.       Significant Labs: All pertinent labs within the past 24 hours have been reviewed.  CBC:   Recent Labs   Lab 04/27/22  0912 04/28/22  0535   WBC 14.17* 15.50*    HGB 6.6* 7.7*   HCT 20.1* 24.0*    331       CMP:   Recent Labs   Lab 04/27/22  0912 04/28/22  0535    137   K 4.5 5.1    101   CO2 21* 27   * 102   * 60*   CREATININE 8.4* 5.3*   CALCIUM 8.4* 10.4   PROT 5.7* 6.0   ALBUMIN 2.1* 2.1*   BILITOT 0.2 0.2   ALKPHOS 58 71   AST 33 39   ALT 10 8*   ANIONGAP 15 9   EGFRNONAA 8* 14*       Microbiology Results (last 7 days)       ** No results found for the last 168 hours. **          Significant Imaging:   X-ray lumbar spine: Normal study.    Right tibia and fibula: Normal study.    Right femur x-ray: Normal study.    Pelvis x-ray: Normal study.    CT Head: No acute abnormality.  Minimal sinus disease.    CT Maxillofacial scan: No facial bone fracture.  Minimal sinus disease.    RLE venous doppler scan: No evidence of deep venous thrombosis in the right lower extremity.    CXR: Central line placement with the tip over the SVC.  No acute detrimental changes.    RLE venous doppler scan:  No evidence of deep venous thrombosis in the right lower extremity. Edema in the soft tissues of the right lower extremity.    CXR:   No acute process.  Right IJ catheter in place.      Assessment/Plan:      * Acute renal failure  Continues to be anuric  Hemodialysis therapy as per Nephrology team.   due to rhabdomyolysis  Trend CPK daily.  Follow Nephrology recommendations.  Avoid nephrotoxins  Telemetry.  Hemo split catheter was placed on April 25, 2022.        Anemia of chronic disease  Patient is now with symptomatic anemia.  Patient will benefit with 1 unit of packed red blood cell transfusion with hemodialysis.  Follow CBC and transfuse as needed.      Drug eruption  No definite etiology apparent.  Possibly clindamycin use.  Off intravenous clindamycin.  Use oral Benadryl 25 mg q.6 hours p.r.n. patient will be monitored closely.      Compartment syndrome  Underwent fasciotomy on 4/13   S/p Debridement deep right lateral fasciotomy incision right lower  leg with excisional  debridement of dead anterior tibialis muscle and biopsy of muscle application of wound VAC right lower leg  - 04/25/22.  Wound care nurse performing dressing changes as per recommendations by Orthopedics.    Elevated liver enzymes  Elevated AST &ALT likely due to rhabdomyolysis  Trending down  Avoid hepatoxic medications  Monitor CMP  See plan for rhabdomyolysis        Rhabdomyolysis  CPK trending down  S/p fasciotomy on 4/13 for compartment syndrome.  On 4/18 S/p DEBRIDEMENT, LOWER EXTREMITY (Right)   closure of medial fasciotomy incision right leg debridement of lateral fasciotomy with biopsy of anterior muscle compartment removal of deep muscle anterior compartment right lower leg. Subsequently underwent Debridement deep right lateral fasciotomy incision right lower leg with excisional  debridement of dead anterior tibialis muscle and biopsy of muscle application of wound VAC right lower leg on 04/25/22.  Monitor BMP   Follow Nephrology recommendations.      History of asthma  Hx of asthma, stable    Monitor for acute changes      Called patient's aunt at the request of patient at bedside, answered all questions.  Patient in on her agreeable to go to LTAC.  Discussed with Dr. Byers.  VTE Risk Mitigation (From admission, onward)         Ordered     heparin (porcine) injection 4,000 Units  As needed (PRN)         04/14/22 0137     Reason for No Pharmacological VTE Prophylaxis  Once        Question:  Reasons:  Answer:  Risk of Bleeding    04/13/22 1913     IP VTE HIGH RISK PATIENT  Once         04/13/22 1913     Place sequential compression device  Until discontinued         04/13/22 1913                Discharge Planning   JARAD: 4/29/2022     Code Status: Full Code   Is the patient medically ready for discharge?:     Reason for patient still in hospital (select all that apply): Patient trending condition and Consult recommendations  Discharge Plan A: Long-term acute care facility (LTAC)                   Owen Zamorano MD  Department of Hospital Medicine   [facility]

## 2022-04-28 NOTE — CARE UPDATE
04/28/22 0840   Patient Assessment/Suction   Level of Consciousness (AVPU) alert   PRE-TX-O2   O2 Device (Oxygen Therapy) room air   SpO2 99 %   Pulse Oximetry Type Intermittent   $ Pulse Oximetry - Multiple Charge Pulse Oximetry - Multiple   Incentive Spirometer   $ Incentive Spirometer Charges done independently per patient   Administration (IS) self-administered

## 2022-04-28 NOTE — PROGRESS NOTES
HD tx complete, net UF 2L removed. 1 unit PRBC given during treatment.        04/27/22 2205   Required for all Hemodialysis Patients   Hepatitis Status negative   Handoff Report   Received From [place]   Given To [facility]   Treatment Type   Treatment Type Acute   Vital Signs   Temp 98.2 °F (36.8 °C)   Temp src Oral   Pulse 109   Heart Rate Source Monitor   Resp 16   SpO2 98 %   Pulse Oximetry Type Intermittent   O2 Device (Oxygen Therapy) room air   BP (!) 166/86   BP Location Left arm   Patient Position Lying   Assessments (Pre/Post)   Consent Obtained yes   Date Hepatitis Profile Obtained 04/14/22   Blood Liters Processed (BLP) 48   Transport Modality bed   Level of Consciousness (AVPU) alert   Dialyzer Clearance heavily streaked        Hemodialysis Catheter 04/25/22 1119 right internal jugular   Placement Date/Time: 04/25/22 1119   Hand Hygiene: Performed  Barrier Precautions: Performed  Skin Antisepsis: ChloraPrep  Location: right internal jugular  Catheter Size (Fr): 14.5 Fr   Site Assessment No redness;No swelling;No warmth   Line Securement Device Secured with sutures   Dressing Type Biopatch in place;Central line dressing   Dressing Status Clean;Dry;Intact   Dressing Intervention Integrity maintained   Date on Dressing 04/25/22   Dressing Due to be Changed 05/02/22   Venous Patency/Care flushed w/o difficulty;heparin locked   Arterial Patency/Care flushed w/o difficulty;heparin locked   Line Necessity Review CRRT/HD   Post-Hemodialysis Assessment   Rinseback Volume (mL) 250 mL   Blood Volume Processed (Liters) 48 L   Dialyzer Clearance Heavily streaked   Duration of Treatment 180 minutes   Additional Fluid Intake (mL) 500 mL   Total UF (mL) 2500 mL   Net Fluid Removal 2000   Patient Response to Treatment Tolerated well   Post-Treatment Weight 79.3 kg (174 lb 13.2 oz)   Treatment Weight Change -2   Post-Hemodialysis Comments Tx completed per protocol, all blood returned without incident.   Edema    Edema generalized

## 2022-04-28 NOTE — NURSING
Wound vac dressing change done at the bedside. Dr. Byers at bedside to assess the wound. Applied Urgotul (silver mesh dressing) over the open wound to promote healing and to lessen the adhesion of the black foam. Applied Urgotul over the sutures on the medial lower leg and padded the right leg with abd pads and re-applied splint as instructed by Dr. Byers. Applied kerlix followed by ace wraps x2. Did apply lidocaine jelly to the wound bed prior to application of new sponge which helped significantly with pain management at this application. Wound vac dressing changes q Mon and Thursday and as needed. Changed the cannister which had 500cc output noted. Wound care to continue to follow this patient.      Right lateral lower leg 19 cm x 7cm

## 2022-04-28 NOTE — PROGRESS NOTES
Changed the wound VAC with the wound VAC nurse today.  The wound itself looks very clean.  About 95% of the wound is covered with granulation tissue there is no evidence of any infection.  I am unable to close much of the wound do the swelling.  The patient appears to have motor activity in the peroneal muscles he does not appear to have any motor activity in the anterior compartment in the anterior tibialis muscle.  The muscle in the anterior part mint looks ischemic but there is mostly covered with granulation tissue.  Again no evidence of any infection seen.  A new wound VAC was applied to the wound.    Plans are to change his wound VAC twice a week.  The patient is going to be transferred to an [facility] facility.  The patient should have the wound VAC changed twice a week.  While at the LTAC plastic surgery will need to be consulted so that a split-thickness skin graft can be applied to the fasciotomy.  Due to the abundant amount of swelling in the muscle and in the compartment I do not think will be able to close the fasciotomy site.  He will need a split-thickness skin graft.

## 2022-04-28 NOTE — PT/OT/SLP PROGRESS
Physical Therapy Treatment    Patient Name:  Agus Horan   MRN:  [id number]    Recommendations:     Discharge Recommendations:  LTACH (long-term acute care hospital), home health PT   Discharge Equipment Recommendations: walker, rolling, bedside commode   Barriers to discharge: None    Assessment:     Agus Horan is a 21 y.o. male admitted with a medical diagnosis of Acute renal failure.  He presents with the following impairments/functional limitations:  weakness, impaired endurance, impaired self care skills, impaired functional mobilty, gait instability, impaired balance, decreased lower extremity function, pain, decreased safety awareness, edema, impaired skin, orthopedic precautions . Agreed to mobilize. Reports increased pain R lower back radiating posterior R leg. Multiple requests for pain medicine ( specifically oxycodone), nurse informed. Episodes of crying throughout session.     Rehab Prognosis: Good; patient would benefit from acute skilled PT services to address these deficits and reach maximum level of function.    Recent Surgery: Procedure(s) (LRB):  FASCIOTOMY (Right)  INSERTION, CATHETER, TUNNELED (Right) 3 Days Post-Op    Plan:     During this hospitalization, patient to be seen  (1 - 2 x / day) to address the identified rehab impairments via gait training, therapeutic activities, therapeutic exercises and progress toward the following goals:    · Plan of Care Expires:  05/15/22    Subjective     Chief Complaint: back pain .   Patient/Family Comments/goals: to return home.   Pain/Comfort:  · Pain Rating 1: 10/10  · Location - Side 1: Right  · Location - Orientation 1: lower  · Location 1: back  · Pain Addressed 1: Reposition, Nurse notified      Objective:     Communicated with nurse Ward  prior to session.  Patient found supine with bed alarm, telemetry, wound vac upon PT entry to room.     General Precautions: Standard, fall   Orthopedic Precautions:RLE non weight bearing   Braces:  N/A  Respiratory Status: Room air     Functional Mobility:  · Bed Mobility:     · Rolling Left:  contact guard assistance  · Supine to Sit: contact guard assistance  · Transfers:     · Sit to Stand:  minimum assistance with rolling walker  · Gait: 20' x 2 with rw and CGA , NWB RLE with seated rest between each . Wound vac in tow.       AM-PAC 6 CLICK MOBILITY          Therapeutic Activities and Exercises:   Transferred EOB, sat briefly. Stood with Min A.   Ambulated slowly with rw and CGA, NWB RLE. Required seated rest due to pain ( crying).    Returned to room in chair with multiple pillows and attempts to reposition for comfort .  Spoke with nurse regarding patient's complaint of pain.    Patient left up in chair with all lines intact, call button in reach, chair alarm on and nurse Sylvia notified..    GOALS:   Multidisciplinary Problems     Physical Therapy Goals        Problem: Physical Therapy    Goal Priority Disciplines Outcome Goal Variances Interventions   Physical Therapy Goal     PT, PT/OT Ongoing, Progressing     Description: Goals to be met by: 5/15/2022     Patient will increase functional independence with mobility by performin). Supine to sit with Modified [place]  2). Sit to stand transfer with Modified [place] maintaining RLE NWB using axillary crutches or rolling walker  3). Bed to chair transfer with Modified [place] maintaining RLE NWB using axillary crutches or rolling walker  5). Gait  x 150 feet with Modified [place] maintaining RLE NWB using axillary crutches or rolling walker                     Time Tracking:     PT Received On: 22  PT Start Time: 45     PT Stop Time: 1014  PT Total Time (min): 29 min     Billable Minutes: Gait Training 15min and Therapeutic Activity 14min    Treatment Type: Treatment  PT/PTA: PTA     PTA Visit Number: 1     2022

## 2022-04-28 NOTE — PROGRESS NOTES
INPATIENT NEPHROLOGY Progress Note  [facility]    Patient Name: Agus Horan  Date: 04/28/2022    Reason for consultation: SANDRA    Chief Complaint:   Chief Complaint   Patient presents with    Leg Pain     Pain in the right leg muscle calf is hard, nausea vomiting, patient was seen here over the weekend for an overdose        History of Present Illness:  22 y/o M with a history of asthma recently here on 4/9 with oxycodone overdose who p/w RLE pain and swelling after a fall on 4/10. He reports severe pain, constant, associated with nausea with inability to bear weight.  He took meloxicam without relief. He was found to have compartment syndrome and was taking to the OR emergently for fasciotomy on 4/13. We are consulted for SANDRA with metabolic derangements.    Interval History:  4/14- did emergent HD overnight for hyperkalemia and metabolic acidosis refractory to medical management; pain controlled, RLE wrapped, no edema in LLE  4/15- worsening pain/edema in RLE- going for MRI- oligoanuric- stop NS IVFs- will do HD/UF today and tomorrow  4/16  Seen on dialysis.  No distress  4/17  Remains oliguric.  AFVSS.  Has back pain.  Leg pain a little better  4/18  In the OR.  Still oliguric.    4/19  Seen on dialysis.  No distress.    4/20  Still not making much urine.  cpk coming down.     4/21  Afebrile.  BP a little better.  uop up a bit  422   Sleeping.  AFVSS.  I/Os not appropriately recorded despite being ordered for twice  4/23  225 cc UOP recorded.  K+ 5.5, HD today.  No renal recovery, CPK improving.  C/o rash, Dr. Zamorano at bedside placed orders.  Seen on dialysis, tolerating tx well.  4/24   No new lab results, next lab draw in am.   Still has rash and c/o itching.  No other complaints.  450 cc UOP recorded.  4/25  In the OR.  Plan for hd today  4/26  AFVSS.  Urine output better.  No complaints specified today  4/27  Sleeping.  Output not recorded despite being ordered  4/28  Tearful.  Not  engaging when spoken too.  Requesting dilaudid       Plan of Care:    Assessment:  Traumatic rhabdomyolysis with compartment syndrome s/p fasciotomy on 4/13  SANDRA due to toxic ATN s/p emergent HD on 4/14- remains oligoanuric and HD dependent  Edema  Hyponatremia  Hyperkalemia  Acidosis  Hypocalcemia  Shock liver    Plan:    - Trend daily CK.     cpk slowing coming down  - He remains HD dependent. Continue grier.  Ok with -160s for renal perfusion. No NSAIDs or IV contrast. Dose meds for CrCl < 10.  MWF dialysis  - Advise renal diet with 1.5L fluid restriction. Will adjust dialysate for all metabolic derangements. Will replete calcium PRN.  - Trend LFTs.   --replete calcium.  Need to do calcium repletion judiciously in pt with rhabdomyolysis.    --added hydralazine  --dialysis as needed, monitor for recovery   --need accurate I/Os     Of note:  The RN reports patient states nobody is telling him how his kidneys are doing.  I have updated him on numerous occasions.  He is not telling the truth about this         Thank you for allowing us to participate in this patient's care. We will continue to follow.    Vital Signs:  Temp Readings from Last 3 Encounters:   04/28/22 99.1 °F (37.3 °C) (Oral)   04/10/22 97.3 °F (36.3 °C)   04/09/22 98.4 °F (36.9 °C)       Pulse Readings from Last 3 Encounters:   04/28/22 109   04/10/22 63   04/09/22 95       BP Readings from Last 3 Encounters:   04/28/22 (!) 146/73   04/10/22 117/64   04/09/22 (!) 143/82       Weight:  Wt Readings from Last 3 Encounters:   04/20/22 81.3 kg (179 lb 3.7 oz)   04/10/22 77.1 kg (170 lb)   04/09/22 77.1 kg (170 lb)       INS/OUTS:  I/O last 3 completed shifts:  In: 1869 [P.O.:876; I.V.:134; Blood:359; Other:500]  Out: 5600 [Urine:3100; Other:2500]  I/O this shift:  In: 337 [P.O.:337]  Out: -     Medications:  Scheduled Meds:   calcitRIOL  0.25 mcg Oral Daily    calcium gluconate IVPB  1 g Intravenous Once    ceFAZolin (ANCEF) IVPB  500 mg  "Intravenous Q12H    epoetin leidy-epbx  50 Units/kg Intravenous Every Mon, Wed, Fri    ergocalciferol  50,000 Units Oral Q7 Days    hydrALAZINE  25 mg Oral Q8H    LIDOcaine HCL 2%   Topical (Top) Every Mon, Thurs    senna-docusate 8.6-50 mg  1 tablet Oral BID    sevelamer carbonate  800 mg Oral TID WM    sodium chloride 0.9%  2 mL Intravenous Q6H    sodium chloride 0.9%  2 mL Intravenous Q6H     Continuous Infusions:   electrolyte-S (pH 7.4) Stopped (04/25/22 1255)    loperamide       PRN Meds:.sodium chloride, sodium chloride 0.9%, acetaminophen, cyclobenzaprine, dextrose 10%, dextrose 10%, diphenhydrAMINE, glucagon (human recombinant), glucose, glucose, heparin (porcine), labetaloL, loperamide, morphine, naloxone, ondansetron, oxyCODONE, sodium chloride 0.9%  No current facility-administered medications on file prior to encounter.     Current Outpatient Medications on File Prior to Encounter   Medication Sig Dispense Refill    naloxone (NARCAN) 4 mg/actuation Spry 4mg by nasal route as needed for opioid overdose; may repeat every 2-3 minutes in alternating nostrils until medical help arrives. Call 911 2 each 0       Review of Systems:  Neg    Physical Exam:  BP (!) 146/73 (BP Location: Left arm, Patient Position: Sitting)   Pulse 109   Temp 99.1 °F (37.3 °C) (Oral)   Resp 18   Ht 5' 9" (1.753 m)   Wt 81.3 kg (179 lb 3.7 oz)   SpO2 99%   BMI 26.47 kg/m²     Constitutional: nad, aao x 3, depressed affect  Heart: rrr, no m/r/g, wwp, RLE eedema  Lungs: ctab, no w/r/r/c, no lb  Abdomen: s/nt/nd, +BS    Results:  Lab Results   Component Value Date     04/28/2022    K 5.1 04/28/2022     04/28/2022    CO2 27 04/28/2022    BUN 60 (H) 04/28/2022    CREATININE 5.3 (H) 04/28/2022    CALCIUM 10.4 04/28/2022    ANIONGAP 9 04/28/2022    ESTGFRAFRICA 16 (A) 04/28/2022    EGFRNONAA 14 (A) 04/28/2022       Lab Results   Component Value Date    CALCIUM 10.4 04/28/2022    PHOS 6.8 (H) 04/28/2022 "       Recent Labs   Lab 04/28/22  0535   WBC 15.50*   RBC 2.59*   HGB 7.7*   HCT 24.0*      MCV 93   MCH 29.7   MCHC 32.1       I have personally reviewed pertinent radiological imaging and reports.    I have spent > 35 minutes providing care for this patient for the above diagnoses. These services have included chart/data/imaging review, evaluation, exam, formulation of plan, , note preparation, and discussions with staff involved in this patient's care.    Darian Ríos MD  Nephrology  [facility]  [phone]

## 2022-04-28 NOTE — PLAN OF CARE
Intervention: sodium/phosphorus/potassium modified diet      Recommendations  Recommendation/Intervention:   1.) Continue with Renal diet + Novasource renal BID   Goals: 1.) diet will advance within 48 hrs 2.) pt will meet >50% of EEN during admit   Nutrition Goal Status: 1.) goal met 2.) goal met/ongoing   Communication of RD Recs: reviewed with RN

## 2022-04-29 LAB
ALBUMIN SERPL BCP-MCNC: 2.3 G/DL (ref 3.5–5.2)
ALP SERPL-CCNC: 82 U/L (ref 55–135)
ALT SERPL W/O P-5'-P-CCNC: 12 U/L (ref 10–44)
ANION GAP SERPL CALC-SCNC: 10 MMOL/L (ref 8–16)
AST SERPL-CCNC: 42 U/L (ref 10–40)
BASOPHILS # BLD AUTO: 0.03 K/UL (ref 0–0.2)
BASOPHILS NFR BLD: 0.2 % (ref 0–1.9)
BILIRUB SERPL-MCNC: 0.3 MG/DL (ref 0.1–1)
BUN SERPL-MCNC: 62 MG/DL (ref 6–20)
CA-I BLDV-SCNC: 1.64 MMOL/L (ref 1.06–1.42)
CALCIUM SERPL-MCNC: 12.5 MG/DL (ref 8.7–10.5)
CHLORIDE SERPL-SCNC: 100 MMOL/L (ref 95–110)
CO2 SERPL-SCNC: 27 MMOL/L (ref 23–29)
CREAT SERPL-MCNC: 5 MG/DL (ref 0.5–1.4)
DIFFERENTIAL METHOD: ABNORMAL
EOSINOPHIL # BLD AUTO: 0.5 K/UL (ref 0–0.5)
EOSINOPHIL NFR BLD: 3.3 % (ref 0–8)
ERYTHROCYTE [DISTWIDTH] IN BLOOD BY AUTOMATED COUNT: 13.6 % (ref 11.5–14.5)
EST. GFR  (AFRICAN AMERICAN): 17 ML/MIN/1.73 M^2
EST. GFR  (NON AFRICAN AMERICAN): 15 ML/MIN/1.73 M^2
GLUCOSE SERPL-MCNC: 122 MG/DL (ref 70–110)
HCT VFR BLD AUTO: 26.4 % (ref 40–54)
HGB BLD-MCNC: 8.4 G/DL (ref 14–18)
IMM GRANULOCYTES # BLD AUTO: 0.24 K/UL (ref 0–0.04)
IMM GRANULOCYTES NFR BLD AUTO: 1.6 % (ref 0–0.5)
LYMPHOCYTES # BLD AUTO: 1 K/UL (ref 1–4.8)
LYMPHOCYTES NFR BLD: 6.4 % (ref 18–48)
MAGNESIUM SERPL-MCNC: 2 MG/DL (ref 1.6–2.6)
MCH RBC QN AUTO: 29.7 PG (ref 27–31)
MCHC RBC AUTO-ENTMCNC: 31.8 G/DL (ref 32–36)
MCV RBC AUTO: 93 FL (ref 82–98)
MONOCYTES # BLD AUTO: 1.1 K/UL (ref 0.3–1)
MONOCYTES NFR BLD: 7.2 % (ref 4–15)
NEUTROPHILS # BLD AUTO: 12.5 K/UL (ref 1.8–7.7)
NEUTROPHILS NFR BLD: 81.3 % (ref 38–73)
NRBC BLD-RTO: 0 /100 WBC
PHOSPHATE SERPL-MCNC: 7.3 MG/DL (ref 2.7–4.5)
PLATELET # BLD AUTO: 323 K/UL (ref 150–450)
PMV BLD AUTO: 8.9 FL (ref 9.2–12.9)
POTASSIUM SERPL-SCNC: 5 MMOL/L (ref 3.5–5.1)
PROT SERPL-MCNC: 6.5 G/DL (ref 6–8.4)
RBC # BLD AUTO: 2.83 M/UL (ref 4.6–6.2)
SODIUM SERPL-SCNC: 137 MMOL/L (ref 136–145)
WBC # BLD AUTO: 15.37 K/UL (ref 3.9–12.7)

## 2022-04-29 PROCEDURE — 25000003 PHARM REV CODE 250: Performed by: ORTHOPAEDIC SURGERY

## 2022-04-29 PROCEDURE — 80053 COMPREHEN METABOLIC PANEL: CPT | Performed by: INTERNAL MEDICINE

## 2022-04-29 PROCEDURE — 36415 COLL VENOUS BLD VENIPUNCTURE: CPT | Performed by: ORTHOPAEDIC SURGERY

## 2022-04-29 PROCEDURE — 84100 ASSAY OF PHOSPHORUS: CPT | Performed by: INTERNAL MEDICINE

## 2022-04-29 PROCEDURE — 36415 COLL VENOUS BLD VENIPUNCTURE: CPT | Performed by: INTERNAL MEDICINE

## 2022-04-29 PROCEDURE — 63600175 PHARM REV CODE 636 W HCPCS: Performed by: ORTHOPAEDIC SURGERY

## 2022-04-29 PROCEDURE — 82330 ASSAY OF CALCIUM: CPT | Performed by: ORTHOPAEDIC SURGERY

## 2022-04-29 PROCEDURE — 83735 ASSAY OF MAGNESIUM: CPT | Performed by: INTERNAL MEDICINE

## 2022-04-29 PROCEDURE — 94761 N-INVAS EAR/PLS OXIMETRY MLT: CPT

## 2022-04-29 PROCEDURE — 11000001 HC ACUTE MED/SURG PRIVATE ROOM

## 2022-04-29 PROCEDURE — A4216 STERILE WATER/SALINE, 10 ML: HCPCS | Performed by: ORTHOPAEDIC SURGERY

## 2022-04-29 PROCEDURE — 63600175 PHARM REV CODE 636 W HCPCS: Performed by: INTERNAL MEDICINE

## 2022-04-29 PROCEDURE — 25000003 PHARM REV CODE 250: Performed by: INTERNAL MEDICINE

## 2022-04-29 PROCEDURE — 94799 UNLISTED PULMONARY SVC/PX: CPT

## 2022-04-29 PROCEDURE — 85025 COMPLETE CBC W/AUTO DIFF WBC: CPT | Performed by: INTERNAL MEDICINE

## 2022-04-29 RX ADMIN — Medication 2 ML: at 06:04

## 2022-04-29 RX ADMIN — OXYCODONE 5 MG: 5 TABLET ORAL at 09:04

## 2022-04-29 RX ADMIN — CYCLOBENZAPRINE 10 MG: 10 TABLET, FILM COATED ORAL at 08:04

## 2022-04-29 RX ADMIN — OXYCODONE 5 MG: 5 TABLET ORAL at 01:04

## 2022-04-29 RX ADMIN — MORPHINE SULFATE 4 MG: 4 INJECTION INTRAVENOUS at 10:04

## 2022-04-29 RX ADMIN — HYDRALAZINE HYDROCHLORIDE 25 MG: 25 TABLET, FILM COATED ORAL at 09:04

## 2022-04-29 RX ADMIN — DOCUSATE SODIUM AND SENNOSIDES 1 TABLET: 8.6; 5 TABLET, FILM COATED ORAL at 08:04

## 2022-04-29 RX ADMIN — HYDRALAZINE HYDROCHLORIDE 25 MG: 25 TABLET, FILM COATED ORAL at 06:04

## 2022-04-29 RX ADMIN — CEFAZOLIN 500 MG: 1 INJECTION, POWDER, FOR SOLUTION INTRAVENOUS at 06:04

## 2022-04-29 RX ADMIN — Medication 2 ML: at 12:04

## 2022-04-29 RX ADMIN — CYCLOBENZAPRINE 10 MG: 10 TABLET, FILM COATED ORAL at 02:04

## 2022-04-29 RX ADMIN — Medication 2 ML: at 11:04

## 2022-04-29 RX ADMIN — OXYCODONE 5 MG: 5 TABLET ORAL at 08:04

## 2022-04-29 RX ADMIN — HYDRALAZINE HYDROCHLORIDE 25 MG: 25 TABLET, FILM COATED ORAL at 02:04

## 2022-04-29 RX ADMIN — MORPHINE SULFATE 4 MG: 4 INJECTION INTRAVENOUS at 09:04

## 2022-04-29 RX ADMIN — CALCITRIOL CAPSULES 0.25 MCG 0.25 MCG: 0.25 CAPSULE ORAL at 08:04

## 2022-04-29 RX ADMIN — EPOETIN ALFA-EPBX 4100 UNITS: 10000 INJECTION, SOLUTION INTRAVENOUS; SUBCUTANEOUS at 10:04

## 2022-04-29 RX ADMIN — DOCUSATE SODIUM AND SENNOSIDES 1 TABLET: 8.6; 5 TABLET, FILM COATED ORAL at 09:04

## 2022-04-29 RX ADMIN — MORPHINE SULFATE 4 MG: 4 INJECTION INTRAVENOUS at 04:04

## 2022-04-29 RX ADMIN — SEVELAMER CARBONATE 800 MG: 800 TABLET, FILM COATED ORAL at 06:04

## 2022-04-29 RX ADMIN — MORPHINE SULFATE 4 MG: 4 INJECTION INTRAVENOUS at 03:04

## 2022-04-29 RX ADMIN — Medication 2 ML: at 01:04

## 2022-04-29 RX ADMIN — SEVELAMER CARBONATE 800 MG: 800 TABLET, FILM COATED ORAL at 08:04

## 2022-04-29 RX ADMIN — OXYCODONE 5 MG: 5 TABLET ORAL at 02:04

## 2022-04-29 RX ADMIN — SEVELAMER CARBONATE 800 MG: 800 TABLET, FILM COATED ORAL at 02:04

## 2022-04-29 NOTE — PT/OT/SLP PROGRESS
Physical Therapy      Patient Name:  Agus Horan   MRN:  [id number]    Patient not seen today secondary to Patient unwilling to participate, Dialysis (Refused 1st attempt due to pain requesting later, Dialysis second attempt. Nurse aware.). Will follow-up 4/30/22.

## 2022-04-29 NOTE — CARE UPDATE
04/29/22 1213   PRE-TX-O2   O2 Device (Oxygen Therapy) room air   SpO2 98 %   Pulse Oximetry Type Intermittent   $ Pulse Oximetry - Multiple Charge Pulse Oximetry - Multiple   Incentive Spirometer   $ Incentive Spirometer Charges done with encouragement   Administration (IS) proper technique demonstrated;self-administered   Number of Repetitions (IS) 10   Level Incentive Spirometer (mL) 3500   Patient Tolerance (IS) good

## 2022-04-29 NOTE — PROGRESS NOTES
INPATIENT NEPHROLOGY Progress Note  [facility]    Patient Name: Agus Horan  Date: 04/29/2022    Reason for consultation: SANDRA    Chief Complaint:   Chief Complaint   Patient presents with    Leg Pain     Pain in the right leg muscle calf is hard, nausea vomiting, patient was seen here over the weekend for an overdose        History of Present Illness:  22 y/o M with a history of asthma recently here on 4/9 with oxycodone overdose who p/w RLE pain and swelling after a fall on 4/10. He reports severe pain, constant, associated with nausea with inability to bear weight.  He took meloxicam without relief. He was found to have compartment syndrome and was taking to the OR emergently for fasciotomy on 4/13. We are consulted for SANDRA with metabolic derangements.    Interval History:  4/14- did emergent HD overnight for hyperkalemia and metabolic acidosis refractory to medical management; pain controlled, RLE wrapped, no edema in LLE  4/15- worsening pain/edema in RLE- going for MRI- oligoanuric- stop NS IVFs- will do HD/UF today and tomorrow  4/16  Seen on dialysis.  No distress  4/17  Remains oliguric.  AFVSS.  Has back pain.  Leg pain a little better  4/18  In the OR.  Still oliguric.    4/19  Seen on dialysis.  No distress.    4/20  Still not making much urine.  cpk coming down.     4/21  Afebrile.  BP a little better.  uop up a bit  422   Sleeping.  AFVSS.  I/Os not appropriately recorded despite being ordered for twice  4/23  225 cc UOP recorded.  K+ 5.5, HD today.  No renal recovery, CPK improving.  C/o rash, Dr. Zamorano at bedside placed orders.  Seen on dialysis, tolerating tx well.  4/24   No new lab results, next lab draw in am.   Still has rash and c/o itching.  No other complaints.  450 cc UOP recorded.  4/25  In the OR.  Plan for hd today  4/26  AFVSS.  Urine output better.  No complaints specified today  4/27  Sleeping.  Output not recorded despite being ordered  4/28  Tearful.  Not  engaging when spoken too.  Requesting dilaudid   4/29     Good urine output.  Seen on dialysis.  No distress    Plan of Care:    Assessment:  Traumatic rhabdomyolysis with compartment syndrome s/p fasciotomy on 4/13  SANDRA due to toxic ATN s/p emergent HD on 4/14- remains oligoanuric and HD dependent  Edema  Hyponatremia  Hyperkalemia  Acidosis  Hypocalcemia  Shock liver    Plan:    - Trend daily CK.     cpk slowing coming down  - He remains HD dependent. Continue grier.  Ok with -160s for renal perfusion. No NSAIDs or IV contrast. Dose meds for CrCl < 10.  MWF dialysis  - Advise renal diet with 1.5L fluid restriction. Will adjust dialysate for all metabolic derangements. Will replete calcium PRN.  - Trend LFTs.   --replete calcium.  Need to do calcium repletion judiciously in pt with rhabdomyolysis.    --added hydralazine  --dialysis as needed, monitor for recovery   - accurate I/Os  Monitor over weekend for renal recovery               Thank you for allowing us to participate in this patient's care. We will continue to follow.    Vital Signs:  Temp Readings from Last 3 Encounters:   04/29/22 99.5 °F (37.5 °C) (Oral)   04/10/22 97.3 °F (36.3 °C)   04/09/22 98.4 °F (36.9 °C)       Pulse Readings from Last 3 Encounters:   04/29/22 108   04/10/22 63   04/09/22 95       BP Readings from Last 3 Encounters:   04/29/22 (!) 147/93   04/10/22 117/64   04/09/22 (!) 143/82       Weight:  Wt Readings from Last 3 Encounters:   04/20/22 81.3 kg (179 lb 3.7 oz)   04/10/22 77.1 kg (170 lb)   04/09/22 77.1 kg (170 lb)       INS/OUTS:  I/O last 3 completed shifts:  In: 2084 [P.O.:1075; I.V.:150; Blood:359; Other:500]  Out: 6875 [Urine:4375; Other:2500]  I/O this shift:  In: 397 [P.O.:397]  Out: 500 [Urine:500]    Medications:  Scheduled Meds:   calcitRIOL  0.25 mcg Oral Daily    calcium gluconate IVPB  1 g Intravenous Once    ceFAZolin (ANCEF) IVPB  500 mg Intravenous Q12H    epoetin leidy-epbx  50 Units/kg Intravenous Every  "Mon, Wed, Fri    ergocalciferol  50,000 Units Oral Q7 Days    hydrALAZINE  25 mg Oral Q8H    LIDOcaine HCL 2%   Topical (Top) Every Mon, Thurs    senna-docusate 8.6-50 mg  1 tablet Oral BID    sevelamer carbonate  800 mg Oral TID WM    sodium chloride 0.9%  2 mL Intravenous Q6H    sodium chloride 0.9%  2 mL Intravenous Q6H     Continuous Infusions:   electrolyte-S (pH 7.4) Stopped (04/25/22 1255)    loperamide       PRN Meds:.sodium chloride, sodium chloride 0.9%, acetaminophen, cyclobenzaprine, dextrose 10%, dextrose 10%, diphenhydrAMINE, glucagon (human recombinant), glucose, glucose, heparin (porcine), labetaloL, loperamide, morphine, naloxone, ondansetron, oxyCODONE, sodium chloride 0.9%  No current facility-administered medications on file prior to encounter.     Current Outpatient Medications on File Prior to Encounter   Medication Sig Dispense Refill    naloxone (NARCAN) 4 mg/actuation Spry 4mg by nasal route as needed for opioid overdose; may repeat every 2-3 minutes in alternating nostrils until medical help arrives. Call 911 2 each 0       Review of Systems:  Neg    Physical Exam:  BP (!) 147/93 (BP Location: Right arm, Patient Position: Sitting)   Pulse 108   Temp 99.5 °F (37.5 °C) (Oral)   Resp 18   Ht 5' 9" (1.753 m)   Wt 81.3 kg (179 lb 3.7 oz)   SpO2 96%   BMI 26.47 kg/m²     Constitutional: nad, aao x 3, depressed affect  Heart: rrr, no m/r/g, wwp, RLE eedema  Lungs: ctab, no w/r/r/c, no lb  Abdomen: s/nt/nd, +BS    Results:  Lab Results   Component Value Date     04/28/2022    K 5.1 04/28/2022     04/28/2022    CO2 27 04/28/2022    BUN 60 (H) 04/28/2022    CREATININE 5.3 (H) 04/28/2022    CALCIUM 10.4 04/28/2022    ANIONGAP 9 04/28/2022    ESTGFRAFRICA 16 (A) 04/28/2022    EGFRNONAA 14 (A) 04/28/2022       Lab Results   Component Value Date    CALCIUM 10.4 04/28/2022    PHOS 6.8 (H) 04/28/2022       No results for input(s): WBC, RBC, HGB, HCT, PLT, MCV, MCH, MCHC in the " last 24 hours.    I have personally reviewed pertinent radiological imaging and reports.    I have spent > 35 minutes providing care for this patient for the above diagnoses. These services have included chart/data/imaging review, evaluation, exam, formulation of plan, , note preparation, and discussions with staff involved in this patient's care.    Darian Ríos MD  Nephrology  [facility]  [phone]

## 2022-04-29 NOTE — PLAN OF CARE
Patient tolerated dialysis treatment today.  2.5 liters removed. Will continue to monitor and control pain.   Problem: Adult Inpatient Plan of Care  Goal: Plan of Care Review  Outcome: Adequate for Care Transition  Goal: Patient-Specific Goal (Individualized)  Outcome: Adequate for Care Transition

## 2022-04-29 NOTE — PROGRESS NOTES
04/29/22 1245   Post-Hemodialysis Assessment   Rinseback Volume (mL) 250 mL   Blood Volume Processed (Liters) 54 L   Dialyzer Clearance Heavily streaked   Duration of Treatment 180 minutes   Additional Fluid Intake (mL) 500 mL   Total UF (mL) 3000 mL   Net Fluid Removal 2500   Patient Response to Treatment tolerated well   Post-Treatment Weight 77.6 kg (171 lb 1.2 oz)   Treatment Weight Change -2.4   Post-Hemodialysis Comments no problems

## 2022-04-29 NOTE — CARE UPDATE
04/29/22 1610   Incentive Spirometer   $ Incentive Spirometer Charges done with encouragement   Administration (IS) self-administered   Number of Repetitions (IS) 10   Level Incentive Spirometer (mL) 3500   Patient Tolerance (IS) good

## 2022-04-29 NOTE — SUBJECTIVE & OBJECTIVE
Interval History:  Flat affect.  Patient is receiving hemodialysis therapy.  Complaining of left antecubital fossa area swelling which is new.  No overlying skin discoloration identified.  s/p Debridement deep right lateral fasciotomy incision right lower leg with excisional  debridement of dead anterior tibialis muscle and biopsy of muscle application of wound VAC right lower leg and hemo split catheter placement.  Asking for more pain medicine, requesting change of hydrocodone to oxycodone.  Patient denies chest pain or shortness of breath.  Awaiting LTAC placement.      Review of Systems   Constitutional:  Negative for activity change, appetite change, chills and fever.   HENT:  Negative for congestion, sore throat and trouble swallowing.    Eyes:  Negative for photophobia and visual disturbance.   Respiratory:  Negative for cough, chest tightness and shortness of breath.    Cardiovascular:  Negative for chest pain, palpitations and leg swelling.   Gastrointestinal:  Negative for abdominal pain, diarrhea and nausea.   Genitourinary:  Negative for dysuria, flank pain and hematuria.   Musculoskeletal:  Positive for gait problem and myalgias. Negative for back pain.   Skin:  Positive for color change.   Neurological:  Negative for dizziness, weakness and headaches.   Psychiatric/Behavioral:  Negative for confusion.    Objective:     Vital Signs (Most Recent):  Temp: 99.5 °F (37.5 °C) (04/29/22 0756)  Pulse: 108 (04/29/22 0756)  Resp: 18 (04/29/22 0756)  BP: (!) 147/93 (04/29/22 0756)  SpO2: 96 % (04/29/22 0756)   Vital Signs (24h Range):  Temp:  [98.2 °F (36.8 °C)-99.7 °F (37.6 °C)] 99.5 °F (37.5 °C)  Pulse:  [] 108  Resp:  [16-18] 18  SpO2:  [95 %-99 %] 96 %  BP: (141-166)/(77-93) 147/93     Weight: 81.3 kg (179 lb 3.7 oz)  Body mass index is 26.47 kg/m².    Intake/Output Summary (Last 24 hours) at 4/29/2022 0871  Last data filed at 4/29/2022 0704  Gross per 24 hour   Intake 1165 ml   Output 3425 ml   Net  -2260 ml        Physical Exam  Vitals and nursing note reviewed.   Constitutional:       Appearance: He is normal weight. He is ill-appearing.   HENT:      Head: Normocephalic.      Mouth/Throat:      Mouth: Mucous membranes are moist.      Pharynx: Oropharynx is clear.   Eyes:      Pupils: Pupils are equal, round, and reactive to light.      Comments: Left orbital ecchymosis   Cardiovascular:      Rate and Rhythm: Regular rhythm. Tachycardia present.      Pulses: Normal pulses.      Heart sounds: Normal heart sounds.   Pulmonary:      Effort: Pulmonary effort is normal.      Breath sounds: Normal breath sounds.   Abdominal:      General: Bowel sounds are normal.      Palpations: Abdomen is soft.   Musculoskeletal:         General: Swelling (r Thigh swellin noted) present. Normal range of motion.      Cervical back: Normal range of motion.      Right upper leg: Swelling present.      Right lower leg: Swelling present.      Comments:     good plantar flexion of his toes actively.  he has no pain on passive extension of the toe the patient has very little extension actively of the toe great toes and smaller toes.    Skin:     General: Skin is warm and dry.      Comments: Diffuse fine maculopapular rash all over extremities and trunk.   Neurological:      Mental Status: He is alert and oriented to person, place, and time. Mental status is at baseline.   Psychiatric:         Mood and Affect: Mood normal.       Significant Labs: All pertinent labs within the past 24 hours have been reviewed.  CBC:   Recent Labs   Lab 04/27/22  0912 04/28/22  0535   WBC 14.17* 15.50*   HGB 6.6* 7.7*   HCT 20.1* 24.0*    331       CMP:   Recent Labs   Lab 04/27/22  0912 04/28/22  0535    137   K 4.5 5.1    101   CO2 21* 27   * 102   * 60*   CREATININE 8.4* 5.3*   CALCIUM 8.4* 10.4   PROT 5.7* 6.0   ALBUMIN 2.1* 2.1*   BILITOT 0.2 0.2   ALKPHOS 58 71   AST 33 39   ALT 10 8*   ANIONGAP 15 9   EGFRNONAA 8* 14*        Microbiology Results (last 7 days)       ** No results found for the last 168 hours. **          Significant Imaging:   X-ray lumbar spine: Normal study.    Right tibia and fibula: Normal study.    Right femur x-ray: Normal study.    Pelvis x-ray: Normal study.    CT Head: No acute abnormality.  Minimal sinus disease.    CT Maxillofacial scan: No facial bone fracture.  Minimal sinus disease.    RLE venous doppler scan: No evidence of deep venous thrombosis in the right lower extremity.    CXR: Central line placement with the tip over the SVC.  No acute detrimental changes.    RLE venous doppler scan:  No evidence of deep venous thrombosis in the right lower extremity. Edema in the soft tissues of the right lower extremity.    CXR:   No acute process.  Right IJ catheter in place.

## 2022-04-29 NOTE — PROGRESS NOTES
[facility] Hospital Medicine  Progress Note    Patient Name: Agus Horan  MRN: [id number]  Patient Class: IP- Inpatient   Admission Date: 4/13/2022  Length of Stay: 16 days  Attending Physician: Owen Zamorano MD  Primary Care Provider: Primary Doctor No        Subjective:     Principal Problem:Acute renal failure        HPI:  Agus Horan is a 20 y/o male with PMHx of mild asthma who presents with right lower extremity pain and swelling x few days. Patient was seen in the ED on 4/9/22 for oxycodone overdose and again on 4/10/22 for left eye pain after falling on a piece of furniture. Patient states he is unsure how he injured his leg and denies IV drug use. Xray of his right leg did not show acute fracture. Patietn states RLE pain and swelling increased over the past few days and he is unable to walk secondary to pain. US of his lower extremity performed today did not show DVT. Lab work revealed elevated Creatinine 13.5 & K+ 7.1, elevated AST/ALT 3980/1611 & WBC 18.14.  CPK>40,0000.  Patient unable to dorsiflex and plantar flex his RLE and symptoms are concerning for compartment syndrome per ED. ED provider discussed with Dr. Byers and Dr. Fraser, nephrology. Patient was referred to hospital medicine and seen in the ED with his friend at bedside. Patient complaining of RLE pain, tenderness and swelling. He denies SOB, N/V/D, chest pain and headaches.  Patient will be admitted to hospital medicine for orthopedic consultation and further management.      Overview/Hospital Course:  He was found to have compartment syndrome and was taking to the OR emergently for fasciotomy on 4/13. We are consulted for SANDRA with metabolic derangements.  Cpk at the time of arrival was more than 40,000. Initially was started on IV fluids and started on hemodialysis. on 4/14- did emergent HD overnight for hyperkalemia and metabolic acidosis refractory to medical management; pain controlled, patient underwent  DEBRIDEMENT, LOWER EXTREMITY (Right)   closure of medial fasciotomy incision right leg debridement of lateral fasciotomy with biopsy of anterior muscle compartment removal of deep muscle anterior compartment right lower leg on 04/18/2022.  Patient's CPK level continue to trend down.  Patient underwent debridement deep right lateral fasciotomy incision right lower leg with excisional  debridement of dead anterior tibialis muscle and biopsy of muscle application of wound VAC right lower leg  on April 25, 2022.       Interval History:  Flat affect.  Patient is receiving hemodialysis therapy.  Complaining of left antecubital fossa area swelling which is new.  No overlying skin discoloration identified.  s/p Debridement deep right lateral fasciotomy incision right lower leg with excisional  debridement of dead anterior tibialis muscle and biopsy of muscle application of wound VAC right lower leg and hemo split catheter placement.  Asking for more pain medicine, requesting change of hydrocodone to oxycodone.  Patient denies chest pain or shortness of breath.  Awaiting LTAC placement.      Review of Systems   Constitutional:  Negative for activity change, appetite change, chills and fever.   HENT:  Negative for congestion, sore throat and trouble swallowing.    Eyes:  Negative for photophobia and visual disturbance.   Respiratory:  Negative for cough, chest tightness and shortness of breath.    Cardiovascular:  Negative for chest pain, palpitations and leg swelling.   Gastrointestinal:  Negative for abdominal pain, diarrhea and nausea.   Genitourinary:  Negative for dysuria, flank pain and hematuria.   Musculoskeletal:  Positive for gait problem and myalgias. Negative for back pain.   Skin:  Positive for color change.   Neurological:  Negative for dizziness, weakness and headaches.   Psychiatric/Behavioral:  Negative for confusion.    Objective:     Vital Signs (Most Recent):  Temp: 99.5 °F (37.5 °C) (04/29/22  0756)  Pulse: 108 (04/29/22 0756)  Resp: 18 (04/29/22 0756)  BP: (!) 147/93 (04/29/22 0756)  SpO2: 96 % (04/29/22 0756)   Vital Signs (24h Range):  Temp:  [98.2 °F (36.8 °C)-99.7 °F (37.6 °C)] 99.5 °F (37.5 °C)  Pulse:  [] 108  Resp:  [16-18] 18  SpO2:  [95 %-99 %] 96 %  BP: (141-166)/(77-93) 147/93     Weight: 81.3 kg (179 lb 3.7 oz)  Body mass index is 26.47 kg/m².    Intake/Output Summary (Last 24 hours) at 4/29/2022 0827  Last data filed at 4/29/2022 0704  Gross per 24 hour   Intake 1165 ml   Output 3425 ml   Net -2260 ml        Physical Exam  Vitals and nursing note reviewed.   Constitutional:       Appearance: He is normal weight. He is ill-appearing.   HENT:      Head: Normocephalic.      Mouth/Throat:      Mouth: Mucous membranes are moist.      Pharynx: Oropharynx is clear.   Eyes:      Pupils: Pupils are equal, round, and reactive to light.      Comments: Left orbital ecchymosis   Cardiovascular:      Rate and Rhythm: Regular rhythm. Tachycardia present.      Pulses: Normal pulses.      Heart sounds: Normal heart sounds.   Pulmonary:      Effort: Pulmonary effort is normal.      Breath sounds: Normal breath sounds.   Abdominal:      General: Bowel sounds are normal.      Palpations: Abdomen is soft.   Musculoskeletal:         General: Swelling (r Thigh swellin noted) present. Normal range of motion.      Cervical back: Normal range of motion.      Right upper leg: Swelling present.      Right lower leg: Swelling present.      Comments:     good plantar flexion of his toes actively.  he has no pain on passive extension of the toe the patient has very little extension actively of the toe great toes and smaller toes.    Skin:     General: Skin is warm and dry.      Comments: Diffuse fine maculopapular rash all over extremities and trunk.   Neurological:      Mental Status: He is alert and oriented to person, place, and time. Mental status is at baseline.   Psychiatric:         Mood and Affect: Mood  normal.       Significant Labs: All pertinent labs within the past 24 hours have been reviewed.  CBC:   Recent Labs   Lab 04/27/22  0912 04/28/22  0535   WBC 14.17* 15.50*   HGB 6.6* 7.7*   HCT 20.1* 24.0*    331       CMP:   Recent Labs   Lab 04/27/22  0912 04/28/22  0535    137   K 4.5 5.1    101   CO2 21* 27   * 102   * 60*   CREATININE 8.4* 5.3*   CALCIUM 8.4* 10.4   PROT 5.7* 6.0   ALBUMIN 2.1* 2.1*   BILITOT 0.2 0.2   ALKPHOS 58 71   AST 33 39   ALT 10 8*   ANIONGAP 15 9   EGFRNONAA 8* 14*       Microbiology Results (last 7 days)       ** No results found for the last 168 hours. **          Significant Imaging:   X-ray lumbar spine: Normal study.    Right tibia and fibula: Normal study.    Right femur x-ray: Normal study.    Pelvis x-ray: Normal study.    CT Head: No acute abnormality.  Minimal sinus disease.    CT Maxillofacial scan: No facial bone fracture.  Minimal sinus disease.    RLE venous doppler scan: No evidence of deep venous thrombosis in the right lower extremity.    CXR: Central line placement with the tip over the SVC.  No acute detrimental changes.    RLE venous doppler scan:  No evidence of deep venous thrombosis in the right lower extremity. Edema in the soft tissues of the right lower extremity.    CXR:   No acute process.  Right IJ catheter in place.      Assessment/Plan:      * Acute renal failure  Continues to be anuric  Hemodialysis therapy as per Nephrology team.   due to rhabdomyolysis  Trend CPK daily.  Follow Nephrology recommendations.  Avoid nephrotoxins  Telemetry.  Hemo split catheter was placed on April 25, 2022.        Anemia of chronic disease  Patient is now with symptomatic anemia.  Patient will benefit with 1 unit of packed red blood cell transfusion with hemodialysis.  Follow CBC and transfuse as needed.      Drug eruption  No definite etiology apparent.  Possibly clindamycin use.  Off intravenous clindamycin.  Use oral Benadryl 25 mg q.6  hours p.r.n. patient will be monitored closely.      Compartment syndrome  Underwent fasciotomy on 4/13   S/p Debridement deep right lateral fasciotomy incision right lower leg with excisional  debridement of dead anterior tibialis muscle and biopsy of muscle application of wound VAC right lower leg  - 04/25/22.  Wound care nurse performing dressing changes as per recommendations by Orthopedics.    Elevated liver enzymes  Elevated AST &ALT likely due to rhabdomyolysis  Trending down  Avoid hepatoxic medications  Monitor CMP  See plan for rhabdomyolysis        Rhabdomyolysis  CPK trending down  S/p fasciotomy on 4/13 for compartment syndrome.  On 4/18 S/p DEBRIDEMENT, LOWER EXTREMITY (Right)   closure of medial fasciotomy incision right leg debridement of lateral fasciotomy with biopsy of anterior muscle compartment removal of deep muscle anterior compartment right lower leg. Subsequently underwent Debridement deep right lateral fasciotomy incision right lower leg with excisional  debridement of dead anterior tibialis muscle and biopsy of muscle application of wound VAC right lower leg on 04/25/22.  Monitor BMP   Follow Nephrology recommendations.      History of asthma  Hx of asthma, stable    Monitor for acute changes      Discussed with , awaiting LTAC placement.  Will obtain left upper extremity venous Doppler to rule out deep venous thrombosis.  VTE Risk Mitigation (From admission, onward)         Ordered     heparin (porcine) injection 4,000 Units  As needed (PRN)         04/14/22 0137     Reason for No Pharmacological VTE Prophylaxis  Once        Question:  Reasons:  Answer:  Risk of Bleeding    04/13/22 1913     IP VTE HIGH RISK PATIENT  Once         04/13/22 1913     Place sequential compression device  Until discontinued         04/13/22 1913                Discharge Planning   JARAD: 4/30/2022     Code Status: Full Code   Is the patient medically ready for discharge?:     Reason for patient  still in hospital (select all that apply): Patient trending condition  Discharge Plan A: Long-term acute care facility (LTAC)                  Owen Zamorano MD  Department of Hospital Medicine   [facility]

## 2022-04-29 NOTE — CARE UPDATE
04/28/22 1930   Patient Assessment/Suction   Level of Consciousness (AVPU) alert   Respiratory Effort Unlabored   PRE-TX-O2   O2 Device (Oxygen Therapy) room air   SpO2 95 %   Pulse Oximetry Type Intermittent   $ Pulse Oximetry - Multiple Charge Pulse Oximetry - Multiple   Pulse 78   Resp 17   Incentive Spirometer   $ Incentive Spirometer Charges done with encouragement   Administration (IS) proper technique demonstrated   Number of Repetitions (IS) 8   Level Incentive Spirometer (mL) 3500   Patient Tolerance (IS) good

## 2022-04-29 NOTE — PLAN OF CARE
Per Rajan - LTAC referrals to [facility] - interested but reviewing   [facility] kin - no response yet   I sent to [facility] to begin reviewing .   CM following.    04/29/22 1137   Post-Acute Status   Post-Acute Authorization Placement   Post-Acute Placement Status Referrals Sent

## 2022-04-30 LAB
ALBUMIN SERPL BCP-MCNC: 2.4 G/DL (ref 3.5–5.2)
ALP SERPL-CCNC: 92 U/L (ref 55–135)
ALT SERPL W/O P-5'-P-CCNC: 14 U/L (ref 10–44)
ANION GAP SERPL CALC-SCNC: 9 MMOL/L (ref 8–16)
AST SERPL-CCNC: 37 U/L (ref 10–40)
BASOPHILS # BLD AUTO: 0.06 K/UL (ref 0–0.2)
BASOPHILS NFR BLD: 0.4 % (ref 0–1.9)
BILIRUB SERPL-MCNC: 0.3 MG/DL (ref 0.1–1)
BUN SERPL-MCNC: 40 MG/DL (ref 6–20)
CA-I BLDV-SCNC: 2.17 MMOL/L (ref 1.06–1.42)
CALCIUM SERPL-MCNC: 15.1 MG/DL (ref 8.7–10.5)
CHLORIDE SERPL-SCNC: 102 MMOL/L (ref 95–110)
CO2 SERPL-SCNC: 26 MMOL/L (ref 23–29)
CREAT SERPL-MCNC: 3.3 MG/DL (ref 0.5–1.4)
DIFFERENTIAL METHOD: ABNORMAL
EOSINOPHIL # BLD AUTO: 0.7 K/UL (ref 0–0.5)
EOSINOPHIL NFR BLD: 4.4 % (ref 0–8)
ERYTHROCYTE [DISTWIDTH] IN BLOOD BY AUTOMATED COUNT: 13.3 % (ref 11.5–14.5)
EST. GFR  (AFRICAN AMERICAN): 29 ML/MIN/1.73 M^2
EST. GFR  (NON AFRICAN AMERICAN): 25 ML/MIN/1.73 M^2
GLUCOSE SERPL-MCNC: 99 MG/DL (ref 70–110)
HCT VFR BLD AUTO: 26 % (ref 40–54)
HGB BLD-MCNC: 8.3 G/DL (ref 14–18)
IMM GRANULOCYTES # BLD AUTO: 0.36 K/UL (ref 0–0.04)
IMM GRANULOCYTES NFR BLD AUTO: 2.5 % (ref 0–0.5)
LYMPHOCYTES # BLD AUTO: 1.3 K/UL (ref 1–4.8)
LYMPHOCYTES NFR BLD: 8.6 % (ref 18–48)
MAGNESIUM SERPL-MCNC: 1.8 MG/DL (ref 1.6–2.6)
MCH RBC QN AUTO: 30.3 PG (ref 27–31)
MCHC RBC AUTO-ENTMCNC: 31.9 G/DL (ref 32–36)
MCV RBC AUTO: 95 FL (ref 82–98)
MONOCYTES # BLD AUTO: 1.1 K/UL (ref 0.3–1)
MONOCYTES NFR BLD: 7.7 % (ref 4–15)
NEUTROPHILS # BLD AUTO: 11.2 K/UL (ref 1.8–7.7)
NEUTROPHILS NFR BLD: 76.4 % (ref 38–73)
NRBC BLD-RTO: 0 /100 WBC
PHOSPHATE SERPL-MCNC: 7.1 MG/DL (ref 2.7–4.5)
PLATELET # BLD AUTO: 344 K/UL (ref 150–450)
PMV BLD AUTO: 8.9 FL (ref 9.2–12.9)
POTASSIUM SERPL-SCNC: 4.8 MMOL/L (ref 3.5–5.1)
PROT SERPL-MCNC: 6.8 G/DL (ref 6–8.4)
PTH-INTACT SERPL-MCNC: 6.2 PG/ML (ref 9–77)
RBC # BLD AUTO: 2.74 M/UL (ref 4.6–6.2)
SODIUM SERPL-SCNC: 137 MMOL/L (ref 136–145)
WBC # BLD AUTO: 14.63 K/UL (ref 3.9–12.7)

## 2022-04-30 PROCEDURE — A4216 STERILE WATER/SALINE, 10 ML: HCPCS | Performed by: ORTHOPAEDIC SURGERY

## 2022-04-30 PROCEDURE — 36415 COLL VENOUS BLD VENIPUNCTURE: CPT | Performed by: ORTHOPAEDIC SURGERY

## 2022-04-30 PROCEDURE — 25000003 PHARM REV CODE 250: Performed by: ORTHOPAEDIC SURGERY

## 2022-04-30 PROCEDURE — 85025 COMPLETE CBC W/AUTO DIFF WBC: CPT | Performed by: INTERNAL MEDICINE

## 2022-04-30 PROCEDURE — 80053 COMPREHEN METABOLIC PANEL: CPT | Performed by: INTERNAL MEDICINE

## 2022-04-30 PROCEDURE — 99900035 HC TECH TIME PER 15 MIN (STAT)

## 2022-04-30 PROCEDURE — 80100014 HC HEMODIALYSIS 1:1

## 2022-04-30 PROCEDURE — 83735 ASSAY OF MAGNESIUM: CPT | Performed by: INTERNAL MEDICINE

## 2022-04-30 PROCEDURE — 63600175 PHARM REV CODE 636 W HCPCS: Performed by: ORTHOPAEDIC SURGERY

## 2022-04-30 PROCEDURE — 83970 ASSAY OF PARATHORMONE: CPT | Performed by: NURSE PRACTITIONER

## 2022-04-30 PROCEDURE — 82330 ASSAY OF CALCIUM: CPT | Performed by: ORTHOPAEDIC SURGERY

## 2022-04-30 PROCEDURE — 93005 ELECTROCARDIOGRAM TRACING: CPT

## 2022-04-30 PROCEDURE — 36415 COLL VENOUS BLD VENIPUNCTURE: CPT | Performed by: NURSE PRACTITIONER

## 2022-04-30 PROCEDURE — 97116 GAIT TRAINING THERAPY: CPT | Mod: CQ

## 2022-04-30 PROCEDURE — 94761 N-INVAS EAR/PLS OXIMETRY MLT: CPT

## 2022-04-30 PROCEDURE — 93010 ELECTROCARDIOGRAM REPORT: CPT | Mod: ,,, | Performed by: GENERAL PRACTICE

## 2022-04-30 PROCEDURE — 25000003 PHARM REV CODE 250: Performed by: NURSE PRACTITIONER

## 2022-04-30 PROCEDURE — 11000001 HC ACUTE MED/SURG PRIVATE ROOM

## 2022-04-30 PROCEDURE — 93010 EKG 12-LEAD: ICD-10-PCS | Mod: ,,, | Performed by: GENERAL PRACTICE

## 2022-04-30 PROCEDURE — 84100 ASSAY OF PHOSPHORUS: CPT | Performed by: INTERNAL MEDICINE

## 2022-04-30 PROCEDURE — 25000003 PHARM REV CODE 250: Performed by: INTERNAL MEDICINE

## 2022-04-30 PROCEDURE — 94799 UNLISTED PULMONARY SVC/PX: CPT

## 2022-04-30 RX ORDER — MORPHINE SULFATE 4 MG/ML
2 INJECTION, SOLUTION INTRAMUSCULAR; INTRAVENOUS EVERY 6 HOURS PRN
Status: DISCONTINUED | OUTPATIENT
Start: 2022-04-30 | End: 2022-05-11

## 2022-04-30 RX ORDER — SODIUM CHLORIDE 9 MG/ML
INJECTION, SOLUTION INTRAVENOUS ONCE
Status: DISCONTINUED | OUTPATIENT
Start: 2022-04-30 | End: 2022-05-02

## 2022-04-30 RX ADMIN — OXYCODONE 5 MG: 5 TABLET ORAL at 08:04

## 2022-04-30 RX ADMIN — SEVELAMER CARBONATE 800 MG: 800 TABLET, FILM COATED ORAL at 05:04

## 2022-04-30 RX ADMIN — Medication 2 ML: at 06:04

## 2022-04-30 RX ADMIN — MINERAL OIL, PETROLATUM, PHENYLEPHRINE HCI 1 APPLICATOR: .25; 14; 74.9 OINTMENT TOPICAL at 12:04

## 2022-04-30 RX ADMIN — CEFAZOLIN 500 MG: 1 INJECTION, POWDER, FOR SOLUTION INTRAVENOUS at 05:04

## 2022-04-30 RX ADMIN — LABETALOL HYDROCHLORIDE 20 MG: 5 INJECTION INTRAVENOUS at 04:04

## 2022-04-30 RX ADMIN — Medication 2 ML: at 12:04

## 2022-04-30 RX ADMIN — CYCLOBENZAPRINE 10 MG: 10 TABLET, FILM COATED ORAL at 08:04

## 2022-04-30 RX ADMIN — HEPARIN SODIUM 4000 UNITS: 1000 INJECTION, SOLUTION INTRAVENOUS; SUBCUTANEOUS at 04:04

## 2022-04-30 RX ADMIN — SEVELAMER CARBONATE 800 MG: 800 TABLET, FILM COATED ORAL at 09:04

## 2022-04-30 RX ADMIN — ONDANSETRON 4 MG: 2 INJECTION INTRAMUSCULAR; INTRAVENOUS at 12:04

## 2022-04-30 RX ADMIN — DOCUSATE SODIUM AND SENNOSIDES 1 TABLET: 8.6; 5 TABLET, FILM COATED ORAL at 09:04

## 2022-04-30 RX ADMIN — SEVELAMER CARBONATE 800 MG: 800 TABLET, FILM COATED ORAL at 12:04

## 2022-04-30 RX ADMIN — OXYCODONE 5 MG: 5 TABLET ORAL at 05:04

## 2022-04-30 RX ADMIN — Medication 2 ML: at 05:04

## 2022-04-30 RX ADMIN — DOCUSATE SODIUM AND SENNOSIDES 1 TABLET: 8.6; 5 TABLET, FILM COATED ORAL at 08:04

## 2022-04-30 RX ADMIN — HYDRALAZINE HYDROCHLORIDE 25 MG: 25 TABLET, FILM COATED ORAL at 10:04

## 2022-04-30 RX ADMIN — HYDRALAZINE HYDROCHLORIDE 25 MG: 25 TABLET, FILM COATED ORAL at 05:04

## 2022-04-30 NOTE — NURSING
Pt hr continues to be in 120s-130s. NP notified. EGK ordered. Called on-call Nephrology. Voicemail left by charge nurse. Pt complains of stiff neck and twitching. Np came to assess pt.

## 2022-04-30 NOTE — ASSESSMENT & PLAN NOTE
Underwent fasciotomy on 4/13   S/p Debridement deep right lateral fasciotomy incision right lower leg with excisional  debridement of dead anterior tibialis muscle and biopsy of muscle application of wound VAC right lower leg  - 04/25/22.  Wound care nurse performing dressing changes as per recommendations by Orthopedics.    4/30 - stable, wound vac in use, cont current therapy

## 2022-04-30 NOTE — PLAN OF CARE
Problem: Physical Therapy  Goal: Physical Therapy Goal  Description: Goals to be met by: 5/15/2022     Patient will increase functional independence with mobility by performin). Supine to sit with Modified [place]  2). Sit to stand transfer with Modified [place] maintaining RLE NWB using axillary crutches or rolling walker  3). Bed to chair transfer with Modified [place] maintaining RLE NWB using axillary crutches or rolling walker  5). Gait  x 150 feet with Modified [place] maintaining RLE NWB using axillary crutches or rolling walker    Outcome: Ongoing, Progressing   Ambulate with assistance for safety ( rw / axillary crutches), NWB RLE.

## 2022-04-30 NOTE — PROGRESS NOTES
[facility] Hospital Medicine  Progress Note    Patient Name: Agus Horan  MRN: [id number]  Patient Class: IP- Inpatient   Admission Date: 4/13/2022  Length of Stay: 17 days  Attending Physician: Owen Zamorano MD  Primary Care Provider: Primary Doctor No        Subjective:     Principal Problem:Acute renal failure        HPI:  Agus Horan is a 22 y/o male with PMHx of mild asthma who presents with right lower extremity pain and swelling x few days. Patient was seen in the ED on 4/9/22 for oxycodone overdose and again on 4/10/22 for left eye pain after falling on a piece of furniture. Patient states he is unsure how he injured his leg and denies IV drug use. Xray of his right leg did not show acute fracture. Patietn states RLE pain and swelling increased over the past few days and he is unable to walk secondary to pain. US of his lower extremity performed today did not show DVT. Lab work revealed elevated Creatinine 13.5 & K+ 7.1, elevated AST/ALT 3980/1611 & WBC 18.14.  CPK>40,0000.  Patient unable to dorsiflex and plantar flex his RLE and symptoms are concerning for compartment syndrome per ED. ED provider discussed with Dr. Byers and Dr. Fraser, nephrology. Patient was referred to hospital medicine and seen in the ED with his friend at bedside. Patient complaining of RLE pain, tenderness and swelling. He denies SOB, N/V/D, chest pain and headaches.  Patient will be admitted to hospital medicine for orthopedic consultation and further management.      Overview/Hospital Course:  He was found to have compartment syndrome and was taking to the OR emergently for fasciotomy on 4/13. We are consulted for SANDRA with metabolic derangements.  Cpk at the time of arrival was more than 40,000. Initially was started on IV fluids and started on hemodialysis. on 4/14- did emergent HD overnight for hyperkalemia and metabolic acidosis refractory to medical management; pain controlled, patient underwent  DEBRIDEMENT, LOWER EXTREMITY (Right)   closure of medial fasciotomy incision right leg debridement of lateral fasciotomy with biopsy of anterior muscle compartment removal of deep muscle anterior compartment right lower leg on 04/18/2022.  Patient's CPK level continue to trend down.  Patient underwent debridement deep right lateral fasciotomy incision right lower leg with excisional  debridement of dead anterior tibialis muscle and biopsy of muscle application of wound VAC right lower leg  on April 25, 2022.       Interval History:  Notes reviewed, no acute events overnight.  Patient resting comfortably in bed.  He denies any acute complaints.  Will continue to monitor closely and await LTAC placement.  Labs reveal hypercalcemia this morning and patient experiencing tachycardia.  He denies chest pain or shortness of breath.  Plan for emergent hemodialysis today and adjusting medications.  Will continue to monitor closely.        Review of Systems   Constitutional:  Negative for activity change, appetite change, chills and fever.   HENT:  Negative for congestion, sore throat and trouble swallowing.    Eyes:  Negative for photophobia and visual disturbance.   Respiratory:  Negative for cough, chest tightness and shortness of breath.    Cardiovascular:  Negative for chest pain, palpitations and leg swelling.   Gastrointestinal:  Negative for abdominal pain, diarrhea and nausea.   Genitourinary:  Negative for dysuria, flank pain and hematuria.   Musculoskeletal:  Positive for gait problem and myalgias. Negative for back pain.   Skin:  Negative for color change.   Neurological:  Negative for dizziness, weakness and headaches.   Psychiatric/Behavioral:  Negative for agitation, behavioral problems and confusion.    Objective:     Vital Signs (Most Recent):  Temp: 99.3 °F (37.4 °C) (04/30/22 0818)  Pulse: (!) 127 (04/30/22 0818)  Resp: 16 (04/30/22 0818)  BP: (!) 191/87 (04/30/22 0818)  SpO2: (!) 91 % (04/30/22 0818)    Vital Signs (24h Range):  Temp:  [98.1 °F (36.7 °C)-99.3 °F (37.4 °C)] 99.3 °F (37.4 °C)  Pulse:  [108-142] 127  Resp:  [16-18] 16  SpO2:  [91 %-98 %] 91 %  BP: (144-201)/() 191/87     Weight: 81.3 kg (179 lb 3.7 oz)  Body mass index is 26.47 kg/m².    Intake/Output Summary (Last 24 hours) at 4/30/2022 0955  Last data filed at 4/30/2022 0643  Gross per 24 hour   Intake 948 ml   Output 7115 ml   Net -6167 ml        Physical Exam  Vitals and nursing note reviewed.   Constitutional:       Appearance: Normal appearance. He is normal weight. He is not ill-appearing.   HENT:      Head: Normocephalic.      Nose: Nose normal. No congestion or rhinorrhea.      Mouth/Throat:      Mouth: Mucous membranes are moist.      Pharynx: Oropharynx is clear. No oropharyngeal exudate or posterior oropharyngeal erythema.   Eyes:      General: No scleral icterus.     Extraocular Movements: Extraocular movements intact.      Pupils: Pupils are equal, round, and reactive to light.   Cardiovascular:      Rate and Rhythm: Regular rhythm. Tachycardia present.      Pulses: Normal pulses.      Heart sounds: Normal heart sounds. No murmur heard.  Pulmonary:      Effort: Pulmonary effort is normal. No respiratory distress.      Breath sounds: Normal breath sounds. No stridor. No wheezing, rhonchi or rales.   Abdominal:      General: Bowel sounds are normal. There is no distension.      Palpations: Abdomen is soft.      Tenderness: There is no abdominal tenderness.   Musculoskeletal:         General: No swelling. Normal range of motion.      Cervical back: Normal range of motion.      Right upper leg: Swelling present.      Right lower leg: Swelling present.      Comments: Right leg wound vac in place and drsg CDI.    Skin:     General: Skin is warm and dry.   Neurological:      Mental Status: He is alert and oriented to person, place, and time. Mental status is at baseline.   Psychiatric:         Mood and Affect: Mood normal.          Behavior: Behavior normal.         Thought Content: Thought content normal.       Significant Labs: All pertinent labs within the past 24 hours have been reviewed.  CBC:   Recent Labs   Lab 04/29/22  0848 04/30/22  0304   WBC 15.37* 14.63*   HGB 8.4* 8.3*   HCT 26.4* 26.0*    344       CMP:   Recent Labs   Lab 04/29/22  0848 04/30/22  0304    137   K 5.0 4.8    102   CO2 27 26   * 99   BUN 62* 40*   CREATININE 5.0* 3.3*   CALCIUM 12.5* 15.1*   PROT 6.5 6.8   ALBUMIN 2.3* 2.4*   BILITOT 0.3 0.3   ALKPHOS 82 92   AST 42* 37   ALT 12 14   ANIONGAP 10 9   EGFRNONAA 15* 25*       Microbiology Results (last 7 days)       ** No results found for the last 168 hours. **          Significant Imaging:   X-ray lumbar spine: Normal study.    Right tibia and fibula: Normal study.    Right femur x-ray: Normal study.    Pelvis x-ray: Normal study.    CT Head: No acute abnormality.  Minimal sinus disease.    CT Maxillofacial scan: No facial bone fracture.  Minimal sinus disease.    RLE venous doppler scan: No evidence of deep venous thrombosis in the right lower extremity.    CXR: Central line placement with the tip over the SVC.  No acute detrimental changes.    RLE venous doppler scan:  No evidence of deep venous thrombosis in the right lower extremity. Edema in the soft tissues of the right lower extremity.    CXR:   No acute process.  Right IJ catheter in place.      Assessment/Plan:      * Acute renal failure  Continues to be anuric  Hemodialysis therapy as per Nephrology team.   due to rhabdomyolysis  Trend CPK daily.  Follow Nephrology recommendations.  Avoid nephrotoxins  Telemetry.  Hemo split catheter was placed on April 25, 2022.     4/30 - remains on hemodialysis, emergent dialysis today secondary to hypercalcemia.  Will continue to monitor closely       Anemia of chronic disease  Patient's anemia is currently controlled. Has recieved 1 units of PRBCs. Etiology likely d/t acute blood  loss  Current CBC reviewed-   Lab Results   Component Value Date    HGB 8.3 (L) 04/30/2022    HCT 26.0 (L) 04/30/2022     Monitor serial CBC and transfuse if patient becomes hemodynamically unstable, symptomatic or H/H drops below 7/21.         Drug eruption  No definite etiology apparent.  Possibly clindamycin use.  Off intravenous clindamycin.  Use oral Benadryl 25 mg q.6 hours p.r.n. patient will be monitored closely.    4/30 - resolved      Compartment syndrome  Underwent fasciotomy on 4/13   S/p Debridement deep right lateral fasciotomy incision right lower leg with excisional  debridement of dead anterior tibialis muscle and biopsy of muscle application of wound VAC right lower leg  - 04/25/22.  Wound care nurse performing dressing changes as per recommendations by Orthopedics.    4/30 - stable, wound vac in use, cont current therapy    Elevated liver enzymes  Elevated AST &ALT likely due to rhabdomyolysis  Trending down  Avoid hepatoxic medications  Monitor CMP  See plan for rhabdomyolysis    4/30 - resolved, cont to monitor CMP        Rhabdomyolysis  CPK trending down  S/p fasciotomy on 4/13 for compartment syndrome.  On 4/18 S/p DEBRIDEMENT, LOWER EXTREMITY (Right)   closure of medial fasciotomy incision right leg debridement of lateral fasciotomy with biopsy of anterior muscle compartment removal of deep muscle anterior compartment right lower leg. Subsequently underwent Debridement deep right lateral fasciotomy incision right lower leg with excisional  debridement of dead anterior tibialis muscle and biopsy of muscle application of wound VAC right lower leg on 04/25/22.  Monitor BMP   Follow Nephrology recommendations.    4/30 - improved and stable, cont to monitor closely      History of asthma  Hx of asthma, stable    Monitor for acute changes        VTE Risk Mitigation (From admission, onward)         Ordered     heparin (porcine) injection 4,000 Units  As needed (PRN)         04/14/22 0137     Reason  for No Pharmacological VTE Prophylaxis  Once        Question:  Reasons:  Answer:  Risk of Bleeding    04/13/22 1913     IP VTE HIGH RISK PATIENT  Once         04/13/22 1913     Place sequential compression device  Until discontinued         04/13/22 1913                Discharge Planning   JARAD:      Code Status: Full Code   Is the patient medically ready for discharge?:     Reason for patient still in hospital (select all that apply): Treatment and Pending disposition  Discharge Plan A: Long-term acute care facility (LTAC)                  Brittni Mackey NP  Department of Hospital Medicine   [facility]

## 2022-04-30 NOTE — ASSESSMENT & PLAN NOTE
Continues to be anuric  Hemodialysis therapy as per Nephrology team.   due to rhabdomyolysis  Trend CPK daily.  Follow Nephrology recommendations.  Avoid nephrotoxins  Telemetry.  Hemo split catheter was placed on April 25, 2022.     4/30 - remains on hemodialysis, emergent dialysis today secondary to hypercalcemia.  Will continue to monitor closely

## 2022-04-30 NOTE — CARE UPDATE
04/29/22 1941   Patient Assessment/Suction   Level of Consciousness (AVPU) alert   Respiratory Effort Unlabored   PRE-TX-O2   O2 Device (Oxygen Therapy) room air   SpO2 98 %   Pulse Oximetry Type Intermittent   $ Pulse Oximetry - Multiple Charge Pulse Oximetry - Multiple   Incentive Spirometer   $ Incentive Spirometer Charges done with encouragement   Administration (IS) proper technique demonstrated   Number of Repetitions (IS) 10   Level Incentive Spirometer (mL) 3500   Patient Tolerance (IS) good

## 2022-04-30 NOTE — SUBJECTIVE & OBJECTIVE
Interval History:  Notes reviewed, no acute events overnight.  Patient resting comfortably in bed.  He denies any acute complaints.  Will continue to monitor closely and await LTAC placement.  Labs reveal hypercalcemia this morning and patient experiencing tachycardia.  He denies chest pain or shortness of breath.  Plan for emergent hemodialysis today and adjusting medications.  Will continue to monitor closely.        Review of Systems   Constitutional:  Negative for activity change, appetite change, chills and fever.   HENT:  Negative for congestion, sore throat and trouble swallowing.    Eyes:  Negative for photophobia and visual disturbance.   Respiratory:  Negative for cough, chest tightness and shortness of breath.    Cardiovascular:  Negative for chest pain, palpitations and leg swelling.   Gastrointestinal:  Negative for abdominal pain, diarrhea and nausea.   Genitourinary:  Negative for dysuria, flank pain and hematuria.   Musculoskeletal:  Positive for gait problem and myalgias. Negative for back pain.   Skin:  Negative for color change.   Neurological:  Negative for dizziness, weakness and headaches.   Psychiatric/Behavioral:  Negative for agitation, behavioral problems and confusion.    Objective:     Vital Signs (Most Recent):  Temp: 99.3 °F (37.4 °C) (04/30/22 0818)  Pulse: (!) 127 (04/30/22 0818)  Resp: 16 (04/30/22 0818)  BP: (!) 191/87 (04/30/22 0818)  SpO2: (!) 91 % (04/30/22 0818)   Vital Signs (24h Range):  Temp:  [98.1 °F (36.7 °C)-99.3 °F (37.4 °C)] 99.3 °F (37.4 °C)  Pulse:  [108-142] 127  Resp:  [16-18] 16  SpO2:  [91 %-98 %] 91 %  BP: (144-201)/() 191/87     Weight: 81.3 kg (179 lb 3.7 oz)  Body mass index is 26.47 kg/m².    Intake/Output Summary (Last 24 hours) at 4/30/2022 0945  Last data filed at 4/30/2022 0643  Gross per 24 hour   Intake 948 ml   Output 7115 ml   Net -6167 ml        Physical Exam  Vitals and nursing note reviewed.   Constitutional:       Appearance: Normal  appearance. He is normal weight. He is not ill-appearing.   HENT:      Head: Normocephalic.      Nose: Nose normal. No congestion or rhinorrhea.      Mouth/Throat:      Mouth: Mucous membranes are moist.      Pharynx: Oropharynx is clear. No oropharyngeal exudate or posterior oropharyngeal erythema.   Eyes:      General: No scleral icterus.     Extraocular Movements: Extraocular movements intact.      Pupils: Pupils are equal, round, and reactive to light.   Cardiovascular:      Rate and Rhythm: Regular rhythm. Tachycardia present.      Pulses: Normal pulses.      Heart sounds: Normal heart sounds. No murmur heard.  Pulmonary:      Effort: Pulmonary effort is normal. No respiratory distress.      Breath sounds: Normal breath sounds. No stridor. No wheezing, rhonchi or rales.   Abdominal:      General: Bowel sounds are normal. There is no distension.      Palpations: Abdomen is soft.      Tenderness: There is no abdominal tenderness.   Musculoskeletal:         General: No swelling. Normal range of motion.      Cervical back: Normal range of motion.      Right upper leg: Swelling present.      Right lower leg: Swelling present.      Comments: Right leg wound vac in place and drsg CDI.    Skin:     General: Skin is warm and dry.   Neurological:      Mental Status: He is alert and oriented to person, place, and time. Mental status is at baseline.   Psychiatric:         Mood and Affect: Mood normal.         Behavior: Behavior normal.         Thought Content: Thought content normal.       Significant Labs: All pertinent labs within the past 24 hours have been reviewed.  CBC:   Recent Labs   Lab 04/29/22  0848 04/30/22  0304   WBC 15.37* 14.63*   HGB 8.4* 8.3*   HCT 26.4* 26.0*    344       CMP:   Recent Labs   Lab 04/29/22  0848 04/30/22  0304    137   K 5.0 4.8    102   CO2 27 26   * 99   BUN 62* 40*   CREATININE 5.0* 3.3*   CALCIUM 12.5* 15.1*   PROT 6.5 6.8   ALBUMIN 2.3* 2.4*   BILITOT 0.3  0.3   ALKPHOS 82 92   AST 42* 37   ALT 12 14   ANIONGAP 10 9   EGFRNONAA 15* 25*       Microbiology Results (last 7 days)       ** No results found for the last 168 hours. **          Significant Imaging:   X-ray lumbar spine: Normal study.    Right tibia and fibula: Normal study.    Right femur x-ray: Normal study.    Pelvis x-ray: Normal study.    CT Head: No acute abnormality.  Minimal sinus disease.    CT Maxillofacial scan: No facial bone fracture.  Minimal sinus disease.    RLE venous doppler scan: No evidence of deep venous thrombosis in the right lower extremity.    CXR: Central line placement with the tip over the SVC.  No acute detrimental changes.    RLE venous doppler scan:  No evidence of deep venous thrombosis in the right lower extremity. Edema in the soft tissues of the right lower extremity.    CXR:   No acute process.  Right IJ catheter in place.

## 2022-04-30 NOTE — NURSING
Attempted to contact Dr. Patrick twice about pt Ca being 15.1. left message with answering service. Awaiting call back.

## 2022-04-30 NOTE — PROGRESS NOTES
INPATIENT NEPHROLOGY Progress Note  [facility]    Patient Name: Agus Horan  Date: 04/30/2022    Reason for consultation: SANDRA    Chief Complaint:   Chief Complaint   Patient presents with    Leg Pain     Pain in the right leg muscle calf is hard, nausea vomiting, patient was seen here over the weekend for an overdose        History of Present Illness:  20 y/o M with a history of asthma recently here on 4/9 with oxycodone overdose who p/w RLE pain and swelling after a fall on 4/10. He reports severe pain, constant, associated with nausea with inability to bear weight.  He took meloxicam without relief. He was found to have compartment syndrome and was taking to the OR emergently for fasciotomy on 4/13. We are consulted for SANDRA with metabolic derangements.    Interval History:  4/14- did emergent HD overnight for hyperkalemia and metabolic acidosis refractory to medical management; pain controlled, RLE wrapped, no edema in LLE  4/15- worsening pain/edema in RLE- going for MRI- oligoanuric- stop NS IVFs- will do HD/UF today and tomorrow  4/16  Seen on dialysis.  No distress  4/17  Remains oliguric.  AFVSS.  Has back pain.  Leg pain a little better  4/18  In the OR.  Still oliguric.    4/19  Seen on dialysis.  No distress.    4/20  Still not making much urine.  cpk coming down.     4/21  Afebrile.  BP a little better.  uop up a bit  422   Sleeping.  AFVSS.  I/Os not appropriately recorded despite being ordered for twice  4/23  225 cc UOP recorded.  K+ 5.5, HD today.  No renal recovery, CPK improving.  C/o rash, Dr. Zamorano at bedside placed orders.  Seen on dialysis, tolerating tx well.  4/24   No new lab results, next lab draw in am.   Still has rash and c/o itching.  No other complaints.  450 cc UOP recorded.  4/25  In the OR.  Plan for hd today  4/26  AFVSS.  Urine output better.  No complaints specified today  4/27  Sleeping.  Output not recorded despite being ordered  4/28  Tearful.  Not  engaging when spoken too.  Requesting dilaudid   4/29     Good urine output.  Seen on dialysis.  No distress  4/30  UOP 4.4L.  Ca+ 15, will have dialysis today, stopped calcitriol and vit D.  Will add on PTH to labs.      Plan of Care:    Assessment:  Traumatic rhabdomyolysis with compartment syndrome s/p fasciotomy on 4/13  SANDRA due to toxic ATN s/p emergent HD on 4/14- remains oligoanuric and HD dependent  Edema  Hyponatremia  Hyperkalemia  Acidosis  Hypocalcemia  Shock liver    Plan:    - Trend daily CK.     cpk slowing coming down  - He remains HD dependent. Continue grier.  Ok with -160s for renal perfusion. No NSAIDs or IV contrast. Dose meds for CrCl < 10.  MWF dialysis  - Advise renal diet with 1.5L fluid restriction. Will adjust dialysate for all metabolic derangements. Will replete calcium PRN.  - Trend LFTs.   --replete calcium.  Need to do calcium repletion judiciously in pt with rhabdomyolysis.    --added hydralazine  --dialysis as needed, monitor for recovery   - accurate I/Os  Monitor over weekend for renal recovery               Thank you for allowing us to participate in this patient's care. We will continue to follow.    Vital Signs:  Temp Readings from Last 3 Encounters:   04/30/22 99.3 °F (37.4 °C)   04/10/22 97.3 °F (36.3 °C)   04/09/22 98.4 °F (36.9 °C)       Pulse Readings from Last 3 Encounters:   04/30/22 (!) 127   04/10/22 63   04/09/22 95       BP Readings from Last 3 Encounters:   04/30/22 (!) 191/87   04/10/22 117/64   04/09/22 (!) 143/82       Weight:  Wt Readings from Last 3 Encounters:   04/20/22 81.3 kg (179 lb 3.7 oz)   04/10/22 77.1 kg (170 lb)   04/09/22 77.1 kg (170 lb)       INS/OUTS:  I/O last 3 completed shifts:  In: 1746 [P.O.:1246; Other:500]  Out: 9065 [Urine:5920; Other:3145]  No intake/output data recorded.    Medications:  Scheduled Meds:   sodium chloride 0.9%   Intravenous Once    ceFAZolin (ANCEF) IVPB  500 mg Intravenous Q12H    epoetin leidy-epbx  50  "Units/kg Intravenous Every Mon, Wed, Fri    hydrALAZINE  25 mg Oral Q8H    LIDOcaine HCL 2%   Topical (Top) Every Mon, Thurs    senna-docusate 8.6-50 mg  1 tablet Oral BID    sevelamer carbonate  800 mg Oral TID WM    sodium chloride 0.9%  2 mL Intravenous Q6H    sodium chloride 0.9%  2 mL Intravenous Q6H     Continuous Infusions:   electrolyte-S (pH 7.4) Stopped (04/25/22 1255)    loperamide       PRN Meds:.acetaminophen, cyclobenzaprine, dextrose 10%, dextrose 10%, diphenhydrAMINE, glucagon (human recombinant), glucose, glucose, heparin (porcine), labetaloL, loperamide, morphine, naloxone, ondansetron, oxyCODONE, phenyleph-min oil-petrolatum, sodium chloride 0.9%  No current facility-administered medications on file prior to encounter.     Current Outpatient Medications on File Prior to Encounter   Medication Sig Dispense Refill    naloxone (NARCAN) 4 mg/actuation Spry 4mg by nasal route as needed for opioid overdose; may repeat every 2-3 minutes in alternating nostrils until medical help arrives. Call 911 2 each 0       Review of Systems:  Neg    Physical Exam:  BP (!) 191/87   Pulse (!) 127   Temp 99.3 °F (37.4 °C)   Resp 16   Ht 5' 9" (1.753 m)   Wt 81.3 kg (179 lb 3.7 oz)   SpO2 (!) 91%   BMI 26.47 kg/m²     Constitutional: nad, aao x 3, depressed affect  Heart: rrr, no m/r/g, wwp, RLE eedema  Lungs: ctab, no w/r/r/c, no lb  Abdomen: s/nt/nd, +BS    Results:  Lab Results   Component Value Date     04/30/2022    K 4.8 04/30/2022     04/30/2022    CO2 26 04/30/2022    BUN 40 (H) 04/30/2022    CREATININE 3.3 (H) 04/30/2022    CALCIUM 15.1 (HH) 04/30/2022    ANIONGAP 9 04/30/2022    ESTGFRAFRICA 29 (A) 04/30/2022    EGFRNONAA 25 (A) 04/30/2022       Lab Results   Component Value Date    CALCIUM 15.1 (HH) 04/30/2022    PHOS 7.1 (H) 04/30/2022       Recent Labs   Lab 04/30/22  0304   WBC 14.63*   RBC 2.74*   HGB 8.3*   HCT 26.0*      MCV 95   MCH 30.3   MCHC 31.9*       I have " personally reviewed pertinent radiological imaging and reports.    I have spent > 35 minutes providing care for this patient for the above diagnoses. These services have included chart/data/imaging review, evaluation, exam, formulation of plan, , note preparation, and discussions with staff involved in this patient's care.    Saida Silva NP    Nephrology  [facility]  [phone]

## 2022-04-30 NOTE — PROGRESS NOTES
04/30/22 1613   Handoff Report   Received From [facility] To Krishan   Vital Signs   Temp 99.7 °F (37.6 °C)   Pulse (!) 117   Resp 18   SpO2 96 %   BP (!) 188/86        Hemodialysis Catheter 04/25/22 1119 right internal jugular   Placement Date/Time: 04/25/22 1119   Hand Hygiene: Performed  Barrier Precautions: Performed  Skin Antisepsis: ChloraPrep  Location: right internal jugular  Catheter Size (Fr): 14.5 Fr   Site Assessment No drainage;No redness;No swelling;No warmth   Line Securement Device Secured with sutures   Dressing Type Biopatch in place   Dressing Status Clean;Dry;Intact   Dressing Intervention Integrity maintained   Date on Dressing 04/29/22   Dressing Due to be Changed 05/06/22   Venous Patency/Care flushed w/o difficulty;blood return present   Arterial Patency/Care flushed w/o difficulty;blood return present   Post-Hemodialysis Assessment   Rinseback Volume (mL) 250 mL   Blood Volume Processed (Liters) 51.2 L   Dialyzer Clearance Lightly streaked   Duration of Treatment 180 minutes   Additional Fluid Intake (mL) 500 mL   Total UF (mL) 2000 mL   Net Fluid Removal 1000   Patient Response to Treatment tolerated well   Post-Hemodialysis Comments tx completed, reinfused, no blood loss noted

## 2022-04-30 NOTE — PLAN OF CARE
Problem: Fluid and Electrolyte Imbalance (Acute Kidney Injury/Impairment)  Goal: Fluid and Electrolyte Balance  Outcome: Ongoing, Progressing     Problem: Hemodynamic Instability (Hemodialysis)  Goal: Effective Tissue Perfusion  Outcome: Ongoing, Progressing     Problem: Infection  Goal: Absence of Infection Signs and Symptoms  Outcome: Ongoing, Progressing    Pt is alert, Pain control with Prn meds, Wound vac @ 125, dressing CDI. Tele monitored, SCD applied to left leg. Urinal at bed side.  Call light in reach, bed alarm set.

## 2022-04-30 NOTE — PT/OT/SLP PROGRESS
Physical Therapy Treatment    Patient Name:  Agus Horan   MRN:  [id number]    Recommendations:     Discharge Recommendations:  LTACH (long-term acute care hospital), home health PT   Discharge Equipment Recommendations: walker, rolling, bedside commode   Barriers to discharge: None    Assessment:     Agus Horan is a 21 y.o. male admitted with a medical diagnosis of Acute renal failure.  He presents with the following impairments/functional limitations:  weakness, impaired endurance, gait instability, impaired balance, decreased lower extremity function, pain, orthopedic precautions .  Asleep, easily awakened with visitor at bedside. Falls asleep readily without stimulation. Agreed to mobilize and reports pain is better managed at this time. Ambulated 50' with rw and CG / Min A, NWB RLE. Sat up in chair at bedside.     Rehab Prognosis: Good; patient would benefit from acute skilled PT services to address these deficits and reach maximum level of function.    Recent Surgery: Procedure(s) (LRB):  FASCIOTOMY (Right)  INSERTION, CATHETER, TUNNELED (Right) 5 Days Post-Op    Plan:     During this hospitalization, patient to be seen  (1-2x / day) to address the identified rehab impairments via gait training, therapeutic activities, therapeutic exercises and progress toward the following goals:    · Plan of Care Expires:  05/15/22    Subjective     Chief Complaint: none stated  Patient/Family Comments/goals: to return home  Pain/Comfort:  · Pain Rating 1: 0/10  · Pain Addressed 1: Pre-medicate for activity      Objective:     Communicated with nurse Nickerson prior to session.  Patient found supine with bed alarm, telemetry, wound vac upon PT entry to room.     General Precautions: Standard, fall   Orthopedic Precautions:RLE non weight bearing   Braces: N/A  Respiratory Status: Room air     Functional Mobility:  · Bed Mobility:     · Supine to Sit: contact guard assistance  · Transfers:     · Sit to Stand:  contact  guard assistance with rolling walker  · Gait: 50' with rw and CG / Min A , NWB RLE with chair follow.      AM-PAC 6 CLICK MOBILITY          Therapeutic Activities and Exercises:   Transferred EOB with CGA, gown donned.   Ambulated 50' with rw and CG/ Min A , NWB RLE, wound vac in tow.     Patient left up in chair with all lines intact, call button in reach, chair alarm on, nurse Krishan  notified and friend present..    GOALS:   Multidisciplinary Problems     Physical Therapy Goals        Problem: Physical Therapy    Goal Priority Disciplines Outcome Goal Variances Interventions   Physical Therapy Goal     PT, PT/OT Ongoing, Progressing     Description: Goals to be met by: 5/15/2022     Patient will increase functional independence with mobility by performin). Supine to sit with Modified [place]  2). Sit to stand transfer with Modified [place] maintaining RLE NWB using axillary crutches or rolling walker  3). Bed to chair transfer with Modified [place] maintaining RLE NWB using axillary crutches or rolling walker  5). Gait  x 150 feet with Modified [place] maintaining RLE NWB using axillary crutches or rolling walker                     Time Tracking:     PT Received On: 22  PT Start Time: 1020     PT Stop Time: 1037  PT Total Time (min): 17 min     Billable Minutes: Gait Training 10min and Therapeutic Activity 7min    Treatment Type: Treatment  PT/PTA: PTA     PTA Visit Number: 2     2022

## 2022-04-30 NOTE — ASSESSMENT & PLAN NOTE
CPK trending down  S/p fasciotomy on 4/13 for compartment syndrome.  On 4/18 S/p DEBRIDEMENT, LOWER EXTREMITY (Right)   closure of medial fasciotomy incision right leg debridement of lateral fasciotomy with biopsy of anterior muscle compartment removal of deep muscle anterior compartment right lower leg. Subsequently underwent Debridement deep right lateral fasciotomy incision right lower leg with excisional  debridement of dead anterior tibialis muscle and biopsy of muscle application of wound VAC right lower leg on 04/25/22.  Monitor BMP   Follow Nephrology recommendations.    4/30 - improved and stable, cont to monitor closely

## 2022-04-30 NOTE — ASSESSMENT & PLAN NOTE
No definite etiology apparent.  Possibly clindamycin use.  Off intravenous clindamycin.  Use oral Benadryl 25 mg q.6 hours p.r.n. patient will be monitored closely.    4/30 - resolved

## 2022-04-30 NOTE — ASSESSMENT & PLAN NOTE
Elevated AST &ALT likely due to rhabdomyolysis  Trending down  Avoid hepatoxic medications  Monitor CMP  See plan for rhabdomyolysis    4/30 - resolved, cont to monitor CMP

## 2022-04-30 NOTE — ASSESSMENT & PLAN NOTE
Patient's anemia is currently controlled. Has recieved 1 units of PRBCs. Etiology likely d/t acute blood loss  Current CBC reviewed-   Lab Results   Component Value Date    HGB 8.3 (L) 04/30/2022    HCT 26.0 (L) 04/30/2022     Monitor serial CBC and transfuse if patient becomes hemodynamically unstable, symptomatic or H/H drops below 7/21.

## 2022-05-01 PROBLEM — R00.0 TACHYCARDIA: Status: ACTIVE | Noted: 2022-05-01

## 2022-05-01 PROBLEM — R09.02 HYPOXIA: Status: ACTIVE | Noted: 2022-05-01

## 2022-05-01 LAB
ALBUMIN SERPL BCP-MCNC: 2.4 G/DL (ref 3.5–5.2)
ALP SERPL-CCNC: 94 U/L (ref 55–135)
ALT SERPL W/O P-5'-P-CCNC: 15 U/L (ref 10–44)
ANION GAP SERPL CALC-SCNC: 10 MMOL/L (ref 8–16)
APTT BLDCRRT: 22.1 SEC (ref 21–32)
APTT BLDCRRT: 22.1 SEC (ref 21–32)
APTT BLDCRRT: 59.9 SEC (ref 21–32)
AST SERPL-CCNC: 38 U/L (ref 10–40)
BACTERIA #/AREA URNS HPF: ABNORMAL /HPF
BASOPHILS # BLD AUTO: 0.08 K/UL (ref 0–0.2)
BASOPHILS # BLD AUTO: 0.09 K/UL (ref 0–0.2)
BASOPHILS # BLD AUTO: 0.09 K/UL (ref 0–0.2)
BASOPHILS NFR BLD: 0.5 % (ref 0–1.9)
BILIRUB SERPL-MCNC: 0.5 MG/DL (ref 0.1–1)
BILIRUB UR QL STRIP: NEGATIVE
BNP SERPL-MCNC: 229 PG/ML (ref 0–99)
BUN SERPL-MCNC: 35 MG/DL (ref 6–20)
CA-I BLDV-SCNC: 2.18 MMOL/L (ref 1.06–1.42)
CALCIUM SERPL-MCNC: 15.5 MG/DL (ref 8.7–10.5)
CHLORIDE SERPL-SCNC: 102 MMOL/L (ref 95–110)
CK SERPL-CCNC: 87 U/L (ref 20–200)
CLARITY UR: ABNORMAL
CO2 SERPL-SCNC: 24 MMOL/L (ref 23–29)
COLOR UR: YELLOW
CREAT SERPL-MCNC: 2.7 MG/DL (ref 0.5–1.4)
CRP SERPL-MCNC: 108.9 MG/L (ref 0–8.2)
D DIMER PPP IA.FEU-MCNC: 3.3 MG/L FEU
DIFFERENTIAL METHOD: ABNORMAL
EOSINOPHIL # BLD AUTO: 0.4 K/UL (ref 0–0.5)
EOSINOPHIL # BLD AUTO: 0.4 K/UL (ref 0–0.5)
EOSINOPHIL # BLD AUTO: 0.5 K/UL (ref 0–0.5)
EOSINOPHIL NFR BLD: 2.5 % (ref 0–8)
EOSINOPHIL NFR BLD: 2.5 % (ref 0–8)
EOSINOPHIL NFR BLD: 3.6 % (ref 0–8)
ERYTHROCYTE [DISTWIDTH] IN BLOOD BY AUTOMATED COUNT: 12.6 % (ref 11.5–14.5)
ERYTHROCYTE [DISTWIDTH] IN BLOOD BY AUTOMATED COUNT: 12.6 % (ref 11.5–14.5)
ERYTHROCYTE [DISTWIDTH] IN BLOOD BY AUTOMATED COUNT: 12.9 % (ref 11.5–14.5)
ERYTHROCYTE [SEDIMENTATION RATE] IN BLOOD BY WESTERGREN METHOD: 79 MM/HR (ref 0–10)
EST. GFR  (AFRICAN AMERICAN): 37 ML/MIN/1.73 M^2
EST. GFR  (NON AFRICAN AMERICAN): 32 ML/MIN/1.73 M^2
FERRITIN SERPL-MCNC: 1010 NG/ML (ref 20–300)
GLUCOSE SERPL-MCNC: 96 MG/DL (ref 70–110)
GLUCOSE UR QL STRIP: NEGATIVE
HCT VFR BLD AUTO: 25.4 % (ref 40–54)
HCT VFR BLD AUTO: 25.4 % (ref 40–54)
HCT VFR BLD AUTO: 26.6 % (ref 40–54)
HGB BLD-MCNC: 8.3 G/DL (ref 14–18)
HGB BLD-MCNC: 8.3 G/DL (ref 14–18)
HGB BLD-MCNC: 8.4 G/DL (ref 14–18)
HGB UR QL STRIP: ABNORMAL
HYALINE CASTS #/AREA URNS LPF: 2 /LPF
IMM GRANULOCYTES # BLD AUTO: 0.25 K/UL (ref 0–0.04)
IMM GRANULOCYTES # BLD AUTO: 0.25 K/UL (ref 0–0.04)
IMM GRANULOCYTES # BLD AUTO: 0.3 K/UL (ref 0–0.04)
IMM GRANULOCYTES NFR BLD AUTO: 1.5 % (ref 0–0.5)
IMM GRANULOCYTES NFR BLD AUTO: 1.5 % (ref 0–0.5)
IMM GRANULOCYTES NFR BLD AUTO: 2 % (ref 0–0.5)
INR PPP: 1.2 (ref 0.8–1.2)
INR PPP: 1.2 (ref 0.8–1.2)
IRON SERPL-MCNC: 10 UG/DL (ref 45–160)
KETONES UR QL STRIP: NEGATIVE
LACTATE SERPL-SCNC: 1.1 MMOL/L (ref 0.5–2.2)
LEUKOCYTE ESTERASE UR QL STRIP: ABNORMAL
LYMPHOCYTES # BLD AUTO: 1.2 K/UL (ref 1–4.8)
LYMPHOCYTES # BLD AUTO: 1.4 K/UL (ref 1–4.8)
LYMPHOCYTES # BLD AUTO: 1.4 K/UL (ref 1–4.8)
LYMPHOCYTES NFR BLD: 8.2 % (ref 18–48)
LYMPHOCYTES NFR BLD: 8.5 % (ref 18–48)
LYMPHOCYTES NFR BLD: 8.5 % (ref 18–48)
MCH RBC QN AUTO: 29.3 PG (ref 27–31)
MCH RBC QN AUTO: 30 PG (ref 27–31)
MCH RBC QN AUTO: 30 PG (ref 27–31)
MCHC RBC AUTO-ENTMCNC: 31.6 G/DL (ref 32–36)
MCHC RBC AUTO-ENTMCNC: 32.7 G/DL (ref 32–36)
MCHC RBC AUTO-ENTMCNC: 32.7 G/DL (ref 32–36)
MCV RBC AUTO: 92 FL (ref 82–98)
MCV RBC AUTO: 92 FL (ref 82–98)
MCV RBC AUTO: 93 FL (ref 82–98)
MICROSCOPIC COMMENT: ABNORMAL
MONOCYTES # BLD AUTO: 1.5 K/UL (ref 0.3–1)
MONOCYTES NFR BLD: 10.2 % (ref 4–15)
MONOCYTES NFR BLD: 9.3 % (ref 4–15)
MONOCYTES NFR BLD: 9.3 % (ref 4–15)
NEUTROPHILS # BLD AUTO: 11.1 K/UL (ref 1.8–7.7)
NEUTROPHILS # BLD AUTO: 12.7 K/UL (ref 1.8–7.7)
NEUTROPHILS # BLD AUTO: 12.7 K/UL (ref 1.8–7.7)
NEUTROPHILS NFR BLD: 75.5 % (ref 38–73)
NEUTROPHILS NFR BLD: 77.7 % (ref 38–73)
NEUTROPHILS NFR BLD: 77.7 % (ref 38–73)
NITRITE UR QL STRIP: NEGATIVE
NRBC BLD-RTO: 0 /100 WBC
PH UR STRIP: 6 [PH] (ref 5–8)
PLATELET # BLD AUTO: 359 K/UL (ref 150–450)
PLATELET # BLD AUTO: 359 K/UL (ref 150–450)
PLATELET # BLD AUTO: 370 K/UL (ref 150–450)
PMV BLD AUTO: 9.2 FL (ref 9.2–12.9)
PMV BLD AUTO: 9.5 FL (ref 9.2–12.9)
PMV BLD AUTO: 9.5 FL (ref 9.2–12.9)
POTASSIUM SERPL-SCNC: 4.6 MMOL/L (ref 3.5–5.1)
PROCALCITONIN SERPL IA-MCNC: 0.81 NG/ML
PROT SERPL-MCNC: 6.9 G/DL (ref 6–8.4)
PROT UR QL STRIP: ABNORMAL
PROTHROMBIN TIME: 12.2 SEC (ref 9–12.5)
PROTHROMBIN TIME: 12.2 SEC (ref 9–12.5)
RBC # BLD AUTO: 2.77 M/UL (ref 4.6–6.2)
RBC # BLD AUTO: 2.77 M/UL (ref 4.6–6.2)
RBC # BLD AUTO: 2.87 M/UL (ref 4.6–6.2)
RBC #/AREA URNS HPF: 6 /HPF (ref 0–4)
SATURATED IRON: 5 % (ref 20–50)
SODIUM SERPL-SCNC: 136 MMOL/L (ref 136–145)
SP GR UR STRIP: 1.01 (ref 1–1.03)
SQUAMOUS #/AREA URNS HPF: 1 /HPF
TOTAL IRON BINDING CAPACITY: 191 UG/DL (ref 250–450)
TRANSFERRIN SERPL-MCNC: 129 MG/DL (ref 200–375)
TSH SERPL DL<=0.005 MIU/L-ACNC: 1.62 UIU/ML (ref 0.4–4)
URN SPEC COLLECT METH UR: ABNORMAL
UROBILINOGEN UR STRIP-ACNC: NEGATIVE EU/DL
WBC # BLD AUTO: 14.75 K/UL (ref 3.9–12.7)
WBC # BLD AUTO: 16.38 K/UL (ref 3.9–12.7)
WBC # BLD AUTO: 16.38 K/UL (ref 3.9–12.7)
WBC #/AREA URNS HPF: 55 /HPF (ref 0–5)

## 2022-05-01 PROCEDURE — 82330 ASSAY OF CALCIUM: CPT | Performed by: ORTHOPAEDIC SURGERY

## 2022-05-01 PROCEDURE — 82728 ASSAY OF FERRITIN: CPT | Performed by: INTERNAL MEDICINE

## 2022-05-01 PROCEDURE — 63600175 PHARM REV CODE 636 W HCPCS: Performed by: NURSE PRACTITIONER

## 2022-05-01 PROCEDURE — 87040 BLOOD CULTURE FOR BACTERIA: CPT | Performed by: NURSE PRACTITIONER

## 2022-05-01 PROCEDURE — 86140 C-REACTIVE PROTEIN: CPT | Performed by: INTERNAL MEDICINE

## 2022-05-01 PROCEDURE — 80053 COMPREHEN METABOLIC PANEL: CPT | Performed by: NURSE PRACTITIONER

## 2022-05-01 PROCEDURE — A4216 STERILE WATER/SALINE, 10 ML: HCPCS | Performed by: ORTHOPAEDIC SURGERY

## 2022-05-01 PROCEDURE — 85610 PROTHROMBIN TIME: CPT | Performed by: HOSPITALIST

## 2022-05-01 PROCEDURE — 84466 ASSAY OF TRANSFERRIN: CPT | Performed by: INTERNAL MEDICINE

## 2022-05-01 PROCEDURE — 36415 COLL VENOUS BLD VENIPUNCTURE: CPT | Performed by: ORTHOPAEDIC SURGERY

## 2022-05-01 PROCEDURE — 84443 ASSAY THYROID STIM HORMONE: CPT | Performed by: INTERNAL MEDICINE

## 2022-05-01 PROCEDURE — 85651 RBC SED RATE NONAUTOMATED: CPT | Performed by: INTERNAL MEDICINE

## 2022-05-01 PROCEDURE — 36415 COLL VENOUS BLD VENIPUNCTURE: CPT | Performed by: INTERNAL MEDICINE

## 2022-05-01 PROCEDURE — 63600175 PHARM REV CODE 636 W HCPCS: Performed by: ORTHOPAEDIC SURGERY

## 2022-05-01 PROCEDURE — 25000003 PHARM REV CODE 250: Performed by: ORTHOPAEDIC SURGERY

## 2022-05-01 PROCEDURE — 82550 ASSAY OF CK (CPK): CPT | Performed by: INTERNAL MEDICINE

## 2022-05-01 PROCEDURE — 83880 ASSAY OF NATRIURETIC PEPTIDE: CPT | Performed by: INTERNAL MEDICINE

## 2022-05-01 PROCEDURE — 94761 N-INVAS EAR/PLS OXIMETRY MLT: CPT

## 2022-05-01 PROCEDURE — 25000003 PHARM REV CODE 250: Performed by: NURSE PRACTITIONER

## 2022-05-01 PROCEDURE — 85730 THROMBOPLASTIN TIME PARTIAL: CPT | Mod: 91 | Performed by: HOSPITALIST

## 2022-05-01 PROCEDURE — 36415 COLL VENOUS BLD VENIPUNCTURE: CPT | Performed by: HOSPITALIST

## 2022-05-01 PROCEDURE — 83605 ASSAY OF LACTIC ACID: CPT | Performed by: NURSE PRACTITIONER

## 2022-05-01 PROCEDURE — 81000 URINALYSIS NONAUTO W/SCOPE: CPT | Performed by: INTERNAL MEDICINE

## 2022-05-01 PROCEDURE — 94799 UNLISTED PULMONARY SVC/PX: CPT

## 2022-05-01 PROCEDURE — 85025 COMPLETE CBC W/AUTO DIFF WBC: CPT | Mod: 91 | Performed by: HOSPITALIST

## 2022-05-01 PROCEDURE — 85379 FIBRIN DEGRADATION QUANT: CPT | Performed by: NURSE PRACTITIONER

## 2022-05-01 PROCEDURE — 25000003 PHARM REV CODE 250: Performed by: INTERNAL MEDICINE

## 2022-05-01 PROCEDURE — 99900035 HC TECH TIME PER 15 MIN (STAT)

## 2022-05-01 PROCEDURE — 97116 GAIT TRAINING THERAPY: CPT | Mod: CQ

## 2022-05-01 PROCEDURE — 80100014 HC HEMODIALYSIS 1:1

## 2022-05-01 PROCEDURE — 36415 COLL VENOUS BLD VENIPUNCTURE: CPT | Performed by: NURSE PRACTITIONER

## 2022-05-01 PROCEDURE — 85025 COMPLETE CBC W/AUTO DIFF WBC: CPT | Performed by: NURSE PRACTITIONER

## 2022-05-01 PROCEDURE — 11000001 HC ACUTE MED/SURG PRIVATE ROOM

## 2022-05-01 PROCEDURE — 87086 URINE CULTURE/COLONY COUNT: CPT | Performed by: INTERNAL MEDICINE

## 2022-05-01 PROCEDURE — 84145 PROCALCITONIN (PCT): CPT | Performed by: INTERNAL MEDICINE

## 2022-05-01 RX ORDER — HEPARIN SODIUM,PORCINE/D5W 25000/250
0-40 INTRAVENOUS SOLUTION INTRAVENOUS CONTINUOUS
Status: DISCONTINUED | OUTPATIENT
Start: 2022-05-01 | End: 2022-05-01

## 2022-05-01 RX ORDER — METOPROLOL SUCCINATE 25 MG/1
25 TABLET, EXTENDED RELEASE ORAL DAILY
Status: DISCONTINUED | OUTPATIENT
Start: 2022-05-01 | End: 2022-05-11 | Stop reason: HOSPADM

## 2022-05-01 RX ORDER — OXYCODONE AND ACETAMINOPHEN 10; 325 MG/1; MG/1
1 TABLET ORAL EVERY 4 HOURS PRN
Status: DISCONTINUED | OUTPATIENT
Start: 2022-05-01 | End: 2022-05-03

## 2022-05-01 RX ORDER — SODIUM CHLORIDE 9 MG/ML
INJECTION, SOLUTION INTRAVENOUS ONCE
Status: CANCELLED | OUTPATIENT
Start: 2022-05-01 | End: 2022-05-01

## 2022-05-01 RX ORDER — HEPARIN SODIUM,PORCINE/D5W 25000/250
0-40 INTRAVENOUS SOLUTION INTRAVENOUS CONTINUOUS
Status: DISCONTINUED | OUTPATIENT
Start: 2022-05-01 | End: 2022-05-02

## 2022-05-01 RX ADMIN — OXYCODONE 5 MG: 5 TABLET ORAL at 05:05

## 2022-05-01 RX ADMIN — OXYCODONE AND ACETAMINOPHEN 1 TABLET: 10; 325 TABLET ORAL at 06:05

## 2022-05-01 RX ADMIN — Medication 2 ML: at 05:05

## 2022-05-01 RX ADMIN — HYDRALAZINE HYDROCHLORIDE 25 MG: 25 TABLET, FILM COATED ORAL at 05:05

## 2022-05-01 RX ADMIN — HEPARIN SODIUM 4000 UNITS: 1000 INJECTION, SOLUTION INTRAVENOUS; SUBCUTANEOUS at 12:05

## 2022-05-01 RX ADMIN — HYDRALAZINE HYDROCHLORIDE 25 MG: 25 TABLET, FILM COATED ORAL at 02:05

## 2022-05-01 RX ADMIN — OXYCODONE AND ACETAMINOPHEN 1 TABLET: 10; 325 TABLET ORAL at 11:05

## 2022-05-01 RX ADMIN — ACETAMINOPHEN 650 MG: 325 TABLET ORAL at 04:05

## 2022-05-01 RX ADMIN — HEPARIN SODIUM AND DEXTROSE 18 UNITS/KG/HR: 10000; 5 INJECTION INTRAVENOUS at 03:05

## 2022-05-01 RX ADMIN — HYDRALAZINE HYDROCHLORIDE 25 MG: 25 TABLET, FILM COATED ORAL at 10:05

## 2022-05-01 RX ADMIN — LABETALOL HYDROCHLORIDE 20 MG: 5 INJECTION INTRAVENOUS at 04:05

## 2022-05-01 RX ADMIN — MORPHINE SULFATE 2 MG: 4 INJECTION INTRAVENOUS at 02:05

## 2022-05-01 RX ADMIN — ACETAMINOPHEN 650 MG: 325 TABLET ORAL at 08:05

## 2022-05-01 RX ADMIN — METOPROLOL SUCCINATE 25 MG: 25 TABLET, EXTENDED RELEASE ORAL at 08:05

## 2022-05-01 RX ADMIN — SEVELAMER CARBONATE 800 MG: 800 TABLET, FILM COATED ORAL at 08:05

## 2022-05-01 RX ADMIN — OXYCODONE AND ACETAMINOPHEN 1 TABLET: 10; 325 TABLET ORAL at 02:05

## 2022-05-01 RX ADMIN — CEFAZOLIN 500 MG: 1 INJECTION, POWDER, FOR SOLUTION INTRAVENOUS at 05:05

## 2022-05-01 RX ADMIN — MORPHINE SULFATE 2 MG: 4 INJECTION INTRAVENOUS at 04:05

## 2022-05-01 RX ADMIN — DOCUSATE SODIUM AND SENNOSIDES 1 TABLET: 8.6; 5 TABLET, FILM COATED ORAL at 08:05

## 2022-05-01 RX ADMIN — DOCUSATE SODIUM AND SENNOSIDES 1 TABLET: 8.6; 5 TABLET, FILM COATED ORAL at 10:05

## 2022-05-01 RX ADMIN — SEVELAMER CARBONATE 800 MG: 800 TABLET, FILM COATED ORAL at 04:05

## 2022-05-01 NOTE — ASSESSMENT & PLAN NOTE
Persistent tachycardia over the past 24 hours  Patient denies chest pain  Concern for PE given tachycardia and new hypoxia  Will obtain chest x-ray and check D-dimer which will likely be elevated given renal failure and recent surgery, will also check lactic acid  Will discuss with Nephrology high concern for PE and need for further workup with CTA verses V/Q scan and also initiating anticoag therapy  Patient also hypertensive-will start metoprolol XL today as per chart review patient has had some intermittent tachycardia during his stay  Continue telemetry monitoring  Workup in progress

## 2022-05-01 NOTE — SUBJECTIVE & OBJECTIVE
Interval History:  Notes reviewed, patient having persistent tachycardia and mild hypoxia this morning.  Patient denies chest pain or shortness of breath.  He reports mild cough with minimal clear sputum production that is sometimes slightly blood tinged.  Discussed with patient concern for PE given tachycardia and mild hypoxia.  Unable to obtain CTA due to acute renal failure.  Will discuss with Nephrology best plan of action to evaluate for PE.  Patient still experiencing intermittent pain to his right leg which he describes as aching sharp quality, worsens with movement or Wound Care, mild improvement with oral pain medication.  Advised will adjust pain medication to help with better pain control.  Patient verbalized understanding and appreciative of care.  Awaiting LTAC placement.      Review of Systems   Constitutional:  Positive for chills, diaphoresis and fever. Negative for activity change and appetite change.   HENT:  Negative for congestion, sore throat and trouble swallowing.    Eyes:  Negative for photophobia and visual disturbance.   Respiratory:  Positive for cough. Negative for chest tightness and shortness of breath.    Cardiovascular:  Negative for chest pain, palpitations and leg swelling.   Gastrointestinal:  Negative for abdominal pain, diarrhea and nausea.   Genitourinary:  Negative for dysuria, flank pain and hematuria.   Musculoskeletal:  Positive for gait problem and myalgias. Negative for back pain.   Skin:  Negative for color change.   Neurological:  Negative for dizziness, weakness and headaches.   Psychiatric/Behavioral:  Negative for agitation, behavioral problems and confusion.    All other systems reviewed and are negative.  Objective:     Vital Signs (Most Recent):  Temp: 100.2 °F (37.9 °C) (05/01/22 0830)  Pulse: (!) 131 (05/01/22 0830)  Resp: 20 (05/01/22 0830)  BP: (!) 182/94 (05/01/22 0830)  SpO2: (!) 91 % (05/01/22 0830)   Vital Signs (24h Range):  Temp:  [96.8 °F (36 °C)-100.2  °F (37.9 °C)] 100.2 °F (37.9 °C)  Pulse:  [101-136] 131  Resp:  [14-20] 20  SpO2:  [90 %-96 %] 91 %  BP: (132-191)/(69-95) 182/94     Weight: 81.3 kg (179 lb 3.7 oz)  Body mass index is 26.47 kg/m².    Intake/Output Summary (Last 24 hours) at 5/1/2022 1104  Last data filed at 5/1/2022 0602  Gross per 24 hour   Intake 1240 ml   Output 3951 ml   Net -2711 ml        Physical Exam  Vitals and nursing note reviewed.   Constitutional:       General: He is not in acute distress.     Appearance: Normal appearance. He is normal weight. He is not ill-appearing, toxic-appearing or diaphoretic.   HENT:      Head: Normocephalic.      Nose: Nose normal. No congestion or rhinorrhea.      Mouth/Throat:      Mouth: Mucous membranes are moist.      Pharynx: Oropharynx is clear. No oropharyngeal exudate or posterior oropharyngeal erythema.   Eyes:      General: No scleral icterus.     Extraocular Movements: Extraocular movements intact.      Pupils: Pupils are equal, round, and reactive to light.   Cardiovascular:      Rate and Rhythm: Regular rhythm. Tachycardia present.      Pulses: Normal pulses.      Heart sounds: Normal heart sounds. No murmur heard.  Pulmonary:      Effort: Pulmonary effort is normal. No respiratory distress.      Breath sounds: Normal breath sounds. No stridor. No wheezing, rhonchi or rales.   Abdominal:      General: Bowel sounds are normal. There is no distension.      Palpations: Abdomen is soft.      Tenderness: There is no abdominal tenderness.   Musculoskeletal:         General: No swelling. Normal range of motion.      Cervical back: Normal range of motion.      Right upper leg: Swelling present.      Right lower leg: Swelling present.      Comments: Right leg wound vac in place and drsg CDI.    Skin:     General: Skin is warm and dry.   Neurological:      Mental Status: He is alert and oriented to person, place, and time. Mental status is at baseline.   Psychiatric:         Mood and Affect: Mood normal.          Behavior: Behavior normal.         Thought Content: Thought content normal.       Significant Labs: All pertinent labs within the past 24 hours have been reviewed.  CBC:   Recent Labs   Lab 04/30/22  0304 05/01/22  0503   WBC 14.63* 14.75*   HGB 8.3* 8.4*   HCT 26.0* 26.6*    370       CMP:   Recent Labs   Lab 04/30/22  0304 05/01/22  0503    136   K 4.8 4.6    102   CO2 26 24   GLU 99 96   BUN 40* 35*   CREATININE 3.3* 2.7*   CALCIUM 15.1* 15.5*   PROT 6.8 6.9   ALBUMIN 2.4* 2.4*   BILITOT 0.3 0.5   ALKPHOS 92 94   AST 37 38   ALT 14 15   ANIONGAP 9 10   EGFRNONAA 25* 32*       Microbiology Results (last 7 days)       ** No results found for the last 168 hours. **          Significant Imaging:   X-ray lumbar spine: Normal study.    Right tibia and fibula: Normal study.    Right femur x-ray: Normal study.    Pelvis x-ray: Normal study.    CT Head: No acute abnormality.  Minimal sinus disease.    CT Maxillofacial scan: No facial bone fracture.  Minimal sinus disease.    RLE venous doppler scan: No evidence of deep venous thrombosis in the right lower extremity.    CXR: Central line placement with the tip over the SVC.  No acute detrimental changes.    RLE venous doppler scan:  No evidence of deep venous thrombosis in the right lower extremity. Edema in the soft tissues of the right lower extremity.    CXR:   No acute process.  Right IJ catheter in place.

## 2022-05-01 NOTE — NURSING
Attempted to phone an ICU friend related to pt MEWS score of 3, with 3 points all for elevated pulse rate. Spoke with an ICU nurse who states she did not know what a MEWS score was. Stated she will have Whitney call me back. Will continue to monitor situation.

## 2022-05-01 NOTE — CARE UPDATE
05/01/22 1242   PRE-TX-O2   O2 Device (Oxygen Therapy) room air   SpO2 (!) 94 %   Pulse Oximetry Type Intermittent   $ Pulse Oximetry - Multiple Charge Pulse Oximetry - Multiple   Incentive Spirometer   $ Incentive Spirometer Charges unable to perform  (sleeping on dilasys)

## 2022-05-01 NOTE — NURSING
LANA Rodriguez and [facility] Supervisor contacted about pt increasing HR and elevated MEWS score. Labs and imaging ordered. Advised to follow up on low grade fever with medication and monitor O2 saturation. Metoprolol ordered to address HR per LANA Rodriguez. Will continue to monitor.

## 2022-05-01 NOTE — CONSULTS
Consult Note  Infectious Disease    Reason for Consult:      HPI: Agus Horan is a 21 y.o. male with PMHx of mild intermittent asthma, allergies, oxycodone overdose on 04/09/2022, left eye pain after a fall 04/10/2022, came to the hospital on 04/13/2022 with complaints of right leg pain.    Ultrasound ruled out DVT  X-ray ruled out foreign body and fractures  He had right leg compartment syndrome, rhabdomyolysis and SANDRA     He was seen by Dr. Fraser with Nephrology and Dr. Freedman with Orthopedic surgery.    Patient was taken for fasciotomy on  04/13/2022, then debridement and closure on 04/18/2022; then  debridement deep right lateral fasciotomy incision right lower leg with excisional  debridement of dead anterior tibialis muscle and biopsy of muscle application of wound VAC right lower leg  on 04/25/2022  There are no cultures sent intraoperatively  Blood cultures are negative on 04/13/2022 and 05/01/2022    I came and saw patient.  He has a temperature of 100.2°.  WBC 16. He is on cefazolin since 04/13/2022(with an interruptions or 4 days from 04/19--4/22)  He feels well, he feels normal.  He is just anxious bc  of SANDRA. He does not want ot be on HD fr the rest of his life  Hospitalist is concerned of tachycardia and poss PE and is giving heparin drip.           Antibiotics (From admission, onward)            Start     Stop Route Frequency Ordered    04/23/22 1400  ceFAZolin (ANCEF) 500 mg in dextrose 5 % 50 mL IVPB         -- IV Every 12 hours (non-standard times) 04/23/22 0924        Antifungals (From admission, onward)            None        Antivirals (From admission, onward)    None              Review of patient's allergies indicates:   Allergen Reactions    Shrimp      Past Medical History:   Diagnosis Date    Allergy     AR    Asthma     mild intermittent    Hyperkalemia 4/14/2022     Past Surgical History:   Procedure Laterality Date    DEBRIDEMENT OF LOWER EXTREMITY Right 4/18/2022    Procedure:  DEBRIDEMENT, LOWER EXTREMITY;  Surgeon: Leonardo Byers MD;  Location: [facility] OR;  Service: Orthopedics;  Laterality: Right;    FASCIOTOMY Right 4/13/2022    Procedure: FASCIOTOMY;  Surgeon: Leonardo Byers MD;  Location: [facility] OR;  Service: Orthopedics;  Laterality: Right;    FASCIOTOMY Right 4/25/2022    Procedure: FASCIOTOMY;  Surgeon: Leonardo Byers MD;  Location: [facility] OR;  Service: Orthopedics;  Laterality: Right;    REMOVAL OF FOREIGN BODY FROM FOOT Left 6/24/2020    Procedure: REMOVAL, FOREIGN BODY, FOOT;  Surgeon: Dani Garcia MD;  Location: [facility] OR;  Service: Orthopedics;  Laterality: Left;     Social History     Socioeconomic History    Marital status: Single   Tobacco Use    Smoking status: Never Smoker    Smokeless tobacco: Never Used   Substance and Sexual Activity    Alcohol use: Yes     Comment: last night    Drug use: Yes     Frequency: 2.0 times per week     Types: Marijuana     Comment: yesterday   Social History Narrative    ** Merged History Encounter **         Lives with mom, 2 brothers.  No smokers.  +Dogs/chickens.  7th grader.     Family History   Problem Relation Age of Onset    Asthma Brother     Emphysema Maternal Grandmother     Hyperlipidemia Maternal Grandmother     Asthma Maternal Grandmother     Arthritis Maternal Grandmother     COPD Maternal Grandmother     Asthma Brother          Review of Systems:   No chills, fever, sweats, weight loss  No change in vision, loss of vision or diplopia  No sinus congestion, purulent nasal discharge, post nasal drip or facial pain  No pain in mouth or throat. No problems with teeth, gums.  No chest pain, palpitations, syncope  No cough, sputum production, shortness of breath, dyspnea on exertion, pleurisy, hemoptysis  No nausea, vomiting, diarrhea, constipation, blood in stool, or focal abd pain,  No dysphagia, odynophagia  No dysuria, hematuria, strangury, retention, incontinence, nocturia, prostatism,    Produces clear urne  No vaginal/penile discharge, genital ulcers, risk for STD  No swelling of joints, redness of joints, injuries, or new focal pain  No unusual headaches, dizziness, vertigo, numbness, paresthesias, neuropathy, falls  No anxiety, depression, substance abuse, sleep disturbance  No diabetes, thyroid, hypogonadal conditions  No bleeding, lymphadenopathy, anemia, malignancy, unusual bruising  No new rashes, lesions, or wounds  No TB exposure  No recent/prior steroids  Outdoor activities:  Travel: no  Implants: no  Antibiotic History: cefazolin    EXAM & DIAGNOSTICS REVIEWED:   Vitals:     Temp:  [97.3 °F (36.3 °C)-100.2 °F (37.9 °C)]   Temp: 99.1 °F (37.3 °C) (05/01/22 1946)  Pulse: 107 (05/01/22 1946)  Resp: 18 (05/01/22 1946)  BP: (!) 143/68 (05/01/22 1946)  SpO2: 95 % (05/01/22 1954)    Intake/Output Summary (Last 24 hours) at 5/1/2022 2222  Last data filed at 5/1/2022 2200  Gross per 24 hour   Intake 1240 ml   Output 2775 ml   Net -1535 ml       General:  In NAD. Alert and attentive, cooperative, comfortable  Eyes:  Anicteric, PERRL, EOMI  ENT:  No ulcers, exudates, thrush, nares patent, dentition is fair  Neck:  supple, no masses or adenopathy appreciated  Lungs: Clear, no consolidation, rales, wheezes, rub. IS 3000ml  Heart:  RRR, no gallop/murmur/rub noted  Abd:  Soft, NT, ND, normal BS, no masses or organomegaly appreciated.  :  Voids/Brown, urine clear, no flank tenderness  Musc:  Other than  Right leg. Joints without effusion, swelling, erythema, synovitis, muscle wasting.   Skin:  No rashes. No palmar or plantar lesions. No subungual petechiae  Neuro:   Alert, attentive, speech fluent, face symmetric, moves all extremities, no focal weakness. Ambulatory  Psych:  Calm, cooperative  Lymphatic:     No cervical, supraclavicular, axillary, or inguinal nodes  Extrem: No edema, erythema, phlebitis, cellulitis, warm and well perfused  VAD:  Rot SC HD     Isolation:  none  Wound:                         General Labs reviewed:  Recent Labs   Lab 04/30/22  0304 05/01/22  0503 05/01/22  1103   WBC 14.63* 14.75* 16.38*  16.38*   HGB 8.3* 8.4* 8.3*  8.3*   HCT 26.0* 26.6* 25.4*  25.4*    370 359  359       Recent Labs   Lab 04/29/22  0848 04/30/22  0304 05/01/22  0503    137 136   K 5.0 4.8 4.6    102 102   CO2 27 26 24   BUN 62* 40* 35*   CREATININE 5.0* 3.3* 2.7*   CALCIUM 12.5* 15.1* 15.5*   PROT 6.5 6.8 6.9   BILITOT 0.3 0.3 0.5   ALKPHOS 82 92 94   ALT 12 14 15   AST 42* 37 38     Recent Labs   Lab 05/01/22  1457   .9*         Micro:  Microbiology Results (last 7 days)     Procedure Component Value Units Date/Time    Blood culture [[id number]] Collected: 05/01/22 1140    Order Status: Sent Specimen: Blood Updated: 05/01/22 1610    Narrative:      Collection has been rescheduled by [email or web address] at 05/01/2022 11:22 Reason:   Patient unavailable starting dialysis and with the dr   Collection has been rescheduled by [email or web address] at 05/01/2022 11:22 Reason:   Patient unavailable starting dialysis and with the dr     Blood culture [[id number]] Collected: 05/01/22 1216    Order Status: Sent Specimen: Blood Updated: 05/01/22 1610    Narrative:      Collection has been rescheduled by [email or web address] at 05/01/2022 11:22 Reason:   Patient unavailable starting dialysis and with the dr     Collection has been rescheduled by [email or web address] at 05/01/2022 11:42 Reason:   Got first set second set due after 12 couldnt stick other side due to   IV   Collection has been rescheduled by [email or web address] at 05/01/2022 11:22 Reason:   Patient unavailable starting dialysis and with the dr     Collection has been rescheduled by MR at 05/01/2022 11:42 Reason:   Got first set second set due after 12 couldnt stick other side due to   IV     Influenza A & B by Molecular [[id number]]     Order Status: No result Specimen: Nasopharyngeal Swab           Imaging Reviewed:   CXR-- No definite acute radiographic abnormality.  Right internal jugular central venous  catheter in place.   CT  UltrasoundNo evidence of deep venous thrombosis in either lower extremity, noting nonvisualization of the right calf veins due to overlying bandaging.      Cardiology:    IMPRESSION & PLAN     1.  Reason for ER visit: Compartment syndrome; s/p multiple I&Ds, on cefazolin prophylactic?.    2. Reason for admission :Rhabdomyolysis due to compartment syndrome.  Resolved    3. SANDRA due to rhabdomyolysis, improved   Hypercalcemia, 15.5getting low bath HD now    4. Id consult for  tachycardia and temps of 100.1, poss infection?    5. Vitamin-D deficiency, Anemia, protein malnutrition, hypoalbuminemia    6. Shortness of breath with mild hypoxemia, pulse ox ranging between 90-94 on room air.on heparin bc he can not have Ct contrast to rue out pe      Recommendations:  On  Cefazolin--- change it to ceftriaxone till I have more results  Follow blood cultures, UA and urine culture;  chest x-ray is normal,  Follow ESR, CRP, BNP, ferritin, TSH, CPK, procalcitonin,  Follow influenza and COVID screen  Follow ECHO  Nephrologist will take patient for low ca bath HD  Treat anxiety  Look at wound tomorrow when Wound vac is down and take cx if infection is suspected        Medical Decision Making during this encounter was  [_] Low Complexity  [_] Moderate Complexity  [  ] High Complexity

## 2022-05-01 NOTE — ASSESSMENT & PLAN NOTE
Patient's anemia is currently controlled. Has recieved 1 units of PRBCs. Etiology likely d/t acute blood loss  Current CBC reviewed-   Lab Results   Component Value Date    HGB 8.4 (L) 05/01/2022    HCT 26.6 (L) 05/01/2022     Monitor serial CBC and transfuse if patient becomes hemodynamically unstable, symptomatic or H/H drops below 7/21.

## 2022-05-01 NOTE — PROGRESS NOTES
INPATIENT NEPHROLOGY Progress Note  [facility]    Patient Name: Agus Horan  Date: 05/01/2022    Reason for consultation: SANDRA    Chief Complaint:   Chief Complaint   Patient presents with    Leg Pain     Pain in the right leg muscle calf is hard, nausea vomiting, patient was seen here over the weekend for an overdose        History of Present Illness:  20 y/o M with a history of asthma recently here on 4/9 with oxycodone overdose who p/w RLE pain and swelling after a fall on 4/10. He reports severe pain, constant, associated with nausea with inability to bear weight.  He took meloxicam without relief. He was found to have compartment syndrome and was taking to the OR emergently for fasciotomy on 4/13. We are consulted for SANDRA with metabolic derangements.    Interval History:  4/14- did emergent HD overnight for hyperkalemia and metabolic acidosis refractory to medical management; pain controlled, RLE wrapped, no edema in LLE  4/15- worsening pain/edema in RLE- going for MRI- oligoanuric- stop NS IVFs- will do HD/UF today and tomorrow  4/16  Seen on dialysis.  No distress  4/17  Remains oliguric.  AFVSS.  Has back pain.  Leg pain a little better  4/18  In the OR.  Still oliguric.    4/19  Seen on dialysis.  No distress.    4/20  Still not making much urine.  cpk coming down.     4/21  Afebrile.  BP a little better.  uop up a bit  422   Sleeping.  AFVSS.  I/Os not appropriately recorded despite being ordered for twice  4/23  225 cc UOP recorded.  K+ 5.5, HD today.  No renal recovery, CPK improving.  C/o rash, Dr. Zamorano at bedside placed orders.  Seen on dialysis, tolerating tx well.  4/24   No new lab results, next lab draw in am.   Still has rash and c/o itching.  No other complaints.  450 cc UOP recorded.  4/25  In the OR.  Plan for hd today  4/26  AFVSS.  Urine output better.  No complaints specified today  4/27  Sleeping.  Output not recorded despite being ordered  4/28  Tearful.  Not  engaging when spoken too.  Requesting dilaudid   4/29     Good urine output.  Seen on dialysis.  No distress  4/30  UOP 4.4L.  Ca+ 15, will have dialysis today, stopped calcitriol and vit D.  Will add on PTH to labs.    5/1  Ca+ 15.5, PTH suppressed.  D/w Dr. Patrick, pt to have additional dialysis today, no UF.  Notified Suni Stone w/[facility] pt needs to run today.  Pt w/elevated temp, a little more tachy than usual, not feeling very well.    Plan of Care:    Assessment:  Traumatic rhabdomyolysis with compartment syndrome s/p fasciotomy on 4/13  SANDRA due to toxic ATN s/p emergent HD on 4/14- remains oligoanuric and HD dependent  Edema  Hyponatremia  Hyperkalemia  Acidosis  Hypocalcemia  Shock liver    Plan:    - Trend daily CK.     cpk slowing coming down  - He remains HD dependent. Continue grier.  Ok with -160s for renal perfusion. No NSAIDs or IV contrast. Dose meds for CrCl < 10.  MWF dialysis  - Advise renal diet with 1.5L fluid restriction. Will adjust dialysate for all metabolic derangements. Will replete calcium PRN.  - Trend LFTs.   --replete calcium.  Need to do calcium repletion judiciously in pt with rhabdomyolysis.    --added hydralazine  --dialysis as needed, monitor for recovery   - accurate I/Os  Monitor over weekend for renal recovery               Thank you for allowing us to participate in this patient's care. We will continue to follow.    Vital Signs:  Temp Readings from Last 3 Encounters:   05/01/22 100.2 °F (37.9 °C)   04/10/22 97.3 °F (36.3 °C)   04/09/22 98.4 °F (36.9 °C)       Pulse Readings from Last 3 Encounters:   05/01/22 (!) 131   04/10/22 63   04/09/22 95       BP Readings from Last 3 Encounters:   05/01/22 (!) 182/94   04/10/22 117/64   04/09/22 (!) 143/82       Weight:  Wt Readings from Last 3 Encounters:   04/20/22 81.3 kg (179 lb 3.7 oz)   04/10/22 77.1 kg (170 lb)   04/09/22 77.1 kg (170 lb)       INS/OUTS:  I/O last 3 completed shifts:  In: 1240 [P.O.:740;  "Other:500]  Out: 6516 [Urine:4370; Emesis/NG output:1; Other:2145]  No intake/output data recorded.    Medications:  Scheduled Meds:   sodium chloride 0.9%   Intravenous Once    ceFAZolin (ANCEF) IVPB  500 mg Intravenous Q12H    epoetin leidy-epbx  50 Units/kg Intravenous Every Mon, Wed, Fri    hydrALAZINE  25 mg Oral Q8H    LIDOcaine HCL 2%   Topical (Top) Every Mon, Thurs    metoprolol succinate  25 mg Oral Daily    senna-docusate 8.6-50 mg  1 tablet Oral BID    sevelamer carbonate  800 mg Oral TID WM    sodium chloride 0.9%  2 mL Intravenous Q6H     Continuous Infusions:   electrolyte-S (pH 7.4) Stopped (04/25/22 1255)    loperamide       PRN Meds:.acetaminophen, cyclobenzaprine, dextrose 10%, dextrose 10%, diphenhydrAMINE, glucagon (human recombinant), glucose, glucose, heparin (porcine), labetaloL, loperamide, morphine, naloxone, ondansetron, oxyCODONE, phenyleph-min oil-petrolatum, sodium chloride 0.9%  No current facility-administered medications on file prior to encounter.     Current Outpatient Medications on File Prior to Encounter   Medication Sig Dispense Refill    naloxone (NARCAN) 4 mg/actuation Spry 4mg by nasal route as needed for opioid overdose; may repeat every 2-3 minutes in alternating nostrils until medical help arrives. Call 911 2 each 0       Review of Systems:  Neg    Physical Exam:  BP (!) 182/94   Pulse (!) 131   Temp 100.2 °F (37.9 °C)   Resp 20   Ht 5' 9" (1.753 m)   Wt 81.3 kg (179 lb 3.7 oz)   SpO2 (!) 91%   BMI 26.47 kg/m²     Constitutional: nad, aao x 3, depressed affect  Heart: rrr, no m/r/g, wwp, RLE eedema  Lungs: ctab, no w/r/r/c, no lb  Abdomen: s/nt/nd, +BS    Results:  Lab Results   Component Value Date     05/01/2022    K 4.6 05/01/2022     05/01/2022    CO2 24 05/01/2022    BUN 35 (H) 05/01/2022    CREATININE 2.7 (H) 05/01/2022    CALCIUM 15.5 (HH) 05/01/2022    ANIONGAP 10 05/01/2022    ESTGFRAFRICA 37 (A) 05/01/2022    EGFRNONAA 32 (A) " 05/01/2022       Lab Results   Component Value Date    CALCIUM 15.5 (HH) 05/01/2022    PHOS 7.1 (H) 04/30/2022       Recent Labs   Lab 05/01/22  0503   WBC 14.75*   RBC 2.87*   HGB 8.4*   HCT 26.6*      MCV 93   MCH 29.3   MCHC 31.6*       I have personally reviewed pertinent radiological imaging and reports.    I have spent > 35 minutes providing care for this patient for the above diagnoses. These services have included chart/data/imaging review, evaluation, exam, formulation of plan, , note preparation, and discussions with staff involved in this patient's care.    Saida Silva NP    Nephrology  [facility]  [phone]

## 2022-05-01 NOTE — NURSING
Patient in no distress noted however his heart rate is being tachy on the 120's /130's sustain. Provider on call was made aware of finding. No new order received as she stated this is not a new occurrence as patient is asymptomatic continue to monitor.Charge nurse was notify. Close monitoring continue.

## 2022-05-01 NOTE — PLAN OF CARE
Cm followed-up with Shahida at [facility]-Still pending placement. Also left message with Gladys Ruano. Still pending placement.

## 2022-05-01 NOTE — PT/OT/SLP PROGRESS
Physical Therapy Treatment    Patient Name:  Agus Horan   MRN:  [id number]    Recommendations:     Discharge Recommendations:  LTACH (long-term acute care hospital), home health PT   Discharge Equipment Recommendations: walker, rolling, bedside commode   Barriers to discharge: None    Assessment:     Agus Horan is a 21 y.o. male admitted with a medical diagnosis of Acute renal failure.  He presents with the following impairments/functional limitations:  weakness, impaired endurance, impaired functional mobilty, gait instability, impaired balance, decreased lower extremity function, pain . Agreeable to mobilize. Reports being tired of being in the hospital.  Ambulated with rw and CG / Min A, NWB RLE with IV and wound vac in tow, chair follow for safety. Verbal cued to decrease step length to maintain balance on L foot.  Sat up in chair at bedside.  Increased HR noted, nurse Nickerson cleared patient.     Rehab Prognosis: Good; patient would benefit from acute skilled PT services to address these deficits and reach maximum level of function.    Recent Surgery: Procedure(s) (LRB):  FASCIOTOMY (Right)  INSERTION, CATHETER, TUNNELED (Right) 6 Days Post-Op    Plan:     During this hospitalization, patient to be seen  (1-2x / day) to address the identified rehab impairments via gait training, therapeutic activities, therapeutic exercises and progress toward the following goals:    · Plan of Care Expires:  05/15/22    Subjective     Chief Complaint: being in the hospital   Patient/Family Comments/goals: to return home.   Pain/Comfort:  · Pain Rating 1: 0/10      Objective:     Communicated with nurse Nickerson prior to session.  Patient found supine with bed alarm, peripheral IV, telemetry, wound vac upon PT entry to room.     General Precautions: Standard, fall   Orthopedic Precautions:RLE non weight bearing   Braces: N/A  Respiratory Status: Room air     Functional Mobility:  · Bed Mobility:     · Rolling Left:  stand  by assistance  · Supine to Sit: stand by assistance  · Transfers:     · Sit to Stand:  contact guard assistance with rolling walker  · Gait: 50' with rw and CG / Min A , NWB RLE ( IV and wound vac in tow).       AM-PAC 6 CLICK MOBILITY          Therapeutic Activities and Exercises:   Ambulated with rw and assistance. Verbal cues required to decrease step length to keep L foot planted.     Patient left up in chair with all lines intact, call button in reach, chair alarm on and nurse Krishan  notified..    GOALS:   Multidisciplinary Problems     Physical Therapy Goals        Problem: Physical Therapy    Goal Priority Disciplines Outcome Goal Variances Interventions   Physical Therapy Goal     PT, PT/OT Ongoing, Progressing     Description: Goals to be met by: 5/15/2022     Patient will increase functional independence with mobility by performin). Supine to sit with Modified [place]  2). Sit to stand transfer with Modified [place] maintaining RLE NWB using axillary crutches or rolling walker  3). Bed to chair transfer with Modified [place] maintaining RLE NWB using axillary crutches or rolling walker  5). Gait  x 150 feet with Modified [place] maintaining RLE NWB using axillary crutches or rolling walker                     Time Tracking:     PT Received On: 22  PT Start Time: 948     PT Stop Time: 1003  PT Total Time (min): 15 min     Billable Minutes: Gait Training 15min    Treatment Type: Treatment  PT/PTA: PTA     PTA Visit Number: 3     2022

## 2022-05-01 NOTE — PROGRESS NOTES
Patient's wound VAC is functioning the patient's leg is elevated he has some posterior for motor function with flexion of his toes still does not have much in the way of dorsiflexion of the toes the swelling appears to be resolving.    Plan is to continue the patient with the wound VAC the patient is being considered for LTAC transfer.  We can continue the wound VAC at the LTAC and also consult Plastic surgery.  The patient will need a split-thickness skin graft to the lateral fasciotomy incision.

## 2022-05-01 NOTE — ASSESSMENT & PLAN NOTE
Concern for PE given tachycardia and new hypoxia  Will obtain chest x-ray and check D-dimer which will likely be elevated given renal failure and recent surgery, will also check lactic acid  Will discuss with Nephrology high concern for PE and need for further workup with CTA verses V/Q scan and also initiating anticoag therapy  continue telemetry monitoring  Supplemental O2 to keep sats >92%  Pt encouraged to use IS  Workup in progress

## 2022-05-01 NOTE — PROGRESS NOTES
[facility] Hospital Medicine  Progress Note    Patient Name: Agus Horan  MRN: [id number]  Patient Class: IP- Inpatient   Admission Date: 4/13/2022  Length of Stay: 18 days  Attending Physician: Andrei Gar MD  Primary Care Provider: Primary Doctor No        Subjective:     Principal Problem:Acute renal failure        HPI:  Agus Horan is a 22 y/o male with PMHx of mild asthma who presents with right lower extremity pain and swelling x few days. Patient was seen in the ED on 4/9/22 for oxycodone overdose and again on 4/10/22 for left eye pain after falling on a piece of furniture. Patient states he is unsure how he injured his leg and denies IV drug use. Xray of his right leg did not show acute fracture. Patietn states RLE pain and swelling increased over the past few days and he is unable to walk secondary to pain. US of his lower extremity performed today did not show DVT. Lab work revealed elevated Creatinine 13.5 & K+ 7.1, elevated AST/ALT 3980/1611 & WBC 18.14.  CPK>40,0000.  Patient unable to dorsiflex and plantar flex his RLE and symptoms are concerning for compartment syndrome per ED. ED provider discussed with Dr. Byers and Dr. Fraser, nephrology. Patient was referred to hospital medicine and seen in the ED with his friend at bedside. Patient complaining of RLE pain, tenderness and swelling. He denies SOB, N/V/D, chest pain and headaches.  Patient will be admitted to hospital medicine for orthopedic consultation and further management.      Overview/Hospital Course:  He was found to have compartment syndrome and was taking to the OR emergently for fasciotomy on 4/13. We are consulted for SANDRA with metabolic derangements.  Cpk at the time of arrival was more than 40,000. Initially was started on IV fluids and started on hemodialysis. on 4/14- did emergent HD overnight for hyperkalemia and metabolic acidosis refractory to medical management; pain controlled, patient underwent  DEBRIDEMENT, LOWER EXTREMITY (Right)   closure of medial fasciotomy incision right leg debridement of lateral fasciotomy with biopsy of anterior muscle compartment removal of deep muscle anterior compartment right lower leg on 04/18/2022.  Patient's CPK level continue to trend down.  Patient underwent debridement deep right lateral fasciotomy incision right lower leg with excisional  debridement of dead anterior tibialis muscle and biopsy of muscle application of wound VAC right lower leg  on April 25, 2022.       Interval History:  Notes reviewed, patient having persistent tachycardia and mild hypoxia this morning.  Patient denies chest pain or shortness of breath.  He reports mild cough with minimal clear sputum production that is sometimes slightly blood tinged.  Discussed with patient concern for PE given tachycardia and mild hypoxia.  Unable to obtain CTA due to acute renal failure.  Will discuss with Nephrology best plan of action to evaluate for PE.  Patient still experiencing intermittent pain to his right leg which he describes as aching sharp quality, worsens with movement or Wound Care, mild improvement with oral pain medication.  Advised will adjust pain medication to help with better pain control.  Patient verbalized understanding and appreciative of care.  Awaiting LTAC placement.      Review of Systems   Constitutional:  Positive for chills, diaphoresis and fever. Negative for activity change and appetite change.   HENT:  Negative for congestion, sore throat and trouble swallowing.    Eyes:  Negative for photophobia and visual disturbance.   Respiratory:  Positive for cough. Negative for chest tightness and shortness of breath.    Cardiovascular:  Negative for chest pain, palpitations and leg swelling.   Gastrointestinal:  Negative for abdominal pain, diarrhea and nausea.   Genitourinary:  Negative for dysuria, flank pain and hematuria.   Musculoskeletal:  Positive for gait problem and myalgias.  Negative for back pain.   Skin:  Negative for color change.   Neurological:  Negative for dizziness, weakness and headaches.   Psychiatric/Behavioral:  Negative for agitation, behavioral problems and confusion.    All other systems reviewed and are negative.  Objective:     Vital Signs (Most Recent):  Temp: 100.2 °F (37.9 °C) (05/01/22 0830)  Pulse: (!) 131 (05/01/22 0830)  Resp: 20 (05/01/22 0830)  BP: (!) 182/94 (05/01/22 0830)  SpO2: (!) 91 % (05/01/22 0830)   Vital Signs (24h Range):  Temp:  [96.8 °F (36 °C)-100.2 °F (37.9 °C)] 100.2 °F (37.9 °C)  Pulse:  [101-136] 131  Resp:  [14-20] 20  SpO2:  [90 %-96 %] 91 %  BP: (132-191)/(69-95) 182/94     Weight: 81.3 kg (179 lb 3.7 oz)  Body mass index is 26.47 kg/m².    Intake/Output Summary (Last 24 hours) at 5/1/2022 1104  Last data filed at 5/1/2022 0602  Gross per 24 hour   Intake 1240 ml   Output 3951 ml   Net -2711 ml        Physical Exam  Vitals and nursing note reviewed.   Constitutional:       General: He is not in acute distress.     Appearance: Normal appearance. He is normal weight. He is not ill-appearing, toxic-appearing or diaphoretic.   HENT:      Head: Normocephalic.      Nose: Nose normal. No congestion or rhinorrhea.      Mouth/Throat:      Mouth: Mucous membranes are moist.      Pharynx: Oropharynx is clear. No oropharyngeal exudate or posterior oropharyngeal erythema.   Eyes:      General: No scleral icterus.     Extraocular Movements: Extraocular movements intact.      Pupils: Pupils are equal, round, and reactive to light.   Cardiovascular:      Rate and Rhythm: Regular rhythm. Tachycardia present.      Pulses: Normal pulses.      Heart sounds: Normal heart sounds. No murmur heard.  Pulmonary:      Effort: Pulmonary effort is normal. No respiratory distress.      Breath sounds: Normal breath sounds. No stridor. No wheezing, rhonchi or rales.   Abdominal:      General: Bowel sounds are normal. There is no distension.      Palpations: Abdomen is  soft.      Tenderness: There is no abdominal tenderness.   Musculoskeletal:         General: No swelling. Normal range of motion.      Cervical back: Normal range of motion.      Right upper leg: Swelling present.      Right lower leg: Swelling present.      Comments: Right leg wound vac in place and drsg CDI.    Skin:     General: Skin is warm and dry.   Neurological:      Mental Status: He is alert and oriented to person, place, and time. Mental status is at baseline.   Psychiatric:         Mood and Affect: Mood normal.         Behavior: Behavior normal.         Thought Content: Thought content normal.       Significant Labs: All pertinent labs within the past 24 hours have been reviewed.  CBC:   Recent Labs   Lab 04/30/22  0304 05/01/22  0503   WBC 14.63* 14.75*   HGB 8.3* 8.4*   HCT 26.0* 26.6*    370       CMP:   Recent Labs   Lab 04/30/22 0304 05/01/22  0503    136   K 4.8 4.6    102   CO2 26 24   GLU 99 96   BUN 40* 35*   CREATININE 3.3* 2.7*   CALCIUM 15.1* 15.5*   PROT 6.8 6.9   ALBUMIN 2.4* 2.4*   BILITOT 0.3 0.5   ALKPHOS 92 94   AST 37 38   ALT 14 15   ANIONGAP 9 10   EGFRNONAA 25* 32*       Microbiology Results (last 7 days)       ** No results found for the last 168 hours. **          Significant Imaging:   X-ray lumbar spine: Normal study.    Right tibia and fibula: Normal study.    Right femur x-ray: Normal study.    Pelvis x-ray: Normal study.    CT Head: No acute abnormality.  Minimal sinus disease.    CT Maxillofacial scan: No facial bone fracture.  Minimal sinus disease.    RLE venous doppler scan: No evidence of deep venous thrombosis in the right lower extremity.    CXR: Central line placement with the tip over the SVC.  No acute detrimental changes.    RLE venous doppler scan:  No evidence of deep venous thrombosis in the right lower extremity. Edema in the soft tissues of the right lower extremity.    CXR:   No acute process.  Right IJ catheter in  place.      Assessment/Plan:      * Acute renal failure  Continues to be anuric  Hemodialysis therapy as per Nephrology team.   due to rhabdomyolysis  Trend CPK daily.  Follow Nephrology recommendations.  Avoid nephrotoxins  Telemetry.  Hemo split catheter was placed on April 25, 2022.     4/30 - remains on hemodialysis, emergent dialysis today secondary to hypercalcemia.  Will continue to monitor closely  5/1 - HD again today secondary to hypercalcemia. Renal function improving. Cont to monitor closely.       Hypoxia  Concern for PE given tachycardia and new hypoxia  Will obtain chest x-ray and check D-dimer which will likely be elevated given renal failure and recent surgery, will also check lactic acid  Will discuss with Nephrology high concern for PE and need for further workup with CTA verses V/Q scan and also initiating anticoag therapy  continue telemetry monitoring  Supplemental O2 to keep sats >92%  Pt encouraged to use IS  Workup in progress          Tachycardia  Persistent tachycardia over the past 24 hours  Patient denies chest pain  Concern for PE given tachycardia and new hypoxia  Will obtain chest x-ray and check D-dimer which will likely be elevated given renal failure and recent surgery, will also check lactic acid  Will discuss with Nephrology high concern for PE and need for further workup with CTA verses V/Q scan and also initiating anticoag therapy  Patient also hypertensive-will start metoprolol XL today as per chart review patient has had some intermittent tachycardia during his stay  Continue telemetry monitoring  Workup in progress        Anemia of chronic disease  Patient's anemia is currently controlled. Has recieved 1 units of PRBCs. Etiology likely d/t acute blood loss  Current CBC reviewed-   Lab Results   Component Value Date    HGB 8.4 (L) 05/01/2022    HCT 26.6 (L) 05/01/2022     Monitor serial CBC and transfuse if patient becomes hemodynamically unstable, symptomatic or H/H drops  below 7/21.         Drug eruption  No definite etiology apparent.  Possibly clindamycin use.  Off intravenous clindamycin.  Use oral Benadryl 25 mg q.6 hours p.r.n. patient will be monitored closely.    4/30 - resolved      Compartment syndrome  Underwent fasciotomy on 4/13   S/p Debridement deep right lateral fasciotomy incision right lower leg with excisional  debridement of dead anterior tibialis muscle and biopsy of muscle application of wound VAC right lower leg  - 04/25/22.  Wound care nurse performing dressing changes as per recommendations by Orthopedics.    4/30 - stable, wound vac in use, cont current therapy    Elevated liver enzymes  Elevated AST &ALT likely due to rhabdomyolysis  Trending down  Avoid hepatoxic medications  Monitor CMP  See plan for rhabdomyolysis    4/30 - resolved, cont to monitor CMP        Rhabdomyolysis  CPK trending down  S/p fasciotomy on 4/13 for compartment syndrome.  On 4/18 S/p DEBRIDEMENT, LOWER EXTREMITY (Right)   closure of medial fasciotomy incision right leg debridement of lateral fasciotomy with biopsy of anterior muscle compartment removal of deep muscle anterior compartment right lower leg. Subsequently underwent Debridement deep right lateral fasciotomy incision right lower leg with excisional  debridement of dead anterior tibialis muscle and biopsy of muscle application of wound VAC right lower leg on 04/25/22.  Monitor BMP   Follow Nephrology recommendations.    4/30 - improved and stable, cont to monitor closely      History of asthma  Hx of asthma, stable    Monitor for acute changes        VTE Risk Mitigation (From admission, onward)         Ordered      05/01/22 1049      05/01/22 1049      05/01/22 1049      05/01/22 1049     heparin (porcine) injection 4,000 Units  As needed (PRN)         04/14/22 0137     Reason for No Pharmacological VTE Prophylaxis  Once        Question:  Reasons:  Answer:  Risk of Bleeding    04/13/22 1913     IP VTE HIGH RISK PATIENT   Once         04/13/22 1913     Place sequential compression device  Until discontinued         04/13/22 1913                Discharge Planning   JARAD:      Code Status: Full Code   Is the patient medically ready for discharge?:     Reason for patient still in hospital (select all that apply): Patient new problem, Laboratory test and Treatment  Discharge Plan A: Long-term acute care facility (LTAC)                  Brittni Mackey NP  Department of Hospital Medicine   [facility]

## 2022-05-01 NOTE — ASSESSMENT & PLAN NOTE
Continues to be anuric  Hemodialysis therapy as per Nephrology team.   due to rhabdomyolysis  Trend CPK daily.  Follow Nephrology recommendations.  Avoid nephrotoxins  Telemetry.  Hemo split catheter was placed on April 25, 2022.     4/30 - remains on hemodialysis, emergent dialysis today secondary to hypercalcemia.  Will continue to monitor closely  5/1 - HD again today secondary to hypercalcemia. Renal function improving. Cont to monitor closely.

## 2022-05-01 NOTE — PROGRESS NOTES
HD: consent and hepatitis status confirmed prior to HD, pt stable, tx completed, lines flushed, clamped, and capped. NET UF: 0mls. Report given to Krishan.

## 2022-05-01 NOTE — PLAN OF CARE
Problem: Physical Therapy  Goal: Physical Therapy Goal  Description: Goals to be met by: 5/15/2022     Patient will increase functional independence with mobility by performin). Supine to sit with Modified [place]  2). Sit to stand transfer with Modified [place] maintaining RLE NWB using axillary crutches or rolling walker  3). Bed to chair transfer with Modified [place] maintaining RLE NWB using axillary crutches or rolling walker  5). Gait  x 150 feet with Modified [place] maintaining RLE NWB using axillary crutches or rolling walker    Outcome: Ongoing, Progressing   Ambulate with assistance for safety ( rw / crutches ) , NWB RLE.

## 2022-05-02 ENCOUNTER — OUTSIDE PLACE OF SERVICE (OUTPATIENT)
Dept: ADMINISTRATIVE | Facility: HOSPITAL | Age: 22
End: 2022-05-02

## 2022-05-02 DIAGNOSIS — N17.0 ACUTE RENAL FAILURE WITH TUBULAR NECROSIS: ICD-10-CM

## 2022-05-02 DIAGNOSIS — M62.82 RHABDOMYOLYSIS: ICD-10-CM

## 2022-05-02 DIAGNOSIS — T79.A0XA COMPARTMENT SYNDROME: ICD-10-CM

## 2022-05-02 LAB
ALBUMIN SERPL BCP-MCNC: 2.3 G/DL (ref 3.5–5.2)
ALP SERPL-CCNC: 100 U/L (ref 55–135)
ALT SERPL W/O P-5'-P-CCNC: 19 U/L (ref 10–44)
ANION GAP SERPL CALC-SCNC: 12 MMOL/L (ref 8–16)
APTT BLDCRRT: 47.4 SEC (ref 21–32)
APTT BLDCRRT: 57.8 SEC (ref 21–32)
APTT BLDCRRT: 57.8 SEC (ref 21–32)
AST SERPL-CCNC: 42 U/L (ref 10–40)
BASOPHILS # BLD AUTO: 0.11 K/UL (ref 0–0.2)
BASOPHILS # BLD AUTO: 0.11 K/UL (ref 0–0.2)
BASOPHILS NFR BLD: 0.6 % (ref 0–1.9)
BASOPHILS NFR BLD: 0.6 % (ref 0–1.9)
BILIRUB SERPL-MCNC: 0.5 MG/DL (ref 0.1–1)
BUN SERPL-MCNC: 39 MG/DL (ref 6–20)
CA-I BLDV-SCNC: 2.06 MMOL/L (ref 1.06–1.42)
CALCIUM SERPL-MCNC: 14.9 MG/DL (ref 8.7–10.5)
CHLORIDE SERPL-SCNC: 99 MMOL/L (ref 95–110)
CO2 SERPL-SCNC: 24 MMOL/L (ref 23–29)
CREAT SERPL-MCNC: 2.7 MG/DL (ref 0.5–1.4)
DIFFERENTIAL METHOD: ABNORMAL
DIFFERENTIAL METHOD: ABNORMAL
EOSINOPHIL # BLD AUTO: 1.3 K/UL (ref 0–0.5)
EOSINOPHIL # BLD AUTO: 1.3 K/UL (ref 0–0.5)
EOSINOPHIL NFR BLD: 7.1 % (ref 0–8)
EOSINOPHIL NFR BLD: 7.1 % (ref 0–8)
ERYTHROCYTE [DISTWIDTH] IN BLOOD BY AUTOMATED COUNT: 12.6 % (ref 11.5–14.5)
ERYTHROCYTE [DISTWIDTH] IN BLOOD BY AUTOMATED COUNT: 12.6 % (ref 11.5–14.5)
EST. GFR  (AFRICAN AMERICAN): 37 ML/MIN/1.73 M^2
EST. GFR  (NON AFRICAN AMERICAN): 32 ML/MIN/1.73 M^2
GLUCOSE SERPL-MCNC: 103 MG/DL (ref 70–110)
HCT VFR BLD AUTO: 25.2 % (ref 40–54)
HCT VFR BLD AUTO: 25.2 % (ref 40–54)
HGB BLD-MCNC: 8.3 G/DL (ref 14–18)
HGB BLD-MCNC: 8.3 G/DL (ref 14–18)
IMM GRANULOCYTES # BLD AUTO: 0.3 K/UL (ref 0–0.04)
IMM GRANULOCYTES # BLD AUTO: 0.3 K/UL (ref 0–0.04)
IMM GRANULOCYTES NFR BLD AUTO: 1.6 % (ref 0–0.5)
IMM GRANULOCYTES NFR BLD AUTO: 1.6 % (ref 0–0.5)
INFLUENZA A, MOLECULAR: NEGATIVE
INFLUENZA B, MOLECULAR: NEGATIVE
LYMPHOCYTES # BLD AUTO: 1.9 K/UL (ref 1–4.8)
LYMPHOCYTES # BLD AUTO: 1.9 K/UL (ref 1–4.8)
LYMPHOCYTES NFR BLD: 10.1 % (ref 18–48)
LYMPHOCYTES NFR BLD: 10.1 % (ref 18–48)
MCH RBC QN AUTO: 30.1 PG (ref 27–31)
MCH RBC QN AUTO: 30.1 PG (ref 27–31)
MCHC RBC AUTO-ENTMCNC: 32.9 G/DL (ref 32–36)
MCHC RBC AUTO-ENTMCNC: 32.9 G/DL (ref 32–36)
MCV RBC AUTO: 91 FL (ref 82–98)
MCV RBC AUTO: 91 FL (ref 82–98)
MONOCYTES # BLD AUTO: 1.7 K/UL (ref 0.3–1)
MONOCYTES # BLD AUTO: 1.7 K/UL (ref 0.3–1)
MONOCYTES NFR BLD: 9.1 % (ref 4–15)
MONOCYTES NFR BLD: 9.1 % (ref 4–15)
NEUTROPHILS # BLD AUTO: 13.2 K/UL (ref 1.8–7.7)
NEUTROPHILS # BLD AUTO: 13.2 K/UL (ref 1.8–7.7)
NEUTROPHILS NFR BLD: 71.5 % (ref 38–73)
NEUTROPHILS NFR BLD: 71.5 % (ref 38–73)
NRBC BLD-RTO: 0 /100 WBC
NRBC BLD-RTO: 0 /100 WBC
PLATELET # BLD AUTO: 369 K/UL (ref 150–450)
PLATELET # BLD AUTO: 369 K/UL (ref 150–450)
PMV BLD AUTO: 9 FL (ref 9.2–12.9)
PMV BLD AUTO: 9 FL (ref 9.2–12.9)
POTASSIUM SERPL-SCNC: 3.9 MMOL/L (ref 3.5–5.1)
PROT SERPL-MCNC: 6.7 G/DL (ref 6–8.4)
RBC # BLD AUTO: 2.76 M/UL (ref 4.6–6.2)
RBC # BLD AUTO: 2.76 M/UL (ref 4.6–6.2)
SODIUM SERPL-SCNC: 135 MMOL/L (ref 136–145)
SPECIMEN SOURCE: NORMAL
URATE SERPL-MCNC: 3 MG/DL (ref 3.4–7)
WBC # BLD AUTO: 18.53 K/UL (ref 3.9–12.7)
WBC # BLD AUTO: 18.53 K/UL (ref 3.9–12.7)

## 2022-05-02 PROCEDURE — 87077 CULTURE AEROBIC IDENTIFY: CPT | Performed by: STUDENT IN AN ORGANIZED HEALTH CARE EDUCATION/TRAINING PROGRAM

## 2022-05-02 PROCEDURE — 25000003 PHARM REV CODE 250: Performed by: ORTHOPAEDIC SURGERY

## 2022-05-02 PROCEDURE — 99024 PR POST-OP FOLLOW-UP VISIT: ICD-10-PCS | Mod: ,,, | Performed by: ORTHOPAEDIC SURGERY

## 2022-05-02 PROCEDURE — 80053 COMPREHEN METABOLIC PANEL: CPT | Performed by: NURSE PRACTITIONER

## 2022-05-02 PROCEDURE — 99024 POSTOP FOLLOW-UP VISIT: CPT | Mod: ,,, | Performed by: ORTHOPAEDIC SURGERY

## 2022-05-02 PROCEDURE — 63600175 PHARM REV CODE 636 W HCPCS: Performed by: NURSE PRACTITIONER

## 2022-05-02 PROCEDURE — 80100014 HC HEMODIALYSIS 1:1

## 2022-05-02 PROCEDURE — 99900035 HC TECH TIME PER 15 MIN (STAT)

## 2022-05-02 PROCEDURE — 85025 COMPLETE CBC W/AUTO DIFF WBC: CPT | Performed by: NURSE PRACTITIONER

## 2022-05-02 PROCEDURE — 63600175 PHARM REV CODE 636 W HCPCS: Performed by: INTERNAL MEDICINE

## 2022-05-02 PROCEDURE — 99232 PR SUBSEQUENT HOSPITAL CARE,LEVL II: ICD-10-PCS | Mod: S$GLB,,, | Performed by: STUDENT IN AN ORGANIZED HEALTH CARE EDUCATION/TRAINING PROGRAM

## 2022-05-02 PROCEDURE — 84550 ASSAY OF BLOOD/URIC ACID: CPT | Performed by: NURSE PRACTITIONER

## 2022-05-02 PROCEDURE — 87186 SC STD MICRODIL/AGAR DIL: CPT | Performed by: STUDENT IN AN ORGANIZED HEALTH CARE EDUCATION/TRAINING PROGRAM

## 2022-05-02 PROCEDURE — 87502 INFLUENZA DNA AMP PROBE: CPT | Performed by: INTERNAL MEDICINE

## 2022-05-02 PROCEDURE — 97605 NEG PRS WND THER DME<=50SQCM: CPT

## 2022-05-02 PROCEDURE — 87075 CULTR BACTERIA EXCEPT BLOOD: CPT | Performed by: STUDENT IN AN ORGANIZED HEALTH CARE EDUCATION/TRAINING PROGRAM

## 2022-05-02 PROCEDURE — 85730 THROMBOPLASTIN TIME PARTIAL: CPT | Mod: 91 | Performed by: INTERNAL MEDICINE

## 2022-05-02 PROCEDURE — 36415 COLL VENOUS BLD VENIPUNCTURE: CPT | Performed by: ORTHOPAEDIC SURGERY

## 2022-05-02 PROCEDURE — 94761 N-INVAS EAR/PLS OXIMETRY MLT: CPT

## 2022-05-02 PROCEDURE — A4216 STERILE WATER/SALINE, 10 ML: HCPCS | Performed by: ORTHOPAEDIC SURGERY

## 2022-05-02 PROCEDURE — 63600175 PHARM REV CODE 636 W HCPCS: Performed by: ORTHOPAEDIC SURGERY

## 2022-05-02 PROCEDURE — 87070 CULTURE OTHR SPECIMN AEROBIC: CPT | Performed by: STUDENT IN AN ORGANIZED HEALTH CARE EDUCATION/TRAINING PROGRAM

## 2022-05-02 PROCEDURE — 63600175 PHARM REV CODE 636 W HCPCS: Performed by: STUDENT IN AN ORGANIZED HEALTH CARE EDUCATION/TRAINING PROGRAM

## 2022-05-02 PROCEDURE — 87040 BLOOD CULTURE FOR BACTERIA: CPT | Performed by: INTERNAL MEDICINE

## 2022-05-02 PROCEDURE — 85730 THROMBOPLASTIN TIME PARTIAL: CPT | Performed by: NURSE PRACTITIONER

## 2022-05-02 PROCEDURE — 25000003 PHARM REV CODE 250: Performed by: STUDENT IN AN ORGANIZED HEALTH CARE EDUCATION/TRAINING PROGRAM

## 2022-05-02 PROCEDURE — 99232 SBSQ HOSP IP/OBS MODERATE 35: CPT | Mod: S$GLB,,, | Performed by: STUDENT IN AN ORGANIZED HEALTH CARE EDUCATION/TRAINING PROGRAM

## 2022-05-02 PROCEDURE — 11000001 HC ACUTE MED/SURG PRIVATE ROOM

## 2022-05-02 PROCEDURE — 94799 UNLISTED PULMONARY SVC/PX: CPT

## 2022-05-02 PROCEDURE — 82330 ASSAY OF CALCIUM: CPT | Performed by: ORTHOPAEDIC SURGERY

## 2022-05-02 PROCEDURE — 25000003 PHARM REV CODE 250: Performed by: NURSE PRACTITIONER

## 2022-05-02 PROCEDURE — 25000003 PHARM REV CODE 250: Performed by: INTERNAL MEDICINE

## 2022-05-02 RX ORDER — FERROUS GLUCONATE 324(38)MG
324 TABLET ORAL 2 TIMES DAILY WITH MEALS
Status: DISCONTINUED | OUTPATIENT
Start: 2022-05-02 | End: 2022-05-11 | Stop reason: HOSPADM

## 2022-05-02 RX ORDER — CALCITONIN SALMON 200 [USP'U]/ML
4 INJECTION, SOLUTION INTRAMUSCULAR; SUBCUTANEOUS 2 TIMES DAILY
Status: COMPLETED | OUTPATIENT
Start: 2022-05-02 | End: 2022-05-02

## 2022-05-02 RX ORDER — HYDROXYZINE PAMOATE 25 MG/1
25 CAPSULE ORAL EVERY 8 HOURS PRN
Status: DISCONTINUED | OUTPATIENT
Start: 2022-05-02 | End: 2022-05-11 | Stop reason: HOSPADM

## 2022-05-02 RX ORDER — HEPARIN SODIUM 5000 [USP'U]/ML
5000 INJECTION, SOLUTION INTRAVENOUS; SUBCUTANEOUS EVERY 8 HOURS
Status: DISCONTINUED | OUTPATIENT
Start: 2022-05-02 | End: 2022-05-11 | Stop reason: HOSPADM

## 2022-05-02 RX ADMIN — MORPHINE SULFATE 2 MG: 4 INJECTION INTRAVENOUS at 09:05

## 2022-05-02 RX ADMIN — HYDROXYZINE PAMOATE 25 MG: 25 CAPSULE ORAL at 12:05

## 2022-05-02 RX ADMIN — HYDRALAZINE HYDROCHLORIDE 25 MG: 25 TABLET, FILM COATED ORAL at 06:05

## 2022-05-02 RX ADMIN — HYDRALAZINE HYDROCHLORIDE 25 MG: 25 TABLET, FILM COATED ORAL at 01:05

## 2022-05-02 RX ADMIN — Medication 324 MG: at 10:05

## 2022-05-02 RX ADMIN — HEPARIN SODIUM 4000 UNITS: 1000 INJECTION, SOLUTION INTRAVENOUS; SUBCUTANEOUS at 11:05

## 2022-05-02 RX ADMIN — Medication 2 ML: at 09:05

## 2022-05-02 RX ADMIN — DOCUSATE SODIUM AND SENNOSIDES 1 TABLET: 8.6; 5 TABLET, FILM COATED ORAL at 09:05

## 2022-05-02 RX ADMIN — CEFTRIAXONE 2 G: 2 INJECTION, SOLUTION INTRAVENOUS at 10:05

## 2022-05-02 RX ADMIN — SEVELAMER CARBONATE 800 MG: 800 TABLET, FILM COATED ORAL at 06:05

## 2022-05-02 RX ADMIN — SEVELAMER CARBONATE 800 MG: 800 TABLET, FILM COATED ORAL at 09:05

## 2022-05-02 RX ADMIN — SEVELAMER CARBONATE 800 MG: 800 TABLET, FILM COATED ORAL at 12:05

## 2022-05-02 RX ADMIN — OXYCODONE AND ACETAMINOPHEN 1 TABLET: 10; 325 TABLET ORAL at 06:05

## 2022-05-02 RX ADMIN — CEFTRIAXONE 2 G: 2 INJECTION, SOLUTION INTRAVENOUS at 12:05

## 2022-05-02 RX ADMIN — EPOETIN ALFA-EPBX 4100 UNITS: 10000 INJECTION, SOLUTION INTRAVENOUS; SUBCUTANEOUS at 11:05

## 2022-05-02 RX ADMIN — MORPHINE SULFATE 2 MG: 4 INJECTION INTRAVENOUS at 03:05

## 2022-05-02 RX ADMIN — METOPROLOL SUCCINATE 25 MG: 25 TABLET, EXTENDED RELEASE ORAL at 09:05

## 2022-05-02 RX ADMIN — HEPARIN SODIUM AND DEXTROSE 18 UNITS/KG/HR: 10000; 5 INJECTION INTRAVENOUS at 03:05

## 2022-05-02 RX ADMIN — HYDRALAZINE HYDROCHLORIDE 25 MG: 25 TABLET, FILM COATED ORAL at 09:05

## 2022-05-02 RX ADMIN — OXYCODONE AND ACETAMINOPHEN 1 TABLET: 10; 325 TABLET ORAL at 10:05

## 2022-05-02 RX ADMIN — CYCLOBENZAPRINE 10 MG: 10 TABLET, FILM COATED ORAL at 10:05

## 2022-05-02 RX ADMIN — CALCITONIN SALMON 326 UNITS: 200 INJECTION, SOLUTION INTRAMUSCULAR; SUBCUTANEOUS at 10:05

## 2022-05-02 RX ADMIN — Medication 2 ML: at 12:05

## 2022-05-02 RX ADMIN — CALCITONIN SALMON 326 UNITS: 200 INJECTION, SOLUTION INTRAMUSCULAR; SUBCUTANEOUS at 03:05

## 2022-05-02 RX ADMIN — HYDROXYZINE PAMOATE 25 MG: 25 CAPSULE ORAL at 10:05

## 2022-05-02 RX ADMIN — VANCOMYCIN HYDROCHLORIDE 1250 MG: 1.25 INJECTION, POWDER, LYOPHILIZED, FOR SOLUTION INTRAVENOUS at 06:05

## 2022-05-02 RX ADMIN — ACETAMINOPHEN 650 MG: 325 TABLET ORAL at 03:05

## 2022-05-02 RX ADMIN — Medication 2 ML: at 06:05

## 2022-05-02 RX ADMIN — OXYCODONE AND ACETAMINOPHEN 1 TABLET: 10; 325 TABLET ORAL at 01:05

## 2022-05-02 RX ADMIN — HEPARIN SODIUM 5000 UNITS: 5000 INJECTION INTRAVENOUS; SUBCUTANEOUS at 09:05

## 2022-05-02 RX ADMIN — Medication 324 MG: at 06:05

## 2022-05-02 NOTE — CARE UPDATE
05/02/22 0923   Patient Assessment/Suction   Level of Consciousness (AVPU) alert   PRE-TX-O2   O2 Device (Oxygen Therapy) room air   SpO2 100 %   Pulse Oximetry Type Intermittent   $ Pulse Oximetry - Multiple Charge Pulse Oximetry - Multiple   Incentive Spirometer   $ Incentive Spirometer Charges done independently per patient   Administration (IS) self-administered

## 2022-05-02 NOTE — PLAN OF CARE
Problem: Adult Inpatient Plan of Care  Goal: Plan of Care Review  Outcome: Ongoing, Progressing     Problem: Adult Inpatient Plan of Care  Goal: Optimal Comfort and Wellbeing  Outcome: Ongoing, Progressing     Problem: Infection  Goal: Absence of Infection Signs and Symptoms  Outcome: Ongoing, Progressing     Problem: Renal Function Impairment (Acute Kidney Injury/Impairment)  Goal: Effective Renal Function  Outcome: Ongoing, Progressing     Problem: Fall Injury Risk  Goal: Absence of Fall and Fall-Related Injury  Outcome: Ongoing, Progressing

## 2022-05-02 NOTE — SUBJECTIVE & OBJECTIVE
Interval History:  Notes reviewed, patient continues to be persistently tachycardic.  He denies any chest pain or shortness of breath.  Workup in progress.  Patient states he feels anxious about his current illness and hemodialysis treatments.  Will start Vistaril p.r.n. to help with anxiety which could be contributing to tachycardia.  Will continue to monitor closely and await further workup.    Review of Systems   Constitutional:  Negative for activity change, appetite change, chills, diaphoresis and fever.   HENT:  Negative for congestion, sore throat and trouble swallowing.    Eyes:  Negative for photophobia and visual disturbance.   Respiratory:  Positive for cough. Negative for chest tightness and shortness of breath.    Cardiovascular:  Negative for chest pain, palpitations and leg swelling.   Gastrointestinal:  Negative for abdominal pain, diarrhea and nausea.   Genitourinary:  Negative for dysuria, flank pain and hematuria.   Musculoskeletal:  Positive for gait problem and myalgias. Negative for back pain.   Skin:  Negative for color change.   Neurological:  Negative for dizziness, weakness and headaches.   Psychiatric/Behavioral:  Negative for agitation, behavioral problems and confusion. The patient is nervous/anxious.    All other systems reviewed and are negative.  Objective:     Vital Signs (Most Recent):  Temp: 97 °F (36.1 °C) (05/02/22 1000)  Pulse: (!) 134 (05/02/22 1300)  Resp: 16 (05/02/22 1328)  BP: 123/89 (05/02/22 1300)  SpO2: 100 % (05/02/22 0923)   Vital Signs (24h Range):  Temp:  [97 °F (36.1 °C)-100.2 °F (37.9 °C)] 97 °F (36.1 °C)  Pulse:  [107-135] 134  Resp:  [16-20] 16  SpO2:  [89 %-100 %] 100 %  BP: (123-196)/() 123/89     Weight: 81.3 kg (179 lb 3.7 oz)  Body mass index is 26.47 kg/m².    Intake/Output Summary (Last 24 hours) at 5/2/2022 1401  Last data filed at 5/2/2022 0600  Gross per 24 hour   Intake 500 ml   Output 2150 ml   Net -1650 ml        Physical Exam  Vitals and  nursing note reviewed.   Constitutional:       General: He is not in acute distress.     Appearance: Normal appearance. He is normal weight. He is not ill-appearing, toxic-appearing or diaphoretic.   HENT:      Head: Normocephalic.      Nose: Nose normal. No congestion or rhinorrhea.      Mouth/Throat:      Mouth: Mucous membranes are moist.      Pharynx: Oropharynx is clear. No oropharyngeal exudate or posterior oropharyngeal erythema.   Eyes:      General: No scleral icterus.     Extraocular Movements: Extraocular movements intact.      Pupils: Pupils are equal, round, and reactive to light.   Cardiovascular:      Rate and Rhythm: Regular rhythm. Tachycardia present.      Pulses: Normal pulses.      Heart sounds: Normal heart sounds. No murmur heard.  Pulmonary:      Effort: Pulmonary effort is normal. No respiratory distress.      Breath sounds: Normal breath sounds. No stridor. No wheezing, rhonchi or rales.   Abdominal:      General: Bowel sounds are normal. There is no distension.      Palpations: Abdomen is soft.      Tenderness: There is no abdominal tenderness.   Musculoskeletal:         General: Swelling and tenderness present. Normal range of motion.      Cervical back: Normal range of motion.      Right upper leg: Swelling present.      Right lower leg: Swelling present.      Comments: Right leg wound vac in place and drsg CDI.  Soft tissue mass to left a and is firm and tender to palpation, no surrounding erythema, fluctuance or open wound.   Skin:     General: Skin is warm and dry.   Neurological:      Mental Status: He is alert and oriented to person, place, and time. Mental status is at baseline.   Psychiatric:         Mood and Affect: Mood normal.         Behavior: Behavior normal.         Thought Content: Thought content normal.       Significant Labs: All pertinent labs within the past 24 hours have been reviewed.  CBC:   Recent Labs   Lab 05/01/22  0503 05/01/22  1103 05/02/22  0410   WBC 14.75*  16.38*  16.38* 18.53*  18.53*   HGB 8.4* 8.3*  8.3* 8.3*  8.3*   HCT 26.6* 25.4*  25.4* 25.2*  25.2*    359  359 369  369       CMP:   Recent Labs   Lab 05/01/22  0503 05/02/22  0410    135*   K 4.6 3.9    99   CO2 24 24   GLU 96 103   BUN 35* 39*   CREATININE 2.7* 2.7*   CALCIUM 15.5* 14.9*   PROT 6.9 6.7   ALBUMIN 2.4* 2.3*   BILITOT 0.5 0.5   ALKPHOS 94 100   AST 38 42*   ALT 15 19   ANIONGAP 10 12   EGFRNONAA 32* 32*       Microbiology Results (last 7 days)       Procedure Component Value Units Date/Time    Blood culture [[id number]] Collected: 05/02/22 0410    Order Status: Sent Specimen: Blood from Antecubital, Right Arm Updated: 05/02/22 1023    Influenza A & B by Molecular [[id number]] Collected: 05/02/22 0900    Order Status: Completed Specimen: Nasopharyngeal Swab Updated: 05/02/22 1021     Influenza A, Molecular Negative     Influenza B, Molecular Negative     Flu A & B Source Nasal swab    Blood culture [[id number]] Collected: 05/01/22 1216    Order Status: Completed Specimen: Blood Updated: 05/02/22 0115     Blood Culture, Routine No Growth to date    Narrative:      Collection has been rescheduled by MR at 05/01/2022 11:22 Reason:   Patient unavailable starting dialysis and with the dr     Collection has been rescheduled by MRQ1 at 05/01/2022 11:42 Reason:   Got first set second set due after 12 couldnt stick other side due to   IV   Collection has been rescheduled by MRQ1 at 05/01/2022 11:22 Reason:   Patient unavailable starting dialysis and with the dr     Collection has been rescheduled by MR at 05/01/2022 11:42 Reason:   Got first set second set due after 12 couldnt stick other side due to   IV     Blood culture [[id number]] Collected: 05/01/22 1140    Order Status: Completed Specimen: Blood Updated: 05/02/22 0115     Blood Culture, Routine No Growth to date    Narrative:      Collection has been rescheduled by MRQ1 at 05/01/2022 11:22 Reason:   Patient unavailable  starting dialysis and with the dr   Collection has been rescheduled by CONCHITA at 05/01/2022 11:22 Reason:   Patient unavailable starting dialysis and with the dr     Urine culture [[id number]] Collected: 05/01/22 2211    Order Status: No result Specimen: Urine Updated: 05/01/22 2237          Significant Imaging:   X-ray lumbar spine: Normal study.    Right tibia and fibula: Normal study.    Right femur x-ray: Normal study.    Pelvis x-ray: Normal study.    CT Head: No acute abnormality.  Minimal sinus disease.    CT Maxillofacial scan: No facial bone fracture.  Minimal sinus disease.    RLE venous doppler scan: No evidence of deep venous thrombosis in the right lower extremity.    CXR: Central line placement with the tip over the SVC.  No acute detrimental changes.    RLE venous doppler scan:  No evidence of deep venous thrombosis in the right lower extremity. Edema in the soft tissues of the right lower extremity.    CXR:   No acute process.  Right IJ catheter in place.

## 2022-05-02 NOTE — PROGRESS NOTES
1:14pm-  spoke with Sosa at [facility]. Patient denied LTAC placement.    1:45pm-  submitted secure chat to Gladys with [facility]. Patient denied LTAC placement at that location.    1:55pm-  submitted LTAC referrals to [facility] and [facility] via [facility].

## 2022-05-02 NOTE — PROGRESS NOTES
Consult Note  Infectious Disease    Reason for Consult:      HPI: Agus Horan is a 21 y.o. male with PMHx of mild intermittent asthma, allergies, oxycodone overdose on 04/09/2022, left eye pain after a fall 04/10/2022, came to the hospital on 04/13/2022 with complaints of right leg pain.    Ultrasound ruled out DVT  X-ray ruled out foreign body and fractures  He had right leg compartment syndrome, rhabdomyolysis and SANDRA     He was seen by Dr. Fraser with Nephrology and Dr. Freedman with Orthopedic surgery.    Patient was taken for fasciotomy on  04/13/2022, then debridement and closure on 04/18/2022; then  debridement deep right lateral fasciotomy incision right lower leg with excisional  debridement of dead anterior tibialis muscle and biopsy of muscle application of wound VAC right lower leg  on 04/25/2022  There are no cultures sent intraoperatively  Blood cultures are negative on 04/13/2022 and 05/01/2022    I came and saw patient.  He has a temperature of 100.2°.  WBC 16. He is on cefazolin since 04/13/2022(with an interruptions or 4 days from 04/19--4/22)  He feels well, he feels normal.  He is just anxious bc  of SANDRA. He does not want ot be on HD fr the rest of his life  Hospitalist is concerned of tachycardia and poss PE and is giving heparin drip.     INTERVAL HISTORY:  5/2 ([place]): Interim reviewed, discussed with Dr Perales, patient seen and examined at bedside, covered in bed-sheet. States he has a growing lesion on his left AC. Having HD a bedside. Persistent tachycardia, hypertension, afebrile. Labs reviewed, WBC: 18.5, PMN 71.5%, H/H 8.3/25.2, plt: 369. Iron 10, ferritin 1.010. ESR 79, .9, calcium 14.9. D-dimer 3.3. Procal 0.81. Micro reviewed, negative thus far, no cultures sent from the OR.       Antibiotics (From admission, onward)                Start     Stop Route Frequency Ordered    05/01/22 6409  cefTRIAXone (ROCEPHIN) 2 g/50 mL D5W IVPB         -- IV Every 24 hours (non-standard  times) 05/01/22 2239          Review of patient's allergies indicates:   Allergen Reactions    Shrimp      Past Medical History:   Diagnosis Date    Allergy     AR    Asthma     mild intermittent    Hyperkalemia 4/14/2022     Past Surgical History:   Procedure Laterality Date    DEBRIDEMENT OF LOWER EXTREMITY Right 4/18/2022    Procedure: DEBRIDEMENT, LOWER EXTREMITY;  Surgeon: Leonardo Byers MD;  Location: [facility] OR;  Service: Orthopedics;  Laterality: Right;    FASCIOTOMY Right 4/13/2022    Procedure: FASCIOTOMY;  Surgeon: Leonardo Byers MD;  Location: [facility] OR;  Service: Orthopedics;  Laterality: Right;    FASCIOTOMY Right 4/25/2022    Procedure: FASCIOTOMY;  Surgeon: Leonardo Byers MD;  Location: [facility] OR;  Service: Orthopedics;  Laterality: Right;    REMOVAL OF FOREIGN BODY FROM FOOT Left 6/24/2020    Procedure: REMOVAL, FOREIGN BODY, FOOT;  Surgeon: Dani Garcia MD;  Location: [facility] OR;  Service: Orthopedics;  Laterality: Left;     Social History     Socioeconomic History    Marital status: Single   Tobacco Use    Smoking status: Never Smoker    Smokeless tobacco: Never Used   Substance and Sexual Activity    Alcohol use: Yes     Comment: last night    Drug use: Yes     Frequency: 2.0 times per week     Types: Marijuana     Comment: yesterday   Social History Narrative    ** Merged History Encounter **         Lives with mom, 2 brothers.  No smokers.  +Dogs/chickens.  7th grader.     Family History   Problem Relation Age of Onset    Asthma Brother     Emphysema Maternal Grandmother     Hyperlipidemia Maternal Grandmother     Asthma Maternal Grandmother     Arthritis Maternal Grandmother     COPD Maternal Grandmother     Asthma Brother          Review of Systems:   Patient with drug use before admission. 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 Alert and attentive, cooperative, comfortable  Eyes:  Anicteric, PERRL, EOMI  ENT:  No ulcers, exudates, thrush, nares patent, dentition is fair  Neck:  supple, no masses or adenopathy appreciated  Lungs: Clear to auscultation b/l   Heart:  Tachycardia, S1/S2+, no murmur   Abd:  Soft, NT, ND, normal BS, no masses or organomegaly appreciated.  :  Voids, dark urine   Musc:  Other than  Right leg dressing in place with wound vac, not disturbed. Joints without effusion, swelling, erythema, synovitis, muscle wasting.   Skin:  No rashes. No palmar or plantar lesions. No subungual petechiae  Neuro:   Alert, attentive, speech fluent, face symmetric, moves all extremities, no focal weakness  Psych:  Calm, cooperative  Lymphatic:       Extrem: No edema, erythema, phlebitis, cellulitis, warm and well perfused  VAD:  R tunneled catheter    Isolation:  none  Wound:                        General Labs reviewed:  Recent Labs   Lab 05/01/22  0503 05/01/22  1103 05/02/22  0410   WBC 14.75* 16.38*  16.38* 18.53*  18.53*   HGB 8.4* 8.3*  8.3* 8.3*  8.3*   HCT 26.6* 25.4*  25.4* 25.2*  25.2*    359  359 369  369       Recent Labs   Lab 04/30/22  0304 05/01/22  0503 05/02/22  0410    136 135*   K 4.8 4.6 3.9    102 99   CO2 26 24 24   BUN 40* 35* 39*   CREATININE 3.3* 2.7* 2.7*   CALCIUM 15.1* 15.5* 14.9*   PROT 6.8 6.9 6.7   BILITOT 0.3 0.5 0.5   ALKPHOS 92 94 100   ALT 14 15 19   AST 37 38 42*     Recent Labs   Lab 05/01/22  1457   .9*          Micro:  Microbiology Results (last 7 days)       Procedure Component Value Units Date/Time    Blood culture [[id number]] Collected: 05/02/22 0410    Order Status: Sent Specimen: Blood from Antecubital, Right Arm Updated: 05/02/22 0411    Blood culture [[id number]] Collected: 05/01/22 1216    Order Status: Completed Specimen: Blood Updated: 05/02/22 0115     Blood Culture, Routine No Growth to date    Narrative:      Collection has been rescheduled by MR at 05/01/2022 11:22 Reason:   Patient unavailable starting dialysis and with the dr     Collection has been rescheduled by MR at 05/01/2022 11:42 Reason:   Got first set second set due after 12 couldnt stick other side due to   IV   Collection has been rescheduled by MR at 05/01/2022 11:22 Reason:   Patient unavailable starting dialysis and with the dr     Collection has been rescheduled by MR at 05/01/2022 11:42 Reason:   Got first set second set due after 12 couldnt stick other side due to   IV     Blood culture [[id number]] Collected: 05/01/22 1140    Order Status: Completed Specimen: Blood Updated: 05/02/22 0115     Blood Culture, Routine No Growth to date    Narrative:      Collection has been rescheduled by MR at 05/01/2022 11:22 Reason:   Patient unavailable starting dialysis and with the dr   Collection has been rescheduled by MR at 05/01/2022 11:22 Reason:   Patient unavailable starting dialysis and with the dr     Urine culture [[id number]] Collected: 05/01/22 2211    Order Status: No result Specimen: Urine Updated: 05/01/22 2237    Influenza A & B by Molecular [[id number]]     Order Status: No result Specimen: Nasopharyngeal Swab             Imaging Reviewed:   CXR-- No definite acute radiographic abnormality.  Right internal jugular central venous catheter in place.   CT  UltrasoundNo evidence of deep venous thrombosis in either lower extremity, noting nonvisualization of the right calf veins due to overlying  bandaging.      Cardiology:    IMPRESSION & PLAN     1. History of R leg compartment syndrome; s/p multiple I&Ds, on cefazolin prophylactic   ESR and cRP elevated    2. Rhabdomyolysis due to compartment syndrome.  Resolved    3. SANDRA due to rhabdomyolysis, improved   Hypercalcemia, 14  4. Vitamin-D deficiency, Anemia, protein malnutrition, hypoalbuminemia    5.Shortness of breath with mild hypoxemia, pulse ox ranging between 90-94 on room air.on heparin bc   Awaiting V/Q scan    Recommendations:  Continue Ceftriaxone 2g IV q24h  Follow uric acid for L AC fossa lesion - calcium vs tophaceous deposit?  Follow blood cultures, UA and urine culture  Follow ECHO   Will try getting cultures at bedside today   Will follow cultures     D/w NP, wound care, Dr Phillips       Medical Decision Making during this encounter was  [_] Low Complexity  [_] Moderate Complexity  [  ] High Complexity

## 2022-05-02 NOTE — PROGRESS NOTES
3:07pm-  contacted by Keli at [facility]. No HD beds at this time. She will continue to follow if bed available prior to patient dc.

## 2022-05-02 NOTE — PROGRESS NOTES
Pharmacokinetic Initial Assessment: IV Vancomycin    Assessment/Plan:    Initiate intravenous vancomycin with loading dose of 1250 mg once   Desired empiric serum trough concentration is 10 to 15 mcg/mL  Draw vancomycin random level on 5/3 at 1645.  Pharmacy will continue to follow and monitor vancomycin.      Please contact pharmacy at extension [phone] with any questions regarding this assessment.     Thank you for the consult,   Venita So       Patient brief summary:  Agus Horan is a 21 y.o. male initiated on antimicrobial therapy with IV Vancomycin for treatment of suspected skin & soft tissue infection    Drug Allergies:   Review of patient's allergies indicates:   Allergen Reactions    Shrimp        Actual Body Weight:   81.2 kg    Renal Function:   Estimated Creatinine Clearance: 42.6 mL/min (A) (based on SCr of 2.7 mg/dL (H)).,     Dialysis Method (if applicable):  intermittent HD    CBC (last 72 hours):  Recent Labs   Lab Result Units 04/30/22  0304 05/01/22  0503 05/01/22  1103 05/02/22  0410   WBC K/uL 14.63* 14.75* 16.38*  16.38* 18.53*  18.53*   Hemoglobin g/dL 8.3* 8.4* 8.3*  8.3* 8.3*  8.3*   Hematocrit % 26.0* 26.6* 25.4*  25.4* 25.2*  25.2*   Platelets K/uL 344 370 359  359 369  369   Gran % % 76.4* 75.5* 77.7*  77.7* 71.5  71.5   Lymph % % 8.6* 8.2* 8.5*  8.5* 10.1*  10.1*   Mono % % 7.7 10.2 9.3  9.3 9.1  9.1   Eosinophil % % 4.4 3.6 2.5  2.5 7.1  7.1   Basophil % % 0.4 0.5 0.5  0.5 0.6  0.6   Differential Method  Automated Automated Automated  Automated Automated  Automated       Metabolic Panel (last 72 hours):  Recent Labs   Lab Result Units 04/30/22  0304 05/01/22  0503 05/01/22  2211 05/02/22  0410   Sodium mmol/L 137 136  --  135*   Potassium mmol/L 4.8 4.6  --  3.9   Chloride mmol/L 102 102  --  99   CO2 mmol/L 26 24  --  24   Glucose mg/dL 99 96  --  103   Glucose, UA   --   --  Negative  --    BUN mg/dL 40* 35*  --  39*   Creatinine mg/dL 3.3* 2.7*  --  2.7*    Albumin g/dL 2.4* 2.4*  --  2.3*   Total Bilirubin mg/dL 0.3 0.5  --  0.5   Alkaline Phosphatase U/L 92 94  --  100   AST U/L 37 38  --  42*   ALT U/L 14 15  --  19   Magnesium mg/dL 1.8  --   --   --    Phosphorus mg/dL 7.1*  --   --   --        Drug levels (last 3 results):  No results for input(s): VANCOMYCINRA, VANCOMYCINPE, VANCOMYCINTR in the last 72 hours.    Microbiologic Results:  Microbiology Results (last 7 days)       Procedure Component Value Units Date/Time    Blood culture [[id number]] Collected: 05/02/22 0410    Order Status: Completed Specimen: Blood from Antecubital, Right Arm Updated: 05/02/22 1715     Blood Culture, Routine No Growth to date    Aerobic culture [[id number]] Collected: 05/02/22 1400    Order Status: Sent Specimen: Wound from Leg, Right Updated: 05/02/22 1441    Culture, Anaerobic [[id number]] Collected: 05/02/22 1400    Order Status: Sent Specimen: Wound from Leg, Right Updated: 05/02/22 1441    Influenza A & B by Molecular [[id number]] Collected: 05/02/22 0900    Order Status: Completed Specimen: Nasopharyngeal Swab Updated: 05/02/22 1021     Influenza A, Molecular Negative     Influenza B, Molecular Negative     Flu A & B Source Nasal swab    Blood culture [[id number]] Collected: 05/01/22 1216    Order Status: Completed Specimen: Blood Updated: 05/02/22 0115     Blood Culture, Routine No Growth to date    Narrative:      Collection has been rescheduled by MR at 05/01/2022 11:22 Reason:   Patient unavailable starting dialysis and with the dr     Collection has been rescheduled by MR at 05/01/2022 11:42 Reason:   Got first set second set due after 12 couldnt stick other side due to   IV   Collection has been rescheduled by MRQ1 at 05/01/2022 11:22 Reason:   Patient unavailable starting dialysis and with the dr     Collection has been rescheduled by MR at 05/01/2022 11:42 Reason:   Got first set second set due after 12 couldnt stick other side due to   IV     Blood culture  [[id number]] Collected: 05/01/22 1140    Order Status: Completed Specimen: Blood Updated: 05/02/22 0115     Blood Culture, Routine No Growth to date    Narrative:      Collection has been rescheduled by MRQ1 at 05/01/2022 11:22 Reason:   Patient unavailable starting dialysis and with the dr   Collection has been rescheduled by MRQ1 at 05/01/2022 11:22 Reason:   Patient unavailable starting dialysis and with the dr     Urine culture [[id number]] Collected: 05/01/22 2211    Order Status: No result Specimen: Urine Updated: 05/01/22 2237

## 2022-05-02 NOTE — ASSESSMENT & PLAN NOTE
Continues to be anuric  Hemodialysis therapy as per Nephrology team.   due to rhabdomyolysis  Trend CPK daily.  Follow Nephrology recommendations.  Avoid nephrotoxins  Telemetry.  Hemo split catheter was placed on April 25, 2022.     4/30 - remains on hemodialysis, emergent dialysis today secondary to hypercalcemia.  Will continue to monitor closely  5/1 - HD again today secondary to hypercalcemia. Renal function improving. Cont to monitor closely.  5/2 - hemodialysis treatment again today.  Continue to monitor

## 2022-05-02 NOTE — NURSING
Follow up with wound vac care with Dr. Byers at the bedside. Assessment of the right lower lateral leg wound noted with blood and purulent drainage noted from the medial aspect of the wound. Wound cultures were obtained from this area x 2 samples per Dr. Byers at bedside. Irrigated the wound with normal saline. Applied Urgotul silver mesh dressing cut to cover the wound bed and then applied the black foam including the area of undermining at the medial wound aspect and obtained seal with wound vac drape. Patient tolerated well. Wound care to continue to follow this patient with Dr. Byers.

## 2022-05-02 NOTE — PT/OT/SLP PROGRESS
Physical Therapy      Patient Name:  Agus Horan   MRN:  [id number]    Patient not seen today secondary to Dialysis, Increased agitation. Dialysis nurse reports pt c/o increased heart rate and requesting anxiety meds. Will follow-up tomorrow.

## 2022-05-02 NOTE — PROGRESS NOTES
INPATIENT NEPHROLOGY Progress Note  [facility]    Patient Name: Agus Horan  Date: 05/02/2022    Reason for consultation: SANDRA    Chief Complaint:   Chief Complaint   Patient presents with    Leg Pain     Pain in the right leg muscle calf is hard, nausea vomiting, patient was seen here over the weekend for an overdose        History of Present Illness:  20 y/o M with a history of asthma recently here on 4/9 with oxycodone overdose who p/w RLE pain and swelling after a fall on 4/10. He reports severe pain, constant, associated with nausea with inability to bear weight.  He took meloxicam without relief. He was found to have compartment syndrome and was taking to the OR emergently for fasciotomy on 4/13. We are consulted for SANDRA with metabolic derangements.    Interval History:  4/14- did emergent HD overnight for hyperkalemia and metabolic acidosis refractory to medical management; pain controlled, RLE wrapped, no edema in LLE  4/15- worsening pain/edema in RLE- going for MRI- oligoanuric- stop NS IVFs- will do HD/UF today and tomorrow  4/16  Seen on dialysis.  No distress  4/17  Remains oliguric.  AFVSS.  Has back pain.  Leg pain a little better  4/18  In the OR.  Still oliguric.    4/19  Seen on dialysis.  No distress.    4/20  Still not making much urine.  cpk coming down.     4/21  Afebrile.  BP a little better.  uop up a bit  422   Sleeping.  AFVSS.  I/Os not appropriately recorded despite being ordered for twice  4/23  225 cc UOP recorded.  K+ 5.5, HD today.  No renal recovery, CPK improving.  C/o rash, Dr. Zamorano at bedside placed orders.  Seen on dialysis, tolerating tx well.  4/24   No new lab results, next lab draw in am.   Still has rash and c/o itching.  No other complaints.  450 cc UOP recorded.  4/25  In the OR.  Plan for hd today  4/26  AFVSS.  Urine output better.  No complaints specified today  4/27  Sleeping.  Output not recorded despite being ordered  4/28  Tearful.  Not  engaging when spoken too.  Requesting dilaudid   4/29     Good urine output.  Seen on dialysis.  No distress  4/30  UOP 4.4L.  Ca+ 15, will have dialysis today, stopped calcitriol and vit D.  Will add on PTH to labs.    5/1  Ca+ 15.5, PTH suppressed.  D/w Dr. Patrick, pt to have additional dialysis today, no UF.  Notified Suni Stone w/[facility] pt needs to run today.  Pt w/elevated temp, a little more tachy than usual, not feeling very well.  5/2 tachy, hypertensive, on RA, UOP 2.6L, remains hypercalcemic- PTH appropriately suppressed, Dr. Byers is at bedside    Plan of Care:    Assessment:  Traumatic rhabdomyolysis with compartment syndrome s/p fasciotomy on 4/13  SANDRA due to toxic ATN s/p emergent HD on 4/14- remains HD dependent  Elevated BP  Hypercalcemia  Hyperphosphatemia  Anemia    Plan:    - Had daily HD over the weekend due to hypercalcemia- space back to [facility]- ordered 2 doses of calcitonin- suspect bone release- PTH is appropriately suppressed- will keep off calcium/vitamin D supplements.  - Control pain- I think this is driving up BP- continue UF with HD.  - Continue non calcium based binder with meals.  - H/H stable- continue iron supplement and ALBERTO with HD.     Thank you for allowing us to participate in this patient's care. We will continue to follow.    Vital Signs:  Temp Readings from Last 3 Encounters:   05/02/22 97 °F (36.1 °C)   04/10/22 97.3 °F (36.3 °C)   04/09/22 98.4 °F (36.9 °C)       Pulse Readings from Last 3 Encounters:   05/02/22 (!) 126   04/10/22 63   04/09/22 95       BP Readings from Last 3 Encounters:   05/02/22 (!) 183/94   04/10/22 117/64   04/09/22 (!) 143/82       Weight:  Wt Readings from Last 3 Encounters:   04/20/22 81.3 kg (179 lb 3.7 oz)   04/10/22 77.1 kg (170 lb)   04/09/22 77.1 kg (170 lb)       INS/OUTS:  I/O last 3 completed shifts:  In: 1240 [P.O.:740; Other:500]  Out: 4225 [Urine:3725; Other:500]  No intake/output data recorded.    Medications:  Scheduled Meds:    "sodium chloride 0.9%   Intravenous Once    cefTRIAXone (ROCEPHIN) IVPB  2 g Intravenous Q24H    epoetin leidy-epbx  50 Units/kg Intravenous Every Mon, Wed, Fri    ferrous gluconate  324 mg Oral BID WM    hydrALAZINE  25 mg Oral Q8H    LIDOcaine HCL 2%   Topical (Top) Every Mon, Thurs    metoprolol succinate  25 mg Oral Daily    senna-docusate 8.6-50 mg  1 tablet Oral BID    sevelamer carbonate  800 mg Oral TID WM    sodium chloride 0.9%  2 mL Intravenous Q6H     Continuous Infusions:   electrolyte-S (pH 7.4) Stopped (04/25/22 1255)    heparin (porcine) in D5W 18 Units/kg/hr (05/02/22 0313)    loperamide       PRN Meds:.acetaminophen, cyclobenzaprine, dextrose 10%, dextrose 10%, diphenhydrAMINE, glucagon (human recombinant), glucose, glucose, heparin (porcine), heparin (PORCINE), heparin (PORCINE), hydrOXYzine pamoate, labetaloL, loperamide, morphine, naloxone, ondansetron, oxyCODONE-acetaminophen, phenyleph-min oil-petrolatum, sodium chloride 0.9%  No current facility-administered medications on file prior to encounter.     Current Outpatient Medications on File Prior to Encounter   Medication Sig Dispense Refill    naloxone (NARCAN) 4 mg/actuation Spry 4mg by nasal route as needed for opioid overdose; may repeat every 2-3 minutes in alternating nostrils until medical help arrives. Call 911 2 each 0       Review of Systems:  Neg    Physical Exam:  BP (!) 183/94   Pulse (!) 126   Temp 97 °F (36.1 °C)   Resp 18   Ht 5' 9" (1.753 m)   Wt 81.3 kg (179 lb 3.7 oz)   SpO2 100%   BMI 26.47 kg/m²     Constitutional: nad, aao x 3, depressed affect  Heart: rrr, no m/r/g, wwp, RLE edema  Lungs: ctab, no w/r/r/c, no lb  Abdomen: s/nt/nd, +BS    Results:  Lab Results   Component Value Date     (L) 05/02/2022    K 3.9 05/02/2022    CL 99 05/02/2022    CO2 24 05/02/2022    BUN 39 (H) 05/02/2022    CREATININE 2.7 (H) 05/02/2022    CALCIUM 14.9 (HH) 05/02/2022    ANIONGAP 12 05/02/2022    ESTGFRAFRICA 37 (A) " 05/02/2022    EGFRNONAA 32 (A) 05/02/2022       Lab Results   Component Value Date    CALCIUM 14.9 (HH) 05/02/2022    PHOS 7.1 (H) 04/30/2022       Recent Labs   Lab 05/02/22  0410   WBC 18.53*  18.53*   RBC 2.76*  2.76*   HGB 8.3*  8.3*   HCT 25.2*  25.2*     369   MCV 91  91   MCH 30.1  30.1   MCHC 32.9  32.9       I have personally reviewed pertinent radiological imaging and reports.    I have spent > 35 minutes providing care for this patient for the above diagnoses. These services have included chart/data/imaging review, evaluation, exam, formulation of plan, , note preparation, and discussions with staff involved in this patient's care.    Chantelle Fraser MD    Nephrology  [facility]  [phone]

## 2022-05-02 NOTE — PROGRESS NOTES
[facility] Hospital Medicine  Progress Note    Patient Name: Agus Horan  MRN: [id number]  Patient Class: IP- Inpatient   Admission Date: 4/13/2022  Length of Stay: 19 days  Attending Physician: Brent Rosales MD  Primary Care Provider: Primary Doctor No        Subjective:     Principal Problem:Acute renal failure        HPI:  Agus Horan is a 22 y/o male with PMHx of mild asthma who presents with right lower extremity pain and swelling x few days. Patient was seen in the ED on 4/9/22 for oxycodone overdose and again on 4/10/22 for left eye pain after falling on a piece of furniture. Patient states he is unsure how he injured his leg and denies IV drug use. Xray of his right leg did not show acute fracture. Patietn states RLE pain and swelling increased over the past few days and he is unable to walk secondary to pain. US of his lower extremity performed today did not show DVT. Lab work revealed elevated Creatinine 13.5 & K+ 7.1, elevated AST/ALT 3980/1611 & WBC 18.14.  CPK>40,0000.  Patient unable to dorsiflex and plantar flex his RLE and symptoms are concerning for compartment syndrome per ED. ED provider discussed with Dr. Byers and Dr. Fraser, nephrology. Patient was referred to hospital medicine and seen in the ED with his friend at bedside. Patient complaining of RLE pain, tenderness and swelling. He denies SOB, N/V/D, chest pain and headaches.  Patient will be admitted to hospital medicine for orthopedic consultation and further management.      Overview/Hospital Course:  He was found to have compartment syndrome and was taking to the OR emergently for fasciotomy on 4/13. We are consulted for SANDRA with metabolic derangements.  Cpk at the time of arrival was more than 40,000. Initially was started on IV fluids and started on hemodialysis. on 4/14- did emergent HD overnight for hyperkalemia and metabolic acidosis refractory to medical management; pain controlled, patient underwent  DEBRIDEMENT, LOWER EXTREMITY (Right)   closure of medial fasciotomy incision right leg debridement of lateral fasciotomy with biopsy of anterior muscle compartment removal of deep muscle anterior compartment right lower leg on 04/18/2022.  Patient's CPK level continue to trend down.  Patient underwent debridement deep right lateral fasciotomy incision right lower leg with excisional  debridement of dead anterior tibialis muscle and biopsy of muscle application of wound VAC right lower leg  on April 25, 2022.       Interval History:  Notes reviewed, patient continues to be persistently tachycardic.  He denies any chest pain or shortness of breath.  Workup in progress.  Patient states he feels anxious about his current illness and hemodialysis treatments.  Will start Vistaril p.r.n. to help with anxiety which could be contributing to tachycardia.  Will continue to monitor closely and await further workup.    Review of Systems   Constitutional:  Negative for activity change, appetite change, chills, diaphoresis and fever.   HENT:  Negative for congestion, sore throat and trouble swallowing.    Eyes:  Negative for photophobia and visual disturbance.   Respiratory:  Positive for cough. Negative for chest tightness and shortness of breath.    Cardiovascular:  Negative for chest pain, palpitations and leg swelling.   Gastrointestinal:  Negative for abdominal pain, diarrhea and nausea.   Genitourinary:  Negative for dysuria, flank pain and hematuria.   Musculoskeletal:  Positive for gait problem and myalgias. Negative for back pain.   Skin:  Negative for color change.   Neurological:  Negative for dizziness, weakness and headaches.   Psychiatric/Behavioral:  Negative for agitation, behavioral problems and confusion. The patient is nervous/anxious.    All other systems reviewed and are negative.  Objective:     Vital Signs (Most Recent):  Temp: 97 °F (36.1 °C) (05/02/22 1000)  Pulse: (!) 134 (05/02/22 1300)  Resp: 16  (05/02/22 1328)  BP: 123/89 (05/02/22 1300)  SpO2: 100 % (05/02/22 0923)   Vital Signs (24h Range):  Temp:  [97 °F (36.1 °C)-100.2 °F (37.9 °C)] 97 °F (36.1 °C)  Pulse:  [107-135] 134  Resp:  [16-20] 16  SpO2:  [89 %-100 %] 100 %  BP: (123-196)/() 123/89     Weight: 81.3 kg (179 lb 3.7 oz)  Body mass index is 26.47 kg/m².    Intake/Output Summary (Last 24 hours) at 5/2/2022 1401  Last data filed at 5/2/2022 0600  Gross per 24 hour   Intake 500 ml   Output 2150 ml   Net -1650 ml        Physical Exam  Vitals and nursing note reviewed.   Constitutional:       General: He is not in acute distress.     Appearance: Normal appearance. He is normal weight. He is not ill-appearing, toxic-appearing or diaphoretic.   HENT:      Head: Normocephalic.      Nose: Nose normal. No congestion or rhinorrhea.      Mouth/Throat:      Mouth: Mucous membranes are moist.      Pharynx: Oropharynx is clear. No oropharyngeal exudate or posterior oropharyngeal erythema.   Eyes:      General: No scleral icterus.     Extraocular Movements: Extraocular movements intact.      Pupils: Pupils are equal, round, and reactive to light.   Cardiovascular:      Rate and Rhythm: Regular rhythm. Tachycardia present.      Pulses: Normal pulses.      Heart sounds: Normal heart sounds. No murmur heard.  Pulmonary:      Effort: Pulmonary effort is normal. No respiratory distress.      Breath sounds: Normal breath sounds. No stridor. No wheezing, rhonchi or rales.   Abdominal:      General: Bowel sounds are normal. There is no distension.      Palpations: Abdomen is soft.      Tenderness: There is no abdominal tenderness.   Musculoskeletal:         General: Swelling and tenderness present. Normal range of motion.      Cervical back: Normal range of motion.      Right upper leg: Swelling present.      Right lower leg: Swelling present.      Comments: Right leg wound vac in place and drsg CDI.  Soft tissue mass to left a and is firm and tender to palpation,  no surrounding erythema, fluctuance or open wound.   Skin:     General: Skin is warm and dry.   Neurological:      Mental Status: He is alert and oriented to person, place, and time. Mental status is at baseline.   Psychiatric:         Mood and Affect: Mood normal.         Behavior: Behavior normal.         Thought Content: Thought content normal.       Significant Labs: All pertinent labs within the past 24 hours have been reviewed.  CBC:   Recent Labs   Lab 05/01/22  0503 05/01/22  1103 05/02/22  0410   WBC 14.75* 16.38*  16.38* 18.53*  18.53*   HGB 8.4* 8.3*  8.3* 8.3*  8.3*   HCT 26.6* 25.4*  25.4* 25.2*  25.2*    359  359 369  369       CMP:   Recent Labs   Lab 05/01/22  0503 05/02/22  0410    135*   K 4.6 3.9    99   CO2 24 24   GLU 96 103   BUN 35* 39*   CREATININE 2.7* 2.7*   CALCIUM 15.5* 14.9*   PROT 6.9 6.7   ALBUMIN 2.4* 2.3*   BILITOT 0.5 0.5   ALKPHOS 94 100   AST 38 42*   ALT 15 19   ANIONGAP 10 12   EGFRNONAA 32* 32*       Microbiology Results (last 7 days)       Procedure Component Value Units Date/Time    Blood culture [[id number]] Collected: 05/02/22 0410    Order Status: Sent Specimen: Blood from Antecubital, Right Arm Updated: 05/02/22 1023    Influenza A & B by Molecular [[id number]] Collected: 05/02/22 0900    Order Status: Completed Specimen: Nasopharyngeal Swab Updated: 05/02/22 1021     Influenza A, Molecular Negative     Influenza B, Molecular Negative     Flu A & B Source Nasal swab    Blood culture [[id number]] Collected: 05/01/22 1216    Order Status: Completed Specimen: Blood Updated: 05/02/22 0115     Blood Culture, Routine No Growth to date    Narrative:      Collection has been rescheduled by [email or web address] at 05/01/2022 11:22 Reason:   Patient unavailable starting dialysis and with the dr     Collection has been rescheduled by [email or web address] at 05/01/2022 11:42 Reason:   Got first set second set due after 12 couldnt stick other side due to   IV   Collection has been rescheduled  by MR at 05/01/2022 11:22 Reason:   Patient unavailable starting dialysis and with the dr     Collection has been rescheduled by MR at 05/01/2022 11:42 Reason:   Got first set second set due after 12 couldnt stick other side due to   IV     Blood culture [[id number]] Collected: 05/01/22 1140    Order Status: Completed Specimen: Blood Updated: 05/02/22 0115     Blood Culture, Routine No Growth to date    Narrative:      Collection has been rescheduled by MR at 05/01/2022 11:22 Reason:   Patient unavailable starting dialysis and with the dr   Collection has been rescheduled by MR at 05/01/2022 11:22 Reason:   Patient unavailable starting dialysis and with the dr     Urine culture [[id number]] Collected: 05/01/22 2211    Order Status: No result Specimen: Urine Updated: 05/01/22 2237          Significant Imaging:   X-ray lumbar spine: Normal study.    Right tibia and fibula: Normal study.    Right femur x-ray: Normal study.    Pelvis x-ray: Normal study.    CT Head: No acute abnormality.  Minimal sinus disease.    CT Maxillofacial scan: No facial bone fracture.  Minimal sinus disease.    RLE venous doppler scan: No evidence of deep venous thrombosis in the right lower extremity.    CXR: Central line placement with the tip over the SVC.  No acute detrimental changes.    RLE venous doppler scan:  No evidence of deep venous thrombosis in the right lower extremity. Edema in the soft tissues of the right lower extremity.    CXR:   No acute process.  Right IJ catheter in place.      Assessment/Plan:      * Acute renal failure  Continues to be anuric  Hemodialysis therapy as per Nephrology team.   due to rhabdomyolysis  Trend CPK daily.  Follow Nephrology recommendations.  Avoid nephrotoxins  Telemetry.  Hemo split catheter was placed on April 25, 2022.     4/30 - remains on hemodialysis, emergent dialysis today secondary to hypercalcemia.  Will continue to monitor closely  5/1 - HD again today secondary to  hypercalcemia. Renal function improving. Cont to monitor closely.  5/2 - hemodialysis treatment again today.  Continue to monitor       Hypoxia  Concern for PE given tachycardia and new hypoxia  Will obtain chest x-ray and check D-dimer which will likely be elevated given renal failure and recent surgery, will also check lactic acid  Will discuss with Nephrology high concern for PE and need for further workup with CTA verses V/Q scan and also initiating anticoag therapy  continue telemetry monitoring  Supplemental O2 to keep sats >92%  Pt encouraged to use IS  Workup in progress          Tachycardia  Persistent tachycardia over the past 24 hours  Patient denies chest pain  Concern for PE given tachycardia and new hypoxia  Will obtain chest x-ray and check D-dimer which will likely be elevated given renal failure and recent surgery, will also check lactic acid  Will discuss with Nephrology high concern for PE and need for further workup with CTA verses V/Q scan and also initiating anticoag therapy  Patient also hypertensive-will start metoprolol XL today as per chart review patient has had some intermittent tachycardia during his stay  Continue telemetry monitoring  Workup in progress    5/2 - patient tolerating heparin drip.  Will obtain V/Q scan and echocardiogram today.  Workup in progress for possible infectious process.  Appreciate ID consult.        Anemia of chronic disease  Patient's anemia is currently controlled. Has recieved 1 units of PRBCs. Etiology likely d/t acute blood loss  Current CBC reviewed-   Lab Results   Component Value Date    HGB 8.4 (L) 05/01/2022    HCT 26.6 (L) 05/01/2022     Monitor serial CBC and transfuse if patient becomes hemodynamically unstable, symptomatic or H/H drops below 7/21.         Drug eruption  No definite etiology apparent.  Possibly clindamycin use.  Off intravenous clindamycin.  Use oral Benadryl 25 mg q.6 hours p.r.n. patient will be monitored closely.    4/30 -  resolved      Compartment syndrome  Underwent fasciotomy on 4/13   S/p Debridement deep right lateral fasciotomy incision right lower leg with excisional  debridement of dead anterior tibialis muscle and biopsy of muscle application of wound VAC right lower leg  - 04/25/22.  Wound care nurse performing dressing changes as per recommendations by Orthopedics.    4/30 - stable, wound vac in use, cont current therapy    5/2 - plan for wound VAC change today    Elevated liver enzymes  Elevated AST &ALT likely due to rhabdomyolysis  Trending down  Avoid hepatoxic medications  Monitor CMP  See plan for rhabdomyolysis    4/30 - resolved, cont to monitor CMP        Rhabdomyolysis  CPK trending down  S/p fasciotomy on 4/13 for compartment syndrome.  On 4/18 S/p DEBRIDEMENT, LOWER EXTREMITY (Right)   closure of medial fasciotomy incision right leg debridement of lateral fasciotomy with biopsy of anterior muscle compartment removal of deep muscle anterior compartment right lower leg. Subsequently underwent Debridement deep right lateral fasciotomy incision right lower leg with excisional  debridement of dead anterior tibialis muscle and biopsy of muscle application of wound VAC right lower leg on 04/25/22.  Monitor BMP   Follow Nephrology recommendations.    4/30 - improved and stable, cont to monitor closely      History of asthma  Hx of asthma, stable    Monitor for acute changes        VTE Risk Mitigation (From admission, onward)         Ordered     heparin 25,000 units in dextrose 5% (100 units/ml) IV bolus from bag - ADDITIONAL PRN BOLUS - 60 units/kg  As needed (PRN)        Question:  Heparin Infusion Adjustment (DO NOT MODIFY ANSWER)  Answer:  [email or web address]\Images\Pharmacy\HeparinInfusions\heparin HIGH INTENSITY nomogram for [facility] [id number]    05/01/22 1049     heparin 25,000 units in dextrose 5% (100 units/ml) IV bolus from bag - ADDITIONAL PRN BOLUS - 30 units/kg  As needed (PRN)        Question:  Heparin Infusion  Adjustment (DO NOT MODIFY ANSWER)  Answer:  \\[email or web address]\[email or web address]\Images\Pharmacy\HeparinInfusions\heparin HIGH INTENSITY nomogram for [facility] [id number].pdf    05/01/22 1049     heparin 25,000 units in dextrose 5% 250 mL (100 units/mL) infusion HIGH INTENSITY nomogram - [facility]  Continuous        Question Answer Comment   Heparin Infusion Adjustment (DO NOT MODIFY ANSWER) \\[email or web address]\[email or web address]\Images\Pharmacy\HeparinInfusions\heparin HIGH INTENSITY nomogram for [facility] [id number]    Begin at (in units/kg/hr) 18        05/01/22 1049     heparin (porcine) injection 4,000 Units  As needed (PRN)         04/14/22 0137     Reason for No Pharmacological VTE Prophylaxis  Once        Question:  Reasons:  Answer:  Risk of Bleeding    04/13/22 1913     IP VTE HIGH RISK PATIENT  Once         04/13/22 1913     Place sequential compression device  Until discontinued         04/13/22 1913                Discharge Planning   JARAD:      Code Status: Full Code   Is the patient medically ready for discharge?:     Reason for patient still in hospital (select all that apply): Patient new problem, Patient trending condition, Treatment and Imaging  Discharge Plan A: Long-term acute care facility (LTAC)                  Brittni Mackey NP  Department of Hospital Medicine   [facility]

## 2022-05-02 NOTE — ASSESSMENT & PLAN NOTE
Underwent fasciotomy on 4/13   S/p Debridement deep right lateral fasciotomy incision right lower leg with excisional  debridement of dead anterior tibialis muscle and biopsy of muscle application of wound VAC right lower leg  - 04/25/22.  Wound care nurse performing dressing changes as per recommendations by Orthopedics.    4/30 - stable, wound vac in use, cont current therapy    5/2 - plan for wound VAC change today

## 2022-05-02 NOTE — ASSESSMENT & PLAN NOTE
Persistent tachycardia over the past 24 hours  Patient denies chest pain  Concern for PE given tachycardia and new hypoxia  Will obtain chest x-ray and check D-dimer which will likely be elevated given renal failure and recent surgery, will also check lactic acid  Will discuss with Nephrology high concern for PE and need for further workup with CTA verses V/Q scan and also initiating anticoag therapy  Patient also hypertensive-will start metoprolol XL today as per chart review patient has had some intermittent tachycardia during his stay  Continue telemetry monitoring  Workup in progress    5/2 - patient tolerating heparin drip.  Will obtain V/Q scan and echocardiogram today.  Workup in progress for possible infectious process.  Appreciate ID consult.

## 2022-05-03 PROBLEM — R65.20 SEVERE SEPSIS: Status: ACTIVE | Noted: 2022-05-03

## 2022-05-03 PROBLEM — A41.9 SEVERE SEPSIS: Status: ACTIVE | Noted: 2022-05-03

## 2022-05-03 LAB
ALBUMIN SERPL BCP-MCNC: 2.3 G/DL (ref 3.5–5.2)
ALP SERPL-CCNC: 102 U/L (ref 55–135)
ALT SERPL W/O P-5'-P-CCNC: 16 U/L (ref 10–44)
ANION GAP SERPL CALC-SCNC: 12 MMOL/L (ref 8–16)
AORTIC ROOT ANNULUS: 2.55 CM
AORTIC VALVE CUSP SEPERATION: 1.89 CM
AST SERPL-CCNC: 27 U/L (ref 10–40)
AV INDEX (PROSTH): 1.06
AV MEAN GRADIENT: 6 MMHG
AV PEAK GRADIENT: 10 MMHG
AV VALVE AREA: 3.97 CM2
AV VELOCITY RATIO: 0.9
BACTERIA UR CULT: NO GROWTH
BASO STIPL BLD QL SMEAR: ABNORMAL
BASOPHILS # BLD AUTO: ABNORMAL K/UL (ref 0–0.2)
BASOPHILS NFR BLD: 0 % (ref 0–1.9)
BILIRUB SERPL-MCNC: 0.3 MG/DL (ref 0.1–1)
BSA FOR ECHO PROCEDURE: 1.99 M2
BUN SERPL-MCNC: 39 MG/DL (ref 6–20)
CA-I BLDV-SCNC: 1.65 MMOL/L (ref 1.06–1.42)
CALCIUM SERPL-MCNC: 12.3 MG/DL (ref 8.7–10.5)
CHLORIDE SERPL-SCNC: 97 MMOL/L (ref 95–110)
CO2 SERPL-SCNC: 26 MMOL/L (ref 23–29)
CREAT SERPL-MCNC: 2.8 MG/DL (ref 0.5–1.4)
CV ECHO LV RWT: 0.55 CM
DIFFERENTIAL METHOD: ABNORMAL
DOP CALC AO PEAK VEL: 1.61 M/S
DOP CALC AO VTI: 18.72 CM
DOP CALC LVOT AREA: 3.8 CM2
DOP CALC LVOT DIAMETER: 2.19 CM
DOP CALC LVOT PEAK VEL: 1.45 M/S
DOP CALC LVOT STROKE VOLUME: 74.36 CM3
DOP CALC MV VTI: 18.95 CM
DOP CALCLVOT PEAK VEL VTI: 19.75 CM
E WAVE DECELERATION TIME: 205 MSEC
E/A RATIO: 0.76
E/E' RATIO: 5.48 M/S
ECHO LV POSTERIOR WALL: 1.15 CM (ref 0.6–1.1)
EJECTION FRACTION: 65 %
EOSINOPHIL # BLD AUTO: ABNORMAL K/UL (ref 0–0.5)
EOSINOPHIL NFR BLD: 13 % (ref 0–8)
ERYTHROCYTE [DISTWIDTH] IN BLOOD BY AUTOMATED COUNT: 12.5 % (ref 11.5–14.5)
EST. GFR  (AFRICAN AMERICAN): 35 ML/MIN/1.73 M^2
EST. GFR  (NON AFRICAN AMERICAN): 30 ML/MIN/1.73 M^2
FRACTIONAL SHORTENING: 35 % (ref 28–44)
GLUCOSE SERPL-MCNC: 104 MG/DL (ref 70–110)
HCT VFR BLD AUTO: 23.5 % (ref 40–54)
HGB BLD-MCNC: 7.6 G/DL (ref 14–18)
IMM GRANULOCYTES # BLD AUTO: ABNORMAL K/UL (ref 0–0.04)
IMM GRANULOCYTES NFR BLD AUTO: ABNORMAL % (ref 0–0.5)
INTERVENTRICULAR SEPTUM: 1.06 CM (ref 0.6–1.1)
IVRT: 59.94 MSEC
LA MAJOR: 4.09 CM
LA MINOR: 4.58 CM
LA WIDTH: 3.22 CM
LEFT ATRIUM SIZE: 2.93 CM
LEFT ATRIUM VOLUME INDEX MOD: 16.7 ML/M2
LEFT ATRIUM VOLUME INDEX: 17.6 ML/M2
LEFT ATRIUM VOLUME MOD: 32.96 CM3
LEFT ATRIUM VOLUME: 34.65 CM3
LEFT INTERNAL DIMENSION IN SYSTOLE: 2.7 CM (ref 2.1–4)
LEFT VENTRICLE DIASTOLIC VOLUME INDEX: 39.36 ML/M2
LEFT VENTRICLE DIASTOLIC VOLUME: 77.54 ML
LEFT VENTRICLE MASS INDEX: 80 G/M2
LEFT VENTRICLE SYSTOLIC VOLUME INDEX: 13.7 ML/M2
LEFT VENTRICLE SYSTOLIC VOLUME: 27.07 ML
LEFT VENTRICULAR INTERNAL DIMENSION IN DIASTOLE: 4.18 CM (ref 3.5–6)
LEFT VENTRICULAR MASS: 156.92 G
LV LATERAL E/E' RATIO: 5.29 M/S
LV SEPTAL E/E' RATIO: 5.69 M/S
LYMPHOCYTES # BLD AUTO: ABNORMAL K/UL (ref 1–4.8)
LYMPHOCYTES NFR BLD: 8 % (ref 18–48)
MCH RBC QN AUTO: 29.8 PG (ref 27–31)
MCHC RBC AUTO-ENTMCNC: 32.3 G/DL (ref 32–36)
MCV RBC AUTO: 92 FL (ref 82–98)
MONOCYTES # BLD AUTO: ABNORMAL K/UL (ref 0.3–1)
MONOCYTES NFR BLD: 11 % (ref 4–15)
MV A" WAVE DURATION": 6.57 MSEC
MV MEAN GRADIENT: 1 MMHG
MV PEAK A VEL: 0.98 M/S
MV PEAK E VEL: 0.74 M/S
MV PEAK GRADIENT: 9 MMHG
MV STENOSIS PRESSURE HALF TIME: 59.45 MS
MV VALVE AREA BY CONTINUITY EQUATION: 3.92 CM2
MV VALVE AREA P 1/2 METHOD: 3.7 CM2
NEUTROPHILS NFR BLD: 68 % (ref 38–73)
NRBC BLD-RTO: 0 /100 WBC
PISA TR MAX VEL: 3.33 M/S
PLATELET # BLD AUTO: 354 K/UL (ref 150–450)
PLATELET BLD QL SMEAR: ABNORMAL
PMV BLD AUTO: 9.1 FL (ref 9.2–12.9)
POLYCHROMASIA BLD QL SMEAR: ABNORMAL
POTASSIUM SERPL-SCNC: 3.9 MMOL/L (ref 3.5–5.1)
PROT SERPL-MCNC: 6.9 G/DL (ref 6–8.4)
PULM VEIN S/D RATIO: 0.88
PV PEAK D VEL: 0.66 M/S
PV PEAK S VEL: 0.58 M/S
PV PEAK VELOCITY: 1.51 CM/S
RA MAJOR: 3.48 CM
RA PRESSURE: 3 MMHG
RA WIDTH: 2.5 CM
RBC # BLD AUTO: 2.55 M/UL (ref 4.6–6.2)
RIGHT VENTRICULAR END-DIASTOLIC DIMENSION: 3.12 CM
RV TISSUE DOPPLER FREE WALL SYSTOLIC VELOCITY 1 (APICAL 4 CHAMBER VIEW): 23.57 CM/S
SODIUM SERPL-SCNC: 135 MMOL/L (ref 136–145)
TDI LATERAL: 0.14 M/S
TDI SEPTAL: 0.13 M/S
TDI: 0.14 M/S
TR MAX PG: 44 MMHG
TRICUSPID ANNULAR PLANE SYSTOLIC EXCURSION: 2.88 CM
TV REST PULMONARY ARTERY PRESSURE: 47 MMHG
VANCOMYCIN SERPL-MCNC: 15.2 UG/ML
WBC # BLD AUTO: 20.78 K/UL (ref 3.9–12.7)

## 2022-05-03 PROCEDURE — 25000003 PHARM REV CODE 250: Performed by: NURSE PRACTITIONER

## 2022-05-03 PROCEDURE — 85027 COMPLETE CBC AUTOMATED: CPT | Performed by: NURSE PRACTITIONER

## 2022-05-03 PROCEDURE — 99232 PR SUBSEQUENT HOSPITAL CARE,LEVL II: ICD-10-PCS | Mod: S$GLB,,, | Performed by: STUDENT IN AN ORGANIZED HEALTH CARE EDUCATION/TRAINING PROGRAM

## 2022-05-03 PROCEDURE — 82330 ASSAY OF CALCIUM: CPT | Performed by: ORTHOPAEDIC SURGERY

## 2022-05-03 PROCEDURE — 25000003 PHARM REV CODE 250: Performed by: ORTHOPAEDIC SURGERY

## 2022-05-03 PROCEDURE — 80053 COMPREHEN METABOLIC PANEL: CPT | Performed by: NURSE PRACTITIONER

## 2022-05-03 PROCEDURE — 63600175 PHARM REV CODE 636 W HCPCS: Performed by: NURSE PRACTITIONER

## 2022-05-03 PROCEDURE — 99232 SBSQ HOSP IP/OBS MODERATE 35: CPT | Mod: S$GLB,,, | Performed by: STUDENT IN AN ORGANIZED HEALTH CARE EDUCATION/TRAINING PROGRAM

## 2022-05-03 PROCEDURE — 85007 BL SMEAR W/DIFF WBC COUNT: CPT | Performed by: NURSE PRACTITIONER

## 2022-05-03 PROCEDURE — 63600175 PHARM REV CODE 636 W HCPCS: Performed by: INTERNAL MEDICINE

## 2022-05-03 PROCEDURE — 36415 COLL VENOUS BLD VENIPUNCTURE: CPT | Performed by: ORTHOPAEDIC SURGERY

## 2022-05-03 PROCEDURE — A4216 STERILE WATER/SALINE, 10 ML: HCPCS | Performed by: ORTHOPAEDIC SURGERY

## 2022-05-03 PROCEDURE — 80202 ASSAY OF VANCOMYCIN: CPT | Performed by: INTERNAL MEDICINE

## 2022-05-03 PROCEDURE — 11000001 HC ACUTE MED/SURG PRIVATE ROOM

## 2022-05-03 PROCEDURE — 94761 N-INVAS EAR/PLS OXIMETRY MLT: CPT

## 2022-05-03 PROCEDURE — 97116 GAIT TRAINING THERAPY: CPT | Mod: CQ

## 2022-05-03 PROCEDURE — 94799 UNLISTED PULMONARY SVC/PX: CPT

## 2022-05-03 RX ORDER — CALCITONIN SALMON 200 [USP'U]/ML
4 INJECTION, SOLUTION INTRAMUSCULAR; SUBCUTANEOUS 2 TIMES DAILY
Status: COMPLETED | OUTPATIENT
Start: 2022-05-03 | End: 2022-05-04

## 2022-05-03 RX ORDER — OXYCODONE AND ACETAMINOPHEN 5; 325 MG/1; MG/1
1 TABLET ORAL EVERY 4 HOURS PRN
Status: DISCONTINUED | OUTPATIENT
Start: 2022-05-03 | End: 2022-05-11 | Stop reason: HOSPADM

## 2022-05-03 RX ADMIN — DOCUSATE SODIUM AND SENNOSIDES 1 TABLET: 8.6; 5 TABLET, FILM COATED ORAL at 09:05

## 2022-05-03 RX ADMIN — Medication 2 ML: at 12:05

## 2022-05-03 RX ADMIN — HYDRALAZINE HYDROCHLORIDE 25 MG: 25 TABLET, FILM COATED ORAL at 01:05

## 2022-05-03 RX ADMIN — OXYCODONE AND ACETAMINOPHEN 1 TABLET: 10; 325 TABLET ORAL at 03:05

## 2022-05-03 RX ADMIN — SEVELAMER CARBONATE 800 MG: 800 TABLET, FILM COATED ORAL at 12:05

## 2022-05-03 RX ADMIN — HYDRALAZINE HYDROCHLORIDE 25 MG: 25 TABLET, FILM COATED ORAL at 09:05

## 2022-05-03 RX ADMIN — CALCITONIN SALMON 324 UNITS: 200 INJECTION, SOLUTION INTRAMUSCULAR; SUBCUTANEOUS at 09:05

## 2022-05-03 RX ADMIN — Medication 324 MG: at 08:05

## 2022-05-03 RX ADMIN — CYCLOBENZAPRINE 10 MG: 10 TABLET, FILM COATED ORAL at 05:05

## 2022-05-03 RX ADMIN — HEPARIN SODIUM 5000 UNITS: 5000 INJECTION INTRAVENOUS; SUBCUTANEOUS at 09:05

## 2022-05-03 RX ADMIN — SEVELAMER CARBONATE 800 MG: 800 TABLET, FILM COATED ORAL at 08:05

## 2022-05-03 RX ADMIN — CALCITONIN SALMON 324 UNITS: 200 INJECTION, SOLUTION INTRAMUSCULAR; SUBCUTANEOUS at 04:05

## 2022-05-03 RX ADMIN — HYDRALAZINE HYDROCHLORIDE 25 MG: 25 TABLET, FILM COATED ORAL at 06:05

## 2022-05-03 RX ADMIN — OXYCODONE HYDROCHLORIDE AND ACETAMINOPHEN 1 TABLET: 5; 325 TABLET ORAL at 05:05

## 2022-05-03 RX ADMIN — MORPHINE SULFATE 2 MG: 4 INJECTION INTRAVENOUS at 04:05

## 2022-05-03 RX ADMIN — OXYCODONE AND ACETAMINOPHEN 1 TABLET: 10; 325 TABLET ORAL at 08:05

## 2022-05-03 RX ADMIN — SEVELAMER CARBONATE 800 MG: 800 TABLET, FILM COATED ORAL at 05:05

## 2022-05-03 RX ADMIN — Medication 2 ML: at 09:05

## 2022-05-03 RX ADMIN — Medication 324 MG: at 05:05

## 2022-05-03 RX ADMIN — OXYCODONE HYDROCHLORIDE AND ACETAMINOPHEN 1 TABLET: 5; 325 TABLET ORAL at 01:05

## 2022-05-03 RX ADMIN — HEPARIN SODIUM 5000 UNITS: 5000 INJECTION INTRAVENOUS; SUBCUTANEOUS at 06:05

## 2022-05-03 RX ADMIN — HYDRALAZINE HYDROCHLORIDE 25 MG: 25 TABLET, FILM COATED ORAL at 12:05

## 2022-05-03 RX ADMIN — HYDROXYZINE PAMOATE 25 MG: 25 CAPSULE ORAL at 12:05

## 2022-05-03 RX ADMIN — METOPROLOL SUCCINATE 25 MG: 25 TABLET, EXTENDED RELEASE ORAL at 08:05

## 2022-05-03 RX ADMIN — DOCUSATE SODIUM AND SENNOSIDES 1 TABLET: 8.6; 5 TABLET, FILM COATED ORAL at 08:05

## 2022-05-03 RX ADMIN — MORPHINE SULFATE 2 MG: 4 INJECTION INTRAVENOUS at 06:05

## 2022-05-03 RX ADMIN — Medication 2 ML: at 06:05

## 2022-05-03 RX ADMIN — OXYCODONE HYDROCHLORIDE AND ACETAMINOPHEN 1 TABLET: 5; 325 TABLET ORAL at 09:05

## 2022-05-03 RX ADMIN — HEPARIN SODIUM 5000 UNITS: 5000 INJECTION INTRAVENOUS; SUBCUTANEOUS at 01:05

## 2022-05-03 RX ADMIN — MORPHINE SULFATE 2 MG: 4 INJECTION INTRAVENOUS at 12:05

## 2022-05-03 NOTE — PROGRESS NOTES
INPATIENT NEPHROLOGY Progress Note  [facility]    Patient Name: Agus Horan  Date: 05/03/2022    Reason for consultation: SANDRA    Chief Complaint:   Chief Complaint   Patient presents with    Leg Pain     Pain in the right leg muscle calf is hard, nausea vomiting, patient was seen here over the weekend for an overdose        History of Present Illness:  22 y/o M with a history of asthma recently here on 4/9 with oxycodone overdose who p/w RLE pain and swelling after a fall on 4/10. He reports severe pain, constant, associated with nausea with inability to bear weight.  He took meloxicam without relief. He was found to have compartment syndrome and was taking to the OR emergently for fasciotomy on 4/13. We are consulted for SANDRA with metabolic derangements.    Interval History:  4/14- did emergent HD overnight for hyperkalemia and metabolic acidosis refractory to medical management; pain controlled, RLE wrapped, no edema in LLE  4/15- worsening pain/edema in RLE- going for MRI- oligoanuric- stop NS IVFs- will do HD/UF today and tomorrow  4/16  Seen on dialysis.  No distress  4/17  Remains oliguric.  AFVSS.  Has back pain.  Leg pain a little better  4/18  In the OR.  Still oliguric.    4/19  Seen on dialysis.  No distress.    4/20  Still not making much urine.  cpk coming down.     4/21  Afebrile.  BP a little better.  uop up a bit  422   Sleeping.  AFVSS.  I/Os not appropriately recorded despite being ordered for twice  4/23  225 cc UOP recorded.  K+ 5.5, HD today.  No renal recovery, CPK improving.  C/o rash, Dr. Zamorano at bedside placed orders.  Seen on dialysis, tolerating tx well.  4/24   No new lab results, next lab draw in am.   Still has rash and c/o itching.  No other complaints.  450 cc UOP recorded.  4/25  In the OR.  Plan for hd today  4/26  AFVSS.  Urine output better.  No complaints specified today  4/27  Sleeping.  Output not recorded despite being ordered  4/28  Tearful.  Not  engaging when spoken too.  Requesting dilaudid   4/29     Good urine output.  Seen on dialysis.  No distress  4/30  UOP 4.4L.  Ca+ 15, will have dialysis today, stopped calcitriol and vit D.  Will add on PTH to labs.    5/1  Ca+ 15.5, PTH suppressed.  D/w Dr. Patrick, pt to have additional dialysis today, no UF.  Notified Suni Stone w/[facility] pt needs to run today.  Pt w/elevated temp, a little more tachy than usual, not feeling very well.  5/2 tachy, hypertensive, on RA, UOP 2.6L, remains hypercalcemic- PTH appropriately suppressed, Dr. Byers is at bedside  5/3 febrile, tachy, BP better, on RA, UOP 650cc    Plan of Care:    Assessment:  Sepsis  Traumatic rhabdomyolysis with compartment syndrome s/p fasciotomy on 4/13  SANDRA due to toxic ATN s/p emergent HD on 4/14- remains HD dependent  Elevated BP  Hypercalcemia  Hyperphosphatemia  Anemia    Plan:    - Ortho following- plan for I&D.  - Continue HD MWF.  - BP is better - not sure if from sepsis or pain control or UF- will monitor.  - Serum Ca is better but still high- ordered another 4 doses of calcitonin- suspect bone release- PTH is appropriately suppressed- will keep off calcium/vitamin D supplements.  - Continue non calcium based binder with meals.  - H/H dropped- bump iron tablet dose- continue ALBERTO with HD.     Thank you for allowing us to participate in this patient's care. We will continue to follow.    Vital Signs:  Temp Readings from Last 3 Encounters:   05/03/22 98.6 °F (37 °C) (Oral)   04/10/22 97.3 °F (36.3 °C)   04/09/22 98.4 °F (36.9 °C)       Pulse Readings from Last 3 Encounters:   05/03/22 (!) 119   04/10/22 63   04/09/22 95       BP Readings from Last 3 Encounters:   05/03/22 137/63   04/10/22 117/64   04/09/22 (!) 143/82       Weight:  Wt Readings from Last 3 Encounters:   05/02/22 81.2 kg (179 lb)   04/10/22 77.1 kg (170 lb)   04/09/22 77.1 kg (170 lb)       INS/OUTS:  I/O last 3 completed shifts:  In: 500 [Other:500]  Out: 5800 [Urine:2300;  "Other:3500]  No intake/output data recorded.    Medications:  Scheduled Meds:   cefTRIAXone (ROCEPHIN) IVPB  2 g Intravenous Q24H    epoetin leidy-epbx  50 Units/kg Intravenous Every Mon, Wed, Fri    ferrous gluconate  324 mg Oral BID WM    heparin (porcine)  5,000 Units Subcutaneous Q8H    hydrALAZINE  25 mg Oral Q8H    LIDOcaine HCL 2%   Topical (Top) Every Mon, Thurs    metoprolol succinate  25 mg Oral Daily    senna-docusate 8.6-50 mg  1 tablet Oral BID    sevelamer carbonate  800 mg Oral TID WM    sodium chloride 0.9%  2 mL Intravenous Q6H     Continuous Infusions:   electrolyte-S (pH 7.4) Stopped (04/25/22 1255)    loperamide       PRN Meds:.acetaminophen, cyclobenzaprine, dextrose 10%, dextrose 10%, diphenhydrAMINE, glucagon (human recombinant), glucose, glucose, heparin (porcine), hydrOXYzine pamoate, labetaloL, loperamide, morphine, naloxone, ondansetron, oxyCODONE-acetaminophen, phenyleph-min oil-petrolatum, sodium chloride 0.9%, Pharmacy to dose Vancomycin consult **AND** vancomycin - pharmacy to dose  No current facility-administered medications on file prior to encounter.     Current Outpatient Medications on File Prior to Encounter   Medication Sig Dispense Refill    naloxone (NARCAN) 4 mg/actuation Spry 4mg by nasal route as needed for opioid overdose; may repeat every 2-3 minutes in alternating nostrils until medical help arrives. Call 911 2 each 0       Review of Systems:  Neg    Physical Exam:  /63 (BP Location: Right arm, Patient Position: Lying)   Pulse (!) 119   Temp 98.6 °F (37 °C) (Oral)   Resp 16   Ht 5' 9" (1.753 m)   Wt 81.2 kg (179 lb)   SpO2 (!) 94%   BMI 26.43 kg/m²     Constitutional: nad, aao x 3, depressed affect  Heart: rrr, no m/r/g, wwp, RLE edema  Lungs: ctab, no w/r/r/c, no lb  Abdomen: s/nt/nd, +BS    Results:  Lab Results   Component Value Date     (L) 05/03/2022    K 3.9 05/03/2022    CL 97 05/03/2022    CO2 26 05/03/2022    BUN 39 (H) 05/03/2022 "    CREATININE 2.8 (H) 05/03/2022    CALCIUM 12.3 (HH) 05/03/2022    ANIONGAP 12 05/03/2022    ESTGFRAFRICA 35 (A) 05/03/2022    EGFRNONAA 30 (A) 05/03/2022       Lab Results   Component Value Date    CALCIUM 12.3 (HH) 05/03/2022    PHOS 7.1 (H) 04/30/2022       Recent Labs   Lab 05/03/22  0549   WBC 20.78*   RBC 2.55*   HGB 7.6*   HCT 23.5*      MCV 92   MCH 29.8   MCHC 32.3       I have personally reviewed pertinent radiological imaging and reports.    I have spent > 35 minutes providing care for this patient for the above diagnoses. These services have included chart/data/imaging review, evaluation, exam, formulation of plan, , note preparation, and discussions with staff involved in this patient's care.    Chantelle Fraser MD    Nephrology  [facility]  [phone]

## 2022-05-03 NOTE — ASSESSMENT & PLAN NOTE
"This patient does have evidence of infective focus  My overall impression is sepsis. Vital signs were reviewed and noted in progress note.  Antibiotics given-   Antibiotics (From admission, onward)            Start     Stop Route Frequency Ordered    05/02/22 1759  vancomycin - pharmacy to dose  (vancomycin IVPB)        "And" Linked Group Details    -- IV pharmacy to manage frequency 05/02/22 1659    05/01/22 2345  cefTRIAXone (ROCEPHIN) 2 g/50 mL D5W IVPB         -- IV Every 24 hours (non-standard times) 05/01/22 2239        Cultures were taken-   Microbiology Results (last 7 days)     Procedure Component Value Units Date/Time    Urine culture [[id number]] Collected: 05/01/22 2211    Order Status: Completed Specimen: Urine Updated: 05/03/22 0802     Urine Culture, Routine No growth    Narrative:      Specimen Source->Urine    Aerobic culture [[id number]] Collected: 05/02/22 1400    Order Status: Sent Specimen: Wound from Leg, Right Updated: 05/02/22 2236    Culture, Anaerobic [[id number]] Collected: 05/02/22 1400    Order Status: Sent Specimen: Wound from Leg, Right Updated: 05/02/22 2236    Blood culture [[id number]] Collected: 05/01/22 1216    Order Status: Completed Specimen: Blood Updated: 05/02/22 1812     Blood Culture, Routine No Growth to date      No Growth to date    Narrative:      Collection has been rescheduled by [facility] at 05/01/2022 11:22 Reason:   Patient unavailable starting dialysis and with the dr     Collection has been rescheduled by MR at 05/01/2022 11:42 Reason:   Got first set second set due after 12 couldnt stick other side due to   IV   Collection has been rescheduled by MR at 05/01/2022 11:22 Reason:   Patient unavailable starting dialysis and with the dr     Collection has been rescheduled by MR at 05/01/2022 11:42 Reason:   Got first set second set due after 12 couldnt stick other side due to   IV     Blood culture [[id number]] Collected: 05/01/22 1140    Order Status: Completed " Specimen: Blood Updated: 05/02/22 1812     Blood Culture, Routine No Growth to date      No Growth to date    Narrative:      Collection has been rescheduled by [email or web address] at 05/01/2022 11:22 Reason:   Patient unavailable starting dialysis and with the dr   Collection has been rescheduled by [email or web address] at 05/01/2022 11:22 Reason:   Patient unavailable starting dialysis and with the dr     Blood culture [[id number]] Collected: 05/02/22 0410    Order Status: Completed Specimen: Blood from Antecubital, Right Arm Updated: 05/02/22 1715     Blood Culture, Routine No Growth to date    Influenza A & B by Molecular [[id number]] Collected: 05/02/22 0900    Order Status: Completed Specimen: Nasopharyngeal Swab Updated: 05/02/22 1021     Influenza A, Molecular Negative     Influenza B, Molecular Negative     Flu A & B Source Nasal swab        Latest lactate reviewed, they are-  Recent Labs   Lab 05/01/22  1140   LACTATE 1.1       Organ dysfunction indicated by Acute respiratory failure and tachycardia  Source- right leg fasciotomy site    Source control Achieved by- abx

## 2022-05-03 NOTE — ASSESSMENT & PLAN NOTE
Persistent tachycardia over the past 24 hours  Patient denies chest pain  Concern for PE given tachycardia and new hypoxia  Will obtain chest x-ray and check D-dimer which will likely be elevated given renal failure and recent surgery, will also check lactic acid  Will discuss with Nephrology high concern for PE and need for further workup with CTA verses V/Q scan and also initiating anticoag therapy  Patient also hypertensive-will start metoprolol XL today as per chart review patient has had some intermittent tachycardia during his stay  Continue telemetry monitoring  Workup in progress    5/2 - patient tolerating heparin drip.  Will obtain V/Q scan and echocardiogram today.  Workup in progress for possible infectious process.  Appreciate ID consult.    5/3 - improving with source control, VQ scan neg and heparin gtt stopped 5/2. Appreciate ID consult.

## 2022-05-03 NOTE — PT/OT/SLP PROGRESS
Physical Therapy Treatment    Patient Name:  Agus Horan   MRN:  [id number]    Recommendations:     Discharge Recommendations:  LTACH (long-term acute care hospital), home health PT   Discharge Equipment Recommendations: walker, rolling, bedside commode   Barriers to discharge: None    Assessment:     Agus Horan is a 21 y.o. male admitted with a medical diagnosis of Acute renal failure.  He presents with the following impairments/functional limitations:  weakness, impaired endurance, impaired self care skills, impaired functional mobilty, gait instability, impaired balance, decreased lower extremity function, pain.  Pt found sleeping and requested therapist return later, but could be encouraged to participate.   Ambulated increased distance today of 35', 40', 25' (100' total) with RW, CGA, chair follow, wound vac in tow, and seated rest breaks btwn trials. Reported 2/10 pain at rest that increased to 6/10 with activity. Declined to remain up in chair and returned to bed via step transfer with RW and Katia.   Was left with RLE elevated to pillows and all needs at hand.    Rehab Prognosis: Good; patient would benefit from acute skilled PT services to address these deficits and reach maximum level of function.    Recent Surgery: Procedure(s) (LRB):  FASCIOTOMY (Right)  INSERTION, CATHETER, TUNNELED (Right) 8 Days Post-Op    Plan:     During this hospitalization, patient to be seen  (1-2x/day) to address the identified rehab impairments via gait training, therapeutic activities, therapeutic exercises and progress toward the following goals:    · Plan of Care Expires:  05/15/22    Subjective     Chief Complaint: pain  Patient/Family Comments/goals: none expressed  Pain/Comfort:  · Pain Rating 1: 2/10  · Location - Side 1: Right  · Location - Orientation 1: lower  · Location 1: leg  · Pain Addressed 1: Reposition, Distraction  · Pain Rating Post-Intervention 1: 6/10      Objective:     Communicated with nurse  Nic prior to session.  Patient found HOB elevated with bed alarm, peripheral IV, telemetry, wound vac upon PT entry to room.     General Precautions: Standard, fall   Orthopedic Precautions:RLE non weight bearing   Braces: N/A  Respiratory Status: Room air     Functional Mobility:  · Bed Mobility:     · Supine to Sit: stand by assistance  · Sit to Supine: stand by assistance  · Transfers:     · Sit to Stand:  contact guard assistance with rolling walker  · Chair to Bed: Katia step transfer with RW  · Gait: 35', 40', 25' (100' total) with RW, CGA, NWB RLE, seated rest btwn      AM-PAC 6 CLICK MOBILITY          Therapeutic Activities and Exercises:       Patient left HOB elevated with all lines intact, call button in reach, bed alarm on and nurse notified..    GOALS:   Multidisciplinary Problems     Physical Therapy Goals        Problem: Physical Therapy    Goal Priority Disciplines Outcome Goal Variances Interventions   Physical Therapy Goal     PT, PT/OT Ongoing, Progressing     Description: Goals to be met by: 5/15/2022     Patient will increase functional independence with mobility by performin). Supine to sit with Modified Rice  2). Sit to stand transfer with Modified Rice maintaining RLE NWB using axillary crutches or rolling walker  3). Bed to chair transfer with Modified Rice maintaining RLE NWB using axillary crutches or rolling walker  5). Gait  x 150 feet with Modified Rice maintaining RLE NWB using axillary crutches or rolling walker                     Time Tracking:     PT Received On: 22  PT Start Time: 1319     PT Stop Time: 1343  PT Total Time (min): 24 min     Billable Minutes: Gait Training 24    Treatment Type: Treatment  PT/PTA: PTA     PTA Visit Number: 4     2022

## 2022-05-03 NOTE — PLAN OF CARE
Problem: Renal Function Impairment (Acute Kidney Injury/Impairment)  Goal: Effective Renal Function  Outcome: Ongoing, Progressing     Problem: Fluid and Electrolyte Imbalance (Acute Kidney Injury/Impairment)  Goal: Fluid and Electrolyte Balance  Outcome: Ongoing, Progressing     Problem: Infection Progression (Sepsis/Septic Shock)  Goal: Absence of Infection Signs and Symptoms  Outcome: Ongoing, Progressing     Pt lying in bed no s/s of distress breathing unlabored. Vss throughout shift. Pain managed throughout shift. Call light in reach fall precautions in place.

## 2022-05-03 NOTE — PLAN OF CARE
Pt lying in bed resting with eyes closed no s/s of distress. Family at bedside. Vss throughout shift pain controlled throughout shift. Pt wound vac changed this shift pt tolerated well. Call light in reach fall precautions in place.

## 2022-05-03 NOTE — ASSESSMENT & PLAN NOTE
Underwent fasciotomy on 4/13   S/p Debridement deep right lateral fasciotomy incision right lower leg with excisional  debridement of dead anterior tibialis muscle and biopsy of muscle application of wound VAC right lower leg  - 04/25/22.  Wound care nurse performing dressing changes as per recommendations by Orthopedics.    4/30 - stable, wound vac in use, cont current therapy    5/2 - plan for wound VAC change today  5/3 - wound vac changed yesterday, d/w wound care nurse, area of necrotic tissue with purulent drainage. Wound debrided at BS by Dr. Byers. Plan for wound washout and further debridement in OR tomorrow or Thursday. Cont abx, appreciate ID consult.

## 2022-05-03 NOTE — ASSESSMENT & PLAN NOTE
Concern for PE given tachycardia and new hypoxia  Will obtain chest x-ray and check D-dimer which will likely be elevated given renal failure and recent surgery, will also check lactic acid  Will discuss with Nephrology high concern for PE and need for further workup with CTA verses V/Q scan and also initiating anticoag therapy  continue telemetry monitoring  Supplemental O2 to keep sats >92%  Pt encouraged to use IS  Workup in progress    5/3 - VQ scan negative, heparin gtt stopped. Suspect hypoxia secondary to sepsis which is now improving with appropriate source control. Pt encouraged to use IS. O2 sats improving and stable on room air. Will cont to monitor closely.

## 2022-05-03 NOTE — PLAN OF CARE
Problem: Infection  Goal: Absence of Infection Signs and Symptoms  Outcome: Ongoing, Progressing     Problem: Fall Injury Risk  Goal: Absence of Fall and Fall-Related Injury  Outcome: Ongoing, Progressing

## 2022-05-03 NOTE — CARE UPDATE
05/03/22 0745   Patient Assessment/Suction   Level of Consciousness (AVPU) alert   Respiratory Effort Normal;Unlabored   Expansion/Accessory Muscles/Retractions no use of accessory muscles;no retractions;expansion symmetric   All Lung Fields Breath Sounds clear;equal bilaterally   Rhythm/Pattern, Respiratory depth regular;unlabored;pattern regular   Cough Frequency no cough   PRE-TX-O2   O2 Device (Oxygen Therapy) room air   SpO2 (!) 94 %   Pulse Oximetry Type Intermittent   $ Pulse Oximetry - Multiple Charge Pulse Oximetry - Multiple   Pulse 109   Resp 18   Incentive Spirometer   $ Incentive Spirometer Charges done with encouragement   Incentive Spirometer Predicted Level (mL) 2500   Administration (IS) instruction provided, follow-up   Number of Repetitions (IS) 10   Level Incentive Spirometer (mL) 2000   Patient Tolerance (IS) good       Patient encouraged to do deep breathing exercises independently.

## 2022-05-03 NOTE — PROGRESS NOTES
[facility] Medicine  Progress Note    Patient Name: Agus Horan  MRN: [id number]  Patient Class: IP- Inpatient   Admission Date: 4/13/2022  Length of Stay: 20 days  Attending Physician: Brent Rosales MD  Primary Care Provider: Primary Doctor No        Subjective:     Principal Problem:Acute renal failure        HPI:  Agus Horan is a 20 y/o male with PMHx of mild asthma who presents with right lower extremity pain and swelling x few days. Patient was seen in the ED on 4/9/22 for oxycodone overdose and again on 4/10/22 for left eye pain after falling on a piece of furniture. Patient states he is unsure how he injured his leg and denies IV drug use. Xray of his right leg did not show acute fracture. Patietn states RLE pain and swelling increased over the past few days and he is unable to walk secondary to pain. US of his lower extremity performed today did not show DVT. Lab work revealed elevated Creatinine 13.5 & K+ 7.1, elevated AST/ALT 3980/1611 & WBC 18.14.  CPK>40,0000.  Patient unable to dorsiflex and plantar flex his RLE and symptoms are concerning for compartment syndrome per ED. ED provider discussed with Dr. Byers and Dr. Fraser, nephrology. Patient was referred to hospital medicine and seen in the ED with his friend at bedside. Patient complaining of RLE pain, tenderness and swelling. He denies SOB, N/V/D, chest pain and headaches.  Patient will be admitted to hospital medicine for orthopedic consultation and further management.      Overview/Hospital Course:  Pt admitted for SANDRA, rhabdomyolisis, and RLE compartment syndrome. Pt was taken to the OR by Dr. Byers for emergent fasciotomy on 4/13. CPK at the time of arrival was more than 40,000. Pt was initially started on IV fluids and required emergent hemodialysis on 4/14 for hyperkalemia and metabolic acidosis refractory to medical management. Pt returned to OR with Dr. Byers on 4/18 and underwent debridement of  RLE with closure of medial and lateral fasciotomy incision and biopsy of anterior muscle compartment. Patient's CPK level was monitored closely and continued to trend down.  Patient underwent repeat debridement of deep right lateral fasciotomy incision right lower leg with excisional  debridement of dead anterior tibialis muscle and biopsy of muscle with application of wound VAC on April 25, 2022. Pts pain was not well controlled and medications were adjusted. On 5/1 pt developed persistent tachycardia with mild hypoxemia with room air sat 90%.  Patient unable to get CTA chest due to acute renal failure and hemodialysis.  Chest x-ray obtained and negative.  Patient encouraged to utilize IS and O2 sats improved.  US of bilat LE obtained and neg for DVT with some limitation to RLE due to drsg intact. Decision was made to initiate heparin drip for high suspicion of PE until V/Q scan could be performed.  Patient also developed low-grade fever and Infectious Disease was consulted for further assistance and recommendations.  Patient was continued on IV antibiotics.  V/Q scan was obtained on 05/02 and neg for PE.  Heparin drip was stopped evening of 5/2 and pt was placed on SQ heparin for VTE prophylaixis.   Wound VAC was changed on 05/02 by Dr. Byers and wound care nurse with findings of some necrotic tissue with purulent drainage.  Antibiotics were tailored by Infectious Disease.      Interval History:  Notes reviewed, no acute events overnight. Pt states he feels better today. HR is improving. D/w wound care nurse yesterday evening findings on wound vac change. Heparin gtt stopped yesterday, VQ scan negative for PE. D/w with pt and he verbalized understanding. Will cont to monitor closely. LTAC still pending.     Review of Systems   Constitutional:  Negative for activity change, appetite change, chills, diaphoresis and fever.   HENT:  Negative for congestion, sore throat and trouble swallowing.    Eyes:  Negative for  photophobia and visual disturbance.   Respiratory:  Positive for cough. Negative for chest tightness and shortness of breath.    Cardiovascular:  Negative for chest pain, palpitations and leg swelling.   Gastrointestinal:  Negative for abdominal pain, diarrhea and nausea.   Genitourinary:  Negative for dysuria, flank pain and hematuria.   Musculoskeletal:  Positive for gait problem and myalgias. Negative for back pain.   Skin:  Negative for color change.   Neurological:  Negative for dizziness, weakness and headaches.   Psychiatric/Behavioral:  Negative for agitation, behavioral problems and confusion. The patient is nervous/anxious.    All other systems reviewed and are negative.  Objective:     Vital Signs (Most Recent):  Temp: 98.6 °F (37 °C) (05/03/22 0753)  Pulse: (!) 119 (05/03/22 0753)  Resp: 16 (05/03/22 0826)  BP: 137/63 (05/03/22 0753)  SpO2: (!) 94 % (05/03/22 0753)   Vital Signs (24h Range):  Temp:  [96.8 °F (36 °C)-100.4 °F (38 °C)] 98.6 °F (37 °C)  Pulse:  [109-134] 119  Resp:  [16-18] 16  SpO2:  [94 %-96 %] 94 %  BP: (117-187)/() 137/63     Weight: 81.2 kg (179 lb)  Body mass index is 26.43 kg/m².    Intake/Output Summary (Last 24 hours) at 5/3/2022 1047  Last data filed at 5/3/2022 0400  Gross per 24 hour   Intake 500 ml   Output 4150 ml   Net -3650 ml        Physical Exam  Vitals and nursing note reviewed.   Constitutional:       General: He is not in acute distress.     Appearance: Normal appearance. He is normal weight. He is not ill-appearing, toxic-appearing or diaphoretic.   HENT:      Head: Normocephalic.      Nose: Nose normal. No congestion or rhinorrhea.      Mouth/Throat:      Mouth: Mucous membranes are moist.      Pharynx: Oropharynx is clear. No oropharyngeal exudate or posterior oropharyngeal erythema.   Eyes:      General: No scleral icterus.     Extraocular Movements: Extraocular movements intact.      Pupils: Pupils are equal, round, and reactive to light.   Cardiovascular:       Rate and Rhythm: Regular rhythm. Tachycardia present.      Pulses: Normal pulses.      Heart sounds: Normal heart sounds. No murmur heard.  Pulmonary:      Effort: Pulmonary effort is normal. No respiratory distress.      Breath sounds: Normal breath sounds. No stridor. No wheezing, rhonchi or rales.   Abdominal:      General: Bowel sounds are normal. There is no distension.      Palpations: Abdomen is soft.      Tenderness: There is no abdominal tenderness.   Musculoskeletal:         General: Swelling and tenderness present. Normal range of motion.      Cervical back: Normal range of motion.      Right upper leg: Swelling present.      Right lower leg: Swelling present.      Comments: Right leg wound vac in place and drsg CDI.  Soft tissue mass to left a and is firm and tender to palpation, no surrounding erythema, fluctuance or open wound.   Skin:     General: Skin is warm and dry.   Neurological:      Mental Status: He is alert and oriented to person, place, and time. Mental status is at baseline.   Psychiatric:         Mood and Affect: Mood normal.         Behavior: Behavior normal.         Thought Content: Thought content normal.       Significant Labs: All pertinent labs within the past 24 hours have been reviewed.  CBC:   Recent Labs   Lab 05/01/22  1103 05/02/22  0410 05/03/22  0549   WBC 16.38*  16.38* 18.53*  18.53* 20.78*   HGB 8.3*  8.3* 8.3*  8.3* 7.6*   HCT 25.4*  25.4* 25.2*  25.2* 23.5*     359 369  369 354       CMP:   Recent Labs   Lab 05/02/22  0410 05/03/22  0549   * 135*   K 3.9 3.9   CL 99 97   CO2 24 26    104   BUN 39* 39*   CREATININE 2.7* 2.8*   CALCIUM 14.9* 12.3*   PROT 6.7 6.9   ALBUMIN 2.3* 2.3*   BILITOT 0.5 0.3   ALKPHOS 100 102   AST 42* 27   ALT 19 16   ANIONGAP 12 12   EGFRNONAA 32* 30*       Microbiology Results (last 7 days)       Procedure Component Value Units Date/Time    Urine culture [[id number]] Collected: 05/01/22 2211    Order Status:  Completed Specimen: Urine Updated: 05/03/22 0802     Urine Culture, Routine No growth    Narrative:      Specimen Source->Urine    Aerobic culture [[id number]] Collected: 05/02/22 1400    Order Status: Sent Specimen: Wound from Leg, Right Updated: 05/02/22 2236    Culture, Anaerobic [[id number]] Collected: 05/02/22 1400    Order Status: Sent Specimen: Wound from Leg, Right Updated: 05/02/22 2236    Blood culture [[id number]] Collected: 05/01/22 1216    Order Status: Completed Specimen: Blood Updated: 05/02/22 1812     Blood Culture, Routine No Growth to date      No Growth to date    Narrative:      Collection has been rescheduled by MR at 05/01/2022 11:22 Reason:   Patient unavailable starting dialysis and with the dr     Collection has been rescheduled by [facility] at 05/01/2022 11:42 Reason:   Got first set second set due after 12 couldnt stick other side due to   IV   Collection has been rescheduled by [facility] at 05/01/2022 11:22 Reason:   Patient unavailable starting dialysis and with the dr     Collection has been rescheduled by [facility] at 05/01/2022 11:42 Reason:   Got first set second set due after 12 couldnt stick other side due to   IV     Blood culture [[id number]] Collected: 05/01/22 1140    Order Status: Completed Specimen: Blood Updated: 05/02/22 1812     Blood Culture, Routine No Growth to date      No Growth to date    Narrative:      Collection has been rescheduled by MR at 05/01/2022 11:22 Reason:   Patient unavailable starting dialysis and with the dr   Collection has been rescheduled by [facility] at 05/01/2022 11:22 Reason:   Patient unavailable starting dialysis and with the dr     Blood culture [[id number]] Collected: 05/02/22 0410    Order Status: Completed Specimen: Blood from Antecubital, Right Arm Updated: 05/02/22 1715     Blood Culture, Routine No Growth to date    Influenza A & B by Molecular [[id number]] Collected: 05/02/22 0900    Order Status: Completed Specimen: Nasopharyngeal Swab Updated: 05/02/22  "1021     Influenza A, Molecular Negative     Influenza B, Molecular Negative     Flu A & B Source Nasal swab          Significant Imaging:   X-ray lumbar spine: Normal study.    Right tibia and fibula: Normal study.    Right femur x-ray: Normal study.    Pelvis x-ray: Normal study.    CT Head: No acute abnormality.  Minimal sinus disease.    CT Maxillofacial scan: No facial bone fracture.  Minimal sinus disease.    RLE venous doppler scan: No evidence of deep venous thrombosis in the right lower extremity.    CXR: Central line placement with the tip over the SVC.  No acute detrimental changes.    RLE venous doppler scan:  No evidence of deep venous thrombosis in the right lower extremity. Edema in the soft tissues of the right lower extremity.    CXR:   No acute process.  Right IJ catheter in place.      Assessment/Plan:      * Acute renal failure  Continues to be anuric  Hemodialysis therapy as per Nephrology team.   due to rhabdomyolysis  Trend CPK daily.  Follow Nephrology recommendations.  Avoid nephrotoxins  Telemetry.  Hemo split catheter was placed on April 25, 2022.     4/30 - remains on hemodialysis, emergent dialysis today secondary to hypercalcemia.  Will continue to monitor closely  5/1 - HD again today secondary to hypercalcemia. Renal function improving. Cont to monitor closely.  5/2 - hemodialysis treatment again today.  Continue to monitor  5/3 - stable, nephrology following, cont routine HD       Severe sepsis  This patient does have evidence of infective focus  My overall impression is sepsis. Vital signs were reviewed and noted in progress note.  Antibiotics given-   Antibiotics (From admission, onward)            Start     Stop Route Frequency Ordered    05/02/22 1759  vancomycin - pharmacy to dose  (vancomycin IVPB)        "And" Linked Group Details    -- IV pharmacy to manage frequency 05/02/22 1659    05/01/22 2345  cefTRIAXone (ROCEPHIN) 2 g/50 mL D5W IVPB         -- IV Every 24 hours " (non-standard times) 05/01/22 2239        Cultures were taken-   Microbiology Results (last 7 days)     Procedure Component Value Units Date/Time    Urine culture [[id number]] Collected: 05/01/22 2211    Order Status: Completed Specimen: Urine Updated: 05/03/22 0802     Urine Culture, Routine No growth    Narrative:      Specimen Source->Urine    Aerobic culture [[id number]] Collected: 05/02/22 1400    Order Status: Sent Specimen: Wound from Leg, Right Updated: 05/02/22 2236    Culture, Anaerobic [[id number]] Collected: 05/02/22 1400    Order Status: Sent Specimen: Wound from Leg, Right Updated: 05/02/22 2236    Blood culture [[id number]] Collected: 05/01/22 1216    Order Status: Completed Specimen: Blood Updated: 05/02/22 1812     Blood Culture, Routine No Growth to date      No Growth to date    Narrative:      Collection has been rescheduled by MR at 05/01/2022 11:22 Reason:   Patient unavailable starting dialysis and with the dr     Collection has been rescheduled by MR at 05/01/2022 11:42 Reason:   Got first set second set due after 12 couldnt stick other side due to   IV   Collection has been rescheduled by MR at 05/01/2022 11:22 Reason:   Patient unavailable starting dialysis and with the dr     Collection has been rescheduled by MR at 05/01/2022 11:42 Reason:   Got first set second set due after 12 couldnt stick other side due to   IV     Blood culture [[id number]] Collected: 05/01/22 1140    Order Status: Completed Specimen: Blood Updated: 05/02/22 1812     Blood Culture, Routine No Growth to date      No Growth to date    Narrative:      Collection has been rescheduled by MR at 05/01/2022 11:22 Reason:   Patient unavailable starting dialysis and with the dr   Collection has been rescheduled by MR at 05/01/2022 11:22 Reason:   Patient unavailable starting dialysis and with the dr     Blood culture [[id number]] Collected: 05/02/22 0410    Order Status: Completed Specimen: Blood from Antecubital, Right  Arm Updated: 05/02/22 1715     Blood Culture, Routine No Growth to date    Influenza A & B by Molecular [[id number]] Collected: 05/02/22 0900    Order Status: Completed Specimen: Nasopharyngeal Swab Updated: 05/02/22 1021     Influenza A, Molecular Negative     Influenza B, Molecular Negative     Flu A & B Source Nasal swab        Latest lactate reviewed, they are-  Recent Labs   Lab 05/01/22  1140   LACTATE 1.1       Organ dysfunction indicated by Acute respiratory failure and tachycardia  Source- right leg fasciotomy site    Source control Achieved by- abx      Hypoxia  Concern for PE given tachycardia and new hypoxia  Will obtain chest x-ray and check D-dimer which will likely be elevated given renal failure and recent surgery, will also check lactic acid  Will discuss with Nephrology high concern for PE and need for further workup with CTA verses V/Q scan and also initiating anticoag therapy  continue telemetry monitoring  Supplemental O2 to keep sats >92%  Pt encouraged to use IS  Workup in progress    5/3 - VQ scan negative, heparin gtt stopped. Suspect hypoxia secondary to sepsis which is now improving with appropriate source control. Pt encouraged to use IS. O2 sats improving and stable on room air. Will cont to monitor closely.          Tachycardia  Persistent tachycardia over the past 24 hours  Patient denies chest pain  Concern for PE given tachycardia and new hypoxia  Will obtain chest x-ray and check D-dimer which will likely be elevated given renal failure and recent surgery, will also check lactic acid  Will discuss with Nephrology high concern for PE and need for further workup with CTA verses V/Q scan and also initiating anticoag therapy  Patient also hypertensive-will start metoprolol XL today as per chart review patient has had some intermittent tachycardia during his stay  Continue telemetry monitoring  Workup in progress    5/2 - patient tolerating heparin drip.  Will obtain V/Q scan and  echocardiogram today.  Workup in progress for possible infectious process.  Appreciate ID consult.    5/3 - improving with source control, VQ scan neg and heparin gtt stopped 5/2. Appreciate ID consult.         Anemia of chronic disease  Patient's anemia is currently controlled. Has recieved 1 units of PRBCs. Etiology likely d/t acute blood loss  Current CBC reviewed-   Lab Results   Component Value Date    HGB 8.4 (L) 05/01/2022    HCT 26.6 (L) 05/01/2022     Monitor serial CBC and transfuse if patient becomes hemodynamically unstable, symptomatic or H/H drops below 7/21.         Drug eruption  No definite etiology apparent.  Possibly clindamycin use.  Off intravenous clindamycin.  Use oral Benadryl 25 mg q.6 hours p.r.n. patient will be monitored closely.    4/30 - resolved      Compartment syndrome  Underwent fasciotomy on 4/13   S/p Debridement deep right lateral fasciotomy incision right lower leg with excisional  debridement of dead anterior tibialis muscle and biopsy of muscle application of wound VAC right lower leg  - 04/25/22.  Wound care nurse performing dressing changes as per recommendations by Orthopedics.    4/30 - stable, wound vac in use, cont current therapy    5/2 - plan for wound VAC change today  5/3 - wound vac changed yesterday, d/w wound care nurse, area of necrotic tissue with purulent drainage. Wound debrided at BS by Dr. Byers. Plan for wound washout and further debridement in OR tomorrow or Thursday. Cont abx, appreciate ID consult.    Elevated liver enzymes  Elevated AST &ALT likely due to rhabdomyolysis  Trending down  Avoid hepatoxic medications  Monitor CMP  See plan for rhabdomyolysis    4/30 - resolved, cont to monitor CMP        Rhabdomyolysis  CPK trending down  S/p fasciotomy on 4/13 for compartment syndrome.  On 4/18 S/p DEBRIDEMENT, LOWER EXTREMITY (Right)   closure of medial fasciotomy incision right leg debridement of lateral fasciotomy with biopsy of anterior muscle  compartment removal of deep muscle anterior compartment right lower leg. Subsequently underwent Debridement deep right lateral fasciotomy incision right lower leg with excisional  debridement of dead anterior tibialis muscle and biopsy of muscle application of wound VAC right lower leg on 04/25/22.  Monitor BMP   Follow Nephrology recommendations.    4/30 - improved and stable, cont to monitor closely      History of asthma  Hx of asthma, stable    Monitor for acute changes      VTE Risk Mitigation (From admission, onward)         Ordered     heparin (porcine) injection 5,000 Units  Every 8 hours         05/02/22 1921     IP VTE HIGH RISK PATIENT  Once         05/02/22 1921     heparin (porcine) injection 4,000 Units  As needed (PRN)         04/14/22 0137     Reason for No Pharmacological VTE Prophylaxis  Once        Question:  Reasons:  Answer:  Risk of Bleeding    04/13/22 1913     Place sequential compression device  Until discontinued         04/13/22 1913                Discharge Planning   JARAD:      Code Status: Full Code   Is the patient medically ready for discharge?:     Reason for patient still in hospital (select all that apply): Patient trending condition and Treatment  Discharge Plan A: Long-term acute care facility (LTAC)                  Brittni Mackey NP  Department of Hospital Medicine   [facility]

## 2022-05-03 NOTE — ASSESSMENT & PLAN NOTE
Continues to be anuric  Hemodialysis therapy as per Nephrology team.   due to rhabdomyolysis  Trend CPK daily.  Follow Nephrology recommendations.  Avoid nephrotoxins  Telemetry.  Hemo split catheter was placed on April 25, 2022.     4/30 - remains on hemodialysis, emergent dialysis today secondary to hypercalcemia.  Will continue to monitor closely  5/1 - HD again today secondary to hypercalcemia. Renal function improving. Cont to monitor closely.  5/2 - hemodialysis treatment again today.  Continue to monitor  5/3 - stable, nephrology following, cont routine HD

## 2022-05-03 NOTE — PLAN OF CARE
Problem: Physical Therapy  Goal: Physical Therapy Goal  Description: Goals to be met by: 5/15/2022     Patient will increase functional independence with mobility by performin). Supine to sit with Modified [place]  2). Sit to stand transfer with Modified [place] maintaining RLE NWB using axillary crutches or rolling walker  3). Bed to chair transfer with Modified [place] maintaining RLE NWB using axillary crutches or rolling walker  5). Gait  x 150 feet with Modified [place] maintaining RLE NWB using axillary crutches or rolling walker    Outcome: Ongoing, Progressing

## 2022-05-03 NOTE — PROGRESS NOTES
Consult Note  Infectious Disease    Reason for Consult:      HPI: Agus Horan is a 21 y.o. male with PMHx of mild intermittent asthma, allergies, oxycodone overdose on 04/09/2022, left eye pain after a fall 04/10/2022, came to the hospital on 04/13/2022 with complaints of right leg pain.    Ultrasound ruled out DVT  X-ray ruled out foreign body and fractures  He had right leg compartment syndrome, rhabdomyolysis and SANDRA     He was seen by Dr. Fraser with Nephrology and Dr. Freedman with Orthopedic surgery.    Patient was taken for fasciotomy on  04/13/2022, then debridement and closure on 04/18/2022; then  debridement deep right lateral fasciotomy incision right lower leg with excisional  debridement of dead anterior tibialis muscle and biopsy of muscle application of wound VAC right lower leg  on 04/25/2022  There are no cultures sent intraoperatively  Blood cultures are negative on 04/13/2022 and 05/01/2022    I came and saw patient.  He has a temperature of 100.2°.  WBC 16. He is on cefazolin since 04/13/2022(with an interruptions or 4 days from 04/19--4/22)  He feels well, he feels normal.  He is just anxious bc  of SANDRA. He does not want ot be on HD fr the rest of his life  Hospitalist is concerned of tachycardia and poss PE and is giving heparin drip.     INTERVAL HISTORY:  5/2 (Isma): Interim reviewed, discussed with Dr Perales, patient seen and examined at bedside, covered in bed-sheet. States he has a growing lesion on his left AC. Having HD a bedside. Persistent tachycardia, hypertension, afebrile. Labs reviewed, WBC: 18.5, PMN 71.5%, H/H 8.3/25.2, plt: 369. Iron 10, ferritin 1.010. ESR 79, .9, calcium 14.9. D-dimer 3.3. Procal 0.81. Micro reviewed, negative thus far, no cultures sent from the OR.     5/3:  Patient seen and examined at bedside, feeling comfortable today.  States is the most calmed he has ever felt.  Seen by Ortho yesterday, status post debridement at bedside.  Upon extensive  "revision, small pocket of purulent bloody fluid drained, cultures taken at bedside.  Plan for OR either tomorrow or the day after.  CT of left arm consistent with myositis ossificans, likely from hypercalcemia.  Uric acid 0.3.  V/Q scan negative for PE.  Patient's heart rate remained slightly elevated 104 currently.  Labs reviewed, white count 20.7, PMN 68%.  H&H 7.6/23.5, platelet count 354. Sodium 135, creatinine 2.8.  Calcium 12.3, ionized calcium 1.65.  Albumin 2.3.      Antibiotics (From admission, onward)            Start     Stop Route Frequency Ordered    05/02/22 1759  vancomycin - pharmacy to dose  (vancomycin IVPB)        "And" Linked Group Details    -- IV pharmacy to manage frequency 05/02/22 1659    05/01/22 2345  cefTRIAXone (ROCEPHIN) 2 g/50 mL D5W IVPB         -- IV Every 24 hours (non-standard times) 05/01/22 2239        Review of patient's allergies indicates:   Allergen Reactions    Shrimp      Past Medical History:   Diagnosis Date    Allergy     AR    Asthma     mild intermittent    Hyperkalemia 4/14/2022     Past Surgical History:   Procedure Laterality Date    DEBRIDEMENT OF LOWER EXTREMITY Right 4/18/2022    Procedure: DEBRIDEMENT, LOWER EXTREMITY;  Surgeon: Leonardo Byers MD;  Location: [facility] OR;  Service: Orthopedics;  Laterality: Right;    FASCIOTOMY Right 4/13/2022    Procedure: FASCIOTOMY;  Surgeon: Leonardo Byers MD;  Location: [facility] OR;  Service: Orthopedics;  Laterality: Right;    FASCIOTOMY Right 4/25/2022    Procedure: FASCIOTOMY;  Surgeon: Leonardo Byers MD;  Location: [facility] OR;  Service: Orthopedics;  Laterality: Right;    REMOVAL OF FOREIGN BODY FROM FOOT Left 6/24/2020    Procedure: REMOVAL, FOREIGN BODY, FOOT;  Surgeon: Dani Garcia MD;  Location: [facility] OR;  Service: Orthopedics;  Laterality: Left;     Social History     Socioeconomic History    Marital status: Single   Tobacco Use    Smoking status: Never Smoker    Smokeless tobacco: Never " Used   Substance and Sexual Activity    Alcohol use: Yes     Comment: last night    Drug use: Yes     Frequency: 2.0 times per week     Types: Marijuana     Comment: yesterday   Social History Narrative    ** Merged History Encounter **         Lives with mom, 2 brothers.  No smokers.  +Dogs/chickens.  7th grader.     Family History   Problem Relation Age of Onset    Asthma Brother     Emphysema Maternal Grandmother     Hyperlipidemia Maternal Grandmother     Asthma Maternal Grandmother     Arthritis Maternal Grandmother     COPD Maternal Grandmother     Asthma Brother          Review of Systems:   Patient with drug use before admission. 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 Alert and attentive, cooperative, comfortable  Eyes:  Anicteric, PERRL, EOMI  ENT:  No ulcers, exudates, thrush, nares patent, dentition is fair  Neck:  supple, no masses or adenopathy appreciated  Lungs: Clear to auscultation b/l   Heart:  Tachycardia, S1/S2+, no murmur   Abd:  Soft, NT, ND, normal BS, no masses or organomegaly appreciated.  :  Voids, dark urine   Musc:  Other than  Right leg dressing in place with wound vac, not disturbed. Joints without effusion, swelling, erythema, synovitis, muscle wasting.   Skin:  No rashes. No palmar or plantar lesions. No subungual petechiae  Neuro:   Alert, attentive, speech fluent, face symmetric, moves all extremities, no focal  weakness  Psych:  Calm, cooperative  Lymphatic:       Extrem: Left arm with amorphic deposit, calcification    No edema, erythema, phlebitis, cellulitis, warm and well perfused  VAD:  R tunneled catheter    Isolation:  none  Wound:     5/3:                                 General Labs reviewed:  Recent Labs   Lab 05/01/22  1103 05/02/22  0410 05/03/22  0549   WBC 16.38*  16.38* 18.53*  18.53* 20.78*   HGB 8.3*  8.3* 8.3*  8.3* 7.6*   HCT 25.4*  25.4* 25.2*  25.2* 23.5*     359 369  369 354       Recent Labs   Lab 05/01/22  0503 05/02/22  0410 05/03/22  0549    135* 135*   K 4.6 3.9 3.9    99 97   CO2 24 24 26   BUN 35* 39* 39*   CREATININE 2.7* 2.7* 2.8*   CALCIUM 15.5* 14.9* 12.3*   PROT 6.9 6.7 6.9   BILITOT 0.5 0.5 0.3   ALKPHOS 94 100 102   ALT 15 19 16   AST 38 42* 27     Recent Labs   Lab 05/01/22  1457   .9*         Micro:  Microbiology Results (last 7 days)     Procedure Component Value Units Date/Time    Urine culture [[id number]] Collected: 05/01/22 2211    Order Status: Completed Specimen: Urine Updated: 05/03/22 0802     Urine Culture, Routine No growth    Narrative:      Specimen Source->Urine    Aerobic culture [[id number]] Collected: 05/02/22 1400    Order Status: Sent Specimen: Wound from Leg, Right Updated: 05/02/22 2236    Culture, Anaerobic [[id number]] Collected: 05/02/22 1400    Order Status: Sent Specimen: Wound from Leg, Right Updated: 05/02/22 2236    Blood culture [[id number]] Collected: 05/01/22 1216    Order Status: Completed Specimen: Blood Updated: 05/02/22 1812     Blood Culture, Routine No Growth to date      No Growth to date    Narrative:      Collection has been rescheduled by CONCHITA at 05/01/2022 11:22 Reason:   Patient unavailable starting dialysis and with the dr     Collection has been rescheduled by MRMARIA LUZ at 05/01/2022 11:42 Reason:   Got first set second set due after 12 couldnt stick other side due to   IV   Collection has been rescheduled by CONCHITA  at 05/01/2022 11:22 Reason:   Patient unavailable starting dialysis and with the dr     Collection has been rescheduled by [email or web address] at 05/01/2022 11:42 Reason:   Got first set second set due after 12 couldnt stick other side due to   IV     Blood culture [[id number]] Collected: 05/01/22 1140    Order Status: Completed Specimen: Blood Updated: 05/02/22 1812     Blood Culture, Routine No Growth to date      No Growth to date    Narrative:      Collection has been rescheduled by [email or web address] at 05/01/2022 11:22 Reason:   Patient unavailable starting dialysis and with the dr   Collection has been rescheduled by MRQ1 at 05/01/2022 11:22 Reason:   Patient unavailable starting dialysis and with the dr     Blood culture [[id number]] Collected: 05/02/22 0410    Order Status: Completed Specimen: Blood from Antecubital, Right Arm Updated: 05/02/22 1715     Blood Culture, Routine No Growth to date    Influenza A & B by Molecular [[id number]] Collected: 05/02/22 0900    Order Status: Completed Specimen: Nasopharyngeal Swab Updated: 05/02/22 1021     Influenza A, Molecular Negative     Influenza B, Molecular Negative     Flu A & B Source Nasal swab          Imaging Reviewed:   CXR-- No definite acute radiographic abnormality.  Right internal jugular central venous catheter in place.   CT  UltrasoundNo evidence of deep venous thrombosis in either lower extremity, noting nonvisualization of the right calf veins due to overlying bandaging.    CT Left arm: Three circumscribed foci of soft tissue mineralization along the antecubital fossa have peripheral zoning favoring myositis ossificans.     NM scan low probability for PE    Cardiology:  · The left ventricle is normal in size with concentric remodeling and normal systolic function.  · The estimated ejection fraction is 65%.  · Grade I left ventricular diastolic dysfunction.  · Normal right ventricular size with normal right ventricular systolic function.  · Mild to moderate tricuspid  regurgitation.  · Normal central venous pressure (3 mmHg).  · The estimated PA systolic pressure is 47 mmHg.  · There is pulmonary hypertension.  · Sinus tachycardia rate of 120-130 beats per minute was noted during the study        IMPRESSION & PLAN     1. History of R leg compartment syndrome; s/p multiple I&Ds, on cefazolin prophylactic   ESR and CRP elevated   Procal 0.81    2. Rhabdomyolysis due to compartment syndrome.  Resolved    3. SANDRA due to rhabdomyolysis, improved   Hypercalcemia, 14  4. Vitamin-D deficiency, Anemia, protein malnutrition, hypoalbuminemia    5. V/Q scan negative for PE  6. Myositis ossificans L arm - due to hypercalcemia    Recommendations:  Adequate source control   Plan for I&D by Ortho  Vancomycin IV, keep level 10-15  Continue Ceftriaxone 2g IV q24h  Follow cultures   Wound care   Incentive spirometry     D/w NP    Medical Decision Making during this encounter was  [_] Low Complexity  [_] Moderate Complexity  [  ] High Complexity

## 2022-05-04 ENCOUNTER — ANESTHESIA EVENT (OUTPATIENT)
Dept: SURGERY | Facility: HOSPITAL | Age: 22
DRG: 957 | End: 2022-05-04
Payer: MEDICAID

## 2022-05-04 PROBLEM — N17.0 ACUTE RENAL FAILURE WITH TUBULAR NECROSIS: Status: ACTIVE | Noted: 2022-04-14

## 2022-05-04 PROBLEM — E83.52 HYPERCALCEMIA: Status: ACTIVE | Noted: 2022-05-04

## 2022-05-04 PROBLEM — E87.6 HYPOKALEMIA: Status: ACTIVE | Noted: 2022-05-04

## 2022-05-04 LAB
ALBUMIN SERPL BCP-MCNC: 2.4 G/DL (ref 3.5–5.2)
ALP SERPL-CCNC: 112 U/L (ref 55–135)
ALT SERPL W/O P-5'-P-CCNC: 17 U/L (ref 10–44)
ANION GAP SERPL CALC-SCNC: 11 MMOL/L (ref 8–16)
ANISOCYTOSIS BLD QL SMEAR: SLIGHT
AST SERPL-CCNC: 29 U/L (ref 10–40)
BASOPHILS NFR BLD: 0 % (ref 0–1.9)
BILIRUB SERPL-MCNC: 0.3 MG/DL (ref 0.1–1)
BUN SERPL-MCNC: 45 MG/DL (ref 6–20)
CA-I BLDV-SCNC: 1.82 MMOL/L (ref 1.06–1.42)
CALCIUM SERPL-MCNC: 13.6 MG/DL (ref 8.7–10.5)
CHLORIDE SERPL-SCNC: 97 MMOL/L (ref 95–110)
CO2 SERPL-SCNC: 28 MMOL/L (ref 23–29)
CREAT SERPL-MCNC: 2.5 MG/DL (ref 0.5–1.4)
DIFFERENTIAL METHOD: ABNORMAL
EOSINOPHIL NFR BLD: 11 % (ref 0–8)
ERYTHROCYTE [DISTWIDTH] IN BLOOD BY AUTOMATED COUNT: 12.4 % (ref 11.5–14.5)
EST. GFR  (AFRICAN AMERICAN): 40 ML/MIN/1.73 M^2
EST. GFR  (NON AFRICAN AMERICAN): 35 ML/MIN/1.73 M^2
GLUCOSE SERPL-MCNC: 93 MG/DL (ref 70–110)
HCT VFR BLD AUTO: 23.2 % (ref 40–54)
HGB BLD-MCNC: 7.6 G/DL (ref 14–18)
HYPOCHROMIA BLD QL SMEAR: ABNORMAL
IMM GRANULOCYTES # BLD AUTO: ABNORMAL K/UL (ref 0–0.04)
IMM GRANULOCYTES NFR BLD AUTO: ABNORMAL % (ref 0–0.5)
LYMPHOCYTES NFR BLD: 3 % (ref 18–48)
MCH RBC QN AUTO: 30 PG (ref 27–31)
MCHC RBC AUTO-ENTMCNC: 32.8 G/DL (ref 32–36)
MCV RBC AUTO: 92 FL (ref 82–98)
MONOCYTES NFR BLD: 5 % (ref 4–15)
NEUTROPHILS NFR BLD: 75 % (ref 38–73)
NEUTS BAND NFR BLD MANUAL: 6 %
NRBC BLD-RTO: 0 /100 WBC
PLATELET # BLD AUTO: 370 K/UL (ref 150–450)
PLATELET BLD QL SMEAR: ABNORMAL
PMV BLD AUTO: 9.4 FL (ref 9.2–12.9)
POTASSIUM SERPL-SCNC: 3.3 MMOL/L (ref 3.5–5.1)
PROCALCITONIN SERPL IA-MCNC: 1 NG/ML
PROT SERPL-MCNC: 7.3 G/DL (ref 6–8.4)
RBC # BLD AUTO: 2.53 M/UL (ref 4.6–6.2)
SODIUM SERPL-SCNC: 136 MMOL/L (ref 136–145)
VANCOMYCIN SERPL-MCNC: 9.5 UG/ML
WBC # BLD AUTO: 19.3 K/UL (ref 3.9–12.7)

## 2022-05-04 PROCEDURE — 85027 COMPLETE CBC AUTOMATED: CPT | Performed by: NURSE PRACTITIONER

## 2022-05-04 PROCEDURE — 80053 COMPREHEN METABOLIC PANEL: CPT | Performed by: NURSE PRACTITIONER

## 2022-05-04 PROCEDURE — 25000003 PHARM REV CODE 250: Performed by: INTERNAL MEDICINE

## 2022-05-04 PROCEDURE — 63600175 PHARM REV CODE 636 W HCPCS: Performed by: STUDENT IN AN ORGANIZED HEALTH CARE EDUCATION/TRAINING PROGRAM

## 2022-05-04 PROCEDURE — 25000003 PHARM REV CODE 250: Performed by: ORTHOPAEDIC SURGERY

## 2022-05-04 PROCEDURE — 80202 ASSAY OF VANCOMYCIN: CPT | Performed by: INTERNAL MEDICINE

## 2022-05-04 PROCEDURE — 82330 ASSAY OF CALCIUM: CPT | Performed by: ORTHOPAEDIC SURGERY

## 2022-05-04 PROCEDURE — 63600175 PHARM REV CODE 636 W HCPCS: Performed by: INTERNAL MEDICINE

## 2022-05-04 PROCEDURE — 99232 PR SUBSEQUENT HOSPITAL CARE,LEVL II: ICD-10-PCS | Mod: S$GLB,,, | Performed by: STUDENT IN AN ORGANIZED HEALTH CARE EDUCATION/TRAINING PROGRAM

## 2022-05-04 PROCEDURE — 63600175 PHARM REV CODE 636 W HCPCS: Performed by: ORTHOPAEDIC SURGERY

## 2022-05-04 PROCEDURE — 80100014 HC HEMODIALYSIS 1:1

## 2022-05-04 PROCEDURE — 94761 N-INVAS EAR/PLS OXIMETRY MLT: CPT

## 2022-05-04 PROCEDURE — 25000003 PHARM REV CODE 250: Performed by: NURSE PRACTITIONER

## 2022-05-04 PROCEDURE — 85007 BL SMEAR W/DIFF WBC COUNT: CPT | Performed by: NURSE PRACTITIONER

## 2022-05-04 PROCEDURE — 97530 THERAPEUTIC ACTIVITIES: CPT | Mod: CQ

## 2022-05-04 PROCEDURE — 94799 UNLISTED PULMONARY SVC/PX: CPT

## 2022-05-04 PROCEDURE — A4216 STERILE WATER/SALINE, 10 ML: HCPCS | Performed by: ORTHOPAEDIC SURGERY

## 2022-05-04 PROCEDURE — 63600175 PHARM REV CODE 636 W HCPCS: Performed by: NURSE PRACTITIONER

## 2022-05-04 PROCEDURE — 84145 PROCALCITONIN (PCT): CPT | Performed by: STUDENT IN AN ORGANIZED HEALTH CARE EDUCATION/TRAINING PROGRAM

## 2022-05-04 PROCEDURE — 99232 SBSQ HOSP IP/OBS MODERATE 35: CPT | Mod: S$GLB,,, | Performed by: STUDENT IN AN ORGANIZED HEALTH CARE EDUCATION/TRAINING PROGRAM

## 2022-05-04 PROCEDURE — 11000001 HC ACUTE MED/SURG PRIVATE ROOM

## 2022-05-04 PROCEDURE — 36415 COLL VENOUS BLD VENIPUNCTURE: CPT | Performed by: ORTHOPAEDIC SURGERY

## 2022-05-04 RX ORDER — SODIUM CHLORIDE 9 MG/ML
INJECTION, SOLUTION INTRAVENOUS CONTINUOUS
Status: DISCONTINUED | OUTPATIENT
Start: 2022-05-04 | End: 2022-05-09

## 2022-05-04 RX ORDER — SODIUM CHLORIDE 0.9 % (FLUSH) 0.9 %
10 SYRINGE (ML) INJECTION
Status: CANCELLED | OUTPATIENT
Start: 2022-05-04

## 2022-05-04 RX ORDER — CEFEPIME HYDROCHLORIDE 1 G/50ML
1 INJECTION, SOLUTION INTRAVENOUS
Status: DISCONTINUED | OUTPATIENT
Start: 2022-05-04 | End: 2022-05-06

## 2022-05-04 RX ORDER — VANCOMYCIN HCL IN 5 % DEXTROSE 1G/250ML
1000 PLASTIC BAG, INJECTION (ML) INTRAVENOUS ONCE
Status: COMPLETED | OUTPATIENT
Start: 2022-05-04 | End: 2022-05-04

## 2022-05-04 RX ADMIN — MORPHINE SULFATE 2 MG: 4 INJECTION INTRAVENOUS at 01:05

## 2022-05-04 RX ADMIN — HYDROXYZINE PAMOATE 25 MG: 25 CAPSULE ORAL at 11:05

## 2022-05-04 RX ADMIN — HYDRALAZINE HYDROCHLORIDE 25 MG: 25 TABLET, FILM COATED ORAL at 10:05

## 2022-05-04 RX ADMIN — HEPARIN SODIUM 5000 UNITS: 5000 INJECTION INTRAVENOUS; SUBCUTANEOUS at 07:05

## 2022-05-04 RX ADMIN — SEVELAMER CARBONATE 800 MG: 800 TABLET, FILM COATED ORAL at 08:05

## 2022-05-04 RX ADMIN — DOCUSATE SODIUM AND SENNOSIDES 1 TABLET: 8.6; 5 TABLET, FILM COATED ORAL at 10:05

## 2022-05-04 RX ADMIN — Medication 2 ML: at 06:05

## 2022-05-04 RX ADMIN — Medication 324 MG: at 01:05

## 2022-05-04 RX ADMIN — HEPARIN SODIUM 5000 UNITS: 5000 INJECTION INTRAVENOUS; SUBCUTANEOUS at 10:05

## 2022-05-04 RX ADMIN — ONDANSETRON 4 MG: 2 INJECTION INTRAMUSCULAR; INTRAVENOUS at 12:05

## 2022-05-04 RX ADMIN — METOPROLOL SUCCINATE 25 MG: 25 TABLET, EXTENDED RELEASE ORAL at 11:05

## 2022-05-04 RX ADMIN — MORPHINE SULFATE 2 MG: 4 INJECTION INTRAVENOUS at 08:05

## 2022-05-04 RX ADMIN — Medication 324 MG: at 05:05

## 2022-05-04 RX ADMIN — OXYCODONE HYDROCHLORIDE AND ACETAMINOPHEN 1 TABLET: 5; 325 TABLET ORAL at 02:05

## 2022-05-04 RX ADMIN — CEFTRIAXONE 2 G: 2 INJECTION, SOLUTION INTRAVENOUS at 02:05

## 2022-05-04 RX ADMIN — CALCITONIN SALMON 324 UNITS: 200 INJECTION, SOLUTION INTRAMUSCULAR; SUBCUTANEOUS at 10:05

## 2022-05-04 RX ADMIN — ERYTHROPOIETIN 4100 UNITS: 10000 INJECTION, SOLUTION INTRAVENOUS; SUBCUTANEOUS at 11:05

## 2022-05-04 RX ADMIN — Medication 2 ML: at 07:05

## 2022-05-04 RX ADMIN — SEVELAMER CARBONATE 800 MG: 800 TABLET, FILM COATED ORAL at 05:05

## 2022-05-04 RX ADMIN — HEPARIN SODIUM 4000 UNITS: 1000 INJECTION, SOLUTION INTRAVENOUS; SUBCUTANEOUS at 11:05

## 2022-05-04 RX ADMIN — CALCITONIN SALMON 324 UNITS: 200 INJECTION, SOLUTION INTRAMUSCULAR; SUBCUTANEOUS at 08:05

## 2022-05-04 RX ADMIN — Medication 2 ML: at 12:05

## 2022-05-04 RX ADMIN — HYDRALAZINE HYDROCHLORIDE 25 MG: 25 TABLET, FILM COATED ORAL at 01:05

## 2022-05-04 RX ADMIN — CYCLOBENZAPRINE 10 MG: 10 TABLET, FILM COATED ORAL at 03:05

## 2022-05-04 RX ADMIN — OXYCODONE HYDROCHLORIDE AND ACETAMINOPHEN 1 TABLET: 5; 325 TABLET ORAL at 05:05

## 2022-05-04 RX ADMIN — MORPHINE SULFATE 2 MG: 4 INJECTION INTRAVENOUS at 12:05

## 2022-05-04 RX ADMIN — SEVELAMER CARBONATE 800 MG: 800 TABLET, FILM COATED ORAL at 11:05

## 2022-05-04 RX ADMIN — OXYCODONE HYDROCHLORIDE AND ACETAMINOPHEN 1 TABLET: 5; 325 TABLET ORAL at 10:05

## 2022-05-04 RX ADMIN — HYDRALAZINE HYDROCHLORIDE 25 MG: 25 TABLET, FILM COATED ORAL at 07:05

## 2022-05-04 RX ADMIN — MORPHINE SULFATE 2 MG: 4 INJECTION INTRAVENOUS at 07:05

## 2022-05-04 RX ADMIN — OXYCODONE HYDROCHLORIDE AND ACETAMINOPHEN 1 TABLET: 5; 325 TABLET ORAL at 08:05

## 2022-05-04 RX ADMIN — DOCUSATE SODIUM AND SENNOSIDES 1 TABLET: 8.6; 5 TABLET, FILM COATED ORAL at 08:05

## 2022-05-04 RX ADMIN — SODIUM CHLORIDE: 0.9 INJECTION, SOLUTION INTRAVENOUS at 06:05

## 2022-05-04 RX ADMIN — HEPARIN SODIUM 5000 UNITS: 5000 INJECTION INTRAVENOUS; SUBCUTANEOUS at 02:05

## 2022-05-04 RX ADMIN — CEFEPIME HYDROCHLORIDE 1 G: 1 INJECTION, SOLUTION INTRAVENOUS at 02:05

## 2022-05-04 RX ADMIN — VANCOMYCIN HYDROCHLORIDE 1000 MG: 1 INJECTION, POWDER, LYOPHILIZED, FOR SOLUTION INTRAVENOUS at 02:05

## 2022-05-04 NOTE — NURSING
HD tx discontinued per Dr. Fraser d/t pt N/V and episodes of sinus tachycardia  RN notified, administered Zofran  Net UF 1673 mL

## 2022-05-04 NOTE — PT/OT/SLP PROGRESS
Physical Therapy Treatment    Patient Name:  Agus Horan   MRN:  [id number]    Recommendations:     Discharge Recommendations:  LTACH (long-term acute care hospital), home health PT   Discharge Equipment Recommendations: walker, rolling, bedside commode   Barriers to discharge: None    Assessment:     Agus Horan is a 21 y.o. male admitted with a medical diagnosis of Acute renal failure with tubular necrosis.  He presents with the following impairments/functional limitations:  weakness, impaired endurance, impaired self care skills, impaired functional mobilty, gait instability, decreased lower extremity function, impaired skin.  Pt in dialysis with AM attempt.  Second attempt in PM, pt agreeable to ambulate but reports earlier nausea. With transfer to sit EOB, onset of severe dizziness and nausea. With decrease of dizziness, pt then attempted twice to ambulate but requested rapid return to sit due to nausea. Returned to bed with needs at hand, nursing notified.     Rehab Prognosis: Good; patient would benefit from acute skilled PT services to address these deficits and reach maximum level of function.    Recent Surgery: Procedure(s) (LRB):  FASCIOTOMY (Right)  INSERTION, CATHETER, TUNNELED (Right) 9 Days Post-Op    Plan:     During this hospitalization, patient to be seen  (1-2x/day) to address the identified rehab impairments via gait training, therapeutic activities, therapeutic exercises and progress toward the following goals:    · Plan of Care Expires:  05/15/22    Subjective     Chief Complaint: dizzy upon sitting up; nauseated  Patient/Family Comments/goals: no more nausea  Pain/Comfort:  · Pain Rating 1: 0/10      Objective:     Communicated with nurse Lucero prior to session.  Patient found HOB elevated with bed alarm, peripheral IV, telemetry, wound vac upon PT entry to room.     General Precautions: Standard, fall   Orthopedic Precautions:RLE non weight bearing   Braces: N/A  Respiratory Status:  Room air     Functional Mobility:  · Bed Mobility:     · Supine to Sit: stand by assistance  · Sit to Supine: stand by assistance  · Transfers:     · Sit to Stand:  contact guard assistance with rolling walker  · Bed to Chair: minimum assistance with  rolling walker  using  Step Transfer  · Gait: two attempts for gait today but unable due to dizziness and nausea      AM-PAC 6 CLICK MOBILITY          Therapeutic Activities and Exercises:      Patient left HOB elevated with all lines intact, call button in reach, bed alarm on and nurse Nic notified..    GOALS:   Multidisciplinary Problems     Physical Therapy Goals        Problem: Physical Therapy    Goal Priority Disciplines Outcome Goal Variances Interventions   Physical Therapy Goal     PT, PT/OT Ongoing, Progressing     Description: Goals to be met by: 5/15/2022     Patient will increase functional independence with mobility by performin). Supine to sit with Modified [place]  2). Sit to stand transfer with Modified [place] maintaining RLE NWB using axillary crutches or rolling walker  3). Bed to chair transfer with Modified [place] maintaining RLE NWB using axillary crutches or rolling walker  5). Gait  x 150 feet with Modified [place] maintaining RLE NWB using axillary crutches or rolling walker                     Time Tracking:     PT Received On: 22  PT Start Time: 1317     PT Stop Time: 1340  PT Total Time (min): 23 min     Billable Minutes: Therapeutic Activity 23    Treatment Type: Treatment  PT/PTA: PTA     PTA Visit Number: 2022

## 2022-05-04 NOTE — PROGRESS NOTES
[facility]  Adult Nutrition  Consult Note    SUMMARY   Intervention: sodium/phosphorus/potassium modified diet     Recommendations  Recommendation/Intervention:   1.) Continue with Renal diet + Novasource renal BID   2.) Recommend beneprotein TID   Goals: 1.) diet will advance within 48 hrs 2.) pt will meet >50% of EEN during admit   Nutrition Goal Status: 1.) goal met 2.) goal met/ongoing   Communication of RD Recs: reviewed with RN    1. Compartment syndrome    2. Hyperkalemia    3. SANDRA (acute kidney injury)    4. Traumatic rhabdomyolysis, initial encounter    5. Acute renal failure    6. Non-traumatic compartment syndrome of right lower extremity    7. Acute renal failure, unspecified acute renal failure type    8. Tachycardia    9. Shortness of breath    10. Acute renal failure with tubular necrosis      Past Medical History:   Diagnosis Date    Allergy     AR    Asthma     mild intermittent    Hyperkalemia 4/14/2022         Assessment and Plan  Nutrition Problem  altered nutrition related lab values    Related to (etiology):   Renal dysfunction    Signs and Symptoms (as evidenced by):   Phos: 8.9   K: 5.3      Interventions/Recommendations (treatment strategy):  Advance to renal diet, educate on diet on f/u     Nutrition Diagnosis Status:   Continues           Malnutrition Assessment  Stable weight.       Reason for Assessment  Reason For Assessment: consult  General Information Comments: admits with acute renal failure. Pt in OR during RD rounds for washout. RD consulted for new HD pt. Provided renal diet education to RN. Will f/u with education. unable to assess any weight loss.  4/21: pt receiving HD during RD rounds. Just receive [place] toast for breakfast which he was excited about. States he's willing to try novasource with meals. Encouraged protein intake with meals and importance of maintaining his nutrition status while receiving HD. Reports a UBW of ~165lbs. Provided SANDRA diet  "education, left at bedside with RD email.  : pt in periop for surgery. Diet just advanced back to renal. Per chart review, pt with adequate intake over the weekend (75% at most meals). Pt will continue with HD outpatient.   : HD yesterday. Tolerating diet, consumed ~50% of breakfast this morning. Drinking about 1 novasource renal a day.   : Pt requesting fruit on all trays due to tolerance. States he has "good days and bad days" with his appetite and nausea due to HD. Updated diet order to send low K fruit (strawberries, blueberries, and pears). Agreeable to beneprotein on tray to mix with beverages for added protein intake. Encouraged novasource renal intake. Pt stated he was drinking it, but slacked off.     Nutrition Risk Screen  Nutrition Risk Screen: no indicators present    Nutrition/Diet History  Food Allergies: shrimp  Factors Affecting Nutritional Intake: NPO-- resolved.     Anthropometrics  Temp: 98.3 °F (36.8 °C)  Height Method: Stated  Height: 5' 9"  Height (inches): 69 in  Weight Method: Bed Scale  Weight: 81.2 kg (179 lb)  Weight (lb): 179 lb  Ideal Body Weight (IBW), Male: 160 lb  % Ideal Body Weight, Male (lb): 111.88 %  BMI (Calculated): 26.4  BMI Grade: 25 - 29.9 - overweight  Usual Body Weight (UBW), k.1 kg (2022)  % Usual Body Weight: 110.09  % Weight Change From Usual Weight: 9.86 %       Lab/Procedures/Meds  Pertinent Labs Reviewed: reviewed  BMP  Lab Results   Component Value Date     2022    K 3.3 (L) 2022    CL 97 2022    CO2 28 2022    BUN 45 (H) 2022    CREATININE 2.5 (H) 2022    CALCIUM 13.6 (HH) 2022    ANIONGAP 11 2022    ESTGFRAFRICA 40 (A) 2022    EGFRNONAA 35 (A) 2022     Lab Results   Component Value Date    ALBUMIN 2.4 (L) 2022     Lab Results   Component Value Date    CALCIUM 13.6 (HH) 2022    PHOS 7.1 (H) 2022     No results for input(s): POCTGLUCOSE in the last  " hours.    Pertinent Medications Reviewed: reviewed  Scheduled Meds:   calcitonin  4 Units/kg Subcutaneous BID    ceFEPime (MAXIPIME) IVPB  1 g Intravenous Q24H    epoetin leidy  50 Units/kg Intravenous Every Mon, Wed, Fri    ferrous gluconate  324 mg Oral BID WM    heparin (porcine)  5,000 Units Subcutaneous Q8H    hydrALAZINE  25 mg Oral Q8H    LIDOcaine HCL 2%   Topical (Top) Every Mon, Thurs    metoprolol succinate  25 mg Oral Daily    senna-docusate 8.6-50 mg  1 tablet Oral BID    sevelamer carbonate  800 mg Oral TID WM    sodium chloride 0.9%  2 mL Intravenous Q6H    vancomycin (VANCOCIN) IVPB  1,000 mg Intravenous Once     Continuous Infusions:   electrolyte-S (pH 7.4) Stopped (04/25/22 1255)    loperamide         Estimated/Assessed Needs  Weight Used For Calorie Calculations: 78 kg (171 lb 15.3 oz) (NHANES II)  Energy Calorie Requirements (kcal): 8160-3175 kcals  Energy Need Method: Kcal/kg  Protein Requirements: 80-95g  Weight Used For Protein Calculations: 78 kg (171 lb 15.3 oz) (NHANES II)  Fluid Requirements (mL): UOP + 1000 mls  Estimated Fluid Requirement Method: other (see comments)  RDA Method (mL): 2300         Nutrition Prescription Ordered  Current Diet Order: Renal diet  Oral Nutrition Supplements: Novasource renal       Evaluation of Received Nutrient/Fluid Intake  Energy Calories Required: not meeting needs  % Intake of Estimated Energy Needs: 25 - 50 %  % Meal Intake: 25 - 50 %     Nutrition Risk  Level of Risk/Frequency of Follow-up:  (2 weekly)       Monitor and Evaluation  Food and Nutrient Intake: energy intake, food and beverage intake  Food and Nutrient Adminstration: diet order  Knowledge/Beliefs/Attitudes: food and nutrition knowledge/skill  Anthropometric Measurements: weight, weight change, body mass index  Biochemical Data, Medical Tests and Procedures: electrolyte and renal panel, glucose/endocrine profile, lipid profile  Nutrition-Focused Physical Findings: overall  appearance       Nutrition Follow-Up  RD Follow-up?: Yes

## 2022-05-04 NOTE — PROGRESS NOTES
Pharmacokinetic Assessment Follow Up: IV Vancomycin    Vancomycin serum concentration assessment(s):    The random level was drawn correctly and can be used to guide therapy at this time. The measurement is below the desired definitive target range of 10 to 15 mcg/mL.    Vancomycin Regimen Plan:    Re-dose vancomycin 1000 mg postHD today. Vancomycin random level ordered for 5/5 with AM labs.    Drug levels (last 3 results):  Recent Labs   Lab Result Units 05/03/22  1754 05/04/22  0618   Vancomycin, Random ug/mL 15.2 9.5       Pharmacy will continue to follow and monitor vancomycin.    Please contact pharmacy at extension [phone] for questions regarding this assessment.    Thank you for the consult,   London Finney       Patient brief summary:  Agus Horan is a 21 y.o. male initiated on antimicrobial therapy with IV Vancomycin for treatment of skin & soft tissue infection      Drug Allergies:   Review of patient's allergies indicates:   Allergen Reactions    Shrimp        Actual Body Weight:   81.2    Renal Function:   Estimated Creatinine Clearance: 46 mL/min (A) (based on SCr of 2.5 mg/dL (H)).,     Dialysis Method (if applicable):  intermittent HD    CBC (last 72 hours):  Recent Labs   Lab Result Units 05/01/22  1103 05/02/22  0410 05/03/22  0549 05/04/22  0618   WBC K/uL 16.38*  16.38* 18.53*  18.53* 20.78* 19.30*   Hemoglobin g/dL 8.3*  8.3* 8.3*  8.3* 7.6* 7.6*   Hematocrit % 25.4*  25.4* 25.2*  25.2* 23.5* 23.2*   Platelets K/uL 359  359 369  369 354 370   Gran % % 77.7*  77.7* 71.5  71.5 68.0 75.0*   Lymph % % 8.5*  8.5* 10.1*  10.1* 8.0* 3.0*   Mono % % 9.3  9.3 9.1  9.1 11.0 5.0   Eosinophil % % 2.5  2.5 7.1  7.1 13.0* 11.0*   Basophil % % 0.5  0.5 0.6  0.6 0.0 0.0   Differential Method  Automated  Automated Automated  Automated Manual Manual       Metabolic Panel (last 72 hours):  Recent Labs   Lab Result Units 05/01/22  2211 05/02/22  0410 05/03/22  0549 05/04/22  0618   Sodium  mmol/L  --  135* 135* 136   Potassium mmol/L  --  3.9 3.9 3.3*   Chloride mmol/L  --  99 97 97   CO2 mmol/L  --  24 26 28   Glucose mg/dL  --  103 104 93   Glucose, UA  Negative  --   --   --    BUN mg/dL  --  39* 39* 45*   Creatinine mg/dL  --  2.7* 2.8* 2.5*   Albumin g/dL  --  2.3* 2.3* 2.4*   Total Bilirubin mg/dL  --  0.5 0.3 0.3   Alkaline Phosphatase U/L  --  100 102 112   AST U/L  --  42* 27 29   ALT U/L  --  19 16 17       Vancomycin Administrations:  vancomycin given in the last 96 hours                     vancomycin 1.25 g in dextrose 5% 250 mL IVPB (ready to mix) (mg) 1,250 mg New Bag 05/02/22 1853                    Microbiologic Results:  Microbiology Results (last 7 days)       Procedure Component Value Units Date/Time    Culture, Anaerobic [[id number]] Collected: 05/02/22 1400    Order Status: Completed Specimen: Wound from Leg, Right Updated: 05/04/22 0739     Anaerobic Culture Culture in progress    Blood culture [[id number]] Collected: 05/01/22 1216    Order Status: Completed Specimen: Blood Updated: 05/03/22 1812     Blood Culture, Routine No Growth to date      No Growth to date      No Growth to date    Narrative:      Collection has been rescheduled by MR at 05/01/2022 11:22 Reason:   Patient unavailable starting dialysis and with the dr     Collection has been rescheduled by MR at 05/01/2022 11:42 Reason:   Got first set second set due after 12 couldnt stick other side due to   IV   Collection has been rescheduled by MR at 05/01/2022 11:22 Reason:   Patient unavailable starting dialysis and with the dr     Collection has been rescheduled by MR at 05/01/2022 11:42 Reason:   Got first set second set due after 12 couldnt stick other side due to   IV     Blood culture [[id number]] Collected: 05/01/22 1140    Order Status: Completed Specimen: Blood Updated: 05/03/22 1812     Blood Culture, Routine No Growth to date      No Growth to date      No Growth to date    Narrative:      Collection  has been rescheduled by [email or web address] at 05/01/2022 11:22 Reason:   Patient unavailable starting dialysis and with the dr   Collection has been rescheduled by MRQ1 at 05/01/2022 11:22 Reason:   Patient unavailable starting dialysis and with the dr     Blood culture [[id number]] Collected: 05/02/22 0410    Order Status: Completed Specimen: Blood from Antecubital, Right Arm Updated: 05/03/22 1212     Blood Culture, Routine No Growth to date      No Growth to date    Urine culture [[id number]] Collected: 05/01/22 2211    Order Status: Completed Specimen: Urine Updated: 05/03/22 0802     Urine Culture, Routine No growth    Narrative:      Specimen Source->Urine    Aerobic culture [[id number]] Collected: 05/02/22 1400    Order Status: Sent Specimen: Wound from Leg, Right Updated: 05/02/22 2236    Influenza A & B by Molecular [[id number]] Collected: 05/02/22 0900    Order Status: Completed Specimen: Nasopharyngeal Swab Updated: 05/02/22 1021     Influenza A, Molecular Negative     Influenza B, Molecular Negative     Flu A & B Source Nasal swab

## 2022-05-04 NOTE — SUBJECTIVE & OBJECTIVE
Interval History:  Notes reviewed, no acute events overnight. States 7/10 pain to RLE improves with pain meds.  Tolerating HD.   Awaiting LTAC placement. P    Review of Systems   Constitutional:  Negative for activity change, appetite change, chills, diaphoresis and fever.   Respiratory:  Positive for cough. Negative for chest tightness and shortness of breath.    Cardiovascular:  Negative for chest pain, palpitations and leg swelling.   Gastrointestinal:  Negative for abdominal pain, diarrhea and nausea.    Musculoskeletal:  Positive for gait problem and myalgias. Negative for back pain.   Skin:  RLE swelling and pain  Neurological:  Negative for dizziness, weakness and headaches.   Psychiatric/Behavioral:  Negative for agitation, behavioral problems and confusion. The patient is nervous/anxious.    All other systems reviewed and are negative.  Objective:     Vital Signs (Most Recent):  Temp: 98.6 °F (37 °C) (05/03/22 0753)  Pulse: (!) 119 (05/03/22 0753)  Resp: 16 (05/03/22 0826)  BP: 137/63 (05/03/22 0753)  SpO2: (!) 94 % (05/03/22 0753)   Vital Signs (24h Range):  Temp:  [96.8 °F (36 °C)-100.4 °F (38 °C)] 98.6 °F (37 °C)  Pulse:  [109-134] 119  Resp:  [16-18] 16  SpO2:  [94 %-96 %] 94 %  BP: (117-187)/() 137/63     Weight: 81.2 kg (179 lb)  Body mass index is 26.43 kg/m².    Intake/Output Summary (Last 24 hours) at 5/3/2022 1047  Last data filed at 5/3/2022 0400  Gross per 24 hour   Intake 500 ml   Output 4150 ml   Net -3650 ml        Physical Exam  Vitals and nursing note reviewed.   Constitutional:       General: He is not in acute distress.     Appearance: Normal appearance. He is normal weight. He is not ill-appearing, toxic-appearing or diaphoretic.    Cardiovascular:      Rate and Rhythm: Regular rhythm. Tachycardia present.      Pulses: Normal pulses.      Heart sounds: Normal heart sounds. No murmur heard.  Pulmonary:      Effort: Pulmonary effort is normal. No respiratory distress.      Breath  sounds: Normal breath sounds. No stridor. No wheezing, rhonchi or rales.   Abdominal:      General: Bowel sounds are normal. There is no distension.      Palpations: Abdomen is soft.      Tenderness: There is no abdominal tenderness.   Musculoskeletal:         General: Swelling and tenderness present. Normal range of motion.      Cervical back: Normal range of motion.      Right upper leg: Swelling present.      Right lower leg: Swelling present.      Comments: Right leg wound vac in place and drsg CDI.  Soft tissue mass to left a and is firm and tender to palpation, no surrounding erythema, fluctuance or open wound.   Skin:     General: Skin is warm and dry.   Neurological:      Mental Status: He is alert and oriented to person, place, and time. Mental status is at baseline.   Psychiatric:         Mood and Affect: Mood normal.         Behavior: Behavior normal.         Thought Content: Thought content normal.       Significant Labs: All pertinent labs within the past 24 hours have been reviewed.  CBC:   Recent Labs   Lab 05/01/22  1103 05/02/22  0410 05/03/22  0549   WBC 16.38*  16.38* 18.53*  18.53* 20.78*   HGB 8.3*  8.3* 8.3*  8.3* 7.6*   HCT 25.4*  25.4* 25.2*  25.2* 23.5*     359 369  369 354       CMP:   Recent Labs   Lab 05/02/22  0410 05/03/22  0549   * 135*   K 3.9 3.9   CL 99 97   CO2 24 26    104   BUN 39* 39*   CREATININE 2.7* 2.8*   CALCIUM 14.9* 12.3*   PROT 6.7 6.9   ALBUMIN 2.3* 2.3*   BILITOT 0.5 0.3   ALKPHOS 100 102   AST 42* 27   ALT 19 16   ANIONGAP 12 12   EGFRNONAA 32* 30*       Microbiology Results (last 7 days)       Procedure Component Value Units Date/Time    Urine culture [[id number]] Collected: 05/01/22 2211    Order Status: Completed Specimen: Urine Updated: 05/03/22 0802     Urine Culture, Routine No growth    Narrative:      Specimen Source->Urine    Aerobic culture [[id number]] Collected: 05/02/22 1400    Order Status: Sent Specimen: Wound from Leg, Right  Updated: 05/02/22 2236    Culture, Anaerobic [[id number]] Collected: 05/02/22 1400    Order Status: Sent Specimen: Wound from Leg, Right Updated: 05/02/22 2236    Blood culture [[id number]] Collected: 05/01/22 1216    Order Status: Completed Specimen: Blood Updated: 05/02/22 1812     Blood Culture, Routine No Growth to date      No Growth to date    Narrative:      Collection has been rescheduled by MR at 05/01/2022 11:22 Reason:   Patient unavailable starting dialysis and with the dr     Collection has been rescheduled by MR at 05/01/2022 11:42 Reason:   Got first set second set due after 12 couldnt stick other side due to   IV   Collection has been rescheduled by MR at 05/01/2022 11:22 Reason:   Patient unavailable starting dialysis and with the dr     Collection has been rescheduled by MR at 05/01/2022 11:42 Reason:   Got first set second set due after 12 couldnt stick other side due to   IV     Blood culture [[id number]] Collected: 05/01/22 1140    Order Status: Completed Specimen: Blood Updated: 05/02/22 1812     Blood Culture, Routine No Growth to date      No Growth to date    Narrative:      Collection has been rescheduled by MR at 05/01/2022 11:22 Reason:   Patient unavailable starting dialysis and with the dr   Collection has been rescheduled by [facility] at 05/01/2022 11:22 Reason:   Patient unavailable starting dialysis and with the dr     Blood culture [[id number]] Collected: 05/02/22 0410    Order Status: Completed Specimen: Blood from Antecubital, Right Arm Updated: 05/02/22 1715     Blood Culture, Routine No Growth to date    Influenza A & B by Molecular [[id number]] Collected: 05/02/22 0900    Order Status: Completed Specimen: Nasopharyngeal Swab Updated: 05/02/22 1021     Influenza A, Molecular Negative     Influenza B, Molecular Negative     Flu A & B Source Nasal swab          Significant Imaging:   No new imaging    I

## 2022-05-04 NOTE — PROGRESS NOTES
Consult Note  Infectious Disease    Reason for Consult:      HPI: Agus Horan is a 21 y.o. male with PMHx of mild intermittent asthma, allergies, oxycodone overdose on 04/09/2022, left eye pain after a fall 04/10/2022, came to the hospital on 04/13/2022 with complaints of right leg pain.    Ultrasound ruled out DVT  X-ray ruled out foreign body and fractures  He had right leg compartment syndrome, rhabdomyolysis and SANDRA     He was seen by Dr. Fraser with Nephrology and Dr. Freedman with Orthopedic surgery.    Patient was taken for fasciotomy on  04/13/2022, then debridement and closure on 04/18/2022; then  debridement deep right lateral fasciotomy incision right lower leg with excisional  debridement of dead anterior tibialis muscle and biopsy of muscle application of wound VAC right lower leg  on 04/25/2022  There are no cultures sent intraoperatively  Blood cultures are negative on 04/13/2022 and 05/01/2022    I came and saw patient.  He has a temperature of 100.2°.  WBC 16. He is on cefazolin since 04/13/2022(with an interruptions or 4 days from 04/19--4/22)  He feels well, he feels normal.  He is just anxious bc  of SANDRA. He does not want ot be on HD fr the rest of his life  Hospitalist is concerned of tachycardia and poss PE and is giving heparin drip.     INTERVAL HISTORY:  5/2 (Isma): Interim reviewed, discussed with Dr Perales, patient seen and examined at bedside, covered in bed-sheet. States he has a growing lesion on his left AC. Having HD a bedside. Persistent tachycardia, hypertension, afebrile. Labs reviewed, WBC: 18.5, PMN 71.5%, H/H 8.3/25.2, plt: 369. Iron 10, ferritin 1.010. ESR 79, .9, calcium 14.9. D-dimer 3.3. Procal 0.81. Micro reviewed, negative thus far, no cultures sent from the OR.     5/3:  Patient seen and examined at bedside, feeling comfortable today.  States is the most calmed he has ever felt.  Seen by Ortho yesterday, status post debridement at bedside.  Upon extensive  "revision, small pocket of purulent bloody fluid drained, cultures taken at bedside.  Plan for OR either tomorrow or the day after.  CT of left arm consistent with myositis ossificans, likely from hypercalcemia.  Uric acid 0.3.  V/Q scan negative for PE.  Patient's heart rate remained slightly elevated 104 currently.  Labs reviewed, white count 20.7, PMN 68%.  H&H 7.6/23.5, platelet count 354. Sodium 135, creatinine 2.8.  Calcium 12.3, ionized calcium 1.65.  Albumin 2.3.    5/4:  Interim reviewed, patient seen and examined at bedside covered in pillows and bed sheets.  States he is tired, and her L AC fossa lesion is decreasing in size. Persistent tachycardia,  hypertensive, afebrile. Having dialysis at bedside. Cultures taken at bedside grew GNR, lab called, lactose negative, ID and sensitivities available tomorrow. Labs reviewed, persistent leukocytosis, 19.3, PMN 75%, bands 6%. Potassium 3.3, normal LFTs, calcium 13.6.    Antibiotics (From admission, onward)            Start     Stop Route Frequency Ordered    05/04/22 1400  vancomycin in dextrose 5 % 1 gram/250 mL IVPB 1,000 mg         -- IV Once 05/04/22 0902    05/02/22 1759  vancomycin - pharmacy to dose  (vancomycin IVPB)        "And" Linked Group Details    -- IV pharmacy to manage frequency 05/02/22 1659    05/01/22 2345  cefTRIAXone (ROCEPHIN) 2 g/50 mL D5W IVPB         -- IV Every 24 hours (non-standard times) 05/01/22 2239        Review of patient's allergies indicates:   Allergen Reactions    Shrimp      Past Medical History:   Diagnosis Date    Allergy     AR    Asthma     mild intermittent    Hyperkalemia 4/14/2022     Past Surgical History:   Procedure Laterality Date    DEBRIDEMENT OF LOWER EXTREMITY Right 4/18/2022    Procedure: DEBRIDEMENT, LOWER EXTREMITY;  Surgeon: Leonardo Byers MD;  Location: [facility];  Service: Orthopedics;  Laterality: Right;    FASCIOTOMY Right 4/13/2022    Procedure: FASCIOTOMY;  Surgeon: Leonardo Byers, " MD;  Location: [facility] OR;  Service: Orthopedics;  Laterality: Right;    FASCIOTOMY Right 4/25/2022    Procedure: FASCIOTOMY;  Surgeon: Leonardo Byers MD;  Location: [facility] OR;  Service: Orthopedics;  Laterality: Right;    REMOVAL OF FOREIGN BODY FROM FOOT Left 6/24/2020    Procedure: REMOVAL, FOREIGN BODY, FOOT;  Surgeon: Dani Garcia MD;  Location: [facility] OR;  Service: Orthopedics;  Laterality: Left;     Social History     Socioeconomic History    Marital status: Single   Tobacco Use    Smoking status: Never Smoker    Smokeless tobacco: Never Used   Substance and Sexual Activity    Alcohol use: Yes     Comment: last night    Drug use: Yes     Frequency: 2.0 times per week     Types: Marijuana     Comment: yesterday   Social History Narrative    ** Merged History Encounter **         Lives with mom, 2 brothers.  No smokers.  +Dogs/chickens.  7th grader.     Family History   Problem Relation Age of Onset    Asthma Brother     Emphysema Maternal Grandmother     Hyperlipidemia Maternal Grandmother     Asthma Maternal Grandmother     Arthritis Maternal Grandmother     COPD Maternal Grandmother     Asthma Brother          Review of Systems:   Patient with drug use before admission. 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 Alert and attentive, cooperative, comfortable  Eyes:  Anicteric, PERRL, EOMI  ENT:  No ulcers, exudates, thrush, nares patent,  dentition is fair  Neck:  Supple  Lungs: Clear to auscultation b/l   Heart:  Tachycardic, S1/S2+, regular rhythm, no murmur   Abd:  Soft, NT, ND, normal BS, no masses or organomegaly appreciated.  :  Voids, dark urine   Musc:  Other than  Right leg dressing in place with wound vac, not disturbed. Joints without effusion, swelling, erythema, synovitis, muscle wasting.   Skin:  No rashes. No palmar or plantar lesions. No subungual petechiae  Neuro:   Alert, attentive, speech fluent, face symmetric, moves all extremities, no focal weakness  Psych:  Calm, cooperative  Lymphatic:       Extrem: Left arm with amorphic deposit, calcification    No edema, erythema, phlebitis, cellulitis, warm and well perfused  VAD:  R tunneled catheter    Isolation:  none  Wound:     5/3:                                 General Labs reviewed:  Recent Labs   Lab 05/02/22  0410 05/03/22  0549 05/04/22  0618   WBC 18.53*  18.53* 20.78* 19.30*   HGB 8.3*  8.3* 7.6* 7.6*   HCT 25.2*  25.2* 23.5* 23.2*     369 354 370       Recent Labs   Lab 05/02/22  0410 05/03/22  0549 05/04/22  0618   * 135* 136   K 3.9 3.9 3.3*   CL 99 97 97   CO2 24 26 28   BUN 39* 39* 45*   CREATININE 2.7* 2.8* 2.5*   CALCIUM 14.9* 12.3* 13.6*   PROT 6.7 6.9 7.3   BILITOT 0.5 0.3 0.3   ALKPHOS 100 102 112   ALT 19 16 17   AST 42* 27 29     Recent Labs   Lab 05/01/22  1457   .9*         Micro:  Microbiology Results (last 7 days)     Procedure Component Value Units Date/Time    Aerobic culture [[id number]]  (Abnormal) Collected: 05/02/22 1400    Order Status: Completed Specimen: Wound from Leg, Right Updated: 05/04/22 0959     Aerobic Bacterial Culture GRAM NEGATIVE RAJI  Many  Identification and susceptibility pending      Culture, Anaerobic [[id number]] Collected: 05/02/22 1400    Order Status: Completed Specimen: Wound from Leg, Right Updated: 05/04/22 0739     Anaerobic Culture Culture in progress    Blood culture [[id number]] Collected: 05/01/22  1216    Order Status: Completed Specimen: Blood Updated: 05/03/22 1812     Blood Culture, Routine No Growth to date      No Growth to date      No Growth to date    Narrative:      Collection has been rescheduled by MR at 05/01/2022 11:22 Reason:   Patient unavailable starting dialysis and with the dr     Collection has been rescheduled by MRQ1 at 05/01/2022 11:42 Reason:   Got first set second set due after 12 couldnt stick other side due to   IV   Collection has been rescheduled by [email or web address] at 05/01/2022 11:22 Reason:   Patient unavailable starting dialysis and with the dr     Collection has been rescheduled by [email or web address] at 05/01/2022 11:42 Reason:   Got first set second set due after 12 couldnt stick other side due to   IV     Blood culture [[id number]] Collected: 05/01/22 1140    Order Status: Completed Specimen: Blood Updated: 05/03/22 1812     Blood Culture, Routine No Growth to date      No Growth to date      No Growth to date    Narrative:      Collection has been rescheduled by MR at 05/01/2022 11:22 Reason:   Patient unavailable starting dialysis and with the dr   Collection has been rescheduled by MR at 05/01/2022 11:22 Reason:   Patient unavailable starting dialysis and with the dr     Blood culture [[id number]] Collected: 05/02/22 0410    Order Status: Completed Specimen: Blood from Antecubital, Right Arm Updated: 05/03/22 1212     Blood Culture, Routine No Growth to date      No Growth to date    Urine culture [[id number]] Collected: 05/01/22 2211    Order Status: Completed Specimen: Urine Updated: 05/03/22 0802     Urine Culture, Routine No growth    Narrative:      Specimen Source->Urine    Influenza A & B by Molecular [[id number]] Collected: 05/02/22 0900    Order Status: Completed Specimen: Nasopharyngeal Swab Updated: 05/02/22 1021     Influenza A, Molecular Negative     Influenza B, Molecular Negative     Flu A & B Source Nasal swab          Imaging Reviewed:   CXR-- No definite acute radiographic  abnormality.  Right internal jugular central venous catheter in place.   CT  UltrasoundNo evidence of deep venous thrombosis in either lower extremity, noting nonvisualization of the right calf veins due to overlying bandaging.    CT Left arm: Three circumscribed foci of soft tissue mineralization along the antecubital fossa have peripheral zoning favoring myositis ossificans.     NM scan low probability for PE    Cardiology:  · The left ventricle is normal in size with concentric remodeling and normal systolic function.  · The estimated ejection fraction is 65%.  · Grade I left ventricular diastolic dysfunction.  · Normal right ventricular size with normal right ventricular systolic function.  · Mild to moderate tricuspid regurgitation.  · Normal central venous pressure (3 mmHg).  · The estimated PA systolic pressure is 47 mmHg.  · There is pulmonary hypertension.  · Sinus tachycardia rate of 120-130 beats per minute was noted during the study        IMPRESSION & PLAN     1. History of R leg compartment syndrome; s/p multiple I&Ds, on cefazolin prophylactic   ESR and CRP elevated   Procal 0.81   Wound culture taken at bedside 5/2 GNR, lactose negative, pending ID and sensitivities     2. Rhabdomyolysis due to compartment syndrome.  Resolved    3. SANDRA due to rhabdomyolysis, improved   Hypercalcemia, 13    4. Vitamin-D deficiency, anemia, protein malnutrition, hypoalbuminemia    5. V/Q scan negative for PE  6. 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

## 2022-05-04 NOTE — PLAN OF CARE
Problem: Physical Therapy  Goal: Physical Therapy Goal  Description: Goals to be met by: 5/15/2022     Patient will increase functional independence with mobility by performin). Supine to sit with Modified [place]  2). Sit to stand transfer with Modified [place] maintaining RLE NWB using axillary crutches or rolling walker  3). Bed to chair transfer with Modified [place] maintaining RLE NWB using axillary crutches or rolling walker  5). Gait  x 150 feet with Modified [place] maintaining RLE NWB using axillary crutches or rolling walker    Outcome: Ongoing, Progressing     Attempted ambulation but participation limited today due to dizziness and nausea. Will continue to progress patient toward physical therapy goals.

## 2022-05-04 NOTE — PLAN OF CARE
Intervention: sodium/phosphorus/potassium modified diet      Recommendations  Recommendation/Intervention:   1.) Continue with Renal diet + Novasource renal BID   2.) Recommend beneprotein TID   Goals: 1.) diet will advance within 48 hrs 2.) pt will meet >50% of EEN during admit   Nutrition Goal Status: 1.) goal met 2.) goal met/ongoing   Communication of RD Recs: reviewed with RN

## 2022-05-04 NOTE — PROGRESS NOTES
INPATIENT NEPHROLOGY Progress Note  [facility]    Patient Name: Agus Horan  Date: 05/04/2022    Reason for consultation: SANDRA    Chief Complaint:   Chief Complaint   Patient presents with    Leg Pain     Pain in the right leg muscle calf is hard, nausea vomiting, patient was seen here over the weekend for an overdose        History of Present Illness:  22 y/o M with a history of asthma recently here on 4/9 with oxycodone overdose who p/w RLE pain and swelling after a fall on 4/10. He reports severe pain, constant, associated with nausea with inability to bear weight.  He took meloxicam without relief. He was found to have compartment syndrome and was taking to the OR emergently for fasciotomy on 4/13. We are consulted for SANDRA with metabolic derangements.    Interval History:  4/14- did emergent HD overnight for hyperkalemia and metabolic acidosis refractory to medical management; pain controlled, RLE wrapped, no edema in LLE  4/15- worsening pain/edema in RLE- going for MRI- oligoanuric- stop NS IVFs- will do HD/UF today and tomorrow  4/16  Seen on dialysis.  No distress  4/17  Remains oliguric.  AFVSS.  Has back pain.  Leg pain a little better  4/18  In the OR.  Still oliguric.    4/19  Seen on dialysis.  No distress.    4/20  Still not making much urine.  cpk coming down.     4/21  Afebrile.  BP a little better.  uop up a bit  422   Sleeping.  AFVSS.  I/Os not appropriately recorded despite being ordered for twice  4/23  225 cc UOP recorded.  K+ 5.5, HD today.  No renal recovery, CPK improving.  C/o rash, Dr. Zamorano at bedside placed orders.  Seen on dialysis, tolerating tx well.  4/24   No new lab results, next lab draw in am.   Still has rash and c/o itching.  No other complaints.  450 cc UOP recorded.  4/25  In the OR.  Plan for hd today  4/26  AFVSS.  Urine output better.  No complaints specified today  4/27  Sleeping.  Output not recorded despite being ordered  4/28  Tearful.  Not  engaging when spoken too.  Requesting dilaudid   4/29     Good urine output.  Seen on dialysis.  No distress  4/30  UOP 4.4L.  Ca+ 15, will have dialysis today, stopped calcitriol and vit D.  Will add on PTH to labs.    5/1  Ca+ 15.5, PTH suppressed.  D/w Dr. Patrick, pt to have additional dialysis today, no UF.  Notified Suni Stone w/[facility] pt needs to run today.  Pt w/elevated temp, a little more tachy than usual, not feeling very well.  5/2 tachy, hypertensive, on RA, UOP 2.6L, remains hypercalcemic- PTH appropriately suppressed, Dr. Byers is at bedside  5/3 febrile, tachy, BP better, on RA, UOP 650cc  5/4 had to terminate HD treatment short about 30min due to n/v and sinus tachy- net UF 1.6L; UOP 3.3L, SCr 2.5, serum Ca rising over last few days in conjunction with improvement in SCr- may reflect volume depletion    Plan of Care:    Assessment:  Sepsis  Traumatic rhabdomyolysis with compartment syndrome s/p fasciotomy on 4/13  SANDRA due to toxic ATN s/p emergent HD on 4/14- last HD 5/4  Hypercalcemia  Elevated BP  Hypokalemia  Hyperphosphatemia  Anemia    Plan:    - Ortho following- plan for I&D. Dose meds for CrCl > 30.  - He has renal recovery- going to stop further HD treatments. Ordered NS 100cc/hr for hypercalcemia. Used 2Ca bath today. Finish out doses of calcitonin. Hold off bisphosphonate today.  - Trend BP- control pain- ok with permissive HTN for renal recovery.  - Ordered 3K bath today. Will most likely switch to a regular diet tomorrow.  - Recheck phos in AM.  - Continue non calcium based binder with meals.  - H/H stabilized- continue iron tablet- continue ALBERTO with HD.     Thank you for allowing us to participate in this patient's care. We will continue to follow.    Vital Signs:  Temp Readings from Last 3 Encounters:   05/04/22 98.7 °F (37.1 °C) (Oral)   04/10/22 97.3 °F (36.3 °C)   04/09/22 98.4 °F (36.9 °C)       Pulse Readings from Last 3 Encounters:   05/04/22 108   04/10/22 63   04/09/22  95       BP Readings from Last 3 Encounters:   05/04/22 138/81   04/10/22 117/64   04/09/22 (!) 143/82       Weight:  Wt Readings from Last 3 Encounters:   05/02/22 81.2 kg (179 lb)   04/10/22 77.1 kg (170 lb)   04/09/22 77.1 kg (170 lb)       INS/OUTS:  I/O last 3 completed shifts:  In: 1 [P.O.:1]  Out: 4095 [Urine:3950; Other:145]  I/O this shift:  In: 550 [P.O.:50; Other:500]  Out: 3223 [Urine:1050; Other:2173]    Medications:  Scheduled Meds:   calcitonin  4 Units/kg Subcutaneous BID    ceFEPime (MAXIPIME) IVPB  1 g Intravenous Q24H    epoetin leidy  50 Units/kg Intravenous Every Mon, Wed, Fri    ferrous gluconate  324 mg Oral BID WM    heparin (porcine)  5,000 Units Subcutaneous Q8H    hydrALAZINE  25 mg Oral Q8H    LIDOcaine HCL 2%   Topical (Top) Every Mon, Thurs    metoprolol succinate  25 mg Oral Daily    senna-docusate 8.6-50 mg  1 tablet Oral BID    sevelamer carbonate  800 mg Oral TID WM    sodium chloride 0.9%  2 mL Intravenous Q6H     Continuous Infusions:   electrolyte-S (pH 7.4) Stopped (04/25/22 1255)    loperamide       PRN Meds:.acetaminophen, cyclobenzaprine, dextrose 10%, dextrose 10%, diphenhydrAMINE, glucagon (human recombinant), glucose, glucose, heparin (porcine), hydrOXYzine pamoate, labetaloL, loperamide, morphine, naloxone, ondansetron, oxyCODONE-acetaminophen, phenyleph-min oil-petrolatum, sodium chloride 0.9%, Pharmacy to dose Vancomycin consult **AND** vancomycin - pharmacy to dose  No current facility-administered medications on file prior to encounter.     Current Outpatient Medications on File Prior to Encounter   Medication Sig Dispense Refill    naloxone (NARCAN) 4 mg/actuation Spry 4mg by nasal route as needed for opioid overdose; may repeat every 2-3 minutes in alternating nostrils until medical help arrives. Call 911 2 each 0       Review of Systems:  Neg    Physical Exam:  /81 (BP Location: Right arm, Patient Position: Lying)   Pulse 108   Temp 98.7 °F  "(37.1 °C) (Oral)   Resp 16   Ht 5' 9" (1.753 m)   Wt 81.2 kg (179 lb)   SpO2 99%   BMI 26.43 kg/m²     Constitutional: nad, aao x 3, depressed affect  Heart: rrr, no m/r/g, wwp, RLE edema  Lungs: ctab, no w/r/r/c, no lb  Abdomen: s/nt/nd, +BS    Results:  Lab Results   Component Value Date     05/04/2022    K 3.3 (L) 05/04/2022    CL 97 05/04/2022    CO2 28 05/04/2022    BUN 45 (H) 05/04/2022    CREATININE 2.5 (H) 05/04/2022    CALCIUM 13.6 (HH) 05/04/2022    ANIONGAP 11 05/04/2022    ESTGFRAFRICA 40 (A) 05/04/2022    EGFRNONAA 35 (A) 05/04/2022       Lab Results   Component Value Date    CALCIUM 13.6 (HH) 05/04/2022    PHOS 7.1 (H) 04/30/2022       Recent Labs   Lab 05/04/22  0618   WBC 19.30*   RBC 2.53*   HGB 7.6*   HCT 23.2*      MCV 92   MCH 30.0   MCHC 32.8       I have personally reviewed pertinent radiological imaging and reports.    I have spent > 35 minutes providing care for this patient for the above diagnoses. These services have included chart/data/imaging review, evaluation, exam, formulation of plan, , note preparation, and discussions with staff involved in this patient's care.    Chantelle Fraser MD    Nephrology  [facility]  [phone]    "

## 2022-05-04 NOTE — CARE UPDATE
05/04/22 0819   Patient Assessment/Suction   Level of Consciousness (AVPU) alert   Respiratory Effort Unlabored   PRE-TX-O2   O2 Device (Oxygen Therapy) room air   SpO2 99 %   Pulse Oximetry Type Intermittent   $ Pulse Oximetry - Multiple Charge Pulse Oximetry - Multiple   Pulse 103   Resp 16   Incentive Spirometer   $ Incentive Spirometer Charges done with encouragement   Incentive Spirometer Predicted Level (mL) 2000   Administration (IS) self-administered

## 2022-05-04 NOTE — PROGRESS NOTES
[facility] Hospital Medicine  Progress Note    Patient Name: Agus Horan  MRN: [id number]  Patient Class: IP- Inpatient   Admission Date: 4/13/2022  Length of Stay: 21 days  Attending Physician: Brent Rosales MD  Primary Care Provider: Primary Doctor No        Subjective:     Principal Problem:Acute renal failure with tubular necrosis        HPI:  Agus Horan is a 22 y/o male with PMHx of mild asthma who presents with right lower extremity pain and swelling x few days. Patient was seen in the ED on 4/9/22 for oxycodone overdose and again on 4/10/22 for left eye pain after falling on a piece of furniture. Patient states he is unsure how he injured his leg and denies IV drug use. Xray of his right leg did not show acute fracture. Patietn states RLE pain and swelling increased over the past few days and he is unable to walk secondary to pain. US of his lower extremity performed today did not show DVT. Lab work revealed elevated Creatinine 13.5 & K+ 7.1, elevated AST/ALT 3980/1611 & WBC 18.14.  CPK>40,0000.  Patient unable to dorsiflex and plantar flex his RLE and symptoms are concerning for compartment syndrome per ED. ED provider discussed with Dr. Byers and Dr. Fraser, nephrology. Patient was referred to hospital medicine and seen in the ED with his friend at bedside. Patient complaining of RLE pain, tenderness and swelling. He denies SOB, N/V/D, chest pain and headaches.  Patient will be admitted to hospital medicine for orthopedic consultation and further management.      Overview/Hospital Course:  Pt admitted for SANDRA, rhabdomyolisis, and RLE compartment syndrome. Pt was taken to the OR by Dr. Byers for emergent fasciotomy on 4/13. CPK at the time of arrival was more than 40,000. Pt was initially started on IV fluids and required emergent hemodialysis on 4/14 for hyperkalemia and metabolic acidosis refractory to medical management. Pt returned to OR with Dr. Byers on 4/18 and  underwent debridement of RLE with closure of medial and lateral fasciotomy incision and biopsy of anterior muscle compartment. Patient's CPK level was monitored closely and continued to trend down.  Patient underwent repeat debridement of deep right lateral fasciotomy incision right lower leg with excisional  debridement of dead anterior tibialis muscle and biopsy of muscle with application of wound VAC on April 25, 2022. Pts pain was not well controlled and medications were adjusted. On 5/1 pt developed persistent tachycardia with mild hypoxemia with room air sat 90%.  Patient unable to get CTA chest due to acute renal failure and hemodialysis.  Chest x-ray obtained and negative.  Patient encouraged to utilize IS and O2 sats improved.  US of bilat LE obtained and neg for DVT with some limitation to RLE due to drsg intact. Decision was made to initiate heparin drip for high suspicion of PE until V/Q scan could be performed.  Patient also developed low-grade fever and Infectious Disease was consulted for further assistance and recommendations.  Patient was continued on IV antibiotics.  V/Q scan was obtained on 05/02 and neg for PE.  Heparin drip was stopped evening of 5/2 and pt was placed on SQ heparin for VTE prophylaixis.   Wound VAC was changed on 05/02 by Dr. Byers and wound care nurse with findings of some necrotic tissue with purulent drainage.  Antibiotics were tailored by Infectious Disease.      Interval History:  Notes reviewed, no acute events overnight. States 7/10 pain to RLE improves with pain meds.  Tolerating HD.   Awaiting LTAC placement. P    Review of Systems   Constitutional:  Negative for activity change, appetite change, chills, diaphoresis and fever.   Respiratory:  Positive for cough. Negative for chest tightness and shortness of breath.    Cardiovascular:  Negative for chest pain, palpitations and leg swelling.   Gastrointestinal:  Negative for abdominal pain, diarrhea and nausea.     Musculoskeletal:  Positive for gait problem and myalgias. Negative for back pain.   Skin:  RLE swelling and pain  Neurological:  Negative for dizziness, weakness and headaches.   Psychiatric/Behavioral:  Negative for agitation, behavioral problems and confusion. The patient is nervous/anxious.    All other systems reviewed and are negative.  Objective:     Vital Signs (Most Recent):  Temp: 98.6 °F (37 °C) (05/03/22 0753)  Pulse: (!) 119 (05/03/22 0753)  Resp: 16 (05/03/22 0826)  BP: 137/63 (05/03/22 0753)  SpO2: (!) 94 % (05/03/22 0753)   Vital Signs (24h Range):  Temp:  [96.8 °F (36 °C)-100.4 °F (38 °C)] 98.6 °F (37 °C)  Pulse:  [109-134] 119  Resp:  [16-18] 16  SpO2:  [94 %-96 %] 94 %  BP: (117-187)/() 137/63     Weight: 81.2 kg (179 lb)  Body mass index is 26.43 kg/m².    Intake/Output Summary (Last 24 hours) at 5/3/2022 1047  Last data filed at 5/3/2022 0400  Gross per 24 hour   Intake 500 ml   Output 4150 ml   Net -3650 ml        Physical Exam  Vitals and nursing note reviewed.   Constitutional:       General: He is not in acute distress.     Appearance: Normal appearance. He is normal weight. He is not ill-appearing, toxic-appearing or diaphoretic.    Cardiovascular:      Rate and Rhythm: Regular rhythm. Tachycardia present.      Pulses: Normal pulses.      Heart sounds: Normal heart sounds. No murmur heard.  Pulmonary:      Effort: Pulmonary effort is normal. No respiratory distress.      Breath sounds: Normal breath sounds. No stridor. No wheezing, rhonchi or rales.   Abdominal:      General: Bowel sounds are normal. There is no distension.      Palpations: Abdomen is soft.      Tenderness: There is no abdominal tenderness.   Musculoskeletal:         General: Swelling and tenderness present. Normal range of motion.      Cervical back: Normal range of motion.      Right upper leg: Swelling present.      Right lower leg: Swelling present.      Comments: Right leg wound vac in place and drsg CDI.  Soft  tissue mass to left a and is firm and tender to palpation, no surrounding erythema, fluctuance or open wound.   Skin:     General: Skin is warm and dry.   Neurological:      Mental Status: He is alert and oriented to person, place, and time. Mental status is at baseline.   Psychiatric:         Mood and Affect: Mood normal.         Behavior: Behavior normal.         Thought Content: Thought content normal.       Significant Labs: All pertinent labs within the past 24 hours have been reviewed.  CBC:   Recent Labs   Lab 05/01/22  1103 05/02/22  0410 05/03/22  0549   WBC 16.38*  16.38* 18.53*  18.53* 20.78*   HGB 8.3*  8.3* 8.3*  8.3* 7.6*   HCT 25.4*  25.4* 25.2*  25.2* 23.5*     359 369  369 354       CMP:   Recent Labs   Lab 05/02/22  0410 05/03/22  0549   * 135*   K 3.9 3.9   CL 99 97   CO2 24 26    104   BUN 39* 39*   CREATININE 2.7* 2.8*   CALCIUM 14.9* 12.3*   PROT 6.7 6.9   ALBUMIN 2.3* 2.3*   BILITOT 0.5 0.3   ALKPHOS 100 102   AST 42* 27   ALT 19 16   ANIONGAP 12 12   EGFRNONAA 32* 30*       Microbiology Results (last 7 days)       Procedure Component Value Units Date/Time    Urine culture [[id number]] Collected: 05/01/22 2211    Order Status: Completed Specimen: Urine Updated: 05/03/22 0802     Urine Culture, Routine No growth    Narrative:      Specimen Source->Urine    Aerobic culture [[id number]] Collected: 05/02/22 1400    Order Status: Sent Specimen: Wound from Leg, Right Updated: 05/02/22 2236    Culture, Anaerobic [[id number]] Collected: 05/02/22 1400    Order Status: Sent Specimen: Wound from Leg, Right Updated: 05/02/22 2236    Blood culture [[id number]] Collected: 05/01/22 1216    Order Status: Completed Specimen: Blood Updated: 05/02/22 1812     Blood Culture, Routine No Growth to date      No Growth to date    Narrative:      Collection has been rescheduled by CONCHITA at 05/01/2022 11:22 Reason:   Patient unavailable starting dialysis and with the dr     Collection has been  rescheduled by MR at 05/01/2022 11:42 Reason:   Got first set second set due after 12 couldnt stick other side due to   IV   Collection has been rescheduled by MR at 05/01/2022 11:22 Reason:   Patient unavailable starting dialysis and with the dr     Collection has been rescheduled by MR at 05/01/2022 11:42 Reason:   Got first set second set due after 12 couldnt stick other side due to   IV     Blood culture [[id number]] Collected: 05/01/22 1140    Order Status: Completed Specimen: Blood Updated: 05/02/22 1812     Blood Culture, Routine No Growth to date      No Growth to date    Narrative:      Collection has been rescheduled by MR at 05/01/2022 11:22 Reason:   Patient unavailable starting dialysis and with the dr   Collection has been rescheduled by MR at 05/01/2022 11:22 Reason:   Patient unavailable starting dialysis and with the dr     Blood culture [[id number]] Collected: 05/02/22 0410    Order Status: Completed Specimen: Blood from Antecubital, Right Arm Updated: 05/02/22 1715     Blood Culture, Routine No Growth to date    Influenza A & B by Molecular [[id number]] Collected: 05/02/22 0900    Order Status: Completed Specimen: Nasopharyngeal Swab Updated: 05/02/22 1021     Influenza A, Molecular Negative     Influenza B, Molecular Negative     Flu A & B Source Nasal swab          Significant Imaging:   No new imaging    I      Assessment/Plan:      * Acute renal failure with tubular necrosis  Continues to be anuric  Hemodialysis therapy as per Nephrology team.   due to rhabdomyolysis  Trend CPK daily.  Follow Nephrology recommendations.  Avoid nephrotoxins  Telemetry.  Hemo split catheter was placed on April 25, 2022.     4/30 - remains on hemodialysis, emergent dialysis today secondary to hypercalcemia.  Will continue to monitor closely  5/1 - HD again today secondary to hypercalcemia. Renal function improving. Cont to monitor closely.  5/2 - hemodialysis treatment again today.  Continue to  "monitor  5/3 - stable, nephrology following, cont routine HD       Hypokalemia  nephrology consulted. Adjust K bath. Trend BMP      Hypercalcemia    Nephrology consulted. On calcitonin     Severe sepsis  This patient does have evidence of infective focus  My overall impression is sepsis. Vital signs were reviewed and noted in progress note.  Antibiotics given-   Antibiotics (From admission, onward)            Start     Stop Route Frequency Ordered    05/02/22 1759  vancomycin - pharmacy to dose  (vancomycin IVPB)        "And" Linked Group Details    -- IV pharmacy to manage frequency 05/02/22 1659    05/01/22 2345  cefTRIAXone (ROCEPHIN) 2 g/50 mL D5W IVPB         -- IV Every 24 hours (non-standard times) 05/01/22 2239        Cultures were taken-   Microbiology Results (last 7 days)     Procedure Component Value Units Date/Time    Urine culture [[id number]] Collected: 05/01/22 2211    Order Status: Completed Specimen: Urine Updated: 05/03/22 0802     Urine Culture, Routine No growth    Narrative:      Specimen Source->Urine    Aerobic culture [[id number]] Collected: 05/02/22 1400    Order Status: Sent Specimen: Wound from Leg, Right Updated: 05/02/22 2236    Culture, Anaerobic [[id number]] Collected: 05/02/22 1400    Order Status: Sent Specimen: Wound from Leg, Right Updated: 05/02/22 2236    Blood culture [[id number]] Collected: 05/01/22 1216    Order Status: Completed Specimen: Blood Updated: 05/02/22 1812     Blood Culture, Routine No Growth to date      No Growth to date    Narrative:      Collection has been rescheduled by MR at 05/01/2022 11:22 Reason:   Patient unavailable starting dialysis and with the dr     Collection has been rescheduled by MR at 05/01/2022 11:42 Reason:   Got first set second set due after 12 couldnt stick other side due to   IV   Collection has been rescheduled by MR at 05/01/2022 11:22 Reason:   Patient unavailable starting dialysis and with the dr     Collection has been rescheduled " by MR at 05/01/2022 11:42 Reason:   Got first set second set due after 12 couldnt stick other side due to   IV     Blood culture [[id number]] Collected: 05/01/22 1140    Order Status: Completed Specimen: Blood Updated: 05/02/22 1812     Blood Culture, Routine No Growth to date      No Growth to date    Narrative:      Collection has been rescheduled by [email or web address] at 05/01/2022 11:22 Reason:   Patient unavailable starting dialysis and with the    Collection has been rescheduled by MR at 05/01/2022 11:22 Reason:   Patient unavailable starting dialysis and with the dr     Blood culture [[id number]] Collected: 05/02/22 0410    Order Status: Completed Specimen: Blood from Antecubital, Right Arm Updated: 05/02/22 1715     Blood Culture, Routine No Growth to date    Influenza A & B by Molecular [[id number]] Collected: 05/02/22 0900    Order Status: Completed Specimen: Nasopharyngeal Swab Updated: 05/02/22 1021     Influenza A, Molecular Negative     Influenza B, Molecular Negative     Flu A & B Source Nasal swab        Latest lactate reviewed, they are-  Recent Labs   Lab 05/01/22  1140   LACTATE 1.1       Organ dysfunction indicated by Acute respiratory failure and tachycardia  Source- right leg fasciotomy site    Source control Achieved by- abx      Hypoxia  Concern for PE given tachycardia and new hypoxia  Will obtain chest x-ray and check D-dimer which will likely be elevated given renal failure and recent surgery, will also check lactic acid  Will discuss with Nephrology high concern for PE and need for further workup with CTA verses V/Q scan and also initiating anticoag therapy  continue telemetry monitoring  Supplemental O2 to keep sats >92%  Pt encouraged to use IS  Workup in progress    5/3 - VQ scan negative, heparin gtt stopped. Suspect hypoxia secondary to sepsis which is now improving with appropriate source control. Pt encouraged to use IS. O2 sats improving and stable on room air. Will cont to monitor  closely.          Tachycardia  Persistent tachycardia over the past 24 hours  Patient denies chest pain  Concern for PE given tachycardia and new hypoxia  Will obtain chest x-ray and check D-dimer which will likely be elevated given renal failure and recent surgery, will also check lactic acid  Will discuss with Nephrology high concern for PE and need for further workup with CTA verses V/Q scan and also initiating anticoag therapy  Patient also hypertensive-will start metoprolol XL today as per chart review patient has had some intermittent tachycardia during his stay  Continue telemetry monitoring  Workup in progress    5/2 - patient tolerating heparin drip.  Will obtain V/Q scan and echocardiogram today.  Workup in progress for possible infectious process.  Appreciate ID consult.    5/3 - improving with source control, VQ scan neg and heparin gtt stopped 5/2. Appreciate ID consult.         Anemia of chronic disease  Patient's anemia is currently controlled. Has recieved 1 units of PRBCs. Etiology likely d/t acute blood loss  Current CBC reviewed-   Lab Results   Component Value Date    HGB 8.4 (L) 05/01/2022    HCT 26.6 (L) 05/01/2022     Monitor serial CBC and transfuse if patient becomes hemodynamically unstable, symptomatic or H/H drops below 7/21.         Drug eruption  No definite etiology apparent.  Possibly clindamycin use.  Off intravenous clindamycin.  Use oral Benadryl 25 mg q.6 hours p.r.n. patient will be monitored closely.    4/30 - resolved      Compartment syndrome  Underwent fasciotomy on 4/13   S/p Debridement deep right lateral fasciotomy incision right lower leg with excisional  debridement of dead anterior tibialis muscle and biopsy of muscle application of wound VAC right lower leg  - 04/25/22.  Wound care nurse performing dressing changes as per recommendations by Orthopedics.    4/30 - stable, wound vac in use, cont current therapy    5/2 - plan for wound VAC change today  5/3 - wound vac  changed yesterday, d/w wound care nurse, area of necrotic tissue with purulent drainage. Wound debrided at BS by Dr. Byers. Plan for wound washout and further debridement in OR tomorrow or Thursday. Cont abx, appreciate ID consult.    Elevated liver enzymes  Elevated AST &ALT likely due to rhabdomyolysis  Trending down  Avoid hepatoxic medications  Monitor CMP  See plan for rhabdomyolysis    4/30 - resolved, cont to monitor CMP        Rhabdomyolysis  CPK trending down  S/p fasciotomy on 4/13 for compartment syndrome.  On 4/18 S/p DEBRIDEMENT, LOWER EXTREMITY (Right)   closure of medial fasciotomy incision right leg debridement of lateral fasciotomy with biopsy of anterior muscle compartment removal of deep muscle anterior compartment right lower leg. Subsequently underwent Debridement deep right lateral fasciotomy incision right lower leg with excisional  debridement of dead anterior tibialis muscle and biopsy of muscle application of wound VAC right lower leg on 04/25/22.  Monitor BMP   Follow Nephrology recommendations.    4/30 - improved and stable, cont to monitor closely      History of asthma  Hx of asthma, stable    Monitor for acute changes      VTE Risk Mitigation (From admission, onward)         Ordered     heparin (porcine) injection 5,000 Units  Every 8 hours         05/02/22 1921     IP VTE HIGH RISK PATIENT  Once         05/02/22 1921     heparin (porcine) injection 4,000 Units  As needed (PRN)         04/14/22 0137     Reason for No Pharmacological VTE Prophylaxis  Once        Question:  Reasons:  Answer:  Risk of Bleeding    04/13/22 1913     Place sequential compression device  Until discontinued         04/13/22 1913                Discharge Planning   JARAD:      Code Status: Full Code   Is the patient medically ready for discharge?:     Reason for patient still in hospital (select all that apply): Patient trending condition, Consult recommendations and Pending disposition  Discharge Plan A:  Long-term acute care facility (LTAC)                  Brittni Oneil NP  Department of Hospital Medicine   [facility]

## 2022-05-04 NOTE — PLAN OF CARE
Problem: Infection  Goal: Absence of Infection Signs and Symptoms  Outcome: Ongoing, Progressing     Problem: Device-Related Complication Risk (Hemodialysis)  Goal: Safe, Effective Therapy Delivery  Outcome: Ongoing, Progressing     Pt resting in bed no s/s of distress breathing unlabore. Pt dialized today 1.6L removed pt had some causea admin zofran during treatment. Pt vss controlled throughout shift. Pain managed throughout shift. Pt verbalized he does not want to be disturbed while sleeping note on door. Pt verbalized no additional concerns at this time. Call light in reach fall precautions in place.

## 2022-05-04 NOTE — PROGRESS NOTES
Pharmacokinetic Assessment Follow Up: IV Vancomycin    Vancomycin serum concentration assessment(s):    The random level was drawn correctly and can be used to guide therapy at this time. The measurement is above the desired definitive target range of 10 to 15 mcg/mL.    Vancomycin Regimen Plan:    Re-dose when the random level is less than 15 mcg/mL, next level to be drawn at 0430 on 5/4    Drug levels (last 3 results):  Recent Labs   Lab Result Units 05/03/22  1754   Vancomycin, Random ug/mL 15.2       Pharmacy will continue to follow and monitor vancomycin.    Please contact pharmacy at extension [phone] for questions regarding this assessment.    Thank you for the consult,   Venita So       Patient brief summary:  Agus Horan is a 21 y.o. male initiated on antimicrobial therapy with IV Vancomycin for treatment of skin & soft tissue infection      Drug Allergies:   Review of patient's allergies indicates:   Allergen Reactions    Shrimp        Actual Body Weight:   81.2 kg    Renal Function:   Estimated Creatinine Clearance: 41 mL/min (A) (based on SCr of 2.8 mg/dL (H)).,     Dialysis Method (if applicable):  intermittent HD    CBC (last 72 hours):  Recent Labs   Lab Result Units 05/01/22  0503 05/01/22  1103 05/02/22  0410 05/03/22  0549   WBC K/uL 14.75* 16.38*  16.38* 18.53*  18.53* 20.78*   Hemoglobin g/dL 8.4* 8.3*  8.3* 8.3*  8.3* 7.6*   Hematocrit % 26.6* 25.4*  25.4* 25.2*  25.2* 23.5*   Platelets K/uL 370 359  359 369  369 354   Gran % % 75.5* 77.7*  77.7* 71.5  71.5 68.0   Lymph % % 8.2* 8.5*  8.5* 10.1*  10.1* 8.0*   Mono % % 10.2 9.3  9.3 9.1  9.1 11.0   Eosinophil % % 3.6 2.5  2.5 7.1  7.1 13.0*   Basophil % % 0.5 0.5  0.5 0.6  0.6 0.0   Differential Method  Automated Automated  Automated Automated  Automated Manual       Metabolic Panel (last 72 hours):  Recent Labs   Lab Result Units 05/01/22  0503 05/01/22  2211 05/02/22  0410 05/03/22  0549   Sodium mmol/L 136  --  135*  135*   Potassium mmol/L 4.6  --  3.9 3.9   Chloride mmol/L 102  --  99 97   CO2 mmol/L 24  --  24 26   Glucose mg/dL 96  --  103 104   Glucose, UA   --  Negative  --   --    BUN mg/dL 35*  --  39* 39*   Creatinine mg/dL 2.7*  --  2.7* 2.8*   Albumin g/dL 2.4*  --  2.3* 2.3*   Total Bilirubin mg/dL 0.5  --  0.5 0.3   Alkaline Phosphatase U/L 94  --  100 102   AST U/L 38  --  42* 27   ALT U/L 15  --  19 16       Vancomycin Administrations:  vancomycin given in the last 96 hours                     vancomycin 1.25 g in dextrose 5% 250 mL IVPB (ready to mix) (mg) 1,250 mg New Bag 05/02/22 4513                    Microbiologic Results:  Microbiology Results (last 7 days)       Procedure Component Value Units Date/Time    Blood culture [[id number]] Collected: 05/01/22 1216    Order Status: Completed Specimen: Blood Updated: 05/03/22 1812     Blood Culture, Routine No Growth to date      No Growth to date      No Growth to date    Narrative:      Collection has been rescheduled by [facility] at 05/01/2022 11:22 Reason:   Patient unavailable starting dialysis and with the dr     Collection has been rescheduled by MR at 05/01/2022 11:42 Reason:   Got first set second set due after 12 couldnt stick other side due to   IV   Collection has been rescheduled by [facility] at 05/01/2022 11:22 Reason:   Patient unavailable starting dialysis and with the dr     Collection has been rescheduled by MR at 05/01/2022 11:42 Reason:   Got first set second set due after 12 couldnt stick other side due to   IV     Blood culture [[id number]] Collected: 05/01/22 1140    Order Status: Completed Specimen: Blood Updated: 05/03/22 1812     Blood Culture, Routine No Growth to date      No Growth to date      No Growth to date    Narrative:      Collection has been rescheduled by MR at 05/01/2022 11:22 Reason:   Patient unavailable starting dialysis and with the dr   Collection has been rescheduled by MR at 05/01/2022 11:22 Reason:   Patient unavailable  starting dialysis and with the dr     Blood culture [[id number]] Collected: 05/02/22 0410    Order Status: Completed Specimen: Blood from Antecubital, Right Arm Updated: 05/03/22 1212     Blood Culture, Routine No Growth to date      No Growth to date    Urine culture [[id number]] Collected: 05/01/22 2211    Order Status: Completed Specimen: Urine Updated: 05/03/22 0802     Urine Culture, Routine No growth    Narrative:      Specimen Source->Urine    Aerobic culture [[id number]] Collected: 05/02/22 1400    Order Status: Sent Specimen: Wound from Leg, Right Updated: 05/02/22 2236    Culture, Anaerobic [[id number]] Collected: 05/02/22 1400    Order Status: Sent Specimen: Wound from Leg, Right Updated: 05/02/22 2236    Influenza A & B by Molecular [[id number]] Collected: 05/02/22 0900    Order Status: Completed Specimen: Nasopharyngeal Swab Updated: 05/02/22 1021     Influenza A, Molecular Negative     Influenza B, Molecular Negative     Flu A & B Source Nasal swab

## 2022-05-05 ENCOUNTER — ANESTHESIA (OUTPATIENT)
Dept: SURGERY | Facility: HOSPITAL | Age: 22
DRG: 957 | End: 2022-05-05
Payer: MEDICAID

## 2022-05-05 LAB
ALBUMIN SERPL BCP-MCNC: 2.4 G/DL (ref 3.5–5.2)
ALP SERPL-CCNC: 111 U/L (ref 55–135)
ALT SERPL W/O P-5'-P-CCNC: 20 U/L (ref 10–44)
ANION GAP SERPL CALC-SCNC: 10 MMOL/L (ref 8–16)
AST SERPL-CCNC: 35 U/L (ref 10–40)
BACTERIA SPEC AEROBE CULT: ABNORMAL
BACTERIA SPEC AEROBE CULT: ABNORMAL
BASOPHILS # BLD AUTO: 0.23 K/UL (ref 0–0.2)
BASOPHILS NFR BLD: 1.2 % (ref 0–1.9)
BILIRUB SERPL-MCNC: 0.2 MG/DL (ref 0.1–1)
BUN SERPL-MCNC: 33 MG/DL (ref 6–20)
CA-I BLDV-SCNC: 1.84 MMOL/L (ref 1.06–1.42)
CALCIUM SERPL-MCNC: 13 MG/DL (ref 8.7–10.5)
CHLORIDE SERPL-SCNC: 100 MMOL/L (ref 95–110)
CO2 SERPL-SCNC: 25 MMOL/L (ref 23–29)
CREAT SERPL-MCNC: 2 MG/DL (ref 0.5–1.4)
CRP SERPL-MCNC: 73.3 MG/L (ref 0–8.2)
CTP QC/QA: YES
DIFFERENTIAL METHOD: ABNORMAL
EOSINOPHIL # BLD AUTO: 2.8 K/UL (ref 0–0.5)
EOSINOPHIL NFR BLD: 13.9 % (ref 0–8)
ERYTHROCYTE [DISTWIDTH] IN BLOOD BY AUTOMATED COUNT: 12.4 % (ref 11.5–14.5)
EST. GFR  (AFRICAN AMERICAN): 53 ML/MIN/1.73 M^2
EST. GFR  (NON AFRICAN AMERICAN): 46 ML/MIN/1.73 M^2
GLUCOSE SERPL-MCNC: 100 MG/DL (ref 70–110)
HCT VFR BLD AUTO: 23.2 % (ref 40–54)
HCT VFR BLD AUTO: 23.6 % (ref 40–54)
HGB BLD-MCNC: 7.4 G/DL (ref 14–18)
HGB BLD-MCNC: 7.6 G/DL (ref 14–18)
IMM GRANULOCYTES # BLD AUTO: 0.52 K/UL (ref 0–0.04)
IMM GRANULOCYTES NFR BLD AUTO: 2.6 % (ref 0–0.5)
LYMPHOCYTES # BLD AUTO: 1.8 K/UL (ref 1–4.8)
LYMPHOCYTES NFR BLD: 9.2 % (ref 18–48)
MCH RBC QN AUTO: 29.2 PG (ref 27–31)
MCHC RBC AUTO-ENTMCNC: 31.9 G/DL (ref 32–36)
MCV RBC AUTO: 92 FL (ref 82–98)
MONOCYTES # BLD AUTO: 1.5 K/UL (ref 0.3–1)
MONOCYTES NFR BLD: 7.8 % (ref 4–15)
NEUTROPHILS # BLD AUTO: 12.9 K/UL (ref 1.8–7.7)
NEUTROPHILS NFR BLD: 65.3 % (ref 38–73)
NRBC BLD-RTO: 0 /100 WBC
PHOSPHATE SERPL-MCNC: 4.4 MG/DL (ref 2.7–4.5)
PLATELET # BLD AUTO: 394 K/UL (ref 150–450)
PMV BLD AUTO: 9.2 FL (ref 9.2–12.9)
POTASSIUM SERPL-SCNC: 3.3 MMOL/L (ref 3.5–5.1)
PROT SERPL-MCNC: 7.3 G/DL (ref 6–8.4)
RBC # BLD AUTO: 2.53 M/UL (ref 4.6–6.2)
SARS-COV-2 AG RESP QL IA.RAPID: NEGATIVE
SODIUM SERPL-SCNC: 135 MMOL/L (ref 136–145)
VANCOMYCIN SERPL-MCNC: 15.3 UG/ML
WBC # BLD AUTO: 19.79 K/UL (ref 3.9–12.7)

## 2022-05-05 PROCEDURE — 99232 SBSQ HOSP IP/OBS MODERATE 35: CPT | Mod: S$GLB,,, | Performed by: STUDENT IN AN ORGANIZED HEALTH CARE EDUCATION/TRAINING PROGRAM

## 2022-05-05 PROCEDURE — 94799 UNLISTED PULMONARY SVC/PX: CPT

## 2022-05-05 PROCEDURE — 99232 PR SUBSEQUENT HOSPITAL CARE,LEVL II: ICD-10-PCS | Mod: S$GLB,,, | Performed by: STUDENT IN AN ORGANIZED HEALTH CARE EDUCATION/TRAINING PROGRAM

## 2022-05-05 PROCEDURE — 37000009 HC ANESTHESIA EA ADD 15 MINS: Performed by: ORTHOPAEDIC SURGERY

## 2022-05-05 PROCEDURE — 87077 CULTURE AEROBIC IDENTIFY: CPT | Performed by: ORTHOPAEDIC SURGERY

## 2022-05-05 PROCEDURE — D9220A PRA ANESTHESIA: ICD-10-PCS | Mod: ANES,,, | Performed by: ANESTHESIOLOGY

## 2022-05-05 PROCEDURE — 11043 PR DEBRIDEMENT, SKIN, SUB-Q TISSUE,MUSCLE,=<20 SQ CM: ICD-10-PCS | Mod: ,,, | Performed by: ORTHOPAEDIC SURGERY

## 2022-05-05 PROCEDURE — 11000001 HC ACUTE MED/SURG PRIVATE ROOM

## 2022-05-05 PROCEDURE — 87070 CULTURE OTHR SPECIMN AEROBIC: CPT | Mod: 59 | Performed by: ORTHOPAEDIC SURGERY

## 2022-05-05 PROCEDURE — 25000003 PHARM REV CODE 250: Performed by: ORTHOPAEDIC SURGERY

## 2022-05-05 PROCEDURE — 97605 NEG PRS WND THER DME<=50SQCM: CPT | Mod: ,,, | Performed by: ORTHOPAEDIC SURGERY

## 2022-05-05 PROCEDURE — 82330 ASSAY OF CALCIUM: CPT | Performed by: ORTHOPAEDIC SURGERY

## 2022-05-05 PROCEDURE — 85025 COMPLETE CBC W/AUTO DIFF WBC: CPT | Performed by: NURSE PRACTITIONER

## 2022-05-05 PROCEDURE — 25000003 PHARM REV CODE 250: Performed by: NURSE ANESTHETIST, CERTIFIED REGISTERED

## 2022-05-05 PROCEDURE — 27200651 HC AIRWAY, LMA: Performed by: NURSE ANESTHETIST, CERTIFIED REGISTERED

## 2022-05-05 PROCEDURE — 63600175 PHARM REV CODE 636 W HCPCS: Performed by: NURSE ANESTHETIST, CERTIFIED REGISTERED

## 2022-05-05 PROCEDURE — D9220A PRA ANESTHESIA: Mod: CRNA,,, | Performed by: NURSE ANESTHETIST, CERTIFIED REGISTERED

## 2022-05-05 PROCEDURE — 80202 ASSAY OF VANCOMYCIN: CPT | Performed by: INTERNAL MEDICINE

## 2022-05-05 PROCEDURE — 71000033 HC RECOVERY, INTIAL HOUR: Performed by: ORTHOPAEDIC SURGERY

## 2022-05-05 PROCEDURE — 71000039 HC RECOVERY, EACH ADD'L HOUR: Performed by: ORTHOPAEDIC SURGERY

## 2022-05-05 PROCEDURE — 63600175 PHARM REV CODE 636 W HCPCS: Performed by: STUDENT IN AN ORGANIZED HEALTH CARE EDUCATION/TRAINING PROGRAM

## 2022-05-05 PROCEDURE — D9220A PRA ANESTHESIA: ICD-10-PCS | Mod: CRNA,,, | Performed by: NURSE ANESTHETIST, CERTIFIED REGISTERED

## 2022-05-05 PROCEDURE — 85014 HEMATOCRIT: CPT | Performed by: ORTHOPAEDIC SURGERY

## 2022-05-05 PROCEDURE — 87205 SMEAR GRAM STAIN: CPT | Performed by: ORTHOPAEDIC SURGERY

## 2022-05-05 PROCEDURE — 87102 FUNGUS ISOLATION CULTURE: CPT | Performed by: ORTHOPAEDIC SURGERY

## 2022-05-05 PROCEDURE — 97605 PR NEG PRESS WOUND THERAPY (NPWT) W/NON-DISPOSABLE WOUND VAC DEVICE (DME), <=50 CM: ICD-10-PCS | Mod: ,,, | Performed by: ORTHOPAEDIC SURGERY

## 2022-05-05 PROCEDURE — D9220A PRA ANESTHESIA: Mod: ANES,,, | Performed by: ANESTHESIOLOGY

## 2022-05-05 PROCEDURE — 11046 PR DEB MUSC/FASCIA ADD-ON: ICD-10-PCS | Mod: ,,, | Performed by: ORTHOPAEDIC SURGERY

## 2022-05-05 PROCEDURE — 11043 DBRDMT MUSC&/FSCA 1ST 20/<: CPT | Mod: ,,, | Performed by: ORTHOPAEDIC SURGERY

## 2022-05-05 PROCEDURE — 63600175 PHARM REV CODE 636 W HCPCS: Performed by: INTERNAL MEDICINE

## 2022-05-05 PROCEDURE — 36000707: Performed by: ORTHOPAEDIC SURGERY

## 2022-05-05 PROCEDURE — 63600175 PHARM REV CODE 636 W HCPCS: Performed by: NURSE PRACTITIONER

## 2022-05-05 PROCEDURE — 11046 DBRDMT MUSC&/FSCA EA ADDL: CPT | Mod: ,,, | Performed by: ORTHOPAEDIC SURGERY

## 2022-05-05 PROCEDURE — 25000003 PHARM REV CODE 250: Performed by: NURSE PRACTITIONER

## 2022-05-05 PROCEDURE — 36000706: Performed by: ORTHOPAEDIC SURGERY

## 2022-05-05 PROCEDURE — 99900103 DSU ONLY-NO CHARGE-INITIAL HR (STAT): Performed by: ORTHOPAEDIC SURGERY

## 2022-05-05 PROCEDURE — 99900104 DSU ONLY-NO CHARGE-EA ADD'L HR (STAT): Performed by: ORTHOPAEDIC SURGERY

## 2022-05-05 PROCEDURE — 36415 COLL VENOUS BLD VENIPUNCTURE: CPT | Performed by: ORTHOPAEDIC SURGERY

## 2022-05-05 PROCEDURE — A4216 STERILE WATER/SALINE, 10 ML: HCPCS | Performed by: ORTHOPAEDIC SURGERY

## 2022-05-05 PROCEDURE — 27000124 HC DRESSING, WOUND VAC

## 2022-05-05 PROCEDURE — 87186 SC STD MICRODIL/AGAR DIL: CPT | Mod: 59 | Performed by: ORTHOPAEDIC SURGERY

## 2022-05-05 PROCEDURE — 37000008 HC ANESTHESIA 1ST 15 MINUTES: Performed by: ORTHOPAEDIC SURGERY

## 2022-05-05 PROCEDURE — 25000003 PHARM REV CODE 250: Performed by: INTERNAL MEDICINE

## 2022-05-05 PROCEDURE — 84100 ASSAY OF PHOSPHORUS: CPT | Performed by: INTERNAL MEDICINE

## 2022-05-05 PROCEDURE — 87070 CULTURE OTHR SPECIMN AEROBIC: CPT | Performed by: ORTHOPAEDIC SURGERY

## 2022-05-05 PROCEDURE — 25000003 PHARM REV CODE 250: Performed by: ANESTHESIOLOGY

## 2022-05-05 PROCEDURE — 25000003 PHARM REV CODE 250: Performed by: STUDENT IN AN ORGANIZED HEALTH CARE EDUCATION/TRAINING PROGRAM

## 2022-05-05 PROCEDURE — 94761 N-INVAS EAR/PLS OXIMETRY MLT: CPT

## 2022-05-05 PROCEDURE — 99900035 HC TECH TIME PER 15 MIN (STAT)

## 2022-05-05 PROCEDURE — 87075 CULTR BACTERIA EXCEPT BLOOD: CPT | Performed by: ORTHOPAEDIC SURGERY

## 2022-05-05 PROCEDURE — 85018 HEMOGLOBIN: CPT | Performed by: ORTHOPAEDIC SURGERY

## 2022-05-05 PROCEDURE — 86140 C-REACTIVE PROTEIN: CPT | Performed by: STUDENT IN AN ORGANIZED HEALTH CARE EDUCATION/TRAINING PROGRAM

## 2022-05-05 PROCEDURE — 80053 COMPREHEN METABOLIC PANEL: CPT | Performed by: NURSE PRACTITIONER

## 2022-05-05 RX ORDER — LOPERAMIDE HYDROCHLORIDE 2 MG/1
2 CAPSULE ORAL CONTINUOUS PRN
Status: CANCELLED | OUTPATIENT
Start: 2022-05-05

## 2022-05-05 RX ORDER — ONDANSETRON 2 MG/ML
INJECTION INTRAMUSCULAR; INTRAVENOUS
Status: DISCONTINUED | OUTPATIENT
Start: 2022-05-05 | End: 2022-05-05

## 2022-05-05 RX ORDER — POTASSIUM CHLORIDE 20 MEQ/1
40 TABLET, EXTENDED RELEASE ORAL ONCE
Status: COMPLETED | OUTPATIENT
Start: 2022-05-05 | End: 2022-05-05

## 2022-05-05 RX ORDER — HYDRALAZINE HYDROCHLORIDE 25 MG/1
50 TABLET, FILM COATED ORAL EVERY 8 HOURS
Status: DISCONTINUED | OUTPATIENT
Start: 2022-05-05 | End: 2022-05-10

## 2022-05-05 RX ORDER — SODIUM CHLORIDE 0.9 % (FLUSH) 0.9 %
2 SYRINGE (ML) INJECTION EVERY 6 HOURS
Status: CANCELLED | OUTPATIENT
Start: 2022-05-05

## 2022-05-05 RX ORDER — AMOXICILLIN 250 MG
1 CAPSULE ORAL 2 TIMES DAILY
Status: CANCELLED | OUTPATIENT
Start: 2022-05-05

## 2022-05-05 RX ORDER — HYDROMORPHONE HYDROCHLORIDE 2 MG/ML
INJECTION, SOLUTION INTRAMUSCULAR; INTRAVENOUS; SUBCUTANEOUS
Status: DISCONTINUED | OUTPATIENT
Start: 2022-05-05 | End: 2022-05-05

## 2022-05-05 RX ORDER — MIDAZOLAM HYDROCHLORIDE 1 MG/ML
INJECTION INTRAMUSCULAR; INTRAVENOUS
Status: DISCONTINUED | OUTPATIENT
Start: 2022-05-05 | End: 2022-05-05

## 2022-05-05 RX ORDER — METOCLOPRAMIDE HYDROCHLORIDE 5 MG/ML
10 INJECTION INTRAMUSCULAR; INTRAVENOUS EVERY 10 MIN PRN
Status: DISCONTINUED | OUTPATIENT
Start: 2022-05-05 | End: 2022-05-10 | Stop reason: HOSPADM

## 2022-05-05 RX ORDER — DEXAMETHASONE SODIUM PHOSPHATE 4 MG/ML
INJECTION, SOLUTION INTRA-ARTICULAR; INTRALESIONAL; INTRAMUSCULAR; INTRAVENOUS; SOFT TISSUE
Status: DISCONTINUED | OUTPATIENT
Start: 2022-05-05 | End: 2022-05-05

## 2022-05-05 RX ORDER — OXYCODONE HYDROCHLORIDE 5 MG/1
5 TABLET ORAL
Status: DISCONTINUED | OUTPATIENT
Start: 2022-05-05 | End: 2022-05-10 | Stop reason: HOSPADM

## 2022-05-05 RX ORDER — ONDANSETRON 2 MG/ML
4 INJECTION INTRAMUSCULAR; INTRAVENOUS EVERY 12 HOURS PRN
Status: CANCELLED | OUTPATIENT
Start: 2022-05-05

## 2022-05-05 RX ORDER — FENTANYL CITRATE 50 UG/ML
INJECTION, SOLUTION INTRAMUSCULAR; INTRAVENOUS
Status: DISCONTINUED | OUTPATIENT
Start: 2022-05-05 | End: 2022-05-05

## 2022-05-05 RX ORDER — PROPOFOL 10 MG/ML
VIAL (ML) INTRAVENOUS
Status: DISCONTINUED | OUTPATIENT
Start: 2022-05-05 | End: 2022-05-05

## 2022-05-05 RX ORDER — LIDOCAINE HCL/PF 100 MG/5ML
SYRINGE (ML) INTRAVENOUS
Status: DISCONTINUED | OUTPATIENT
Start: 2022-05-05 | End: 2022-05-05

## 2022-05-05 RX ORDER — HYDROCODONE BITARTRATE AND ACETAMINOPHEN 5; 325 MG/1; MG/1
1 TABLET ORAL EVERY 4 HOURS PRN
Status: CANCELLED | OUTPATIENT
Start: 2022-05-05

## 2022-05-05 RX ORDER — CEFAZOLIN SODIUM 2 G/50ML
2 SOLUTION INTRAVENOUS
Status: CANCELLED | OUTPATIENT
Start: 2022-05-05 | End: 2022-05-06

## 2022-05-05 RX ORDER — HYDROMORPHONE HYDROCHLORIDE 2 MG/ML
0.2 INJECTION, SOLUTION INTRAMUSCULAR; INTRAVENOUS; SUBCUTANEOUS EVERY 5 MIN PRN
Status: DISCONTINUED | OUTPATIENT
Start: 2022-05-05 | End: 2022-05-09

## 2022-05-05 RX ADMIN — HEPARIN SODIUM 5000 UNITS: 5000 INJECTION INTRAVENOUS; SUBCUTANEOUS at 04:05

## 2022-05-05 RX ADMIN — Medication 324 MG: at 08:05

## 2022-05-05 RX ADMIN — SODIUM CHLORIDE, SODIUM GLUCONATE, SODIUM ACETATE, POTASSIUM CHLORIDE, MAGNESIUM CHLORIDE, SODIUM PHOSPHATE, DIBASIC, AND POTASSIUM PHOSPHATE: .53; .5; .37; .037; .03; .012; .00082 INJECTION, SOLUTION INTRAVENOUS at 02:05

## 2022-05-05 RX ADMIN — OXYCODONE HYDROCHLORIDE AND ACETAMINOPHEN 1 TABLET: 5; 325 TABLET ORAL at 08:05

## 2022-05-05 RX ADMIN — DOCUSATE SODIUM AND SENNOSIDES 1 TABLET: 8.6; 5 TABLET, FILM COATED ORAL at 09:05

## 2022-05-05 RX ADMIN — POTASSIUM CHLORIDE 40 MEQ: 1500 TABLET, EXTENDED RELEASE ORAL at 10:05

## 2022-05-05 RX ADMIN — OXYCODONE HYDROCHLORIDE AND ACETAMINOPHEN 1 TABLET: 5; 325 TABLET ORAL at 03:05

## 2022-05-05 RX ADMIN — FENTANYL CITRATE 50 MCG: 50 INJECTION, SOLUTION INTRAMUSCULAR; INTRAVENOUS at 02:05

## 2022-05-05 RX ADMIN — MIDAZOLAM HYDROCHLORIDE 2 MG: 1 INJECTION, SOLUTION INTRAMUSCULAR; INTRAVENOUS at 02:05

## 2022-05-05 RX ADMIN — CYCLOBENZAPRINE 10 MG: 10 TABLET, FILM COATED ORAL at 03:05

## 2022-05-05 RX ADMIN — HYDROMORPHONE HYDROCHLORIDE 1 MG: 2 INJECTION INTRAMUSCULAR; INTRAVENOUS; SUBCUTANEOUS at 02:05

## 2022-05-05 RX ADMIN — Medication 2 ML: at 06:05

## 2022-05-05 RX ADMIN — OXYCODONE HYDROCHLORIDE AND ACETAMINOPHEN 1 TABLET: 5; 325 TABLET ORAL at 09:05

## 2022-05-05 RX ADMIN — HYDRALAZINE HYDROCHLORIDE 50 MG: 25 TABLET, FILM COATED ORAL at 04:05

## 2022-05-05 RX ADMIN — CEFEPIME HYDROCHLORIDE 1 G: 1 INJECTION, SOLUTION INTRAVENOUS at 02:05

## 2022-05-05 RX ADMIN — PROPOFOL 200 MG: 10 INJECTION, EMULSION INTRAVENOUS at 02:05

## 2022-05-05 RX ADMIN — Medication 2 ML: at 12:05

## 2022-05-05 RX ADMIN — CYCLOBENZAPRINE 10 MG: 10 TABLET, FILM COATED ORAL at 09:05

## 2022-05-05 RX ADMIN — OXYCODONE 5 MG: 5 TABLET ORAL at 04:05

## 2022-05-05 RX ADMIN — DOCUSATE SODIUM AND SENNOSIDES 1 TABLET: 8.6; 5 TABLET, FILM COATED ORAL at 08:05

## 2022-05-05 RX ADMIN — HYDRALAZINE HYDROCHLORIDE 25 MG: 25 TABLET, FILM COATED ORAL at 06:05

## 2022-05-05 RX ADMIN — GLYCOPYRROLATE 0.2 MG: 0.2 INJECTION, SOLUTION INTRAMUSCULAR; INTRAVITREAL at 02:05

## 2022-05-05 RX ADMIN — MORPHINE SULFATE 2 MG: 4 INJECTION INTRAVENOUS at 05:05

## 2022-05-05 RX ADMIN — LIDOCAINE HYDROCHLORIDE 100 MG: 20 INJECTION INTRAVENOUS at 02:05

## 2022-05-05 RX ADMIN — PAMIDRONATE DISODIUM 30 MG: 9 INJECTION INTRAVENOUS at 11:05

## 2022-05-05 RX ADMIN — ONDANSETRON 4 MG: 2 INJECTION INTRAMUSCULAR; INTRAVENOUS at 02:05

## 2022-05-05 RX ADMIN — VANCOMYCIN HYDROCHLORIDE 750 MG: 750 INJECTION, POWDER, LYOPHILIZED, FOR SOLUTION INTRAVENOUS at 04:05

## 2022-05-05 RX ADMIN — Medication 324 MG: at 04:05

## 2022-05-05 RX ADMIN — MORPHINE SULFATE 2 MG: 4 INJECTION INTRAVENOUS at 11:05

## 2022-05-05 RX ADMIN — METOPROLOL SUCCINATE 25 MG: 25 TABLET, EXTENDED RELEASE ORAL at 08:05

## 2022-05-05 RX ADMIN — MORPHINE SULFATE 2 MG: 4 INJECTION INTRAVENOUS at 06:05

## 2022-05-05 RX ADMIN — DEXAMETHASONE SODIUM PHOSPHATE 4 MG: 4 INJECTION, SOLUTION INTRA-ARTICULAR; INTRALESIONAL; INTRAMUSCULAR; INTRAVENOUS; SOFT TISSUE at 02:05

## 2022-05-05 RX ADMIN — HEPARIN SODIUM 5000 UNITS: 5000 INJECTION INTRAVENOUS; SUBCUTANEOUS at 09:05

## 2022-05-05 RX ADMIN — HYDROMORPHONE HYDROCHLORIDE 1 MG: 2 INJECTION INTRAMUSCULAR; INTRAVENOUS; SUBCUTANEOUS at 03:05

## 2022-05-05 RX ADMIN — HYDRALAZINE HYDROCHLORIDE 50 MG: 25 TABLET, FILM COATED ORAL at 09:05

## 2022-05-05 NOTE — CARE UPDATE
05/05/22 0820   Patient Assessment/Suction   Level of Consciousness (AVPU) alert   Respiratory Effort Normal;Unlabored   All Lung Fields Breath Sounds clear   Rhythm/Pattern, Respiratory pattern regular;unlabored   PRE-TX-O2   O2 Device (Oxygen Therapy) room air   SpO2 97 %   Pulse Oximetry Type Intermittent   $ Pulse Oximetry - Multiple Charge Pulse Oximetry - Multiple   Pulse 101   Resp 16   Incentive Spirometer   $ Incentive Spirometer Charges done with encouragement;proper technique demonstrated   Number of Repetitions (IS) 5   Level Incentive Spirometer (mL) 1500

## 2022-05-05 NOTE — TRANSFER OF CARE
"Anesthesia Transfer of Care Note    Patient: Agus Horan    Procedure(s) Performed: Procedure(s) (LRB):  FASCIOTOMY (Right)  REPLACEMENT, WOUND VAC (N/A)    Patient location: PACU    Anesthesia Type: general    Transport from OR: Transported from OR on 2-3 L/min O2 by NC with adequate spontaneous ventilation    Post pain: adequate analgesia    Post assessment: no apparent anesthetic complications    Post vital signs: stable    Level of consciousness: awake    Nausea/Vomiting: no nausea/vomiting    Complications: none    Transfer of care protocol was followed      Last vitals:   Visit Vitals  BP (!) 164/88 (BP Location: Right arm, Patient Position: Lying)   Pulse 97   Temp 36.3 °C (97.4 °F)   Resp 16   Ht 5' 9" (1.753 m)   Wt 81.2 kg (179 lb)   SpO2 98%   BMI 26.43 kg/m²     "

## 2022-05-05 NOTE — PROGRESS NOTES
[facility] Hospital Medicine  Progress Note    Patient Name: Agus Horan  MRN: [id number]  Patient Class: IP- Inpatient   Admission Date: 4/13/2022  Length of Stay: 22 days  Attending Physician: Brent Rosales MD  Primary Care Provider: Primary Doctor No        Subjective:     Principal Problem:Acute renal failure with tubular necrosis        HPI:  Agus Horan is a 22 y/o male with PMHx of mild asthma who presents with right lower extremity pain and swelling x few days. Patient was seen in the ED on 4/9/22 for oxycodone overdose and again on 4/10/22 for left eye pain after falling on a piece of furniture. Patient states he is unsure how he injured his leg and denies IV drug use. Xray of his right leg did not show acute fracture. Patietn states RLE pain and swelling increased over the past few days and he is unable to walk secondary to pain. US of his lower extremity performed today did not show DVT. Lab work revealed elevated Creatinine 13.5 & K+ 7.1, elevated AST/ALT 3980/1611 & WBC 18.14.  CPK>40,0000.  Patient unable to dorsiflex and plantar flex his RLE and symptoms are concerning for compartment syndrome per ED. ED provider discussed with Dr. Byers and Dr. Fraser, nephrology. Patient was referred to hospital medicine and seen in the ED with his friend at bedside. Patient complaining of RLE pain, tenderness and swelling. He denies SOB, N/V/D, chest pain and headaches.  Patient will be admitted to hospital medicine for orthopedic consultation and further management.      Overview/Hospital Course:  Pt admitted for SANDRA, rhabdomyolisis, and RLE compartment syndrome. Pt was taken to the OR by Dr. Byers for emergent fasciotomy on 4/13. CPK at the time of arrival was more than 40,000. Pt was initially started on IV fluids and required emergent hemodialysis on 4/14 for hyperkalemia and metabolic acidosis refractory to medical management. Pt returned to OR with Dr. Byers on 4/18 and  underwent debridement of RLE with closure of medial and lateral fasciotomy incision and biopsy of anterior muscle compartment. Patient's CPK level was monitored closely and continued to trend down.  Patient underwent repeat debridement of deep right lateral fasciotomy incision right lower leg with excisional  debridement of dead anterior tibialis muscle and biopsy of muscle with application of wound VAC on April 25, 2022. Pts pain was not well controlled and medications were adjusted. On 5/1 pt developed persistent tachycardia with mild hypoxemia with room air sat 90%.  Patient unable to get CTA chest due to acute renal failure and hemodialysis.  Chest x-ray obtained and negative.  Patient encouraged to utilize IS and O2 sats improved.  US of bilat LE obtained and neg for DVT with some limitation to RLE due to drsg intact. Decision was made to initiate heparin drip for high suspicion of PE until V/Q scan could be performed.  Patient also developed low-grade fever and Infectious Disease was consulted for further assistance and recommendations.  Patient was continued on IV antibiotics.  V/Q scan was obtained on 05/02 and neg for PE.  Heparin drip was stopped evening of 5/2 and pt was placed on SQ heparin for VTE prophylaixis.   Wound VAC was changed on 05/02 by Dr. Byers and wound care nurse with findings of some necrotic tissue with purulent drainage.  Antibiotics were tailored by Infectious Disease.  Patient to OR for right lower extremity surgical site washout on 05/05/2022.      Interval History:  Notes reviewed, no acute events overnight. States 6/10 pain to RLE improves with pain meds. HD stopped yesterday  due to n/v and ST.     To OR for wash out for RLE surgical wound.     Awaiting LTAC placement once cleared from sx and ID standpoint.     Review of Systems   Constitutional:  Negative for activity change, appetite change, chills, diaphoresis and fever.   Respiratory:  Negative for chest tightness and  shortness of breath.    Cardiovascular:  Negative for chest pain, palpitations and leg swelling.   Gastrointestinal:  Negative for abdominal pain, diarrhea and nausea.    Musculoskeletal:  Positive for gait problem and myalgias. Negative for back pain.   Skin:  RLE swelling and pain  Neurological:  Negative for dizziness, weakness and headaches.   Psychiatric/Behavioral:  Negative for agitation, behavioral problems and confusion.   All other systems reviewed and are negative.  Objective:     Vital Signs (Most Recent):  Temp: 98.6 °F (37 °C) (05/03/22 0753)  Pulse: (!) 119 (05/03/22 0753)  Resp: 16 (05/03/22 0826)  BP: 137/63 (05/03/22 0753)  SpO2: (!) 94 % (05/03/22 0753)   Vital Signs (24h Range):  Temp:  [96.8 °F (36 °C)-100.4 °F (38 °C)] 98.6 °F (37 °C)  Pulse:  [109-134] 119  Resp:  [16-18] 16  SpO2:  [94 %-96 %] 94 %  BP: (117-187)/() 137/63     Weight: 81.2 kg (179 lb)  Body mass index is 26.43 kg/m².    Intake/Output Summary (Last 24 hours) at 5/3/2022 1047  Last data filed at 5/3/2022 0400  Gross per 24 hour   Intake 500 ml   Output 4150 ml   Net -3650 ml        Physical Exam  Vitals and nursing note reviewed.   Constitutional:       General: He is not in acute distress.     Appearance: Normal appearance. He is normal weight. He is not ill-appearing, toxic-appearing or diaphoretic.    Cardiovascular:      Rate and Rhythm: Regular rhythm. Tachycardia present.      Pulses: Normal pulses.      Heart sounds: Normal heart sounds. No murmur heard.  Pulmonary:      Effort: Pulmonary effort is normal. No respiratory distress.      Breath sounds: Normal breath sounds. No stridor. No wheezing, rhonchi or rales.   Abdominal:      General: Bowel sounds are normal. There is no distension.      Palpations: Abdomen is soft.      Tenderness: There is no abdominal tenderness.   Musculoskeletal:         General: Swelling and tenderness present. Normal range of motion.      Cervical back: Normal range of motion.       Right upper leg: Swelling present.      Right lower leg: Swelling present.      Comments: Right leg wound vac in place and drsg CDI.  Soft tissue mass to left a and is firm and tender to palpation, no surrounding erythema, fluctuance or open wound.   Skin:     General: Skin is warm and dry.   Neurological:      Mental Status: He is alert and oriented to person, place, and time. Mental status is at baseline.   Psychiatric:         Mood and Affect: Mood normal.         Behavior: Behavior normal.         Thought Content: Thought content normal.       Significant Labs: All pertinent labs within the past 24 hours have been reviewed.  CBC:   Recent Labs   Lab 05/01/22  1103 05/02/22  0410 05/03/22  0549   WBC 16.38*  16.38* 18.53*  18.53* 20.78*   HGB 8.3*  8.3* 8.3*  8.3* 7.6*   HCT 25.4*  25.4* 25.2*  25.2* 23.5*     359 369  369 354       CMP:   Recent Labs   Lab 05/02/22  0410 05/03/22  0549   * 135*   K 3.9 3.9   CL 99 97   CO2 24 26    104   BUN 39* 39*   CREATININE 2.7* 2.8*   CALCIUM 14.9* 12.3*   PROT 6.7 6.9   ALBUMIN 2.3* 2.3*   BILITOT 0.5 0.3   ALKPHOS 100 102   AST 42* 27   ALT 19 16   ANIONGAP 12 12   EGFRNONAA 32* 30*       Microbiology Results (last 7 days)       Procedure Component Value Units Date/Time    Urine culture [[id number]] Collected: 05/01/22 2211    Order Status: Completed Specimen: Urine Updated: 05/03/22 0802     Urine Culture, Routine No growth    Narrative:      Specimen Source->Urine    Aerobic culture [[id number]] Collected: 05/02/22 1400    Order Status: Sent Specimen: Wound from Leg, Right Updated: 05/02/22 2236    Culture, Anaerobic [[id number]] Collected: 05/02/22 1400    Order Status: Sent Specimen: Wound from Leg, Right Updated: 05/02/22 2236    Blood culture [[id number]] Collected: 05/01/22 1216    Order Status: Completed Specimen: Blood Updated: 05/02/22 1812     Blood Culture, Routine No Growth to date      No Growth to date    Narrative:      Collection  has been rescheduled by MR at 05/01/2022 11:22 Reason:   Patient unavailable starting dialysis and with the dr     Collection has been rescheduled by MR at 05/01/2022 11:42 Reason:   Got first set second set due after 12 couldnt stick other side due to   IV   Collection has been rescheduled by MR at 05/01/2022 11:22 Reason:   Patient unavailable starting dialysis and with the dr     Collection has been rescheduled by MR at 05/01/2022 11:42 Reason:   Got first set second set due after 12 couldnt stick other side due to   IV     Blood culture [[id number]] Collected: 05/01/22 1140    Order Status: Completed Specimen: Blood Updated: 05/02/22 1812     Blood Culture, Routine No Growth to date      No Growth to date    Narrative:      Collection has been rescheduled by MR at 05/01/2022 11:22 Reason:   Patient unavailable starting dialysis and with the dr   Collection has been rescheduled by MR at 05/01/2022 11:22 Reason:   Patient unavailable starting dialysis and with the dr     Blood culture [[id number]] Collected: 05/02/22 0410    Order Status: Completed Specimen: Blood from Antecubital, Right Arm Updated: 05/02/22 1715     Blood Culture, Routine No Growth to date    Influenza A & B by Molecular [[id number]] Collected: 05/02/22 0900    Order Status: Completed Specimen: Nasopharyngeal Swab Updated: 05/02/22 1021     Influenza A, Molecular Negative     Influenza B, Molecular Negative     Flu A & B Source Nasal swab          Significant Imaging:   No new imaging    I      Assessment/Plan:      * Acute renal failure with tubular necrosis  Continues to be anuric  Hemodialysis therapy as per Nephrology team.   due to rhabdomyolysis  Trend CPK daily.  Follow Nephrology recommendations.  Avoid nephrotoxins  Telemetry.  Hemo split catheter was placed on April 25, 2022.     4/30 - remains on hemodialysis, emergent dialysis today secondary to hypercalcemia.  Will continue to monitor closely  5/1 - HD again today secondary  "to hypercalcemia. Renal function improving. Cont to monitor closely.  5/2 - hemodialysis treatment again today.  Continue to monitor  5/3 - stable, nephrology following, cont routine HD    Resolving. HD on hold-- patient with renal recovery       Hypokalemia  nephrology consulted. Adjust K bath. Trend BMP      Hypercalcemia    Nephrology consulted. On calcitonin     Severe sepsis  This patient does have evidence of infective focus  My overall impression is sepsis. Vital signs were reviewed and noted in progress note.  Antibiotics given-   Antibiotics (From admission, onward)            Start     Stop Route Frequency Ordered    05/02/22 1759  vancomycin - pharmacy to dose  (vancomycin IVPB)        "And" Linked Group Details    -- IV pharmacy to manage frequency 05/02/22 1659    05/01/22 2345  cefTRIAXone (ROCEPHIN) 2 g/50 mL D5W IVPB         -- IV Every 24 hours (non-standard times) 05/01/22 2239        Cultures were taken-   Microbiology Results (last 7 days)     Procedure Component Value Units Date/Time    Urine culture [[id number]] Collected: 05/01/22 2211    Order Status: Completed Specimen: Urine Updated: 05/03/22 0802     Urine Culture, Routine No growth    Narrative:      Specimen Source->Urine    Aerobic culture [[id number]] Collected: 05/02/22 1400    Order Status: Sent Specimen: Wound from Leg, Right Updated: 05/02/22 2236    Culture, Anaerobic [[id number]] Collected: 05/02/22 1400    Order Status: Sent Specimen: Wound from Leg, Right Updated: 05/02/22 2236    Blood culture [[id number]] Collected: 05/01/22 1216    Order Status: Completed Specimen: Blood Updated: 05/02/22 1812     Blood Culture, Routine No Growth to date      No Growth to date    Narrative:      Collection has been rescheduled by MR at 05/01/2022 11:22 Reason:   Patient unavailable starting dialysis and with the dr     Collection has been rescheduled by [email or web address] at 05/01/2022 11:42 Reason:   Got first set second set due after 12 couldnt stick " other side due to   IV   Collection has been rescheduled by MR at 05/01/2022 11:22 Reason:   Patient unavailable starting dialysis and with the dr     Collection has been rescheduled by MR at 05/01/2022 11:42 Reason:   Got first set second set due after 12 couldnt stick other side due to   IV     Blood culture [[id number]] Collected: 05/01/22 1140    Order Status: Completed Specimen: Blood Updated: 05/02/22 1812     Blood Culture, Routine No Growth to date      No Growth to date    Narrative:      Collection has been rescheduled by [email or web address] at 05/01/2022 11:22 Reason:   Patient unavailable starting dialysis and with the dr   Collection has been rescheduled by MR at 05/01/2022 11:22 Reason:   Patient unavailable starting dialysis and with the dr     Blood culture [[id number]] Collected: 05/02/22 0410    Order Status: Completed Specimen: Blood from Antecubital, Right Arm Updated: 05/02/22 1715     Blood Culture, Routine No Growth to date    Influenza A & B by Molecular [[id number]] Collected: 05/02/22 0900    Order Status: Completed Specimen: Nasopharyngeal Swab Updated: 05/02/22 1021     Influenza A, Molecular Negative     Influenza B, Molecular Negative     Flu A & B Source Nasal swab        Latest lactate reviewed, they are-  Recent Labs   Lab 05/01/22  1140   LACTATE 1.1       Organ dysfunction indicated by Acute respiratory failure and tachycardia  Source- right leg fasciotomy site    Source control Achieved by- abx    Wound Culture GNR-- Abx per ID- change rocephin to Cefepime on 5/5- Continue Vanco  Blood cx negative      Hypoxia  Concern for PE given tachycardia and new hypoxia  Will obtain chest x-ray and check D-dimer which will likely be elevated given renal failure and recent surgery, will also check lactic acid  Will discuss with Nephrology high concern for PE and need for further workup with CTA verses V/Q scan and also initiating anticoag therapy  continue telemetry monitoring  Supplemental O2 to keep  sats >92%  Pt encouraged to use IS  Workup in progress    5/3 - VQ scan negative, heparin gtt stopped. Suspect hypoxia secondary to sepsis which is now improving with appropriate source control. Pt encouraged to use IS. O2 sats improving and stable on room air. Will cont to monitor closely.    resolved          Tachycardia  Persistent tachycardia over the past 24 hours  Patient denies chest pain  Concern for PE given tachycardia and new hypoxia  Will obtain chest x-ray and check D-dimer which will likely be elevated given renal failure and recent surgery, will also check lactic acid  Will discuss with Nephrology high concern for PE and need for further workup with CTA verses V/Q scan and also initiating anticoag therapy  Patient also hypertensive-will start metoprolol XL today as per chart review patient has had some intermittent tachycardia during his stay  Continue telemetry monitoring  Workup in progress    5/2 - patient tolerating heparin drip.  Will obtain V/Q scan and echocardiogram today.  Workup in progress for possible infectious process.  Appreciate ID consult.    5/3 - improving with source control, VQ scan neg and heparin gtt stopped 5/2. Appreciate ID consult.     Possible dehydration. monitor        Anemia of chronic disease  Patient's anemia is currently controlled. Has recieved 1 units of PRBCs. Etiology likely d/t acute blood loss  Current CBC reviewed-   Lab Results   Component Value Date    HGB 8.4 (L) 05/01/2022    HCT 26.6 (L) 05/01/2022     Monitor serial CBC and transfuse if patient becomes hemodynamically unstable, symptomatic or H/H drops below 7/21.         Drug eruption  No definite etiology apparent.  Possibly clindamycin use.  Off intravenous clindamycin.  Use oral Benadryl 25 mg q.6 hours p.r.n. patient will be monitored closely.    4/30 - resolved      Compartment syndrome  Underwent fasciotomy on 4/13   S/p Debridement deep right lateral fasciotomy incision right lower leg with  excisional  debridement of dead anterior tibialis muscle and biopsy of muscle application of wound VAC right lower leg  - 04/25/22.  Wound care nurse performing dressing changes as per recommendations by Orthopedics.    4/30 - stable, wound vac in use, cont current therapy    5/2 - plan for wound VAC change today  5/3 - wound vac changed yesterday, d/w wound care nurse, area of necrotic tissue with purulent drainage. Wound debrided at BS by Dr. Byers. Plan for wound washout and further debridement in OR tomorrow or Thursday. Cont abx, appreciate ID consult.    To OR for wash out on 5/5/2022    Elevated liver enzymes  Elevated AST &ALT likely due to rhabdomyolysis  Trending down  Avoid hepatoxic medications  Monitor CMP  See plan for rhabdomyolysis    4/30 - resolved, cont to monitor CMP        Rhabdomyolysis  CPK trending down  S/p fasciotomy on 4/13 for compartment syndrome.  On 4/18 S/p DEBRIDEMENT, LOWER EXTREMITY (Right)   closure of medial fasciotomy incision right leg debridement of lateral fasciotomy with biopsy of anterior muscle compartment removal of deep muscle anterior compartment right lower leg. Subsequently underwent Debridement deep right lateral fasciotomy incision right lower leg with excisional  debridement of dead anterior tibialis muscle and biopsy of muscle application of wound VAC right lower leg on 04/25/22.  Monitor BMP   Follow Nephrology recommendations.    4/30 - improved and stable, cont to monitor closely      History of asthma  Hx of asthma, stable    Monitor for acute changes        VTE Risk Mitigation (From admission, onward)         Ordered     heparin (porcine) injection 5,000 Units  Every 8 hours         05/02/22 1921     IP VTE HIGH RISK PATIENT  Once         05/02/22 1921     heparin (porcine) injection 4,000 Units  As needed (PRN)         04/14/22 0137     Reason for No Pharmacological VTE Prophylaxis  Once        Question:  Reasons:  Answer:  Risk of Bleeding    04/13/22 1913      Place sequential compression device  Until discontinued         04/13/22 1913                Discharge Planning   JARAD: 5/9/2022     Code Status: Full Code   Is the patient medically ready for discharge?:     Reason for patient still in hospital (select all that apply): Patient trending condition, Treatment and Consult recommendations  Discharge Plan A: Long-term acute care facility (LTAC)                  Brittni Oneil NP  Department of Hospital Medicine   [facility]

## 2022-05-05 NOTE — PLAN OF CARE
Problem: Adjustment to Illness (Sepsis/Septic Shock)  Goal: Optimal Coping  Outcome: Ongoing, Progressing     Problem: Bleeding (Sepsis/Septic Shock)  Goal: Absence of Bleeding  Outcome: Ongoing, Progressing

## 2022-05-05 NOTE — PT/OT/SLP PROGRESS
Physical Therapy      Patient Name:  Agus Horan   MRN:  [id number]    Patient not seen today secondary to patient scheduled for debridement of right leg. Will follow-up 05/06/22.

## 2022-05-05 NOTE — ASSESSMENT & PLAN NOTE
Concern for PE given tachycardia and new hypoxia  Will obtain chest x-ray and check D-dimer which will likely be elevated given renal failure and recent surgery, will also check lactic acid  Will discuss with Nephrology high concern for PE and need for further workup with CTA verses V/Q scan and also initiating anticoag therapy  continue telemetry monitoring  Supplemental O2 to keep sats >92%  Pt encouraged to use IS  Workup in progress    5/3 - VQ scan negative, heparin gtt stopped. Suspect hypoxia secondary to sepsis which is now improving with appropriate source control. Pt encouraged to use IS. O2 sats improving and stable on room air. Will cont to monitor closely.    resolved

## 2022-05-05 NOTE — PROGRESS NOTES
11:15am-  contacted by Keli GARCIA #[phone] at [facility] in [place], for updated patient information.  sent updated patient information via [facility]. Possible HD bed ready soon for patient LTAC placement.

## 2022-05-05 NOTE — ASSESSMENT & PLAN NOTE
Continues to be anuric  Hemodialysis therapy as per Nephrology team.   due to rhabdomyolysis  Trend CPK daily.  Follow Nephrology recommendations.  Avoid nephrotoxins  Telemetry.  Hemo split catheter was placed on April 25, 2022.     4/30 - remains on hemodialysis, emergent dialysis today secondary to hypercalcemia.  Will continue to monitor closely  5/1 - HD again today secondary to hypercalcemia. Renal function improving. Cont to monitor closely.  5/2 - hemodialysis treatment again today.  Continue to monitor  5/3 - stable, nephrology following, cont routine HD    Resolving. HD on hold-- patient with renal recovery

## 2022-05-05 NOTE — PROGRESS NOTES
Pharmacokinetic Assessment Follow Up: IV Vancomycin    Vancomycin serum concentration assessment(s):    The random level was drawn correctly and can be used to guide therapy at this time. The measurement is within the desired definitive target range of 10 to 15 mcg/mL.    Vancomycin Regimen Plan:    Change regimen to Vancomycin 750 mg IV once with next serum trough concentration measured at 1200 prior to next dose on 5/6    Drug levels (last 3 results):  Recent Labs   Lab Result Units 05/03/22  1754 05/04/22 0618 05/05/22  0504   Vancomycin, Random ug/mL 15.2 9.5 15.3       Pharmacy will continue to follow and monitor vancomycin.    Please contact pharmacy at extension [phone] for questions regarding this assessment.    Thank you for the consult,   Braxton Teresa       Patient brief summary:  Agus Horan is a 21 y.o. male initiated on antimicrobial therapy with IV Vancomycin for treatment of skin & soft tissue infection      Drug Allergies:   Review of patient's allergies indicates:   Allergen Reactions    Shrimp        Actual Body Weight:   81.2 kg    Renal Function:   Estimated Creatinine Clearance: 57.4 mL/min (A) (based on SCr of 2 mg/dL (H)).,     Dialysis Method (if applicable):  N/A    CBC (last 72 hours):  Recent Labs   Lab Result Units 05/03/22  0549 05/04/22  0618 05/05/22  0504   WBC K/uL 20.78* 19.30* 19.79*   Hemoglobin g/dL 7.6* 7.6* 7.4*   Hematocrit % 23.5* 23.2* 23.2*   Platelets K/uL 354 370 394   Gran % % 68.0 75.0* 65.3   Lymph % % 8.0* 3.0* 9.2*   Mono % % 11.0 5.0 7.8   Eosinophil % % 13.0* 11.0* 13.9*   Basophil % % 0.0 0.0 1.2   Differential Method  Manual Manual Automated       Metabolic Panel (last 72 hours):  Recent Labs   Lab Result Units 05/03/22  0549 05/04/22  0618 05/05/22  0504   Sodium mmol/L 135* 136 135*   Potassium mmol/L 3.9 3.3* 3.3*   Chloride mmol/L 97 97 100   CO2 mmol/L 26 28 25   Glucose mg/dL 104 93 100   BUN mg/dL 39* 45* 33*   Creatinine mg/dL 2.8* 2.5* 2.0*   Albumin g/dL  2.3* 2.4* 2.4*   Total Bilirubin mg/dL 0.3 0.3 0.2   Alkaline Phosphatase U/L 102 112 111   AST U/L 27 29 35   ALT U/L 16 17 20   Phosphorus mg/dL  --   --  4.4       Vancomycin Administrations:  vancomycin given in the last 96 hours                     vancomycin in dextrose 5 % 1 gram/250 mL IVPB 1,000 mg (mg) 1,000 mg New Bag 05/04/22 1405    vancomycin 1.25 g in dextrose 5% 250 mL IVPB (ready to mix) (mg) 1,250 mg New Bag 05/02/22 1853                    Microbiologic Results:  Microbiology Results (last 7 days)       Procedure Component Value Units Date/Time    Blood culture [[id number]] Collected: 05/01/22 1216    Order Status: Completed Specimen: Blood Updated: 05/04/22 1812     Blood Culture, Routine No Growth to date      No Growth to date      No Growth to date      No Growth to date    Narrative:      Collection has been rescheduled by MR at 05/01/2022 11:22 Reason:   Patient unavailable starting dialysis and with the dr     Collection has been rescheduled by MR at 05/01/2022 11:42 Reason:   Got first set second set due after 12 couldnt stick other side due to   IV   Collection has been rescheduled by MR at 05/01/2022 11:22 Reason:   Patient unavailable starting dialysis and with the dr     Collection has been rescheduled by MR at 05/01/2022 11:42 Reason:   Got first set second set due after 12 couldnt stick other side due to   IV     Blood culture [[id number]] Collected: 05/01/22 1140    Order Status: Completed Specimen: Blood Updated: 05/04/22 1812     Blood Culture, Routine No Growth to date      No Growth to date      No Growth to date      No Growth to date    Narrative:      Collection has been rescheduled by MR at 05/01/2022 11:22 Reason:   Patient unavailable starting dialysis and with the dr   Collection has been rescheduled by MR at 05/01/2022 11:22 Reason:   Patient unavailable starting dialysis and with the dr     Blood culture [[id number]] Collected: 05/02/22 0410    Order Status:  Completed Specimen: Blood from Antecubital, Right Arm Updated: 05/04/22 1212     Blood Culture, Routine No Growth to date      No Growth to date      No Growth to date    Aerobic culture [[id number]]  (Abnormal) Collected: 05/02/22 1400    Order Status: Completed Specimen: Wound from Leg, Right Updated: 05/04/22 0959     Aerobic Bacterial Culture GRAM NEGATIVE RAJI  Many  Identification and susceptibility pending      Culture, Anaerobic [[id number]] Collected: 05/02/22 1400    Order Status: Completed Specimen: Wound from Leg, Right Updated: 05/04/22 0739     Anaerobic Culture Culture in progress    Urine culture [[id number]] Collected: 05/01/22 2211    Order Status: Completed Specimen: Urine Updated: 05/03/22 0802     Urine Culture, Routine No growth    Narrative:      Specimen Source->Urine    Influenza A & B by Molecular [[id number]] Collected: 05/02/22 0900    Order Status: Completed Specimen: Nasopharyngeal Swab Updated: 05/02/22 1021     Influenza A, Molecular Negative     Influenza B, Molecular Negative     Flu A & B Source Nasal swab

## 2022-05-05 NOTE — ANESTHESIA PREPROCEDURE EVALUATION
05/05/2022  Agus Horan is a 21 y.o., male.      Pre-op Assessment    I have reviewed the Patient Summary Reports.     I have reviewed the Nursing Notes. I have reviewed the NPO Status.   I have reviewed the Medications.     Review of Systems  Anesthesia Hx:  No problems with previous Anesthesia    Social:  Non-Smoker    Cardiovascular:  Cardiovascular Normal     Pulmonary:   Asthma asymptomatic    Renal/:   Chronic Renal Disease    Musculoskeletal:   Compartment syndrome    Neurological:   Neuromuscular Disease,    Endocrine:  Endocrine Normal        Physical Exam  General: Well nourished, Cooperative, Alert and Oriented    Airway:  Mallampati: I   Mouth Opening: Normal  Neck ROM: Normal ROM    Dental:  Intact    Chest/Lungs:  Normal Respiratory Rate, Clear to auscultation    Heart:  Rate: Normal  Rhythm: Regular Rhythm        Anesthesia Plan  Type of Anesthesia, risks & benefits discussed:    Anesthesia Type: Gen Supraglottic Airway  Intra-op Monitoring Plan: Standard ASA Monitors  Post Op Pain Control Plan: multimodal analgesia  Induction:  IV  Airway Plan: , Post-Induction  Informed Consent: Informed consent signed with the Patient and all parties understand the risks and agree with anesthesia plan.  All questions answered.   ASA Score: 2  Day of Surgery Review of History & Physical: H&P Update referred to the surgeon/provider.    Ready For Surgery From Anesthesia Perspective.     .

## 2022-05-05 NOTE — OP NOTE
[facility]  Orthopedic Surgery   Operative Note    SUMMARY     Date of Procedure: 5/5/2022     Procedure:  Deep excisional debridement of necrotic muscle anterior compartment right leg.  Application of wound VAC       Surgeon(s) and Role:     * Leonardo Byers MD - Primary    Assistant:  Roc Garner    Pre-Operative Diagnosis:  Necrotic anterior compartment muscle right lower leg status post fasciotomy right lower leg    Post-Operative Diagnosis:     Same    Anesthesia: General      Estimated Blood Loss (EBL): * No values recorded between 5/5/2022  2:52 PM and 5/5/2022  3:18 PM *           Implants: * No implants in log *    Specimens:   Specimen (24h ago, onward)            None           Complications:  None           Description of the Procedure:  The patient was given a general anesthetic and placed in the supine position.  The dressings were removed some normal saline was applied to the lower leg so that we could take off the wound VAC.  inspection of the wound showed that about 90% of the wound was covered with granulation tissue on deep palpation were able to get gross pus coming from the anterior portion of the anterior compartment near the anterior tibialis muscle there was some necrotic muscle present along with pus it was also cultured.  The leg was then prepped with Betadine solution scrub and draped in a sterile manner finger dissection was made into the anterior compartment there was gross pus present in the anterior compartment and necrotic muscle which was debrided with a knife blade and with scissors and a rongeur all of the necrotic appearing muscle was excised.  We then irrigated the wound with about 6 L of normal saline solution the remaining muscle looked to be viable there was no other necrotic tissue the muscle which was removed went all the way up to the lateral surface of the tibia which was covered with periosteum.  A wound VAC was then placed onto the wound so  and soft tissue we had a good suction with the wound VAC cultures were taken of the wound leg.  Patient was stable to recovery.  along with of specimen culture 5th of muscle.  We then put the posterior splint back onto the right  me white foam was placed along the tibial bone and then black foam placed on top of the muscle.  A large segment of foam was used appeared to be about 7 in in length about 6 in in width and was placed onto the lateral fasciotomy wound.

## 2022-05-05 NOTE — ASSESSMENT & PLAN NOTE
Underwent fasciotomy on 4/13   S/p Debridement deep right lateral fasciotomy incision right lower leg with excisional  debridement of dead anterior tibialis muscle and biopsy of muscle application of wound VAC right lower leg  - 04/25/22.  Wound care nurse performing dressing changes as per recommendations by Orthopedics.    4/30 - stable, wound vac in use, cont current therapy    5/2 - plan for wound VAC change today  5/3 - wound vac changed yesterday, d/w wound care nurse, area of necrotic tissue with purulent drainage. Wound debrided at BS by Dr. Byers. Plan for wound washout and further debridement in OR tomorrow or Thursday. Cont abx, appreciate ID consult.    To OR for wash out on 5/5/2022

## 2022-05-05 NOTE — PLAN OF CARE
Problem: Adult Inpatient Plan of Care  Goal: Plan of Care Review  Outcome: Ongoing, Progressing  Goal: Patient-Specific Goal (Individualized)  Outcome: Ongoing, Progressing  Goal: Absence of Hospital-Acquired Illness or Injury  Outcome: Ongoing, Progressing  Goal: Optimal Comfort and Wellbeing  Outcome: Ongoing, Progressing  Goal: Readiness for Transition of Care  Outcome: Ongoing, Progressing     Problem: Infection  Goal: Absence of Infection Signs and Symptoms  Outcome: Ongoing, Progressing     Problem: Fall Injury Risk  Goal: Absence of Fall and Fall-Related Injury  Outcome: Ongoing, Progressing     Problem: Skin Injury Risk Increased  Goal: Skin Health and Integrity  Outcome: Ongoing, Progressing     Problem: Device-Related Complication Risk (Hemodialysis)  Goal: Safe, Effective Therapy Delivery  Outcome: Ongoing, Progressing     Problem: Hemodynamic Instability (Hemodialysis)  Goal: Effective Tissue Perfusion  Outcome: Ongoing, Progressing     Problem: Infection (Hemodialysis)  Goal: Absence of Infection Signs and Symptoms  Outcome: Ongoing, Progressing     Problem: Fluid and Electrolyte Imbalance (Acute Kidney Injury/Impairment)  Goal: Fluid and Electrolyte Balance  Outcome: Ongoing, Progressing     Problem: Oral Intake Inadequate (Acute Kidney Injury/Impairment)  Goal: Optimal Nutrition Intake  Outcome: Ongoing, Progressing     Problem: Renal Function Impairment (Acute Kidney Injury/Impairment)  Goal: Effective Renal Function  Outcome: Ongoing, Progressing     Problem: Adjustment to Illness (Sepsis/Septic Shock)  Goal: Optimal Coping  Outcome: Ongoing, Progressing     Problem: Bleeding (Sepsis/Septic Shock)  Goal: Absence of Bleeding  Outcome: Ongoing, Progressing     Problem: Glycemic Control Impaired (Sepsis/Septic Shock)  Goal: Blood Glucose Level Within Desired Range  Outcome: Ongoing, Progressing

## 2022-05-05 NOTE — PROGRESS NOTES
INPATIENT NEPHROLOGY Progress Note  [facility]    Patient Name: Agus Horan  Date: 05/05/2022    Reason for consultation: SANDRA    Chief Complaint:   Chief Complaint   Patient presents with    Leg Pain     Pain in the right leg muscle calf is hard, nausea vomiting, patient was seen here over the weekend for an overdose        History of Present Illness:  20 y/o M with a history of asthma recently here on 4/9 with oxycodone overdose who p/w RLE pain and swelling after a fall on 4/10. He reports severe pain, constant, associated with nausea with inability to bear weight.  He took meloxicam without relief. He was found to have compartment syndrome and was taking to the OR emergently for fasciotomy on 4/13. We are consulted for SANDRA with metabolic derangements.    Interval History:  4/14- did emergent HD overnight for hyperkalemia and metabolic acidosis refractory to medical management; pain controlled, RLE wrapped, no edema in LLE  4/15- worsening pain/edema in RLE- going for MRI- oligoanuric- stop NS IVFs- will do HD/UF today and tomorrow  4/16  Seen on dialysis.  No distress  4/17  Remains oliguric.  AFVSS.  Has back pain.  Leg pain a little better  4/18  In the OR.  Still oliguric.    4/19  Seen on dialysis.  No distress.    4/20  Still not making much urine.  cpk coming down.     4/21  Afebrile.  BP a little better.  uop up a bit  422   Sleeping.  AFVSS.  I/Os not appropriately recorded despite being ordered for twice  4/23  225 cc UOP recorded.  K+ 5.5, HD today.  No renal recovery, CPK improving.  C/o rash, Dr. Zamorano at bedside placed orders.  Seen on dialysis, tolerating tx well.  4/24   No new lab results, next lab draw in am.   Still has rash and c/o itching.  No other complaints.  450 cc UOP recorded.  4/25  In the OR.  Plan for hd today  4/26  AFVSS.  Urine output better.  No complaints specified today  4/27  Sleeping.  Output not recorded despite being ordered  4/28  Tearful.  Not  engaging when spoken too.  Requesting dilaudid   4/29     Good urine output.  Seen on dialysis.  No distress  4/30  UOP 4.4L.  Ca+ 15, will have dialysis today, stopped calcitriol and vit D.  Will add on PTH to labs.    5/1  Ca+ 15.5, PTH suppressed.  D/w Dr. Patrick, pt to have additional dialysis today, no UF.  Notified Suni Stone w/[facility] pt needs to run today.  Pt w/elevated temp, a little more tachy than usual, not feeling very well.  5/2 tachy, hypertensive, on RA, UOP 2.6L, remains hypercalcemic- PTH appropriately suppressed, Dr. Byers is at bedside  5/3 febrile, tachy, BP better, on RA, UOP 650cc  5/4 had to terminate HD treatment short about 30min due to n/v and sinus tachy- net UF 1.6L; UOP 3.3L, SCr 2.5, serum Ca rising over last few days in conjunction with improvement in SCr- may reflect volume depletion  5/5 BP high, on RA, UOP 2.7L, going for OR procedure    Plan of Care:    Assessment:  Sepsis  Traumatic rhabdomyolysis with compartment syndrome s/p fasciotomy on 4/13  SANDRA due to toxic ATN s/p emergent HD on 4/14- last HD 5/4  Hypercalcemia  Elevated BP  Hypokalemia  Hyperphosphatemia  Anemia    Plan:    - Ortho following- plan for I&D. Dose meds for CrCl > 30.  - He has renal recovery- last HD 5/4. No NSAIDS or IV contrast. Keep hemosplit for now.   - Remains hypercalcemic but slightly better. Cut back NS 75cc/hr. Ordered bisphosphonate therapy today.  - BP is high- bump hydral to 50mg TID.  - Ordered KCl repletion. Change to a regular diet. Will stop phos binder.  - H/H stabilized- continue iron tablet- continue ALBERTO 3x per week but change to SQ.     Thank you for allowing us to participate in this patient's care. We will continue to follow.    Vital Signs:  Temp Readings from Last 3 Encounters:   05/05/22 96.9 °F (36.1 °C) (Oral)   04/10/22 97.3 °F (36.3 °C)   04/09/22 98.4 °F (36.9 °C)       Pulse Readings from Last 3 Encounters:   05/05/22 101   04/10/22 63   04/09/22 95       BP Readings  from Last 3 Encounters:   05/05/22 (!) 167/78   04/10/22 117/64   04/09/22 (!) 143/82       Weight:  Wt Readings from Last 3 Encounters:   05/02/22 81.2 kg (179 lb)   04/10/22 77.1 kg (170 lb)   04/09/22 77.1 kg (170 lb)       INS/OUTS:  I/O last 3 completed shifts:  In: 550 [P.O.:50; Other:500]  Out: 5973 [Urine:3800; Other:2173]  No intake/output data recorded.    Medications:  Scheduled Meds:   ceFEPime (MAXIPIME) IVPB  1 g Intravenous Q24H    epoetin leidy  50 Units/kg Intravenous Every Mon, Wed, Fri    ferrous gluconate  324 mg Oral BID WM    heparin (porcine)  5,000 Units Subcutaneous Q8H    hydrALAZINE  25 mg Oral Q8H    LIDOcaine HCL 2%   Topical (Top) Every Mon, Thurs    metoprolol succinate  25 mg Oral Daily    senna-docusate 8.6-50 mg  1 tablet Oral BID    sevelamer carbonate  800 mg Oral TID WM    sodium chloride 0.9%  2 mL Intravenous Q6H    vancomycin (VANCOCIN) IVPB  750 mg Intravenous Once     Continuous Infusions:   sodium chloride 0.9% 100 mL/hr at 05/04/22 1859    electrolyte-S (pH 7.4) Stopped (04/25/22 1255)    loperamide       PRN Meds:.acetaminophen, cyclobenzaprine, dextrose 10%, dextrose 10%, diphenhydrAMINE, glucagon (human recombinant), glucose, glucose, heparin (porcine), hydrOXYzine pamoate, labetaloL, loperamide, morphine, naloxone, ondansetron, oxyCODONE-acetaminophen, phenyleph-min oil-petrolatum, sodium chloride 0.9%, Pharmacy to dose Vancomycin consult **AND** vancomycin - pharmacy to dose  No current facility-administered medications on file prior to encounter.     Current Outpatient Medications on File Prior to Encounter   Medication Sig Dispense Refill    naloxone (NARCAN) 4 mg/actuation Spry 4mg by nasal route as needed for opioid overdose; may repeat every 2-3 minutes in alternating nostrils until medical help arrives. Call 911 2 each 0       Review of Systems:  Neg    Physical Exam:  BP (!) 167/78 (BP Location: Right arm, Patient Position: Lying)   Pulse 101    "Temp 96.9 °F (36.1 °C) (Oral)   Resp 17   Ht 5' 9" (1.753 m)   Wt 81.2 kg (179 lb)   SpO2 97%   BMI 26.43 kg/m²     Constitutional: nad, aao x 3, depressed affect  Heart: rrr, no m/r/g, wwp, RLE edema  Lungs: ctab, no w/r/r/c, no lb  Abdomen: s/nt/nd, +BS    Results:  Lab Results   Component Value Date     (L) 05/05/2022    K 3.3 (L) 05/05/2022     05/05/2022    CO2 25 05/05/2022    BUN 33 (H) 05/05/2022    CREATININE 2.0 (H) 05/05/2022    CALCIUM 13.0 (HH) 05/05/2022    ANIONGAP 10 05/05/2022    ESTGFRAFRICA 53 (A) 05/05/2022    EGFRNONAA 46 (A) 05/05/2022       Lab Results   Component Value Date    CALCIUM 13.0 (HH) 05/05/2022    PHOS 4.4 05/05/2022       Recent Labs   Lab 05/05/22  0504   WBC 19.79*   RBC 2.53*   HGB 7.4*   HCT 23.2*      MCV 92   MCH 29.2   MCHC 31.9*       I have personally reviewed pertinent radiological imaging and reports.    I have spent > 35 minutes providing care for this patient for the above diagnoses. These services have included chart/data/imaging review, evaluation, exam, formulation of plan, , note preparation, and discussions with staff involved in this patient's care.    Chantelle Fraser MD    Nephrology  [facility]  [phone]    "

## 2022-05-05 NOTE — ANESTHESIA POSTPROCEDURE EVALUATION
Anesthesia Post Evaluation    Patient: Agus Horan    Procedure(s) Performed: Procedure(s) (LRB):  FASCIOTOMY (Right)  REPLACEMENT, WOUND VAC (N/A)    Final Anesthesia Type: general      Patient location during evaluation: PACU  Patient participation: Yes- Able to Participate  Level of consciousness: awake and alert  Post-procedure vital signs: reviewed and stable  Pain management: adequate  Airway patency: patent    PONV status at discharge: No PONV  Anesthetic complications: no      Cardiovascular status: hemodynamically stable  Respiratory status: unassisted and room air  Hydration status: euvolemic  Follow-up not needed.          Vitals Value Taken Time   /78 05/05/22 1616   Temp 36.3 °C (97.3 °F) 05/05/22 1520   Pulse 98 05/05/22 1618   Resp 11 05/05/22 1618   SpO2 96 % 05/05/22 1618   Vitals shown include unvalidated device data.      No case tracking events are documented in the log.      Pain/Rhonda Score: Pain Rating Prior to Med Admin: 6 (5/5/2022  4:16 PM)  Pain Rating Post Med Admin: 2 (5/5/2022  7:24 AM)  Rhonda Score: 8 (5/5/2022  3:45 PM)

## 2022-05-05 NOTE — PROGRESS NOTES
Consult Note  Infectious Disease    Reason for Consult:      HPI: Agus Horan is a 21 y.o. male with PMHx of mild intermittent asthma, allergies, oxycodone overdose on 04/09/2022, left eye pain after a fall 04/10/2022, came to the hospital on 04/13/2022 with complaints of right leg pain.    Ultrasound ruled out DVT  X-ray ruled out foreign body and fractures  He had right leg compartment syndrome, rhabdomyolysis and SANDRA     He was seen by Dr. Fraser with Nephrology and Dr. Freedman with Orthopedic surgery.    Patient was taken for fasciotomy on  04/13/2022, then debridement and closure on 04/18/2022; then  debridement deep right lateral fasciotomy incision right lower leg with excisional  debridement of dead anterior tibialis muscle and biopsy of muscle application of wound VAC right lower leg  on 04/25/2022  There are no cultures sent intraoperatively  Blood cultures are negative on 04/13/2022 and 05/01/2022    I came and saw patient.  He has a temperature of 100.2°.  WBC 16. He is on cefazolin since 04/13/2022(with an interruptions or 4 days from 04/19--4/22)  He feels well, he feels normal.  He is just anxious bc  of SANDRA. He does not want ot be on HD fr the rest of his life  Hospitalist is concerned of tachycardia and poss PE and is giving heparin drip.     INTERVAL HISTORY:  5/2 (Isma): Interim reviewed, discussed with Dr Perales, patient seen and examined at bedside, covered in bed-sheet. States he has a growing lesion on his left AC. Having HD a bedside. Persistent tachycardia, hypertension, afebrile. Labs reviewed, WBC: 18.5, PMN 71.5%, H/H 8.3/25.2, plt: 369. Iron 10, ferritin 1.010. ESR 79, .9, calcium 14.9. D-dimer 3.3. Procal 0.81. Micro reviewed, negative thus far, no cultures sent from the OR.     5/3:  Patient seen and examined at bedside, feeling comfortable today.  States is the most calmed he has ever felt.  Seen by Ortho yesterday, status post debridement at bedside.  Upon extensive  "revision, small pocket of purulent bloody fluid drained, cultures taken at bedside.  Plan for OR either tomorrow or the day after.  CT of left arm consistent with myositis ossificans, likely from hypercalcemia.  Uric acid 3.0.  V/Q scan negative for PE.  Patient's heart rate remained slightly elevated 104 currently.  Labs reviewed, white count 20.7, PMN 68%.  H&H 7.6/23.5, platelet count 354. Sodium 135, creatinine 2.8.  Calcium 12.3, ionized calcium 1.65.  Albumin 2.3.    5/4:  Interim reviewed, patient seen and examined at bedside covered in pillows and bed sheets.  States he is tired, and her L AC fossa lesion is decreasing in size. Persistent tachycardia,  hypertensive, afebrile. Having dialysis at bedside. Cultures taken at bedside grew GNR, lab called, lactose negative, ID and sensitivities available tomorrow. Labs reviewed, persistent leukocytosis, 19.3, PMN 75%, bands 6%. Potassium 3.3, normal LFTs, calcium 13.6.    5/5: Patient seen and examined at bedside, tearful about current situation. States he is starting all over again once he moves to [place].  He is scheduled for procedure with Ortho today. Remains hypertensive, tachy 101, afebrile, good urine output. Labs reviewed, WBC: 19.7, no left shift. H/H 7.4/23.2, plt: 394. Hypokalemia 3.3, creatinine 2.0. Calcium 13. Albumin 2.4. Normal LFTs, CPK 87.    Antibiotics (From admission, onward)                Start     Stop Route Frequency Ordered    05/05/22 1300  vancomycin 750 mg in dextrose 5 % 250 mL IVPB (ready to mix system)         -- IV Once 05/05/22 0618    05/04/22 1245  cefepime in dextrose 5 % 1 gram/50 mL IVPB 1 g         -- IV Every 24 hours (non-standard times) 05/04/22 1135    05/02/22 1759  vancomycin - pharmacy to dose  (vancomycin IVPB)        "And" Linked Group Details    -- IV pharmacy to manage frequency 05/02/22 1659          Review of patient's allergies indicates:   Allergen Reactions    Shrimp      Past Medical History:   Diagnosis " Date    Allergy     AR    Asthma     mild intermittent    Hyperkalemia 4/14/2022     Past Surgical History:   Procedure Laterality Date    DEBRIDEMENT OF LOWER EXTREMITY Right 4/18/2022    Procedure: DEBRIDEMENT, LOWER EXTREMITY;  Surgeon: Leonardo Byers MD;  Location: [facility] OR;  Service: Orthopedics;  Laterality: Right;    FASCIOTOMY Right 4/13/2022    Procedure: FASCIOTOMY;  Surgeon: Leonardo Byers MD;  Location: [facility] OR;  Service: Orthopedics;  Laterality: Right;    FASCIOTOMY Right 4/25/2022    Procedure: FASCIOTOMY;  Surgeon: Leonardo Byers MD;  Location: [facility] OR;  Service: Orthopedics;  Laterality: Right;    REMOVAL OF FOREIGN BODY FROM FOOT Left 6/24/2020    Procedure: REMOVAL, FOREIGN BODY, FOOT;  Surgeon: Dani Garcia MD;  Location: [facility] OR;  Service: Orthopedics;  Laterality: Left;     Social History     Socioeconomic History    Marital status: Single   Tobacco Use    Smoking status: Never Smoker    Smokeless tobacco: Never Used   Substance and Sexual Activity    Alcohol use: Yes     Comment: last night    Drug use: Yes     Frequency: 2.0 times per week     Types: Marijuana     Comment: yesterday   Social History Narrative    ** Merged History Encounter **         Lives with mom, 2 brothers.  No smokers.  +Dogs/chickens.  9th grader.     Family History   Problem Relation Age of Onset    Asthma Brother     Emphysema Maternal Grandmother     Hyperlipidemia Maternal Grandmother     Asthma Maternal Grandmother     Arthritis Maternal Grandmother     COPD Maternal Grandmother     Asthma Brother          Review of Systems:   Patient with drug use before admission. 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 Alert and attentive, cooperative, comfortable  Eyes:  Anicteric, PERRL, EOMI  ENT:  No ulcers, exudates, thrush, nares patent, dentition is fair  Neck:  Supple  Lungs: Clear to auscultation b/l   Heart:  Tachycardic, S1/S2+, regular rhythm, no murmur   Abd:  Soft, NT, ND, normal BS, no masses or organomegaly appreciated.  :  Voids, clear urine  Musc:  Other than  Right leg dressing in place with wound vac, not disturbed. Joints without effusion, swelling, erythema, synovitis, muscle wasting.   Skin:  No rashes. No palmar or plantar lesions. No subungual petechiae  Neuro:   Alert, attentive, speech fluent, face symmetric, moves all extremities, no focal weakness  Psych:  Calm, cooperative  Lymphatic:       Extrem: Left arm with amorphic deposit, calcification - tender to palpation, no redness noted.     No edema, erythema, phlebitis, cellulitis, warm and well perfused  VAD:  R tunneled catheter    Isolation:  none  Wound:     5/3:                                 General Labs reviewed:  Recent Labs   Lab 05/03/22  0549 05/04/22  0618 05/05/22  0504   WBC 20.78* 19.30* 19.79*   HGB 7.6* 7.6* 7.4*   HCT 23.5* 23.2* 23.2*    370 394       Recent Labs   Lab 05/03/22  0549 05/04/22  0618 05/05/22  0504   * 136 135*   K 3.9 3.3* 3.3*   CL 97 97 100   CO2 26 28 25   BUN 39* 45* 33*   CREATININE 2.8* 2.5* 2.0*   CALCIUM 12.3* 13.6* 13.0*   PROT 6.9 7.3 7.3   BILITOT 0.3 0.3 0.2   ALKPHOS 102 112 111   ALT 16 17 20   AST 27 29 35     Recent Labs   Lab 05/01/22  1457   .9*         Micro:  Microbiology Results (last 7 days)       Procedure Component Value Units Date/Time    Blood culture [[id number]] Collected: 05/01/22 1216     Order Status: Completed Specimen: Blood Updated: 05/04/22 1812     Blood Culture, Routine No Growth to date      No Growth to date      No Growth to date      No Growth to date    Narrative:      Collection has been rescheduled by [email or web address] at 05/01/2022 11:22 Reason:   Patient unavailable starting dialysis and with the dr     Collection has been rescheduled by [email or web address] at 05/01/2022 11:42 Reason:   Got first set second set due after 12 couldnt stick other side due to   IV   Collection has been rescheduled by [email or web address] at 05/01/2022 11:22 Reason:   Patient unavailable starting dialysis and with the dr     Collection has been rescheduled by [email or web address] at 05/01/2022 11:42 Reason:   Got first set second set due after 12 couldnt stick other side due to   IV     Blood culture [[id number]] Collected: 05/01/22 1140    Order Status: Completed Specimen: Blood Updated: 05/04/22 1812     Blood Culture, Routine No Growth to date      No Growth to date      No Growth to date      No Growth to date    Narrative:      Collection has been rescheduled by [email or web address] at 05/01/2022 11:22 Reason:   Patient unavailable starting dialysis and with the dr   Collection has been rescheduled by MR at 05/01/2022 11:22 Reason:   Patient unavailable starting dialysis and with the dr     Blood culture [[id number]] Collected: 05/02/22 0410    Order Status: Completed Specimen: Blood from Antecubital, Right Arm Updated: 05/04/22 1212     Blood Culture, Routine No Growth to date      No Growth to date      No Growth to date    Aerobic culture [[id number]]  (Abnormal) Collected: 05/02/22 1400    Order Status: Completed Specimen: Wound from Leg, Right Updated: 05/04/22 0959     Aerobic Bacterial Culture GRAM NEGATIVE RAJI  Many  Identification and susceptibility pending      Culture, Anaerobic [[id number]] Collected: 05/02/22 1400    Order Status: Completed Specimen: Wound from Leg, Right Updated: 05/04/22 0739     Anaerobic Culture Culture in progress    Urine culture  [[id number]] Collected: 05/01/22 2211    Order Status: Completed Specimen: Urine Updated: 05/03/22 0802     Urine Culture, Routine No growth    Narrative:      Specimen Source->Urine    Influenza A & B by Molecular [[id number]] Collected: 05/02/22 0900    Order Status: Completed Specimen: Nasopharyngeal Swab Updated: 05/02/22 1021     Influenza A, Molecular Negative     Influenza B, Molecular Negative     Flu A & B Source Nasal swab            Imaging Reviewed:   CXR-- No definite acute radiographic abnormality.  Right internal jugular central venous catheter in place.   CT  UltrasoundNo evidence of deep venous thrombosis in either lower extremity, noting nonvisualization of the right calf veins due to overlying bandaging.    CT Left arm: Three circumscribed foci of soft tissue mineralization along the antecubital fossa have peripheral zoning favoring myositis ossificans.     NM scan low probability for PE    Cardiology:  · The left ventricle is normal in size with concentric remodeling and normal systolic function.  · The estimated ejection fraction is 65%.  · Grade I left ventricular diastolic dysfunction.  · Normal right ventricular size with normal right ventricular systolic function.  · Mild to moderate tricuspid regurgitation.  · Normal central venous pressure (3 mmHg).  · The estimated PA systolic pressure is 47 mmHg.  · There is pulmonary hypertension.  · Sinus tachycardia rate of 120-130 beats per minute was noted during the study        IMPRESSION & PLAN     1. History of R leg compartment syndrome; s/p multiple I&Ds, plan for I&D today    ESR 79, .9->73.3   Procal 0.81-->1, pt on HD   Wound culture taken at bedside 5/2 GNR, lactose negative, pending ID and sensitivities     2. Rhabdomyolysis due to compartment syndrome.  Resolved    3. SANDRA due to rhabdomyolysis, and hypercalcemia, 11 on HD   Nephrology following     4. Vitamin-D deficiency, anemia, protein malnutrition, hypoalbuminemia    5. V/Q scan  negative for PE  6. 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

## 2022-05-05 NOTE — ANESTHESIA PROCEDURE NOTES
Intubation    Date/Time: 5/5/2022 2:43 PM  Performed by: Albin Chino CRNA  Authorized by: Albin Chino CRNA     Intubation:     Induction:  Intravenous    Attempts:  1    Attempted By:  CRNA    Difficult Airway Encountered?: No      Airway Device:  Supraglottic airway/LMA    Airway Device Size:  4.0    Style/Cuff Inflation:  Cuffed (inflated to minimal occlusive pressure)    Placement Verified By:  Capnometry    Complicating Factors:  None

## 2022-05-06 LAB
ALBUMIN SERPL BCP-MCNC: 2.4 G/DL (ref 3.5–5.2)
ALP SERPL-CCNC: 103 U/L (ref 55–135)
ALT SERPL W/O P-5'-P-CCNC: 22 U/L (ref 10–44)
ANION GAP SERPL CALC-SCNC: 10 MMOL/L (ref 8–16)
AST SERPL-CCNC: 29 U/L (ref 10–40)
BACTERIA BLD CULT: NORMAL
BACTERIA BLD CULT: NORMAL
BASOPHILS # BLD AUTO: 0.08 K/UL (ref 0–0.2)
BASOPHILS NFR BLD: 0.5 % (ref 0–1.9)
BILIRUB SERPL-MCNC: 0.2 MG/DL (ref 0.1–1)
BUN SERPL-MCNC: 36 MG/DL (ref 6–20)
CA-I BLDV-SCNC: 1.81 MMOL/L (ref 1.06–1.42)
CALCIUM SERPL-MCNC: 13.2 MG/DL (ref 8.7–10.5)
CHLORIDE SERPL-SCNC: 103 MMOL/L (ref 95–110)
CO2 SERPL-SCNC: 23 MMOL/L (ref 23–29)
CREAT SERPL-MCNC: 2 MG/DL (ref 0.5–1.4)
DIFFERENTIAL METHOD: ABNORMAL
EOSINOPHIL # BLD AUTO: 1.1 K/UL (ref 0–0.5)
EOSINOPHIL NFR BLD: 6.5 % (ref 0–8)
ERYTHROCYTE [DISTWIDTH] IN BLOOD BY AUTOMATED COUNT: 12.6 % (ref 11.5–14.5)
EST. GFR  (AFRICAN AMERICAN): 53 ML/MIN/1.73 M^2
EST. GFR  (NON AFRICAN AMERICAN): 46 ML/MIN/1.73 M^2
GLUCOSE SERPL-MCNC: 102 MG/DL (ref 70–110)
HCT VFR BLD AUTO: 22.5 % (ref 40–54)
HGB BLD-MCNC: 7.2 G/DL (ref 14–18)
IMM GRANULOCYTES # BLD AUTO: 0.52 K/UL (ref 0–0.04)
IMM GRANULOCYTES NFR BLD AUTO: 3.2 % (ref 0–0.5)
LYMPHOCYTES # BLD AUTO: 0.8 K/UL (ref 1–4.8)
LYMPHOCYTES NFR BLD: 5 % (ref 18–48)
MCH RBC QN AUTO: 29 PG (ref 27–31)
MCHC RBC AUTO-ENTMCNC: 32 G/DL (ref 32–36)
MCV RBC AUTO: 91 FL (ref 82–98)
MONOCYTES # BLD AUTO: 0.5 K/UL (ref 0.3–1)
MONOCYTES NFR BLD: 3.3 % (ref 4–15)
NEUTROPHILS # BLD AUTO: 13.3 K/UL (ref 1.8–7.7)
NEUTROPHILS NFR BLD: 81.5 % (ref 38–73)
NRBC BLD-RTO: 0 /100 WBC
PLATELET # BLD AUTO: 401 K/UL (ref 150–450)
PMV BLD AUTO: 8.9 FL (ref 9.2–12.9)
POTASSIUM SERPL-SCNC: 3.4 MMOL/L (ref 3.5–5.1)
PROT SERPL-MCNC: 7.2 G/DL (ref 6–8.4)
RBC # BLD AUTO: 2.48 M/UL (ref 4.6–6.2)
SODIUM SERPL-SCNC: 136 MMOL/L (ref 136–145)
WBC # BLD AUTO: 16.24 K/UL (ref 3.9–12.7)

## 2022-05-06 PROCEDURE — 25000003 PHARM REV CODE 250: Performed by: NURSE PRACTITIONER

## 2022-05-06 PROCEDURE — 97116 GAIT TRAINING THERAPY: CPT

## 2022-05-06 PROCEDURE — 63600175 PHARM REV CODE 636 W HCPCS: Performed by: INTERNAL MEDICINE

## 2022-05-06 PROCEDURE — 27000124 HC DRESSING, WOUND VAC

## 2022-05-06 PROCEDURE — 82330 ASSAY OF CALCIUM: CPT | Performed by: ORTHOPAEDIC SURGERY

## 2022-05-06 PROCEDURE — 80053 COMPREHEN METABOLIC PANEL: CPT | Performed by: NURSE PRACTITIONER

## 2022-05-06 PROCEDURE — 36415 COLL VENOUS BLD VENIPUNCTURE: CPT | Performed by: ORTHOPAEDIC SURGERY

## 2022-05-06 PROCEDURE — 25000003 PHARM REV CODE 250: Performed by: INTERNAL MEDICINE

## 2022-05-06 PROCEDURE — 11000001 HC ACUTE MED/SURG PRIVATE ROOM

## 2022-05-06 PROCEDURE — 99232 SBSQ HOSP IP/OBS MODERATE 35: CPT | Mod: S$GLB,,, | Performed by: STUDENT IN AN ORGANIZED HEALTH CARE EDUCATION/TRAINING PROGRAM

## 2022-05-06 PROCEDURE — 99232 PR SUBSEQUENT HOSPITAL CARE,LEVL II: ICD-10-PCS | Mod: S$GLB,,, | Performed by: STUDENT IN AN ORGANIZED HEALTH CARE EDUCATION/TRAINING PROGRAM

## 2022-05-06 PROCEDURE — 94761 N-INVAS EAR/PLS OXIMETRY MLT: CPT

## 2022-05-06 PROCEDURE — 63600175 PHARM REV CODE 636 W HCPCS: Performed by: STUDENT IN AN ORGANIZED HEALTH CARE EDUCATION/TRAINING PROGRAM

## 2022-05-06 PROCEDURE — A4216 STERILE WATER/SALINE, 10 ML: HCPCS | Performed by: ORTHOPAEDIC SURGERY

## 2022-05-06 PROCEDURE — 94799 UNLISTED PULMONARY SVC/PX: CPT

## 2022-05-06 PROCEDURE — 63600175 PHARM REV CODE 636 W HCPCS: Performed by: NURSE PRACTITIONER

## 2022-05-06 PROCEDURE — 85025 COMPLETE CBC W/AUTO DIFF WBC: CPT | Performed by: NURSE PRACTITIONER

## 2022-05-06 PROCEDURE — 99900035 HC TECH TIME PER 15 MIN (STAT)

## 2022-05-06 PROCEDURE — 25000003 PHARM REV CODE 250: Performed by: ORTHOPAEDIC SURGERY

## 2022-05-06 RX ORDER — POTASSIUM CHLORIDE 20 MEQ/1
40 TABLET, EXTENDED RELEASE ORAL ONCE
Status: COMPLETED | OUTPATIENT
Start: 2022-05-06 | End: 2022-05-06

## 2022-05-06 RX ORDER — CALCITONIN SALMON 200 [USP'U]/ML
4 INJECTION, SOLUTION INTRAMUSCULAR; SUBCUTANEOUS 2 TIMES DAILY
Status: DISCONTINUED | OUTPATIENT
Start: 2022-05-06 | End: 2022-05-07

## 2022-05-06 RX ORDER — ACETAMINOPHEN 325 MG/1
650 TABLET ORAL EVERY 6 HOURS PRN
Status: DISCONTINUED | OUTPATIENT
Start: 2022-05-06 | End: 2022-05-11 | Stop reason: HOSPADM

## 2022-05-06 RX ORDER — CEFEPIME HYDROCHLORIDE 1 G/50ML
1 INJECTION, SOLUTION INTRAVENOUS
Status: DISCONTINUED | OUTPATIENT
Start: 2022-05-06 | End: 2022-05-07

## 2022-05-06 RX ADMIN — MORPHINE SULFATE 2 MG: 4 INJECTION INTRAVENOUS at 08:05

## 2022-05-06 RX ADMIN — HEPARIN SODIUM 5000 UNITS: 5000 INJECTION INTRAVENOUS; SUBCUTANEOUS at 02:05

## 2022-05-06 RX ADMIN — Medication 324 MG: at 05:05

## 2022-05-06 RX ADMIN — ERYTHROPOIETIN 4100 UNITS: 10000 INJECTION, SOLUTION INTRAVENOUS; SUBCUTANEOUS at 02:05

## 2022-05-06 RX ADMIN — OXYCODONE HYDROCHLORIDE AND ACETAMINOPHEN 1 TABLET: 5; 325 TABLET ORAL at 01:05

## 2022-05-06 RX ADMIN — POTASSIUM CHLORIDE 40 MEQ: 1500 TABLET, EXTENDED RELEASE ORAL at 11:05

## 2022-05-06 RX ADMIN — Medication 2 ML: at 11:05

## 2022-05-06 RX ADMIN — ACETAMINOPHEN 650 MG: 325 TABLET ORAL at 08:05

## 2022-05-06 RX ADMIN — HYDRALAZINE HYDROCHLORIDE 50 MG: 25 TABLET, FILM COATED ORAL at 09:05

## 2022-05-06 RX ADMIN — Medication 324 MG: at 08:05

## 2022-05-06 RX ADMIN — MORPHINE SULFATE 2 MG: 4 INJECTION INTRAVENOUS at 09:05

## 2022-05-06 RX ADMIN — CEFEPIME HYDROCHLORIDE 1 G: 1 INJECTION, SOLUTION INTRAVENOUS at 10:05

## 2022-05-06 RX ADMIN — ACETAMINOPHEN 650 MG: 325 TABLET ORAL at 10:05

## 2022-05-06 RX ADMIN — HYDRALAZINE HYDROCHLORIDE 50 MG: 25 TABLET, FILM COATED ORAL at 02:05

## 2022-05-06 RX ADMIN — OXYCODONE HYDROCHLORIDE AND ACETAMINOPHEN 1 TABLET: 5; 325 TABLET ORAL at 05:05

## 2022-05-06 RX ADMIN — DOCUSATE SODIUM AND SENNOSIDES 1 TABLET: 8.6; 5 TABLET, FILM COATED ORAL at 08:05

## 2022-05-06 RX ADMIN — OXYCODONE HYDROCHLORIDE AND ACETAMINOPHEN 1 TABLET: 5; 325 TABLET ORAL at 11:05

## 2022-05-06 RX ADMIN — CEFEPIME HYDROCHLORIDE 1 G: 1 INJECTION, SOLUTION INTRAVENOUS at 05:05

## 2022-05-06 RX ADMIN — MORPHINE SULFATE 2 MG: 4 INJECTION INTRAVENOUS at 02:05

## 2022-05-06 RX ADMIN — CALCITONIN SALMON 324 UNITS: 200 INJECTION, SOLUTION INTRAMUSCULAR; SUBCUTANEOUS at 10:05

## 2022-05-06 RX ADMIN — HEPARIN SODIUM 5000 UNITS: 5000 INJECTION INTRAVENOUS; SUBCUTANEOUS at 05:05

## 2022-05-06 RX ADMIN — HEPARIN SODIUM 5000 UNITS: 5000 INJECTION INTRAVENOUS; SUBCUTANEOUS at 09:05

## 2022-05-06 RX ADMIN — DOCUSATE SODIUM AND SENNOSIDES 1 TABLET: 8.6; 5 TABLET, FILM COATED ORAL at 09:05

## 2022-05-06 RX ADMIN — CALCITONIN SALMON 324 UNITS: 200 INJECTION, SOLUTION INTRAMUSCULAR; SUBCUTANEOUS at 09:05

## 2022-05-06 RX ADMIN — METOPROLOL SUCCINATE 25 MG: 25 TABLET, EXTENDED RELEASE ORAL at 08:05

## 2022-05-06 RX ADMIN — HYDRALAZINE HYDROCHLORIDE 50 MG: 25 TABLET, FILM COATED ORAL at 05:05

## 2022-05-06 NOTE — ASSESSMENT & PLAN NOTE
Underwent fasciotomy on 4/13   S/p Debridement deep right lateral fasciotomy incision right lower leg with excisional  debridement of dead anterior tibialis muscle and biopsy of muscle application of wound VAC right lower leg  - 04/25/22.  Wound care nurse performing dressing changes as per recommendations by Orthopedics.    4/30 - stable, wound vac in use, cont current therapy    5/2 - plan for wound VAC change today  5/3 - wound vac changed yesterday, d/w wound care nurse, area of necrotic tissue with purulent drainage. Wound debrided at BS by Dr. Byers. Plan for wound washout and further debridement in OR tomorrow or Thursday. Cont abx, appreciate ID consult.    To OR for wash out on 5/5/2022  New cultures obtained on 5/5/2022 awaiting results

## 2022-05-06 NOTE — PLAN OF CARE
Patient AOX4 resting in bed, appears asleep, eyes closed, respirations even and unlabored. NAD. Denies SOB. VSS. Afebrile. Cardiac monitoring maintained. ST. Up with assistance. Room air. Medications administered per MD/NP order. Bed alarm active. Side Rails up X2. Plan of care reviewed with patient. Verbalizes understanding. Frequent checks performed Q2H. Call light in reach. Pt free from fall or injury. Will monitor.

## 2022-05-06 NOTE — PROGRESS NOTES
Agus Horan [id number] is a 21 y.o. male who has been consulted for vancomycin dosing.    Pharmacy consult for vancomycin dosing in no longer required.  Vancomycin was discontinued.    Thank you for allowing us to participate in this patient's care.     Venita So, PharmD

## 2022-05-06 NOTE — CARE UPDATE
05/06/22 0802   PRE-TX-O2   O2 Device (Oxygen Therapy) room air   SpO2 97 %   Pulse Oximetry Type Intermittent   $ Pulse Oximetry - Multiple Charge Pulse Oximetry - Multiple   Probe Placed On (Pulse Ox) finger   Pulse 99   Resp 15   Positioning HOB elevated 30 degrees   Incentive Spirometer   $ Incentive Spirometer Charges done with encouragement   Incentive Spirometer Predicted Level (mL) 2000   Administration (IS) self-administered   Number of Repetitions (IS) 10   Level Incentive Spirometer (mL) 2250   Patient Tolerance (IS) good   PPOx, IS QS

## 2022-05-06 NOTE — SUBJECTIVE & OBJECTIVE
Interval History: Pt was seen and examined. Labs and diagnostics reviewed. Denied fever/chills/CP/SOB/n/v. Denies dysuria, urine clear yellow. Reports pain in right leg.      Awaiting new culture results. Spoke with case-management, information given to LT[facility] in [place] to assess for adm when cleared from ortho and ID standpoint.    Review of Systems   Constitutional:  Positive for activity change and fatigue. Negative for appetite change, chills, diaphoresis and fever.   HENT:  Negative for congestion, postnasal drip and sore throat.    Respiratory:  Negative for cough and shortness of breath.    Cardiovascular:  Negative for chest pain and palpitations.   Gastrointestinal:  Negative for abdominal pain, constipation, diarrhea, nausea and vomiting.   Genitourinary:  Negative for dysuria.   Musculoskeletal:  Positive for arthralgias, gait problem and myalgias. Negative for back pain.        Right leg   Neurological:  Negative for dizziness, weakness and headaches.   Psychiatric/Behavioral:  Negative for agitation, behavioral problems, confusion and decreased concentration.    Objective:     Vital Signs (Most Recent):  Temp: 98.4 °F (36.9 °C) (05/06/22 1107)  Pulse: (!) 122 (05/06/22 1107)  Resp: 18 (05/06/22 1107)  BP: (!) 159/81 (05/06/22 1107)  SpO2: 96 % (05/06/22 1107)   Vital Signs (24h Range):  Temp:  [97.3 °F (36.3 °C)-100.4 °F (38 °C)] 98.4 °F (36.9 °C)  Pulse:  [] 122  Resp:  [8-18] 18  SpO2:  [96 %-100 %] 96 %  BP: (124-176)/(60-91) 159/81     Weight: 81.2 kg (179 lb)  Body mass index is 26.43 kg/m².    Intake/Output Summary (Last 24 hours) at 5/6/2022 1152  Last data filed at 5/6/2022 0400  Gross per 24 hour   Intake 500 ml   Output 450 ml   Net 50 ml      Physical Exam  Vitals and nursing note reviewed.   Constitutional:       Appearance: Normal appearance.   HENT:      Head: Normocephalic and atraumatic.      Nose: Nose normal.      Mouth/Throat:      Pharynx: Oropharynx is clear.    Musculoskeletal:      Cervical back: Normal range of motion.      Comments: Right leg in brace and ace. Toes warm, ROM in toes full. Toenails pink and cap refill <2. Pedal pulse strong.    Skin:     General: Skin is warm and dry.      Capillary Refill: Capillary refill takes less than 2 seconds.   Neurological:      Mental Status: He is alert and oriented to person, place, and time.   Psychiatric:         Mood and Affect: Mood normal.         Behavior: Behavior normal.       Significant Labs: All pertinent labs within the past 24 hours have been reviewed.  CBC:   Recent Labs   Lab 05/05/22  0504 05/05/22  1631 05/06/22  0748   WBC 19.79*  --  16.24*   HGB 7.4* 7.6* 7.2*   HCT 23.2* 23.6* 22.5*     --  401     CMP:   Recent Labs   Lab 05/05/22  0504 05/06/22  0748   * 136   K 3.3* 3.4*    103   CO2 25 23    102   BUN 33* 36*   CREATININE 2.0* 2.0*   CALCIUM 13.0* 13.2*   PROT 7.3 7.2   ALBUMIN 2.4* 2.4*   BILITOT 0.2 0.2   ALKPHOS 111 103   AST 35 29   ALT 20 22   ANIONGAP 10 10   EGFRNONAA 46* 46*       Significant Imaging: I have reviewed all pertinent imaging results/findings within the past 24 hours.

## 2022-05-06 NOTE — PROGRESS NOTES
8:58am-  contacted by Keli at [facility]. She inquired about other patient medical information.  present with patient's provider Elyse SCHWARTZ and patient provider able to give Keli updated patient medical information via phone. Patient's medical chart still being review for LTAC acceptance to [place]. Keli will f/u.

## 2022-05-06 NOTE — PROGRESS NOTES
[facility] Medicine  Progress Note    Patient Name: Agus Horan  MRN: [id number]  Patient Class: IP- Inpatient   Admission Date: 4/13/2022  Length of Stay: 23 days  Attending Physician: Brent Rosales MD  Primary Care Provider: Primary Doctor No        Subjective:     Principal Problem:Acute renal failure with tubular necrosis        HPI:  Agus Horan is a 22 y/o male with PMHx of mild asthma who presents with right lower extremity pain and swelling x few days. Patient was seen in the ED on 4/9/22 for oxycodone overdose and again on 4/10/22 for left eye pain after falling on a piece of furniture. Patient states he is unsure how he injured his leg and denies IV drug use. Xray of his right leg did not show acute fracture. Patietn states RLE pain and swelling increased over the past few days and he is unable to walk secondary to pain. US of his lower extremity performed today did not show DVT. Lab work revealed elevated Creatinine 13.5 & K+ 7.1, elevated AST/ALT 3980/1611 & WBC 18.14.  CPK>40,0000.  Patient unable to dorsiflex and plantar flex his RLE and symptoms are concerning for compartment syndrome per ED. ED provider discussed with Dr. Byers and Dr. Fraser, nephrology. Patient was referred to hospital medicine and seen in the ED with his friend at bedside. Patient complaining of RLE pain, tenderness and swelling. He denies SOB, N/V/D, chest pain and headaches.  Patient will be admitted to hospital medicine for orthopedic consultation and further management.      Overview/Hospital Course:  Pt admitted for SANDRA, rhabdomyolisis, and RLE compartment syndrome. Pt was taken to the OR by Dr. Byers for emergent fasciotomy on 4/13. CPK at the time of arrival was more than 40,000. Pt was initially started on IV fluids and required emergent hemodialysis on 4/14 for hyperkalemia and metabolic acidosis refractory to medical management. Pt returned to OR with Dr. Byers on 4/18 and  underwent debridement of RLE with closure of medial and lateral fasciotomy incision and biopsy of anterior muscle compartment. Patient's CPK level was monitored closely and continued to trend down.  Patient underwent repeat debridement of deep right lateral fasciotomy incision right lower leg with excisional  debridement of dead anterior tibialis muscle and biopsy of muscle with application of wound VAC on April 25, 2022. Pts pain was not well controlled and medications were adjusted. On 5/1 pt developed persistent tachycardia with mild hypoxemia with room air sat 90%.  Patient unable to get CTA chest due to acute renal failure and hemodialysis.  Chest x-ray obtained and negative.  Patient encouraged to utilize IS and O2 sats improved.  US of bilat LE obtained and neg for DVT with some limitation to RLE due to drsg intact. Decision was made to initiate heparin drip for high suspicion of PE until V/Q scan could be performed.  Patient also developed low-grade fever and Infectious Disease was consulted for further assistance and recommendations.  Patient was continued on IV antibiotics.  V/Q scan was obtained on 05/02 and neg for PE.  Heparin drip was stopped evening of 5/2 and pt was placed on SQ heparin for VTE prophylaixis.   Wound VAC was changed on 05/02 by Dr. Byers and wound care nurse with findings of some necrotic tissue with purulent drainage.  Antibiotics were tailored by Infectious Disease.  Patient to OR for right lower extremity surgical site washout on 05/05/2022.      Interval History: Pt was seen and examined. Labs and diagnostics reviewed. Denied fever/chills/CP/SOB/n/v. Denies dysuria, urine clear yellow. Reports pain in right leg.      Awaiting new culture results. Spoke with case-management, information given to LT[facility] in [place] to assess for adm when cleared from ortho and ID standpoint.    Review of Systems   Constitutional:  Positive for activity change and fatigue. Negative for appetite  change, chills, diaphoresis and fever.   HENT:  Negative for congestion, postnasal drip and sore throat.    Respiratory:  Negative for cough and shortness of breath.    Cardiovascular:  Negative for chest pain and palpitations.   Gastrointestinal:  Negative for abdominal pain, constipation, diarrhea, nausea and vomiting.   Genitourinary:  Negative for dysuria.   Musculoskeletal:  Positive for arthralgias, gait problem and myalgias. Negative for back pain.        Right leg   Neurological:  Negative for dizziness, weakness and headaches.   Psychiatric/Behavioral:  Negative for agitation, behavioral problems, confusion and decreased concentration.    Objective:     Vital Signs (Most Recent):  Temp: 98.4 °F (36.9 °C) (05/06/22 1107)  Pulse: (!) 122 (05/06/22 1107)  Resp: 18 (05/06/22 1107)  BP: (!) 159/81 (05/06/22 1107)  SpO2: 96 % (05/06/22 1107)   Vital Signs (24h Range):  Temp:  [97.3 °F (36.3 °C)-100.4 °F (38 °C)] 98.4 °F (36.9 °C)  Pulse:  [] 122  Resp:  [8-18] 18  SpO2:  [96 %-100 %] 96 %  BP: (124-176)/(60-91) 159/81     Weight: 81.2 kg (179 lb)  Body mass index is 26.43 kg/m².    Intake/Output Summary (Last 24 hours) at 5/6/2022 1152  Last data filed at 5/6/2022 0400  Gross per 24 hour   Intake 500 ml   Output 450 ml   Net 50 ml      Physical Exam  Vitals and nursing note reviewed.   Constitutional:       Appearance: Normal appearance.   HENT:      Head: Normocephalic and atraumatic.      Nose: Nose normal.      Mouth/Throat:      Pharynx: Oropharynx is clear.   Musculoskeletal:      Cervical back: Normal range of motion.      Comments: Right leg in brace and ace. Toes warm, ROM in toes full. Toenails pink and cap refill <2. Pedal pulse strong.    Skin:     General: Skin is warm and dry.      Capillary Refill: Capillary refill takes less than 2 seconds.   Neurological:      Mental Status: He is alert and oriented to person, place, and time.   Psychiatric:         Mood and Affect: Mood normal.          Behavior: Behavior normal.       Significant Labs: All pertinent labs within the past 24 hours have been reviewed.  CBC:   Recent Labs   Lab 05/05/22  0504 05/05/22  1631 05/06/22  0748   WBC 19.79*  --  16.24*   HGB 7.4* 7.6* 7.2*   HCT 23.2* 23.6* 22.5*     --  401     CMP:   Recent Labs   Lab 05/05/22  0504 05/06/22  0748   * 136   K 3.3* 3.4*    103   CO2 25 23    102   BUN 33* 36*   CREATININE 2.0* 2.0*   CALCIUM 13.0* 13.2*   PROT 7.3 7.2   ALBUMIN 2.4* 2.4*   BILITOT 0.2 0.2   ALKPHOS 111 103   AST 35 29   ALT 20 22   ANIONGAP 10 10   EGFRNONAA 46* 46*       Significant Imaging: I have reviewed all pertinent imaging results/findings within the past 24 hours.      Assessment/Plan:      * Acute renal failure with tubular necrosis  Continues to be anuric  Hemodialysis therapy as per Nephrology team.   due to rhabdomyolysis,   Trend CPK daily.  Follow Nephrology recommendations.  Avoid nephrotoxins  Telemetry.  Hemo split catheter was placed on April 25, 2022.     4/30 - remains on hemodialysis, emergent dialysis today secondary to hypercalcemia.  Will continue to monitor closely  5/1 - HD again today secondary to hypercalcemia. Renal function improving. Cont to monitor closely.  5/2 - hemodialysis treatment again today.  Continue to monitor  5/3 - stable, nephrology following, cont routine HD    Resolving. HD on hold-- patient with renal recovery       Hypokalemia  nephrology consulted. Adjust K bath. Trend BMP      Hypercalcemia    Nephrology consulted. On calcitonin   To receive procalcitonin x 1 today    Severe sepsis  This patient does have evidence of infective focus  My overall impression is sepsis. Vital signs were reviewed and noted in progress note.  Antibiotics given-   Antibiotics (From admission, onward)            Start     Stop Route Frequency Ordered    05/06/22 0900  cefepime in dextrose 5 % 1 gram/50 mL IVPB 1 g         -- IV Every 8 hours (non-standard times) 05/06/22  0850        Cultures were taken-   Microbiology Results (last 7 days)     Procedure Component Value Units Date/Time    Blood culture [[id number]] Collected: 05/02/22 0410    Order Status: Completed Specimen: Blood from Antecubital, Right Arm Updated: 05/06/22 1212     Blood Culture, Routine No Growth to date      No Growth to date      No Growth to date      No Growth to date      No Growth to date    Culture, Anaerobic [[id number]] Collected: 05/02/22 1400    Order Status: Completed Specimen: Wound from Leg, Right Updated: 05/06/22 1102     Anaerobic Culture Culture in progress    Culture, Body Fluid (Aerobic) w/ GS [[id number]] Collected: 05/05/22 1505    Order Status: Completed Specimen: Synovial Fluid from Ankle, Right Updated: 05/06/22 0433     Gram Stain Result Few WBC's      No organisms seen    Tissue culture [[id number]] Collected: 05/05/22 1530    Order Status: Completed Specimen: Tissue from Leg, Right Updated: 05/06/22 0426     Gram Stain Result Rare WBC's      Rare Gram negative rods    Culture, Anaerobic [[id number]] Collected: 05/05/22 1505    Order Status: Sent Specimen: Wound from Leg, Right Updated: 05/06/22 0030    Fungus culture [[id number]] Collected: 05/05/22 1505    Order Status: Sent Specimen: Wound from Leg, Right Updated: 05/06/22 0027    Blood culture [[id number]] Collected: 05/01/22 1216    Order Status: Completed Specimen: Blood Updated: 05/05/22 1812     Blood Culture, Routine No Growth to date      No Growth to date      No Growth to date      No Growth to date      No Growth to date    Narrative:      Collection has been rescheduled by MR at 05/01/2022 11:22 Reason:   Patient unavailable starting dialysis and with the dr     Collection has been rescheduled by MR at 05/01/2022 11:42 Reason:   Got first set second set due after 12 couldnt stick other side due to   IV   Collection has been rescheduled by MR at 05/01/2022 11:22 Reason:   Patient unavailable starting dialysis and with the       Collection has been rescheduled by MR at 05/01/2022 11:42 Reason:   Got first set second set due after 12 couldnt stick other side due to   IV     Blood culture [[id number]] Collected: 05/01/22 1140    Order Status: Completed Specimen: Blood Updated: 05/05/22 1812     Blood Culture, Routine No Growth to date      No Growth to date      No Growth to date      No Growth to date      No Growth to date    Narrative:      Collection has been rescheduled by [email or web address] at 05/01/2022 11:22 Reason:   Patient unavailable starting dialysis and with the dr   Collection has been rescheduled by [email or web address] at 05/01/2022 11:22 Reason:   Patient unavailable starting dialysis and with the dr     Aerobic culture [[id number]]  (Abnormal)  (Susceptibility) Collected: 05/02/22 1400    Order Status: Completed Specimen: Wound from Leg, Right Updated: 05/05/22 1256     Aerobic Bacterial Culture SERRATIA MARCESCENS  Many        ENTEROBACTER CLOACAE  Moderate      Urine culture [[id number]] Collected: 05/01/22 2211    Order Status: Completed Specimen: Urine Updated: 05/03/22 0802     Urine Culture, Routine No growth    Narrative:      Specimen Source->Urine    Influenza A & B by Molecular [[id number]] Collected: 05/02/22 0900    Order Status: Completed Specimen: Nasopharyngeal Swab Updated: 05/02/22 1021     Influenza A, Molecular Negative     Influenza B, Molecular Negative     Flu A & B Source Nasal swab        Latest lactate reviewed, they are-  No results for input(s): LACTATE in the last 72 hours.    Organ dysfunction indicated by Acute respiratory failure and tachycardia  Source- right leg fasciotomy site    Source control Achieved by- abx      Hypoxia  Concern for PE given tachycardia and new hypoxia  Will obtain chest x-ray and check D-dimer which will likely be elevated given renal failure and recent surgery, will also check lactic acid  Will discuss with Nephrology high concern for PE and need for further workup with CTA verses V/Q scan  and also initiating anticoag therapy  continue telemetry monitoring  Supplemental O2 to keep sats >92%  Pt encouraged to use IS  Workup in progress    5/3 - VQ scan negative, heparin gtt stopped. Suspect hypoxia secondary to sepsis which is now improving with appropriate source control. Pt encouraged to use IS. O2 sats improving and stable on room air. Will cont to monitor closely.    resolved          Tachycardia  Persistent tachycardia over the past 24 hours  Patient denies chest pain  Concern for PE given tachycardia and new hypoxia  Will obtain chest x-ray and check D-dimer which will likely be elevated given renal failure and recent surgery, will also check lactic acid  Will discuss with Nephrology high concern for PE and need for further workup with CTA verses V/Q scan and also initiating anticoag therapy  Patient also hypertensive-will start metoprolol XL today as per chart review patient has had some intermittent tachycardia during his stay  Continue telemetry monitoring  Workup in progress    5/2 - patient tolerating heparin drip.  Will obtain V/Q scan and echocardiogram today.  Workup in progress for possible infectious process.  Appreciate ID consult.    5/3 - improving with source control, VQ scan neg and heparin gtt stopped 5/2. Appreciate ID consult.         Anemia of chronic disease  Patient's anemia is currently controlled. Has recieved 1 units of PRBCs. Etiology likely d/t acute blood loss  Current CBC reviewed-   Lab Results   Component Value Date    HGB 7.2 (L) 05/06/2022    HCT 22.5 (L) 05/06/2022     Monitor serial CBC and transfuse if patient becomes hemodynamically unstable, symptomatic or H/H drops below 7/21.   Rcving epoiten        Drug eruption  No definite etiology apparent.  Possibly clindamycin use.  Off intravenous clindamycin.  Use oral Benadryl 25 mg q.6 hours p.r.n. patient will be monitored closely.    4/30 - resolved      Compartment syndrome  Underwent fasciotomy on 4/13   S/p  Debridement deep right lateral fasciotomy incision right lower leg with excisional  debridement of dead anterior tibialis muscle and biopsy of muscle application of wound VAC right lower leg  - 04/25/22.  Wound care nurse performing dressing changes as per recommendations by Orthopedics.    4/30 - stable, wound vac in use, cont current therapy    5/2 - plan for wound VAC change today  5/3 - wound vac changed yesterday, d/w wound care nurse, area of necrotic tissue with purulent drainage. Wound debrided at BS by Dr. Byers. Plan for wound washout and further debridement in OR tomorrow or Thursday. Cont abx, appreciate ID consult.    To OR for wash out on 5/5/2022  New cultures obtained on 5/5/2022 awaiting results    Elevated liver enzymes  Elevated AST &ALT likely due to rhabdomyolysis  Trending down  Avoid hepatoxic medications  Monitor CMP  See plan for rhabdomyolysis    4/30 - resolved, cont to monitor CMP        Rhabdomyolysis  CPK trending down  S/p fasciotomy on 4/13 for compartment syndrome.  On 4/18 S/p DEBRIDEMENT, LOWER EXTREMITY (Right)   closure of medial fasciotomy incision right leg debridement of lateral fasciotomy with biopsy of anterior muscle compartment removal of deep muscle anterior compartment right lower leg. Subsequently underwent Debridement deep right lateral fasciotomy incision right lower leg with excisional  debridement of dead anterior tibialis muscle and biopsy of muscle application of wound VAC right lower leg on 04/25/22.  Monitor BMP   Follow Nephrology recommendations.    4/30 - improved and stable, cont to monitor closely      History of asthma  Hx of asthma, stable    Monitor for acute changes        VTE Risk Mitigation (From admission, onward)         Ordered     heparin (porcine) injection 5,000 Units  Every 8 hours         05/02/22 1921     IP VTE HIGH RISK PATIENT  Once         05/02/22 1921     heparin (porcine) injection 4,000 Units  As needed (PRN)         04/14/22 1985      Reason for No Pharmacological VTE Prophylaxis  Once        Question:  Reasons:  Answer:  Risk of Bleeding    04/13/22 1913     Place sequential compression device  Until discontinued         04/13/22 1913                Discharge Planning   JARAD:      Code Status: Full Code   Is the patient medically ready for discharge?:     Reason for patient still in hospital (select all that apply): Patient trending condition, Laboratory test, Treatment and Consult recommendations  Discharge Plan A: Long-term acute care facility (LTAC)                  Elyse Ward NP  Department of Hospital Medicine   [facility]

## 2022-05-06 NOTE — ASSESSMENT & PLAN NOTE
This patient does have evidence of infective focus  My overall impression is sepsis. Vital signs were reviewed and noted in progress note.  Antibiotics given-   Antibiotics (From admission, onward)            Start     Stop Route Frequency Ordered    05/06/22 0900  cefepime in dextrose 5 % 1 gram/50 mL IVPB 1 g         -- IV Every 8 hours (non-standard times) 05/06/22 0850        Cultures were taken-   Microbiology Results (last 7 days)     Procedure Component Value Units Date/Time    Blood culture [[id number]] Collected: 05/02/22 0410    Order Status: Completed Specimen: Blood from Antecubital, Right Arm Updated: 05/06/22 1212     Blood Culture, Routine No Growth to date      No Growth to date      No Growth to date      No Growth to date      No Growth to date    Culture, Anaerobic [[id number]] Collected: 05/02/22 1400    Order Status: Completed Specimen: Wound from Leg, Right Updated: 05/06/22 1102     Anaerobic Culture Culture in progress    Culture, Body Fluid (Aerobic) w/ GS [[id number]] Collected: 05/05/22 1505    Order Status: Completed Specimen: Synovial Fluid from Ankle, Right Updated: 05/06/22 0433     Gram Stain Result Few WBC's      No organisms seen    Tissue culture [[id number]] Collected: 05/05/22 1530    Order Status: Completed Specimen: Tissue from Leg, Right Updated: 05/06/22 0426     Gram Stain Result Rare WBC's      Rare Gram negative rods    Culture, Anaerobic [[id number]] Collected: 05/05/22 1505    Order Status: Sent Specimen: Wound from Leg, Right Updated: 05/06/22 0030    Fungus culture [[id number]] Collected: 05/05/22 1505    Order Status: Sent Specimen: Wound from Leg, Right Updated: 05/06/22 0027    Blood culture [[id number]] Collected: 05/01/22 1216    Order Status: Completed Specimen: Blood Updated: 05/05/22 1812     Blood Culture, Routine No Growth to date      No Growth to date      No Growth to date      No Growth to date      No Growth to date    Narrative:      Collection has been  rescheduled by MR at 05/01/2022 11:22 Reason:   Patient unavailable starting dialysis and with the dr     Collection has been rescheduled by MR at 05/01/2022 11:42 Reason:   Got first set second set due after 12 couldnt stick other side due to   IV   Collection has been rescheduled by MR at 05/01/2022 11:22 Reason:   Patient unavailable starting dialysis and with the dr     Collection has been rescheduled by MR at 05/01/2022 11:42 Reason:   Got first set second set due after 12 couldnt stick other side due to   IV     Blood culture [[id number]] Collected: 05/01/22 1140    Order Status: Completed Specimen: Blood Updated: 05/05/22 1812     Blood Culture, Routine No Growth to date      No Growth to date      No Growth to date      No Growth to date      No Growth to date    Narrative:      Collection has been rescheduled by MR at 05/01/2022 11:22 Reason:   Patient unavailable starting dialysis and with the dr   Collection has been rescheduled by [facility] at 05/01/2022 11:22 Reason:   Patient unavailable starting dialysis and with the dr     Aerobic culture [[id number]]  (Abnormal)  (Susceptibility) Collected: 05/02/22 1400    Order Status: Completed Specimen: Wound from Leg, Right Updated: 05/05/22 1256     Aerobic Bacterial Culture SERRATIA MARCESCENS  Many        ENTEROBACTER CLOACAE  Moderate      Urine culture [[id number]] Collected: 05/01/22 2211    Order Status: Completed Specimen: Urine Updated: 05/03/22 0802     Urine Culture, Routine No growth    Narrative:      Specimen Source->Urine    Influenza A & B by Molecular [[id number]] Collected: 05/02/22 0900    Order Status: Completed Specimen: Nasopharyngeal Swab Updated: 05/02/22 1021     Influenza A, Molecular Negative     Influenza B, Molecular Negative     Flu A & B Source Nasal swab        Latest lactate reviewed, they are-  No results for input(s): LACTATE in the last 72 hours.    Organ dysfunction indicated by Acute respiratory failure and  tachycardia  Source- right leg fasciotomy site    Source control Achieved by- abx

## 2022-05-06 NOTE — PROGRESS NOTES
[facility] Hospital Medicine  Progress Note    Patient Name: Agus Horan  MRN: [id number]  Patient Class: IP- Inpatient   Admission Date: 4/13/2022  Length of Stay: 23 days  Attending Physician: Brent Rosales MD  Primary Care Provider: Primary Doctor No        Subjective:     Principal Problem:Acute renal failure with tubular necrosis        HPI:  Agus Horan is a 22 y/o male with PMHx of mild asthma who presents with right lower extremity pain and swelling x few days. Patient was seen in the ED on 4/9/22 for oxycodone overdose and again on 4/10/22 for left eye pain after falling on a piece of furniture. Patient states he is unsure how he injured his leg and denies IV drug use. Xray of his right leg did not show acute fracture. Patietn states RLE pain and swelling increased over the past few days and he is unable to walk secondary to pain. US of his lower extremity performed today did not show DVT. Lab work revealed elevated Creatinine 13.5 & K+ 7.1, elevated AST/ALT 3980/1611 & WBC 18.14.  CPK>40,0000.  Patient unable to dorsiflex and plantar flex his RLE and symptoms are concerning for compartment syndrome per ED. ED provider discussed with Dr. Byers and Dr. Fraser, nephrology. Patient was referred to hospital medicine and seen in the ED with his friend at bedside. Patient complaining of RLE pain, tenderness and swelling. He denies SOB, N/V/D, chest pain and headaches.  Patient will be admitted to hospital medicine for orthopedic consultation and further management.      Overview/Hospital Course:  Pt admitted for SANDRA, rhabdomyolisis, and RLE compartment syndrome. Pt was taken to the OR by Dr. Byers for emergent fasciotomy on 4/13. CPK at the time of arrival was more than 40,000. Pt was initially started on IV fluids and required emergent hemodialysis on 4/14 for hyperkalemia and metabolic acidosis refractory to medical management. Pt returned to OR with Dr. Byers on 4/18 and  underwent debridement of RLE with closure of medial and lateral fasciotomy incision and biopsy of anterior muscle compartment. Patient's CPK level was monitored closely and continued to trend down.  Patient underwent repeat debridement of deep right lateral fasciotomy incision right lower leg with excisional  debridement of dead anterior tibialis muscle and biopsy of muscle with application of wound VAC on April 25, 2022. Pts pain was not well controlled and medications were adjusted. On 5/1 pt developed persistent tachycardia with mild hypoxemia with room air sat 90%.  Patient unable to get CTA chest due to acute renal failure and hemodialysis.  Chest x-ray obtained and negative.  Patient encouraged to utilize IS and O2 sats improved.  US of bilat LE obtained and neg for DVT with some limitation to RLE due to drsg intact. Decision was made to initiate heparin drip for high suspicion of PE until V/Q scan could be performed.  Patient also developed low-grade fever and Infectious Disease was consulted for further assistance and recommendations.  Patient was continued on IV antibiotics.  V/Q scan was obtained on 05/02 and neg for PE.  Heparin drip was stopped evening of 5/2 and pt was placed on SQ heparin for VTE prophylaixis.   Wound VAC was changed on 05/02 by Dr. Byers and wound care nurse with findings of some necrotic tissue with purulent drainage.  Antibiotics were tailored by Infectious Disease.  Patient to OR for right lower extremity surgical site washout on 05/05/2022.      No new subjective & objective note has been filed under this hospital service since the last note was generated.      Assessment/Plan:      * Acute renal failure with tubular necrosis  Continues to be anuric  Hemodialysis therapy as per Nephrology team.   due to rhabdomyolysis,   Trend CPK daily.  Follow Nephrology recommendations.  Avoid nephrotoxins  Telemetry.  Hemo split catheter was placed on April 25, 2022.     4/30 - remains on  hemodialysis, emergent dialysis today secondary to hypercalcemia.  Will continue to monitor closely  5/1 - HD again today secondary to hypercalcemia. Renal function improving. Cont to monitor closely.  5/2 - hemodialysis treatment again today.  Continue to monitor  5/3 - stable, nephrology following, cont routine HD    Resolving. HD on hold-- patient with renal recovery       Hypokalemia  nephrology consulted. Adjust K bath. Trend BMP      Hypercalcemia    Nephrology consulted. On calcitonin   To receive procalcitonin x 1 today    Severe sepsis  This patient does have evidence of infective focus  My overall impression is sepsis. Vital signs were reviewed and noted in progress note.  Antibiotics given-   Antibiotics (From admission, onward)            Start     Stop Route Frequency Ordered    05/06/22 0900  cefepime in dextrose 5 % 1 gram/50 mL IVPB 1 g         -- IV Every 8 hours (non-standard times) 05/06/22 0850        Cultures were taken-   Microbiology Results (last 7 days)     Procedure Component Value Units Date/Time    Blood culture [[id number]] Collected: 05/02/22 0410    Order Status: Completed Specimen: Blood from Antecubital, Right Arm Updated: 05/06/22 1212     Blood Culture, Routine No Growth to date      No Growth to date      No Growth to date      No Growth to date      No Growth to date    Culture, Anaerobic [[id number]] Collected: 05/02/22 1400    Order Status: Completed Specimen: Wound from Leg, Right Updated: 05/06/22 1102     Anaerobic Culture Culture in progress    Culture, Body Fluid (Aerobic) w/ GS [[id number]] Collected: 05/05/22 1505    Order Status: Completed Specimen: Synovial Fluid from Ankle, Right Updated: 05/06/22 0433     Gram Stain Result Few WBC's      No organisms seen    Tissue culture [[id number]] Collected: 05/05/22 1530    Order Status: Completed Specimen: Tissue from Leg, Right Updated: 05/06/22 0426     Gram Stain Result Rare WBC's      Rare Gram negative rods    Culture,  Anaerobic [[id number]] Collected: 05/05/22 1505    Order Status: Sent Specimen: Wound from Leg, Right Updated: 05/06/22 0030    Fungus culture [[id number]] Collected: 05/05/22 1505    Order Status: Sent Specimen: Wound from Leg, Right Updated: 05/06/22 0027    Blood culture [[id number]] Collected: 05/01/22 1216    Order Status: Completed Specimen: Blood Updated: 05/05/22 1812     Blood Culture, Routine No Growth to date      No Growth to date      No Growth to date      No Growth to date      No Growth to date    Narrative:      Collection has been rescheduled by MR at 05/01/2022 11:22 Reason:   Patient unavailable starting dialysis and with the dr     Collection has been rescheduled by [facility] at 05/01/2022 11:42 Reason:   Got first set second set due after 12 couldnt stick other side due to   IV   Collection has been rescheduled by [facility] at 05/01/2022 11:22 Reason:   Patient unavailable starting dialysis and with the dr     Collection has been rescheduled by [facility] at 05/01/2022 11:42 Reason:   Got first set second set due after 12 couldnt stick other side due to   IV     Blood culture [[id number]] Collected: 05/01/22 1140    Order Status: Completed Specimen: Blood Updated: 05/05/22 1812     Blood Culture, Routine No Growth to date      No Growth to date      No Growth to date      No Growth to date      No Growth to date    Narrative:      Collection has been rescheduled by [facility] at 05/01/2022 11:22 Reason:   Patient unavailable starting dialysis and with the dr   Collection has been rescheduled by [facility] at 05/01/2022 11:22 Reason:   Patient unavailable starting dialysis and with the dr     Aerobic culture [[id number]]  (Abnormal)  (Susceptibility) Collected: 05/02/22 1400    Order Status: Completed Specimen: Wound from Leg, Right Updated: 05/05/22 1256     Aerobic Bacterial Culture SERRATIA MARCESCENS  Many        ENTEROBACTER CLOACAE  Moderate      Urine culture [[id number]] Collected: 05/01/22 2211    Order Status:  Completed Specimen: Urine Updated: 05/03/22 0802     Urine Culture, Routine No growth    Narrative:      Specimen Source->Urine    Influenza A & B by Molecular [[id number]] Collected: 05/02/22 0900    Order Status: Completed Specimen: Nasopharyngeal Swab Updated: 05/02/22 1021     Influenza A, Molecular Negative     Influenza B, Molecular Negative     Flu A & B Source Nasal swab        Latest lactate reviewed, they are-  No results for input(s): LACTATE in the last 72 hours.    Organ dysfunction indicated by Acute respiratory failure and tachycardia  Source- right leg fasciotomy site    Source control Achieved by- abx      Hypoxia  Concern for PE given tachycardia and new hypoxia  Will obtain chest x-ray and check D-dimer which will likely be elevated given renal failure and recent surgery, will also check lactic acid  Will discuss with Nephrology high concern for PE and need for further workup with CTA verses V/Q scan and also initiating anticoag therapy  continue telemetry monitoring  Supplemental O2 to keep sats >92%  Pt encouraged to use IS  Workup in progress    5/3 - VQ scan negative, heparin gtt stopped. Suspect hypoxia secondary to sepsis which is now improving with appropriate source control. Pt encouraged to use IS. O2 sats improving and stable on room air. Will cont to monitor closely.    resolved          Tachycardia  Persistent tachycardia over the past 24 hours  Patient denies chest pain  Concern for PE given tachycardia and new hypoxia  Will obtain chest x-ray and check D-dimer which will likely be elevated given renal failure and recent surgery, will also check lactic acid  Will discuss with Nephrology high concern for PE and need for further workup with CTA verses V/Q scan and also initiating anticoag therapy  Patient also hypertensive-will start metoprolol XL today as per chart review patient has had some intermittent tachycardia during his stay  Continue telemetry monitoring  Workup in  progress    5/2 - patient tolerating heparin drip.  Will obtain V/Q scan and echocardiogram today.  Workup in progress for possible infectious process.  Appreciate ID consult.    5/3 - improving with source control, VQ scan neg and heparin gtt stopped 5/2. Appreciate ID consult.         Anemia of chronic disease  Patient's anemia is currently controlled. Has recieved 1 units of PRBCs. Etiology likely d/t acute blood loss  Current CBC reviewed-   Lab Results   Component Value Date    HGB 7.2 (L) 05/06/2022    HCT 22.5 (L) 05/06/2022     Monitor serial CBC and transfuse if patient becomes hemodynamically unstable, symptomatic or H/H drops below 7/21.   Rcving epoiten        Drug eruption  No definite etiology apparent.  Possibly clindamycin use.  Off intravenous clindamycin.  Use oral Benadryl 25 mg q.6 hours p.r.n. patient will be monitored closely.    4/30 - resolved      Compartment syndrome  Underwent fasciotomy on 4/13   S/p Debridement deep right lateral fasciotomy incision right lower leg with excisional  debridement of dead anterior tibialis muscle and biopsy of muscle application of wound VAC right lower leg  - 04/25/22.  Wound care nurse performing dressing changes as per recommendations by Orthopedics.    4/30 - stable, wound vac in use, cont current therapy    5/2 - plan for wound VAC change today  5/3 - wound vac changed yesterday, d/w wound care nurse, area of necrotic tissue with purulent drainage. Wound debrided at BS by Dr. Byers. Plan for wound washout and further debridement in OR tomorrow or Thursday. Cont abx, appreciate ID consult.    To OR for wash out on 5/5/2022  New cultures obtained on 5/5/2022 awaiting results    Elevated liver enzymes  Elevated AST &ALT likely due to rhabdomyolysis  Trending down  Avoid hepatoxic medications  Monitor CMP  See plan for rhabdomyolysis    4/30 - resolved, cont to monitor CMP        Rhabdomyolysis  CPK trending down  S/p fasciotomy on 4/13 for compartment  syndrome.  On 4/18 S/p DEBRIDEMENT, LOWER EXTREMITY (Right)   closure of medial fasciotomy incision right leg debridement of lateral fasciotomy with biopsy of anterior muscle compartment removal of deep muscle anterior compartment right lower leg. Subsequently underwent Debridement deep right lateral fasciotomy incision right lower leg with excisional  debridement of dead anterior tibialis muscle and biopsy of muscle application of wound VAC right lower leg on 04/25/22.  Monitor BMP   Follow Nephrology recommendations.    4/30 - improved and stable, cont to monitor closely      History of asthma  Hx of asthma, stable    Monitor for acute changes        VTE Risk Mitigation (From admission, onward)         Ordered     heparin (porcine) injection 5,000 Units  Every 8 hours         05/02/22 1921     IP VTE HIGH RISK PATIENT  Once         05/02/22 1921     heparin (porcine) injection 4,000 Units  As needed (PRN)         04/14/22 0137     Reason for No Pharmacological VTE Prophylaxis  Once        Question:  Reasons:  Answer:  Risk of Bleeding    04/13/22 1913     Place sequential compression device  Until discontinued         04/13/22 1913                Discharge Planning   JARAD:      Code Status: Full Code   Is the patient medically ready for discharge?:     Reason for patient still in hospital (select all that apply): Patient trending condition, Laboratory test, Treatment and Consult recommendations  Discharge Plan A: Long-term acute care facility (LTAC)                  Elyse Ward NP  Department of Hospital Medicine   [facility]

## 2022-05-06 NOTE — ASSESSMENT & PLAN NOTE
Patient's anemia is currently controlled. Has recieved 1 units of PRBCs. Etiology likely d/t acute blood loss  Current CBC reviewed-   Lab Results   Component Value Date    HGB 7.2 (L) 05/06/2022    HCT 22.5 (L) 05/06/2022     Monitor serial CBC and transfuse if patient becomes hemodynamically unstable, symptomatic or H/H drops below 7/21.   Rcving epoiten

## 2022-05-06 NOTE — PROGRESS NOTES
Patient's wound VAC is working well he is elevating his leg he has good capillary filling to the toes he has some plantar flexion of his toes no dorsiflexion of his toes.  Plans are to continue his IV antibiotics and continue the wound VAC it will be changed on Monday by wound care at the bedside.

## 2022-05-06 NOTE — PLAN OF CARE
Problem: Adult Inpatient Plan of Care  Goal: Plan of Care Review  Outcome: Ongoing, Progressing  Goal: Patient-Specific Goal (Individualized)  Outcome: Ongoing, Progressing  Goal: Absence of Hospital-Acquired Illness or Injury  Outcome: Ongoing, Progressing  Goal: Optimal Comfort and Wellbeing  Outcome: Ongoing, Progressing  Goal: Readiness for Transition of Care  Outcome: Ongoing, Progressing     Problem: Infection  Goal: Absence of Infection Signs and Symptoms  Outcome: Ongoing, Progressing     Problem: Fall Injury Risk  Goal: Absence of Fall and Fall-Related Injury  Outcome: Ongoing, Progressing     Problem: Skin Injury Risk Increased  Goal: Skin Health and Integrity  Outcome: Ongoing, Progressing     Problem: Device-Related Complication Risk (Hemodialysis)  Goal: Safe, Effective Therapy Delivery  Outcome: Ongoing, Progressing     Problem: Hemodynamic Instability (Hemodialysis)  Goal: Effective Tissue Perfusion  Outcome: Ongoing, Progressing     Problem: Infection (Hemodialysis)  Goal: Absence of Infection Signs and Symptoms  Outcome: Ongoing, Progressing     Problem: Fluid and Electrolyte Imbalance (Acute Kidney Injury/Impairment)  Goal: Fluid and Electrolyte Balance  Outcome: Ongoing, Progressing     Problem: Oral Intake Inadequate (Acute Kidney Injury/Impairment)  Goal: Optimal Nutrition Intake  Outcome: Ongoing, Progressing     Problem: Renal Function Impairment (Acute Kidney Injury/Impairment)  Goal: Effective Renal Function  Outcome: Ongoing, Progressing     Problem: Adjustment to Illness (Sepsis/Septic Shock)  Goal: Optimal Coping  Outcome: Ongoing, Progressing     Problem: Bleeding (Sepsis/Septic Shock)  Goal: Absence of Bleeding  Outcome: Ongoing, Progressing

## 2022-05-06 NOTE — PT/OT/SLP PROGRESS
Physical Therapy Treatment    Patient Name:  Agus Horan   MRN:  [id number]    Recommendations:     Discharge Recommendations:  LTACH (long-term acute care hospitals)   Discharge Equipment Recommendations: walker, rolling, bedside commode   Barriers to discharge: None    Assessment:     Agus Horan is a 21 y.o. male admitted with a medical diagnosis of Acute renal failure with tubular necrosis.  He presents with the following impairments/functional limitations:  weakness, impaired endurance, impaired functional mobilty, impaired self care skills, gait instability, impaired balance, decreased upper extremity function, decreased safety awareness, pain, impaired skin, edema, orthopedic precautions. Patient is agreeable to participation with PT treatment. He requires SBA for supine to sit and CGA for sit to stand with RW. He reports dizziness upon standing, but requests to ambulate. He then ambulated 50' with RW, CGA, RLE NWB, and 1 LOB when turning left. He reports increased dizziness and was brought back to room in chair with LEs elevated and BP of 140/98. He is agreeable to remain sitting up in chair with chair alarm on, uncle present, and RN notified.     Rehab Prognosis: Good; patient would benefit from acute skilled PT services to address these deficits and reach maximum level of function.    Recent Surgery: Procedure(s) (LRB):  FASCIOTOMY (Right)  REPLACEMENT, WOUND VAC (N/A) 1 Day Post-Op    Plan:     During this hospitalization, patient to be seen daily to address the identified rehab impairments via gait training, therapeutic activities, therapeutic exercises and progress toward the following goals:    · Plan of Care Expires:  05/15/22    Subjective     Chief Complaint: wants linens changed while up in chair; PCT unavailable and PT tech changed linens   Patient/Family Comments/goals: wants linens changed   Pain/Comfort:  · Pain Rating 1: 7/10  · Location - Side 1: Right  · Location 1: leg  · Pain  Addressed 1: Reposition, Distraction, Nurse notified      Objective:     Communicated with HAMILTON Elias prior to session.  Patient found HOB elevated with peripheral IV, telemetry, wound vac upon PT entry to room.     General Precautions: Standard, fall   Orthopedic Precautions:RLE non weight bearing   Braces: N/A  Respiratory Status: Room air     Functional Mobility:  · Bed Mobility:     · Supine to Sit: stand by assistance  · Transfers:     · Sit to Stand:  contact guard assistance with rolling walker and RLE NWB  · Gait: 50' with RW, CGA, RLE NWB, and 1 LOB when turning left      AM-PAC 6 CLICK MOBILITY  Turning over in bed (including adjusting bedclothes, sheets and blankets)?: 4  Sitting down on and standing up from a chair with arms (e.g., wheelchair, bedside commode, etc.): 4  Moving from lying on back to sitting on the side of the bed?: 4  Moving to and from a bed to a chair (including a wheelchair)?: 3  Need to walk in hospital room?: 3  Climbing 3-5 steps with a railing?: 1  Basic Mobility Total Score: 19       Therapeutic Activities and Exercises:   Patient was educated on the importance of OOB activity and functional mobility to negate negative effects of prolonged bed rest during hospitalization, safe transfers and ambulation, and D/C planning     Patient left up in chair with all lines intact, call button in reach, chair alarm on, RN notified and uncle present..    GOALS:   Multidisciplinary Problems     Physical Therapy Goals        Problem: Physical Therapy    Goal Priority Disciplines Outcome Goal Variances Interventions   Physical Therapy Goal     PT, PT/OT Ongoing, Progressing     Description: Goals to be met by: 5/15/2022     Patient will increase functional independence with mobility by performin). Supine to sit with Modified [place]  2). Sit to stand transfer with Modified [place] maintaining RLE NWB using axillary crutches or rolling walker  3). Bed to chair transfer with  Modified [place] maintaining RLE NWB using axillary crutches or rolling walker  5). Gait  x 150 feet with Modified [place] maintaining RLE NWB using axillary crutches or rolling walker                     Time Tracking:     PT Received On: 05/06/22  PT Start Time: 1013     PT Stop Time: 1038  PT Total Time (min): 25 min     Billable Minutes: Gait Training 25    Treatment Type: Treatment  PT/PTA: PT     PTA Visit Number: 0     05/06/2022

## 2022-05-06 NOTE — PROGRESS NOTES
[facility]  Adult Nutrition  Consult Note    SUMMARY   Intervention: sodium/phosphorus/potassium modified diet     Recommendations  Recommendation/Intervention:   1.) Continue with Adult regular diet + Poli BID   Goals: 1.) diet will advance within 48 hrs 2.) pt will meet >50% of EEN during admit   Nutrition Goal Status: 1.) goal met 2.) goal met/ongoing   Communication of RD Recs: reviewed with RN    1. Compartment syndrome    2. Hyperkalemia    3. SANDRA (acute kidney injury)    4. Traumatic rhabdomyolysis, initial encounter    5. Acute renal failure    6. Non-traumatic compartment syndrome of right lower extremity    7. Acute renal failure, unspecified acute renal failure type    8. Tachycardia    9. Shortness of breath    10. Acute renal failure with tubular necrosis    11. Hypercalcemia      Past Medical History:   Diagnosis Date    Allergy     AR    Asthma     mild intermittent    Hyperkalemia 4/14/2022         Assessment and Plan  Nutrition Problem  altered nutrition related lab values    Related to (etiology):   Renal dysfunction    Signs and Symptoms (as evidenced by):   Phos: 8.9   K: 5.3      Interventions/Recommendations (treatment strategy):  Advance to renal diet, educate on diet on f/u     Nutrition Diagnosis Status:   Improving           Malnutrition Assessment  Stable weight.       Reason for Assessment  Reason For Assessment: consult  General Information Comments: admits with acute renal failure. Pt in OR during RD rounds for washout. RD consulted for new HD pt. Provided renal diet education to RN. Will f/u with education. unable to assess any weight loss.  4/21: pt receiving HD during RD rounds. Just receive Mongolian toast for breakfast which he was excited about. States he's willing to try novasource with meals. Encouraged protein intake with meals and importance of maintaining his nutrition status while receiving HD. Reports a UBW of ~165lbs. Provided SANDRA diet education,  "left at bedside with RD email.  : pt in periop for surgery. Diet just advanced back to renal. Per chart review, pt with adequate intake over the weekend (75% at most meals). Pt will continue with HD outpatient.   : HD yesterday. Tolerating diet, consumed ~50% of breakfast this morning. Drinking about 1 novasource renal a day.   : Pt requesting fruit on all trays due to tolerance. States he has "good days and bad days" with his appetite and nausea due to HD. Updated diet order to send low K fruit (strawberries, blueberries, and pears). Agreeable to beneprotein on tray to mix with beverages for added protein intake. Encouraged novasource renal intake. Pt stated he was drinking it, but slacked off.   : Diet liberalized due to renal function improving. Poli ordered BID. Pending LTAC acceptance     Nutrition Risk Screen  Nutrition Risk Screen: no indicators present    Nutrition/Diet History  Food Allergies: shrimp  Factors Affecting Nutritional Intake: NPO-- resolved.     Anthropometrics  Temp: 98.4 °F (36.9 °C)  Height Method: Stated  Height: 5' 9"  Height (inches): 69 in  Weight Method: Bed Scale  Weight: 81.2 kg (179 lb)  Weight (lb): 179 lb  Ideal Body Weight (IBW), Male: 160 lb  % Ideal Body Weight, Male (lb): 111.88 %  BMI (Calculated): 26.4  BMI Grade: 25 - 29.9 - overweight  Usual Body Weight (UBW), k.1 kg (2022)  % Usual Body Weight: 110.09  % Weight Change From Usual Weight: 9.86 %       Lab/Procedures/Meds  Pertinent Labs Reviewed: reviewed  BMP  Lab Results   Component Value Date     2022    K 3.4 (L) 2022     2022    CO2 23 2022    BUN 36 (H) 2022    CREATININE 2.0 (H) 2022    CALCIUM 13.2 (HH) 2022    ANIONGAP 10 2022    ESTGFRAFRICA 53 (A) 2022    EGFRNONAA 46 (A) 2022     Lab Results   Component Value Date    ALBUMIN 2.4 (L) 2022     Lab Results   Component Value Date    CALCIUM 13.2 (HH) 2022    " PHOS 4.4 05/05/2022     No results for input(s): POCTGLUCOSE in the last 24 hours.    Pertinent Medications Reviewed: reviewed  Scheduled Meds:   calcitonin  4 Units/kg Subcutaneous BID    ceFEPime (MAXIPIME) IVPB  1 g Intravenous Q8H    denosumab  60 mg Subcutaneous 1 time in Clinic/HOD    epoetin leidy  50 Units/kg Subcutaneous Every Mon, Wed, Fri    ferrous gluconate  324 mg Oral BID WM    heparin (porcine)  5,000 Units Subcutaneous Q8H    hydrALAZINE  50 mg Oral Q8H    LIDOcaine HCL 2%   Topical (Top) Every Mon, Thurs    metoprolol succinate  25 mg Oral Daily    senna-docusate 8.6-50 mg  1 tablet Oral BID    sodium chloride 0.9%  2 mL Intravenous Q6H     Continuous Infusions:   sodium chloride 0.9% 100 mL/hr at 05/06/22 0903    electrolyte-S (pH 7.4) Stopped (05/05/22 1600)    electrolyte-S (pH 7.4)      loperamide         Estimated/Assessed Needs  Weight Used For Calorie Calculations: 78 kg (171 lb 15.3 oz) (NHANES II)  Energy Calorie Requirements (kcal): 7831-4041 kcals  Energy Need Method: Kcal/kg  Protein Requirements: 80-95g  Weight Used For Protein Calculations: 78 kg (171 lb 15.3 oz) (NHANES II)  Fluid Requirements (mL): UOP + 1000 mls  Estimated Fluid Requirement Method: other (see comments)  RDA Method (mL): 2300         Nutrition Prescription Ordered  Current Diet Order: Adult regular diet  Oral Nutrition Supplements: Novasource renal + Poli BID       Evaluation of Received Nutrient/Fluid Intake  Energy Calories Required: not meeting needs  % Intake of Estimated Energy Needs: 25 - 50 %  % Meal Intake: 25 - 50 %     Nutrition Risk  Level of Risk/Frequency of Follow-up:  (2 weekly)       Monitor and Evaluation  Food and Nutrient Intake: energy intake, food and beverage intake  Food and Nutrient Adminstration: diet order  Knowledge/Beliefs/Attitudes: food and nutrition knowledge/skill  Anthropometric Measurements: weight, weight change, body mass index  Biochemical Data, Medical Tests and  Procedures: electrolyte and renal panel, glucose/endocrine profile, lipid profile  Nutrition-Focused Physical Findings: overall appearance       Nutrition Follow-Up  RD Follow-up?: Yes

## 2022-05-06 NOTE — ASSESSMENT & PLAN NOTE
Continues to be anuric  Hemodialysis therapy as per Nephrology team.   due to rhabdomyolysis,   Trend CPK daily.  Follow Nephrology recommendations.  Avoid nephrotoxins  Telemetry.  Hemo split catheter was placed on April 25, 2022.     4/30 - remains on hemodialysis, emergent dialysis today secondary to hypercalcemia.  Will continue to monitor closely  5/1 - HD again today secondary to hypercalcemia. Renal function improving. Cont to monitor closely.  5/2 - hemodialysis treatment again today.  Continue to monitor  5/3 - stable, nephrology following, cont routine HD    Resolving. HD on hold-- patient with renal recovery

## 2022-05-06 NOTE — PLAN OF CARE
Problem: Physical Therapy  Goal: Physical Therapy Goal  Description: Goals to be met by: 5/15/2022     Patient will increase functional independence with mobility by performin). Supine to sit with Modified [place]  2). Sit to stand transfer with Modified [place] maintaining RLE NWB using axillary crutches or rolling walker  3). Bed to chair transfer with Modified [place] maintaining RLE NWB using axillary crutches or rolling walker  5). Gait  x 150 feet with Modified [place] maintaining RLE NWB using axillary crutches or rolling walker    Outcome: Ongoing, Progressing

## 2022-05-06 NOTE — PROGRESS NOTES
3:19pm- Pending medical clearance and approval for LTAC at [facility] in [place].

## 2022-05-06 NOTE — PROGRESS NOTES
Consult Note  Infectious Disease    Reason for Consult:      HPI: Agus Horan is a 21 y.o. male with PMHx of mild intermittent asthma, allergies, oxycodone overdose on 04/09/2022, left eye pain after a fall 04/10/2022, came to the hospital on 04/13/2022 with complaints of right leg pain.    Ultrasound ruled out DVT  X-ray ruled out foreign body and fractures  He had right leg compartment syndrome, rhabdomyolysis and SANDRA     He was seen by Dr. Fraser with Nephrology and Dr. Freedman with Orthopedic surgery.    Patient was taken for fasciotomy on  04/13/2022, then debridement and closure on 04/18/2022; then  debridement deep right lateral fasciotomy incision right lower leg with excisional  debridement of dead anterior tibialis muscle and biopsy of muscle application of wound VAC right lower leg  on 04/25/2022  There are no cultures sent intraoperatively  Blood cultures are negative on 04/13/2022 and 05/01/2022    I came and saw patient.  He has a temperature of 100.2°.  WBC 16. He is on cefazolin since 04/13/2022(with an interruptions or 4 days from 04/19--4/22)  He feels well, he feels normal.  He is just anxious bc  of SANDRA. He does not want ot be on HD fr the rest of his life  Hospitalist is concerned of tachycardia and poss PE and is giving heparin drip.     INTERVAL HISTORY:  5/2 (Isma): Interim reviewed, discussed with Dr Perales, patient seen and examined at bedside, covered in bed-sheet. States he has a growing lesion on his left AC. Having HD a bedside. Persistent tachycardia, hypertension, afebrile. Labs reviewed, WBC: 18.5, PMN 71.5%, H/H 8.3/25.2, plt: 369. Iron 10, ferritin 1.010. ESR 79, .9, calcium 14.9. D-dimer 3.3. Procal 0.81. Micro reviewed, negative thus far, no cultures sent from the OR.     5/3:  Patient seen and examined at bedside, feeling comfortable today.  States is the most calmed he has ever felt.  Seen by Ortho yesterday, status post debridement at bedside.  Upon extensive  "revision, small pocket of purulent bloody fluid drained, cultures taken at bedside.  Plan for OR either tomorrow or the day after.  CT of left arm consistent with myositis ossificans, likely from hypercalcemia.  Uric acid 3.0.  V/Q scan negative for PE.  Patient's heart rate remained slightly elevated 104 currently.  Labs reviewed, white count 20.7, PMN 68%.  H&H 7.6/23.5, platelet count 354. Sodium 135, creatinine 2.8.  Calcium 12.3, ionized calcium 1.65.  Albumin 2.3.    5/4:  Interim reviewed, patient seen and examined at bedside covered in pillows and bed sheets.  States he is tired, and her L AC fossa lesion is decreasing in size. Persistent tachycardia,  hypertensive, afebrile. Having dialysis at bedside. Cultures taken at bedside grew GNR, lab called, lactose negative, ID and sensitivities available tomorrow. Labs reviewed, persistent leukocytosis, 19.3, PMN 75%, bands 6%. Potassium 3.3, normal LFTs, calcium 13.6.    5/5: Patient seen and examined at bedside, tearful about current situation. States he is starting all over again once he moves to [place].  He is scheduled for procedure with Ortho today. Remains hypertensive, tachy 101, afebrile, good urine output. Labs reviewed, WBC: 19.7, no left shift. H/H 7.4/23.2, plt: 394. Hypokalemia 3.3, creatinine 2.0. Calcium 13. Albumin 2.4. Normal LFTs, CPK 87.    5/6: Interim reviewed, patient seen and examined at bedside, asking for percocet 10. S/p washout in the OR by Ortho yesterday. As per Op-note: "inspection of the wound showed that about 90% of the wound was covered with granulation tissue on deep palpation were able to get gross pus coming from the anterior portion of the anterior compartment near the anterior tibialis muscle there was some necrotic muscle present along with pus it was also cultured." Patient currently hypertensive, low grade fever 100.4. Last HD 5/4. Labs reviewed, WC 16.2, PMNL 81.5%, eosinophilia improved. H/H 7.2/22.5, pltL 41. Cr 2.0, " "calcium 13.2, albumin 2.4, normal LFTs. Wound cultures 5/2 E cloacae and Serratia marcescens both susceptible to Cefepime (DEN <2).        Antibiotics (From admission, onward)            Start     Stop Route Frequency Ordered    05/04/22 1245  cefepime in dextrose 5 % 1 gram/50 mL IVPB 1 g         -- IV Every 24 hours (non-standard times) 05/04/22 1135    05/02/22 1759  vancomycin - pharmacy to dose  (vancomycin IVPB)        "And" Linked Group Details    -- IV pharmacy to manage frequency 05/02/22 1659        Review of patient's allergies indicates:   Allergen Reactions    Shrimp      Past Medical History:   Diagnosis Date    Allergy     AR    Asthma     mild intermittent    Hyperkalemia 4/14/2022     Past Surgical History:   Procedure Laterality Date    DEBRIDEMENT OF LOWER EXTREMITY Right 4/18/2022    Procedure: DEBRIDEMENT, LOWER EXTREMITY;  Surgeon: Leonardo Byers MD;  Location: [facility] OR;  Service: Orthopedics;  Laterality: Right;    FASCIOTOMY Right 4/13/2022    Procedure: FASCIOTOMY;  Surgeon: Leonardo Byers MD;  Location: [facility] OR;  Service: Orthopedics;  Laterality: Right;    FASCIOTOMY Right 4/25/2022    Procedure: FASCIOTOMY;  Surgeon: Leonardo Byers MD;  Location: [facility] OR;  Service: Orthopedics;  Laterality: Right;    REMOVAL OF FOREIGN BODY FROM FOOT Left 6/24/2020    Procedure: REMOVAL, FOREIGN BODY, FOOT;  Surgeon: Dani Garcia MD;  Location: [facility] OR;  Service: Orthopedics;  Laterality: Left;     Social History     Socioeconomic History    Marital status: Single   Tobacco Use    Smoking status: Never Smoker    Smokeless tobacco: Never Used   Substance and Sexual Activity    Alcohol use: Yes     Comment: last night    Drug use: Yes     Frequency: 2.0 times per week     Types: Marijuana     Comment: yesterday   Social History Narrative    ** Merged History Encounter **         Lives with mom, 2 brothers.  No smokers.  +Dogs/chickens.  9th grader.     Family History "   Problem Relation Age of Onset    Asthma Brother     Emphysema Maternal Grandmother     Hyperlipidemia Maternal Grandmother     Asthma Maternal Grandmother     Arthritis Maternal Grandmother     COPD Maternal Grandmother     Asthma Brother          Review of Systems:   Patient with drug use before admission. 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 Alert and attentive, cooperative, comfortable  Eyes:  Anicteric, PERRL, EOMI  ENT:  No ulcers, exudates, thrush, nares patent, dentition is fair  Neck:  Supple  Lungs: Clear to auscultation b/l   Heart:  Tachycardic, S1/S2+, regular rhythm, no murmur   Abd:  Soft, NT, ND, normal BS, no masses or organomegaly appreciated.  :  Voids, clear urine  Musc:  Other than  Right leg dressing in place with wound vac, not disturbed. Joints without effusion, swelling, erythema, synovitis, muscle wasting.   Skin:  Warm  Neuro:   Alert, attentive, speech fluent, face symmetric, moves all extremities, no focal weakness  Psych:  Anxious, cooperative  Lymphatic:       Extrem: Left arm with amorphic deposit, calcification - tender to palpation, no redness noted.     R leg with cast and wound vac in place    No edema, erythema, phlebitis, cellulitis, warm and well perfused  VAD:  R tunneled catheter    Isolation:  none  Wound:     5/3:                                 General Labs reviewed:  Recent Labs   Lab 05/04/22 0618  05/05/22  0504 05/05/22  1631 05/06/22  0748   WBC 19.30* 19.79*  --  16.24*   HGB 7.6* 7.4* 7.6* 7.2*   HCT 23.2* 23.2* 23.6* 22.5*    394  --  401       Recent Labs   Lab 05/04/22  0618 05/05/22  0504 05/06/22  0748    135* 136   K 3.3* 3.3* 3.4*   CL 97 100 103   CO2 28 25 23   BUN 45* 33* 36*   CREATININE 2.5* 2.0* 2.0*   CALCIUM 13.6* 13.0* 13.2*   PROT 7.3 7.3 7.2   BILITOT 0.3 0.2 0.2   ALKPHOS 112 111 103   ALT 17 20 22   AST 29 35 29     Recent Labs   Lab 05/01/22  1457 05/05/22  0951   .9* 73.3*     Estimated Creatinine Clearance: 57.4 mL/min (A) (based on SCr of 2 mg/dL (H)).      Micro:  Microbiology Results (last 7 days)     Procedure Component Value Units Date/Time    Culture, Body Fluid (Aerobic) w/ GS [[id number]] Collected: 05/05/22 1505    Order Status: Completed Specimen: Synovial Fluid from Ankle, Right Updated: 05/06/22 0433     Gram Stain Result Few WBC's      No organisms seen    Tissue culture [[id number]] Collected: 05/05/22 1530    Order Status: Completed Specimen: Tissue from Leg, Right Updated: 05/06/22 0426     Gram Stain Result Rare WBC's      Rare Gram negative rods    Culture, Anaerobic [[id number]] Collected: 05/05/22 1505    Order Status: Sent Specimen: Wound from Leg, Right Updated: 05/06/22 0030    Fungus culture [[id number]] Collected: 05/05/22 1505    Order Status: Sent Specimen: Wound from Leg, Right Updated: 05/06/22 0027    Blood culture [[id number]] Collected: 05/01/22 1216    Order Status: Completed Specimen: Blood Updated: 05/05/22 1812     Blood Culture, Routine No Growth to date      No Growth to date      No Growth to date      No Growth to date      No Growth to date    Narrative:      Collection has been rescheduled by [email or web address] at 05/01/2022 11:22 Reason:   Patient unavailable starting dialysis and with the dr     Collection has been rescheduled by [email or web address] at 05/01/2022 11:42 Reason:   Got first set second set due after 12 couldnt stick other side due to   IV    Collection has been rescheduled by [email or web address] at 05/01/2022 11:22 Reason:   Patient unavailable starting dialysis and with the dr     Collection has been rescheduled by MR at 05/01/2022 11:42 Reason:   Got first set second set due after 12 couldnt stick other side due to   IV     Blood culture [[id number]] Collected: 05/01/22 1140    Order Status: Completed Specimen: Blood Updated: 05/05/22 1812     Blood Culture, Routine No Growth to date      No Growth to date      No Growth to date      No Growth to date      No Growth to date    Narrative:      Collection has been rescheduled by [email or web address] at 05/01/2022 11:22 Reason:   Patient unavailable starting dialysis and with the dr   Collection has been rescheduled by MR at 05/01/2022 11:22 Reason:   Patient unavailable starting dialysis and with the dr     Aerobic culture [[id number]]  (Abnormal)  (Susceptibility) Collected: 05/02/22 1400    Order Status: Completed Specimen: Wound from Leg, Right Updated: 05/05/22 1256     Aerobic Bacterial Culture SERRATIA MARCESCENS  Many        ENTEROBACTER CLOACAE  Moderate      Blood culture [[id number]] Collected: 05/02/22 0410    Order Status: Completed Specimen: Blood from Antecubital, Right Arm Updated: 05/05/22 1212     Blood Culture, Routine No Growth to date      No Growth to date      No Growth to date      No Growth to date    Culture, Anaerobic [[id number]] Collected: 05/02/22 1400    Order Status: Completed Specimen: Wound from Leg, Right Updated: 05/04/22 0739     Anaerobic Culture Culture in progress    Urine culture [[id number]] Collected: 05/01/22 2211    Order Status: Completed Specimen: Urine Updated: 05/03/22 0802     Urine Culture, Routine No growth    Narrative:      Specimen Source->Urine    Influenza A & B by Molecular [[id number]] Collected: 05/02/22 0900    Order Status: Completed Specimen: Nasopharyngeal Swab Updated: 05/02/22 1021     Influenza A, Molecular Negative     Influenza B, Molecular Negative     Flu A & B  Source Nasal swab          Imaging Reviewed:   CXR-- No definite acute radiographic abnormality.  Right internal jugular central venous catheter in place.   CT  UltrasoundNo evidence of deep venous thrombosis in either lower extremity, noting nonvisualization of the right calf veins due to overlying bandaging.    CT Left arm: Three circumscribed foci of soft tissue mineralization along the antecubital fossa have peripheral zoning favoring myositis ossificans.     NM scan low probability for PE    Cardiology:  · The left ventricle is normal in size with concentric remodeling and normal systolic function.  · The estimated ejection fraction is 65%.  · Grade I left ventricular diastolic dysfunction.  · Normal right ventricular size with normal right ventricular systolic function.  · Mild to moderate tricuspid regurgitation.  · Normal central venous pressure (3 mmHg).  · The estimated PA systolic pressure is 47 mmHg.  · There is pulmonary hypertension.  · Sinus tachycardia rate of 120-130 beats per minute was noted during the study        IMPRESSION & PLAN     1. History of R leg compartment syndrome; s/p multiple I&Ds, plan for I&D today    ESR 79, .9->73.3   Procal 0.81-->1, pt on HD   Wound culture taken at bedside 5/2  E cloacae and Serratia marcescens both susceptible to Cefepime (DEN<2)     2. Rhabdomyolysis due to compartment syndrome.  Resolved    3. SANDRA due to rhabdomyolysis, and hypercalcemia, 11 on HD   Nephrology following     4. Vitamin-D deficiency, anemia, protein malnutrition, hypoalbuminemia    5. V/Q scan negative for PE  6. 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

## 2022-05-06 NOTE — PROGRESS NOTES
INPATIENT NEPHROLOGY Progress Note  [facility]    Patient Name: Agus Horan  Date: 05/06/2022    Reason for consultation: SANDRA    Chief Complaint:   Chief Complaint   Patient presents with    Leg Pain     Pain in the right leg muscle calf is hard, nausea vomiting, patient was seen here over the weekend for an overdose        History of Present Illness:  22 y/o M with a history of asthma recently here on 4/9 with oxycodone overdose who p/w RLE pain and swelling after a fall on 4/10. He reports severe pain, constant, associated with nausea with inability to bear weight.  He took meloxicam without relief. He was found to have compartment syndrome and was taking to the OR emergently for fasciotomy on 4/13. We are consulted for SANDRA with metabolic derangements.    Interval History:  4/14- did emergent HD overnight for hyperkalemia and metabolic acidosis refractory to medical management; pain controlled, RLE wrapped, no edema in LLE  4/15- worsening pain/edema in RLE- going for MRI- oligoanuric- stop NS IVFs- will do HD/UF today and tomorrow  4/16  Seen on dialysis.  No distress  4/17  Remains oliguric.  AFVSS.  Has back pain.  Leg pain a little better  4/18  In the OR.  Still oliguric.    4/19  Seen on dialysis.  No distress.    4/20  Still not making much urine.  cpk coming down.     4/21  Afebrile.  BP a little better.  uop up a bit  422   Sleeping.  AFVSS.  I/Os not appropriately recorded despite being ordered for twice  4/23  225 cc UOP recorded.  K+ 5.5, HD today.  No renal recovery, CPK improving.  C/o rash, Dr. Zamorano at bedside placed orders.  Seen on dialysis, tolerating tx well.  4/24   No new lab results, next lab draw in am.   Still has rash and c/o itching.  No other complaints.  450 cc UOP recorded.  4/25  In the OR.  Plan for hd today  4/26  AFVSS.  Urine output better.  No complaints specified today  4/27  Sleeping.  Output not recorded despite being ordered  4/28  Tearful.  Not  engaging when spoken too.  Requesting dilaudid   4/29     Good urine output.  Seen on dialysis.  No distress  4/30  UOP 4.4L.  Ca+ 15, will have dialysis today, stopped calcitriol and vit D.  Will add on PTH to labs.    5/1  Ca+ 15.5, PTH suppressed.  D/w Dr. Patrick, pt to have additional dialysis today, no UF.  Notified Suni Stone w/[facility] pt needs to run today.  Pt w/elevated temp, a little more tachy than usual, not feeling very well.  5/2 tachy, hypertensive, on RA, UOP 2.6L, remains hypercalcemic- PTH appropriately suppressed, Dr. Byers is at bedside  5/3 febrile, tachy, BP better, on RA, UOP 650cc  5/4 had to terminate HD treatment short about 30min due to n/v and sinus tachy- net UF 1.6L; UOP 3.3L, SCr 2.5, serum Ca rising over last few days in conjunction with improvement in SCr- may reflect volume depletion  5/5 BP high, on RA, UOP 2.7L, going for OR procedure: Deep excisional debridement of necrotic muscle anterior compartment right leg.  Application of wound VAC.  5/6 febrile, BP stable, on RA, UOP 400cc, pain score about a 7    Plan of Care:    Assessment:  Sepsis  Traumatic rhabdomyolysis with compartment syndrome s/p fasciotomy on 4/13  SANDRA due to toxic ATN s/p emergent HD on 4/14- last HD 5/4  Hypercalcemia of immobilization  Elevated BP  Hypokalemia  Hyperphosphatemia  Anemia    Plan:    - Ortho following- s/p I&D on 5/5. Dose meds for CrCl > 30.  - He has renal recovery- last HD 5/4. No NSAIDS or IV contrast. Keep hemosplit for now.   - Remains hypercalcemic- so far refractory to everything including dialysis. Going to increase NS to 100cc/hr. He is s/p bisphosphonate therapy on 5/5. Ordered denosumab 60mg and another 4 doses of calcitonin on 5/6. Ordered 25 vitamin D with AM labs.  - BP is better- continue hydral 50mg TID.  - Ordered KCl repletion. Continue a regular diet.  - H/H stabilized- continue iron tablet- continue ALBERTO 3x per week SQ.      Thank you for allowing us to participate in  this patient's care. We will continue to follow.    Vital Signs:  Temp Readings from Last 3 Encounters:   05/06/22 (!) 100.4 °F (38 °C)   04/10/22 97.3 °F (36.3 °C)   04/09/22 98.4 °F (36.9 °C)       Pulse Readings from Last 3 Encounters:   05/06/22 (!) 133   04/10/22 63   04/09/22 95       BP Readings from Last 3 Encounters:   05/06/22 (!) 172/82   04/10/22 117/64   04/09/22 (!) 143/82       Weight:  Wt Readings from Last 3 Encounters:   05/02/22 81.2 kg (179 lb)   04/10/22 77.1 kg (170 lb)   04/09/22 77.1 kg (170 lb)       INS/OUTS:  I/O last 3 completed shifts:  In: 500 [P.O.:100; I.V.:400]  Out: 2100 [Urine:2050; Blood:50]  No intake/output data recorded.    Medications:  Scheduled Meds:   ceFEPime (MAXIPIME) IVPB  1 g Intravenous Q8H    epoetin leidy  50 Units/kg Subcutaneous Every Mon, Wed, Fri    ferrous gluconate  324 mg Oral BID WM    heparin (porcine)  5,000 Units Subcutaneous Q8H    hydrALAZINE  50 mg Oral Q8H    LIDOcaine HCL 2%   Topical (Top) Every Mon, Thurs    metoprolol succinate  25 mg Oral Daily    senna-docusate 8.6-50 mg  1 tablet Oral BID    sodium chloride 0.9%  2 mL Intravenous Q6H     Continuous Infusions:   sodium chloride 0.9% 75 mL/hr at 05/05/22 0906    electrolyte-S (pH 7.4) Stopped (05/05/22 1600)    electrolyte-S (pH 7.4)      loperamide       PRN Meds:.acetaminophen, cyclobenzaprine, dextrose 10%, dextrose 10%, diphenhydrAMINE, glucagon (human recombinant), glucose, glucose, heparin (porcine), HYDROmorphone, hydrOXYzine pamoate, labetaloL, loperamide, metoclopramide HCl, morphine, naloxone, ondansetron, oxyCODONE, oxyCODONE-acetaminophen, phenyleph-min oil-petrolatum, sodium chloride 0.9%, Pharmacy to dose Vancomycin consult **AND** vancomycin - pharmacy to dose  No current facility-administered medications on file prior to encounter.     Current Outpatient Medications on File Prior to Encounter   Medication Sig Dispense Refill    naloxone (NARCAN) 4 mg/actuation Spry  "4mg by nasal route as needed for opioid overdose; may repeat every 2-3 minutes in alternating nostrils until medical help arrives. Call 911 2 each 0       Review of Systems:  Neg    Physical Exam:  BP (!) 172/82   Pulse (!) 133   Temp (!) 100.4 °F (38 °C)   Resp 18   Ht 5' 9" (1.753 m)   Wt 81.2 kg (179 lb)   SpO2 96%   BMI 26.43 kg/m²     Constitutional: nad, aao x 3  Heart: rrr, no m/r/g, wwp, RLE edema  Lungs: ctab, no w/r/r/c, no lb  Abdomen: s/nt/nd, +BS    Results:  Lab Results   Component Value Date     05/06/2022    K 3.4 (L) 05/06/2022     05/06/2022    CO2 23 05/06/2022    BUN 36 (H) 05/06/2022    CREATININE 2.0 (H) 05/06/2022    CALCIUM 13.2 (HH) 05/06/2022    ANIONGAP 10 05/06/2022    ESTGFRAFRICA 53 (A) 05/06/2022    EGFRNONAA 46 (A) 05/06/2022       Lab Results   Component Value Date    CALCIUM 13.2 (HH) 05/06/2022    PHOS 4.4 05/05/2022       Recent Labs   Lab 05/06/22  0748   WBC 16.24*   RBC 2.48*   HGB 7.2*   HCT 22.5*      MCV 91   MCH 29.0   MCHC 32.0       I have personally reviewed pertinent radiological imaging and reports.    I have spent > 35 minutes providing care for this patient for the above diagnoses. These services have included chart/data/imaging review, evaluation, exam, formulation of plan, , note preparation, and discussions with staff involved in this patient's care.    Chantelle Fraser MD    Nephrology  [facility]  [phone]    "

## 2022-05-07 LAB
25(OH)D3+25(OH)D2 SERPL-MCNC: 21 NG/ML (ref 30–96)
ALBUMIN SERPL BCP-MCNC: 2.3 G/DL (ref 3.5–5.2)
ALP SERPL-CCNC: 111 U/L (ref 55–135)
ALT SERPL W/O P-5'-P-CCNC: 30 U/L (ref 10–44)
ANION GAP SERPL CALC-SCNC: 9 MMOL/L (ref 8–16)
AST SERPL-CCNC: 41 U/L (ref 10–40)
BACTERIA BLD CULT: NORMAL
BASOPHILS # BLD AUTO: 0.09 K/UL (ref 0–0.2)
BASOPHILS NFR BLD: 0.8 % (ref 0–1.9)
BILIRUB SERPL-MCNC: 0.2 MG/DL (ref 0.1–1)
BUN SERPL-MCNC: 29 MG/DL (ref 6–20)
CA-I BLDV-SCNC: 1.5 MMOL/L (ref 1.06–1.42)
CALCIUM SERPL-MCNC: 10.7 MG/DL (ref 8.7–10.5)
CHLORIDE SERPL-SCNC: 103 MMOL/L (ref 95–110)
CO2 SERPL-SCNC: 24 MMOL/L (ref 23–29)
CREAT SERPL-MCNC: 1.5 MG/DL (ref 0.5–1.4)
CRP SERPL-MCNC: 53.2 MG/L (ref 0–8.2)
DIFFERENTIAL METHOD: ABNORMAL
EOSINOPHIL # BLD AUTO: 1.1 K/UL (ref 0–0.5)
EOSINOPHIL NFR BLD: 9.3 % (ref 0–8)
ERYTHROCYTE [DISTWIDTH] IN BLOOD BY AUTOMATED COUNT: 12.8 % (ref 11.5–14.5)
EST. GFR  (AFRICAN AMERICAN): >60 ML/MIN/1.73 M^2
EST. GFR  (NON AFRICAN AMERICAN): >60 ML/MIN/1.73 M^2
GLUCOSE SERPL-MCNC: 98 MG/DL (ref 70–110)
HCT VFR BLD AUTO: 21.5 % (ref 40–54)
HGB BLD-MCNC: 7 G/DL (ref 14–18)
IMM GRANULOCYTES # BLD AUTO: 0.46 K/UL (ref 0–0.04)
IMM GRANULOCYTES NFR BLD AUTO: 3.9 % (ref 0–0.5)
LYMPHOCYTES # BLD AUTO: 1.6 K/UL (ref 1–4.8)
LYMPHOCYTES NFR BLD: 13.6 % (ref 18–48)
MCH RBC QN AUTO: 29.9 PG (ref 27–31)
MCHC RBC AUTO-ENTMCNC: 32.6 G/DL (ref 32–36)
MCV RBC AUTO: 92 FL (ref 82–98)
MONOCYTES # BLD AUTO: 0.7 K/UL (ref 0.3–1)
MONOCYTES NFR BLD: 6.1 % (ref 4–15)
NEUTROPHILS # BLD AUTO: 7.8 K/UL (ref 1.8–7.7)
NEUTROPHILS NFR BLD: 66.3 % (ref 38–73)
NRBC BLD-RTO: 0 /100 WBC
PLATELET # BLD AUTO: 298 K/UL (ref 150–450)
PMV BLD AUTO: 8.5 FL (ref 9.2–12.9)
POTASSIUM SERPL-SCNC: 3.3 MMOL/L (ref 3.5–5.1)
PROT SERPL-MCNC: 6.6 G/DL (ref 6–8.4)
RBC # BLD AUTO: 2.34 M/UL (ref 4.6–6.2)
SODIUM SERPL-SCNC: 136 MMOL/L (ref 136–145)
WBC # BLD AUTO: 11.73 K/UL (ref 3.9–12.7)

## 2022-05-07 PROCEDURE — 36415 COLL VENOUS BLD VENIPUNCTURE: CPT

## 2022-05-07 PROCEDURE — 97530 THERAPEUTIC ACTIVITIES: CPT

## 2022-05-07 PROCEDURE — 85025 COMPLETE CBC W/AUTO DIFF WBC: CPT

## 2022-05-07 PROCEDURE — 63600175 PHARM REV CODE 636 W HCPCS: Performed by: INTERNAL MEDICINE

## 2022-05-07 PROCEDURE — 99232 SBSQ HOSP IP/OBS MODERATE 35: CPT | Mod: S$GLB,,, | Performed by: STUDENT IN AN ORGANIZED HEALTH CARE EDUCATION/TRAINING PROGRAM

## 2022-05-07 PROCEDURE — 82306 VITAMIN D 25 HYDROXY: CPT | Performed by: INTERNAL MEDICINE

## 2022-05-07 PROCEDURE — 94799 UNLISTED PULMONARY SVC/PX: CPT

## 2022-05-07 PROCEDURE — 86140 C-REACTIVE PROTEIN: CPT | Performed by: STUDENT IN AN ORGANIZED HEALTH CARE EDUCATION/TRAINING PROGRAM

## 2022-05-07 PROCEDURE — 25000003 PHARM REV CODE 250: Performed by: INTERNAL MEDICINE

## 2022-05-07 PROCEDURE — 25000003 PHARM REV CODE 250

## 2022-05-07 PROCEDURE — 99232 PR SUBSEQUENT HOSPITAL CARE,LEVL II: ICD-10-PCS | Mod: S$GLB,,, | Performed by: STUDENT IN AN ORGANIZED HEALTH CARE EDUCATION/TRAINING PROGRAM

## 2022-05-07 PROCEDURE — 82330 ASSAY OF CALCIUM: CPT | Performed by: ORTHOPAEDIC SURGERY

## 2022-05-07 PROCEDURE — 63600175 PHARM REV CODE 636 W HCPCS: Performed by: STUDENT IN AN ORGANIZED HEALTH CARE EDUCATION/TRAINING PROGRAM

## 2022-05-07 PROCEDURE — 97116 GAIT TRAINING THERAPY: CPT

## 2022-05-07 PROCEDURE — 80053 COMPREHEN METABOLIC PANEL: CPT

## 2022-05-07 PROCEDURE — 99900035 HC TECH TIME PER 15 MIN (STAT)

## 2022-05-07 PROCEDURE — A4216 STERILE WATER/SALINE, 10 ML: HCPCS | Performed by: ORTHOPAEDIC SURGERY

## 2022-05-07 PROCEDURE — 94761 N-INVAS EAR/PLS OXIMETRY MLT: CPT

## 2022-05-07 PROCEDURE — 25000003 PHARM REV CODE 250: Performed by: NURSE PRACTITIONER

## 2022-05-07 PROCEDURE — 63600175 PHARM REV CODE 636 W HCPCS: Performed by: NURSE PRACTITIONER

## 2022-05-07 PROCEDURE — 27000124 HC DRESSING, WOUND VAC

## 2022-05-07 PROCEDURE — 11000001 HC ACUTE MED/SURG PRIVATE ROOM

## 2022-05-07 PROCEDURE — 25000003 PHARM REV CODE 250: Performed by: ORTHOPAEDIC SURGERY

## 2022-05-07 RX ORDER — CALCITONIN SALMON 200 [USP'U]/ML
8 INJECTION, SOLUTION INTRAMUSCULAR; SUBCUTANEOUS 2 TIMES DAILY
Status: COMPLETED | OUTPATIENT
Start: 2022-05-07 | End: 2022-05-08

## 2022-05-07 RX ORDER — CEFEPIME HYDROCHLORIDE 1 G/50ML
2 INJECTION, SOLUTION INTRAVENOUS
Status: DISCONTINUED | OUTPATIENT
Start: 2022-05-08 | End: 2022-05-11 | Stop reason: HOSPADM

## 2022-05-07 RX ORDER — CALCITONIN SALMON 200 [USP'U]/ML
8 INJECTION, SOLUTION INTRAMUSCULAR; SUBCUTANEOUS 2 TIMES DAILY
Status: DISCONTINUED | OUTPATIENT
Start: 2022-05-07 | End: 2022-05-07

## 2022-05-07 RX ORDER — OXYCODONE AND ACETAMINOPHEN 7.5; 325 MG/1; MG/1
1 TABLET ORAL EVERY 4 HOURS PRN
Status: DISCONTINUED | OUTPATIENT
Start: 2022-05-07 | End: 2022-05-09

## 2022-05-07 RX ADMIN — HEPARIN SODIUM 5000 UNITS: 5000 INJECTION INTRAVENOUS; SUBCUTANEOUS at 06:05

## 2022-05-07 RX ADMIN — DOCUSATE SODIUM AND SENNOSIDES 1 TABLET: 8.6; 5 TABLET, FILM COATED ORAL at 09:05

## 2022-05-07 RX ADMIN — OXYCODONE AND ACETAMINOPHEN 1 TABLET: 7.5; 325 TABLET ORAL at 10:05

## 2022-05-07 RX ADMIN — CALCITONIN SALMON 650 UNITS: 200 INJECTION, SOLUTION INTRAMUSCULAR; SUBCUTANEOUS at 10:05

## 2022-05-07 RX ADMIN — Medication 324 MG: at 08:05

## 2022-05-07 RX ADMIN — MORPHINE SULFATE 2 MG: 4 INJECTION INTRAVENOUS at 07:05

## 2022-05-07 RX ADMIN — MORPHINE SULFATE 2 MG: 4 INJECTION INTRAVENOUS at 06:05

## 2022-05-07 RX ADMIN — HYDRALAZINE HYDROCHLORIDE 50 MG: 25 TABLET, FILM COATED ORAL at 09:05

## 2022-05-07 RX ADMIN — Medication 2 ML: at 05:05

## 2022-05-07 RX ADMIN — MORPHINE SULFATE 2 MG: 4 INJECTION INTRAVENOUS at 01:05

## 2022-05-07 RX ADMIN — HYDRALAZINE HYDROCHLORIDE 50 MG: 25 TABLET, FILM COATED ORAL at 01:05

## 2022-05-07 RX ADMIN — HEPARIN SODIUM 5000 UNITS: 5000 INJECTION INTRAVENOUS; SUBCUTANEOUS at 01:05

## 2022-05-07 RX ADMIN — SODIUM CHLORIDE: 0.9 INJECTION, SOLUTION INTRAVENOUS at 04:05

## 2022-05-07 RX ADMIN — HYDRALAZINE HYDROCHLORIDE 50 MG: 25 TABLET, FILM COATED ORAL at 06:05

## 2022-05-07 RX ADMIN — CALCITONIN SALMON 324 UNITS: 200 INJECTION, SOLUTION INTRAMUSCULAR; SUBCUTANEOUS at 08:05

## 2022-05-07 RX ADMIN — CEFEPIME HYDROCHLORIDE 1 G: 1 INJECTION, SOLUTION INTRAVENOUS at 01:05

## 2022-05-07 RX ADMIN — OXYCODONE HYDROCHLORIDE AND ACETAMINOPHEN 1 TABLET: 5; 325 TABLET ORAL at 08:05

## 2022-05-07 RX ADMIN — HEPARIN SODIUM 5000 UNITS: 5000 INJECTION INTRAVENOUS; SUBCUTANEOUS at 09:05

## 2022-05-07 RX ADMIN — METOPROLOL SUCCINATE 25 MG: 25 TABLET, EXTENDED RELEASE ORAL at 08:05

## 2022-05-07 RX ADMIN — Medication 324 MG: at 05:05

## 2022-05-07 RX ADMIN — Medication 2 ML: at 07:05

## 2022-05-07 RX ADMIN — CEFEPIME HYDROCHLORIDE 1 G: 1 INJECTION, SOLUTION INTRAVENOUS at 05:05

## 2022-05-07 RX ADMIN — OXYCODONE AND ACETAMINOPHEN 1 TABLET: 7.5; 325 TABLET ORAL at 03:05

## 2022-05-07 RX ADMIN — Medication 2 ML: at 01:05

## 2022-05-07 RX ADMIN — OXYCODONE HYDROCHLORIDE AND ACETAMINOPHEN 1 TABLET: 5; 325 TABLET ORAL at 04:05

## 2022-05-07 RX ADMIN — CEFEPIME HYDROCHLORIDE 1 G: 1 INJECTION, SOLUTION INTRAVENOUS at 08:05

## 2022-05-07 RX ADMIN — DOCUSATE SODIUM AND SENNOSIDES 1 TABLET: 8.6; 5 TABLET, FILM COATED ORAL at 08:05

## 2022-05-07 NOTE — ASSESSMENT & PLAN NOTE
Patient's anemia is currently controlled. Has recieved 1 units of PRBCs. Etiology likely d/t acute blood loss  Current CBC reviewed-   Lab Results   Component Value Date    HGB 7.0 (L) 05/07/2022    HCT 21.5 (L) 05/07/2022     Monitor serial CBC and transfuse if patient becomes hemodynamically unstable, symptomatic or H/H drops below 7/21.   Continue iron tablet- continue ALBERTO 3x per week SQ per nephrology recs

## 2022-05-07 NOTE — ASSESSMENT & PLAN NOTE
Underwent fasciotomy on 4/13   S/p Debridement deep right lateral fasciotomy incision right lower leg with excisional  debridement of dead anterior tibialis muscle and biopsy of muscle application of wound VAC right lower leg  - 04/25/22.  Wound care nurse performing dressing changes as per recommendations by Orthopedics.    4/30 - stable, wound vac in use, cont current therapy    5/2 - plan for wound VAC change today  5/3 - wound vac changed yesterday, d/w wound care nurse, area of necrotic tissue with purulent drainage. Wound debrided at BS by Dr. Byers. Plan for wound washout and further debridement in OR tomorrow or Thursday. Cont abx, appreciate ID consult.    To OR for wash out on 5/5/2022  New cultures obtained on 5/5/2022 resulted in many Gram negative rods, ID following, continue IV cefepime, wound vac in place

## 2022-05-07 NOTE — SUBJECTIVE & OBJECTIVE
Interval History: Notes reviewed,no acute events overnight. Patient seen resting in bed w/o any acute distress. Currently afebrile, he complains of moderate leg pain that comes and goes. He describes a burning, stinging sensation in his right leg. Plan to adjust prn pain medication dose. He denies any fatigue, weakness, chills, SOB, chest pain, or abdominal pain. Highest temp last night was 100.4. Wound vac in place with some dark colored drainage. WBC improved to 11.7 this morning. H&H dropped to 7.0/21.5. Nephrology following. CRP trending downwards. Will continue to monitor.     Review of Systems   Constitutional:  Negative for chills, fatigue and fever.   HENT:  Negative for congestion, facial swelling, nosebleeds, rhinorrhea and sore throat.    Respiratory:  Negative for cough, chest tightness and shortness of breath.    Cardiovascular:  Negative for chest pain, palpitations and leg swelling.   Gastrointestinal:  Negative for abdominal distention, abdominal pain, nausea and vomiting.   Endocrine: Negative for polydipsia and polyuria.   Genitourinary:  Negative for difficulty urinating, dysuria and frequency.   Musculoskeletal:  Positive for arthralgias, gait problem and myalgias.        Right leg   Skin:  Positive for wound.   Neurological:  Negative for syncope, numbness and headaches.   Hematological:  Negative for adenopathy.   Psychiatric/Behavioral:  Negative for agitation, behavioral problems and confusion.    All other systems reviewed and are negative.  Objective:     Vital Signs (Most Recent):  Temp: 98.6 °F (37 °C) (05/07/22 0737)  Pulse: 109 (05/07/22 0737)  Resp: 18 (05/07/22 0843)  BP: (!) 141/75 (05/07/22 0842)  SpO2: 97 % (05/07/22 0737)   Vital Signs (24h Range):  Temp:  [98.4 °F (36.9 °C)-101.9 °F (38.8 °C)] 98.6 °F (37 °C)  Pulse:  [105-127] 109  Resp:  [15-18] 18  SpO2:  [95 %-98 %] 97 %  BP: (136-159)/(64-89) 141/75     Weight: 81.2 kg (179 lb)  Body mass index is 26.43  kg/m².    Intake/Output Summary (Last 24 hours) at 5/7/2022 1043  Last data filed at 5/7/2022 0947  Gross per 24 hour   Intake 300 ml   Output 3150 ml   Net -2850 ml      Physical Exam  Vitals and nursing note reviewed.   Constitutional:       General: He is not in acute distress.     Appearance: Normal appearance.   HENT:      Head: Normocephalic and atraumatic.      Right Ear: External ear normal.      Left Ear: External ear normal.      Nose: Nose normal. No congestion or rhinorrhea.      Mouth/Throat:      Pharynx: Oropharynx is clear. No oropharyngeal exudate or posterior oropharyngeal erythema.   Eyes:      Extraocular Movements: Extraocular movements intact.      Conjunctiva/sclera: Conjunctivae normal.      Pupils: Pupils are equal, round, and reactive to light.   Cardiovascular:      Rate and Rhythm: Normal rate and regular rhythm.      Pulses: Normal pulses.      Heart sounds: Normal heart sounds. No murmur heard.    No gallop.   Pulmonary:      Effort: Pulmonary effort is normal. No respiratory distress.      Breath sounds: Normal breath sounds. No wheezing or rales.   Abdominal:      General: Abdomen is flat. There is no distension.      Palpations: Abdomen is soft.      Tenderness: There is no abdominal tenderness.   Musculoskeletal:         General: No swelling. Normal range of motion.      Comments: Right leg braced and dressed, ROM decreased with dorsal flexion of toes, wound vac in place with dark colored output   Skin:     General: Skin is warm and dry.      Capillary Refill: Capillary refill takes less than 2 seconds.   Neurological:      General: No focal deficit present.      Mental Status: He is alert and oriented to person, place, and time.      Cranial Nerves: No cranial nerve deficit.      Motor: No weakness.   Psychiatric:         Mood and Affect: Mood normal.         Behavior: Behavior normal.       Significant Labs: All pertinent labs within the past 24 hours have been reviewed.  CBC:    Recent Labs   Lab 05/05/22  1631 05/06/22  0748 05/07/22  0845   WBC  --  16.24* 11.73   HGB 7.6* 7.2* 7.0*   HCT 23.6* 22.5* 21.5*   PLT  --  401 298     CMP:   Recent Labs   Lab 05/06/22  0748 05/07/22  0844    136   K 3.4* 3.3*    103   CO2 23 24    98   BUN 36* 29*   CREATININE 2.0* 1.5*   CALCIUM 13.2* 10.7*   PROT 7.2 6.6   ALBUMIN 2.4* 2.3*   BILITOT 0.2 0.2   ALKPHOS 103 111   AST 29 41*   ALT 22 30   ANIONGAP 10 9   EGFRNONAA 46* >60       Significant Imaging: I have reviewed all pertinent imaging results/findings within the past 24 hours.

## 2022-05-07 NOTE — ASSESSMENT & PLAN NOTE
This patient does have evidence of infective focus  My overall impression is sepsis. Vital signs were reviewed and noted in progress note.  Antibiotics given-   Antibiotics (From admission, onward)            Start     Stop Route Frequency Ordered    05/06/22 0900  cefepime in dextrose 5 % 1 gram/50 mL IVPB 1 g         -- IV Every 8 hours (non-standard times) 05/06/22 0850        Cultures were taken-   Microbiology Results (last 7 days)     Procedure Component Value Units Date/Time    Tissue culture [[id number]]  (Abnormal) Collected: 05/05/22 1530    Order Status: Completed Specimen: Tissue from Leg, Right Updated: 05/07/22 1008     Aerobic Culture - Tissue GRAM NEGATIVE RAJI  Many  Identification and susceptibility pending       Gram Stain Result Rare WBC's      Rare Gram negative rods    Culture, Body Fluid (Aerobic) w/ GS [[id number]]  (Abnormal) Collected: 05/05/22 1505    Order Status: Completed Specimen: Synovial Fluid from Ankle, Right Updated: 05/07/22 0958     AEROBIC CULTURE - FLUID GRAM NEGATIVE RAJI  Moderate  Identification and susceptibility pending       Gram Stain Result Few WBC's      No organisms seen    Blood culture [[id number]] Collected: 05/01/22 1216    Order Status: Completed Specimen: Blood Updated: 05/06/22 1812     Blood Culture, Routine No growth after 5 days.    Narrative:      Collection has been rescheduled by MR at 05/01/2022 11:22 Reason:   Patient unavailable starting dialysis and with the dr     Collection has been rescheduled by MR at 05/01/2022 11:42 Reason:   Got first set second set due after 12 couldnt stick other side due to   IV   Collection has been rescheduled by MR at 05/01/2022 11:22 Reason:   Patient unavailable starting dialysis and with the dr     Collection has been rescheduled by MR at 05/01/2022 11:42 Reason:   Got first set second set due after 12 couldnt stick other side due to   IV     Blood culture [[id number]] Collected: 05/01/22 1140    Order Status:  Completed Specimen: Blood Updated: 05/06/22 1812     Blood Culture, Routine No growth after 5 days.    Narrative:      Collection has been rescheduled by [email or web address] at 05/01/2022 11:22 Reason:   Patient unavailable starting dialysis and with the dr   Collection has been rescheduled by [email or web address] at 05/01/2022 11:22 Reason:   Patient unavailable starting dialysis and with the dr     Blood culture [[id number]] Collected: 05/02/22 0410    Order Status: Completed Specimen: Blood from Antecubital, Right Arm Updated: 05/06/22 1212     Blood Culture, Routine No Growth to date      No Growth to date      No Growth to date      No Growth to date      No Growth to date    Culture, Anaerobic [[id number]] Collected: 05/02/22 1400    Order Status: Completed Specimen: Wound from Leg, Right Updated: 05/06/22 1102     Anaerobic Culture Culture in progress    Culture, Anaerobic [[id number]] Collected: 05/05/22 1505    Order Status: Sent Specimen: Wound from Leg, Right Updated: 05/06/22 0030    Fungus culture [[id number]] Collected: 05/05/22 1505    Order Status: Sent Specimen: Wound from Leg, Right Updated: 05/06/22 0027    Aerobic culture [[id number]]  (Abnormal)  (Susceptibility) Collected: 05/02/22 1400    Order Status: Completed Specimen: Wound from Leg, Right Updated: 05/05/22 1256     Aerobic Bacterial Culture SERRATIA MARCESCENS  Many        ENTEROBACTER CLOACAE  Moderate      Urine culture [[id number]] Collected: 05/01/22 2211    Order Status: Completed Specimen: Urine Updated: 05/03/22 0802     Urine Culture, Routine No growth    Narrative:      Specimen Source->Urine    Influenza A & B by Molecular [[id number]] Collected: 05/02/22 0900    Order Status: Completed Specimen: Nasopharyngeal Swab Updated: 05/02/22 1021     Influenza A, Molecular Negative     Influenza B, Molecular Negative     Flu A & B Source Nasal swab        Latest lactate reviewed, they are-  No results for input(s): LACTATE in the last 72 hours.    Organ dysfunction  indicated by Acute respiratory failure and tachycardia  Source- right leg fasciotomy site    Source control Achieved by- IV abx  CRP trending downward

## 2022-05-07 NOTE — PROGRESS NOTES
INPATIENT NEPHROLOGY Progress Note  [facility]    Patient Name: Agus Horan  Date: 05/07/2022    Reason for consultation: SANDRA    Chief Complaint:   Chief Complaint   Patient presents with    Leg Pain     Pain in the right leg muscle calf is hard, nausea vomiting, patient was seen here over the weekend for an overdose        History of Present Illness:  20 y/o M with a history of asthma recently here on 4/9 with oxycodone overdose who p/w RLE pain and swelling after a fall on 4/10. He reports severe pain, constant, associated with nausea with inability to bear weight.  He took meloxicam without relief. He was found to have compartment syndrome and was taking to the OR emergently for fasciotomy on 4/13. We are consulted for SANDRA with metabolic derangements.    Interval History:  4/14- did emergent HD overnight for hyperkalemia and metabolic acidosis refractory to medical management; pain controlled, RLE wrapped, no edema in LLE  4/15- worsening pain/edema in RLE- going for MRI- oligoanuric- stop NS IVFs- will do HD/UF today and tomorrow  4/16  Seen on dialysis.  No distress  4/17  Remains oliguric.  AFVSS.  Has back pain.  Leg pain a little better  4/18  In the OR.  Still oliguric.    4/19  Seen on dialysis.  No distress.    4/20  Still not making much urine.  cpk coming down.     4/21  Afebrile.  BP a little better.  uop up a bit  422   Sleeping.  AFVSS.  I/Os not appropriately recorded despite being ordered for twice  4/23  225 cc UOP recorded.  K+ 5.5, HD today.  No renal recovery, CPK improving.  C/o rash, Dr. Zamorano at bedside placed orders.  Seen on dialysis, tolerating tx well.  4/24   No new lab results, next lab draw in am.   Still has rash and c/o itching.  No other complaints.  450 cc UOP recorded.  4/25  In the OR.  Plan for hd today  4/26  AFVSS.  Urine output better.  No complaints specified today  4/27  Sleeping.  Output not recorded despite being ordered  4/28  Tearful.  Not  engaging when spoken too.  Requesting dilaudid   4/29     Good urine output.  Seen on dialysis.  No distress  4/30  UOP 4.4L.  Ca+ 15, will have dialysis today, stopped calcitriol and vit D.  Will add on PTH to labs.    5/1  Ca+ 15.5, PTH suppressed.  D/w Dr. Patrick, pt to have additional dialysis today, no UF.  Notified Suni Stone w/[facility] pt needs to run today.  Pt w/elevated temp, a little more tachy than usual, not feeling very well.  5/2 tachy, hypertensive, on RA, UOP 2.6L, remains hypercalcemic- PTH appropriately suppressed, Dr. Byers is at bedside  5/3 febrile, tachy, BP better, on RA, UOP 650cc  5/4 had to terminate HD treatment short about 30min due to n/v and sinus tachy- net UF 1.6L; UOP 3.3L, SCr 2.5, serum Ca rising over last few days in conjunction with improvement in SCr- may reflect volume depletion  5/5 BP high, on RA, UOP 2.7L, going for OR procedure: Deep excisional debridement of necrotic muscle anterior compartment right leg.  Application of wound VAC.  5/6 febrile, BP stable, on RA, UOP 400cc, pain score about a 7  5/7  Tmax 101.9, pressures stable.  Ionized Ca+ trended down, CMP results still pending at present.  No complaints.    Plan of Care:    Assessment:  Sepsis  Traumatic rhabdomyolysis with compartment syndrome s/p fasciotomy on 4/13  SANDRA due to toxic ATN s/p emergent HD on 4/14- last HD 5/4  Hypercalcemia of immobilization  Elevated BP  Hypokalemia  Hyperphosphatemia  Anemia    Plan:    - Ortho following- s/p I&D on 5/5. Dose meds for CrCl > 30.  - He has renal recovery- last HD 5/4. No NSAIDS or IV contrast. Keep hemosplit for now.   - Remains hypercalcemic- so far refractory to everything including dialysis. Going to increase NS to 100cc/hr. He is s/p bisphosphonate therapy on 5/5. Ordered denosumab 60mg and another 4 doses of calcitonin on 5/6. Ordered 25 vitamin D with AM labs.  - BP is better- continue hydral 50mg TID.  - Ordered KCl repletion. Continue a regular diet.  -  H/H stabilized- continue iron tablet- continue ALBERTO 3x per week SQ.      Thank you for allowing us to participate in this patient's care. We will continue to follow.    Vital Signs:  Temp Readings from Last 3 Encounters:   05/07/22 98.6 °F (37 °C) (Oral)   04/10/22 97.3 °F (36.3 °C)   04/09/22 98.4 °F (36.9 °C)       Pulse Readings from Last 3 Encounters:   05/07/22 109   04/10/22 63   04/09/22 95       BP Readings from Last 3 Encounters:   05/07/22 (!) 141/75   04/10/22 117/64   04/09/22 (!) 143/82       Weight:  Wt Readings from Last 3 Encounters:   05/02/22 81.2 kg (179 lb)   04/10/22 77.1 kg (170 lb)   04/09/22 77.1 kg (170 lb)       INS/OUTS:  I/O last 3 completed shifts:  In: 300 [P.O.:300]  Out: 3250 [Urine:3250]  I/O this shift:  In: -   Out: 650 [Urine:650]    Medications:  Scheduled Meds:   calcitonin  4 Units/kg Subcutaneous BID    ceFEPime (MAXIPIME) IVPB  1 g Intravenous Q8H    denosumab  60 mg Subcutaneous 1 time in Clinic/HOD    epoetin leidy  50 Units/kg Subcutaneous Every Mon, Wed, Fri    ferrous gluconate  324 mg Oral BID WM    heparin (porcine)  5,000 Units Subcutaneous Q8H    hydrALAZINE  50 mg Oral Q8H    LIDOcaine HCL 2%   Topical (Top) Every Mon, Thurs    metoprolol succinate  25 mg Oral Daily    senna-docusate 8.6-50 mg  1 tablet Oral BID    sodium chloride 0.9%  2 mL Intravenous Q6H     Continuous Infusions:   sodium chloride 0.9% 100 mL/hr at 05/07/22 0427    electrolyte-S (pH 7.4) Stopped (05/05/22 1600)    electrolyte-S (pH 7.4)      loperamide       PRN Meds:.acetaminophen, cyclobenzaprine, dextrose 10%, dextrose 10%, diphenhydrAMINE, glucagon (human recombinant), glucose, glucose, heparin (porcine), HYDROmorphone, hydrOXYzine pamoate, labetaloL, loperamide, metoclopramide HCl, morphine, naloxone, ondansetron, oxyCODONE, oxyCODONE-acetaminophen, phenyleph-min oil-petrolatum, sodium chloride 0.9%  No current facility-administered medications on file prior to encounter.  "    Current Outpatient Medications on File Prior to Encounter   Medication Sig Dispense Refill    naloxone (NARCAN) 4 mg/actuation Spry 4mg by nasal route as needed for opioid overdose; may repeat every 2-3 minutes in alternating nostrils until medical help arrives. Call 911 2 each 0       Review of Systems:  Neg    Physical Exam:  BP (!) 141/75   Pulse 109   Temp 98.6 °F (37 °C) (Oral)   Resp 18   Ht 5' 9" (1.753 m)   Wt 81.2 kg (179 lb)   SpO2 97%   BMI 26.43 kg/m²     Constitutional: nad, aao x 3  Heart: rrr, no m/r/g, wwp, RLE edema  Lungs: ctab, no w/r/r/c, no lb  Abdomen: s/nt/nd, +BS    Results:  Lab Results   Component Value Date     05/06/2022    K 3.4 (L) 05/06/2022     05/06/2022    CO2 23 05/06/2022    BUN 36 (H) 05/06/2022    CREATININE 2.0 (H) 05/06/2022    CALCIUM 13.2 (HH) 05/06/2022    ANIONGAP 10 05/06/2022    ESTGFRAFRICA 53 (A) 05/06/2022    EGFRNONAA 46 (A) 05/06/2022       Lab Results   Component Value Date    CALCIUM 13.2 (HH) 05/06/2022    PHOS 4.4 05/05/2022       Recent Labs   Lab 05/07/22  0845   WBC 11.73   RBC 2.34*   HGB 7.0*   HCT 21.5*      MCV 92   MCH 29.9   MCHC 32.6       I have personally reviewed pertinent radiological imaging and reports.    I have spent > 35 minutes providing care for this patient for the above diagnoses. These services have included chart/data/imaging review, evaluation, exam, formulation of plan, , note preparation, and discussions with staff involved in this patient's care.    Saida Silva NP    Nephrology  [facility]  [phone]    "

## 2022-05-07 NOTE — PLAN OF CARE
Problem: Physical Therapy  Goal: Physical Therapy Goal  Description: Goals to be met by: 5/15/2022     Patient will increase functional independence with mobility by performin). Supine to sit with Modified Baker  2). Sit to stand transfer with Modified Baker maintaining RLE NWB using axillary crutches or rolling walker  3). Bed to chair transfer with Modified Baker maintaining RLE NWB using axillary crutches or rolling walker  5). Gait  x 150 feet with Modified Baker maintaining RLE NWB using axillary crutches or rolling walker    Outcome: Ongoing, Progressing

## 2022-05-07 NOTE — PLAN OF CARE
Problem: Adult Inpatient Plan of Care  Goal: Optimal Comfort and Wellbeing  Outcome: Ongoing, Progressing     Problem: Fall Injury Risk  Goal: Absence of Fall and Fall-Related Injury  Outcome: Ongoing, Progressing     Problem: Nutrition Impaired (Sepsis/Septic Shock)  Goal: Optimal Nutrition Intake  Outcome: Ongoing, Progressing

## 2022-05-07 NOTE — PROGRESS NOTES
Consult Note  Infectious Disease    Reason for Consult:      HPI: Agus Horan is a 21 y.o. male with PMHx of mild intermittent asthma, allergies, oxycodone overdose on 04/09/2022, left eye pain after a fall 04/10/2022, came to the hospital on 04/13/2022 with complaints of right leg pain.    Ultrasound ruled out DVT  X-ray ruled out foreign body and fractures  He had right leg compartment syndrome, rhabdomyolysis and SANDRA     He was seen by Dr. Fraser with Nephrology and Dr. Freedman with Orthopedic surgery.    Patient was taken for fasciotomy on  04/13/2022, then debridement and closure on 04/18/2022; then  debridement deep right lateral fasciotomy incision right lower leg with excisional  debridement of dead anterior tibialis muscle and biopsy of muscle application of wound VAC right lower leg  on 04/25/2022  There are no cultures sent intraoperatively  Blood cultures are negative on 04/13/2022 and 05/01/2022    I came and saw patient.  He has a temperature of 100.2°.  WBC 16. He is on cefazolin since 04/13/2022(with an interruptions or 4 days from 04/19--4/22)  He feels well, he feels normal.  He is just anxious bc  of SANDRA. He does not want ot be on HD fr the rest of his life  Hospitalist is concerned of tachycardia and poss PE and is giving heparin drip.     INTERVAL HISTORY:  5/2 (Isma): Interim reviewed, discussed with Dr Perales, patient seen and examined at bedside, covered in bed-sheet. States he has a growing lesion on his left AC. Having HD a bedside. Persistent tachycardia, hypertension, afebrile. Labs reviewed, WBC: 18.5, PMN 71.5%, H/H 8.3/25.2, plt: 369. Iron 10, ferritin 1.010. ESR 79, .9, calcium 14.9. D-dimer 3.3. Procal 0.81. Micro reviewed, negative thus far, no cultures sent from the OR.     5/3:  Patient seen and examined at bedside, feeling comfortable today.  States is the most calmed he has ever felt.  Seen by Ortho yesterday, status post debridement at bedside.  Upon extensive  "revision, small pocket of purulent bloody fluid drained, cultures taken at bedside.  Plan for OR either tomorrow or the day after.  CT of left arm consistent with myositis ossificans, likely from hypercalcemia.  Uric acid 3.0.  V/Q scan negative for PE.  Patient's heart rate remained slightly elevated 104 currently.  Labs reviewed, white count 20.7, PMN 68%.  H&H 7.6/23.5, platelet count 354. Sodium 135, creatinine 2.8.  Calcium 12.3, ionized calcium 1.65.  Albumin 2.3.    5/4:  Interim reviewed, patient seen and examined at bedside covered in pillows and bed sheets.  States he is tired, and her L AC fossa lesion is decreasing in size. Persistent tachycardia,  hypertensive, afebrile. Having dialysis at bedside. Cultures taken at bedside grew GNR, lab called, lactose negative, ID and sensitivities available tomorrow. Labs reviewed, persistent leukocytosis, 19.3, PMN 75%, bands 6%. Potassium 3.3, normal LFTs, calcium 13.6.    5/5: Patient seen and examined at bedside, tearful about current situation. States he is starting all over again once he moves to [place].  He is scheduled for procedure with Ortho today. Remains hypertensive, tachy 101, afebrile, good urine output. Labs reviewed, WBC: 19.7, no left shift. H/H 7.4/23.2, plt: 394. Hypokalemia 3.3, creatinine 2.0. Calcium 13. Albumin 2.4. Normal LFTs, CPK 87.    5/6: Interim reviewed, patient seen and examined at bedside, asking for percocet 10. S/p washout in the OR by Ortho yesterday. As per Op-note: "inspection of the wound showed that about 90% of the wound was covered with granulation tissue on deep palpation were able to get gross pus coming from the anterior portion of the anterior compartment near the anterior tibialis muscle there was some necrotic muscle present along with pus it was also cultured." Patient currently hypertensive, low grade fever 100.4. Last HD 5/4. Labs reviewed, WC 16.2, PMNL 81.5%, eosinophilia improved. H/H 7.2/22.5, pltL 41. Cr 2.0, " calcium 13.2, albumin 2.4, normal LFTs. Wound cultures 5/2 E cloacae and Serratia marcescens both susceptible to Cefepime (DEN <2).    5/7:  Interim reviewed, patient seen examined at bedside.  States he is feeling great.  Had temperature of 101.9° overnight, currently afebrile.  He states he did feel the fever, maybe was covered in blankets.  Labs reviewed, white count significantly improved 11, no left shift, H&H 7/21.5, platelet count 298. Holding off dialysis, adequate urinary output, creatinine 1.5, CRP 53 trending down.  Calcium 10.7.  Potassium 3.3.  Vitamin-D 21. Micro reviewed, OR cultures from left ankle synovial fluid, and left leg grew Gram-negative gilles, ID and sensitivities pending.      Antibiotics (From admission, onward)                Start     Stop Route Frequency Ordered    05/06/22 0900  cefepime in dextrose 5 % 1 gram/50 mL IVPB 1 g         -- IV Every 8 hours (non-standard times) 05/06/22 0850          Review of patient's allergies indicates:   Allergen Reactions    Shrimp      Past Medical History:   Diagnosis Date    Allergy     AR    Asthma     mild intermittent    Hyperkalemia 4/14/2022     Past Surgical History:   Procedure Laterality Date    DEBRIDEMENT OF LOWER EXTREMITY Right 4/18/2022    Procedure: DEBRIDEMENT, LOWER EXTREMITY;  Surgeon: Leonardo Byers MD;  Location: [facility] OR;  Service: Orthopedics;  Laterality: Right;    DEBRIDEMENT OF LOWER EXTREMITY Right 5/5/2022    Procedure: DEBRIDEMENT, LOWER EXTREMITY;  Surgeon: Leonardo Byers MD;  Location: [facility] OR;  Service: Orthopedics;  Laterality: Right;    FASCIOTOMY Right 4/13/2022    Procedure: FASCIOTOMY;  Surgeon: Leonardo Byers MD;  Location: [facility] OR;  Service: Orthopedics;  Laterality: Right;    FASCIOTOMY Right 4/25/2022    Procedure: FASCIOTOMY;  Surgeon: Leonardo Byers MD;  Location: [facility] OR;  Service: Orthopedics;  Laterality: Right;    REMOVAL OF FOREIGN BODY FROM FOOT Left 6/24/2020     Procedure: REMOVAL, FOREIGN BODY, FOOT;  Surgeon: Dani Garcia MD;  Location: [facility] OR;  Service: Orthopedics;  Laterality: Left;    REPLACEMENT OF WOUND VACUUM-ASSISTED CLOSURE DEVICE Right 5/5/2022    Procedure: REPLACEMENT, WOUND VAC;  Surgeon: Leonardo Byers MD;  Location: [facility] OR;  Service: Orthopedics;  Laterality: Right;     Social History     Socioeconomic History    Marital status: Single   Tobacco Use    Smoking status: Never Smoker    Smokeless tobacco: Never Used   Substance and Sexual Activity    Alcohol use: Yes     Comment: last night    Drug use: Yes     Frequency: 2.0 times per week     Types: Marijuana     Comment: yesterday   Social History Narrative    ** Merged History Encounter **         Lives with mom, 2 brothers.  No smokers.  +Dogs/chickens.  9th grader.     Family History   Problem Relation Age of Onset    Asthma Brother     Emphysema Maternal Grandmother     Hyperlipidemia Maternal Grandmother     Asthma Maternal Grandmother     Arthritis Maternal Grandmother     COPD Maternal Grandmother     Asthma Brother          Review of Systems:   Patient with drug use before admission. 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 Alert and attentive, cooperative, comfortable  Eyes:  Anicteric, PERRL, EOMI  ENT:  No ulcers, exudates, thrush, nares patent, dentition is fair  Neck:  Supple  Lungs: Clear to auscultation b/l    Heart:  Tachycardic, S1/S2+, regular rhythm, no murmur   Abd:  Soft, NT, ND, normal BS, no masses or organomegaly appreciated.  :  Voids, clear urine  Musc:  Other than  Right leg dressing in place with wound vac, not disturbed. Joints without effusion, swelling, erythema, synovitis, muscle wasting.   Skin:  Warm  Neuro:   Alert, attentive, speech fluent, face symmetric, moves all extremities, no focal weakness  Psych:  Anxious, cooperative  Lymphatic:       Extrem: Left arm with amorphic deposit, calcification - decreased in size, non tender to palpation    R leg with cast and wound vac in place    No edema, erythema, phlebitis, cellulitis, warm and well perfused  VAD:  R tunneled catheter    Isolation:  none  Wound:     5/3:      5/2:      4/28:                       General Labs reviewed:  Recent Labs   Lab 05/05/22  0504 05/05/22  1631 05/06/22  0748 05/07/22  0845   WBC 19.79*  --  16.24* 11.73   HGB 7.4* 7.6* 7.2* 7.0*   HCT 23.2* 23.6* 22.5* 21.5*     --  401 298       Recent Labs   Lab 05/05/22  0504 05/06/22  0748 05/07/22  0844   * 136 136   K 3.3* 3.4* 3.3*    103 103   CO2 25 23 24   BUN 33* 36* 29*   CREATININE 2.0* 2.0* 1.5*   CALCIUM 13.0* 13.2* 10.7*   PROT 7.3 7.2 6.6   BILITOT 0.2 0.2 0.2   ALKPHOS 111 103 111   ALT 20 22 30   AST 35 29 41*     Recent Labs   Lab 05/01/22  1457 05/05/22  0951 05/07/22  0341   .9* 73.3* 53.2*     Estimated Creatinine Clearance: 76.6 mL/min (A) (based on SCr of 1.5 mg/dL (H)).      Micro:  Microbiology Results (last 7 days)       Procedure Component Value Units Date/Time    Blood culture [[id number]] Collected: 05/02/22 0410    Order Status: Completed Specimen: Blood from Antecubital, Right Arm Updated: 05/07/22 1212     Blood Culture, Routine No growth after 5 days.    Tissue culture [[id number]]  (Abnormal) Collected: 05/05/22 1530    Order Status: Completed Specimen: Tissue from Leg, Right Updated: 05/07/22 1008     Aerobic Culture -  Tissue GRAM NEGATIVE RAJI  Many  Identification and susceptibility pending       Gram Stain Result Rare WBC's      Rare Gram negative rods    Culture, Body Fluid (Aerobic) w/ GS [[id number]]  (Abnormal) Collected: 05/05/22 1505    Order Status: Completed Specimen: Synovial Fluid from Ankle, Right Updated: 05/07/22 0958     AEROBIC CULTURE - FLUID GRAM NEGATIVE RAJI  Moderate  Identification and susceptibility pending       Gram Stain Result Few WBC's      No organisms seen    Blood culture [[id number]] Collected: 05/01/22 1216    Order Status: Completed Specimen: Blood Updated: 05/06/22 1812     Blood Culture, Routine No growth after 5 days.    Narrative:      Collection has been rescheduled by [facility] at 05/01/2022 11:22 Reason:   Patient unavailable starting dialysis and with the dr     Collection has been rescheduled by [facility] at 05/01/2022 11:42 Reason:   Got first set second set due after 12 couldnt stick other side due to   IV   Collection has been rescheduled by [facility] at 05/01/2022 11:22 Reason:   Patient unavailable starting dialysis and with the dr     Collection has been rescheduled by [facility] at 05/01/2022 11:42 Reason:   Got first set second set due after 12 couldnt stick other side due to   IV     Blood culture [[id number]] Collected: 05/01/22 1140    Order Status: Completed Specimen: Blood Updated: 05/06/22 1812     Blood Culture, Routine No growth after 5 days.    Narrative:      Collection has been rescheduled by [facility] at 05/01/2022 11:22 Reason:   Patient unavailable starting dialysis and with the dr   Collection has been rescheduled by [facility] at 05/01/2022 11:22 Reason:   Patient unavailable starting dialysis and with the dr     Culture, Anaerobic [[id number]] Collected: 05/02/22 1400    Order Status: Completed Specimen: Wound from Leg, Right Updated: 05/06/22 1102     Anaerobic Culture Culture in progress    Culture, Anaerobic [[id number]] Collected: 05/05/22 1505    Order Status: Sent Specimen: Wound from Leg,  Right Updated: 05/06/22 0030    Fungus culture [[id number]] Collected: 05/05/22 1505    Order Status: Sent Specimen: Wound from Leg, Right Updated: 05/06/22 0027    Aerobic culture [[id number]]  (Abnormal)  (Susceptibility) Collected: 05/02/22 1400    Order Status: Completed Specimen: Wound from Leg, Right Updated: 05/05/22 1256     Aerobic Bacterial Culture SERRATIA MARCESCENS  Many        ENTEROBACTER CLOACAE  Moderate      Urine culture [[id number]] Collected: 05/01/22 2211    Order Status: Completed Specimen: Urine Updated: 05/03/22 0802     Urine Culture, Routine No growth    Narrative:      Specimen Source->Urine    Influenza A & B by Molecular [[id number]] Collected: 05/02/22 0900    Order Status: Completed Specimen: Nasopharyngeal Swab Updated: 05/02/22 1021     Influenza A, Molecular Negative     Influenza B, Molecular Negative     Flu A & B Source Nasal swab            Imaging Reviewed:   CXR-- No definite acute radiographic abnormality.  Right internal jugular central venous catheter in place.   CT  UltrasoundNo evidence of deep venous thrombosis in either lower extremity, noting nonvisualization of the right calf veins due to overlying bandaging.    CT Left arm: Three circumscribed foci of soft tissue mineralization along the antecubital fossa have peripheral zoning favoring myositis ossificans.     NM scan low probability for PE    Cardiology:  · The left ventricle is normal in size with concentric remodeling and normal systolic function.  · The estimated ejection fraction is 65%.  · Grade I left ventricular diastolic dysfunction.  · Normal right ventricular size with normal right ventricular systolic function.  · Mild to moderate tricuspid regurgitation.  · Normal central venous pressure (3 mmHg).  · The estimated PA systolic pressure is 47 mmHg.  · There is pulmonary hypertension.  · Sinus tachycardia rate of 120-130 beats per minute was noted during the study        IMPRESSION & PLAN     1. History of R  leg compartment syndrome; s/p multiple I&Ds, plan for I&D 5/5   ESR 79, .9->73.3   Procal 0.81-->1, pt on HD   OR cultures from left ankle and left leg GNR, ID and sensitivities pending    Wound culture taken at bedside 5/2  E cloacae and Serratia marcescens both susceptible to Cefepime (DEN<2)        2. Rhabdomyolysis due to compartment syndrome.  Resolved    3. SANDRA due to rhabdomyolysis, and hypercalcemia, 13 - holding OFF HD< cr 1.5, improving   Nephrology following     4. Vitamin-D deficiency, anemia, protein malnutrition, hypoalbuminemia    5. V/Q scan negative for PE  6. Myositis ossificans L arm - due to hypercalcemia    Recommendations:  Adjust Cefepime to 2g IV q8h, dose as per crcl  Follow cultures to guide antibiotic therapy   He would benefit from LTAC to continue IV abx for at least 3-4 weeks before skin flap is considered   Wound care   Incentive spirometry     Will follow    D/w patient, nrusing     Medical Decision Making during this encounter was  [_] Low Complexity  [_] Moderate Complexity  [  ] High Complexity  Consult Note  Infectious Disease    Reason for Consult:      HPI: Agus Horan is a 21 y.o. male with PMHx of mild intermittent asthma, allergies, oxycodone overdose on 04/09/2022, left eye pain after a fall 04/10/2022, came to the hospital on 04/13/2022 with complaints of right leg pain.    Ultrasound ruled out DVT  X-ray ruled out foreign body and fractures  He had right leg compartment syndrome, rhabdomyolysis and SANDRA     He was seen by Dr. Fraser with Nephrology and Dr. Freedman with Orthopedic surgery.    Patient was taken for fasciotomy on  04/13/2022, then debridement and closure on 04/18/2022; then  debridement deep right lateral fasciotomy incision right lower leg with excisional  debridement of dead anterior tibialis muscle and biopsy of muscle application of wound VAC right lower leg  on 04/25/2022  There are no cultures sent intraoperatively  Blood cultures are negative  on 04/13/2022 and 05/01/2022    I came and saw patient.  He has a temperature of 100.2°.  WBC 16. He is on cefazolin since 04/13/2022(with an interruptions or 4 days from 04/19--4/22)  He feels well, he feels normal.  He is just anxious bc  of SANDRA. He does not want ot be on HD fr the rest of his life  Hospitalist is concerned of tachycardia and poss PE and is giving heparin drip.     INTERVAL HISTORY:  5/2 (Isma): Interim reviewed, discussed with Dr Perales, patient seen and examined at bedside, covered in bed-sheet. States he has a growing lesion on his left AC. Having HD a bedside. Persistent tachycardia, hypertension, afebrile. Labs reviewed, WBC: 18.5, PMN 71.5%, H/H 8.3/25.2, plt: 369. Iron 10, ferritin 1.010. ESR 79, .9, calcium 14.9. D-dimer 3.3. Procal 0.81. Micro reviewed, negative thus far, no cultures sent from the OR.     5/3:  Patient seen and examined at bedside, feeling comfortable today.  States is the most calmed he has ever felt.  Seen by Ortho yesterday, status post debridement at bedside.  Upon extensive revision, small pocket of purulent bloody fluid drained, cultures taken at bedside.  Plan for OR either tomorrow or the day after.  CT of left arm consistent with myositis ossificans, likely from hypercalcemia.  Uric acid 3.0.  V/Q scan negative for PE.  Patient's heart rate remained slightly elevated 104 currently.  Labs reviewed, white count 20.7, PMN 68%.  H&H 7.6/23.5, platelet count 354. Sodium 135, creatinine 2.8.  Calcium 12.3, ionized calcium 1.65.  Albumin 2.3.    5/4:  Interim reviewed, patient seen and examined at bedside covered in pillows and bed sheets.  States he is tired, and her L AC fossa lesion is decreasing in size. Persistent tachycardia,  hypertensive, afebrile. Having dialysis at bedside. Cultures taken at bedside grew GNR, lab called, lactose negative, ID and sensitivities available tomorrow. Labs reviewed, persistent leukocytosis, 19.3, PMN 75%, bands 6%. Potassium  "3.3, normal LFTs, calcium 13.6.    5/5: Patient seen and examined at bedside, tearful about current situation. States he is starting all over again once he moves to [place].  He is scheduled for procedure with Ortho today. Remains hypertensive, tachy 101, afebrile, good urine output. Labs reviewed, WBC: 19.7, no left shift. H/H 7.4/23.2, plt: 394. Hypokalemia 3.3, creatinine 2.0. Calcium 13. Albumin 2.4. Normal LFTs, CPK 87.    5/6: Interim reviewed, patient seen and examined at bedside, asking for percocet 10. S/p washout in the OR by Ortho yesterday. As per Op-note: "inspection of the wound showed that about 90% of the wound was covered with granulation tissue on deep palpation were able to get gross pus coming from the anterior portion of the anterior compartment near the anterior tibialis muscle there was some necrotic muscle present along with pus it was also cultured." Patient currently hypertensive, low grade fever 100.4. Last HD 5/4. Labs reviewed, WC 16.2, PMNL 81.5%, eosinophilia improved. H/H 7.2/22.5, pltL 41. Cr 2.0, calcium 13.2, albumin 2.4, normal LFTs. Wound cultures 5/2 E cloacae and Serratia marcescens both susceptible to Cefepime (DEN <2).        Antibiotics (From admission, onward)                Start     Stop Route Frequency Ordered    05/06/22 0900  cefepime in dextrose 5 % 1 gram/50 mL IVPB 1 g         -- IV Every 8 hours (non-standard times) 05/06/22 0850          Review of patient's allergies indicates:   Allergen Reactions    Shrimp      Past Medical History:   Diagnosis Date    Allergy     AR    Asthma     mild intermittent    Hyperkalemia 4/14/2022     Past Surgical History:   Procedure Laterality Date    DEBRIDEMENT OF LOWER EXTREMITY Right 4/18/2022    Procedure: DEBRIDEMENT, LOWER EXTREMITY;  Surgeon: Leonardo Byers MD;  Location[facility];  Service: Orthopedics;  Laterality: Right;    DEBRIDEMENT OF LOWER EXTREMITY Right 5/5/2022    Procedure: DEBRIDEMENT, LOWER EXTREMITY;  " Surgeon: Leonardo Byers MD;  Location: [facility] OR;  Service: Orthopedics;  Laterality: Right;    FASCIOTOMY Right 4/13/2022    Procedure: FASCIOTOMY;  Surgeon: Leonardo Byers MD;  Location: [facility] OR;  Service: Orthopedics;  Laterality: Right;    FASCIOTOMY Right 4/25/2022    Procedure: FASCIOTOMY;  Surgeon: Leonardo Byers MD;  Location: [facility] OR;  Service: Orthopedics;  Laterality: Right;    REMOVAL OF FOREIGN BODY FROM FOOT Left 6/24/2020    Procedure: REMOVAL, FOREIGN BODY, FOOT;  Surgeon: Dani Garcia MD;  Location: [facility] OR;  Service: Orthopedics;  Laterality: Left;    REPLACEMENT OF WOUND VACUUM-ASSISTED CLOSURE DEVICE Right 5/5/2022    Procedure: REPLACEMENT, WOUND VAC;  Surgeon: Leonardo Byers MD;  Location: [facility] OR;  Service: Orthopedics;  Laterality: Right;     Social History     Socioeconomic History    Marital status: Single   Tobacco Use    Smoking status: Never Smoker    Smokeless tobacco: Never Used   Substance and Sexual Activity    Alcohol use: Yes     Comment: last night    Drug use: Yes     Frequency: 2.0 times per week     Types: Marijuana     Comment: yesterday   Social History Narrative    ** Merged History Encounter **         Lives with mom, 2 brothers.  No smokers.  +Dogs/chickens.  9th grader.     Family History   Problem Relation Age of Onset    Asthma Brother     Emphysema Maternal Grandmother     Hyperlipidemia Maternal Grandmother     Asthma Maternal Grandmother     Arthritis Maternal Grandmother     COPD Maternal Grandmother     Asthma Brother          Review of Systems:   Patient with drug use before admission. 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 Alert and attentive, cooperative, comfortable  Eyes:  Anicteric, PERRL, EOMI  ENT:  No ulcers, exudates, thrush, nares patent, dentition is fair  Neck:  Supple  Lungs: Clear to auscultation b/l   Heart:  Tachycardic, S1/S2+, regular rhythm, no murmur   Abd:  Soft, NT, ND, normal BS, no masses or organomegaly appreciated.  :  Voids, clear urine  Musc:  Other than  Right leg dressing in place with wound vac, not disturbed. Joints without effusion, swelling, erythema, synovitis, muscle wasting.   Skin:  Warm  Neuro:   Alert, attentive, speech fluent, face symmetric, moves all extremities, no focal weakness  Psych:  Anxious, cooperative  Lymphatic:       Extrem: Left arm with amorphic deposit, calcification - tender to palpation, no redness noted.     R leg with cast and wound vac in place    No edema, erythema, phlebitis, cellulitis, warm and well perfused  VAD:  R tunneled catheter    Isolation:  none  Wound:     5/3:                                 General Labs reviewed:  Recent Labs   Lab 05/05/22  0504 05/05/22  1631 05/06/22  0748 05/07/22  0845   WBC 19.79*  --  16.24* 11.73   HGB 7.4* 7.6* 7.2* 7.0*   HCT 23.2* 23.6* 22.5* 21.5*     --  401 298       Recent Labs   Lab 05/05/22  0504 05/06/22  0748 05/07/22  0844   * 136 136   K 3.3* 3.4* 3.3*    103 103   CO2 25 23 24   BUN 33* 36* 29*   CREATININE 2.0* 2.0* 1.5*   CALCIUM 13.0* 13.2* 10.7*   PROT 7.3 7.2 6.6   BILITOT 0.2 0.2 0.2   ALKPHOS 111 103 111   ALT 20 22 30   AST 35 29 41*     Recent Labs   Lab 05/01/22  1457 05/05/22  0951 05/07/22  0341   .9* 73.3* 53.2*     Estimated Creatinine Clearance: 76.6 mL/min (A) (based on SCr of 1.5 mg/dL  (H)).      Micro:  Microbiology Results (last 7 days)       Procedure Component Value Units Date/Time    Blood culture [[id number]] Collected: 05/02/22 0410    Order Status: Completed Specimen: Blood from Antecubital, Right Arm Updated: 05/07/22 1212     Blood Culture, Routine No growth after 5 days.    Tissue culture [[id number]]  (Abnormal) Collected: 05/05/22 1530    Order Status: Completed Specimen: Tissue from Leg, Right Updated: 05/07/22 1008     Aerobic Culture - Tissue GRAM NEGATIVE RAJI  Many  Identification and susceptibility pending       Gram Stain Result Rare WBC's      Rare Gram negative rods    Culture, Body Fluid (Aerobic) w/ GS [[id number]]  (Abnormal) Collected: 05/05/22 1505    Order Status: Completed Specimen: Synovial Fluid from Ankle, Right Updated: 05/07/22 0958     AEROBIC CULTURE - FLUID GRAM NEGATIVE RAJI  Moderate  Identification and susceptibility pending       Gram Stain Result Few WBC's      No organisms seen    Blood culture [[id number]] Collected: 05/01/22 1216    Order Status: Completed Specimen: Blood Updated: 05/06/22 1812     Blood Culture, Routine No growth after 5 days.    Narrative:      Collection has been rescheduled by [facility] at 05/01/2022 11:22 Reason:   Patient unavailable starting dialysis and with the dr     Collection has been rescheduled by [facility] at 05/01/2022 11:42 Reason:   Got first set second set due after 12 couldnt stick other side due to   IV   Collection has been rescheduled by [facility] at 05/01/2022 11:22 Reason:   Patient unavailable starting dialysis and with the dr     Collection has been rescheduled by [facility] at 05/01/2022 11:42 Reason:   Got first set second set due after 12 couldnt stick other side due to   IV     Blood culture [[id number]] Collected: 05/01/22 1140    Order Status: Completed Specimen: Blood Updated: 05/06/22 1812     Blood Culture, Routine No growth after 5 days.    Narrative:      Collection has been rescheduled by [facility] at 05/01/2022 11:22 Reason:    Patient unavailable starting dialysis and with the dr   Collection has been rescheduled by [email or web address] at 05/01/2022 11:22 Reason:   Patient unavailable starting dialysis and with the dr     Culture, Anaerobic [[id number]] Collected: 05/02/22 1400    Order Status: Completed Specimen: Wound from Leg, Right Updated: 05/06/22 1102     Anaerobic Culture Culture in progress    Culture, Anaerobic [[id number]] Collected: 05/05/22 1505    Order Status: Sent Specimen: Wound from Leg, Right Updated: 05/06/22 0030    Fungus culture [[id number]] Collected: 05/05/22 1505    Order Status: Sent Specimen: Wound from Leg, Right Updated: 05/06/22 0027    Aerobic culture [[id number]]  (Abnormal)  (Susceptibility) Collected: 05/02/22 1400    Order Status: Completed Specimen: Wound from Leg, Right Updated: 05/05/22 1256     Aerobic Bacterial Culture SERRATIA MARCESCENS  Many        ENTEROBACTER CLOACAE  Moderate      Urine culture [[id number]] Collected: 05/01/22 2211    Order Status: Completed Specimen: Urine Updated: 05/03/22 0802     Urine Culture, Routine No growth    Narrative:      Specimen Source->Urine    Influenza A & B by Molecular [[id number]] Collected: 05/02/22 0900    Order Status: Completed Specimen: Nasopharyngeal Swab Updated: 05/02/22 1021     Influenza A, Molecular Negative     Influenza B, Molecular Negative     Flu A & B Source Nasal swab            Imaging Reviewed:   CXR-- No definite acute radiographic abnormality.  Right internal jugular central venous catheter in place.   CT  UltrasoundNo evidence of deep venous thrombosis in either lower extremity, noting nonvisualization of the right calf veins due to overlying bandaging.    CT Left arm: Three circumscribed foci of soft tissue mineralization along the antecubital fossa have peripheral zoning favoring myositis ossificans.     NM scan low probability for PE    Cardiology:  · The left ventricle is normal in size with concentric remodeling and normal systolic  function.  · The estimated ejection fraction is 65%.  · Grade I left ventricular diastolic dysfunction.  · Normal right ventricular size with normal right ventricular systolic function.  · Mild to moderate tricuspid regurgitation.  · Normal central venous pressure (3 mmHg).  · The estimated PA systolic pressure is 47 mmHg.  · There is pulmonary hypertension.  · Sinus tachycardia rate of 120-130 beats per minute was noted during the study        IMPRESSION & PLAN     1. History of R leg compartment syndrome; s/p multiple I&Ds, plan for I&D today    ESR 79, .9->73.3   Procal 0.81-->1, pt on HD   Wound culture taken at bedside 5/2  E cloacae and Serratia marcescens both susceptible to Cefepime (DEN<2)     2. Rhabdomyolysis due to compartment syndrome.  Resolved    3. SANDRA due to rhabdomyolysis, and hypercalcemia, 11 on HD   Nephrology following     4. Vitamin-D deficiency, anemia, protein malnutrition, hypoalbuminemia    5. V/Q scan negative for PE  6. 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

## 2022-05-07 NOTE — ASSESSMENT & PLAN NOTE
Continues to be anuric  Hemodialysis therapy as per Nephrology team.  due to rhabdomyolysis,   Trend CPK daily.  Follow Nephrology recommendations.  Avoid nephrotoxins  Telemetry.  Hemo split catheter was placed on April 25, 2022.     4/30 - remains on hemodialysis, emergent dialysis today secondary to hypercalcemia.  Will continue to monitor closely  5/1 - HD again today secondary to hypercalcemia. Renal function improving. Cont to monitor closely.  5/2 - hemodialysis treatment again today.  Continue to monitor  5/3 - stable, nephrology following, cont routine HD    Resolving. HD on hold-- patient with renal recovery  5/7 - BUN/Cr of 29/1.5, Cont to follow Nephrology recs

## 2022-05-07 NOTE — PLAN OF CARE
Pt asleep, no distress noted, respiration is even and unlabored. Pt still having intermittent low grade fever. Pt is medicated with Tylenol fever and  Oxycodone /Apap was also administered for pain and fever. Pt tolerated well. Wound vac is on and working and NS infusing at 100ml/hr. Pt had a good urine output.  Problem: Adult Inpatient Plan of Care  Goal: Plan of Care Review  Outcome: Ongoing, Progressing  Goal: Patient-Specific Goal (Individualized)  Outcome: Ongoing, Progressing  Goal: Readiness for Transition of Care  Outcome: Ongoing, Progressing     Problem: Infection  Goal: Absence of Infection Signs and Symptoms  Outcome: Ongoing, Progressing     Problem: Skin Injury Risk Increased  Goal: Skin Health and Integrity  Outcome: Ongoing, Progressing     Problem: Infection Progression (Sepsis/Septic Shock)  Goal: Absence of Infection Signs and Symptoms  Outcome: Ongoing, Progressing

## 2022-05-07 NOTE — CARE UPDATE
05/07/22 0652   PRE-TX-O2   O2 Device (Oxygen Therapy) room air   SpO2 95 %   Pulse Oximetry Type Intermittent   $ Pulse Oximetry - Multiple Charge Pulse Oximetry - Multiple   Probe Placed On (Pulse Ox) finger   Pulse 105   Resp 15   Incentive Spirometer   $ Incentive Spirometer Charges done with encouragement;done independently per patient   Administration (IS) proper technique demonstrated;self-administered   POx, IS QS

## 2022-05-07 NOTE — PROGRESS NOTES
[facility] Medicine  Progress Note    Patient Name: Agus Horan  MRN: [id number]  Patient Class: IP- Inpatient   Admission Date: 4/13/2022  Length of Stay: 24 days  Attending Physician: Terrance Bentley MD  Primary Care Provider: Primary Doctor No        Subjective:     Principal Problem:Acute renal failure with tubular necrosis        HPI:  Agus Horan is a 20 y/o male with PMHx of mild asthma who presents with right lower extremity pain and swelling x few days. Patient was seen in the ED on 4/9/22 for oxycodone overdose and again on 4/10/22 for left eye pain after falling on a piece of furniture. Patient states he is unsure how he injured his leg and denies IV drug use. Xray of his right leg did not show acute fracture. Patietn states RLE pain and swelling increased over the past few days and he is unable to walk secondary to pain. US of his lower extremity performed today did not show DVT. Lab work revealed elevated Creatinine 13.5 & K+ 7.1, elevated AST/ALT 3980/1611 & WBC 18.14.  CPK>40,0000.  Patient unable to dorsiflex and plantar flex his RLE and symptoms are concerning for compartment syndrome per ED. ED provider discussed with Dr. Byers and Dr. Fraser, nephrology. Patient was referred to hospital medicine and seen in the ED with his friend at bedside. Patient complaining of RLE pain, tenderness and swelling. He denies SOB, N/V/D, chest pain and headaches.  Patient will be admitted to hospital medicine for orthopedic consultation and further management.      Overview/Hospital Course:  Pt admitted for SANDRA, rhabdomyolisis, and RLE compartment syndrome. Pt was taken to the OR by Dr. Byers for emergent fasciotomy on 4/13. CPK at the time of arrival was more than 40,000. Pt was initially started on IV fluids and required emergent hemodialysis on 4/14 for hyperkalemia and metabolic acidosis refractory to medical management. Pt returned to OR with Dr. Byers on 4/18 and  underwent debridement of RLE with closure of medial and lateral fasciotomy incision and biopsy of anterior muscle compartment. Patient's CPK level was monitored closely and continued to trend down.  Patient underwent repeat debridement of deep right lateral fasciotomy incision right lower leg with excisional  debridement of dead anterior tibialis muscle and biopsy of muscle with application of wound VAC on April 25, 2022. Pts pain was not well controlled and medications were adjusted. On 5/1 pt developed persistent tachycardia with mild hypoxemia with room air sat 90%.  Patient unable to get CTA chest due to acute renal failure and hemodialysis.  Chest x-ray obtained and negative.  Patient encouraged to utilize IS and O2 sats improved.  US of bilat LE obtained and neg for DVT with some limitation to RLE due to drsg intact. Decision was made to initiate heparin drip for high suspicion of PE until V/Q scan could be performed.  Patient also developed low-grade fever and Infectious Disease was consulted for further assistance and recommendations.  Patient was continued on IV antibiotics.  V/Q scan was obtained on 05/02 and neg for PE.  Heparin drip was stopped evening of 5/2 and pt was placed on SQ heparin for VTE prophylaixis.   Wound VAC was changed on 05/02 by Dr. Byers and wound care nurse with findings of some necrotic tissue with purulent drainage.  Antibiotics were tailored by Infectious Disease.  Patient to OR for right lower extremity surgical site washout on 05/05/2022.      Interval History: Notes reviewed,no acute events overnight. Patient seen resting in bed w/o any acute distress. Currently afebrile, he complains of moderate leg pain that comes and goes. He describes a burning, stinging sensation in his right leg. Plan to adjust prn pain medication dose. He denies any fatigue, weakness, chills, SOB, chest pain, or abdominal pain. Highest temp last night was 100.4. Wound vac in place with some dark colored  drainage. WBC improved to 11.7 this morning. H&H dropped to 7.0/21.5. Nephrology following. CRP trending downwards. Will continue to monitor.     Review of Systems   Constitutional:  Negative for chills, fatigue and fever.   HENT:  Negative for congestion, facial swelling, nosebleeds, rhinorrhea and sore throat.    Respiratory:  Negative for cough, chest tightness and shortness of breath.    Cardiovascular:  Negative for chest pain, palpitations and leg swelling.   Gastrointestinal:  Negative for abdominal distention, abdominal pain, nausea and vomiting.   Endocrine: Negative for polydipsia and polyuria.   Genitourinary:  Negative for difficulty urinating, dysuria and frequency.   Musculoskeletal:  Positive for arthralgias, gait problem and myalgias.        Right leg   Skin:  Positive for wound.   Neurological:  Negative for syncope, numbness and headaches.   Hematological:  Negative for adenopathy.   Psychiatric/Behavioral:  Negative for agitation, behavioral problems and confusion.    All other systems reviewed and are negative.  Objective:     Vital Signs (Most Recent):  Temp: 98.6 °F (37 °C) (05/07/22 0737)  Pulse: 109 (05/07/22 0737)  Resp: 18 (05/07/22 0843)  BP: (!) 141/75 (05/07/22 0842)  SpO2: 97 % (05/07/22 0737)   Vital Signs (24h Range):  Temp:  [98.4 °F (36.9 °C)-101.9 °F (38.8 °C)] 98.6 °F (37 °C)  Pulse:  [105-127] 109  Resp:  [15-18] 18  SpO2:  [95 %-98 %] 97 %  BP: (136-159)/(64-89) 141/75     Weight: 81.2 kg (179 lb)  Body mass index is 26.43 kg/m².    Intake/Output Summary (Last 24 hours) at 5/7/2022 1043  Last data filed at 5/7/2022 0947  Gross per 24 hour   Intake 300 ml   Output 3150 ml   Net -2850 ml      Physical Exam  Vitals and nursing note reviewed.   Constitutional:       General: He is not in acute distress.     Appearance: Normal appearance.   HENT:      Head: Normocephalic and atraumatic.      Right Ear: External ear normal.      Left Ear: External ear normal.      Nose: Nose normal.  No congestion or rhinorrhea.      Mouth/Throat:      Pharynx: Oropharynx is clear. No oropharyngeal exudate or posterior oropharyngeal erythema.   Eyes:      Extraocular Movements: Extraocular movements intact.      Conjunctiva/sclera: Conjunctivae normal.      Pupils: Pupils are equal, round, and reactive to light.   Cardiovascular:      Rate and Rhythm: Normal rate and regular rhythm.      Pulses: Normal pulses.      Heart sounds: Normal heart sounds. No murmur heard.    No gallop.   Pulmonary:      Effort: Pulmonary effort is normal. No respiratory distress.      Breath sounds: Normal breath sounds. No wheezing or rales.   Abdominal:      General: Abdomen is flat. There is no distension.      Palpations: Abdomen is soft.      Tenderness: There is no abdominal tenderness.   Musculoskeletal:         General: No swelling. Normal range of motion.      Comments: Right leg braced and dressed, ROM decreased with dorsal flexion of toes, wound vac in place with dark colored output   Skin:     General: Skin is warm and dry.      Capillary Refill: Capillary refill takes less than 2 seconds.   Neurological:      General: No focal deficit present.      Mental Status: He is alert and oriented to person, place, and time.      Cranial Nerves: No cranial nerve deficit.      Motor: No weakness.   Psychiatric:         Mood and Affect: Mood normal.         Behavior: Behavior normal.       Significant Labs: All pertinent labs within the past 24 hours have been reviewed.  CBC:   Recent Labs   Lab 05/05/22  1631 05/06/22  0748 05/07/22  0845   WBC  --  16.24* 11.73   HGB 7.6* 7.2* 7.0*   HCT 23.6* 22.5* 21.5*   PLT  --  401 298     CMP:   Recent Labs   Lab 05/06/22  0748 05/07/22  0844    136   K 3.4* 3.3*    103   CO2 23 24    98   BUN 36* 29*   CREATININE 2.0* 1.5*   CALCIUM 13.2* 10.7*   PROT 7.2 6.6   ALBUMIN 2.4* 2.3*   BILITOT 0.2 0.2   ALKPHOS 103 111   AST 29 41*   ALT 22 30   ANIONGAP 10 9   EGFRNONAA 46*  >60       Significant Imaging: I have reviewed all pertinent imaging results/findings within the past 24 hours.      Assessment/Plan:      * Acute renal failure with tubular necrosis  Continues to be anuric  Hemodialysis therapy as per Nephrology team.  due to rhabdomyolysis,   Trend CPK daily.  Follow Nephrology recommendations.  Avoid nephrotoxins  Telemetry.  Hemo split catheter was placed on April 25, 2022.     4/30 - remains on hemodialysis, emergent dialysis today secondary to hypercalcemia.  Will continue to monitor closely  5/1 - HD again today secondary to hypercalcemia. Renal function improving. Cont to monitor closely.  5/2 - hemodialysis treatment again today.  Continue to monitor  5/3 - stable, nephrology following, cont routine HD    Resolving. HD on hold-- patient with renal recovery  5/7 - BUN/Cr of 29/1.5, Cont to follow Nephrology recs       Hypokalemia  nephrology consulted. Adjust K bath. Trend BMP      Hypercalcemia  Nephrology consulted. On calcitonin   Improving    Severe sepsis  This patient does have evidence of infective focus  My overall impression is sepsis. Vital signs were reviewed and noted in progress note.  Antibiotics given-   Antibiotics (From admission, onward)            Start     Stop Route Frequency Ordered    05/06/22 0900  cefepime in dextrose 5 % 1 gram/50 mL IVPB 1 g         -- IV Every 8 hours (non-standard times) 05/06/22 0850        Cultures were taken-   Microbiology Results (last 7 days)     Procedure Component Value Units Date/Time    Tissue culture [[id number]]  (Abnormal) Collected: 05/05/22 1530    Order Status: Completed Specimen: Tissue from Leg, Right Updated: 05/07/22 1008     Aerobic Culture - Tissue GRAM NEGATIVE RAJI  Many  Identification and susceptibility pending       Gram Stain Result Rare WBC's      Rare Gram negative rods    Culture, Body Fluid (Aerobic) w/ GS [[id number]]  (Abnormal) Collected: 05/05/22 1505    Order Status: Completed Specimen: Synovial  Fluid from Ankle, Right Updated: 05/07/22 0958     AEROBIC CULTURE - FLUID GRAM NEGATIVE RAJI  Moderate  Identification and susceptibility pending       Gram Stain Result Few WBC's      No organisms seen    Blood culture [[id number]] Collected: 05/01/22 1216    Order Status: Completed Specimen: Blood Updated: 05/06/22 1812     Blood Culture, Routine No growth after 5 days.    Narrative:      Collection has been rescheduled by MR at 05/01/2022 11:22 Reason:   Patient unavailable starting dialysis and with the dr     Collection has been rescheduled by MR at 05/01/2022 11:42 Reason:   Got first set second set due after 12 couldnt stick other side due to   IV   Collection has been rescheduled by MR at 05/01/2022 11:22 Reason:   Patient unavailable starting dialysis and with the dr     Collection has been rescheduled by MR at 05/01/2022 11:42 Reason:   Got first set second set due after 12 couldnt stick other side due to   IV     Blood culture [[id number]] Collected: 05/01/22 1140    Order Status: Completed Specimen: Blood Updated: 05/06/22 1812     Blood Culture, Routine No growth after 5 days.    Narrative:      Collection has been rescheduled by MR at 05/01/2022 11:22 Reason:   Patient unavailable starting dialysis and with the dr   Collection has been rescheduled by MR at 05/01/2022 11:22 Reason:   Patient unavailable starting dialysis and with the dr     Blood culture [[id number]] Collected: 05/02/22 0410    Order Status: Completed Specimen: Blood from Antecubital, Right Arm Updated: 05/06/22 1212     Blood Culture, Routine No Growth to date      No Growth to date      No Growth to date      No Growth to date      No Growth to date    Culture, Anaerobic [[id number]] Collected: 05/02/22 1400    Order Status: Completed Specimen: Wound from Leg, Right Updated: 05/06/22 1102     Anaerobic Culture Culture in progress    Culture, Anaerobic [[id number]] Collected: 05/05/22 1505    Order Status: Sent Specimen: Wound  from Leg, Right Updated: 05/06/22 0030    Fungus culture [[id number]] Collected: 05/05/22 1505    Order Status: Sent Specimen: Wound from Leg, Right Updated: 05/06/22 0027    Aerobic culture [[id number]]  (Abnormal)  (Susceptibility) Collected: 05/02/22 1400    Order Status: Completed Specimen: Wound from Leg, Right Updated: 05/05/22 1256     Aerobic Bacterial Culture SERRATIA MARCESCENS  Many        ENTEROBACTER CLOACAE  Moderate      Urine culture [[id number]] Collected: 05/01/22 2211    Order Status: Completed Specimen: Urine Updated: 05/03/22 0802     Urine Culture, Routine No growth    Narrative:      Specimen Source->Urine    Influenza A & B by Molecular [[id number]] Collected: 05/02/22 0900    Order Status: Completed Specimen: Nasopharyngeal Swab Updated: 05/02/22 1021     Influenza A, Molecular Negative     Influenza B, Molecular Negative     Flu A & B Source Nasal swab        Latest lactate reviewed, they are-  No results for input(s): LACTATE in the last 72 hours.    Organ dysfunction indicated by Acute respiratory failure and tachycardia  Source- right leg fasciotomy site    Source control Achieved by- IV abx  CRP trending downward      Hypoxia  Concern for PE given tachycardia and new hypoxia  Will obtain chest x-ray and check D-dimer which will likely be elevated given renal failure and recent surgery, will also check lactic acid  Will discuss with Nephrology high concern for PE and need for further workup with CTA verses V/Q scan and also initiating anticoag therapy  continue telemetry monitoring  Supplemental O2 to keep sats >92%  Pt encouraged to use IS  Workup in progress    5/3 - VQ scan negative, heparin gtt stopped. Suspect hypoxia secondary to sepsis which is now improving with appropriate source control. Pt encouraged to use IS. O2 sats improving and stable on room air. Will cont to monitor closely.    resolved          Tachycardia  Persistent tachycardia over the past 24 hours  Patient denies  chest pain  Concern for PE given tachycardia and new hypoxia  Will obtain chest x-ray and check D-dimer which will likely be elevated given renal failure and recent surgery, will also check lactic acid  Will discuss with Nephrology high concern for PE and need for further workup with CTA verses V/Q scan and also initiating anticoag therapy  Patient also hypertensive-will start metoprolol XL today as per chart review patient has had some intermittent tachycardia during his stay  Continue telemetry monitoring  Workup in progress    5/2 - patient tolerating heparin drip.  Will obtain V/Q scan and echocardiogram today.  Workup in progress for possible infectious process.  Appreciate ID consult.    5/3 - improving with source control, VQ scan neg and heparin gtt stopped 5/2. Appreciate ID consult.           Anemia of chronic disease  Patient's anemia is currently controlled. Has recieved 1 units of PRBCs. Etiology likely d/t acute blood loss  Current CBC reviewed-   Lab Results   Component Value Date    HGB 7.0 (L) 05/07/2022    HCT 21.5 (L) 05/07/2022     Monitor serial CBC and transfuse if patient becomes hemodynamically unstable, symptomatic or H/H drops below 7/21.   Continue iron tablet- continue ALBERTO 3x per week SQ per nephrology recs        Drug eruption  No definite etiology apparent.  Possibly clindamycin use.  Off intravenous clindamycin.  Use oral Benadryl 25 mg q.6 hours p.r.n. patient will be monitored closely.    4/30 - resolved      Compartment syndrome  Underwent fasciotomy on 4/13   S/p Debridement deep right lateral fasciotomy incision right lower leg with excisional  debridement of dead anterior tibialis muscle and biopsy of muscle application of wound VAC right lower leg  - 04/25/22.  Wound care nurse performing dressing changes as per recommendations by Orthopedics.    4/30 - stable, wound vac in use, cont current therapy    5/2 - plan for wound VAC change today  5/3 - wound vac changed yesterday, d/w  wound care nurse, area of necrotic tissue with purulent drainage. Wound debrided at BS by Dr. Byers. Plan for wound washout and further debridement in OR tomorrow or Thursday. Cont abx, appreciate ID consult.    To OR for wash out on 5/5/2022  New cultures obtained on 5/5/2022 resulted in many Gram negative rods, ID following, continue IV cefepime, wound vac in place     Elevated liver enzymes  Elevated AST &ALT likely due to rhabdomyolysis  Trending down  Avoid hepatoxic medications  Monitor CMP  See plan for rhabdomyolysis    4/30 - resolved, cont to monitor CMP        Rhabdomyolysis  CPK trending down  S/p fasciotomy on 4/13 for compartment syndrome.  On 4/18 S/p DEBRIDEMENT, LOWER EXTREMITY (Right)   closure of medial fasciotomy incision right leg debridement of lateral fasciotomy with biopsy of anterior muscle compartment removal of deep muscle anterior compartment right lower leg. Subsequently underwent Debridement deep right lateral fasciotomy incision right lower leg with excisional  debridement of dead anterior tibialis muscle and biopsy of muscle application of wound VAC right lower leg on 04/25/22.  Monitor BMP   Follow Nephrology recommendations.    4/30 - improved and stable, cont to monitor closely      History of asthma  Hx of asthma, stable  Monitor for acute changes        VTE Risk Mitigation (From admission, onward)         Ordered     heparin (porcine) injection 5,000 Units  Every 8 hours         05/02/22 1921     IP VTE HIGH RISK PATIENT  Once         05/02/22 1921     heparin (porcine) injection 4,000 Units  As needed (PRN)         04/14/22 0137     Reason for No Pharmacological VTE Prophylaxis  Once        Question:  Reasons:  Answer:  Risk of Bleeding    04/13/22 1913     Place sequential compression device  Until discontinued         04/13/22 1913                Discharge Planning   JARAD:      Code Status: Full Code   Is the patient medically ready for discharge?:     Reason for patient still  in hospital (select all that apply): Patient trending condition, Treatment and Pending disposition  Discharge Plan A: Long-term acute care facility (LTAC)                  Eliezer Arteaga PA-C  Department of Hospital Medicine   [facility]

## 2022-05-08 LAB
ALBUMIN SERPL BCP-MCNC: 2.4 G/DL (ref 3.5–5.2)
ALP SERPL-CCNC: 121 U/L (ref 55–135)
ALT SERPL W/O P-5'-P-CCNC: 41 U/L (ref 10–44)
ANION GAP SERPL CALC-SCNC: 11 MMOL/L (ref 8–16)
AST SERPL-CCNC: 45 U/L (ref 10–40)
BASOPHILS # BLD AUTO: 0.14 K/UL (ref 0–0.2)
BASOPHILS NFR BLD: 1.2 % (ref 0–1.9)
BILIRUB SERPL-MCNC: 0.2 MG/DL (ref 0.1–1)
BUN SERPL-MCNC: 21 MG/DL (ref 6–20)
CA-I BLDV-SCNC: 1.54 MMOL/L (ref 1.06–1.42)
CALCIUM SERPL-MCNC: 10.4 MG/DL (ref 8.7–10.5)
CHLORIDE SERPL-SCNC: 105 MMOL/L (ref 95–110)
CO2 SERPL-SCNC: 22 MMOL/L (ref 23–29)
CREAT SERPL-MCNC: 1.2 MG/DL (ref 0.5–1.4)
DIFFERENTIAL METHOD: ABNORMAL
EOSINOPHIL # BLD AUTO: 1.6 K/UL (ref 0–0.5)
EOSINOPHIL NFR BLD: 13.6 % (ref 0–8)
ERYTHROCYTE [DISTWIDTH] IN BLOOD BY AUTOMATED COUNT: 12.8 % (ref 11.5–14.5)
EST. GFR  (AFRICAN AMERICAN): >60 ML/MIN/1.73 M^2
EST. GFR  (NON AFRICAN AMERICAN): >60 ML/MIN/1.73 M^2
GLUCOSE SERPL-MCNC: 144 MG/DL (ref 70–110)
HCT VFR BLD AUTO: 22.7 % (ref 40–54)
HGB BLD-MCNC: 7.2 G/DL (ref 14–18)
IMM GRANULOCYTES # BLD AUTO: 0.38 K/UL (ref 0–0.04)
IMM GRANULOCYTES NFR BLD AUTO: 3.2 % (ref 0–0.5)
LYMPHOCYTES # BLD AUTO: 1.9 K/UL (ref 1–4.8)
LYMPHOCYTES NFR BLD: 16 % (ref 18–48)
MCH RBC QN AUTO: 29 PG (ref 27–31)
MCHC RBC AUTO-ENTMCNC: 31.7 G/DL (ref 32–36)
MCV RBC AUTO: 92 FL (ref 82–98)
MONOCYTES # BLD AUTO: 0.9 K/UL (ref 0.3–1)
MONOCYTES NFR BLD: 7.5 % (ref 4–15)
NEUTROPHILS # BLD AUTO: 6.9 K/UL (ref 1.8–7.7)
NEUTROPHILS NFR BLD: 58.5 % (ref 38–73)
NRBC BLD-RTO: 0 /100 WBC
PLATELET # BLD AUTO: 349 K/UL (ref 150–450)
PMV BLD AUTO: 9.5 FL (ref 9.2–12.9)
POTASSIUM SERPL-SCNC: 3 MMOL/L (ref 3.5–5.1)
PROCALCITONIN SERPL IA-MCNC: 0.62 NG/ML
PROT SERPL-MCNC: 6.7 G/DL (ref 6–8.4)
RBC # BLD AUTO: 2.48 M/UL (ref 4.6–6.2)
SODIUM SERPL-SCNC: 138 MMOL/L (ref 136–145)
WBC # BLD AUTO: 11.81 K/UL (ref 3.9–12.7)

## 2022-05-08 PROCEDURE — 63600175 PHARM REV CODE 636 W HCPCS: Performed by: STUDENT IN AN ORGANIZED HEALTH CARE EDUCATION/TRAINING PROGRAM

## 2022-05-08 PROCEDURE — 80053 COMPREHEN METABOLIC PANEL: CPT

## 2022-05-08 PROCEDURE — 97116 GAIT TRAINING THERAPY: CPT

## 2022-05-08 PROCEDURE — 63600175 PHARM REV CODE 636 W HCPCS: Performed by: NURSE PRACTITIONER

## 2022-05-08 PROCEDURE — 25000003 PHARM REV CODE 250: Performed by: INTERNAL MEDICINE

## 2022-05-08 PROCEDURE — 94761 N-INVAS EAR/PLS OXIMETRY MLT: CPT

## 2022-05-08 PROCEDURE — 84145 PROCALCITONIN (PCT): CPT | Performed by: STUDENT IN AN ORGANIZED HEALTH CARE EDUCATION/TRAINING PROGRAM

## 2022-05-08 PROCEDURE — 99900035 HC TECH TIME PER 15 MIN (STAT)

## 2022-05-08 PROCEDURE — 25000003 PHARM REV CODE 250: Performed by: ORTHOPAEDIC SURGERY

## 2022-05-08 PROCEDURE — 36415 COLL VENOUS BLD VENIPUNCTURE: CPT | Performed by: STUDENT IN AN ORGANIZED HEALTH CARE EDUCATION/TRAINING PROGRAM

## 2022-05-08 PROCEDURE — 25000003 PHARM REV CODE 250: Performed by: NURSE PRACTITIONER

## 2022-05-08 PROCEDURE — 97530 THERAPEUTIC ACTIVITIES: CPT

## 2022-05-08 PROCEDURE — 25000003 PHARM REV CODE 250

## 2022-05-08 PROCEDURE — 82330 ASSAY OF CALCIUM: CPT | Performed by: ORTHOPAEDIC SURGERY

## 2022-05-08 PROCEDURE — 63600175 PHARM REV CODE 636 W HCPCS: Performed by: INTERNAL MEDICINE

## 2022-05-08 PROCEDURE — 94799 UNLISTED PULMONARY SVC/PX: CPT

## 2022-05-08 PROCEDURE — 11000001 HC ACUTE MED/SURG PRIVATE ROOM

## 2022-05-08 PROCEDURE — A4216 STERILE WATER/SALINE, 10 ML: HCPCS | Performed by: ORTHOPAEDIC SURGERY

## 2022-05-08 PROCEDURE — 85025 COMPLETE CBC W/AUTO DIFF WBC: CPT

## 2022-05-08 RX ORDER — LANOLIN ALCOHOL/MO/W.PET/CERES
800 CREAM (GRAM) TOPICAL
Status: DISCONTINUED | OUTPATIENT
Start: 2022-05-08 | End: 2022-05-11 | Stop reason: HOSPADM

## 2022-05-08 RX ORDER — SODIUM,POTASSIUM PHOSPHATES 280-250MG
2 POWDER IN PACKET (EA) ORAL
Status: DISCONTINUED | OUTPATIENT
Start: 2022-05-08 | End: 2022-05-11 | Stop reason: HOSPADM

## 2022-05-08 RX ADMIN — CALCITONIN SALMON 650 UNITS: 200 INJECTION, SOLUTION INTRAMUSCULAR; SUBCUTANEOUS at 11:05

## 2022-05-08 RX ADMIN — HYDRALAZINE HYDROCHLORIDE 50 MG: 25 TABLET, FILM COATED ORAL at 05:05

## 2022-05-08 RX ADMIN — CYCLOBENZAPRINE 10 MG: 10 TABLET, FILM COATED ORAL at 06:05

## 2022-05-08 RX ADMIN — CEFEPIME HYDROCHLORIDE 2 G: 2 INJECTION, SOLUTION INTRAVENOUS at 09:05

## 2022-05-08 RX ADMIN — MORPHINE SULFATE 2 MG: 4 INJECTION INTRAVENOUS at 11:05

## 2022-05-08 RX ADMIN — OXYCODONE AND ACETAMINOPHEN 1 TABLET: 7.5; 325 TABLET ORAL at 06:05

## 2022-05-08 RX ADMIN — Medication 2 ML: at 06:05

## 2022-05-08 RX ADMIN — OXYCODONE AND ACETAMINOPHEN 1 TABLET: 7.5; 325 TABLET ORAL at 01:05

## 2022-05-08 RX ADMIN — POTASSIUM BICARBONATE 60 MEQ: 391 TABLET, EFFERVESCENT ORAL at 03:05

## 2022-05-08 RX ADMIN — HEPARIN SODIUM 5000 UNITS: 5000 INJECTION INTRAVENOUS; SUBCUTANEOUS at 05:05

## 2022-05-08 RX ADMIN — CEFEPIME HYDROCHLORIDE 2 G: 2 INJECTION, SOLUTION INTRAVENOUS at 05:05

## 2022-05-08 RX ADMIN — OXYCODONE AND ACETAMINOPHEN 1 TABLET: 7.5; 325 TABLET ORAL at 09:05

## 2022-05-08 RX ADMIN — HYDRALAZINE HYDROCHLORIDE 50 MG: 25 TABLET, FILM COATED ORAL at 09:05

## 2022-05-08 RX ADMIN — Medication 2 ML: at 01:05

## 2022-05-08 RX ADMIN — CALCITONIN SALMON 650 UNITS: 200 INJECTION, SOLUTION INTRAMUSCULAR; SUBCUTANEOUS at 09:05

## 2022-05-08 RX ADMIN — DOCUSATE SODIUM AND SENNOSIDES 1 TABLET: 8.6; 5 TABLET, FILM COATED ORAL at 09:05

## 2022-05-08 RX ADMIN — SODIUM CHLORIDE: 0.9 INJECTION, SOLUTION INTRAVENOUS at 04:05

## 2022-05-08 RX ADMIN — HEPARIN SODIUM 5000 UNITS: 5000 INJECTION INTRAVENOUS; SUBCUTANEOUS at 09:05

## 2022-05-08 RX ADMIN — Medication 324 MG: at 04:05

## 2022-05-08 RX ADMIN — MORPHINE SULFATE 2 MG: 4 INJECTION INTRAVENOUS at 05:05

## 2022-05-08 RX ADMIN — HYDRALAZINE HYDROCHLORIDE 50 MG: 25 TABLET, FILM COATED ORAL at 01:05

## 2022-05-08 RX ADMIN — CEFEPIME HYDROCHLORIDE 2 G: 2 INJECTION, SOLUTION INTRAVENOUS at 12:05

## 2022-05-08 RX ADMIN — MORPHINE SULFATE 2 MG: 4 INJECTION INTRAVENOUS at 03:05

## 2022-05-08 RX ADMIN — SODIUM CHLORIDE: 0.9 INJECTION, SOLUTION INTRAVENOUS at 03:05

## 2022-05-08 RX ADMIN — Medication 2 ML: at 12:05

## 2022-05-08 RX ADMIN — METOPROLOL SUCCINATE 25 MG: 25 TABLET, EXTENDED RELEASE ORAL at 09:05

## 2022-05-08 RX ADMIN — HEPARIN SODIUM 5000 UNITS: 5000 INJECTION INTRAVENOUS; SUBCUTANEOUS at 01:05

## 2022-05-08 RX ADMIN — Medication 324 MG: at 09:05

## 2022-05-08 RX ADMIN — Medication 2 ML: at 11:05

## 2022-05-08 RX ADMIN — Medication 2 ML: at 05:05

## 2022-05-08 NOTE — PROGRESS NOTES
[facility] Hospital Medicine  Progress Note    Patient Name: Agus Horan  MRN: [id number]  Patient Class: IP- Inpatient   Admission Date: 4/13/2022  Length of Stay: 25 days  Attending Physician: Terrance Bentley MD  Primary Care Provider: Primary Doctor No        Subjective:     Principal Problem:Acute renal failure with tubular necrosis        HPI:  Agus Horan is a 22 y/o male with PMHx of mild asthma who presents with right lower extremity pain and swelling x few days. Patient was seen in the ED on 4/9/22 for oxycodone overdose and again on 4/10/22 for left eye pain after falling on a piece of furniture. Patient states he is unsure how he injured his leg and denies IV drug use. Xray of his right leg did not show acute fracture. Patietn states RLE pain and swelling increased over the past few days and he is unable to walk secondary to pain. US of his lower extremity performed today did not show DVT. Lab work revealed elevated Creatinine 13.5 & K+ 7.1, elevated AST/ALT 3980/1611 & WBC 18.14.  CPK>40,0000.  Patient unable to dorsiflex and plantar flex his RLE and symptoms are concerning for compartment syndrome per ED. ED provider discussed with Dr. Byers and Dr. Fraser, nephrology. Patient was referred to hospital medicine and seen in the ED with his friend at bedside. Patient complaining of RLE pain, tenderness and swelling. He denies SOB, N/V/D, chest pain and headaches.  Patient will be admitted to hospital medicine for orthopedic consultation and further management.      Overview/Hospital Course:  Pt admitted for SANDRA, rhabdomyolisis, and RLE compartment syndrome. Pt was taken to the OR by Dr. Byers for emergent fasciotomy on 4/13. CPK at the time of arrival was more than 40,000. Pt was initially started on IV fluids and required emergent hemodialysis on 4/14 for hyperkalemia and metabolic acidosis refractory to medical management. Pt returned to OR with Dr. Byers on 4/18 and  underwent debridement of RLE with closure of medial and lateral fasciotomy incision and biopsy of anterior muscle compartment. Patient's CPK level was monitored closely and continued to trend down.  Patient underwent repeat debridement of deep right lateral fasciotomy incision right lower leg with excisional  debridement of dead anterior tibialis muscle and biopsy of muscle with application of wound VAC on April 25, 2022. Pts pain was not well controlled and medications were adjusted. On 5/1 pt developed persistent tachycardia with mild hypoxemia with room air sat 90%.  Patient unable to get CTA chest due to acute renal failure and hemodialysis.  Chest x-ray obtained and negative.  Patient encouraged to utilize IS and O2 sats improved.  US of bilat LE obtained and neg for DVT with some limitation to RLE due to drsg intact. Decision was made to initiate heparin drip for high suspicion of PE until V/Q scan could be performed.  Patient also developed low-grade fever and Infectious Disease was consulted for further assistance and recommendations.  Patient was continued on IV antibiotics.  V/Q scan was obtained on 05/02 and neg for PE.  Heparin drip was stopped evening of 5/2 and pt was placed on SQ heparin for VTE prophylaixis.   Wound VAC was changed on 05/02 by Dr. Byers and wound care nurse with findings of some necrotic tissue with purulent drainage.  Antibiotics were tailored by Infectious Disease.  Patient to OR for right lower extremity surgical site washout on 05/05/2022.      Interval History: Notes reviewed, no acute events overnight. Patient seen resting in bed this morning w/o any acute distress. Patient remains in good spirits. He is currently afebrile without any fevers last night. Patient states his RLE is without pain at this time. He states the pain medication helps. He reports his pain flares up at night with stinging, burning pain around his wound. He asked about switching back to Dilaudid. I  informed patient we will resume with the current pain regiment for now. Labs drawn this morning still pending. IV abx continued. He denies any other complaints at this time.      Review of Systems   Constitutional:  Negative for chills, fatigue and fever.   HENT:  Negative for congestion, postnasal drip, rhinorrhea and sore throat.    Respiratory:  Negative for cough, chest tightness and shortness of breath.    Cardiovascular:  Negative for chest pain, palpitations and leg swelling.   Gastrointestinal:  Negative for abdominal distention, abdominal pain, nausea and vomiting.   Endocrine: Negative for polydipsia and polyuria.   Genitourinary:  Negative for difficulty urinating, dysuria, flank pain and hematuria.   Musculoskeletal:  Positive for arthralgias and myalgias. Negative for back pain and joint swelling.   Skin:  Positive for wound.        Right lower extremity    Neurological:  Negative for dizziness, weakness and headaches.   Hematological:  Negative for adenopathy.   Psychiatric/Behavioral:  Negative for agitation, behavioral problems and confusion.    Objective:     Vital Signs (Most Recent):  Temp: 96.5 °F (35.8 °C) (05/08/22 0726)  Pulse: 101 (05/08/22 0811)  Resp: 18 (05/08/22 0902)  BP: (!) 169/77 (05/08/22 0903)  SpO2: 98 % (05/08/22 0811)   Vital Signs (24h Range):  Temp:  [96.5 °F (35.8 °C)-98.1 °F (36.7 °C)] 96.5 °F (35.8 °C)  Pulse:  [101-115] 101  Resp:  [15-18] 18  SpO2:  [97 %-99 %] 98 %  BP: (141-185)/(75-94) 169/77     Weight: 81.2 kg (179 lb)  Body mass index is 26.43 kg/m².    Intake/Output Summary (Last 24 hours) at 5/8/2022 1056  Last data filed at 5/8/2022 0537  Gross per 24 hour   Intake 282 ml   Output 4050 ml   Net -3768 ml      Physical Exam  Vitals and nursing note reviewed.   Constitutional:       General: He is not in acute distress.     Appearance: Normal appearance. He is not ill-appearing.   HENT:      Head: Normocephalic and atraumatic.      Right Ear: External ear normal.       Left Ear: External ear normal.      Nose: Nose normal. No congestion or rhinorrhea.      Mouth/Throat:      Mouth: Mucous membranes are moist.      Pharynx: Oropharynx is clear. No oropharyngeal exudate or posterior oropharyngeal erythema.   Eyes:      Extraocular Movements: Extraocular movements intact.      Conjunctiva/sclera: Conjunctivae normal.      Pupils: Pupils are equal, round, and reactive to light.   Cardiovascular:      Rate and Rhythm: Regular rhythm. Tachycardia present.      Pulses: Normal pulses.      Heart sounds: Normal heart sounds. No murmur heard.    No gallop.   Pulmonary:      Effort: Pulmonary effort is normal. No respiratory distress.      Breath sounds: Normal breath sounds. No wheezing or rales.   Abdominal:      General: Abdomen is flat. Bowel sounds are normal. There is no distension.      Palpations: Abdomen is soft.      Tenderness: There is no abdominal tenderness.   Musculoskeletal:         General: Tenderness present.      Comments: Right lower extremity braced and dressed, Dorsal flexion of toes ROM decreased, Wound vac in place over right leg wound   Skin:     General: Skin is warm and dry.      Capillary Refill: Capillary refill takes less than 2 seconds.      Coloration: Skin is not jaundiced.      Findings: No bruising.   Neurological:      General: No focal deficit present.      Mental Status: He is alert and oriented to person, place, and time. Mental status is at baseline.      Cranial Nerves: No cranial nerve deficit.      Sensory: No sensory deficit.      Motor: No weakness.   Psychiatric:         Mood and Affect: Mood normal.         Behavior: Behavior normal.       Significant Labs: All pertinent labs within the past 24 hours have been reviewed.  CBC:   Recent Labs   Lab 05/07/22  0845 05/08/22  0954   WBC 11.73 11.81   HGB 7.0* 7.2*   HCT 21.5* 22.7*    349     CMP:   Recent Labs   Lab 05/07/22  0844      K 3.3*      CO2 24   GLU 98   BUN 29*    CREATININE 1.5*   CALCIUM 10.7*   PROT 6.6   ALBUMIN 2.3*   BILITOT 0.2   ALKPHOS 111   AST 41*   ALT 30   ANIONGAP 9   EGFRNONAA >60       Significant Imaging: I have reviewed all pertinent imaging results/findings within the past 24 hours.      Assessment/Plan:      * Acute renal failure with tubular necrosis  Continues to be anuric  Hemodialysis therapy as per Nephrology team.  due to rhabdomyolysis,   Trend CPK daily.  Follow Nephrology recommendations.  Avoid nephrotoxins  Telemetry.  Hemo split catheter was placed on April 25, 2022.     4/30 - remains on hemodialysis, emergent dialysis today secondary to hypercalcemia.  Will continue to monitor closely  5/1 - HD again today secondary to hypercalcemia. Renal function improving. Cont to monitor closely.  5/2 - hemodialysis treatment again today.  Continue to monitor  5/3 - stable, nephrology following, cont routine HD    Resolving. HD on hold-- patient with renal recovery  5/7 - BUN/Cr of 29/1.5, Cont to follow Nephrology recs   5/8 - stable, nephrology following    Hypokalemia  nephrology consulted. Adjust K bath. Trend BMP      Hypercalcemia  Nephrology consulted. On calcitonin   Improving    Severe sepsis  This patient does have evidence of infective focus  My overall impression is sepsis. Vital signs were reviewed and noted in progress note.  Antibiotics given-   Antibiotics (From admission, onward)            Start     Stop Route Frequency Ordered    05/08/22 0100  cefepime in dextrose 5 % IVPB 2 g         -- IV Every 8 hours (non-standard times) 05/07/22 1848        Cultures were taken-   Microbiology Results (last 7 days)     Procedure Component Value Units Date/Time    Blood culture [[id number]] Collected: 05/02/22 0410    Order Status: Completed Specimen: Blood from Antecubital, Right Arm Updated: 05/07/22 1212     Blood Culture, Routine No growth after 5 days.    Tissue culture [[id number]]  (Abnormal) Collected: 05/05/22 1530    Order Status:  Completed Specimen: Tissue from Leg, Right Updated: 05/07/22 1008     Aerobic Culture - Tissue GRAM NEGATIVE RAJI  Many  Identification and susceptibility pending       Gram Stain Result Rare WBC's      Rare Gram negative rods    Culture, Body Fluid (Aerobic) w/ GS [[id number]]  (Abnormal) Collected: 05/05/22 1505    Order Status: Completed Specimen: Synovial Fluid from Ankle, Right Updated: 05/07/22 0958     AEROBIC CULTURE - FLUID GRAM NEGATIVE RAJI  Moderate  Identification and susceptibility pending       Gram Stain Result Few WBC's      No organisms seen    Blood culture [[id number]] Collected: 05/01/22 1216    Order Status: Completed Specimen: Blood Updated: 05/06/22 1812     Blood Culture, Routine No growth after 5 days.    Narrative:      Collection has been rescheduled by [facility] at 05/01/2022 11:22 Reason:   Patient unavailable starting dialysis and with the dr     Collection has been rescheduled by [facility] at 05/01/2022 11:42 Reason:   Got first set second set due after 12 couldnt stick other side due to   IV   Collection has been rescheduled by [facility] at 05/01/2022 11:22 Reason:   Patient unavailable starting dialysis and with the dr     Collection has been rescheduled by [facility] at 05/01/2022 11:42 Reason:   Got first set second set due after 12 couldnt stick other side due to   IV     Blood culture [[id number]] Collected: 05/01/22 1140    Order Status: Completed Specimen: Blood Updated: 05/06/22 1812     Blood Culture, Routine No growth after 5 days.    Narrative:      Collection has been rescheduled by [facility] at 05/01/2022 11:22 Reason:   Patient unavailable starting dialysis and with the dr   Collection has been rescheduled by [facility] at 05/01/2022 11:22 Reason:   Patient unavailable starting dialysis and with the dr     Culture, Anaerobic [[id number]] Collected: 05/02/22 1400    Order Status: Completed Specimen: Wound from Leg, Right Updated: 05/06/22 1102     Anaerobic Culture Culture in progress    Culture, Anaerobic  [[id number]] Collected: 05/05/22 1505    Order Status: Sent Specimen: Wound from Leg, Right Updated: 05/06/22 0030    Fungus culture [[id number]] Collected: 05/05/22 1505    Order Status: Sent Specimen: Wound from Leg, Right Updated: 05/06/22 0027    Aerobic culture [[id number]]  (Abnormal)  (Susceptibility) Collected: 05/02/22 1400    Order Status: Completed Specimen: Wound from Leg, Right Updated: 05/05/22 1256     Aerobic Bacterial Culture SERRATIA MARCESCENS  Many        ENTEROBACTER CLOACAE  Moderate      Urine culture [[id number]] Collected: 05/01/22 2211    Order Status: Completed Specimen: Urine Updated: 05/03/22 0802     Urine Culture, Routine No growth    Narrative:      Specimen Source->Urine    Influenza A & B by Molecular [[id number]] Collected: 05/02/22 0900    Order Status: Completed Specimen: Nasopharyngeal Swab Updated: 05/02/22 1021     Influenza A, Molecular Negative     Influenza B, Molecular Negative     Flu A & B Source Nasal swab        Latest lactate reviewed, they are-  No results for input(s): LACTATE in the last 72 hours.    Organ dysfunction indicated by Acute respiratory failure and tachycardia  Source- right leg fasciotomy site    Source control Achieved by- IV abx, ID following  CRP trending downward      Hypoxia  Concern for PE given tachycardia and new hypoxia  Will obtain chest x-ray and check D-dimer which will likely be elevated given renal failure and recent surgery, will also check lactic acid  Will discuss with Nephrology high concern for PE and need for further workup with CTA verses V/Q scan and also initiating anticoag therapy  continue telemetry monitoring  Supplemental O2 to keep sats >92%  Pt encouraged to use IS    5/3 - VQ scan negative, heparin gtt stopped. Suspect hypoxia secondary to sepsis which is now improving with appropriate source control. Pt encouraged to use IS. O2 sats improving and stable on room air. Will cont to monitor  closely.    resolved          Tachycardia  Persistent tachycardia over the past 24 hours  Patient denies chest pain  Concern for PE given tachycardia and new hypoxia  Will obtain chest x-ray and check D-dimer which will likely be elevated given renal failure and recent surgery, will also check lactic acid  Will discuss with Nephrology high concern for PE and need for further workup with CTA verses V/Q scan and also initiating anticoag therapy  Patient also hypertensive-will start metoprolol XL today as per chart review patient has had some intermittent tachycardia during his stay  Continue telemetry monitoring    5/2 - patient tolerating heparin drip.  Will obtain V/Q scan and echocardiogram today.  Workup in progress for possible infectious process.  Appreciate ID consult.    5/3 - improving with source control, VQ scan neg and heparin gtt stopped 5/2. Appreciate ID consult.           Anemia of chronic disease  Patient's anemia is currently controlled. Has recieved 1 units of PRBCs. Etiology likely d/t acute blood loss  Current CBC reviewed-   Lab Results   Component Value Date    HGB 7.2 (L) 05/08/2022    HCT 22.7 (L) 05/08/2022     Monitor serial CBC and transfuse if patient becomes hemodynamically unstable, symptomatic or H/H drops below 7/21.   Continue iron tablet- continue ALBERTO 3x per week SQ per nephrology recs        Drug eruption  No definite etiology apparent.  Possibly clindamycin use.  Off intravenous clindamycin.  Use oral Benadryl 25 mg q.6 hours p.r.n. patient will be monitored closely.    4/30 - resolved      Compartment syndrome  Underwent fasciotomy on 4/13   S/p Debridement deep right lateral fasciotomy incision right lower leg with excisional  debridement of dead anterior tibialis muscle and biopsy of muscle application of wound VAC right lower leg  - 04/25/22.  Wound care nurse performing dressing changes as per recommendations by Orthopedics.    4/30 - stable, wound vac in use, cont current  therapy    5/2 - plan for wound VAC change today  5/3 - wound vac changed yesterday, d/w wound care nurse, area of necrotic tissue with purulent drainage. Wound debrided at BS by Dr. Byers. Plan for wound washout and further debridement in OR tomorrow or Thursday. Cont abx, appreciate ID consult.    To OR for wash out on 5/5/2022  New cultures obtained on 5/5/2022 resulted in many Gram negative rods, ID following, continue IV cefepime, wound vac in place     Elevated liver enzymes  Elevated AST &ALT likely due to rhabdomyolysis  Trending down  Avoid hepatoxic medications  Monitor CMP  See plan for rhabdomyolysis    4/30 - resolved, cont to monitor CMP        Rhabdomyolysis  CPK trending down  S/p fasciotomy on 4/13 for compartment syndrome.  On 4/18 S/p DEBRIDEMENT, LOWER EXTREMITY (Right)   closure of medial fasciotomy incision right leg debridement of lateral fasciotomy with biopsy of anterior muscle compartment removal of deep muscle anterior compartment right lower leg. Subsequently underwent Debridement deep right lateral fasciotomy incision right lower leg with excisional  debridement of dead anterior tibialis muscle and biopsy of muscle application of wound VAC right lower leg on 04/25/22.  Monitor BMP   Follow Nephrology recommendations.    4/30 - improved and stable, cont to monitor closely      History of asthma  Hx of asthma, stable  Monitor for acute changes        VTE Risk Mitigation (From admission, onward)         Ordered     heparin (porcine) injection 5,000 Units  Every 8 hours         05/02/22 1921     IP VTE HIGH RISK PATIENT  Once         05/02/22 1921     heparin (porcine) injection 4,000 Units  As needed (PRN)         04/14/22 0137     Reason for No Pharmacological VTE Prophylaxis  Once        Question:  Reasons:  Answer:  Risk of Bleeding    04/13/22 1913     Place sequential compression device  Until discontinued         04/13/22 1913                Discharge Planning   JARAD:      Code  Status: Full Code   Is the patient medically ready for discharge?:     Reason for patient still in hospital (select all that apply): Patient trending condition, Treatment and Pending disposition  Discharge Plan A: Long-term acute care facility (LTAC)                  Eliezer Arteaga PA-C  Department of Hospital Medicine   [facility]

## 2022-05-08 NOTE — SUBJECTIVE & OBJECTIVE
Interval History: Notes reviewed, no acute events overnight. Patient seen resting in bed this morning w/o any acute distress. Patient remains in good spirits. He is currently afebrile without any fevers last night. Patient states his RLE is without pain at this time. He states the pain medication helps. He reports his pain flares up at night with stinging, burning pain around his wound. He asked about switching back to Dilaudid. I informed patient we will resume with the current pain regiment for now. Labs drawn this morning still pending. IV abx continued. He denies any other complaints at this time.      Review of Systems   Constitutional:  Negative for chills, fatigue and fever.   HENT:  Negative for congestion, postnasal drip, rhinorrhea and sore throat.    Respiratory:  Negative for cough, chest tightness and shortness of breath.    Cardiovascular:  Negative for chest pain, palpitations and leg swelling.   Gastrointestinal:  Negative for abdominal distention, abdominal pain, nausea and vomiting.   Endocrine: Negative for polydipsia and polyuria.   Genitourinary:  Negative for difficulty urinating, dysuria, flank pain and hematuria.   Musculoskeletal:  Positive for arthralgias and myalgias. Negative for back pain and joint swelling.   Skin:  Positive for wound.        Right lower extremity    Neurological:  Negative for dizziness, weakness and headaches.   Hematological:  Negative for adenopathy.   Psychiatric/Behavioral:  Negative for agitation, behavioral problems and confusion.    Objective:     Vital Signs (Most Recent):  Temp: 96.5 °F (35.8 °C) (05/08/22 0726)  Pulse: 101 (05/08/22 0811)  Resp: 18 (05/08/22 0902)  BP: (!) 169/77 (05/08/22 0903)  SpO2: 98 % (05/08/22 0811)   Vital Signs (24h Range):  Temp:  [96.5 °F (35.8 °C)-98.1 °F (36.7 °C)] 96.5 °F (35.8 °C)  Pulse:  [101-115] 101  Resp:  [15-18] 18  SpO2:  [97 %-99 %] 98 %  BP: (141-185)/(75-94) 169/77     Weight: 81.2 kg (179 lb)  Body mass index is  26.43 kg/m².    Intake/Output Summary (Last 24 hours) at 5/8/2022 1056  Last data filed at 5/8/2022 0537  Gross per 24 hour   Intake 282 ml   Output 4050 ml   Net -3768 ml      Physical Exam  Vitals and nursing note reviewed.   Constitutional:       General: He is not in acute distress.     Appearance: Normal appearance. He is not ill-appearing.   HENT:      Head: Normocephalic and atraumatic.      Right Ear: External ear normal.      Left Ear: External ear normal.      Nose: Nose normal. No congestion or rhinorrhea.      Mouth/Throat:      Mouth: Mucous membranes are moist.      Pharynx: Oropharynx is clear. No oropharyngeal exudate or posterior oropharyngeal erythema.   Eyes:      Extraocular Movements: Extraocular movements intact.      Conjunctiva/sclera: Conjunctivae normal.      Pupils: Pupils are equal, round, and reactive to light.   Cardiovascular:      Rate and Rhythm: Regular rhythm. Tachycardia present.      Pulses: Normal pulses.      Heart sounds: Normal heart sounds. No murmur heard.    No gallop.   Pulmonary:      Effort: Pulmonary effort is normal. No respiratory distress.      Breath sounds: Normal breath sounds. No wheezing or rales.   Abdominal:      General: Abdomen is flat. Bowel sounds are normal. There is no distension.      Palpations: Abdomen is soft.      Tenderness: There is no abdominal tenderness.   Musculoskeletal:         General: Tenderness present.      Comments: Right lower extremity braced and dressed, Dorsal flexion of toes ROM decreased, Wound vac in place over right leg wound   Skin:     General: Skin is warm and dry.      Capillary Refill: Capillary refill takes less than 2 seconds.      Coloration: Skin is not jaundiced.      Findings: No bruising.   Neurological:      General: No focal deficit present.      Mental Status: He is alert and oriented to person, place, and time. Mental status is at baseline.      Cranial Nerves: No cranial nerve deficit.      Sensory: No sensory  deficit.      Motor: No weakness.   Psychiatric:         Mood and Affect: Mood normal.         Behavior: Behavior normal.       Significant Labs: All pertinent labs within the past 24 hours have been reviewed.  CBC:   Recent Labs   Lab 05/07/22  0845 05/08/22  0954   WBC 11.73 11.81   HGB 7.0* 7.2*   HCT 21.5* 22.7*    349     CMP:   Recent Labs   Lab 05/07/22  0844      K 3.3*      CO2 24   GLU 98   BUN 29*   CREATININE 1.5*   CALCIUM 10.7*   PROT 6.6   ALBUMIN 2.3*   BILITOT 0.2   ALKPHOS 111   AST 41*   ALT 30   ANIONGAP 9   EGFRNONAA >60       Significant Imaging: I have reviewed all pertinent imaging results/findings within the past 24 hours.

## 2022-05-08 NOTE — PLAN OF CARE
Pt in bed awake and alert, no distress noted. Pt is medicated a couple of times for c/o pain t the right lower extremity and was well tolerated and effective. Wound vac is on and working, no leakage noted. NS is infusing at 100ml/hr. Vitals are stable and pt is voiding well.  Problem: Adult Inpatient Plan of Care  Goal: Plan of Care Review  Outcome: Ongoing, Progressing  Goal: Patient-Specific Goal (Individualized)  Outcome: Ongoing, Progressing  Goal: Optimal Comfort and Wellbeing  Outcome: Ongoing, Progressing  Goal: Readiness for Transition of Care  Outcome: Ongoing, Progressing

## 2022-05-08 NOTE — PT/OT/SLP PROGRESS
Physical Therapy Treatment    Patient Name:  Agus Horan   MRN:  [id number]    Recommendations:     Discharge Recommendations:  LTACH (long-term acute care [facility])   Discharge Equipment Recommendations: walker, rolling, bedside commode   Barriers to discharge: None    Assessment:     Agus Horan is a 21 y.o. male admitted with a medical diagnosis of Acute renal failure with tubular necrosis.  He presents with the following impairments/functional limitations:  weakness, impaired endurance, impaired self care skills, impaired functional mobilty, gait instability, impaired balance, decreased lower extremity function, pain, impaired skin, edema. Patient agreeable to strength/mobility training.    Rehab Prognosis: Fair; patient would benefit from acute skilled PT services to address these deficits and reach maximum level of function.    Recent Surgery: Procedure(s) (LRB):  REPLACEMENT, WOUND VAC (Right)  DEBRIDEMENT, LOWER EXTREMITY (Right) 3 Days Post-Op    Plan:     During this hospitalization, patient to be seen daily to address the identified rehab impairments via gait training, therapeutic activities, therapeutic exercises and progress toward the following goals:    · Plan of Care Expires:  05/15/22    Subjective     Chief Complaint: generalized weakness  Patient/Family Comments/goals: improve overall strength/mobility   Pain/Comfort:  · Pain Rating 1: 3/10  · Location - Side 1: Right  · Location - Orientation 1: lower  · Location 1: leg  · Pain Addressed 1: Cessation of Activity  · Pain Rating Post-Intervention 1: 0/10  · Pain Rating 2: 0/10      Objective:     Communicated with nurse prior to session.  Patient found supine with bed alarm, peripheral IV, telemetry, wound vac upon PT entry to room.     General Precautions: Standard, fall   Orthopedic Precautions:RLE non weight bearing   Braces: N/A  Respiratory Status: Room air     Functional Mobility:  · Bed Mobility:     · Supine to Sit: stand by  assistance  · Transfers:     · Sit to Stand:  minimum assistance with axillary crutches  · Gait: patient requested use of crutches; functional ambulation 40 feet with bilateral crutches and minimal assist - had to stop due to lightheadedness; patient placed in wheelchair after ambulation; stand by assist transfer wheelchair to bedside chair; frequent rest breaks needed due to lightheadedness - nursing aware       AM-PAC 6 CLICK MOBILITY  Turning over in bed (including adjusting bedclothes, sheets and blankets)?: 3  Sitting down on and standing up from a chair with arms (e.g., wheelchair, bedside commode, etc.): 3  Moving from lying on back to sitting on the side of the bed?: 3  Moving to and from a bed to a chair (including a wheelchair)?: 3  Need to walk in hospital room?: 3  Climbing 3-5 steps with a railing?: 1  Basic Mobility Total Score: 16       Therapeutic Activities and Exercises:   n/a    Patient left up in chair with all lines intact, call button in reach and nurse present..    GOALS:   Multidisciplinary Problems     Physical Therapy Goals        Problem: Physical Therapy    Goal Priority Disciplines Outcome Goal Variances Interventions   Physical Therapy Goal     PT, PT/OT Ongoing, Progressing     Description: Goals to be met by: 5/15/2022     Patient will increase functional independence with mobility by performin). Supine to sit with Modified [place]  2). Sit to stand transfer with Modified [place] maintaining RLE NWB using axillary crutches or rolling walker  3). Bed to chair transfer with Modified [place] maintaining RLE NWB using axillary crutches or rolling walker  5). Gait  x 150 feet with Modified [place] maintaining RLE NWB using axillary crutches or rolling walker                     Time Tracking:     PT Received On: 22  PT Start Time: 945     PT Stop Time: 1015  PT Total Time (min): 30 min     Billable Minutes: Gait Training 20 and Therapeutic Activity  10    Treatment Type: Treatment  PT/PTA: PT     PTA Visit Number: 0     05/08/2022

## 2022-05-08 NOTE — ASSESSMENT & PLAN NOTE
Patient's anemia is currently controlled. Has recieved 1 units of PRBCs. Etiology likely d/t acute blood loss  Current CBC reviewed-   Lab Results   Component Value Date    HGB 7.2 (L) 05/08/2022    HCT 22.7 (L) 05/08/2022     Monitor serial CBC and transfuse if patient becomes hemodynamically unstable, symptomatic or H/H drops below 7/21.   Continue iron tablet- continue ALBERTO 3x per week SQ per nephrology recs

## 2022-05-08 NOTE — ASSESSMENT & PLAN NOTE
This patient does have evidence of infective focus  My overall impression is sepsis. Vital signs were reviewed and noted in progress note.  Antibiotics given-   Antibiotics (From admission, onward)            Start     Stop Route Frequency Ordered    05/08/22 0100  cefepime in dextrose 5 % IVPB 2 g         -- IV Every 8 hours (non-standard times) 05/07/22 1848        Cultures were taken-   Microbiology Results (last 7 days)     Procedure Component Value Units Date/Time    Blood culture [[id number]] Collected: 05/02/22 0410    Order Status: Completed Specimen: Blood from Antecubital, Right Arm Updated: 05/07/22 1212     Blood Culture, Routine No growth after 5 days.    Tissue culture [[id number]]  (Abnormal) Collected: 05/05/22 1530    Order Status: Completed Specimen: Tissue from Leg, Right Updated: 05/07/22 1008     Aerobic Culture - Tissue GRAM NEGATIVE RAJI  Many  Identification and susceptibility pending       Gram Stain Result Rare WBC's      Rare Gram negative rods    Culture, Body Fluid (Aerobic) w/ GS [[id number]]  (Abnormal) Collected: 05/05/22 1505    Order Status: Completed Specimen: Synovial Fluid from Ankle, Right Updated: 05/07/22 0958     AEROBIC CULTURE - FLUID GRAM NEGATIVE RAJI  Moderate  Identification and susceptibility pending       Gram Stain Result Few WBC's      No organisms seen    Blood culture [[id number]] Collected: 05/01/22 1216    Order Status: Completed Specimen: Blood Updated: 05/06/22 1812     Blood Culture, Routine No growth after 5 days.    Narrative:      Collection has been rescheduled by [facility] at 05/01/2022 11:22 Reason:   Patient unavailable starting dialysis and with the dr     Collection has been rescheduled by MR at 05/01/2022 11:42 Reason:   Got first set second set due after 12 couldnt stick other side due to   IV   Collection has been rescheduled by MR at 05/01/2022 11:22 Reason:   Patient unavailable starting dialysis and with the dr     Collection has been rescheduled by  MRMARIA LUZ at 05/01/2022 11:42 Reason:   Got first set second set due after 12 couldnt stick other side due to   IV     Blood culture [[id number]] Collected: 05/01/22 1140    Order Status: Completed Specimen: Blood Updated: 05/06/22 1812     Blood Culture, Routine No growth after 5 days.    Narrative:      Collection has been rescheduled by MRMARIA LUZ at 05/01/2022 11:22 Reason:   Patient unavailable starting dialysis and with the dr   Collection has been rescheduled by MR at 05/01/2022 11:22 Reason:   Patient unavailable starting dialysis and with the dr     Culture, Anaerobic [[id number]] Collected: 05/02/22 1400    Order Status: Completed Specimen: Wound from Leg, Right Updated: 05/06/22 1102     Anaerobic Culture Culture in progress    Culture, Anaerobic [[id number]] Collected: 05/05/22 1505    Order Status: Sent Specimen: Wound from Leg, Right Updated: 05/06/22 0030    Fungus culture [[id number]] Collected: 05/05/22 1505    Order Status: Sent Specimen: Wound from Leg, Right Updated: 05/06/22 0027    Aerobic culture [[id number]]  (Abnormal)  (Susceptibility) Collected: 05/02/22 1400    Order Status: Completed Specimen: Wound from Leg, Right Updated: 05/05/22 1256     Aerobic Bacterial Culture SERRATIA MARCESCENS  Many        ENTEROBACTER CLOACAE  Moderate      Urine culture [[id number]] Collected: 05/01/22 2211    Order Status: Completed Specimen: Urine Updated: 05/03/22 0802     Urine Culture, Routine No growth    Narrative:      Specimen Source->Urine    Influenza A & B by Molecular [[id number]] Collected: 05/02/22 0900    Order Status: Completed Specimen: Nasopharyngeal Swab Updated: 05/02/22 1021     Influenza A, Molecular Negative     Influenza B, Molecular Negative     Flu A & B Source Nasal swab        Latest lactate reviewed, they are-  No results for input(s): LACTATE in the last 72 hours.    Organ dysfunction indicated by Acute respiratory failure and tachycardia  Source- right leg fasciotomy site    Source control  Achieved by- IV abx, ID following  CRP trending downward

## 2022-05-08 NOTE — PROGRESS NOTES
INPATIENT NEPHROLOGY Progress Note  [facility]    Patient Name: Agus Horan  Date: 05/08/2022    Reason for consultation: SANDRA    Chief Complaint:   Chief Complaint   Patient presents with    Leg Pain     Pain in the right leg muscle calf is hard, nausea vomiting, patient was seen here over the weekend for an overdose        History of Present Illness:  22 y/o M with a history of asthma recently here on 4/9 with oxycodone overdose who p/w RLE pain and swelling after a fall on 4/10. He reports severe pain, constant, associated with nausea with inability to bear weight.  He took meloxicam without relief. He was found to have compartment syndrome and was taking to the OR emergently for fasciotomy on 4/13. We are consulted for SANDRA with metabolic derangements.    Interval History:  4/14- did emergent HD overnight for hyperkalemia and metabolic acidosis refractory to medical management; pain controlled, RLE wrapped, no edema in LLE  4/15- worsening pain/edema in RLE- going for MRI- oligoanuric- stop NS IVFs- will do HD/UF today and tomorrow  4/16  Seen on dialysis.  No distress  4/17  Remains oliguric.  AFVSS.  Has back pain.  Leg pain a little better  4/18  In the OR.  Still oliguric.    4/19  Seen on dialysis.  No distress.    4/20  Still not making much urine.  cpk coming down.     4/21  Afebrile.  BP a little better.  uop up a bit  422   Sleeping.  AFVSS.  I/Os not appropriately recorded despite being ordered for twice  4/23  225 cc UOP recorded.  K+ 5.5, HD today.  No renal recovery, CPK improving.  C/o rash, Dr. Zamorano at bedside placed orders.  Seen on dialysis, tolerating tx well.  4/24   No new lab results, next lab draw in am.   Still has rash and c/o itching.  No other complaints.  450 cc UOP recorded.  4/25  In the OR.  Plan for hd today  4/26  AFVSS.  Urine output better.  No complaints specified today  4/27  Sleeping.  Output not recorded despite being ordered  4/28  Tearful.  Not  engaging when spoken too.  Requesting dilaudid   4/29     Good urine output.  Seen on dialysis.  No distress  4/30  UOP 4.4L.  Ca+ 15, will have dialysis today, stopped calcitriol and vit D.  Will add on PTH to labs.    5/1  Ca+ 15.5, PTH suppressed.  D/w Dr. Patrick, pt to have additional dialysis today, no UF.  Notified Suni Stone w/[facility] pt needs to run today.  Pt w/elevated temp, a little more tachy than usual, not feeling very well.  5/2 tachy, hypertensive, on RA, UOP 2.6L, remains hypercalcemic- PTH appropriately suppressed, Dr. Byers is at bedside  5/3 febrile, tachy, BP better, on RA, UOP 650cc  5/4 had to terminate HD treatment short about 30min due to n/v and sinus tachy- net UF 1.6L; UOP 3.3L, SCr 2.5, serum Ca rising over last few days in conjunction with improvement in SCr- may reflect volume depletion  5/5 BP high, on RA, UOP 2.7L, going for OR procedure: Deep excisional debridement of necrotic muscle anterior compartment right leg.  Application of wound VAC.  5/6 febrile, BP stable, on RA, UOP 400cc, pain score about a 7  5/7  Tmax 101.9, pressures stable.  Ionized Ca+ trended down, CMP results still pending at present.  No complaints.  5/8  Renal function improving on yesterday's labs.  Ca+ was improved as well.  Ionized Ca+ level this am a little higher, but no other lab results are posted as of yet.  Pressures a little elevated this am, but states RLE very painful, has requested medication.    Plan of Care:    Assessment:  Sepsis  Traumatic rhabdomyolysis with compartment syndrome s/p fasciotomy on 4/13  SANDRA due to toxic ATN s/p emergent HD on 4/14- last HD 5/4  Hypercalcemia of immobilization  Elevated BP  Hypokalemia  Hyperphosphatemia  Anemia    Plan:    - Ortho following- s/p I&D on 5/5. Dose meds for CrCl > 30.  - He has renal recovery- last HD 5/4. No NSAIDS or IV contrast. Keep hemosplit for now.   - Remains hypercalcemic-Cont NS 100cc/hr. He is s/p bisphosphonate therapy on 5/5.  Ordered denosumab 60mg and another 4 doses of calcitonin on 5/6. Ordered 25 vitamin D with AM labs.  - BP is better- continue hydral 50mg TID.  - Ordered KCl repletion. Continue a regular diet.  - H/H stabilized- continue iron tablet- continue ALBERTO 3x per week SQ.      Thank you for allowing us to participate in this patient's care. We will continue to follow.    Vital Signs:  Temp Readings from Last 3 Encounters:   05/08/22 96.5 °F (35.8 °C) (Axillary)   04/10/22 97.3 °F (36.3 °C)   04/09/22 98.4 °F (36.9 °C)       Pulse Readings from Last 3 Encounters:   05/08/22 101   04/10/22 63   04/09/22 95       BP Readings from Last 3 Encounters:   05/08/22 (!) 169/77   04/10/22 117/64   04/09/22 (!) 143/82       Weight:  Wt Readings from Last 3 Encounters:   05/02/22 81.2 kg (179 lb)   04/10/22 77.1 kg (170 lb)   04/09/22 77.1 kg (170 lb)       INS/OUTS:  I/O last 3 completed shifts:  In: 582 [P.O.:580; I.V.:2]  Out: 6350 [Urine:6350]  No intake/output data recorded.    Medications:  Scheduled Meds:   calcitonin  8 Units/kg Subcutaneous BID    ceFEPime (MAXIPIME) IVPB  2 g Intravenous Q8H    denosumab  60 mg Subcutaneous 1 time in Clinic/HOD    epoetin elidy  50 Units/kg Subcutaneous Every Mon, Wed, Fri    ferrous gluconate  324 mg Oral BID WM    heparin (porcine)  5,000 Units Subcutaneous Q8H    hydrALAZINE  50 mg Oral Q8H    LIDOcaine HCL 2%   Topical (Top) Every Mon, Thurs    metoprolol succinate  25 mg Oral Daily    senna-docusate 8.6-50 mg  1 tablet Oral BID    sodium chloride 0.9%  2 mL Intravenous Q6H     Continuous Infusions:   sodium chloride 0.9% 100 mL/hr at 05/08/22 0305    electrolyte-S (pH 7.4) Stopped (05/05/22 1600)    electrolyte-S (pH 7.4)      loperamide       PRN Meds:.acetaminophen, cyclobenzaprine, dextrose 10%, dextrose 10%, diphenhydrAMINE, glucagon (human recombinant), glucose, glucose, heparin (porcine), HYDROmorphone, hydrOXYzine pamoate, labetaloL, loperamide, metoclopramide HCl,  "morphine, naloxone, ondansetron, oxyCODONE, oxyCODONE-acetaminophen, oxyCODONE-acetaminophen, phenyleph-min oil-petrolatum, sodium chloride 0.9%  No current facility-administered medications on file prior to encounter.     Current Outpatient Medications on File Prior to Encounter   Medication Sig Dispense Refill    naloxone (NARCAN) 4 mg/actuation Spry 4mg by nasal route as needed for opioid overdose; may repeat every 2-3 minutes in alternating nostrils until medical help arrives. Call 911 2 each 0       Review of Systems:  Neg    Physical Exam:  BP (!) 169/77 (Patient Position: Lying)   Pulse 101   Temp 96.5 °F (35.8 °C) (Axillary)   Resp 15   Ht 5' 9" (1.753 m)   Wt 81.2 kg (179 lb)   SpO2 98%   BMI 26.43 kg/m²     Constitutional: nad, aao x 3  Heart: rrr, no m/r/g, wwp, RLE edema  Lungs: ctab, no w/r/r/c, no lb  Abdomen: s/nt/nd, +BS    Results:  Lab Results   Component Value Date     05/07/2022    K 3.3 (L) 05/07/2022     05/07/2022    CO2 24 05/07/2022    BUN 29 (H) 05/07/2022    CREATININE 1.5 (H) 05/07/2022    CALCIUM 10.7 (H) 05/07/2022    ANIONGAP 9 05/07/2022    ESTGFRAFRICA >60 05/07/2022    EGFRNONAA >60 05/07/2022       Lab Results   Component Value Date    CALCIUM 10.7 (H) 05/07/2022    PHOS 4.4 05/05/2022       Recent Labs   Lab 05/07/22  0845   WBC 11.73   RBC 2.34*   HGB 7.0*   HCT 21.5*      MCV 92   MCH 29.9   MCHC 32.6       I have personally reviewed pertinent radiological imaging and reports.    I have spent > 35 minutes providing care for this patient for the above diagnoses. These services have included chart/data/imaging review, evaluation, exam, formulation of plan, , note preparation, and discussions with staff involved in this patient's care.    Saida Silva NP    Nephrology  [facility] [place]  [phone]    "

## 2022-05-08 NOTE — ASSESSMENT & PLAN NOTE
Persistent tachycardia over the past 24 hours  Patient denies chest pain  Concern for PE given tachycardia and new hypoxia  Will obtain chest x-ray and check D-dimer which will likely be elevated given renal failure and recent surgery, will also check lactic acid  Will discuss with Nephrology high concern for PE and need for further workup with CTA verses V/Q scan and also initiating anticoag therapy  Patient also hypertensive-will start metoprolol XL today as per chart review patient has had some intermittent tachycardia during his stay  Continue telemetry monitoring    5/2 - patient tolerating heparin drip.  Will obtain V/Q scan and echocardiogram today.  Workup in progress for possible infectious process.  Appreciate ID consult.    5/3 - improving with source control, VQ scan neg and heparin gtt stopped 5/2. Appreciate ID consult.

## 2022-05-08 NOTE — CARE UPDATE
05/08/22 0811   PRE-TX-O2   O2 Device (Oxygen Therapy) room air   SpO2 98 %   Pulse Oximetry Type Intermittent   $ Pulse Oximetry - Multiple Charge Pulse Oximetry - Multiple   Probe Placed On (Pulse Ox) finger   Pulse 101   Resp 15   Positioning HOB elevated 30 degrees   Incentive Spirometer   $ Incentive Spirometer Charges done with encouragement   Incentive Spirometer Predicted Level (mL) 2000   Administration (IS) proper technique demonstrated   Number of Repetitions (IS) 10   Level Incentive Spirometer (mL) 3500   Patient Tolerance (IS) good   POx, IS QS

## 2022-05-08 NOTE — ASSESSMENT & PLAN NOTE
Concern for PE given tachycardia and new hypoxia  Will obtain chest x-ray and check D-dimer which will likely be elevated given renal failure and recent surgery, will also check lactic acid  Will discuss with Nephrology high concern for PE and need for further workup with CTA verses V/Q scan and also initiating anticoag therapy  continue telemetry monitoring  Supplemental O2 to keep sats >92%  Pt encouraged to use IS    5/3 - VQ scan negative, heparin gtt stopped. Suspect hypoxia secondary to sepsis which is now improving with appropriate source control. Pt encouraged to use IS. O2 sats improving and stable on room air. Will cont to monitor closely.    resolved

## 2022-05-08 NOTE — ASSESSMENT & PLAN NOTE
Continues to be anuric  Hemodialysis therapy as per Nephrology team.  due to rhabdomyolysis,   Trend CPK daily.  Follow Nephrology recommendations.  Avoid nephrotoxins  Telemetry.  Hemo split catheter was placed on April 25, 2022.     4/30 - remains on hemodialysis, emergent dialysis today secondary to hypercalcemia.  Will continue to monitor closely  5/1 - HD again today secondary to hypercalcemia. Renal function improving. Cont to monitor closely.  5/2 - hemodialysis treatment again today.  Continue to monitor  5/3 - stable, nephrology following, cont routine HD    Resolving. HD on hold-- patient with renal recovery  5/7 - BUN/Cr of 29/1.5, Cont to follow Nephrology recs   5/8 - stable, nephrology following

## 2022-05-08 NOTE — PLAN OF CARE
Problem: Physical Therapy  Goal: Physical Therapy Goal  Description: Goals to be met by: 5/15/2022     Patient will increase functional independence with mobility by performin). Supine to sit with Modified [place]  2). Sit to stand transfer with Modified [place] maintaining RLE NWB using axillary crutches or rolling walker  3). Bed to chair transfer with Modified [place] maintaining RLE NWB using axillary crutches or rolling walker  5). Gait  x 150 feet with Modified [place] maintaining RLE NWB using axillary crutches or rolling walker    Outcome: Ongoing, Progressing

## 2022-05-09 LAB
ALBUMIN SERPL BCP-MCNC: 2.4 G/DL (ref 3.5–5.2)
ALP SERPL-CCNC: 128 U/L (ref 55–135)
ALT SERPL W/O P-5'-P-CCNC: 44 U/L (ref 10–44)
ANION GAP SERPL CALC-SCNC: 7 MMOL/L (ref 8–16)
AST SERPL-CCNC: 44 U/L (ref 10–40)
BACTERIA FLD AEROBE CULT: ABNORMAL
BACTERIA SPEC ANAEROBE CULT: NORMAL
BACTERIA TISS AEROBE CULT: ABNORMAL
BASOPHILS # BLD AUTO: 0.14 K/UL (ref 0–0.2)
BASOPHILS NFR BLD: 1.2 % (ref 0–1.9)
BILIRUB SERPL-MCNC: 0.1 MG/DL (ref 0.1–1)
BUN SERPL-MCNC: 19 MG/DL (ref 6–20)
CA-I BLDV-SCNC: 1.49 MMOL/L (ref 1.06–1.42)
CALCIUM SERPL-MCNC: 10.2 MG/DL (ref 8.7–10.5)
CHLORIDE SERPL-SCNC: 105 MMOL/L (ref 95–110)
CO2 SERPL-SCNC: 25 MMOL/L (ref 23–29)
CREAT SERPL-MCNC: 1.2 MG/DL (ref 0.5–1.4)
DIFFERENTIAL METHOD: ABNORMAL
EOSINOPHIL # BLD AUTO: 1.8 K/UL (ref 0–0.5)
EOSINOPHIL NFR BLD: 15.3 % (ref 0–8)
ERYTHROCYTE [DISTWIDTH] IN BLOOD BY AUTOMATED COUNT: 12.7 % (ref 11.5–14.5)
EST. GFR  (AFRICAN AMERICAN): >60 ML/MIN/1.73 M^2
EST. GFR  (NON AFRICAN AMERICAN): >60 ML/MIN/1.73 M^2
GLUCOSE SERPL-MCNC: 91 MG/DL (ref 70–110)
GRAM STN SPEC: ABNORMAL
HCT VFR BLD AUTO: 22.1 % (ref 40–54)
HGB BLD-MCNC: 7.2 G/DL (ref 14–18)
IMM GRANULOCYTES # BLD AUTO: 0.28 K/UL (ref 0–0.04)
IMM GRANULOCYTES NFR BLD AUTO: 2.4 % (ref 0–0.5)
LYMPHOCYTES # BLD AUTO: 2.6 K/UL (ref 1–4.8)
LYMPHOCYTES NFR BLD: 22 % (ref 18–48)
MCH RBC QN AUTO: 29.4 PG (ref 27–31)
MCHC RBC AUTO-ENTMCNC: 32.6 G/DL (ref 32–36)
MCV RBC AUTO: 90 FL (ref 82–98)
MONOCYTES # BLD AUTO: 1.2 K/UL (ref 0.3–1)
MONOCYTES NFR BLD: 10.1 % (ref 4–15)
NEUTROPHILS # BLD AUTO: 5.7 K/UL (ref 1.8–7.7)
NEUTROPHILS NFR BLD: 49 % (ref 38–73)
NRBC BLD-RTO: 0 /100 WBC
PLATELET # BLD AUTO: 330 K/UL (ref 150–450)
PMV BLD AUTO: 9.3 FL (ref 9.2–12.9)
POTASSIUM SERPL-SCNC: 3.3 MMOL/L (ref 3.5–5.1)
PROT SERPL-MCNC: 6.7 G/DL (ref 6–8.4)
RBC # BLD AUTO: 2.45 M/UL (ref 4.6–6.2)
SODIUM SERPL-SCNC: 137 MMOL/L (ref 136–145)
WBC # BLD AUTO: 11.58 K/UL (ref 3.9–12.7)

## 2022-05-09 PROCEDURE — 63600175 PHARM REV CODE 636 W HCPCS: Performed by: STUDENT IN AN ORGANIZED HEALTH CARE EDUCATION/TRAINING PROGRAM

## 2022-05-09 PROCEDURE — 94761 N-INVAS EAR/PLS OXIMETRY MLT: CPT

## 2022-05-09 PROCEDURE — 99232 SBSQ HOSP IP/OBS MODERATE 35: CPT | Mod: S$GLB,,, | Performed by: STUDENT IN AN ORGANIZED HEALTH CARE EDUCATION/TRAINING PROGRAM

## 2022-05-09 PROCEDURE — 11000001 HC ACUTE MED/SURG PRIVATE ROOM

## 2022-05-09 PROCEDURE — 36415 COLL VENOUS BLD VENIPUNCTURE: CPT | Performed by: ORTHOPAEDIC SURGERY

## 2022-05-09 PROCEDURE — 25000003 PHARM REV CODE 250: Performed by: INTERNAL MEDICINE

## 2022-05-09 PROCEDURE — A4216 STERILE WATER/SALINE, 10 ML: HCPCS | Performed by: ORTHOPAEDIC SURGERY

## 2022-05-09 PROCEDURE — 25000003 PHARM REV CODE 250: Performed by: ANESTHESIOLOGY

## 2022-05-09 PROCEDURE — 80053 COMPREHEN METABOLIC PANEL: CPT

## 2022-05-09 PROCEDURE — 85025 COMPLETE CBC W/AUTO DIFF WBC: CPT

## 2022-05-09 PROCEDURE — 97605 NEG PRS WND THER DME<=50SQCM: CPT

## 2022-05-09 PROCEDURE — 25000003 PHARM REV CODE 250: Performed by: ORTHOPAEDIC SURGERY

## 2022-05-09 PROCEDURE — 99232 PR SUBSEQUENT HOSPITAL CARE,LEVL II: ICD-10-PCS | Mod: S$GLB,,, | Performed by: STUDENT IN AN ORGANIZED HEALTH CARE EDUCATION/TRAINING PROGRAM

## 2022-05-09 PROCEDURE — 82330 ASSAY OF CALCIUM: CPT | Performed by: ORTHOPAEDIC SURGERY

## 2022-05-09 PROCEDURE — 63600175 PHARM REV CODE 636 W HCPCS: Performed by: INTERNAL MEDICINE

## 2022-05-09 PROCEDURE — 25000003 PHARM REV CODE 250: Performed by: NURSE PRACTITIONER

## 2022-05-09 PROCEDURE — 63600175 PHARM REV CODE 636 W HCPCS: Performed by: NURSE PRACTITIONER

## 2022-05-09 PROCEDURE — 25000003 PHARM REV CODE 250

## 2022-05-09 RX ADMIN — HEPARIN SODIUM 5000 UNITS: 5000 INJECTION INTRAVENOUS; SUBCUTANEOUS at 01:05

## 2022-05-09 RX ADMIN — HEPARIN SODIUM 5000 UNITS: 5000 INJECTION INTRAVENOUS; SUBCUTANEOUS at 09:05

## 2022-05-09 RX ADMIN — SODIUM CHLORIDE: 0.9 INJECTION, SOLUTION INTRAVENOUS at 03:05

## 2022-05-09 RX ADMIN — Medication 2 ML: at 05:05

## 2022-05-09 RX ADMIN — Medication 324 MG: at 08:05

## 2022-05-09 RX ADMIN — OXYCODONE AND ACETAMINOPHEN 1 TABLET: 7.5; 325 TABLET ORAL at 01:05

## 2022-05-09 RX ADMIN — HYDRALAZINE HYDROCHLORIDE 50 MG: 25 TABLET, FILM COATED ORAL at 05:05

## 2022-05-09 RX ADMIN — OXYCODONE 5 MG: 5 TABLET ORAL at 01:05

## 2022-05-09 RX ADMIN — OXYCODONE 5 MG: 5 TABLET ORAL at 09:05

## 2022-05-09 RX ADMIN — ERYTHROPOIETIN 4100 UNITS: 10000 INJECTION, SOLUTION INTRAVENOUS; SUBCUTANEOUS at 01:05

## 2022-05-09 RX ADMIN — MORPHINE SULFATE 2 MG: 4 INJECTION INTRAVENOUS at 05:05

## 2022-05-09 RX ADMIN — LIDOCAINE HYDROCHLORIDE: 20 JELLY TOPICAL at 01:05

## 2022-05-09 RX ADMIN — HEPARIN SODIUM 5000 UNITS: 5000 INJECTION INTRAVENOUS; SUBCUTANEOUS at 05:05

## 2022-05-09 RX ADMIN — CEFEPIME HYDROCHLORIDE 2 G: 2 INJECTION, SOLUTION INTRAVENOUS at 12:05

## 2022-05-09 RX ADMIN — DOCUSATE SODIUM AND SENNOSIDES 1 TABLET: 8.6; 5 TABLET, FILM COATED ORAL at 09:05

## 2022-05-09 RX ADMIN — MORPHINE SULFATE 2 MG: 4 INJECTION INTRAVENOUS at 12:05

## 2022-05-09 RX ADMIN — MORPHINE SULFATE 2 MG: 4 INJECTION INTRAVENOUS at 09:05

## 2022-05-09 RX ADMIN — CEFEPIME HYDROCHLORIDE 2 G: 2 INJECTION, SOLUTION INTRAVENOUS at 09:05

## 2022-05-09 RX ADMIN — DOCUSATE SODIUM AND SENNOSIDES 1 TABLET: 8.6; 5 TABLET, FILM COATED ORAL at 08:05

## 2022-05-09 RX ADMIN — HYDRALAZINE HYDROCHLORIDE 50 MG: 25 TABLET, FILM COATED ORAL at 09:05

## 2022-05-09 RX ADMIN — Medication 324 MG: at 05:05

## 2022-05-09 RX ADMIN — OXYCODONE 5 MG: 5 TABLET ORAL at 05:05

## 2022-05-09 RX ADMIN — METOPROLOL SUCCINATE 25 MG: 25 TABLET, EXTENDED RELEASE ORAL at 08:05

## 2022-05-09 RX ADMIN — HYDRALAZINE HYDROCHLORIDE 50 MG: 25 TABLET, FILM COATED ORAL at 01:05

## 2022-05-09 RX ADMIN — OXYCODONE AND ACETAMINOPHEN 1 TABLET: 7.5; 325 TABLET ORAL at 09:05

## 2022-05-09 RX ADMIN — CEFEPIME HYDROCHLORIDE 2 G: 2 INJECTION, SOLUTION INTRAVENOUS at 05:05

## 2022-05-09 NOTE — ASSESSMENT & PLAN NOTE
This patient does have evidence of infective focus  My overall impression is sepsis. Vital signs were reviewed and noted in progress note.  Antibiotics given-   Antibiotics (From admission, onward)            Start     Stop Route Frequency Ordered    05/08/22 0100  cefepime in dextrose 5 % IVPB 2 g         -- IV Every 8 hours (non-standard times) 05/07/22 1848        Cultures were taken-   Microbiology Results (last 7 days)     Procedure Component Value Units Date/Time    Culture, Anaerobic [[id number]] Collected: 05/02/22 1400    Order Status: Completed Specimen: Wound from Leg, Right Updated: 05/09/22 1035     Anaerobic Culture No anaerobes isolated    Tissue culture [[id number]]  (Abnormal)  (Susceptibility) Collected: 05/05/22 1530    Order Status: Completed Specimen: Tissue from Leg, Right Updated: 05/09/22 1001     Aerobic Culture - Tissue SERRATIA MARCESCENS  Many       Gram Stain Result Rare WBC's      Rare Gram negative rods    Culture, Body Fluid (Aerobic) w/ GS [[id number]]  (Abnormal)  (Susceptibility) Collected: 05/05/22 1505    Order Status: Completed Specimen: Synovial Fluid from Ankle, Right Updated: 05/09/22 0958     AEROBIC CULTURE - FLUID SERRATIA MARCESCENS  Moderate       Gram Stain Result Few WBC's      No organisms seen    Culture, Anaerobic [[id number]] Collected: 05/05/22 1505    Order Status: Completed Specimen: Wound from Leg, Right Updated: 05/09/22 0740     Anaerobic Culture Culture in progress    Blood culture [[id number]] Collected: 05/02/22 0410    Order Status: Completed Specimen: Blood from Antecubital, Right Arm Updated: 05/07/22 1212     Blood Culture, Routine No growth after 5 days.    Blood culture [[id number]] Collected: 05/01/22 1216    Order Status: Completed Specimen: Blood Updated: 05/06/22 1812     Blood Culture, Routine No growth after 5 days.    Narrative:      Collection has been rescheduled by [facility] at 05/01/2022 11:22 Reason:   Patient unavailable starting dialysis and with  the dr     Collection has been rescheduled by MR at 05/01/2022 11:42 Reason:   Got first set second set due after 12 couldnt stick other side due to   IV   Collection has been rescheduled by MRQ1 at 05/01/2022 11:22 Reason:   Patient unavailable starting dialysis and with the dr     Collection has been rescheduled by MRQ1 at 05/01/2022 11:42 Reason:   Got first set second set due after 12 couldnt stick other side due to   IV     Blood culture [[id number]] Collected: 05/01/22 1140    Order Status: Completed Specimen: Blood Updated: 05/06/22 1812     Blood Culture, Routine No growth after 5 days.    Narrative:      Collection has been rescheduled by MR at 05/01/2022 11:22 Reason:   Patient unavailable starting dialysis and with the dr   Collection has been rescheduled by MR at 05/01/2022 11:22 Reason:   Patient unavailable starting dialysis and with the dr     Fungus culture [[id number]] Collected: 05/05/22 1505    Order Status: Sent Specimen: Wound from Leg, Right Updated: 05/06/22 0027    Aerobic culture [[id number]]  (Abnormal)  (Susceptibility) Collected: 05/02/22 1400    Order Status: Completed Specimen: Wound from Leg, Right Updated: 05/05/22 1256     Aerobic Bacterial Culture SERRATIA MARCESCENS  Many        ENTEROBACTER CLOACAE  Moderate      Urine culture [[id number]] Collected: 05/01/22 2211    Order Status: Completed Specimen: Urine Updated: 05/03/22 0802     Urine Culture, Routine No growth    Narrative:      Specimen Source->Urine        Latest lactate reviewed, they are-  No results for input(s): LACTATE in the last 72 hours.    Organ dysfunction indicated by Acute respiratory failure and tachycardia  Source- right leg fasciotomy site    Source control Achieved by- IV abx, ID following  CRP trending downward    Plan to insert PICC line for IV abx

## 2022-05-09 NOTE — PLAN OF CARE
Pt is alert and orientedx4. Pain management. Dressing intact. Room air. Iv fluids 100 mL/hr. Wound vac suction 125. Bed alarm set. Call light within reach.

## 2022-05-09 NOTE — PROGRESS NOTES
INPATIENT NEPHROLOGY Progress Note  [facility]    Patient Name: Agus Horan  Date: 05/09/2022    Reason for consultation:   SANDRA    Chief Complaint:   Chief Complaint   Patient presents with    Leg Pain     Pain in the right leg muscle calf is hard, nausea vomiting, patient was seen here over the weekend for an overdose        History of Present Illness:  20 y/o M with a history of asthma recently here on 4/9 with oxycodone overdose who p/w RLE pain and swelling after a fall on 4/10. He reports severe pain, constant, associated with nausea with inability to bear weight.  He took meloxicam without relief. He was found to have compartment syndrome and was taking to the OR emergently for fasciotomy on 4/13. We are consulted for SANDRA with metabolic derangements.    Interval History:  4/14- did emergent HD overnight for hyperkalemia and metabolic acidosis refractory to medical management; pain controlled, RLE wrapped, no edema in LLE  4/15- worsening pain/edema in RLE- going for MRI- oligoanuric- stop NS IVFs- will do HD/UF today and tomorrow  4/16  Seen on dialysis.  No distress  4/17  Remains oliguric.  AFVSS.  Has back pain.  Leg pain a little better  4/18  In the OR.  Still oliguric.    4/19  Seen on dialysis.  No distress.    4/20  Still not making much urine.  cpk coming down.     4/21  Afebrile.  BP a little better.  uop up a bit  422   Sleeping.  AFVSS.  I/Os not appropriately recorded despite being ordered for twice  4/23  225 cc UOP recorded.  K+ 5.5, HD today.  No renal recovery, CPK improving.  C/o rash, Dr. Zamorano at bedside placed orders.  Seen on dialysis, tolerating tx well.  4/24   No new lab results, next lab draw in am.   Still has rash and c/o itching.  No other complaints.  450 cc UOP recorded.  4/25  In the OR.  Plan for hd today  4/26  AFVSS.  Urine output better.  No complaints specified today  4/27  Sleeping.  Output not recorded despite being ordered  4/28  Tearful.  Not  engaging when spoken too.  Requesting dilaudid   4/29     Good urine output.  Seen on dialysis.  No distress  4/30  UOP 4.4L.  Ca+ 15, will have dialysis today, stopped calcitriol and vit D.  Will add on PTH to labs.    5/1  Ca+ 15.5, PTH suppressed.  D/w Dr. Patrick, pt to have additional dialysis today, no UF.  Notified Suni Stone w/[facility] pt needs to run today.  Pt w/elevated temp, a little more tachy than usual, not feeling very well.  5/2 tachy, hypertensive, on RA, UOP 2.6L, remains hypercalcemic- PTH appropriately suppressed, Dr. Byers is at bedside  5/3 febrile, tachy, BP better, on RA, UOP 650cc  5/4 had to terminate HD treatment short about 30min due to n/v and sinus tachy- net UF 1.6L; UOP 3.3L, SCr 2.5, serum Ca rising over last few days in conjunction with improvement in SCr- may reflect volume depletion  5/5 BP high, on RA, UOP 2.7L, going for OR procedure: Deep excisional debridement of necrotic muscle anterior compartment right leg.  Application of wound VAC.  5/6 febrile, BP stable, on RA, UOP 400cc, pain score about a 7  5/7  Tmax 101.9, pressures stable.  Ionized Ca+ trended down, CMP results still pending at present.  No complaints.  5/8  Renal function improving on yesterday's labs.  Ca+ was improved as well.  Ionized Ca+ level this am a little higher, but no other lab results are posted as of yet.  Pressures a little elevated this am, but states RLE very painful, has requested medication.  5/9 VSS, no new complains. Discontinue tunneled HD cath, he no longer needs HD, will consult Gen Surg,    Plan of Care:    Traumatic rhabdomyolysis with compartment syndrome, s/p fasciotomy 4/13  SANDRA due to toxic ATN requiring HD 4/14 to 5/4  Hypercalcemia of immobilization  Hypokalemia  Anemia  HTN    Plan:    - Ortho following- s/p I&D on 5/5. Stop IVF.  - He has renal recovery- last HD 5/4. No NSAIDS or IV contrast. Can remove hemosplit, no longer needs HD.   - Hypercalcemia better after  bisphosphonate therapy on 5/5, denosumab 60mg and another 4 doses of calcitonin on 5/6. Hold calcitonin for now.  - BP is better- continue hydral 50mg TID.  - Ordered KCl repletion. Continue a regular diet.  - H/H stabilized- continue iron tablet- continue ALBERTO 3x per week SQ.      Thank you for allowing us to participate in this patient's care. We will continue to follow.    Vital Signs:  Temp Readings from Last 3 Encounters:   05/09/22 97.3 °F (36.3 °C) (Oral)   04/10/22 97.3 °F (36.3 °C)   04/09/22 98.4 °F (36.9 °C)       Pulse Readings from Last 3 Encounters:   05/09/22 101   04/10/22 63   04/09/22 95       BP Readings from Last 3 Encounters:   05/09/22 134/63   04/10/22 117/64   04/09/22 (!) 143/82       Weight:  Wt Readings from Last 3 Encounters:   05/02/22 81.2 kg (179 lb)   04/10/22 77.1 kg (170 lb)   04/09/22 77.1 kg (170 lb)       INS/OUTS:  I/O last 3 completed shifts:  In: 5775.2 [P.O.:280; I.V.:5298.7; IV Piggyback:196.5]  Out: 5600 [Urine:5600]  I/O this shift:  In: 240 [P.O.:240]  Out: 500 [Urine:500]    Medications:  Scheduled Meds:   ceFEPime (MAXIPIME) IVPB  2 g Intravenous Q8H    denosumab  60 mg Subcutaneous 1 time in Clinic/HOD    epoetin leidy  50 Units/kg Subcutaneous Every Mon, Wed, Fri    ferrous gluconate  324 mg Oral BID WM    heparin (porcine)  5,000 Units Subcutaneous Q8H    hydrALAZINE  50 mg Oral Q8H    LIDOcaine HCL 2%   Topical (Top) Every Mon, Thurs    metoprolol succinate  25 mg Oral Daily    senna-docusate 8.6-50 mg  1 tablet Oral BID    sodium chloride 0.9%  2 mL Intravenous Q6H     Continuous Infusions:   sodium chloride 0.9% 100 mL/hr at 05/09/22 0641    electrolyte-S (pH 7.4) Stopped (05/05/22 1600)    electrolyte-S (pH 7.4)      loperamide       PRN Meds:.acetaminophen, cyclobenzaprine, dextrose 10%, dextrose 10%, diphenhydrAMINE, glucagon (human recombinant), glucose, glucose, heparin (porcine), hydrOXYzine pamoate, labetaloL, loperamide, magnesium oxide, magnesium oxide,  "metoclopramide HCl, morphine, naloxone, ondansetron, oxyCODONE, oxyCODONE-acetaminophen, phenyleph-min oil-petrolatum, potassium bicarbonate, potassium bicarbonate, potassium bicarbonate, potassium, sodium phosphates, potassium, sodium phosphates, potassium, sodium phosphates, sodium chloride 0.9%  No current facility-administered medications on file prior to encounter.     Current Outpatient Medications on File Prior to Encounter   Medication Sig Dispense Refill    naloxone (NARCAN) 4 mg/actuation Spry 4mg by nasal route as needed for opioid overdose; may repeat every 2-3 minutes in alternating nostrils until medical help arrives. Call 911 2 each 0       Review of Systems:  Neg    Physical Exam:  /63 (BP Location: Right arm, Patient Position: Lying)   Pulse 101   Temp 97.3 °F (36.3 °C) (Oral)   Resp 16   Ht 5' 9" (1.753 m)   Wt 81.2 kg (179 lb)   SpO2 97%   BMI 26.43 kg/m²     Physical Exam  Constitutional:       Appearance: He is well-developed. He is not diaphoretic.   HENT:      Head: Normocephalic and atraumatic.   Eyes:      General: No scleral icterus.     Pupils: Pupils are equal, round, and reactive to light.   Cardiovascular:      Rate and Rhythm: Normal rate and regular rhythm.   Pulmonary:      Effort: Pulmonary effort is normal. No respiratory distress.      Breath sounds: No stridor.   Abdominal:      General: There is no distension.      Palpations: Abdomen is soft.   Musculoskeletal:         General: No deformity. Normal range of motion.      Cervical back: Neck supple.   Skin:     General: Skin is warm and dry.      Findings: No erythema or rash.   Neurological:      Mental Status: He is alert and oriented to person, place, and time.      Cranial Nerves: No cranial nerve deficit.   Psychiatric:         Behavior: Behavior normal.       Results:  Recent Labs   Lab 05/07/22  0844 05/08/22  0954 05/09/22  0523    138 137   K 3.3* 3.0* 3.3*    105 105   CO2 24 22* 25   BUN 29* " 21* 19   CREATININE 1.5* 1.2 1.2   ESTGFRAFRICA >60 >60 >60   EGFRNONAA >60 >60 >60   GLU 98 144* 91       Recent Labs   Lab 05/05/22  0504 05/06/22  0748 05/07/22  0844 05/08/22  0954 05/09/22  0523   CALCIUM 13.0*   < > 10.7* 10.4 10.2   ALBUMIN 2.4*   < > 2.3* 2.4* 2.4*   PHOS 4.4  --   --   --   --     < > = values in this interval not displayed.       Recent Labs   Lab 04/23/22  1535 04/30/22  0304   PTH, Intact 901.6 H 6.2 L       Recent Labs   Lab 05/07/22  0845 05/08/22  0954 05/09/22  0523   WBC 11.73 11.81 11.58   HGB 7.0* 7.2* 7.2*   HCT 21.5* 22.7* 22.1*    349 330   MCV 92 92 90   MCHC 32.6 31.7* 32.6   MONO 6.1  0.7 7.5  0.9 10.1  1.2*       Recent Labs   Lab 05/07/22  0844 05/08/22  0954 05/09/22  0523   BILITOT 0.2 0.2 0.1   PROT 6.6 6.7 6.7   ALBUMIN 2.3* 2.4* 2.4*   ALKPHOS 111 121 128   ALT 30 41 44   AST 41* 45* 44*       Recent Labs   Lab 04/16/22  0452 05/01/22  2211   Color, UA Brown A Yellow   Appearance, UA Hazy A Hazy A   pH, UA 6.0 6.0   Specific Gravity, UA >=1.030 A 1.015   Protein, UA 2+ A Trace A   Glucose, UA 1+ A Negative   Ketones, UA Negative Negative   Urobilinogen, UA Negative Negative   Bilirubin (UA) 1+ A Negative   Occult Blood UA 3+ A 2+ A   Nitrite, UA Negative Negative   RBC, UA >100 H 6 H   WBC, UA >100 H 55 H   Bacteria Moderate A Few A   Hyaline Casts, UA 0 2 A       I have spent >  minutes providing care for this patient for the above diagnoses. These services have included chart/data/imaging review, evaluation, exam, formulation of plan, , note preparation, and discussions with staff involved in this patient's care.    Mac Patrick MD    Nephrology  [facility]  [phone]

## 2022-05-09 NOTE — PLAN OF CARE
12:04pm - [facility] called [facility], spoke to Keli, accepted pt medically but have no dialysis bed,  will auth not submit for auth until beds is available.    The following LTACs denied pt:  [facility].  Called, [facility], [phone], phone busy.      [facility] medical records to [facility], [facility], [facility]s via [employer].    [facility] gave ELEAZAR Martins status update on LTAC placement.  Per ELEAZAR Martins pt will no longer need dialysis.    3:04pm - [facility] called Keli at [facility] and informed pt no longer needs dialysis.  Keli stated they have nondialysis bed available.  [facility] sent Neph and ID notes to [facility] via [employer].       05/09/22 1238   Post-Acute Status   Post-Acute Authorization Placement   Post-Acute Placement Status Referrals Sent   Discharge Plan   Discharge Plan A Long-term acute care facility (LTAC)

## 2022-05-09 NOTE — PROGRESS NOTES
Examine the right leg wound with the wound care nurse today we changed the wound VAC the patient's wound looks very clean is no purulent material he has good granulation tissue it also forming on the lateral aspect of the tibia bone and all of the muscle remaining has good rash granulation tissue there was no purulent material present.  We reapplied a new wound VAC.  the patient was kept in his posterior splint.    Plan is to transfer the patient to an LTAC.  I have also been in contact with a plastic surgeon who will do a split-thickness skin graft.  The patient needs to follow-up in his clinic Dr. Corbin in his office in [place].

## 2022-05-09 NOTE — PROGRESS NOTES
[facility] Hospital Medicine  Progress Note    Patient Name: Agus Horan  MRN: [id number]  Patient Class: IP- Inpatient   Admission Date: 4/13/2022  Length of Stay: 26 days  Attending Physician: Ortiz Saxena MD  Primary Care Provider: Primary Doctor No        Subjective:     Principal Problem:Acute renal failure with tubular necrosis        HPI:  Agus Horan is a 20 y/o male with PMHx of mild asthma who presents with right lower extremity pain and swelling x few days. Patient was seen in the ED on 4/9/22 for oxycodone overdose and again on 4/10/22 for left eye pain after falling on a piece of furniture. Patient states he is unsure how he injured his leg and denies IV drug use. Xray of his right leg did not show acute fracture. Patietn states RLE pain and swelling increased over the past few days and he is unable to walk secondary to pain. US of his lower extremity performed today did not show DVT. Lab work revealed elevated Creatinine 13.5 & K+ 7.1, elevated AST/ALT 3980/1611 & WBC 18.14.  CPK>40,0000.  Patient unable to dorsiflex and plantar flex his RLE and symptoms are concerning for compartment syndrome per ED. ED provider discussed with Dr. Byers and Dr. Fraser, nephrology. Patient was referred to hospital medicine and seen in the ED with his friend at bedside. Patient complaining of RLE pain, tenderness and swelling. He denies SOB, N/V/D, chest pain and headaches.  Patient will be admitted to hospital medicine for orthopedic consultation and further management.      Overview/Hospital Course:  Pt admitted for SANDRA, rhabdomyolisis, and RLE compartment syndrome. Pt was taken to the OR by Dr. Byers for emergent fasciotomy on 4/13. CPK at the time of arrival was more than 40,000. Pt was initially started on IV fluids and required emergent hemodialysis on 4/14 for hyperkalemia and metabolic acidosis refractory to medical management. Pt returned to OR with Dr. Byers on 4/18 and  underwent debridement of RLE with closure of medial and lateral fasciotomy incision and biopsy of anterior muscle compartment. Patient's CPK level was monitored closely and continued to trend down.  Patient underwent repeat debridement of deep right lateral fasciotomy incision right lower leg with excisional  debridement of dead anterior tibialis muscle and biopsy of muscle with application of wound VAC on April 25, 2022. Pts pain was not well controlled and medications were adjusted. On 5/1 pt developed persistent tachycardia with mild hypoxemia with room air sat 90%.  Patient unable to get CTA chest due to acute renal failure and hemodialysis.  Chest x-ray obtained and negative.  Patient encouraged to utilize IS and O2 sats improved.  US of bilat LE obtained and neg for DVT with some limitation to RLE due to drsg intact. Decision was made to initiate heparin drip for high suspicion of PE until V/Q scan could be performed.  Patient also developed low-grade fever and Infectious Disease was consulted for further assistance and recommendations.  Patient was continued on IV antibiotics.  V/Q scan was obtained on 05/02 and neg for PE.  Heparin drip was stopped evening of 5/2 and pt was placed on SQ heparin for VTE prophylaixis.   Wound VAC was changed on 05/02 by Dr. Byers and wound care nurse with findings of some necrotic tissue with purulent drainage.  Antibiotics were tailored by Infectious Disease.  Patient to OR for right lower extremity surgical site washout on 05/05/2022.      Interval History: Notes reviewed, no acute events overnight. Patient seen resting in bed with no acute distress. Patient not complaining of RLE pain at this time. Instructed patient plan to gradually wean off pain medications. His calcium continues to improve. K+ better this morning. Wound vac in place. Nephrology cleared removal of hemosplit. Plan to discuss with general surgery for hemo split removal. PICC line to be inserted following  removal for IV abx. Disposition pending LTAC placement.     Review of Systems   Constitutional:  Negative for chills, fatigue and fever.   HENT:  Negative for ear pain, nosebleeds, sinus pressure and sore throat.    Respiratory:  Negative for cough, chest tightness and shortness of breath.    Cardiovascular:  Negative for chest pain and palpitations.   Gastrointestinal:  Negative for abdominal distention, abdominal pain, nausea and vomiting.   Endocrine: Negative for polydipsia and polyuria.   Genitourinary:  Negative for difficulty urinating, dysuria, frequency and hematuria.   Musculoskeletal:  Negative for arthralgias, back pain and myalgias.   Skin:  Positive for wound.   Neurological:  Negative for dizziness, seizures, numbness and headaches.   Psychiatric/Behavioral:  Negative for agitation and behavioral problems.    Objective:     Vital Signs (Most Recent):  Temp: 98.6 °F (37 °C) (05/09/22 1137)  Pulse: 105 (05/09/22 1137)  Resp: 18 (05/09/22 1137)  BP: (!) 141/68 (05/09/22 1137)  SpO2: 97 % (05/09/22 1137)   Vital Signs (24h Range):  Temp:  [97.3 °F (36.3 °C)-98.6 °F (37 °C)] 98.6 °F (37 °C)  Pulse:  [] 105  Resp:  [16-18] 18  SpO2:  [97 %-100 %] 97 %  BP: (133-169)/(59-81) 141/68     Weight: 81.2 kg (179 lb)  Body mass index is 26.43 kg/m².    Intake/Output Summary (Last 24 hours) at 5/9/2022 1230  Last data filed at 5/9/2022 1000  Gross per 24 hour   Intake 5735.23 ml   Output 4000 ml   Net 1735.23 ml      Physical Exam  Vitals and nursing note reviewed.   Constitutional:       Appearance: Normal appearance.   HENT:      Head: Normocephalic and atraumatic.      Right Ear: External ear normal.      Left Ear: External ear normal.      Nose: Nose normal.      Mouth/Throat:      Mouth: Mucous membranes are dry.      Pharynx: Oropharynx is clear.   Eyes:      Extraocular Movements: Extraocular movements intact.      Conjunctiva/sclera: Conjunctivae normal.      Pupils: Pupils are equal, round, and  reactive to light.   Cardiovascular:      Rate and Rhythm: Normal rate and regular rhythm.      Pulses: Normal pulses.      Heart sounds: Normal heart sounds. No murmur heard.    No friction rub. No gallop.   Pulmonary:      Effort: Pulmonary effort is normal. No respiratory distress.      Breath sounds: Normal breath sounds. No wheezing or rales.   Abdominal:      General: Abdomen is flat. Bowel sounds are normal. There is no distension.      Palpations: Abdomen is soft.      Tenderness: There is no abdominal tenderness.   Musculoskeletal:         General: No tenderness or deformity.      Comments: RLE braced and dressed CDI   Skin:     General: Skin is warm and dry.      Capillary Refill: Capillary refill takes less than 2 seconds.   Neurological:      General: No focal deficit present.      Mental Status: He is alert and oriented to person, place, and time.      Cranial Nerves: No cranial nerve deficit.      Sensory: No sensory deficit.      Motor: No weakness.   Psychiatric:         Mood and Affect: Mood normal.         Behavior: Behavior normal.       Significant Labs: All pertinent labs within the past 24 hours have been reviewed.  CBC:   Recent Labs   Lab 05/08/22  0954 05/09/22  0523   WBC 11.81 11.58   HGB 7.2* 7.2*   HCT 22.7* 22.1*    330     CMP:   Recent Labs   Lab 05/08/22  0954 05/09/22  0523    137   K 3.0* 3.3*    105   CO2 22* 25   * 91   BUN 21* 19   CREATININE 1.2 1.2   CALCIUM 10.4 10.2   PROT 6.7 6.7   ALBUMIN 2.4* 2.4*   BILITOT 0.2 0.1   ALKPHOS 121 128   AST 45* 44*   ALT 41 44   ANIONGAP 11 7*   EGFRNONAA >60 >60       Significant Imaging: I have reviewed all pertinent imaging results/findings within the past 24 hours.      Assessment/Plan:      * Acute renal failure with tubular necrosis  Continues to be anuric  Hemodialysis therapy as per Nephrology team.  due to rhabdomyolysis,   Trend CPK daily.  Follow Nephrology recommendations.  Avoid  nephrotoxins  Telemetry.  Hemo split catheter was placed on April 25, 2022.     4/30 - remains on hemodialysis, emergent dialysis today secondary to hypercalcemia.  Will continue to monitor closely  5/1 - HD again today secondary to hypercalcemia. Renal function improving. Cont to monitor closely.  5/2 - hemodialysis treatment again today.  Continue to monitor  5/3 - stable, nephrology following, cont routine HD    Resolving. HD on hold-- patient with renal recovery  5/7 - BUN/Cr of 29/1.5, Cont to follow Nephrology recs   5/8 - stable, nephrology following  5/9 - Nephrology cleared removal of hemo split    Hypokalemia  nephrology consulted. Adjust K bath. Trend BMP      Hypercalcemia  Nephrology consulted. On calcitonin   Improving    Severe sepsis  This patient does have evidence of infective focus  My overall impression is sepsis. Vital signs were reviewed and noted in progress note.  Antibiotics given-   Antibiotics (From admission, onward)            Start     Stop Route Frequency Ordered    05/08/22 0100  cefepime in dextrose 5 % IVPB 2 g         -- IV Every 8 hours (non-standard times) 05/07/22 1848        Cultures were taken-   Microbiology Results (last 7 days)     Procedure Component Value Units Date/Time    Culture, Anaerobic [[id number]] Collected: 05/02/22 1400    Order Status: Completed Specimen: Wound from Leg, Right Updated: 05/09/22 1035     Anaerobic Culture No anaerobes isolated    Tissue culture [[id number]]  (Abnormal)  (Susceptibility) Collected: 05/05/22 1530    Order Status: Completed Specimen: Tissue from Leg, Right Updated: 05/09/22 1001     Aerobic Culture - Tissue SERRATIA MARCESCENS  Many       Gram Stain Result Rare WBC's      Rare Gram negative rods    Culture, Body Fluid (Aerobic) w/ GS [[id number]]  (Abnormal)  (Susceptibility) Collected: 05/05/22 1505    Order Status: Completed Specimen: Synovial Fluid from Ankle, Right Updated: 05/09/22 0958     AEROBIC CULTURE - FLUID SERRATIA  MARCESCENS  Moderate       Gram Stain Result Few WBC's      No organisms seen    Culture, Anaerobic [[id number]] Collected: 05/05/22 1505    Order Status: Completed Specimen: Wound from Leg, Right Updated: 05/09/22 0740     Anaerobic Culture Culture in progress    Blood culture [[id number]] Collected: 05/02/22 0410    Order Status: Completed Specimen: Blood from Antecubital, Right Arm Updated: 05/07/22 1212     Blood Culture, Routine No growth after 5 days.    Blood culture [[id number]] Collected: 05/01/22 1216    Order Status: Completed Specimen: Blood Updated: 05/06/22 1812     Blood Culture, Routine No growth after 5 days.    Narrative:      Collection has been rescheduled by MR at 05/01/2022 11:22 Reason:   Patient unavailable starting dialysis and with the dr     Collection has been rescheduled by [facility] at 05/01/2022 11:42 Reason:   Got first set second set due after 12 couldnt stick other side due to   IV   Collection has been rescheduled by [facility] at 05/01/2022 11:22 Reason:   Patient unavailable starting dialysis and with the dr     Collection has been rescheduled by [facility] at 05/01/2022 11:42 Reason:   Got first set second set due after 12 couldnt stick other side due to   IV     Blood culture [[id number]] Collected: 05/01/22 1140    Order Status: Completed Specimen: Blood Updated: 05/06/22 1812     Blood Culture, Routine No growth after 5 days.    Narrative:      Collection has been rescheduled by [facility] at 05/01/2022 11:22 Reason:   Patient unavailable starting dialysis and with the dr   Collection has been rescheduled by [facility] at 05/01/2022 11:22 Reason:   Patient unavailable starting dialysis and with the dr     Fungus culture [[id number]] Collected: 05/05/22 1505    Order Status: Sent Specimen: Wound from Leg, Right Updated: 05/06/22 0027    Aerobic culture [[id number]]  (Abnormal)  (Susceptibility) Collected: 05/02/22 1400    Order Status: Completed Specimen: Wound from Leg, Right Updated: 05/05/22 1256      Aerobic Bacterial Culture SERRATIA MARCESCENS  Many        ENTEROBACTER CLOACAE  Moderate      Urine culture [[id number]] Collected: 05/01/22 2211    Order Status: Completed Specimen: Urine Updated: 05/03/22 0802     Urine Culture, Routine No growth    Narrative:      Specimen Source->Urine        Latest lactate reviewed, they are-  No results for input(s): LACTATE in the last 72 hours.    Organ dysfunction indicated by Acute respiratory failure and tachycardia  Source- right leg fasciotomy site    Source control Achieved by- IV abx, ID following  CRP trending downward    Plan to insert PICC line for IV abx     Hypoxia  Concern for PE given tachycardia and new hypoxia  Will obtain chest x-ray and check D-dimer which will likely be elevated given renal failure and recent surgery, will also check lactic acid  Will discuss with Nephrology high concern for PE and need for further workup with CTA verses V/Q scan and also initiating anticoag therapy  continue telemetry monitoring  Supplemental O2 to keep sats >92%  Pt encouraged to use IS    5/3 - VQ scan negative, heparin gtt stopped. Suspect hypoxia secondary to sepsis which is now improving with appropriate source control. Pt encouraged to use IS. O2 sats improving and stable on room air. Will cont to monitor closely.    resolved          Tachycardia  Persistent tachycardia over the past 24 hours  Patient denies chest pain  Concern for PE given tachycardia and new hypoxia  Will obtain chest x-ray and check D-dimer which will likely be elevated given renal failure and recent surgery, will also check lactic acid  Will discuss with Nephrology high concern for PE and need for further workup with CTA verses V/Q scan and also initiating anticoag therapy  Patient also hypertensive-will start metoprolol XL today as per chart review patient has had some intermittent tachycardia during his stay  Continue telemetry monitoring    5/2 - patient tolerating heparin drip.  Will obtain  V/Q scan and echocardiogram today.  Workup in progress for possible infectious process.  Appreciate ID consult.    5/3 - improving with source control, VQ scan neg and heparin gtt stopped 5/2. Appreciate ID consult.           Anemia of chronic disease  Patient's anemia is currently controlled. Has recieved 1 units of PRBCs. Etiology likely d/t acute blood loss  Current CBC reviewed-   Lab Results   Component Value Date    HGB 7.2 (L) 05/09/2022    HCT 22.1 (L) 05/09/2022     Monitor serial CBC and transfuse if patient becomes hemodynamically unstable, symptomatic or H/H drops below 7/21.   Continue iron tablet- continue ALBERTO 3x per week SQ per nephrology recs        Drug eruption  No definite etiology apparent.  Possibly clindamycin use.  Off intravenous clindamycin.  Use oral Benadryl 25 mg q.6 hours p.r.n. patient will be monitored closely.    4/30 - resolved      Compartment syndrome  Underwent fasciotomy on 4/13   S/p Debridement deep right lateral fasciotomy incision right lower leg with excisional  debridement of dead anterior tibialis muscle and biopsy of muscle application of wound VAC right lower leg  - 04/25/22.  Wound care nurse performing dressing changes as per recommendations by Orthopedics.    4/30 - stable, wound vac in use, cont current therapy    5/2 - plan for wound VAC change today  5/3 - wound vac changed yesterday, d/w wound care nurse, area of necrotic tissue with purulent drainage. Wound debrided at BS by Dr. Beyrs. Plan for wound washout and further debridement in OR tomorrow or Thursday. Cont abx, appreciate ID consult.    To OR for wash out on 5/5/2022  New cultures obtained on 5/5/2022 resulted in many Gram negative rods, ID following, continue IV cefepime, wound vac in place   Plan to insert PICC line following hemo split removal     Elevated liver enzymes  Elevated AST &ALT likely due to rhabdomyolysis  Trending down  Avoid hepatoxic medications  Monitor CMP  See plan for  rhabdomyolysis    4/30 - resolved, cont to monitor CMP        Rhabdomyolysis  CPK trending down  S/p fasciotomy on 4/13 for compartment syndrome.  On 4/18 S/p DEBRIDEMENT, LOWER EXTREMITY (Right)   closure of medial fasciotomy incision right leg debridement of lateral fasciotomy with biopsy of anterior muscle compartment removal of deep muscle anterior compartment right lower leg. Subsequently underwent Debridement deep right lateral fasciotomy incision right lower leg with excisional  debridement of dead anterior tibialis muscle and biopsy of muscle application of wound VAC right lower leg on 04/25/22.  Monitor BMP   Follow Nephrology recommendations.    4/30 - improved and stable, cont to monitor closely      History of asthma  Hx of asthma, stable  Monitor for acute changes        VTE Risk Mitigation (From admission, onward)         Ordered     heparin (porcine) injection 5,000 Units  Every 8 hours         05/02/22 1921     IP VTE HIGH RISK PATIENT  Once         05/02/22 1921     heparin (porcine) injection 4,000 Units  As needed (PRN)         04/14/22 0137     Reason for No Pharmacological VTE Prophylaxis  Once        Question:  Reasons:  Answer:  Risk of Bleeding    04/13/22 1913     Place sequential compression device  Until discontinued         04/13/22 1913                Discharge Planning   JARAD: 5/14/2022     Code Status: Full Code   Is the patient medically ready for discharge?:     Reason for patient still in hospital (select all that apply): Patient trending condition, Treatment and Pending disposition  Discharge Plan A: Long-term acute care facility (LTAC)                  Eliezer Arteaga PA-C  Department of Hospital Medicine   [facility]

## 2022-05-09 NOTE — CONSULTS
General Surgery    Consult acknowledged.  Per nephrology note patient has had renal recovery and no longer needs tunneled catheter.  Will plan to remove at bedside tomorrow.  No need to be made NPO.    Please call with any questions or concerns.    Sundeep Rahman MD  General Surgery  [phone]

## 2022-05-09 NOTE — ASSESSMENT & PLAN NOTE
Underwent fasciotomy on 4/13   S/p Debridement deep right lateral fasciotomy incision right lower leg with excisional  debridement of dead anterior tibialis muscle and biopsy of muscle application of wound VAC right lower leg  - 04/25/22.  Wound care nurse performing dressing changes as per recommendations by Orthopedics.    4/30 - stable, wound vac in use, cont current therapy    5/2 - plan for wound VAC change today  5/3 - wound vac changed yesterday, d/w wound care nurse, area of necrotic tissue with purulent drainage. Wound debrided at BS by Dr. Byers. Plan for wound washout and further debridement in OR tomorrow or Thursday. Cont abx, appreciate ID consult.    To OR for wash out on 5/5/2022  New cultures obtained on 5/5/2022 resulted in many Gram negative rods, ID following, continue IV cefepime, wound vac in place   Plan to insert PICC line following hemo split removal

## 2022-05-09 NOTE — ASSESSMENT & PLAN NOTE
Continues to be anuric  Hemodialysis therapy as per Nephrology team.  due to rhabdomyolysis,   Trend CPK daily.  Follow Nephrology recommendations.  Avoid nephrotoxins  Telemetry.  Hemo split catheter was placed on April 25, 2022.     4/30 - remains on hemodialysis, emergent dialysis today secondary to hypercalcemia.  Will continue to monitor closely  5/1 - HD again today secondary to hypercalcemia. Renal function improving. Cont to monitor closely.  5/2 - hemodialysis treatment again today.  Continue to monitor  5/3 - stable, nephrology following, cont routine HD    Resolving. HD on hold-- patient with renal recovery  5/7 - BUN/Cr of 29/1.5, Cont to follow Nephrology recs   5/8 - stable, nephrology following  5/9 - Nephrology cleared removal of hemo split

## 2022-05-09 NOTE — PROGRESS NOTES
Consult Note  Infectious Disease    Reason for Consult:      HPI: Agus Horan is a 21 y.o. male with PMHx of mild intermittent asthma, allergies, oxycodone overdose on 04/09/2022, left eye pain after a fall 04/10/2022, came to the hospital on 04/13/2022 with complaints of right leg pain.    Ultrasound ruled out DVT  X-ray ruled out foreign body and fractures  He had right leg compartment syndrome, rhabdomyolysis and SANDRA     He was seen by Dr. Fraser with Nephrology and Dr. Freedman with Orthopedic surgery.    Patient was taken for fasciotomy on  04/13/2022, then debridement and closure on 04/18/2022; then  debridement deep right lateral fasciotomy incision right lower leg with excisional  debridement of dead anterior tibialis muscle and biopsy of muscle application of wound VAC right lower leg  on 04/25/2022  There are no cultures sent intraoperatively  Blood cultures are negative on 04/13/2022 and 05/01/2022    I came and saw patient.  He has a temperature of 100.2°.  WBC 16. He is on cefazolin since 04/13/2022(with an interruptions or 4 days from 04/19--4/22)  He feels well, he feels normal.  He is just anxious bc  of SANDRA. He does not want ot be on HD fr the rest of his life  Hospitalist is concerned of tachycardia and poss PE and is giving heparin drip.     INTERVAL HISTORY:  5/2 (Isma): Interim reviewed, discussed with Dr Perales, patient seen and examined at bedside, covered in bed-sheet. States he has a growing lesion on his left AC. Having HD a bedside. Persistent tachycardia, hypertension, afebrile. Labs reviewed, WBC: 18.5, PMN 71.5%, H/H 8.3/25.2, plt: 369. Iron 10, ferritin 1.010. ESR 79, .9, calcium 14.9. D-dimer 3.3. Procal 0.81. Micro reviewed, negative thus far, no cultures sent from the OR.     5/3:  Patient seen and examined at bedside, feeling comfortable today.  States is the most calmed he has ever felt.  Seen by Ortho yesterday, status post debridement at bedside.  Upon extensive  "revision, small pocket of purulent bloody fluid drained, cultures taken at bedside.  Plan for OR either tomorrow or the day after.  CT of left arm consistent with myositis ossificans, likely from hypercalcemia.  Uric acid 3.0.  V/Q scan negative for PE.  Patient's heart rate remained slightly elevated 104 currently.  Labs reviewed, white count 20.7, PMN 68%.  H&H 7.6/23.5, platelet count 354. Sodium 135, creatinine 2.8.  Calcium 12.3, ionized calcium 1.65.  Albumin 2.3.    5/4:  Interim reviewed, patient seen and examined at bedside covered in pillows and bed sheets.  States he is tired, and her L AC fossa lesion is decreasing in size. Persistent tachycardia,  hypertensive, afebrile. Having dialysis at bedside. Cultures taken at bedside grew GNR, lab called, lactose negative, ID and sensitivities available tomorrow. Labs reviewed, persistent leukocytosis, 19.3, PMN 75%, bands 6%. Potassium 3.3, normal LFTs, calcium 13.6.    5/5: Patient seen and examined at bedside, tearful about current situation. States he is starting all over again once he moves to [place].  He is scheduled for procedure with Ortho today. Remains hypertensive, tachy 101, afebrile, good urine output. Labs reviewed, WBC: 19.7, no left shift. H/H 7.4/23.2, plt: 394. Hypokalemia 3.3, creatinine 2.0. Calcium 13. Albumin 2.4. Normal LFTs, CPK 87.    5/6: Interim reviewed, patient seen and examined at bedside, asking for percocet 10. S/p washout in the OR by Ortho yesterday. As per Op-note: "inspection of the wound showed that about 90% of the wound was covered with granulation tissue on deep palpation were able to get gross pus coming from the anterior portion of the anterior compartment near the anterior tibialis muscle there was some necrotic muscle present along with pus it was also cultured." Patient currently hypertensive, low grade fever 100.4. Last HD 5/4. Labs reviewed, WC 16.2, PMNL 81.5%, eosinophilia improved. H/H 7.2/22.5, pltL 41. Cr 2.0, " calcium 13.2, albumin 2.4, normal LFTs. Wound cultures 5/2 E cloacae and Serratia marcescens both susceptible to Cefepime (DEN <2).    5/7:  Interim reviewed, patient seen examined at bedside.  States he is feeling great.  Had temperature of 101.9° overnight, currently afebrile.  He states he did feel the fever, maybe was covered in blankets.  Labs reviewed, white count significantly improved 11, no left shift, H&H 7/21.5, platelet count 298. Holding off dialysis, adequate urinary output, creatinine 1.5, CRP 53 trending down.  Calcium 10.7.  Potassium 3.3.  Vitamin-D 21. Micro reviewed, OR cultures from left ankle synovial fluid, and left leg grew Gram-negative gilles, ID and sensitivities pending.    5/8-9:  Interim reviewed, patient seen examined at bedside.  Sleeping although able to interact.  He is feeling tired and wants to rest.  Hypertensive, tachy 105, afebrile.  Cultures from the OR finally resulted today Serratia marcescens pansensitive.  Labs reviewed, stable white count 11.5, no left shift, H&H 7.2/22.1, platelet count 330. Creatinine 1.2, significantly improved.  Calcium 10.2.  AST 44/ALT 44.       Antibiotics (From admission, onward)                Start     Stop Route Frequency Ordered    05/08/22 0100  cefepime in dextrose 5 % IVPB 2 g         -- IV Every 8 hours (non-standard times) 05/07/22 1848          Review of patient's allergies indicates:   Allergen Reactions    Shrimp      Past Medical History:   Diagnosis Date    Allergy     AR    Asthma     mild intermittent    Hyperkalemia 4/14/2022     Past Surgical History:   Procedure Laterality Date    DEBRIDEMENT OF LOWER EXTREMITY Right 4/18/2022    Procedure: DEBRIDEMENT, LOWER EXTREMITY;  Surgeon: Leonardo Byers MD;  Location: [facility] OR;  Service: Orthopedics;  Laterality: Right;    DEBRIDEMENT OF LOWER EXTREMITY Right 5/5/2022    Procedure: DEBRIDEMENT, LOWER EXTREMITY;  Surgeon: Leonardo Byers MD;  Location: [facility] OR;  Service:  Orthopedics;  Laterality: Right;    FASCIOTOMY Right 4/13/2022    Procedure: FASCIOTOMY;  Surgeon: Leonardo Byers MD;  Location: [facility] OR;  Service: Orthopedics;  Laterality: Right;    FASCIOTOMY Right 4/25/2022    Procedure: FASCIOTOMY;  Surgeon: Leonardo Byers MD;  Location: [facility] OR;  Service: Orthopedics;  Laterality: Right;    REMOVAL OF FOREIGN BODY FROM FOOT Left 6/24/2020    Procedure: REMOVAL, FOREIGN BODY, FOOT;  Surgeon: Dani Garcia MD;  Location: [facility] OR;  Service: Orthopedics;  Laterality: Left;    REPLACEMENT OF WOUND VACUUM-ASSISTED CLOSURE DEVICE Right 5/5/2022    Procedure: REPLACEMENT, WOUND VAC;  Surgeon: Leonardo Byers MD;  Location: [facility] OR;  Service: Orthopedics;  Laterality: Right;     Social History     Socioeconomic History    Marital status: Single   Tobacco Use    Smoking status: Never Smoker    Smokeless tobacco: Never Used   Substance and Sexual Activity    Alcohol use: Yes     Comment: last night    Drug use: Yes     Frequency: 2.0 times per week     Types: Marijuana     Comment: yesterday   Social History Narrative    ** Merged History Encounter **         Lives with mom, 2 brothers.  No smokers.  +Dogs/chickens.  7th grader.     Family History   Problem Relation Age of Onset    Asthma Brother     Emphysema Maternal Grandmother     Hyperlipidemia Maternal Grandmother     Asthma Maternal Grandmother     Arthritis Maternal Grandmother     COPD Maternal Grandmother     Asthma Brother          Review of Systems:   Patient with drug use before admission. 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 Alert and attentive, cooperative, comfortable  Eyes:  Anicteric, PERRL, EOMI  ENT:  No ulcers, exudates, thrush, nares patent, dentition is fair  Neck:  Supple  Lungs: Clear to auscultation b/l   Heart:  Tachycardic, S1/S2+, regular rhythm, no murmur   Abd:  Soft, NT, ND, normal BS, no masses or organomegaly appreciated.  :  Voids, clear urine  Musc:  Right leg dressing in place with wound vac, not disturbed. Other joints without effusion, swelling, erythema, synovitis, muscle wasting.   Skin:  Warm  Neuro:   Alert, attentive, speech fluent, face symmetric, moves all extremities, no focal weakness  Psych:  Anxious, cooperative  Lymphatic:       Extrem: Left arm with amorphic deposit, calcification - decreased in size, non tender to palpation    R leg with cast and wound vac in place    No edema, erythema, phlebitis, cellulitis, warm and well perfused  VAD:  R tunneled catheter    Isolation:  none  Wound:     5/3:      5/2:      4/28:                       General Labs reviewed:  Recent Labs   Lab 05/07/22  0845 05/08/22  0954 05/09/22  0523   WBC 11.73 11.81 11.58   HGB 7.0* 7.2* 7.2*   HCT 21.5* 22.7* 22.1*    349 330       Recent Labs   Lab 05/07/22  0844 05/08/22  0954 05/09/22  0523    138 137   K 3.3* 3.0* 3.3*    105 105   CO2 24 22* 25   BUN 29* 21* 19   CREATININE 1.5* 1.2 1.2   CALCIUM 10.7* 10.4 10.2   PROT 6.6 6.7 6.7   BILITOT 0.2 0.2 0.1   ALKPHOS 111 121 128   ALT 30 41 44   AST 41* 45* 44*     Recent Labs   Lab 05/05/22  0951 05/07/22  0341   CRP 73.3* 53.2*     Estimated Creatinine Clearance: 95.7 mL/min (based on SCr of 1.2 mg/dL).      Micro:  Microbiology Results (last 7 days)       Procedure Component Value Units Date/Time    Culture, Anaerobic [[id number]] Collected:  05/05/22 1505    Order Status: Completed Specimen: Wound from Leg, Right Updated: 05/09/22 0740     Anaerobic Culture Culture in progress    Blood culture [[id number]] Collected: 05/02/22 0410    Order Status: Completed Specimen: Blood from Antecubital, Right Arm Updated: 05/07/22 1212     Blood Culture, Routine No growth after 5 days.    Tissue culture [[id number]]  (Abnormal) Collected: 05/05/22 1530    Order Status: Completed Specimen: Tissue from Leg, Right Updated: 05/07/22 1008     Aerobic Culture - Tissue GRAM NEGATIVE RAJI  Many  Identification and susceptibility pending       Gram Stain Result Rare WBC's      Rare Gram negative rods    Culture, Body Fluid (Aerobic) w/ GS [[id number]]  (Abnormal) Collected: 05/05/22 1505    Order Status: Completed Specimen: Synovial Fluid from Ankle, Right Updated: 05/07/22 0958     AEROBIC CULTURE - FLUID GRAM NEGATIVE RAJI  Moderate  Identification and susceptibility pending       Gram Stain Result Few WBC's      No organisms seen    Blood culture [[id number]] Collected: 05/01/22 1216    Order Status: Completed Specimen: Blood Updated: 05/06/22 1812     Blood Culture, Routine No growth after 5 days.    Narrative:      Collection has been rescheduled by MR at 05/01/2022 11:22 Reason:   Patient unavailable starting dialysis and with the dr     Collection has been rescheduled by [facility] at 05/01/2022 11:42 Reason:   Got first set second set due after 12 couldnt stick other side due to   IV   Collection has been rescheduled by MR at 05/01/2022 11:22 Reason:   Patient unavailable starting dialysis and with the dr     Collection has been rescheduled by [facility] at 05/01/2022 11:42 Reason:   Got first set second set due after 12 couldnt stick other side due to   IV     Blood culture [[id number]] Collected: 05/01/22 1140    Order Status: Completed Specimen: Blood Updated: 05/06/22 1812     Blood Culture, Routine No growth after 5 days.    Narrative:      Collection has been rescheduled by  [email or web address] at 05/01/2022 11:22 Reason:   Patient unavailable starting dialysis and with the dr   Collection has been rescheduled by [email or web address] at 05/01/2022 11:22 Reason:   Patient unavailable starting dialysis and with the dr     Culture, Anaerobic [[id number]] Collected: 05/02/22 1400    Order Status: Completed Specimen: Wound from Leg, Right Updated: 05/06/22 1102     Anaerobic Culture Culture in progress    Fungus culture [[id number]] Collected: 05/05/22 1505    Order Status: Sent Specimen: Wound from Leg, Right Updated: 05/06/22 0027    Aerobic culture [[id number]]  (Abnormal)  (Susceptibility) Collected: 05/02/22 1400    Order Status: Completed Specimen: Wound from Leg, Right Updated: 05/05/22 1256     Aerobic Bacterial Culture SERRATIA MARCESCENS  Many        ENTEROBACTER CLOACAE  Moderate      Urine culture [[id number]] Collected: 05/01/22 2211    Order Status: Completed Specimen: Urine Updated: 05/03/22 0802     Urine Culture, Routine No growth    Narrative:      Specimen Source->Urine    Influenza A & B by Molecular [[id number]] Collected: 05/02/22 0900    Order Status: Completed Specimen: Nasopharyngeal Swab Updated: 05/02/22 1021     Influenza A, Molecular Negative     Influenza B, Molecular Negative     Flu A & B Source Nasal swab            Imaging Reviewed:   CXR-- No definite acute radiographic abnormality.  Right internal jugular central venous catheter in place.   CT  UltrasoundNo evidence of deep venous thrombosis in either lower extremity, noting nonvisualization of the right calf veins due to overlying bandaging.    CT Left arm: Three circumscribed foci of soft tissue mineralization along the antecubital fossa have peripheral zoning favoring myositis ossificans.     NM scan low probability for PE    Cardiology:  · The left ventricle is normal in size with concentric remodeling and normal systolic function.  · The estimated ejection fraction is 65%.  · Grade I left ventricular diastolic dysfunction.  · Normal  right ventricular size with normal right ventricular systolic function.  · Mild to moderate tricuspid regurgitation.  · Normal central venous pressure (3 mmHg).  · The estimated PA systolic pressure is 47 mmHg.  · There is pulmonary hypertension.  · Sinus tachycardia rate of 120-130 beats per minute was noted during the study        IMPRESSION & PLAN     1. History of R leg compartment syndrome; s/p multiple I&Ds, plan for I&D 5/5   ESR 79, .9->73.3   Procal 0.81-->1, pt on HD   OR cultures from left ankle and left leg GNR, ID and sensitivities pending    Wound culture taken at bedside 5/2  E cloacae and Serratia marcescens both susceptible to Cefepime (DEN<2)        2. Rhabdomyolysis due to compartment syndrome.  Resolved    3. SANDRA due to rhabdomyolysis, and hypercalcemia, 13 - holding OFF HD< cr 1.5, improving   Nephrology following     4. Vitamin-D deficiency, anemia, protein malnutrition, hypoalbuminemia    5. V/Q scan negative for PE  6. Myositis ossificans L arm - due to hypercalcemia    Recommendations:  Adjust Cefepime to 2g IV q8h, dose as per crcl  Follow cultures to guide antibiotic therapy   He would benefit from LTAC to continue IV abx for at least 3-4 weeks before skin flap is considered   Wound care   Incentive spirometry     Will follow    D/w patient, nrusing     Medical Decision Making during this encounter was  [_] Low Complexity  [_] Moderate Complexity  [  ] High Complexity  Consult Note  Infectious Disease    Reason for Consult:      HPI: Agus Horan is a 21 y.o. male with PMHx of mild intermittent asthma, allergies, oxycodone overdose on 04/09/2022, left eye pain after a fall 04/10/2022, came to the hospital on 04/13/2022 with complaints of right leg pain.    Ultrasound ruled out DVT  X-ray ruled out foreign body and fractures  He had right leg compartment syndrome, rhabdomyolysis and SANDRA     He was seen by Dr. Fraser with Nephrology and Dr. Freedman with Orthopedic surgery.     Patient was taken for fasciotomy on  04/13/2022, then debridement and closure on 04/18/2022; then  debridement deep right lateral fasciotomy incision right lower leg with excisional  debridement of dead anterior tibialis muscle and biopsy of muscle application of wound VAC right lower leg  on 04/25/2022  There are no cultures sent intraoperatively  Blood cultures are negative on 04/13/2022 and 05/01/2022    I came and saw patient.  He has a temperature of 100.2°.  WBC 16. He is on cefazolin since 04/13/2022(with an interruptions or 4 days from 04/19--4/22)  He feels well, he feels normal.  He is just anxious bc  of SANDRA. He does not want ot be on HD fr the rest of his life  Hospitalist is concerned of tachycardia and poss PE and is giving heparin drip.     INTERVAL HISTORY:  5/2 (Isma): Interim reviewed, discussed with Dr Perales, patient seen and examined at bedside, covered in bed-sheet. States he has a growing lesion on his left AC. Having HD a bedside. Persistent tachycardia, hypertension, afebrile. Labs reviewed, WBC: 18.5, PMN 71.5%, H/H 8.3/25.2, plt: 369. Iron 10, ferritin 1.010. ESR 79, .9, calcium 14.9. D-dimer 3.3. Procal 0.81. Micro reviewed, negative thus far, no cultures sent from the OR.     5/3:  Patient seen and examined at bedside, feeling comfortable today.  States is the most calmed he has ever felt.  Seen by Ortho yesterday, status post debridement at bedside.  Upon extensive revision, small pocket of purulent bloody fluid drained, cultures taken at bedside.  Plan for OR either tomorrow or the day after.  CT of left arm consistent with myositis ossificans, likely from hypercalcemia.  Uric acid 3.0.  V/Q scan negative for PE.  Patient's heart rate remained slightly elevated 104 currently.  Labs reviewed, white count 20.7, PMN 68%.  H&H 7.6/23.5, platelet count 354. Sodium 135, creatinine 2.8.  Calcium 12.3, ionized calcium 1.65.  Albumin 2.3.    5/4:  Interim reviewed, patient seen and  "examined at bedside covered in pillows and bed sheets.  States he is tired, and her L AC fossa lesion is decreasing in size. Persistent tachycardia,  hypertensive, afebrile. Having dialysis at bedside. Cultures taken at bedside grew GNR, lab called, lactose negative, ID and sensitivities available tomorrow. Labs reviewed, persistent leukocytosis, 19.3, PMN 75%, bands 6%. Potassium 3.3, normal LFTs, calcium 13.6.    5/5: Patient seen and examined at bedside, tearful about current situation. States he is starting all over again once he moves to [place].  He is scheduled for procedure with Ortho today. Remains hypertensive, tachy 101, afebrile, good urine output. Labs reviewed, WBC: 19.7, no left shift. H/H 7.4/23.2, plt: 394. Hypokalemia 3.3, creatinine 2.0. Calcium 13. Albumin 2.4. Normal LFTs, CPK 87.    5/6: Interim reviewed, patient seen and examined at bedside, asking for percocet 10. S/p washout in the OR by Ortho yesterday. As per Op-note: "inspection of the wound showed that about 90% of the wound was covered with granulation tissue on deep palpation were able to get gross pus coming from the anterior portion of the anterior compartment near the anterior tibialis muscle there was some necrotic muscle present along with pus it was also cultured." Patient currently hypertensive, low grade fever 100.4. Last HD 5/4. Labs reviewed, WC 16.2, PMNL 81.5%, eosinophilia improved. H/H 7.2/22.5, pltL 41. Cr 2.0, calcium 13.2, albumin 2.4, normal LFTs. Wound cultures 5/2 E cloacae and Serratia marcescens both susceptible to Cefepime (DEN <2).        Antibiotics (From admission, onward)                Start     Stop Route Frequency Ordered    05/08/22 0100  cefepime in dextrose 5 % IVPB 2 g         -- IV Every 8 hours (non-standard times) 05/07/22 1848          Review of patient's allergies indicates:   Allergen Reactions    Shrimp      Past Medical History:   Diagnosis Date    Allergy     AR    Asthma     mild " intermittent    Hyperkalemia 4/14/2022     Past Surgical History:   Procedure Laterality Date    DEBRIDEMENT OF LOWER EXTREMITY Right 4/18/2022    Procedure: DEBRIDEMENT, LOWER EXTREMITY;  Surgeon: Leonardo Byers MD;  Location: [facility] OR;  Service: Orthopedics;  Laterality: Right;    DEBRIDEMENT OF LOWER EXTREMITY Right 5/5/2022    Procedure: DEBRIDEMENT, LOWER EXTREMITY;  Surgeon: Leonardo Byers MD;  Location: [facility] OR;  Service: Orthopedics;  Laterality: Right;    FASCIOTOMY Right 4/13/2022    Procedure: FASCIOTOMY;  Surgeon: Leonardo Byers MD;  Location: [facility] OR;  Service: Orthopedics;  Laterality: Right;    FASCIOTOMY Right 4/25/2022    Procedure: FASCIOTOMY;  Surgeon: Leonardo Byers MD;  Location: [facility] OR;  Service: Orthopedics;  Laterality: Right;    REMOVAL OF FOREIGN BODY FROM FOOT Left 6/24/2020    Procedure: REMOVAL, FOREIGN BODY, FOOT;  Surgeon: Dani Garcia MD;  Location: [facility] OR;  Service: Orthopedics;  Laterality: Left;    REPLACEMENT OF WOUND VACUUM-ASSISTED CLOSURE DEVICE Right 5/5/2022    Procedure: REPLACEMENT, WOUND VAC;  Surgeon: Leonardo Byers MD;  Location: [facility] OR;  Service: Orthopedics;  Laterality: Right;     Social History     Socioeconomic History    Marital status: Single   Tobacco Use    Smoking status: Never Smoker    Smokeless tobacco: Never Used   Substance and Sexual Activity    Alcohol use: Yes     Comment: last night    Drug use: Yes     Frequency: 2.0 times per week     Types: Marijuana     Comment: yesterday   Social History Narrative    ** Merged History Encounter **         Lives with mom, 2 brothers.  No smokers.  +Dogs/chickens.  9th grader.     Family History   Problem Relation Age of Onset    Asthma Brother     Emphysema Maternal Grandmother     Hyperlipidemia Maternal Grandmother     Asthma Maternal Grandmother     Arthritis Maternal Grandmother     COPD Maternal Grandmother     Asthma Brother          Review of  Systems:   Patient with drug use before admission. 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 Alert and attentive, cooperative, comfortable  Eyes:  Anicteric, PERRL, EOMI  ENT:  No ulcers, exudates, thrush, nares patent, dentition is fair  Neck:  Supple  Lungs: Clear to auscultation b/l   Heart:  Tachycardic, S1/S2+, regular rhythm, no murmur   Abd:  Soft, NT, ND, normal BS, no masses or organomegaly appreciated.  :  Voids, clear urine  Musc:  Other than  Right leg dressing in place with wound vac, not disturbed. Joints without effusion, swelling, erythema, synovitis, muscle wasting.   Skin:  Warm  Neuro:   Alert, attentive, speech fluent, face symmetric, moves all extremities, no focal weakness  Psych:  Anxious, cooperative  Lymphatic:       Extrem: Left arm with amorphic deposit, calcification - tender to palpation, no redness noted.     R leg with cast and wound vac in place    No edema, erythema, phlebitis, cellulitis, warm and well perfused  VAD:  R tunneled catheter    Isolation:  none  Wound:     5/3:                                 General Labs reviewed:  Recent Labs   Lab 05/07/22  0845 05/08/22  0954 05/09/22  0523   WBC 11.73 11.81 11.58   HGB 7.0* 7.2* 7.2*   HCT 21.5* 22.7* 22.1*    349 330       Recent Labs   Lab 05/07/22  0844 05/08/22  0954 05/09/22  0523    138 137   K 3.3* 3.0* 3.3*    105 105   CO2 24 22* 25   BUN 29* 21* 19    CREATININE 1.5* 1.2 1.2   CALCIUM 10.7* 10.4 10.2   PROT 6.6 6.7 6.7   BILITOT 0.2 0.2 0.1   ALKPHOS 111 121 128   ALT 30 41 44   AST 41* 45* 44*     Recent Labs   Lab 05/05/22  0951 05/07/22  0341   CRP 73.3* 53.2*     Estimated Creatinine Clearance: 95.7 mL/min (based on SCr of 1.2 mg/dL).      Micro:  Microbiology Results (last 7 days)       Procedure Component Value Units Date/Time    Culture, Anaerobic [[id number]] Collected: 05/05/22 1505    Order Status: Completed Specimen: Wound from Leg, Right Updated: 05/09/22 0740     Anaerobic Culture Culture in progress    Blood culture [[id number]] Collected: 05/02/22 0410    Order Status: Completed Specimen: Blood from Antecubital, Right Arm Updated: 05/07/22 1212     Blood Culture, Routine No growth after 5 days.    Tissue culture [[id number]]  (Abnormal) Collected: 05/05/22 1530    Order Status: Completed Specimen: Tissue from Leg, Right Updated: 05/07/22 1008     Aerobic Culture - Tissue GRAM NEGATIVE RAJI  Many  Identification and susceptibility pending       Gram Stain Result Rare WBC's      Rare Gram negative rods    Culture, Body Fluid (Aerobic) w/ GS [[id number]]  (Abnormal) Collected: 05/05/22 1505    Order Status: Completed Specimen: Synovial Fluid from Ankle, Right Updated: 05/07/22 0958     AEROBIC CULTURE - FLUID GRAM NEGATIVE RAJI  Moderate  Identification and susceptibility pending       Gram Stain Result Few WBC's      No organisms seen    Blood culture [[id number]] Collected: 05/01/22 1216    Order Status: Completed Specimen: Blood Updated: 05/06/22 1812     Blood Culture, Routine No growth after 5 days.    Narrative:      Collection has been rescheduled by [email or web address] at 05/01/2022 11:22 Reason:   Patient unavailable starting dialysis and with the dr     Collection has been rescheduled by [email or web address] at 05/01/2022 11:42 Reason:   Got first set second set due after 12 couldnt stick other side due to   IV   Collection has been rescheduled by [email or web address] at 05/01/2022 11:22  Reason:   Patient unavailable starting dialysis and with the dr     Collection has been rescheduled by MR at 05/01/2022 11:42 Reason:   Got first set second set due after 12 couldnt stick other side due to   IV     Blood culture [[id number]] Collected: 05/01/22 1140    Order Status: Completed Specimen: Blood Updated: 05/06/22 1812     Blood Culture, Routine No growth after 5 days.    Narrative:      Collection has been rescheduled by MR at 05/01/2022 11:22 Reason:   Patient unavailable starting dialysis and with the dr   Collection has been rescheduled by MR at 05/01/2022 11:22 Reason:   Patient unavailable starting dialysis and with the dr     Culture, Anaerobic [[id number]] Collected: 05/02/22 1400    Order Status: Completed Specimen: Wound from Leg, Right Updated: 05/06/22 1102     Anaerobic Culture Culture in progress    Fungus culture [[id number]] Collected: 05/05/22 1505    Order Status: Sent Specimen: Wound from Leg, Right Updated: 05/06/22 0027    Aerobic culture [[id number]]  (Abnormal)  (Susceptibility) Collected: 05/02/22 1400    Order Status: Completed Specimen: Wound from Leg, Right Updated: 05/05/22 1256     Aerobic Bacterial Culture SERRATIA MARCESCENS  Many        ENTEROBACTER CLOACAE  Moderate      Urine culture [[id number]] Collected: 05/01/22 2211    Order Status: Completed Specimen: Urine Updated: 05/03/22 0802     Urine Culture, Routine No growth    Narrative:      Specimen Source->Urine    Influenza A & B by Molecular [[id number]] Collected: 05/02/22 0900    Order Status: Completed Specimen: Nasopharyngeal Swab Updated: 05/02/22 1021     Influenza A, Molecular Negative     Influenza B, Molecular Negative     Flu A & B Source Nasal swab            Imaging Reviewed:   CXR-- No definite acute radiographic abnormality.  Right internal jugular central venous catheter in place.   CT  UltrasoundNo evidence of deep venous thrombosis in either lower extremity, noting nonvisualization of the right calf  veins due to overlying bandaging.    CT Left arm: Three circumscribed foci of soft tissue mineralization along the antecubital fossa have peripheral zoning favoring myositis ossificans.     NM scan low probability for PE    Cardiology:  · The left ventricle is normal in size with concentric remodeling and normal systolic function.  · The estimated ejection fraction is 65%.  · Grade I left ventricular diastolic dysfunction.  · Normal right ventricular size with normal right ventricular systolic function.  · Mild to moderate tricuspid regurgitation.  · Normal central venous pressure (3 mmHg).  · The estimated PA systolic pressure is 47 mmHg.  · There is pulmonary hypertension.  · Sinus tachycardia rate of 120-130 beats per minute was noted during the study        IMPRESSION & PLAN     1. History of R leg compartment syndrome; s/p multiple I&Ds, plan for I&D today    ESR 79, .9->73.3-->53   Procal 0.81-->1-->0.62   Wound culture taken at bedside 5/2  E cloacae and Serratia marcescens both susceptible to Cefepime (DEN<2)    OR cultures 5/5 right leg and ankle - Serratia marcescens pan-sensitive     2. Rhabdomyolysis due to compartment syndrome.  Resolved    3. SANDRA due to rhabdomyolysis, and hypercalcemia, - improved.   Nephrology following     4. Vitamin-D deficiency, anemia, protein malnutrition, hypoalbuminemia    5. V/Q scan negative for PE  6. 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

## 2022-05-09 NOTE — SUBJECTIVE & OBJECTIVE
Interval History: Notes reviewed, no acute events overnight. Patient seen resting in bed with no acute distress. Patient not complaining of RLE pain at this time. Instructed patient plan to gradually wean off pain medications. His calcium continues to improve. K+ better this morning. Wound vac in place. Nephrology cleared removal of hemosplit. Plan to discuss with general surgery for hemo split removal. PICC line to be inserted following removal for IV abx. Disposition pending LTAC placement.     Review of Systems   Constitutional:  Negative for chills, fatigue and fever.   HENT:  Negative for ear pain, nosebleeds, sinus pressure and sore throat.    Respiratory:  Negative for cough, chest tightness and shortness of breath.    Cardiovascular:  Negative for chest pain and palpitations.   Gastrointestinal:  Negative for abdominal distention, abdominal pain, nausea and vomiting.   Endocrine: Negative for polydipsia and polyuria.   Genitourinary:  Negative for difficulty urinating, dysuria, frequency and hematuria.   Musculoskeletal:  Negative for arthralgias, back pain and myalgias.   Skin:  Positive for wound.   Neurological:  Negative for dizziness, seizures, numbness and headaches.   Psychiatric/Behavioral:  Negative for agitation and behavioral problems.    Objective:     Vital Signs (Most Recent):  Temp: 98.6 °F (37 °C) (05/09/22 1137)  Pulse: 105 (05/09/22 1137)  Resp: 18 (05/09/22 1137)  BP: (!) 141/68 (05/09/22 1137)  SpO2: 97 % (05/09/22 1137)   Vital Signs (24h Range):  Temp:  [97.3 °F (36.3 °C)-98.6 °F (37 °C)] 98.6 °F (37 °C)  Pulse:  [] 105  Resp:  [16-18] 18  SpO2:  [97 %-100 %] 97 %  BP: (133-169)/(59-81) 141/68     Weight: 81.2 kg (179 lb)  Body mass index is 26.43 kg/m².    Intake/Output Summary (Last 24 hours) at 5/9/2022 1230  Last data filed at 5/9/2022 1000  Gross per 24 hour   Intake 5735.23 ml   Output 4000 ml   Net 1735.23 ml      Physical Exam  Vitals and nursing note reviewed.    Constitutional:       Appearance: Normal appearance.   HENT:      Head: Normocephalic and atraumatic.      Right Ear: External ear normal.      Left Ear: External ear normal.      Nose: Nose normal.      Mouth/Throat:      Mouth: Mucous membranes are dry.      Pharynx: Oropharynx is clear.   Eyes:      Extraocular Movements: Extraocular movements intact.      Conjunctiva/sclera: Conjunctivae normal.      Pupils: Pupils are equal, round, and reactive to light.   Cardiovascular:      Rate and Rhythm: Normal rate and regular rhythm.      Pulses: Normal pulses.      Heart sounds: Normal heart sounds. No murmur heard.    No friction rub. No gallop.   Pulmonary:      Effort: Pulmonary effort is normal. No respiratory distress.      Breath sounds: Normal breath sounds. No wheezing or rales.   Abdominal:      General: Abdomen is flat. Bowel sounds are normal. There is no distension.      Palpations: Abdomen is soft.      Tenderness: There is no abdominal tenderness.   Musculoskeletal:         General: No tenderness or deformity.      Comments: RLE braced and dressed CDI   Skin:     General: Skin is warm and dry.      Capillary Refill: Capillary refill takes less than 2 seconds.   Neurological:      General: No focal deficit present.      Mental Status: He is alert and oriented to person, place, and time.      Cranial Nerves: No cranial nerve deficit.      Sensory: No sensory deficit.      Motor: No weakness.   Psychiatric:         Mood and Affect: Mood normal.         Behavior: Behavior normal.       Significant Labs: All pertinent labs within the past 24 hours have been reviewed.  CBC:   Recent Labs   Lab 05/08/22  0954 05/09/22  0523   WBC 11.81 11.58   HGB 7.2* 7.2*   HCT 22.7* 22.1*    330     CMP:   Recent Labs   Lab 05/08/22  0954 05/09/22  0523    137   K 3.0* 3.3*    105   CO2 22* 25   * 91   BUN 21* 19   CREATININE 1.2 1.2   CALCIUM 10.4 10.2   PROT 6.7 6.7   ALBUMIN 2.4* 2.4*   BILITOT  0.2 0.1   ALKPHOS 121 128   AST 45* 44*   ALT 41 44   ANIONGAP 11 7*   EGFRNONAA >60 >60       Significant Imaging: I have reviewed all pertinent imaging results/findings within the past 24 hours.

## 2022-05-09 NOTE — PT/OT/SLP PROGRESS
Physical Therapy      Patient Name:  Agus Horan   MRN:  [id number]    Patient not seen today secondary to patient declined participation with PT treatment in a.m. and patient receiving wound care in p.m. Will follow-up 05/10/22.

## 2022-05-09 NOTE — NURSING
Wound vac dressing change done. Dr. Byers at bedside to assess the right lower leg wound once wound vac dressing removed. Healthy beefy tissue present. There is an area 2cm of exposed bone and the remainder of the area along the bone has granulation tissue present noted at this assessment. Applied a cut piece of white foam dressing and placed this along the bone and 6 cm at 12 o'clock and 6cm at 6 o'clock with 1 continuous piece of white foam intact. Then applied the black foam tucked under the skin and over the white foam and applied the wound vac drape dressing and obtained a good seal. Applied a cut piece of urtogul and placed this over the medial suture site which remains intact at this time. Covered with kerlix then re-applied the cast mold to keep the foot and lower leg in alignment and secured this with 2 ace wraps. Patient tolerated well as he was medicated with morphine prior to wound care and lidocaine jelly used topically to help with wound pain. Wound care to continue to follow this patient.         Right lateral lower leg measures 18cm x 5cm x 3cm      Left medial incision site intact at this assessment.      Consent: Written consent was obtained and risks were reviewed including but not limited to scarring, infection, bleeding, scabbing, incomplete removal, nerve damage and allergy to anesthesia.

## 2022-05-09 NOTE — ASSESSMENT & PLAN NOTE
Patient's anemia is currently controlled. Has recieved 1 units of PRBCs. Etiology likely d/t acute blood loss  Current CBC reviewed-   Lab Results   Component Value Date    HGB 7.2 (L) 05/09/2022    HCT 22.1 (L) 05/09/2022     Monitor serial CBC and transfuse if patient becomes hemodynamically unstable, symptomatic or H/H drops below 7/21.   Continue iron tablet- continue ALBERTO 3x per week SQ per nephrology recs

## 2022-05-09 NOTE — PLAN OF CARE
GAMA met with pt at bedside and informed he of acceptance at [facility] in [facility] and pt okay with going to [place].  Called Keli at [facility] and pt spoke to her.    Keli called GAMA after talking to pt and stated she submitted for LTAC auth to Medicaid/[facility] Connect.     05/09/22 3929   Post-Acute Status   Post-Acute Authorization Placement   Post-Acute Placement Status Pending payor review/awaiting authorization (if required)   Discharge Plan   Discharge Plan A Long-term acute care facility (LTAC)

## 2022-05-10 LAB
ABO + RH BLD: ABNORMAL
ALBUMIN SERPL BCP-MCNC: 2.3 G/DL (ref 3.5–5.2)
ALP SERPL-CCNC: 143 U/L (ref 55–135)
ALT SERPL W/O P-5'-P-CCNC: 47 U/L (ref 10–44)
ANION GAP SERPL CALC-SCNC: 9 MMOL/L (ref 8–16)
AST SERPL-CCNC: 43 U/L (ref 10–40)
BACTERIA SPEC ANAEROBE CULT: NORMAL
BASOPHILS # BLD AUTO: 0.18 K/UL (ref 0–0.2)
BASOPHILS NFR BLD: 1.4 % (ref 0–1.9)
BILIRUB SERPL-MCNC: 0.1 MG/DL (ref 0.1–1)
BLD GP AB SCN CELLS X3 SERPL QL: ABNORMAL
BLOOD GROUP ANTIBODIES SERPL: NORMAL
BUN SERPL-MCNC: 20 MG/DL (ref 6–20)
CA-I BLDV-SCNC: 1.41 MMOL/L (ref 1.06–1.42)
CALCIUM SERPL-MCNC: 9.6 MG/DL (ref 8.7–10.5)
CHLORIDE SERPL-SCNC: 107 MMOL/L (ref 95–110)
CO2 SERPL-SCNC: 22 MMOL/L (ref 23–29)
CREAT SERPL-MCNC: 1.1 MG/DL (ref 0.5–1.4)
CRP SERPL-MCNC: 11.1 MG/L (ref 0–8.2)
DIFFERENTIAL METHOD: ABNORMAL
EOSINOPHIL # BLD AUTO: 1.7 K/UL (ref 0–0.5)
EOSINOPHIL NFR BLD: 13.4 % (ref 0–8)
ERYTHROCYTE [DISTWIDTH] IN BLOOD BY AUTOMATED COUNT: 12.8 % (ref 11.5–14.5)
EST. GFR  (AFRICAN AMERICAN): >60 ML/MIN/1.73 M^2
EST. GFR  (NON AFRICAN AMERICAN): >60 ML/MIN/1.73 M^2
GLUCOSE SERPL-MCNC: 91 MG/DL (ref 70–110)
HCT VFR BLD AUTO: 21.3 % (ref 40–54)
HGB BLD-MCNC: 6.7 G/DL (ref 14–18)
IMM GRANULOCYTES # BLD AUTO: 0.32 K/UL (ref 0–0.04)
IMM GRANULOCYTES NFR BLD AUTO: 2.5 % (ref 0–0.5)
LYMPHOCYTES # BLD AUTO: 2.7 K/UL (ref 1–4.8)
LYMPHOCYTES NFR BLD: 21.3 % (ref 18–48)
MCH RBC QN AUTO: 28.8 PG (ref 27–31)
MCHC RBC AUTO-ENTMCNC: 31.5 G/DL (ref 32–36)
MCV RBC AUTO: 91 FL (ref 82–98)
MONOCYTES # BLD AUTO: 1.1 K/UL (ref 0.3–1)
MONOCYTES NFR BLD: 8.3 % (ref 4–15)
NEUTROPHILS # BLD AUTO: 6.7 K/UL (ref 1.8–7.7)
NEUTROPHILS NFR BLD: 53.1 % (ref 38–73)
NRBC BLD-RTO: 0 /100 WBC
PLATELET # BLD AUTO: 371 K/UL (ref 150–450)
PMV BLD AUTO: 9.3 FL (ref 9.2–12.9)
POTASSIUM SERPL-SCNC: 3.4 MMOL/L (ref 3.5–5.1)
PROT SERPL-MCNC: 6.4 G/DL (ref 6–8.4)
RBC # BLD AUTO: 2.33 M/UL (ref 4.6–6.2)
SARS-COV-2 RDRP RESP QL NAA+PROBE: NEGATIVE
SODIUM SERPL-SCNC: 138 MMOL/L (ref 136–145)
WBC # BLD AUTO: 12.6 K/UL (ref 3.9–12.7)

## 2022-05-10 PROCEDURE — 25000003 PHARM REV CODE 250: Performed by: INTERNAL MEDICINE

## 2022-05-10 PROCEDURE — 86140 C-REACTIVE PROTEIN: CPT | Performed by: STUDENT IN AN ORGANIZED HEALTH CARE EDUCATION/TRAINING PROGRAM

## 2022-05-10 PROCEDURE — 27000124 HC DRESSING, WOUND VAC

## 2022-05-10 PROCEDURE — 25000003 PHARM REV CODE 250: Performed by: ANESTHESIOLOGY

## 2022-05-10 PROCEDURE — 99232 PR SUBSEQUENT HOSPITAL CARE,LEVL II: ICD-10-PCS | Mod: S$GLB,,, | Performed by: STUDENT IN AN ORGANIZED HEALTH CARE EDUCATION/TRAINING PROGRAM

## 2022-05-10 PROCEDURE — U0002 COVID-19 LAB TEST NON-CDC: HCPCS

## 2022-05-10 PROCEDURE — A4216 STERILE WATER/SALINE, 10 ML: HCPCS | Performed by: ORTHOPAEDIC SURGERY

## 2022-05-10 PROCEDURE — 99024: ICD-10-PCS | Mod: ,,, | Performed by: STUDENT IN AN ORGANIZED HEALTH CARE EDUCATION/TRAINING PROGRAM

## 2022-05-10 PROCEDURE — 25000003 PHARM REV CODE 250: Performed by: ORTHOPAEDIC SURGERY

## 2022-05-10 PROCEDURE — 99232 SBSQ HOSP IP/OBS MODERATE 35: CPT | Mod: S$GLB,,, | Performed by: STUDENT IN AN ORGANIZED HEALTH CARE EDUCATION/TRAINING PROGRAM

## 2022-05-10 PROCEDURE — 25000003 PHARM REV CODE 250: Performed by: NURSE PRACTITIONER

## 2022-05-10 PROCEDURE — 36415 COLL VENOUS BLD VENIPUNCTURE: CPT | Performed by: ORTHOPAEDIC SURGERY

## 2022-05-10 PROCEDURE — 97116 GAIT TRAINING THERAPY: CPT | Mod: CQ

## 2022-05-10 PROCEDURE — 97530 THERAPEUTIC ACTIVITIES: CPT | Mod: CQ

## 2022-05-10 PROCEDURE — 82330 ASSAY OF CALCIUM: CPT | Performed by: ORTHOPAEDIC SURGERY

## 2022-05-10 PROCEDURE — 80053 COMPREHEN METABOLIC PANEL: CPT

## 2022-05-10 PROCEDURE — 99024 POSTOP FOLLOW-UP VISIT: CPT | Mod: ,,, | Performed by: STUDENT IN AN ORGANIZED HEALTH CARE EDUCATION/TRAINING PROGRAM

## 2022-05-10 PROCEDURE — 11000001 HC ACUTE MED/SURG PRIVATE ROOM

## 2022-05-10 PROCEDURE — 63600175 PHARM REV CODE 636 W HCPCS: Performed by: NURSE PRACTITIONER

## 2022-05-10 PROCEDURE — 86850 RBC ANTIBODY SCREEN: CPT

## 2022-05-10 PROCEDURE — 63600175 PHARM REV CODE 636 W HCPCS: Performed by: STUDENT IN AN ORGANIZED HEALTH CARE EDUCATION/TRAINING PROGRAM

## 2022-05-10 PROCEDURE — 86870 RBC ANTIBODY IDENTIFICATION: CPT

## 2022-05-10 PROCEDURE — 94761 N-INVAS EAR/PLS OXIMETRY MLT: CPT

## 2022-05-10 PROCEDURE — 86922 COMPATIBILITY TEST ANTIGLOB: CPT

## 2022-05-10 PROCEDURE — 85025 COMPLETE CBC W/AUTO DIFF WBC: CPT

## 2022-05-10 RX ORDER — FERROUS GLUCONATE 324(38)MG
324 TABLET ORAL 2 TIMES DAILY WITH MEALS
Start: 2022-05-10 | End: 2024-03-27

## 2022-05-10 RX ORDER — HYDROXYZINE PAMOATE 25 MG/1
25 CAPSULE ORAL EVERY 8 HOURS PRN
Start: 2022-05-10 | End: 2024-03-27

## 2022-05-10 RX ORDER — DIPHENHYDRAMINE HCL 25 MG
25 CAPSULE ORAL EVERY 6 HOURS PRN
Refills: 0
Start: 2022-05-10 | End: 2024-03-27

## 2022-05-10 RX ORDER — HYDRALAZINE HYDROCHLORIDE 25 MG/1
25 TABLET, FILM COATED ORAL EVERY 8 HOURS
Qty: 90 TABLET | Refills: 11
Start: 2022-05-10 | End: 2024-03-27

## 2022-05-10 RX ORDER — CYCLOBENZAPRINE HCL 10 MG
10 TABLET ORAL 3 TIMES DAILY PRN
Start: 2022-05-10 | End: 2022-05-20

## 2022-05-10 RX ORDER — LIDOCAINE HYDROCHLORIDE AND EPINEPHRINE 10; 10 MG/ML; UG/ML
5 INJECTION, SOLUTION INFILTRATION; PERINEURAL ONCE
Status: DISCONTINUED | OUTPATIENT
Start: 2022-05-10 | End: 2022-05-11 | Stop reason: HOSPADM

## 2022-05-10 RX ORDER — ACETAMINOPHEN 325 MG/1
650 TABLET ORAL EVERY 6 HOURS PRN
Refills: 0
Start: 2022-05-10 | End: 2024-03-27

## 2022-05-10 RX ORDER — CEFEPIME HYDROCHLORIDE 1 G/50ML
6 INJECTION, SOLUTION INTRAVENOUS CONTINUOUS
Start: 2022-05-10 | End: 2022-05-24

## 2022-05-10 RX ORDER — METOPROLOL SUCCINATE 25 MG/1
25 TABLET, EXTENDED RELEASE ORAL DAILY
Qty: 30 TABLET | Refills: 11
Start: 2022-05-11 | End: 2024-03-27

## 2022-05-10 RX ORDER — HYDRALAZINE HYDROCHLORIDE 25 MG/1
25 TABLET, FILM COATED ORAL EVERY 8 HOURS
Status: DISCONTINUED | OUTPATIENT
Start: 2022-05-10 | End: 2022-05-11 | Stop reason: HOSPADM

## 2022-05-10 RX ORDER — OXYCODONE AND ACETAMINOPHEN 5; 325 MG/1; MG/1
1 TABLET ORAL EVERY 4 HOURS PRN
Refills: 0
Start: 2022-05-10 | End: 2024-03-27

## 2022-05-10 RX ADMIN — HEPARIN SODIUM 5000 UNITS: 5000 INJECTION INTRAVENOUS; SUBCUTANEOUS at 03:05

## 2022-05-10 RX ADMIN — OXYCODONE HYDROCHLORIDE AND ACETAMINOPHEN 1 TABLET: 5; 325 TABLET ORAL at 11:05

## 2022-05-10 RX ADMIN — HEPARIN SODIUM 5000 UNITS: 5000 INJECTION INTRAVENOUS; SUBCUTANEOUS at 05:05

## 2022-05-10 RX ADMIN — OXYCODONE HYDROCHLORIDE AND ACETAMINOPHEN 1 TABLET: 5; 325 TABLET ORAL at 01:05

## 2022-05-10 RX ADMIN — HYDRALAZINE HYDROCHLORIDE 25 MG: 25 TABLET, FILM COATED ORAL at 10:05

## 2022-05-10 RX ADMIN — Medication 2 ML: at 05:05

## 2022-05-10 RX ADMIN — METOPROLOL SUCCINATE 25 MG: 25 TABLET, EXTENDED RELEASE ORAL at 09:05

## 2022-05-10 RX ADMIN — DOCUSATE SODIUM AND SENNOSIDES 1 TABLET: 8.6; 5 TABLET, FILM COATED ORAL at 09:05

## 2022-05-10 RX ADMIN — OXYCODONE HYDROCHLORIDE AND ACETAMINOPHEN 1 TABLET: 5; 325 TABLET ORAL at 09:05

## 2022-05-10 RX ADMIN — CYCLOBENZAPRINE 10 MG: 10 TABLET, FILM COATED ORAL at 12:05

## 2022-05-10 RX ADMIN — MORPHINE SULFATE 2 MG: 4 INJECTION INTRAVENOUS at 06:05

## 2022-05-10 RX ADMIN — OXYCODONE 5 MG: 5 TABLET ORAL at 12:05

## 2022-05-10 RX ADMIN — HYDRALAZINE HYDROCHLORIDE 25 MG: 25 TABLET, FILM COATED ORAL at 03:05

## 2022-05-10 RX ADMIN — DOCUSATE SODIUM AND SENNOSIDES 1 TABLET: 8.6; 5 TABLET, FILM COATED ORAL at 08:05

## 2022-05-10 RX ADMIN — MORPHINE SULFATE 2 MG: 4 INJECTION INTRAVENOUS at 02:05

## 2022-05-10 RX ADMIN — OXYCODONE HYDROCHLORIDE AND ACETAMINOPHEN 1 TABLET: 5; 325 TABLET ORAL at 05:05

## 2022-05-10 RX ADMIN — Medication 324 MG: at 09:05

## 2022-05-10 RX ADMIN — HYDRALAZINE HYDROCHLORIDE 50 MG: 25 TABLET, FILM COATED ORAL at 05:05

## 2022-05-10 RX ADMIN — CEFEPIME HYDROCHLORIDE 2 G: 2 INJECTION, SOLUTION INTRAVENOUS at 12:05

## 2022-05-10 RX ADMIN — CEFEPIME HYDROCHLORIDE 2 G: 2 INJECTION, SOLUTION INTRAVENOUS at 09:05

## 2022-05-10 RX ADMIN — Medication 324 MG: at 05:05

## 2022-05-10 RX ADMIN — HEPARIN SODIUM 5000 UNITS: 5000 INJECTION INTRAVENOUS; SUBCUTANEOUS at 10:05

## 2022-05-10 RX ADMIN — MORPHINE SULFATE 2 MG: 4 INJECTION INTRAVENOUS at 08:05

## 2022-05-10 RX ADMIN — CEFEPIME HYDROCHLORIDE 2 G: 2 INJECTION, SOLUTION INTRAVENOUS at 05:05

## 2022-05-10 RX ADMIN — Medication 2 ML: at 12:05

## 2022-05-10 RX ADMIN — OXYCODONE 5 MG: 5 TABLET ORAL at 05:05

## 2022-05-10 NOTE — PROGRESS NOTES
[facility]  Adult Nutrition  Progress Note    SUMMARY     Intervention: general healthful diet and nutrition supplement therapy     Recommendations   1) Continue with Adult regular diet   2) change supplements to Poli+ beneprotein BID and boost glucose control ( low K/Phos) 1 x day  3) weigh weekly    Goals: 1.) diet will advance within 48 hrs 2.) pt will meet >50% of EEN during admit   Nutrition Goal Status: 1.) goal met 2.) goal met/ongoing   Communication of RD Recs: POC, sticky note     1. Compartment syndrome    2. Hyperkalemia    3. SANDRA (acute kidney injury)    4. Traumatic rhabdomyolysis, initial encounter    5. Acute renal failure    6. Non-traumatic compartment syndrome of right lower extremity    7. Acute renal failure, unspecified acute renal failure type    8. Tachycardia    9. Shortness of breath    10. Acute renal failure with tubular necrosis    11. Hypercalcemia            Past Medical History:   Diagnosis Date    Allergy       AR    Asthma       mild intermittent    Hyperkalemia 4/14/2022      Assessment and Plan  Nutrition Problem  altered nutrition related lab values     Related to (etiology):   Renal dysfunction     Signs and Symptoms (as evidenced by):   Phos: 8.9   K: 5.3        Interventions/Recommendations (treatment strategy):  Advance to renal diet, educate on diet on f/u      Nutrition Diagnosis Status:   Improved    Inadequate energy/protein intake  R/t decreased appetite/nausea  As evidenced by wound, PO intakes < 75% of meals x > 1 week  Intervention: above  improving              Malnutrition Assessment  Stable weight  Johnny: 20, leg incision  Edema: 1+   Teeth WDL     Reason for Assessment  Reason For Assessment: Follow up  Rounds: not attended    General Information Comments: admits with acute renal failure. Pt in OR during RD rounds for washout. RD consulted for new HD pt. Provided renal diet education to RN. Will f/u with education. unable to assess any  "weight loss.  4/21: pt receiving HD during RD rounds. Just receive Polish toast for breakfast which he was excited about. States he's willing to try novasource with meals. Encouraged protein intake with meals and importance of maintaining his nutrition status while receiving HD. Reports a UBW of ~165lbs. Provided SANDRA diet education, left at bedside with RD email.  4/25: pt in periop for surgery. Diet just advanced back to renal. Per chart review, pt with adequate intake over the weekend (75% at most meals). Pt will continue with HD outpatient.   4/28: HD yesterday. Tolerating diet, consumed ~50% of breakfast this morning. Drinking about 1 novasource renal a day.   5/4: Pt requesting fruit on all trays due to tolerance. States he has "good days and bad days" with his appetite and nausea due to HD. Updated diet order to send low K fruit (strawberries, blueberries, and pears). Agreeable to beneprotein on tray to mix with beverages for added protein intake. Encouraged novasource renal intake. Pt stated he was drinking it, but slacked off.   5/6: Diet liberalized due to renal function improving. Poli ordered BID. Pending LTAC acceptance   5/10/22 Pt eating 50% or greater of most meals per his report ( 1/3 of an omlet and 75% of a bagel this morning). Not taking novasource or poli at this time- I educated pt on Poli's importance and encourage supplement intakes for wound healing. Awaits placement.     Nutrition Risk Screen  Nutrition Risk Screen: no indicators present     Nutrition/Diet History  Food Allergies: shrimp  Spiritual/cultural/Jewish factors affecting PO intakes: n/a  Factors Affecting Nutritional Intake: decreased appetite     Anthropometrics  Height Method: Stated  Height: 5' 9"  Height (inches): 69 in  Weight Method: Bed Scale  Weight: 81.2 kg (5/2/22)  Weight (lb): 179 lb  Ideal Body Weight (IBW), Male: 160 lb  BMI (Calculated): 26.4  BMI Grade: 25 - 29.9 - overweight  Usual Body Weight (UBW), kg: " 77.1 kg (April 2022)     Lab/Procedures/Meds  Pertinent Labs Reviewed: reviewed  BMP  Lab Results   Component Value Date     05/10/2022    K 3.4 (L) 05/10/2022     05/10/2022    CO2 22 (L) 05/10/2022    BUN 20 05/10/2022    CREATININE 1.1 05/10/2022    CALCIUM 9.6 05/10/2022    ANIONGAP 9 05/10/2022    ESTGFRAFRICA >60 05/10/2022    EGFRNONAA >60 05/10/2022     Lab Results   Component Value Date    CALCIUM 9.6 05/10/2022    PHOS 4.4 05/05/2022     Lab Results   Component Value Date    ALBUMIN 2.3 (L) 05/10/2022     Pertinent Medications Reviewed: reviewed  Kbicarb, isolyte @ 25 ml/hr, zofran, epoetin, iron, senna, KNaPhos     Estimated/Assessed Needs  Weight Used For Calorie Calculations: 81.2  Energy Calorie Requirements (kcal): 2030 kcal  Energy Need Method: 25 Kcal/kg (SANDRA resolved no more HD)  Protein Requirements: 0.8-1.0 g protein/kg ( 64-81 g)  Weight Used For Protein Calculations: 81.2 kg  Fluid Requirements (mL): 2000 ml  Estimated Fluid Requirement Method: RDA        Nutrition Prescription Ordered  Current Diet Order: Adult regular diet  Oral Nutrition Supplements: Novasource renal BID + Poli BID         Evaluation of Received Nutrient/Fluid Intake  Energy Calories Required: sometimes meeting  Protein needs: sometimes meeting  Fluid needs: meeting  % Intake of Estimated Energy Needs: 50-75%  % Meal Intake: 50-75%     Nutrition Risk  Level of Risk/Frequency of Follow-up: low/moderate (1x weekly)         Monitor and Evaluation  Food and Nutrient Intake: energy intake, food and beverage intake  Food and Nutrient Adminstration: diet order  Knowledge/Beliefs/Attitudes: food and nutrition knowledge/skill  Anthropometric Measurements: weight, weight change, body mass index  Biochemical Data, Medical Tests and Procedures: electrolyte and renal panel  Nutrition-Focused Physical Findings: overall appearance, wound         Nutrition Follow-Up  RD Follow-up?: Yes

## 2022-05-10 NOTE — PHYSICIAN QUERY
PT Name: Agus Horan  MR #: [id number]    DOCUMENTATION CLARIFICATION     CDS/: Janina Alanis RN              Contact information:[email or web address]      This form is a permanent document in the medical record.     Query Date: May 10, 2022    By submitting this query, we are merely seeking further clarification of documentation. Please utilize your independent clinical judgment when addressing the question(s) below.    Supporting Clinical Findings Location in Medical Record   Severe Sepsis -- Organ dysfunction indicated by Acute respiratory failure and tachycardia  Source- right leg fasciotomy site       E cloacae and Serratia marcescens both susceptible to Cefepime (DEN 5/8  Hosp Med MD PN              5/2 ID MD PN     Serratia Marcescens    Serratia Marcescens  Enterobacter Cloacae   5/5 R. Leg tissue culture    5/2 R Leg Wound       Provider, please clarify organism(s) that are associated with Sepsis:     [  ] Serratia Marcescens     [  ] Enterobacter Cloacae     [  x ]  Serratia Marcescens  & Enterobacter Cloacae     [   ]  No linkage of organism to sepsis     [  ] Other explanation (Please Specify): ______________     [  ] Clinically Undetermined                                                                               Please document in your progress notes daily for the duration of treatment until resolved and include in your discharge summary.

## 2022-05-10 NOTE — ASSESSMENT & PLAN NOTE
This patient does have evidence of infective focus  My overall impression is sepsis. Vital signs were reviewed and noted in progress note.  Antibiotics given-   Antibiotics (From admission, onward)            Start     Stop Route Frequency Ordered    05/08/22 0100  cefepime in dextrose 5 % IVPB 2 g         -- IV Every 8 hours (non-standard times) 05/07/22 1848        Cultures were taken-   Microbiology Results (last 7 days)     Procedure Component Value Units Date/Time    Culture, Anaerobic [[id number]] Collected: 05/05/22 1505    Order Status: Completed Specimen: Wound from Leg, Right Updated: 05/10/22 1045     Anaerobic Culture No anaerobes isolated    Culture, Anaerobic [[id number]] Collected: 05/02/22 1400    Order Status: Completed Specimen: Wound from Leg, Right Updated: 05/09/22 1035     Anaerobic Culture No anaerobes isolated    Tissue culture [[id number]]  (Abnormal)  (Susceptibility) Collected: 05/05/22 1530    Order Status: Completed Specimen: Tissue from Leg, Right Updated: 05/09/22 1001     Aerobic Culture - Tissue SERRATIA MARCESCENS  Many       Gram Stain Result Rare WBC's      Rare Gram negative rods    Culture, Body Fluid (Aerobic) w/ GS [[id number]]  (Abnormal)  (Susceptibility) Collected: 05/05/22 1505    Order Status: Completed Specimen: Synovial Fluid from Ankle, Right Updated: 05/09/22 0958     AEROBIC CULTURE - FLUID SERRATIA MARCESCENS  Moderate       Gram Stain Result Few WBC's      No organisms seen    Blood culture [[id number]] Collected: 05/02/22 0410    Order Status: Completed Specimen: Blood from Antecubital, Right Arm Updated: 05/07/22 1212     Blood Culture, Routine No growth after 5 days.    Blood culture [[id number]] Collected: 05/01/22 1216    Order Status: Completed Specimen: Blood Updated: 05/06/22 1812     Blood Culture, Routine No growth after 5 days.    Narrative:      Collection has been rescheduled by CONCHITA at 05/01/2022 11:22 Reason:   Patient unavailable starting dialysis and  with the dr     Collection has been rescheduled by MR at 05/01/2022 11:42 Reason:   Got first set second set due after 12 couldnt stick other side due to   IV   Collection has been rescheduled by [email or web address] at 05/01/2022 11:22 Reason:   Patient unavailable starting dialysis and with the dr     Collection has been rescheduled by [email or web address] at 05/01/2022 11:42 Reason:   Got first set second set due after 12 couldnt stick other side due to   IV     Blood culture [[id number]] Collected: 05/01/22 1140    Order Status: Completed Specimen: Blood Updated: 05/06/22 1812     Blood Culture, Routine No growth after 5 days.    Narrative:      Collection has been rescheduled by MRQ1 at 05/01/2022 11:22 Reason:   Patient unavailable starting dialysis and with the dr   Collection has been rescheduled by MR at 05/01/2022 11:22 Reason:   Patient unavailable starting dialysis and with the dr     Fungus culture [[id number]] Collected: 05/05/22 1505    Order Status: Sent Specimen: Wound from Leg, Right Updated: 05/06/22 0027    Aerobic culture [[id number]]  (Abnormal)  (Susceptibility) Collected: 05/02/22 1400    Order Status: Completed Specimen: Wound from Leg, Right Updated: 05/05/22 1256     Aerobic Bacterial Culture SERRATIA MARCESCENS  Many        ENTEROBACTER CLOACAE  Moderate          Latest lactate reviewed, they are-  No results for input(s): LACTATE in the last 72 hours.    Organ dysfunction indicated by Acute respiratory failure and tachycardia  Source- right leg fasciotomy site    Source control Achieved by- IV abx, ID following  CRP trending downward    Plan to insert PICC line for IV abx

## 2022-05-10 NOTE — PROGRESS NOTES
[facility] Hospital Medicine  Progress Note    Patient Name: Agus Horan  MRN: [id number]  Patient Class: IP- Inpatient   Admission Date: 4/13/2022  Length of Stay: 27 days  Attending Physician: Ortiz Saxena MD  Primary Care Provider: Primary Doctor No        Subjective:     Principal Problem:Acute renal failure with tubular necrosis        HPI:  Agus Horan is a 20 y/o male with PMHx of mild asthma who presents with right lower extremity pain and swelling x few days. Patient was seen in the ED on 4/9/22 for oxycodone overdose and again on 4/10/22 for left eye pain after falling on a piece of furniture. Patient states he is unsure how he injured his leg and denies IV drug use. Xray of his right leg did not show acute fracture. Patietn states RLE pain and swelling increased over the past few days and he is unable to walk secondary to pain. US of his lower extremity performed today did not show DVT. Lab work revealed elevated Creatinine 13.5 & K+ 7.1, elevated AST/ALT 3980/1611 & WBC 18.14.  CPK>40,0000.  Patient unable to dorsiflex and plantar flex his RLE and symptoms are concerning for compartment syndrome per ED. ED provider discussed with Dr. Byers and Dr. Fraser, nephrology. Patient was referred to hospital medicine and seen in the ED with his friend at bedside. Patient complaining of RLE pain, tenderness and swelling. He denies SOB, N/V/D, chest pain and headaches.  Patient will be admitted to hospital medicine for orthopedic consultation and further management.      Overview/Hospital Course:  Pt admitted for SANDRA, rhabdomyolisis, and RLE compartment syndrome. Pt was taken to the OR by Dr. Byers for emergent fasciotomy on 4/13. CPK at the time of arrival was more than 40,000. Pt was initially started on IV fluids and required emergent hemodialysis on 4/14 for hyperkalemia and metabolic acidosis refractory to medical management. Pt returned to OR with Dr. Byers on 4/18 and  underwent debridement of RLE with closure of medial and lateral fasciotomy incision and biopsy of anterior muscle compartment. Patient's CPK level was monitored closely and continued to trend down.  Patient underwent repeat debridement of deep right lateral fasciotomy incision right lower leg with excisional  debridement of dead anterior tibialis muscle and biopsy of muscle with application of wound VAC on April 25, 2022. Pts pain was not well controlled and medications were adjusted. On 5/1 pt developed persistent tachycardia with mild hypoxemia with room air sat 90%.  Patient unable to get CTA chest due to acute renal failure and hemodialysis.  Chest x-ray obtained and negative.  Patient encouraged to utilize IS and O2 sats improved.  US of bilat LE obtained and neg for DVT with some limitation to RLE due to drsg intact. Decision was made to initiate heparin drip for high suspicion of PE until V/Q scan could be performed.  Patient also developed low-grade fever and Infectious Disease was consulted for further assistance and recommendations.  Patient was continued on IV antibiotics.  V/Q scan was obtained on 05/02 and neg for PE.  Heparin drip was stopped evening of 5/2 and pt was placed on SQ heparin for VTE prophylaixis.   Wound VAC was changed on 05/02 by Dr. Byers and wound care nurse with findings of some necrotic tissue with purulent drainage.  Antibiotics were tailored by Infectious Disease.  Patient to OR for right lower extremity surgical site washout on 05/05/2022.      Interval History: Notes reviewed, no acute events overnight. Patient seen resting in bed this morning playing video games. He admits to continued right lower leg pain that is worse in his foot. He denies any other complaints at this time. Patient found to have low H&H this morning. He denies any fatigue, weakness, active bleeding. Plan to transfuse 1 unit of blood. Informed by nurse blood has to travel from [place]. Will likely  discharge tomorrow following transfusion. Wound vac replaced yesterday evening by wound care nurse. Patient scheduled for hemo split removal today with General surgery followed by PICC line insertion. Plan to discharge to [facility] in [place]. Will continue to monitor.     Review of Systems   Constitutional:  Negative for chills, fatigue and fever.   HENT:  Negative for congestion, drooling, nosebleeds and sore throat.    Respiratory:  Negative for cough, chest tightness and shortness of breath.    Cardiovascular:  Negative for chest pain and palpitations.   Gastrointestinal:  Negative for abdominal distention, abdominal pain, nausea and vomiting.   Endocrine: Negative for polydipsia and polyuria.   Genitourinary:  Negative for difficulty urinating, dysuria and hematuria.   Musculoskeletal:  Positive for myalgias. Negative for back pain and gait problem.   Neurological:  Negative for dizziness, syncope and headaches.   Hematological:  Negative for adenopathy.   Psychiatric/Behavioral:  Negative for agitation, behavioral problems and confusion.    Objective:     Vital Signs (Most Recent):  Temp: 98.1 °F (36.7 °C) (05/10/22 0748)  Pulse: 108 (05/10/22 0748)  Resp: 16 (05/10/22 1316)  BP: 136/65 (05/10/22 0748)  SpO2: 99 % (05/10/22 0748) Vital Signs (24h Range):  Temp:  [96.5 °F (35.8 °C)-98.1 °F (36.7 °C)] 98.1 °F (36.7 °C)  Pulse:  [101-124] 108  Resp:  [16-19] 16  SpO2:  [98 %-100 %] 99 %  BP: (136-158)/(64-91) 136/65     Weight: 81.2 kg (179 lb)  Body mass index is 26.43 kg/m².    Intake/Output Summary (Last 24 hours) at 5/10/2022 1413  Last data filed at 5/10/2022 1000  Gross per 24 hour   Intake 1337.6 ml   Output 2200 ml   Net -862.4 ml      Physical Exam  Vitals and nursing note reviewed.   Constitutional:       General: He is not in acute distress.     Appearance: Normal appearance. He is not ill-appearing.   HENT:      Head: Normocephalic and atraumatic.      Right Ear: External ear normal.      Left  Ear: External ear normal.      Nose: No congestion or rhinorrhea.      Mouth/Throat:      Mouth: Mucous membranes are moist.      Pharynx: Oropharynx is clear. No oropharyngeal exudate or posterior oropharyngeal erythema.   Eyes:      Extraocular Movements: Extraocular movements intact.      Conjunctiva/sclera: Conjunctivae normal.      Pupils: Pupils are equal, round, and reactive to light.   Pulmonary:      Effort: Pulmonary effort is normal. No respiratory distress.      Breath sounds: Normal breath sounds. No wheezing or rales.   Abdominal:      General: Abdomen is flat. Bowel sounds are normal. There is no distension.      Palpations: Abdomen is soft.      Tenderness: There is no abdominal tenderness.   Musculoskeletal:         General: No swelling or tenderness. Normal range of motion.      Comments: Right lower extremity braced and dressings CDI. Wound vac in place with dark colored output   Skin:     General: Skin is warm and dry.      Capillary Refill: Capillary refill takes less than 2 seconds.      Coloration: Skin is not jaundiced.      Findings: No bruising.   Neurological:      General: No focal deficit present.      Mental Status: He is alert and oriented to person, place, and time. Mental status is at baseline.      Cranial Nerves: No cranial nerve deficit.      Sensory: No sensory deficit.   Psychiatric:         Mood and Affect: Mood normal.         Behavior: Behavior normal.       Significant Labs: All pertinent labs within the past 24 hours have been reviewed.  CBC:   Recent Labs   Lab 05/09/22  0523 05/10/22  0551   WBC 11.58 12.60   HGB 7.2* 6.7*   HCT 22.1* 21.3*    371     CMP:   Recent Labs   Lab 05/09/22  0523 05/10/22  0551    138   K 3.3* 3.4*    107   CO2 25 22*   GLU 91 91   BUN 19 20   CREATININE 1.2 1.1   CALCIUM 10.2 9.6   PROT 6.7 6.4   ALBUMIN 2.4* 2.3*   BILITOT 0.1 0.1   ALKPHOS 128 143*   AST 44* 43*   ALT 44 47*   ANIONGAP 7* 9   EGFRNONAA >60 >60        Significant Imaging: I have reviewed all pertinent imaging results/findings within the past 24 hours.      Assessment/Plan:      * Acute renal failure with tubular necrosis  Continues to be anuric  Hemodialysis therapy as per Nephrology team.  due to rhabdomyolysis,   Trend CPK daily.  Follow Nephrology recommendations.  Avoid nephrotoxins  Telemetry.  Hemo split catheter was placed on April 25, 2022.     4/30 - remains on hemodialysis, emergent dialysis today secondary to hypercalcemia.  Will continue to monitor closely  5/1 - HD again today secondary to hypercalcemia. Renal function improving. Cont to monitor closely.  5/2 - hemodialysis treatment again today.  Continue to monitor  5/3 - stable, nephrology following, cont routine HD    Resolving. HD on hold-- patient with renal recovery  5/7 - BUN/Cr of 29/1.5, Cont to follow Nephrology recs   5/8 - stable, nephrology following  5/9 - Nephrology cleared removal of hemo split  5/10 hemo split removal with General surgery    Hypokalemia  nephrology consulted. Adjust K bath. Trend BMP  improving    Hypercalcemia  Nephrology consulted. On calcitonin   Improving    Severe sepsis  This patient does have evidence of infective focus  My overall impression is sepsis. Vital signs were reviewed and noted in progress note.  Antibiotics given-   Antibiotics (From admission, onward)            Start     Stop Route Frequency Ordered    05/08/22 0100  cefepime in dextrose 5 % IVPB 2 g         -- IV Every 8 hours (non-standard times) 05/07/22 1848        Cultures were taken-   Microbiology Results (last 7 days)     Procedure Component Value Units Date/Time    Culture, Anaerobic [[id number]] Collected: 05/05/22 1505    Order Status: Completed Specimen: Wound from Leg, Right Updated: 05/10/22 1045     Anaerobic Culture No anaerobes isolated    Culture, Anaerobic [[id number]] Collected: 05/02/22 1400    Order Status: Completed Specimen: Wound from Leg, Right Updated: 05/09/22  1035     Anaerobic Culture No anaerobes isolated    Tissue culture [[id number]]  (Abnormal)  (Susceptibility) Collected: 05/05/22 1530    Order Status: Completed Specimen: Tissue from Leg, Right Updated: 05/09/22 1001     Aerobic Culture - Tissue SERRATIA MARCESCENS  Many       Gram Stain Result Rare WBC's      Rare Gram negative rods    Culture, Body Fluid (Aerobic) w/ GS [[id number]]  (Abnormal)  (Susceptibility) Collected: 05/05/22 1505    Order Status: Completed Specimen: Synovial Fluid from Ankle, Right Updated: 05/09/22 0958     AEROBIC CULTURE - FLUID SERRATIA MARCESCENS  Moderate       Gram Stain Result Few WBC's      No organisms seen    Blood culture [[id number]] Collected: 05/02/22 0410    Order Status: Completed Specimen: Blood from Antecubital, Right Arm Updated: 05/07/22 1212     Blood Culture, Routine No growth after 5 days.    Blood culture [[id number]] Collected: 05/01/22 1216    Order Status: Completed Specimen: Blood Updated: 05/06/22 1812     Blood Culture, Routine No growth after 5 days.    Narrative:      Collection has been rescheduled by [facility] at 05/01/2022 11:22 Reason:   Patient unavailable starting dialysis and with the dr     Collection has been rescheduled by [facility] at 05/01/2022 11:42 Reason:   Got first set second set due after 12 couldnt stick other side due to   IV   Collection has been rescheduled by [facility] at 05/01/2022 11:22 Reason:   Patient unavailable starting dialysis and with the dr     Collection has been rescheduled by [facility] at 05/01/2022 11:42 Reason:   Got first set second set due after 12 couldnt stick other side due to   IV     Blood culture [[id number]] Collected: 05/01/22 1140    Order Status: Completed Specimen: Blood Updated: 05/06/22 1812     Blood Culture, Routine No growth after 5 days.    Narrative:      Collection has been rescheduled by [facility] at 05/01/2022 11:22 Reason:   Patient unavailable starting dialysis and with the dr   Collection has been rescheduled by MR at  05/01/2022 11:22 Reason:   Patient unavailable starting dialysis and with the dr     Fungus culture [[id number]] Collected: 05/05/22 1505    Order Status: Sent Specimen: Wound from Leg, Right Updated: 05/06/22 0027    Aerobic culture [[id number]]  (Abnormal)  (Susceptibility) Collected: 05/02/22 1400    Order Status: Completed Specimen: Wound from Leg, Right Updated: 05/05/22 1256     Aerobic Bacterial Culture SERRATIA MARCESCENS  Many        ENTEROBACTER CLOACAE  Moderate          Latest lactate reviewed, they are-  No results for input(s): LACTATE in the last 72 hours.    Organ dysfunction indicated by Acute respiratory failure and tachycardia  Source- right leg fasciotomy site    Source control Achieved by- IV abx, ID following  CRP trending downward    Plan to insert PICC line for IV abx     Hypoxia  Concern for PE given tachycardia and new hypoxia  Will obtain chest x-ray and check D-dimer which will likely be elevated given renal failure and recent surgery, will also check lactic acid  Will discuss with Nephrology high concern for PE and need for further workup with CTA verses V/Q scan and also initiating anticoag therapy  continue telemetry monitoring  Supplemental O2 to keep sats >92%  Pt encouraged to use IS    5/3 - VQ scan negative, heparin gtt stopped. Suspect hypoxia secondary to sepsis which is now improving with appropriate source control. Pt encouraged to use IS. O2 sats improving and stable on room air. Will cont to monitor closely.    resolved          Tachycardia  Persistent tachycardia over the past 24 hours  Patient denies chest pain  Concern for PE given tachycardia and new hypoxia  Will obtain chest x-ray and check D-dimer which will likely be elevated given renal failure and recent surgery, will also check lactic acid  Will discuss with Nephrology high concern for PE and need for further workup with CTA verses V/Q scan and also initiating anticoag therapy  Patient also hypertensive-will start  metoprolol XL today as per chart review patient has had some intermittent tachycardia during his stay  Continue telemetry monitoring    5/2 - patient tolerating heparin drip.  Will obtain V/Q scan and echocardiogram today.  Workup in progress for possible infectious process.  Appreciate ID consult.    5/3 - improving with source control, VQ scan neg and heparin gtt stopped 5/2. Appreciate ID consult.           Anemia of chronic disease  Patient's anemia is currently controlled. Has recieved 1 units of PRBCs. Etiology likely d/t acute blood loss  Current CBC reviewed-   Lab Results   Component Value Date    HGB 6.7 (L) 05/10/2022    HCT 21.3 (L) 05/10/2022     Monitor serial CBC and transfuse if patient becomes hemodynamically unstable, symptomatic or H/H drops below 7/21.   Continue iron tablet- continue ALBERTO 3x per week SQ per nephrology recs  Low H&H this morning, 1 unit of RBC ordered      Drug eruption  No definite etiology apparent.  Possibly clindamycin use.  Off intravenous clindamycin.  Use oral Benadryl 25 mg q.6 hours p.r.n. patient will be monitored closely.    4/30 - resolved      Compartment syndrome  Underwent fasciotomy on 4/13   S/p Debridement deep right lateral fasciotomy incision right lower leg with excisional  debridement of dead anterior tibialis muscle and biopsy of muscle application of wound VAC right lower leg  - 04/25/22.  Wound care nurse performing dressing changes as per recommendations by Orthopedics.    4/30 - stable, wound vac in use, cont current therapy    5/2 - plan for wound VAC change today  5/3 - wound vac changed yesterday, d/w wound care nurse, area of necrotic tissue with purulent drainage. Wound debrided at BS by Dr. Byers. Plan for wound washout and further debridement in OR tomorrow or Thursday. Cont abx, appreciate ID consult.    To OR for wash out on 5/5/2022  New cultures obtained on 5/5/2022 resulted in many Gram negative rods, ID following, continue IV cefepime,  wound vac in place   Wound vac replaced by wound care nurse on 5/9    Elevated liver enzymes  Elevated AST &ALT likely due to rhabdomyolysis  Trending down  Avoid hepatoxic medications  Monitor CMP  See plan for rhabdomyolysis    4/30 - resolved, cont to monitor CMP        Rhabdomyolysis  CPK trending down  S/p fasciotomy on 4/13 for compartment syndrome.  On 4/18 S/p DEBRIDEMENT, LOWER EXTREMITY (Right)   closure of medial fasciotomy incision right leg debridement of lateral fasciotomy with biopsy of anterior muscle compartment removal of deep muscle anterior compartment right lower leg. Subsequently underwent Debridement deep right lateral fasciotomy incision right lower leg with excisional  debridement of dead anterior tibialis muscle and biopsy of muscle application of wound VAC right lower leg on 04/25/22.  Monitor BMP   Follow Nephrology recommendations.    4/30 - improved and stable, cont to monitor closely      History of asthma  Hx of asthma, stable  Monitor for acute changes        VTE Risk Mitigation (From admission, onward)         Ordered     heparin (porcine) injection 5,000 Units  Every 8 hours         05/02/22 1921     IP VTE HIGH RISK PATIENT  Once         05/02/22 1921     heparin (porcine) injection 4,000 Units  As needed (PRN)         04/14/22 0137     Reason for No Pharmacological VTE Prophylaxis  Once        Question:  Reasons:  Answer:  Risk of Bleeding    04/13/22 1913     Place sequential compression device  Until discontinued         04/13/22 1913                Discharge Planning   JARAD: 5/10/2022     Code Status: Full Code   Is the patient medically ready for discharge?:     Reason for patient still in hospital (select all that apply): Patient trending condition and Treatment  Discharge Plan A: Long-term acute care facility (LTAC)                  Eliezer Arteaga PA-C  Department of Hospital Medicine   [facility]

## 2022-05-10 NOTE — PROGRESS NOTES
The patient's wound VAC is functioning well on the right leg he is afebrile patient is comfortable.  We looked at his wound yesterday appears to be granulating well there is no evidence of any infection.    I think this patient can be transferred to an LTAC at any time .  They can continue to change the wound VAC twice a week.  I would continue the posterior splint on his right leg to keep his foot in a neutral position.  The patient needs to follow up on discharge with Dr. Corbin the plastic surgeon who have consulted for a split thickness skin graft to the lower leg.  The patient can be seen by Dr. Corbin in his clinic in [place] he comes there once a week.

## 2022-05-10 NOTE — PT/OT/SLP PROGRESS
Physical Therapy Treatment    Patient Name:  Agus Horan   MRN:  [id number]    Recommendations:     Discharge Recommendations:  LTACH (long-term acute care hospital)   Discharge Equipment Recommendations: walker, rolling, bedside commode   Barriers to discharge: None    Assessment:     Agus Hroan is a 21 y.o. male admitted with a medical diagnosis of Acute renal failure with tubular necrosis.  He presents with the following impairments/functional limitations:  weakness, impaired endurance, impaired self care skills, impaired functional mobilty, gait instability, decreased lower extremity function, pain, orthopedic precautions, impaired skin . Agreed to mobilize.  Eager to discharge soon. Ambulated 125' x 2 with rw and CGA. NWB RLE, seated rest between each . Transferred on / off commode in restroom with CGA. Returned to bed.     Rehab Prognosis: Good; patient would benefit from acute skilled PT services to address these deficits and reach maximum level of function.    Recent Surgery: Procedure(s) (LRB):  REPLACEMENT, WOUND VAC (Right)  DEBRIDEMENT, LOWER EXTREMITY (Right) 5 Days Post-Op    Plan:     During this hospitalization, patient to be seen daily to address the identified rehab impairments via gait training, therapeutic activities, therapeutic exercises and progress toward the following goals:    · Plan of Care Expires:  05/15/22    Subjective     Chief Complaint: none stated  Patient/Family Comments/goals: to go to [facility] then to [place] with family.  Pain/Comfort:  · Pain Rating 1: other (see comments) (did not rate)  · Location - Side 1: Right  · Location - Orientation 1: lower  · Location 1: leg (upon sitting with RLE dependent)  · Pain Addressed 1: Reposition, Nurse notified      Objective:     Communicated with nurse Nickerson prior to session.  Patient found supine with bed alarm, peripheral IV, wound vac upon PT entry to room.     General Precautions: Standard, fall   Orthopedic Precautions:RLE non  weight bearing   Braces: N/A  Respiratory Status: Room air     Functional Mobility:  · Bed Mobility:     · Supine to Sit: contact guard assistance  · Sit to Supine: contact guard assistance  · Transfers:     · Sit to Stand:  minimum assistance with rolling walker  · Gait: 125' x 2 with rw and CGA, NWB RLE. Seated rest betwen each .       AM-PAC 6 CLICK MOBILITY          Therapeutic Activities and Exercises:   Ambulated slowly in hallway with IV and wound vac in tow.   On / off commode with CGA.   Ambulated to bed following.    Patient left supine with all lines intact, call button in reach, bed alarm on and nurse Krishan notified..    GOALS:   Multidisciplinary Problems     Physical Therapy Goals        Problem: Physical Therapy    Goal Priority Disciplines Outcome Goal Variances Interventions   Physical Therapy Goal     PT, PT/OT Ongoing, Progressing     Description: Goals to be met by: 5/15/2022     Patient will increase functional independence with mobility by performin). Supine to sit with Modified [place]  2). Sit to stand transfer with Modified [place] maintaining RLE NWB using axillary crutches or rolling walker  3). Bed to chair transfer with Modified [place] maintaining RLE NWB using axillary crutches or rolling walker  5). Gait  x 150 feet with Modified [place] maintaining RLE NWB using axillary crutches or rolling walker                     Time Tracking:     PT Received On: 05/10/22  PT Start Time: 0950     PT Stop Time: 1019  PT Total Time (min): 29 min     Billable Minutes: Gait Training 19min and Therapeutic Activity 10min    Treatment Type: Treatment  PT/PTA: PTA     PTA Visit Number: 1     05/10/2022

## 2022-05-10 NOTE — PLAN OF CARE
Intervention: general healthful diet and nutrition supplement therapy     Recommendations   1) Continue with Adult regular diet   2) change supplements to Poli+ beneprotein BID and boost glucose control ( low K/Phos) 1 x day  3) weigh weekly     Goals: 1.) diet will advance within 48 hrs 2.) pt will meet >50% of EEN during admit   Nutrition Goal Status: 1.) goal met 2.) goal met/ongoing   Communication of RD Recs: POC, sticky note

## 2022-05-10 NOTE — PHYSICIAN QUERY
PT Name: Agus Horan  MR #: [id number]    DOCUMENTATION CLARIFICATION     CDS/: Janina Alanis RN              Contact information: [email or web address]     This form is a permanent document in the medical record.     Query Date: May 10, 2022    Dear Provider,  By submitting this query, we are merely seeking further clarification of documentation. Please utilize your independent clinical judgment when addressing the question(s) below.    The medical record contains the following:  Supporting Clinical Findings Location in Medical Record   Pt admitted for SANDRA, rhabdomyolisis, and RLE compartment syndrome.   Pt was taken to the OR by Dr. Byers for emergent fasciotomy on 4/13.    Severe Sepsis -- Organ dysfunction indicated by Acute respiratory failure and tachycardia  Source- right leg fasciotomy site  Source control Achieved by- abx, ID following    E cloacae and Serratia marcescens both susceptible to Cefepime (DEN<2)    WBC= 18.53, 20.78, 19.30  Procal= 0.62    Temp=  100.4. 101.9   HR= 118, 113, 135     5/8  Hosp Med MD PN          5/2 ID MD PN    5/3-5/5 Lab/ Flowsheet       Please clarify if Severe sepsis (as it relates to __Fasciotomy ) is:      [  ] Inherent/Integral to the procedure   [x  ] Complication of the procedure   [  ] Present, but not a complication of the procedure   [  ] Incidental finding, not clinically significant   [  ] Other (please specify): __________________   [  ] Clinically Undetermined       Please document in your progress notes daily for the duration of treatment until resolved and include in your discharge summary.

## 2022-05-10 NOTE — PLAN OF CARE
Problem: Physical Therapy  Goal: Physical Therapy Goal  Description: Goals to be met by: 5/15/2022     Patient will increase functional independence with mobility by performin). Supine to sit with Modified [place]  2). Sit to stand transfer with Modified [place] maintaining RLE NWB using axillary crutches or rolling walker  3). Bed to chair transfer with Modified [place] maintaining RLE NWB using axillary crutches or rolling walker  5). Gait  x 150 feet with Modified [place] maintaining RLE NWB using axillary crutches or rolling walker    Outcome: Ongoing, Progressing   Ambulate with rw and assistance for safety, NWB RLE.

## 2022-05-10 NOTE — NURSING
pt's H/h went from 7.2/22.1 to 6.7/21.3 this morning. Day shift nurse notified. Hand MD notified. Awaiting new orders. Pt alert and oriented x4. No complaints of pain or bleeding.

## 2022-05-10 NOTE — PROCEDURES
"Agus Horan is a 21 y.o. male patient.    Temp: 98.1 °F (36.7 °C) (05/10/22 0748)  Pulse: 108 (05/10/22 0748)  Resp: 16 (05/10/22 1417)  BP: 136/65 (05/10/22 0748)  SpO2: 99 % (05/10/22 0748)  Weight: 81.2 kg (179 lb) (05/02/22 1539)  Height: 5' 9" (175.3 cm) (05/02/22 1539)       Tunneled CVC Removal    Date/Time: 5/10/2022 2:19 PM  Location procedure was performed: [facility] PRE/POST-OP  Performed by: Wayne Batres MD  Authorized by: Wayne Batres MD   Body area: trunk  Location details: chest  Description of findings: right tunneled CVC removed intact    Sutures Removed: 2  Post-removal: dressing applied  Estimated blood loss (mL): 1  Comments: Permacath removed intact without difficulty in trendelenburg . Pressure held. Pt tolerated well          5/10/2022    "

## 2022-05-10 NOTE — ASSESSMENT & PLAN NOTE
Underwent fasciotomy on 4/13   S/p Debridement deep right lateral fasciotomy incision right lower leg with excisional  debridement of dead anterior tibialis muscle and biopsy of muscle application of wound VAC right lower leg  - 04/25/22.  Wound care nurse performing dressing changes as per recommendations by Orthopedics.    4/30 - stable, wound vac in use, cont current therapy    5/2 - plan for wound VAC change today  5/3 - wound vac changed yesterday, d/w wound care nurse, area of necrotic tissue with purulent drainage. Wound debrided at BS by Dr. Byers. Plan for wound washout and further debridement in OR tomorrow or Thursday. Cont abx, appreciate ID consult.    To OR for wash out on 5/5/2022  New cultures obtained on 5/5/2022 resulted in many Gram negative rods, ID following, continue IV cefepime, wound vac in place   Wound vac replaced by wound care nurse on 5/9

## 2022-05-10 NOTE — ASSESSMENT & PLAN NOTE
Patient's anemia is currently controlled. Has recieved 1 units of PRBCs. Etiology likely d/t acute blood loss  Current CBC reviewed-   Lab Results   Component Value Date    HGB 6.7 (L) 05/10/2022    HCT 21.3 (L) 05/10/2022     Monitor serial CBC and transfuse if patient becomes hemodynamically unstable, symptomatic or H/H drops below 7/21.   Continue iron tablet- continue ALBERTO 3x per week SQ per nephrology recs  Low H&H this morning, 1 unit of RBC ordered

## 2022-05-10 NOTE — PROGRESS NOTES
INPATIENT NEPHROLOGY Progress Note  [facility]    Patient Name: Agus Horan  Date: 05/10/2022    Reason for consultation:   SANDRA    Chief Complaint:   Chief Complaint   Patient presents with    Leg Pain     Pain in the right leg muscle calf is hard, nausea vomiting, patient was seen here over the weekend for an overdose      History of Present Illness:  20 y/o M with a history of asthma recently here on 4/9 with oxycodone overdose who p/w RLE pain and swelling after a fall on 4/10. He reports severe pain, constant, associated with nausea with inability to bear weight.  He took meloxicam without relief. He was found to have compartment syndrome and was taking to the OR emergently for fasciotomy on 4/13. We are consulted for SANDRA with metabolic derangements.    Interval History:  4/14- did emergent HD overnight for hyperkalemia and metabolic acidosis refractory to medical management; pain controlled, RLE wrapped, no edema in LLE  4/15- worsening pain/edema in RLE- going for MRI- oligoanuric- stop NS IVFs- will do HD/UF today and tomorrow  4/16  Seen on dialysis.  No distress  4/17  Remains oliguric.  AFVSS.  Has back pain.  Leg pain a little better  4/18  In the OR.  Still oliguric.    4/19  Seen on dialysis.  No distress.    4/20  Still not making much urine.  cpk coming down.     4/21  Afebrile.  BP a little better.  uop up a bit  422   Sleeping.  AFVSS.  I/Os not appropriately recorded despite being ordered for twice  4/23  225 cc UOP recorded.  K+ 5.5, HD today.  No renal recovery, CPK improving.  C/o rash, Dr. Zamorano at bedside placed orders.  Seen on dialysis, tolerating tx well.  4/24   No new lab results, next lab draw in am.   Still has rash and c/o itching.  No other complaints.  450 cc UOP recorded.  4/25  In the OR.  Plan for hd today  4/26  AFVSS.  Urine output better.  No complaints specified today  4/27  Sleeping.  Output not recorded despite being ordered  4/28  Tearful.  Not  engaging when spoken too.  Requesting dilaudid   4/29     Good urine output.  Seen on dialysis.  No distress  4/30  UOP 4.4L.  Ca+ 15, will have dialysis today, stopped calcitriol and vit D.  Will add on PTH to labs.    5/1  Ca+ 15.5, PTH suppressed.  D/w Dr. Patrick, pt to have additional dialysis today, no UF.  Notified Suni Stone w/[facility] pt needs to run today.  Pt w/elevated temp, a little more tachy than usual, not feeling very well.  5/2 tachy, hypertensive, on RA, UOP 2.6L, remains hypercalcemic- PTH appropriately suppressed, Dr. Byers is at bedside  5/3 febrile, tachy, BP better, on RA, UOP 650cc  5/4 had to terminate HD treatment short about 30min due to n/v and sinus tachy- net UF 1.6L; UOP 3.3L, SCr 2.5, serum Ca rising over last few days in conjunction with improvement in SCr- may reflect volume depletion  5/5 BP high, on RA, UOP 2.7L, going for OR procedure: Deep excisional debridement of necrotic muscle anterior compartment right leg.  Application of wound VAC.  5/6 febrile, BP stable, on RA, UOP 400cc, pain score about a 7  5/7  Tmax 101.9, pressures stable.  Ionized Ca+ trended down, CMP results still pending at present.  No complaints.  5/8  Renal function improving on yesterday's labs.  Ca+ was improved as well.  Ionized Ca+ level this am a little higher, but no other lab results are posted as of yet.  Pressures a little elevated this am, but states RLE very painful, has requested medication.  5/9 VSS, no new complains. Discontinue tunneled HD cath, he no longer needs HD, will consult Gen Surg,  5/10 VSS, drop in Hgb of unclear significance. Suggest blood transfusion and/or hematology consult. Already receiving Epo, although this anemia is not renal. Tunneled HD cath removal timing per Surgery, it is not emergent.    Plan of Care:    Traumatic rhabdomyolysis with compartment syndrome, s/p fasciotomy 4/13  SANDRA due to toxic ATN requiring HD 4/14 to 5/4, now with recovery  Hypercalcemia of  immobilization, improving after bisphosphonate and calcitonin  Hypokalemia  Anemia  HTN    Plan:    - Ortho following- s/p I&D on 5/5.  - He has renal recovery- last HD 5/4. No NSAIDS or IV contrast. Can remove hemosplit, no longer needs HD.   - Hypercalcemia better after bisphosphonate therapy on 5/5 and calcitonin.  - BP is better- continue hydral but slower to 25mg TID, will stop eventually.  - Ordered oral KCl repletion. Continue a regular diet.  - H/H is low, significance unclear- continue iron tablet- continue ALBERTO 3x per week SQ - transfuse or consider heme eval.      Thank you for allowing us to participate in this patient's care. We will continue to follow.    Vital Signs:  Temp Readings from Last 3 Encounters:   05/10/22 98.1 °F (36.7 °C) (Oral)   04/10/22 97.3 °F (36.3 °C)   04/09/22 98.4 °F (36.9 °C)     Pulse Readings from Last 3 Encounters:   05/10/22 108   04/10/22 63   04/09/22 95     BP Readings from Last 3 Encounters:   05/10/22 136/65   04/10/22 117/64   04/09/22 (!) 143/82     Weight:  Wt Readings from Last 3 Encounters:   05/02/22 81.2 kg (179 lb)   04/10/22 77.1 kg (170 lb)   04/09/22 77.1 kg (170 lb)     INS/OUTS:  I/O last 3 completed shifts:  In: 7072.8 [P.O.:1440; I.V.:5298.7; IV Piggyback:334.1]  Out: 5700 [Urine:5700]  No intake/output data recorded.    Medications:  Scheduled Meds:   ceFEPime (MAXIPIME) IVPB  2 g Intravenous Q8H    epoetin leidy  50 Units/kg Subcutaneous Every Mon, Wed, Fri    ferrous gluconate  324 mg Oral BID WM    heparin (porcine)  5,000 Units Subcutaneous Q8H    hydrALAZINE  50 mg Oral Q8H    LIDOcaine HCL 2%   Topical (Top) Every Mon, Thurs    metoprolol succinate  25 mg Oral Daily    senna-docusate 8.6-50 mg  1 tablet Oral BID    sodium chloride 0.9%  2 mL Intravenous Q6H     Continuous Infusions:   electrolyte-S (pH 7.4) Stopped (05/05/22 1600)    electrolyte-S (pH 7.4)      loperamide       PRN Meds:.acetaminophen, cyclobenzaprine, dextrose 10%,  "dextrose 10%, diphenhydrAMINE, glucagon (human recombinant), glucose, glucose, heparin (porcine), hydrOXYzine pamoate, labetaloL, loperamide, magnesium oxide, magnesium oxide, metoclopramide HCl, morphine, naloxone, ondansetron, oxyCODONE, oxyCODONE-acetaminophen, phenyleph-min oil-petrolatum, potassium bicarbonate, potassium bicarbonate, potassium bicarbonate, potassium, sodium phosphates, potassium, sodium phosphates, potassium, sodium phosphates, sodium chloride 0.9%  No current facility-administered medications on file prior to encounter.     Current Outpatient Medications on File Prior to Encounter   Medication Sig Dispense Refill    naloxone (NARCAN) 4 mg/actuation Spry 4mg by nasal route as needed for opioid overdose; may repeat every 2-3 minutes in alternating nostrils until medical help arrives. Call 911 2 each 0     Review of Systems:    Physical Exam:  /65 (BP Location: Right arm, Patient Position: Lying)   Pulse 108   Temp 98.1 °F (36.7 °C) (Oral)   Resp 16   Ht 5' 9" (1.753 m)   Wt 81.2 kg (179 lb)   SpO2 99%   BMI 26.43 kg/m²     Physical Exam  Constitutional:       Appearance: He is well-developed. He is not diaphoretic.   HENT:      Head: Normocephalic and atraumatic.   Eyes:      General: No scleral icterus.     Pupils: Pupils are equal, round, and reactive to light.   Cardiovascular:      Rate and Rhythm: Normal rate and regular rhythm.   Pulmonary:      Effort: Pulmonary effort is normal. No respiratory distress.      Breath sounds: No stridor.   Abdominal:      General: There is no distension.      Palpations: Abdomen is soft.   Musculoskeletal:         General: No deformity. Normal range of motion.      Cervical back: Neck supple.   Skin:     General: Skin is warm and dry.      Findings: No erythema or rash.   Neurological:      Mental Status: He is alert and oriented to person, place, and time.      Cranial Nerves: No cranial nerve deficit.   Psychiatric:         Behavior: " Behavior normal.       Results:  Recent Labs   Lab 05/08/22  0954 05/09/22  0523 05/10/22  0551    137 138   K 3.0* 3.3* 3.4*    105 107   CO2 22* 25 22*   BUN 21* 19 20   CREATININE 1.2 1.2 1.1   ESTGFRAFRICA >60 >60 >60   EGFRNONAA >60 >60 >60   * 91 91       Recent Labs   Lab 05/05/22  0504 05/06/22  0748 05/08/22  0954 05/09/22  0523 05/10/22  0551   CALCIUM 13.0*   < > 10.4 10.2 9.6   ALBUMIN 2.4*   < > 2.4* 2.4* 2.3*   PHOS 4.4  --   --   --   --     < > = values in this interval not displayed.       Recent Labs   Lab 04/23/22  1535 04/30/22  0304   PTH, Intact 901.6 H 6.2 L       Recent Labs   Lab 05/08/22  0954 05/09/22  0523 05/10/22  0551   WBC 11.81 11.58 12.60   HGB 7.2* 7.2* 6.7*   HCT 22.7* 22.1* 21.3*    330 371   MCV 92 90 91   MCHC 31.7* 32.6 31.5*   MONO 7.5  0.9 10.1  1.2* 8.3  1.1*       Recent Labs   Lab 05/08/22  0954 05/09/22  0523 05/10/22  0551   BILITOT 0.2 0.1 0.1   PROT 6.7 6.7 6.4   ALBUMIN 2.4* 2.4* 2.3*   ALKPHOS 121 128 143*   ALT 41 44 47*   AST 45* 44* 43*       Recent Labs   Lab 04/16/22  0452 05/01/22  2211   Color, UA Brown A Yellow   Appearance, UA Hazy A Hazy A   pH, UA 6.0 6.0   Specific Gravity, UA >=1.030 A 1.015   Protein, UA 2+ A Trace A   Glucose, UA 1+ A Negative   Ketones, UA Negative Negative   Urobilinogen, UA Negative Negative   Bilirubin (UA) 1+ A Negative   Occult Blood UA 3+ A 2+ A   Nitrite, UA Negative Negative   RBC, UA >100 H 6 H   WBC, UA >100 H 55 H   Bacteria Moderate A Few A   Hyaline Casts, UA 0 2 A     I have spent >  minutes providing care for this patient for the above diagnoses. These services have included chart/data/imaging review, evaluation, exam, formulation of plan, , note preparation, and discussions with staff involved in this patient's care.    Mac Patrick MD    Nephrology  [facility]  [phone]

## 2022-05-10 NOTE — PLAN OF CARE
Problem: Adult Inpatient Plan of Care  Goal: Plan of Care Review  Outcome: Ongoing, Progressing  Goal: Absence of Hospital-Acquired Illness or Injury  Outcome: Ongoing, Progressing  Goal: Optimal Comfort and Wellbeing  Outcome: Ongoing, Progressing  Goal: Readiness for Transition of Care  Outcome: Ongoing, Progressing     Problem: Infection  Goal: Absence of Infection Signs and Symptoms  Outcome: Ongoing, Progressing     Problem: Fall Injury Risk  Goal: Absence of Fall and Fall-Related Injury  Outcome: Ongoing, Progressing     Problem: Skin Injury Risk Increased  Goal: Skin Health and Integrity  Outcome: Ongoing, Progressing     Problem: Glycemic Control Impaired (Sepsis/Septic Shock)  Goal: Blood Glucose Level Within Desired Range  Outcome: Ongoing, Progressing     Problem: Infection Progression (Sepsis/Septic Shock)  Goal: Absence of Infection Signs and Symptoms  Outcome: Ongoing, Progressing   Pt is alert and orientedx4. Pain management. Dressing intact. Room air. Iv fluids 100 mL/hr. Wound vac suction 125. Bed alarm set. Call light within reach.

## 2022-05-10 NOTE — NURSING
Received consult for PICC placement; message to nephrology for approval to place PICC line. Per Dr. Celina barriga to place PICC. Message to ELEAZAR Singh okay to place in the morning prior to discharge. Notified primary nurse.

## 2022-05-10 NOTE — DISCHARGE INSTRUCTIONS
[facility] Transfer Orders        Ad[facility] to: [facility]    Diagnoses:   Active Hospital Problems    Diagnosis  POA    *Acute renal failure with tubular necrosis [N17.0]  Yes    Hypercalcemia [E83.52]  Yes    Hypokalemia [E87.6]  No    Severe sepsis [A41.9, R65.20]  No    Tachycardia [R00.0]  No    Hypoxia [R09.02]  No    Anemia of chronic disease [D63.8]  No    Drug eruption [L27.0]  No    Rhabdomyolysis [M62.82]  Yes    Elevated liver enzymes [R74.8]  Yes    Compartment syndrome [T79.A0XA]  Yes    History of asthma [Z87.09]  Not Applicable      Resolved Hospital Problems    Diagnosis Date Resolved POA    Hyperkalemia [E87.5] 04/16/2022 Yes     Allergies:   Review of patient's allergies indicates:   Allergen Reactions    Shrimp        Code Status: Full    Vitals: Routine       Diet: renal diet    Activity: Activity as tolerated    Nursing Precautions: Fall    Bed/Surface: Low Air Loss    Consults: PT to evaluate and treat- 3 times a week, OT to evaluate and treat- 3 times a week, and Wound Care        Dialysis: Patient is not on dialysis.     Labs: First blood draw on 5/12.              CBL    Pending Diagnostic Studies:       Procedure Component Value Units Date/Time    EKG 12-lead [[id number]] Collected: 04/30/22 0529    Order Status: Sent Lab Status: In process Updated: 05/02/22 0929    Narrative:      Test Reason : R00.0,    Vent. Rate : 117 BPM     Atrial Rate : 117 BPM     P-R Int : 148 ms          QRS Dur : 076 ms      QT Int : 272 ms       P-R-T Axes : 073 065 030 degrees     QTc Int : 379 ms    Sinus tachycardia  Otherwise normal ECG  When compared with ECG of 13-APR-2022 18:25,  Vent. rate has increased BY  47 BPM  Nonspecific T wave abnormality now evident in Inferior leads    Referred By: [email or web address]   SELF           Confirmed By:     EKG 12-lead [[id number]]     Order Status: Sent Lab Status: No result               Miscellaneous Care:   Wound Care: yes:  Wound Vac:   Location:   Right lower extremity    Change twice a week. Use white foam along the bone with tunnel areas noted and may apply a silver mesh dressing over this area if bone is no longer exposed under the black foam.   Continue the posterior splint on his right leg to keep his foot in a neutral position. May order an AFO brace for this patient when available.        IV Access: PICC     Medications: Discontinue all previous medication orders, if any. See new list below.  Current Discharge Medication List        CONTINUE these medications which have NOT CHANGED    Details   naloxone (NARCAN) 4 mg/actuation Spry 4mg by nasal route as needed for opioid overdose; may repeat every 2-3 minutes in alternating nostrils until medical help arrives. Call 911  Qty: 2 each, Refills: 0    Associated Diagnoses: Opiate overdose, accidental or unintentional, initial encounter           Follow up:   Fax his CBC and CMP labs to the Infectious Disease office [phone]  Follow up with Dr. Ashby in clinic on 5/25  Follow up with Dr. Corbin plastic surgery in 1 week.       Immunizations Administered as of 5/10/2022       No immunizations on file.              Eliezer Arteaga PA-C

## 2022-05-10 NOTE — PROGRESS NOTES
Consult Note  Infectious Disease    Reason for Consult:  Compartment syndrome left leg s/p fall/Rhabdo    HPI: Agus Horan is a 21 y.o. male with PMHx of mild intermittent asthma, allergies, oxycodone overdose on 04/09/2022, left eye pain after a fall 04/10/2022, came to the hospital on 04/13/2022 with complaints of right leg pain.    Ultrasound ruled out DVT  X-ray ruled out foreign body and fractures  He had right leg compartment syndrome, rhabdomyolysis and SANDRA     He was seen by Dr. Fraser with Nephrology and Dr. Freedman with Orthopedic surgery.    Patient was taken for fasciotomy on  04/13/2022, then debridement and closure on 04/18/2022; then  debridement deep right lateral fasciotomy incision right lower leg with excisional  debridement of dead anterior tibialis muscle and biopsy of muscle application of wound VAC right lower leg  on 04/25/2022  There are no cultures sent intraoperatively  Blood cultures are negative on 04/13/2022 and 05/01/2022    I came and saw patient.  He has a temperature of 100.2°.  WBC 16. He is on cefazolin since 04/13/2022(with an interruptions or 4 days from 04/19--4/22)  He feels well, he feels normal.  He is just anxious bc  of SANDRA. He does not want ot be on HD fr the rest of his life  Hospitalist is concerned of tachycardia and poss PE and is giving heparin drip.     INTERVAL HISTORY:  5/2 (Isma): Interim reviewed, discussed with Dr Perales, patient seen and examined at bedside, covered in bed-sheet. States he has a growing lesion on his left AC. Having HD a bedside. Persistent tachycardia, hypertension, afebrile. Labs reviewed, WBC: 18.5, PMN 71.5%, H/H 8.3/25.2, plt: 369. Iron 10, ferritin 1.010. ESR 79, .9, calcium 14.9. D-dimer 3.3. Procal 0.81. Micro reviewed, negative thus far, no cultures sent from the OR.     5/3:  Patient seen and examined at bedside, feeling comfortable today.  States is the most calmed he has ever felt.  Seen by Ortho yesterday, status post  "debridement at bedside.  Upon extensive revision, small pocket of purulent bloody fluid drained, cultures taken at bedside.  Plan for OR either tomorrow or the day after.  CT of left arm consistent with myositis ossificans, likely from hypercalcemia.  Uric acid 3.0.  V/Q scan negative for PE.  Patient's heart rate remained slightly elevated 104 currently.  Labs reviewed, white count 20.7, PMN 68%.  H&H 7.6/23.5, platelet count 354. Sodium 135, creatinine 2.8.  Calcium 12.3, ionized calcium 1.65.  Albumin 2.3.    5/4:  Interim reviewed, patient seen and examined at bedside covered in pillows and bed sheets.  States he is tired, and her L AC fossa lesion is decreasing in size. Persistent tachycardia,  hypertensive, afebrile. Having dialysis at bedside. Cultures taken at bedside grew GNR, lab called, lactose negative, ID and sensitivities available tomorrow. Labs reviewed, persistent leukocytosis, 19.3, PMN 75%, bands 6%. Potassium 3.3, normal LFTs, calcium 13.6.    5/5: Patient seen and examined at bedside, tearful about current situation. States he is starting all over again once he moves to [place].  He is scheduled for procedure with Ortho today. Remains hypertensive, tachy 101, afebrile, good urine output. Labs reviewed, WBC: 19.7, no left shift. H/H 7.4/23.2, plt: 394. Hypokalemia 3.3, creatinine 2.0. Calcium 13. Albumin 2.4. Normal LFTs, CPK 87.    5/6: Interim reviewed, patient seen and examined at bedside, asking for percocet 10. S/p washout in the OR by Ortho yesterday. As per Op-note: "inspection of the wound showed that about 90% of the wound was covered with granulation tissue on deep palpation were able to get gross pus coming from the anterior portion of the anterior compartment near the anterior tibialis muscle there was some necrotic muscle present along with pus it was also cultured." Patient currently hypertensive, low grade fever 100.4. Last HD 5/4. Labs reviewed, WC 16.2, PMNL 81.5%, eosinophilia " improved. H/H 7.2/22.5, pltL 41. Cr 2.0, calcium 13.2, albumin 2.4, normal LFTs. Wound cultures 5/2 E cloacae and Serratia marcescens both susceptible to Cefepime (DEN <2).    5/7:  Interim reviewed, patient seen examined at bedside.  States he is feeling great.  Had temperature of 101.9° overnight, currently afebrile.  He states he did feel the fever, maybe was covered in blankets.  Labs reviewed, white count significantly improved 11, no left shift, H&H 7/21.5, platelet count 298. Holding off dialysis, adequate urinary output, creatinine 1.5, CRP 53 trending down.  Calcium 10.7.  Potassium 3.3.  Vitamin-D 21. Micro reviewed, OR cultures from left ankle synovial fluid, and left leg grew Gram-negative gilles, ID and sensitivities pending.    5/8-9:  Interim reviewed, patient seen examined at bedside.  Sleeping although able to interact.  He is feeling tired and wants to rest.  Hypertensive, tachy 105, afebrile.  Cultures from the OR finally resulted today Serratia marcescens pansensitive.  Labs reviewed, stable white count 11.5, no left shift, H&H 7.2/22.1, platelet count 330. Creatinine 1.2, significantly improved.  Calcium 10.2.  AST 44/ALT 44.     5/10: Patient seen and examined at bedside, awaiting discharge. No acute complaints. Hg dropped 6.7, plan for transfusion. WBC stable, eosinophilia 13.4, trending down from 15% yesterday.      Antibiotics (From admission, onward)                Start     Stop Route Frequency Ordered    05/08/22 0100  cefepime in dextrose 5 % IVPB 2 g         -- IV Every 8 hours (non-standard times) 05/07/22 1848          Review of patient's allergies indicates:   Allergen Reactions    Shrimp      Past Medical History:   Diagnosis Date    Allergy     AR    Asthma     mild intermittent    Hyperkalemia 4/14/2022     Past Surgical History:   Procedure Laterality Date    DEBRIDEMENT OF LOWER EXTREMITY Right 4/18/2022    Procedure: DEBRIDEMENT, LOWER EXTREMITY;  Surgeon: Leonardo LEONARDO  MD Zeeshan;  Location: [facility] OR;  Service: Orthopedics;  Laterality: Right;    DEBRIDEMENT OF LOWER EXTREMITY Right 5/5/2022    Procedure: DEBRIDEMENT, LOWER EXTREMITY;  Surgeon: Leonardo Byers MD;  Location: [facility] OR;  Service: Orthopedics;  Laterality: Right;    FASCIOTOMY Right 4/13/2022    Procedure: FASCIOTOMY;  Surgeon: Leonardo Byers MD;  Location: [facility] OR;  Service: Orthopedics;  Laterality: Right;    FASCIOTOMY Right 4/25/2022    Procedure: FASCIOTOMY;  Surgeon: Leonardo Byers MD;  Location: [facility] OR;  Service: Orthopedics;  Laterality: Right;    REMOVAL OF FOREIGN BODY FROM FOOT Left 6/24/2020    Procedure: REMOVAL, FOREIGN BODY, FOOT;  Surgeon: Dani Garcia MD;  Location: [facility] OR;  Service: Orthopedics;  Laterality: Left;    REPLACEMENT OF WOUND VACUUM-ASSISTED CLOSURE DEVICE Right 5/5/2022    Procedure: REPLACEMENT, WOUND VAC;  Surgeon: Leonardo Byers MD;  Location: [facility] OR;  Service: Orthopedics;  Laterality: Right;     Social History     Socioeconomic History    Marital status: Single   Tobacco Use    Smoking status: Never Smoker    Smokeless tobacco: Never Used   Substance and Sexual Activity    Alcohol use: Yes     Comment: last night    Drug use: Yes     Frequency: 2.0 times per week     Types: Marijuana     Comment: yesterday   Social History Narrative    ** Merged History Encounter **         Lives with mom, 2 brothers.  No smokers.  +Dogs/chickens.  9th grader.     Family History   Problem Relation Age of Onset    Asthma Brother     Emphysema Maternal Grandmother     Hyperlipidemia Maternal Grandmother     Asthma Maternal Grandmother     Arthritis Maternal Grandmother     COPD Maternal Grandmother     Asthma Brother          Review of Systems:   Patient with drug use before admission. 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 Alert and attentive, cooperative, comfortable  Eyes:  Anicteric, PERRL, EOMI  ENT:  No ulcers, exudates, thrush, nares patent, dentition is fair  Neck:  Supple  Lungs: Clear to auscultation b/l   Heart:  Tachycardic, S1/S2+, regular rhythm, no murmur   Abd:  Soft, NT, ND, normal BS, no masses or organomegaly appreciated.  :  Voids, clear urine  Musc:  Right leg dressing in place with wound vac, not disturbed. Other joints without effusion, swelling, erythema, synovitis, muscle wasting.   Skin:  Warm  Neuro:   Alert, attentive, speech fluent, face symmetric, moves all extremities, no focal weakness  Psych:  Anxious, cooperative  Lymphatic:       Extrem: Left arm with amorphic deposit, calcification - decreased in size, non tender to palpation    R leg with cast and wound vac in place    No edema, erythema, phlebitis, cellulitis, warm and well perfused  VAD:  R tunneled catheter    Isolation:  none  Wound:     5/3:      5/2:      4/28:                       General Labs reviewed:  Recent Labs   Lab 05/08/22  0954 05/09/22  0523 05/10/22  0551   WBC 11.81 11.58 12.60   HGB 7.2* 7.2* 6.7*   HCT 22.7* 22.1* 21.3*    330 371       Recent Labs   Lab 05/08/22  0954 05/09/22  0523 05/10/22  0551    137 138   K 3.0* 3.3* 3.4*    105 107   CO2 22* 25 22*   BUN 21* 19 20   CREATININE 1.2 1.2 1.1   CALCIUM 10.4 10.2 9.6   PROT 6.7 6.7 6.4   BILITOT 0.2 0.1 0.1   ALKPHOS 121 128 143*   ALT 41 44 47*   AST 45* 44* 43*     Recent Labs   Lab 05/05/22  0951 05/07/22  0341  05/10/22  0551   CRP 73.3* 53.2* 11.1*     Estimated Creatinine Clearance: 104.4 mL/min (based on SCr of 1.1 mg/dL).      Micro:  Microbiology Results (last 7 days)       Procedure Component Value Units Date/Time    Culture, Anaerobic [[id number]] Collected: 05/02/22 1400    Order Status: Completed Specimen: Wound from Leg, Right Updated: 05/09/22 1035     Anaerobic Culture No anaerobes isolated    Tissue culture [[id number]]  (Abnormal)  (Susceptibility) Collected: 05/05/22 1530    Order Status: Completed Specimen: Tissue from Leg, Right Updated: 05/09/22 1001     Aerobic Culture - Tissue SERRATIA MARCESCENS  Many       Gram Stain Result Rare WBC's      Rare Gram negative rods    Culture, Body Fluid (Aerobic) w/ GS [[id number]]  (Abnormal)  (Susceptibility) Collected: 05/05/22 1505    Order Status: Completed Specimen: Synovial Fluid from Ankle, Right Updated: 05/09/22 0958     AEROBIC CULTURE - FLUID SERRATIA MARCESCENS  Moderate       Gram Stain Result Few WBC's      No organisms seen    Culture, Anaerobic [[id number]] Collected: 05/05/22 1505    Order Status: Completed Specimen: Wound from Leg, Right Updated: 05/09/22 0740     Anaerobic Culture Culture in progress    Blood culture [[id number]] Collected: 05/02/22 0410    Order Status: Completed Specimen: Blood from Antecubital, Right Arm Updated: 05/07/22 1212     Blood Culture, Routine No growth after 5 days.    Blood culture [[id number]] Collected: 05/01/22 1216    Order Status: Completed Specimen: Blood Updated: 05/06/22 1812     Blood Culture, Routine No growth after 5 days.    Narrative:      Collection has been rescheduled by [email or web address] at 05/01/2022 11:22 Reason:   Patient unavailable starting dialysis and with the dr     Collection has been rescheduled by MR at 05/01/2022 11:42 Reason:   Got first set second set due after 12 couldnt stick other side due to   IV   Collection has been rescheduled by MR at 05/01/2022 11:22 Reason:   Patient unavailable starting  dialysis and with the dr     Collection has been rescheduled by MR at 05/01/2022 11:42 Reason:   Got first set second set due after 12 couldnt stick other side due to   IV     Blood culture [[id number]] Collected: 05/01/22 1140    Order Status: Completed Specimen: Blood Updated: 05/06/22 1812     Blood Culture, Routine No growth after 5 days.    Narrative:      Collection has been rescheduled by MR at 05/01/2022 11:22 Reason:   Patient unavailable starting dialysis and with the dr   Collection has been rescheduled by MR at 05/01/2022 11:22 Reason:   Patient unavailable starting dialysis and with the dr     Fungus culture [[id number]] Collected: 05/05/22 1505    Order Status: Sent Specimen: Wound from Leg, Right Updated: 05/06/22 0027    Aerobic culture [[id number]]  (Abnormal)  (Susceptibility) Collected: 05/02/22 1400    Order Status: Completed Specimen: Wound from Leg, Right Updated: 05/05/22 1256     Aerobic Bacterial Culture SERRATIA MARCESCENS  Many        ENTEROBACTER CLOACAE  Moderate              Imaging Reviewed:   CXR-- No definite acute radiographic abnormality.  Right internal jugular central venous catheter in place.   CT  UltrasoundNo evidence of deep venous thrombosis in either lower extremity, noting nonvisualization of the right calf veins due to overlying bandaging.    CT Left arm: Three circumscribed foci of soft tissue mineralization along the antecubital fossa have peripheral zoning favoring myositis ossificans.     NM scan low probability for PE    Cardiology:  · The left ventricle is normal in size with concentric remodeling and normal systolic function.  · The estimated ejection fraction is 65%.  · Grade I left ventricular diastolic dysfunction.  · Normal right ventricular size with normal right ventricular systolic function.  · Mild to moderate tricuspid regurgitation.  · Normal central venous pressure (3 mmHg).  · The estimated PA systolic pressure is 47 mmHg.  · There is pulmonary  hypertension.  · Sinus tachycardia rate of 120-130 beats per minute was noted during the study        IMPRESSION & PLAN     1. History of R leg compartment syndrome; s/p multiple I&Ds, plan for I&D 5/5   ESR 79, .9->73.3-->11   Procal 0.81-->1, pt on HD   OR cultures from left ankle and left leg GNR, ID and sensitivities pending    Wound culture taken at bedside 5/2  E cloacae and Serratia marcescens both susceptible to Cefepime (DEN<2)        2. Rhabdomyolysis due to compartment syndrome.  Resolved    3. SANDRA due to rhabdomyolysis, and hypercalcemia, 13 - holding OFF HD< cr 1.5, improving   Nephrology following     4. Vitamin-D deficiency, anemia, protein malnutrition, hypoalbuminemia    5. V/Q scan negative for PE  6. Myositis ossificans L arm - due to hypercalcemia    Recommendations:  Adjust Cefepime to 2g IV q8h, dose as per crcl  He would benefit from LTAC to continue IV abx   Upon discharge, Cefepime 6g IV continuous infusion daily for at least 2 weeks, estimated end date 5/19/2022  Plan for Plastic Surgery eval in [place] in 1 week for skin flap  Monitor CBC w/diff, CMP, CRP weekly while on antibiotics   Follow up in ID clinic/Dr Ashby in 2-3 weeks; office to schedule appt   Wound care   Incentive spirometry     Will sign-off, call us back with any questions     D/w patient, PA, Dr Byers, wound care RN    Medical Decision Making during this encounter was  [_] Low Complexity  [_] Moderate Complexity  [  ] High Complexity

## 2022-05-10 NOTE — ASSESSMENT & PLAN NOTE
Continues to be anuric  Hemodialysis therapy as per Nephrology team.  due to rhabdomyolysis,   Trend CPK daily.  Follow Nephrology recommendations.  Avoid nephrotoxins  Telemetry.  Hemo split catheter was placed on April 25, 2022.     4/30 - remains on hemodialysis, emergent dialysis today secondary to hypercalcemia.  Will continue to monitor closely  5/1 - HD again today secondary to hypercalcemia. Renal function improving. Cont to monitor closely.  5/2 - hemodialysis treatment again today.  Continue to monitor  5/3 - stable, nephrology following, cont routine HD    Resolving. HD on hold-- patient with renal recovery  5/7 - BUN/Cr of 29/1.5, Cont to follow Nephrology recs   5/8 - stable, nephrology following  5/9 - Nephrology cleared removal of hemo split  5/10 hemo split removal with General surgery

## 2022-05-11 VITALS
WEIGHT: 179 LBS | DIASTOLIC BLOOD PRESSURE: 68 MMHG | RESPIRATION RATE: 16 BRPM | OXYGEN SATURATION: 98 % | TEMPERATURE: 98 F | HEIGHT: 69 IN | SYSTOLIC BLOOD PRESSURE: 132 MMHG | BODY MASS INDEX: 26.51 KG/M2 | HEART RATE: 105 BPM

## 2022-05-11 LAB
ALBUMIN SERPL BCP-MCNC: 2.4 G/DL (ref 3.5–5.2)
ALP SERPL-CCNC: 157 U/L (ref 55–135)
ALT SERPL W/O P-5'-P-CCNC: 47 U/L (ref 10–44)
ANION GAP SERPL CALC-SCNC: 9 MMOL/L (ref 8–16)
AST SERPL-CCNC: 36 U/L (ref 10–40)
BASOPHILS # BLD AUTO: 0.19 K/UL (ref 0–0.2)
BASOPHILS NFR BLD: 1.2 % (ref 0–1.9)
BILIRUB SERPL-MCNC: 0.3 MG/DL (ref 0.1–1)
BLD PROD TYP BPU: NORMAL
BLOOD UNIT EXPIRATION DATE: NORMAL
BLOOD UNIT TYPE CODE: 6200
BLOOD UNIT TYPE: NORMAL
BUN SERPL-MCNC: 21 MG/DL (ref 6–20)
CA-I BLDV-SCNC: 1.31 MMOL/L (ref 1.06–1.42)
CALCIUM SERPL-MCNC: 9.1 MG/DL (ref 8.7–10.5)
CHLORIDE SERPL-SCNC: 107 MMOL/L (ref 95–110)
CO2 SERPL-SCNC: 22 MMOL/L (ref 23–29)
CODING SYSTEM: NORMAL
CREAT SERPL-MCNC: 1 MG/DL (ref 0.5–1.4)
DIFFERENTIAL METHOD: ABNORMAL
DISPENSE STATUS: NORMAL
EOSINOPHIL # BLD AUTO: 2 K/UL (ref 0–0.5)
EOSINOPHIL NFR BLD: 12.6 % (ref 0–8)
ERYTHROCYTE [DISTWIDTH] IN BLOOD BY AUTOMATED COUNT: 12.8 % (ref 11.5–14.5)
EST. GFR  (AFRICAN AMERICAN): >60 ML/MIN/1.73 M^2
EST. GFR  (NON AFRICAN AMERICAN): >60 ML/MIN/1.73 M^2
GLUCOSE SERPL-MCNC: 88 MG/DL (ref 70–110)
HCT VFR BLD AUTO: 24.2 % (ref 40–54)
HGB BLD-MCNC: 7.9 G/DL (ref 14–18)
IMM GRANULOCYTES # BLD AUTO: 0.72 K/UL (ref 0–0.04)
IMM GRANULOCYTES NFR BLD AUTO: 4.6 % (ref 0–0.5)
LYMPHOCYTES # BLD AUTO: 2.7 K/UL (ref 1–4.8)
LYMPHOCYTES NFR BLD: 17.4 % (ref 18–48)
MCH RBC QN AUTO: 29.9 PG (ref 27–31)
MCHC RBC AUTO-ENTMCNC: 32.6 G/DL (ref 32–36)
MCV RBC AUTO: 92 FL (ref 82–98)
MONOCYTES # BLD AUTO: 1.2 K/UL (ref 0.3–1)
MONOCYTES NFR BLD: 7.6 % (ref 4–15)
NEUTROPHILS # BLD AUTO: 8.9 K/UL (ref 1.8–7.7)
NEUTROPHILS NFR BLD: 56.6 % (ref 38–73)
NRBC BLD-RTO: 0 /100 WBC
NUM UNITS TRANS PACKED RBC: NORMAL
PLATELET # BLD AUTO: 376 K/UL (ref 150–450)
PMV BLD AUTO: 9.2 FL (ref 9.2–12.9)
POTASSIUM SERPL-SCNC: 3.5 MMOL/L (ref 3.5–5.1)
PROT SERPL-MCNC: 6.4 G/DL (ref 6–8.4)
RBC # BLD AUTO: 2.64 M/UL (ref 4.6–6.2)
SODIUM SERPL-SCNC: 138 MMOL/L (ref 136–145)
WBC # BLD AUTO: 15.68 K/UL (ref 3.9–12.7)

## 2022-05-11 PROCEDURE — 27000124 HC DRESSING, WOUND VAC

## 2022-05-11 PROCEDURE — 25000003 PHARM REV CODE 250: Performed by: ORTHOPAEDIC SURGERY

## 2022-05-11 PROCEDURE — 63600175 PHARM REV CODE 636 W HCPCS: Performed by: NURSE PRACTITIONER

## 2022-05-11 PROCEDURE — 80053 COMPREHEN METABOLIC PANEL: CPT

## 2022-05-11 PROCEDURE — 82330 ASSAY OF CALCIUM: CPT | Performed by: ORTHOPAEDIC SURGERY

## 2022-05-11 PROCEDURE — 63600175 PHARM REV CODE 636 W HCPCS

## 2022-05-11 PROCEDURE — 63600175 PHARM REV CODE 636 W HCPCS: Performed by: STUDENT IN AN ORGANIZED HEALTH CARE EDUCATION/TRAINING PROGRAM

## 2022-05-11 PROCEDURE — C1751 CATH, INF, PER/CENT/MIDLINE: HCPCS

## 2022-05-11 PROCEDURE — 85025 COMPLETE CBC W/AUTO DIFF WBC: CPT

## 2022-05-11 PROCEDURE — 36415 COLL VENOUS BLD VENIPUNCTURE: CPT | Performed by: ORTHOPAEDIC SURGERY

## 2022-05-11 PROCEDURE — A4216 STERILE WATER/SALINE, 10 ML: HCPCS | Performed by: ORTHOPAEDIC SURGERY

## 2022-05-11 PROCEDURE — 63600175 PHARM REV CODE 636 W HCPCS: Performed by: INTERNAL MEDICINE

## 2022-05-11 PROCEDURE — 36573 INSJ PICC RS&I 5 YR+: CPT

## 2022-05-11 PROCEDURE — 25000003 PHARM REV CODE 250: Performed by: NURSE PRACTITIONER

## 2022-05-11 PROCEDURE — 25000003 PHARM REV CODE 250: Performed by: INTERNAL MEDICINE

## 2022-05-11 PROCEDURE — P9016 RBC LEUKOCYTES REDUCED: HCPCS

## 2022-05-11 RX ORDER — SODIUM CHLORIDE 0.9 % (FLUSH) 0.9 %
10 SYRINGE (ML) INJECTION
Status: DISCONTINUED | OUTPATIENT
Start: 2022-05-11 | End: 2022-05-11 | Stop reason: HOSPADM

## 2022-05-11 RX ORDER — MORPHINE SULFATE 4 MG/ML
2 INJECTION, SOLUTION INTRAMUSCULAR; INTRAVENOUS ONCE
Status: COMPLETED | OUTPATIENT
Start: 2022-05-11 | End: 2022-05-11

## 2022-05-11 RX ORDER — SODIUM CHLORIDE 0.9 % (FLUSH) 0.9 %
10 SYRINGE (ML) INJECTION EVERY 6 HOURS
Status: DISCONTINUED | OUTPATIENT
Start: 2022-05-11 | End: 2022-05-11 | Stop reason: HOSPADM

## 2022-05-11 RX ADMIN — HEPARIN SODIUM 5000 UNITS: 5000 INJECTION INTRAVENOUS; SUBCUTANEOUS at 05:05

## 2022-05-11 RX ADMIN — HEPARIN SODIUM 5000 UNITS: 5000 INJECTION INTRAVENOUS; SUBCUTANEOUS at 04:05

## 2022-05-11 RX ADMIN — Medication 2 ML: at 06:05

## 2022-05-11 RX ADMIN — OXYCODONE HYDROCHLORIDE AND ACETAMINOPHEN 1 TABLET: 5; 325 TABLET ORAL at 01:05

## 2022-05-11 RX ADMIN — MORPHINE SULFATE 2 MG: 4 INJECTION INTRAVENOUS at 03:05

## 2022-05-11 RX ADMIN — ERYTHROPOIETIN 4100 UNITS: 10000 INJECTION, SOLUTION INTRAVENOUS; SUBCUTANEOUS at 09:05

## 2022-05-11 RX ADMIN — OXYCODONE HYDROCHLORIDE AND ACETAMINOPHEN 1 TABLET: 5; 325 TABLET ORAL at 05:05

## 2022-05-11 RX ADMIN — HYDRALAZINE HYDROCHLORIDE 25 MG: 25 TABLET, FILM COATED ORAL at 05:05

## 2022-05-11 RX ADMIN — HYDRALAZINE HYDROCHLORIDE 25 MG: 25 TABLET, FILM COATED ORAL at 01:05

## 2022-05-11 RX ADMIN — Medication 2 ML: at 12:05

## 2022-05-11 RX ADMIN — Medication 324 MG: at 05:05

## 2022-05-11 RX ADMIN — CYCLOBENZAPRINE 10 MG: 10 TABLET, FILM COATED ORAL at 05:05

## 2022-05-11 RX ADMIN — MORPHINE SULFATE 2 MG: 4 INJECTION INTRAVENOUS at 01:05

## 2022-05-11 RX ADMIN — CEFEPIME HYDROCHLORIDE 2 G: 2 INJECTION, SOLUTION INTRAVENOUS at 09:05

## 2022-05-11 RX ADMIN — CEFEPIME HYDROCHLORIDE 2 G: 2 INJECTION, SOLUTION INTRAVENOUS at 12:05

## 2022-05-11 RX ADMIN — CEFEPIME HYDROCHLORIDE 2 G: 2 INJECTION, SOLUTION INTRAVENOUS at 05:05

## 2022-05-11 RX ADMIN — OXYCODONE HYDROCHLORIDE AND ACETAMINOPHEN 1 TABLET: 5; 325 TABLET ORAL at 09:05

## 2022-05-11 RX ADMIN — METOPROLOL SUCCINATE 25 MG: 25 TABLET, EXTENDED RELEASE ORAL at 09:05

## 2022-05-11 RX ADMIN — DOCUSATE SODIUM AND SENNOSIDES 1 TABLET: 8.6; 5 TABLET, FILM COATED ORAL at 09:05

## 2022-05-11 RX ADMIN — Medication 324 MG: at 09:05

## 2022-05-11 NOTE — PLAN OF CARE
Pt cleared for discharge to [facility] from Case Management     05/11/22 0654   Final Note   Assessment Type Final Discharge Note   Anticipated Discharge Disposition Long Term   Post-Acute Status   Post-Acute Authorization Placement   Post-Acute Placement Status Set-up Complete/Auth obtained

## 2022-05-11 NOTE — PT/OT/SLP PROGRESS
Physical Therapy      Patient Name:  Agus Horan   MRN:  [id number]    Patient not seen today secondary to wound care RN present on first attempt. Patient declined on second attempt due to pending discharge to LTAC today. Will follow-up 05/12/22 if D/C falls through.

## 2022-05-11 NOTE — NURSING
Removed the wound vac from the right lower leg due to patient discharging to [facility] in [place]. Patient did require IV analgesic to remove the wound vac. Pictures taken. There is healthy granulation tissue noted throughout. Irrigated the wound with normal saline and patted dry. Applied Urgotul silver mesh dressing into the wound bed to prevent gauze and abd pads from adhering to the wound bed as well as for the antimicrobial advantage and wrapped with kerlix. Applied the splint cast to the right foot and secured with an ace wrap. Patient would benefit from an AFO brace to the right ankle due to right foot mobility limitations and to protect the ankle/foot.   Wound vac to be placed at the LT upon arrival or tomorrow morning depending on the time the patient arrives per their protocol.      Right lower leg measures 16cm x 5cm x 2cm with tunnel at 12 o'clock at 4cm and tunnel at 6 o'clock at 6cm      View of area along the bone with granulation tissue noted.

## 2022-05-11 NOTE — PROGRESS NOTES
[facility] called [phone], spoke to Rhina, the , will send message to Dr. Corbin office and someone will call [facility] to schedule appt.    [facility] sent revised orders to [facility] and called [facility] and informed of orders sent via careport.  [facility] stated they will make the appt for pt to f/u with Dr. Corbin and transport pt to appt.    Nurse can call report to [facility] [phone] and transportation pickup scheduled for 1pm.    HAMILTON Lozano notified.

## 2022-05-11 NOTE — PROGRESS NOTES
INPATIENT NEPHROLOGY Progress Note  [facility]    Patient Name: Agus Horan  Date: 05/11/2022    Reason for consultation:   SANDRA    Chief Complaint:   Chief Complaint   Patient presents with    Leg Pain     Pain in the right leg muscle calf is hard, nausea vomiting, patient was seen here over the weekend for an overdose      History of Present Illness:  22 y/o M with a history of asthma recently here on 4/9 with oxycodone overdose who p/w RLE pain and swelling after a fall on 4/10. He reports severe pain, constant, associated with nausea with inability to bear weight.  He took meloxicam without relief. He was found to have compartment syndrome and was taking to the OR emergently for fasciotomy on 4/13. We are consulted for SANDRA with metabolic derangements.    Interval History:  4/14- did emergent HD overnight for hyperkalemia and metabolic acidosis refractory to medical management; pain controlled, RLE wrapped, no edema in LLE  4/15- worsening pain/edema in RLE- going for MRI- oligoanuric- stop NS IVFs- will do HD/UF today and tomorrow  4/16  Seen on dialysis.  No distress  4/17  Remains oliguric.  AFVSS.  Has back pain.  Leg pain a little better  4/18  In the OR.  Still oliguric.    4/19  Seen on dialysis.  No distress.    4/20  Still not making much urine.  cpk coming down.     4/21  Afebrile.  BP a little better.  uop up a bit  422   Sleeping.  AFVSS.  I/Os not appropriately recorded despite being ordered for twice  4/23  225 cc UOP recorded.  K+ 5.5, HD today.  No renal recovery, CPK improving.  C/o rash, Dr. Zamorano at bedside placed orders.  Seen on dialysis, tolerating tx well.  4/24   No new lab results, next lab draw in am.   Still has rash and c/o itching.  No other complaints.  450 cc UOP recorded.  4/25  In the OR.  Plan for hd today  4/26  AFVSS.  Urine output better.  No complaints specified today  4/27  Sleeping.  Output not recorded despite being ordered  4/28  Tearful.  Not  engaging when spoken too.  Requesting dilaudid   4/29     Good urine output.  Seen on dialysis.  No distress  4/30  UOP 4.4L.  Ca+ 15, will have dialysis today, stopped calcitriol and vit D.  Will add on PTH to labs.    5/1  Ca+ 15.5, PTH suppressed.  D/w Dr. Patrick, pt to have additional dialysis today, no UF.  Notified Suni Stone w/[facility] pt needs to run today.  Pt w/elevated temp, a little more tachy than usual, not feeling very well.  5/2 tachy, hypertensive, on RA, UOP 2.6L, remains hypercalcemic- PTH appropriately suppressed, Dr. Byers is at bedside  5/3 febrile, tachy, BP better, on RA, UOP 650cc  5/4 had to terminate HD treatment short about 30min due to n/v and sinus tachy- net UF 1.6L; UOP 3.3L, SCr 2.5, serum Ca rising over last few days in conjunction with improvement in SCr- may reflect volume depletion  5/5 BP high, on RA, UOP 2.7L, going for OR procedure: Deep excisional debridement of necrotic muscle anterior compartment right leg.  Application of wound VAC.  5/6 febrile, BP stable, on RA, UOP 400cc, pain score about a 7  5/7  Tmax 101.9, pressures stable.  Ionized Ca+ trended down, CMP results still pending at present.  No complaints.  5/8  Renal function improving on yesterday's labs.  Ca+ was improved as well.  Ionized Ca+ level this am a little higher, but no other lab results are posted as of yet.  Pressures a little elevated this am, but states RLE very painful, has requested medication.  5/9 VSS, no new complains. Discontinue tunneled HD cath, he no longer needs HD, will consult Gen Surg,  5/10 VSS, drop in Hgb of unclear significance. Suggest blood transfusion and/or hematology consult. Already receiving Epo, although this anemia is not renal. Tunneled HD cath removal timing per Surgery, it is not emergent.  5/11 VSS, no new complains. OK to dc to LTAC.    Plan of Care:    Traumatic rhabdomyolysis with compartment syndrome, s/p fasciotomy 4/13  SANDRA due to toxic ATN requiring HD 4/14  to 5/4, now with recovery to normal  Hypercalcemia of immobilization, improving after bisphosphonate and calcitonin  Hypokalemia  Anemia  HTN    Plan:    - Ortho following- s/p I&D on 5/5.  - He has renal recovery- last HD 5/4. HD line removed 5/10.  - Hypercalcemia resolved after bisphosphonate therapy on 5/5 and calcitonin.  - BP is better- continue hydral at 25mg TID, will stop eventually.  - Ordered oral KCl repletion as needed. Continue a regular diet.  - H/H is low, etiology is multifactorial- continue iron tablet- continue ALBERTO 3x per week SQ while here - transfuse PRN to keep Hgb around 8 or consider heme eval.      Thank you for allowing us to participate in this patient's care. We will continue to follow.    Vital Signs:  Temp Readings from Last 3 Encounters:   05/11/22 98.1 °F (36.7 °C)   04/10/22 97.3 °F (36.3 °C)   04/09/22 98.4 °F (36.9 °C)     Pulse Readings from Last 3 Encounters:   05/11/22 105   04/10/22 63   04/09/22 95     BP Readings from Last 3 Encounters:   05/11/22 (!) 159/78   04/10/22 117/64   04/09/22 (!) 143/82     Weight:  Wt Readings from Last 3 Encounters:   05/02/22 81.2 kg (179 lb)   04/10/22 77.1 kg (170 lb)   04/09/22 77.1 kg (170 lb)     INS/OUTS:  I/O last 3 completed shifts:  In: 2037.6 [P.O.:1900; IV Piggyback:137.6]  Out: 3775 [Urine:3775]  No intake/output data recorded.    Medications:  Scheduled Meds:   ceFEPime (MAXIPIME) IVPB  2 g Intravenous Q8H    epoetin leidy  50 Units/kg Subcutaneous Every Mon, Wed, Fri    ferrous gluconate  324 mg Oral BID WM    heparin (porcine)  5,000 Units Subcutaneous Q8H    hydrALAZINE  25 mg Oral Q8H    LIDOcaine HCL 2%   Topical (Top) Every Mon, Thurs    LIDOcaine-EPINEPHrine 1%-1:100,000  5 mL Intradermal Once    metoprolol succinate  25 mg Oral Daily    senna-docusate 8.6-50 mg  1 tablet Oral BID    sodium chloride 0.9%  2 mL Intravenous Q6H     Continuous Infusions:   electrolyte-S (pH 7.4) Stopped (05/05/22 1600)     "electrolyte-S (pH 7.4)      loperamide       PRN Meds:.acetaminophen, cyclobenzaprine, dextrose 10%, dextrose 10%, diphenhydrAMINE, glucagon (human recombinant), glucose, glucose, heparin (porcine), hydrOXYzine pamoate, labetaloL, loperamide, magnesium oxide, magnesium oxide, morphine, naloxone, ondansetron, oxyCODONE-acetaminophen, phenyleph-min oil-petrolatum, potassium bicarbonate, potassium bicarbonate, potassium bicarbonate, potassium, sodium phosphates, potassium, sodium phosphates, potassium, sodium phosphates, sodium chloride 0.9%  No current facility-administered medications on file prior to encounter.     Current Outpatient Medications on File Prior to Encounter   Medication Sig Dispense Refill    naloxone (NARCAN) 4 mg/actuation Spry 4mg by nasal route as needed for opioid overdose; may repeat every 2-3 minutes in alternating nostrils until medical help arrives. Call 911 2 each 0     Review of Systems:    Physical Exam:  BP (!) 159/78   Pulse 105   Temp 98.1 °F (36.7 °C)   Resp 16   Ht 5' 9" (1.753 m)   Wt 81.2 kg (179 lb)   SpO2 99%   BMI 26.43 kg/m²     Physical Exam  Constitutional:       Appearance: He is well-developed. He is not diaphoretic.   HENT:      Head: Normocephalic and atraumatic.   Eyes:      General: No scleral icterus.     Pupils: Pupils are equal, round, and reactive to light.   Cardiovascular:      Rate and Rhythm: Normal rate and regular rhythm.   Pulmonary:      Effort: Pulmonary effort is normal. No respiratory distress.      Breath sounds: No stridor.   Abdominal:      General: There is no distension.      Palpations: Abdomen is soft.   Musculoskeletal:         General: No deformity. Normal range of motion.      Cervical back: Neck supple.   Skin:     General: Skin is warm and dry.      Findings: No erythema or rash.   Neurological:      Mental Status: He is alert and oriented to person, place, and time.      Cranial Nerves: No cranial nerve deficit.   Psychiatric:         " Behavior: Behavior normal.       Results:  Recent Labs   Lab 05/09/22  0523 05/10/22  0551 05/11/22  0549    138 138   K 3.3* 3.4* 3.5    107 107   CO2 25 22* 22*   BUN 19 20 21*   CREATININE 1.2 1.1 1.0   ESTGFRAFRICA >60 >60 >60   EGFRNONAA >60 >60 >60   GLU 91 91 88       Recent Labs   Lab 05/05/22  0504 05/06/22  0748 05/09/22  0523 05/10/22  0551 05/11/22  0549   CALCIUM 13.0*   < > 10.2 9.6 9.1   ALBUMIN 2.4*   < > 2.4* 2.3* 2.4*   PHOS 4.4  --   --   --   --     < > = values in this interval not displayed.       Recent Labs   Lab 04/23/22  1535 04/30/22  0304   PTH, Intact 901.6 H 6.2 L       Recent Labs   Lab 05/09/22  0523 05/10/22  0551 05/11/22  0549   WBC 11.58 12.60 15.68*   HGB 7.2* 6.7* 7.9*   HCT 22.1* 21.3* 24.2*    371 376   MCV 90 91 92   MCHC 32.6 31.5* 32.6   MONO 10.1  1.2* 8.3  1.1* 7.6  1.2*       Recent Labs   Lab 05/09/22  0523 05/10/22  0551 05/11/22  0549   BILITOT 0.1 0.1 0.3   PROT 6.7 6.4 6.4   ALBUMIN 2.4* 2.3* 2.4*   ALKPHOS 128 143* 157*   ALT 44 47* 47*   AST 44* 43* 36       Recent Labs   Lab 04/16/22  0452 05/01/22  2211   Color, UA Brown A Yellow   Appearance, UA Hazy A Hazy A   pH, UA 6.0 6.0   Specific Gravity, UA >=1.030 A 1.015   Protein, UA 2+ A Trace A   Glucose, UA 1+ A Negative   Ketones, UA Negative Negative   Urobilinogen, UA Negative Negative   Bilirubin (UA) 1+ A Negative   Occult Blood UA 3+ A 2+ A   Nitrite, UA Negative Negative   RBC, UA >100 H 6 H   WBC, UA >100 H 55 H   Bacteria Moderate A Few A   Hyaline Casts, UA 0 2 A     I have spent >  minutes providing care for this patient for the above diagnoses. These services have included chart/data/imaging review, evaluation, exam, formulation of plan, , note preparation, and discussions with staff involved in this patient's care.    Mac Patrick MD    Nephrology  [facility]  [phone]

## 2022-05-11 NOTE — PROCEDURES
"Agus Horan is a 21 y.o. male patient.    Temp: 98.1 °F (36.7 °C) (05/11/22 0728)  Pulse: 105 (05/11/22 0728)  Resp: 16 (05/11/22 0930)  BP: (!) 159/78 (05/11/22 0728)  SpO2: 99 % (05/11/22 0728)  Weight: 81.2 kg (179 lb) (05/02/22 1539)  Height: 5' 9" (175.3 cm) (05/02/22 1539)    PICC  Date/Time: 5/11/2022 9:00 AM  Location procedure was performed: [facility] FINESSE/PROGRESSIVE CARE UNIT  Performed by: Yulia Vázquez RN  Assisting provider: Rose Garcia RN  Consent Done: Yes  Time out: Immediately prior to procedure a time out was called to verify the correct patient, procedure, equipment, support staff and site/side marked as required  Indications: med administration and vascular access  Anesthesia: local infiltration  Local anesthetic: lidocaine 1% without epinephrine  Anesthetic Total (mL): 2.5  Preparation: skin prepped with ChloraPrep  Skin prep agent dried: skin prep agent completely dried prior to procedure  Sterile barriers: all five maximum sterile barriers used - cap, mask, sterile gown, sterile gloves, and large sterile sheet  Hand hygiene: hand hygiene performed prior to central venous catheter insertion  Location details: right basilic  Catheter type: double lumen  Catheter size: 5 Fr  Catheter Length: 37.5cm    Ultrasound guidance: yes  Vessel Caliber: large and patent, compressibility normal  Vascular Doppler: not done  Needle advanced into vessel with real time Ultrasound guidance.  Guidewire confirmed in vessel.  Image recorded and saved.  Sterile sheath used.  no esophageal manometryNumber of attempts: 1  Post-procedure: blood return through all ports, chlorhexidine patch and sterile dressing applied  Technical procedures used: stuart; bernice 3cg  Estimated blood loss (mL): 0  Specimens: No  Implants: No  Assessment: placement verified by x-ray, free fluid flow, no pneumothorax on x-ray, tip termination and successful placement  Tip Termination Explanation: distal svc/ caj  Complications: " none          Name   5/11/2022

## 2022-05-11 NOTE — PLAN OF CARE
Pt in bed awake and alert, no distress noted at this time. One unit of PRBC transfused and pt tolerated well with no adverse reaction.   Problem: Adult Inpatient Plan of Care  Goal: Plan of Care Review  Outcome: Ongoing, Progressing

## 2022-05-12 NOTE — DISCHARGE SUMMARY
[facility] Medicine  Discharge Summary      Patient Name: Agus Horan  MRN: [id number]  Patient Class: IP- Inpatient  Admission Date: 4/13/2022  Hospital Length of Stay: 28 days  Discharge Date and Time: 5/11/2022  7:09 PM  Attending Physician: Ortiz Saxena M.D.  Discharging Provider: Eliezer Arteaga PA-C  Primary Care Provider: Primary Doctor No      HPI:   Agus Horan is a 20 y/o male with PMHx of mild asthma who presents with right lower extremity pain and swelling x few days. Patient was seen in the ED on 4/9/22 for oxycodone overdose and again on 4/10/22 for left eye pain after falling on a piece of furniture. Patient states he is unsure how he injured his leg and denies IV drug use. Xray of his right leg did not show acute fracture. Patietn states RLE pain and swelling increased over the past few days and he is unable to walk secondary to pain. US of his lower extremity performed today did not show DVT. Lab work revealed elevated Creatinine 13.5 & K+ 7.1, elevated AST/ALT 3980/1611 & WBC 18.14.  CPK>40,0000.  Patient unable to dorsiflex and plantar flex his RLE and symptoms are concerning for compartment syndrome per ED. ED provider discussed with Dr. Byers and Dr. Fraser, nephrology. Patient was referred to hospital medicine and seen in the ED with his friend at bedside. Patient complaining of RLE pain, tenderness and swelling. He denies SOB, N/V/D, chest pain and headaches.  Patient will be admitted to hospital medicine for orthopedic consultation and further management.      Procedure(s) (LRB):  REPLACEMENT, WOUND VAC (Right)  DEBRIDEMENT, LOWER EXTREMITY (Right)      Hospital Course:   Pt admitted for SANDRA, rhabdomyolisis, and RLE compartment syndrome. Pt was taken to the OR by Dr. Byers for emergent fasciotomy on 4/13. CPK at the time of arrival was more than 40,000. Pt was initially started on IV fluids and required emergent hemodialysis on 4/14 for hyperkalemia and  metabolic acidosis refractory to medical management. Pt returned to OR with Dr. Byers on 4/18 and underwent debridement of RLE with closure of medial and lateral fasciotomy incision and biopsy of anterior muscle compartment. Patient's CPK level was monitored closely and continued to trend down.  Patient underwent repeat debridement of deep right lateral fasciotomy incision right lower leg with excisional  debridement of dead anterior tibialis muscle and biopsy of muscle with application of wound VAC on April 25, 2022. Pts pain was not well controlled and medications were adjusted. On 5/1 pt developed persistent tachycardia with mild hypoxemia with room air sat 90%.  Patient unable to get CTA chest due to acute renal failure and hemodialysis.  Chest x-ray obtained and negative.  Patient encouraged to utilize IS and O2 sats improved.  US of bilat LE obtained and neg for DVT with some limitation to RLE due to drsg intact. Decision was made to initiate heparin drip for high suspicion of PE until V/Q scan could be performed.  Patient also developed low-grade fever and Infectious Disease was consulted for further assistance and recommendations.  Patient was continued on IV antibiotics.  V/Q scan was obtained on 05/02 and neg for PE.  Heparin drip was stopped evening of 5/2 and pt was placed on SQ heparin for VTE prophylaixis.   Wound VAC was changed on 05/02 by Dr. Byers and wound care nurse with findings of some necrotic tissue with purulent drainage.  Antibiotics were tailored by Infectious Disease.  Patient to OR for right lower extremity surgical site washout on 05/05/2022. Monitored Kidney function throughout course of stay. Kidney function improved throughout hospital course. Received 1 unit of RBC for low H&H on 5/10. H&H stable at time of discharge. Hemosplit removed on 5/10 with General surgery once cleared by nephrology. PICC line inserted on 5/11 for IV antibiotics following discharge. Patient pain controlled  with little prn pain medication at the time of discharge. Wound vac removed on day of discharge and is to be replaced at LTAC. Patient was cleared for discharge to LTAC by ID and ortho. Plan to send patient to [facility] in [place]. Patient scheduled to follow up with Dr. Corbin in one week and ID on 5/25/22. Patient verbalized understanding of discharge instructions and return precautions.     Physical Exam:  General- Patient alert and oriented x3 in NAD  HEENT- PERRLA, EOMI, OP clear, MMM  CV- Regular rate and rhythm, No Murmur/kesha/rubs  Resp- Lungs CTA Bilaterally, No increased WOB  GI- Non tender/non-distended, BS normoactive x4 quads, no HSM  Extrem- No cyanosis, clubbing, edema. Pulses 2+ and symmetric  Neuro- Strength 5/5 flexors/extensors, DTRs 2+ and symmetric, Intact sensation to light touch grossly  Skin-  Wound vac in place to RLE. Braced and dressings CDI         Goals of Care Treatment Preferences:  Code Status: Full Code      Consults:   Consults (From admission, onward)        Status Ordering Provider     [facility]  Once        Provider:  (Not yet assigned)    Completed OTIS WEBSTER     Inpatient consult to General Surgery  Once        Provider:  (Not yet assigned)    Completed OTIS WEBSTER     Inpatient consult to General Surgery  Once        Provider:  Sundeep Rahman Jr., MD    Completed ANDRESSA TOMAS     Inpatient consult to Infectious Diseases  Once        Provider:  Anna Perales MD    Completed ERIK KUNZ     Inpatient consult to General Surgery  Once        Provider:  Sundeep Rahman Jr., MD    Completed GAMALIEL MIGUEL     Inpatient consult to Social Work/Case Management  Once        Provider:  (Not yet assigned)    Completed PABLO WEAVER     Inpatient consult to Registered Dietitian/Nutritionist  Once        Provider:  (Not yet assigned)    Completed ELY LAGUNAS     Inpatient consult to Nephrology  Once         Provider:  Chantelle Fraser MD    Completed LEONARDO BYERS     Inpatient consult to Orthopedics  Once        Provider:  Leonardo Byers MD    Completed LEONARDO BYERS     Inpatient consult to Orthopedic Surgery  Once        Provider:  Leonardo Byers MD    Completed LEONARDO BYERS          No new Assessment & Plan notes have been filed under this hospital service since the last note was generated.  Service: Hospital Medicine    Final Active Diagnoses:    Diagnosis Date Noted POA    PRINCIPAL PROBLEM:  Acute renal failure with tubular necrosis [N17.0] 04/14/2022 Yes    Hypercalcemia [E83.52] 05/04/2022 Yes    Hypokalemia [E87.6] 05/04/2022 No    Severe sepsis [A41.9, R65.20] 05/03/2022 No    Tachycardia [R00.0] 05/01/2022 No    Hypoxia [R09.02] 05/01/2022 No    Anemia of chronic disease [D63.8] 04/26/2022 No    Drug eruption [L27.0] 04/23/2022 No    Rhabdomyolysis [M62.82] 04/14/2022 Yes    Elevated liver enzymes [R74.8] 04/14/2022 Yes    Compartment syndrome [T79.A0XA] 04/14/2022 Yes    History of asthma [Z87.09] 11/03/2012 Not Applicable      Problems Resolved During this Admission:    Diagnosis Date Noted Date Resolved POA    Hyperkalemia [E87.5] 04/14/2022 04/16/2022 Yes       Discharged Condition: good    Disposition: Long Term Acute Care    Follow Up:   Follow-up Information     Annette Green MD Follow up.    Specialty: Family Medicine  Contact information:  [address] [facility] [address]  [phone]             Albin Corbin MD Follow up in 1 week(s).    Specialty: Plastic Surgery  Why: Fasciotomy follow up for Skin Flap  Contact information:  [address]  [place] [address]  [phone]                       Patient Instructions:   No discharge procedures on file.    Significant Diagnostic Studies: Labs:   CMP   Recent Labs   Lab 05/11/22  0549      K 3.5      CO2 22*   GLU 88   BUN 21*   CREATININE 1.0   CALCIUM 9.1   PROT  6.4   ALBUMIN 2.4*   BILITOT 0.3   ALKPHOS 157*   AST 36   ALT 47*   ANIONGAP 9   ESTGFRAFRICA >60   EGFRNONAA >60    and CBC   Recent Labs   Lab 05/11/22  0549   WBC 15.68*   HGB 7.9*   HCT 24.2*          Pending Diagnostic Studies:     Procedure Component Value Units Date/Time    EKG 12-lead [[id number]]     Order Status: Sent Lab Status: No result          Medications:  Reconciled Home Medications:      Medication List      START taking these medications    acetaminophen 325 MG tablet  Commonly known as: TYLENOL  Take 2 tablets (650 mg total) by mouth every 6 (six) hours as needed.     cefepime in dextrose 5 % 2 gram/50 mL Pgbk  Commonly known as: MAXIPIME  Inject 150 mLs (6 g total) into the vein continuous. for 14 days     cyclobenzaprine 10 MG tablet  Commonly known as: FLEXERIL  Take 1 tablet (10 mg total) by mouth 3 (three) times daily as needed for Muscle spasms.     diphenhydrAMINE 25 mg capsule  Commonly known as: BENADRYL  Take 1 capsule (25 mg total) by mouth every 6 (six) hours as needed for Itching.     ferrous gluconate 324 MG tablet  Commonly known as: FERGON  Take 1 tablet (324 mg total) by mouth 2 (two) times daily with meals.     hydrALAZINE 25 MG tablet  Commonly known as: APRESOLINE  Take 1 tablet (25 mg total) by mouth every 8 (eight) hours.     hydrOXYzine pamoate 25 MG Cap  Commonly known as: VISTARIL  Take 1 capsule (25 mg total) by mouth every 8 (eight) hours as needed.     metoprolol succinate 25 MG 24 hr tablet  Commonly known as: TOPROL-XL  Take 1 tablet (25 mg total) by mouth once daily.     oxyCODONE-acetaminophen 5-325 mg per tablet  Commonly known as: PERCOCET  Take 1 tablet by mouth every 4 (four) hours as needed.        CONTINUE taking these medications    naloxone 4 mg/actuation Spry  Commonly known as: NARCAN  4mg by nasal route as needed for opioid overdose; may repeat every 2-3 minutes in alternating nostrils until medical help arrives. Call 911            Indwelling  Lines/Drains at time of discharge:   Lines/Drains/Airways     Peripherally Inserted Central Catheter Line  Duration           PICC Double Lumen 05/11/22 1009 right basilic 1 day                Time spent on the discharge of patient: 50 minutes         Eliezer Arteaga PA-C  Department of Hospital Medicine  [facility]

## 2022-06-03 ENCOUNTER — TELEPHONE (OUTPATIENT)
Dept: ORTHOPEDICS | Facility: CLINIC | Age: 22
End: 2022-06-03
Payer: MEDICAID

## 2022-06-03 NOTE — TELEPHONE ENCOUNTER
We spoke with patients sister trying to contact Luca to follow up on his treatment. She states his father checked him out of the hospital on 05/14/2022 and took him to [place] and she hasn't been able to reach him since then.

## 2022-06-08 LAB — FUNGUS SPEC CULT: NORMAL

## 2022-07-01 NOTE — SUBJECTIVE & OBJECTIVE
CHIEF COMPLAINT: Osteoporosis  67 y.o. old being seen as a new patient. Diagnosed with osteoporosis in approx 2019. Has not been on tx in the past. No fractures. Has had a trip and a fall. No significant steroids in the past. No kidney stones in the past. Believes menopause was approx mid 40's. No HRT. She has GERD but is controlled with PPI. She does exercise and garden. Tries to stay active. Not taking Ca + D.       PAST MEDICAL HISTORY/PAST SURGICAL HISTORY:  Reviewed in [facility]    SOCIAL HISTORY: Reviewed in [facility]    FAMILY HISTORY:  No known osteoporosis. No known hip fractures. No known thyroid disease. No DM.     MEDICATIONS/ALLERGIES: The patient's MedCard has been updated and reviewed.      ROS:   Constitutional: Weight stable   Cardiovascular: No CP   Respiratory: No SOB  Remainder ROS negative        PE:    GENERAL: Well developed, well nourished.  NECK: Supple, trachea midline, no palpable thyroid nodules.   CHEST: Resp even and unlabored, CTA bilateral.  CARDIAC: RRR, S1, S2 heard, no murmurs, rubs, S3, or S4  SKIN: no acanthosis nigracans.        LABS/Radiology    DEXA BONE DENSITY SPINE HIP     CLINICAL HISTORY:  Encounter for screening for osteoporosis     TECHNIQUE:  DXA scanning was performed over the bilateral hip and lumbar spine.  Review of the images confirms satisfactory positioning and technique.     COMPARISON:  DEXA bone mineral density measurements from study dated 06/04/2019     FINDINGS:  The L1-L3 vertebral bone mineral density is equal to 0.811 g/cm squared with a T score of -1.9, which corresponds with the osteopenic category according to world health organization criteria..  The measured average bone mineral density in the lumbar spine represents an 85.2% increase in measured bone mineral density compared to study dated 08/04/2019.  However, the measured bone mineral density in the lumbar spine could in part be artifactually elevated by hypertrophic degenerative changes..     The left  Interval History:  Patient is without any new complaints.  s/p Debridement deep right lateral fasciotomy incision right lower leg with excisional  debridement of dead anterior tibialis muscle and biopsy of muscle application of wound VAC right lower leg and hemo split catheter placement.  No new complaints noted overnight. Patient denies chest pain or shortness of breath.      Review of Systems   Constitutional:  Negative for activity change, appetite change, chills and fever.   HENT:  Negative for congestion, sore throat and trouble swallowing.    Eyes:  Negative for photophobia and visual disturbance.   Respiratory:  Negative for cough, chest tightness and shortness of breath.    Cardiovascular:  Negative for chest pain, palpitations and leg swelling.   Gastrointestinal:  Negative for abdominal pain, diarrhea and nausea.   Genitourinary:  Negative for dysuria, flank pain and hematuria.   Musculoskeletal:  Positive for gait problem and myalgias. Negative for back pain.   Skin:  Positive for color change.   Neurological:  Negative for dizziness, weakness and headaches.   Psychiatric/Behavioral:  Negative for confusion.    Objective:     Vital Signs (Most Recent):  Temp: 99.3 °F (37.4 °C) (04/27/22 0734)  Pulse: 108 (04/27/22 0734)  Resp: 16 (04/27/22 0825)  BP: (!) 162/73 (04/27/22 0734)  SpO2: 95 % (04/27/22 0734)   Vital Signs (24h Range):  Temp:  [96.6 °F (35.9 °C)-99.3 °F (37.4 °C)] 99.3 °F (37.4 °C)  Pulse:  [107-116] 108  Resp:  [14-19] 16  SpO2:  [95 %-100 %] 95 %  BP: (139-174)/(68-88) 162/73     Weight: 81.3 kg (179 lb 3.7 oz)  Body mass index is 26.47 kg/m².    Intake/Output Summary (Last 24 hours) at 4/27/2022 0906  Last data filed at 4/27/2022 0825  Gross per 24 hour   Intake 403 ml   Output 550 ml   Net -147 ml        Physical Exam  Vitals and nursing note reviewed.   Constitutional:       Appearance: He is normal weight. He is ill-appearing.   HENT:      Head: Normocephalic.      Mouth/Throat:       Mouth: Mucous membranes are moist.      Pharynx: Oropharynx is clear.   Eyes:      Pupils: Pupils are equal, round, and reactive to light.      Comments: Left orbital ecchymosis   Cardiovascular:      Rate and Rhythm: Regular rhythm. Tachycardia present.      Pulses: Normal pulses.      Heart sounds: Normal heart sounds.   Pulmonary:      Effort: Pulmonary effort is normal.      Breath sounds: Normal breath sounds.   Abdominal:      General: Bowel sounds are normal.      Palpations: Abdomen is soft.   Musculoskeletal:         General: Swelling (r Thigh swellin noted) present. Normal range of motion.      Cervical back: Normal range of motion.      Right upper leg: Swelling present.      Right lower leg: Swelling present.      Comments:     good plantar flexion of his toes actively.  he has no pain on passive extension of the toe the patient has very little extension actively of the toe great toes and smaller toes.    Skin:     General: Skin is warm and dry.      Comments: Diffuse fine maculopapular rash all over extremities and trunk.   Neurological:      Mental Status: He is alert and oriented to person, place, and time. Mental status is at baseline.   Psychiatric:         Mood and Affect: Mood normal.       Significant Labs: All pertinent labs within the past 24 hours have been reviewed.  CBC:   Recent Labs   Lab 04/26/22  0616   WBC 14.21*   HGB 7.0*   HCT 21.4*          CMP:   Recent Labs   Lab 04/26/22  0616   *   K 5.4*   CL 99   CO2 23   GLU 93   BUN 89*   CREATININE 8.8*   CALCIUM 7.8*   ANIONGAP 13   EGFRNONAA 8*       Microbiology Results (last 7 days)       ** No results found for the last 168 hours. **          Significant Imaging:   X-ray lumbar spine: Normal study.    Right tibia and fibula: Normal study.    Right femur x-ray: Normal study.    Pelvis x-ray: Normal study.    CT Head: No acute abnormality.  Minimal sinus disease.    CT Maxillofacial scan: No facial bone fracture.  Minimal  femoral neck bone mineral density is equal to 0.546 g/cm squared with a T score of -2.7.  The right femoral neck bone mineral density is equal to 0.549 g/cm squared with a T score of -2.7.  The measured bone mineral density in the bilateral femoral necks corresponds with the osteoporotic category according to world health organization criteria.  The average measured bone mineral density in the bilateral proximal femurs represents a 1.9% increase from measured bone mineral density on study dated 06/04/2019..     There is a 14% risk of a major osteoporotic fracture and a 3.4% risk of hip fracture in the next 10 years (FRAX).     Impression:     The measured bone mineral density in the bilateral femoral necks corresponds with the osteoporotic category according to world health organization criteria.  Fracture risk is high.     Latest Reference Range & Units 06/18/19 12:41   Sodium 136 - 145 mmol/L 140   Potassium 3.5 - 5.1 mmol/L 4.0   Chloride 95 - 110 mmol/L 99   CO2 22 - 31 mmol/L 32 (H)   Anion Gap 8 - 16 mmol/L 9   BUN 7 - 18 mg/dL 30 (H)   Creatinine 0.50 - 1.40 mg/dL 0.95   eGFR if non African American >60 mL/min/1.73 m^2 >60   eGFR if African American >60 mL/min/1.73 m^2 >60   Glucose 70 - 110 mg/dL 114 (H)   Calcium 8.4 - 10.2 mg/dL 9.5   (H): Data is abnormally high    ASSESSMENT/PLAN:  1. Osteoporosis- without fracture history. Meets criteria for Treatment. Discussed CA + D, weight bearing exercise and fall precautions. Reviewed info in AVS. WIll do secondary w/u. Depending on w/u can start fosamax. Discussed how to take and s/e including ONJ and AFF    2. GERD- monitor for worsening if fosamax started      FOLLOWUP  Renal Panel, PTH, Fasting CTX, 24 hr urine Ca/Cr        sinus disease.    RLE venous doppler scan: No evidence of deep venous thrombosis in the right lower extremity.    CXR: Central line placement with the tip over the SVC.  No acute detrimental changes.    RLE venous doppler scan:  No evidence of deep venous thrombosis in the right lower extremity. Edema in the soft tissues of the right lower extremity.    CXR:   No acute process.  Right IJ catheter in place.

## 2022-07-26 ENCOUNTER — HOSPITAL ENCOUNTER (EMERGENCY)
Facility: HOSPITAL | Age: 22
Discharge: HOME | End: 2022-07-26
Attending: EMERGENCY MEDICINE
Payer: MEDICAID

## 2022-07-26 VITALS
TEMPERATURE: 100 F | BODY MASS INDEX: 23.7 KG/M2 | OXYGEN SATURATION: 98 % | HEART RATE: 100 BPM | HEIGHT: 69 IN | WEIGHT: 160 LBS | DIASTOLIC BLOOD PRESSURE: 84 MMHG | RESPIRATION RATE: 18 BRPM | SYSTOLIC BLOOD PRESSURE: 138 MMHG

## 2022-07-26 DIAGNOSIS — M79.604 PAIN OF RIGHT LOWER EXTREMITY: ICD-10-CM

## 2022-07-26 DIAGNOSIS — M21.371 RIGHT FOOT DROP: Primary | ICD-10-CM

## 2022-07-26 PROCEDURE — 99282 EMERGENCY DEPT VISIT SF MDM: CPT

## 2022-07-26 RX ORDER — GABAPENTIN 100 MG/1
100 CAPSULE ORAL 3 TIMES DAILY
Qty: 42 CAPSULE | Refills: 0 | Status: SHIPPED | OUTPATIENT
Start: 2022-07-26 | End: 2024-03-27

## 2022-07-26 NOTE — ED PROVIDER NOTES
"Encounter Date: 7/26/2022    SCRIBE #1 NOTE: I, Caitietiffanie Hurtado, am scribing for, and in the presence of, Chrissy Joya PA-C.       History     Chief Complaint   Patient presents with    Wound Check     S/p fasciotomy to R leg in April -- states has not had follow up in 2 months    Foot Pain     Reports R foot pain and swelling     Time seen by provider: 6:25 PM on 07/26/2022    Agus Horan is a 21 y.o. male who presents to the ED with a wound and pain to his right lower leg that began 3 months ago. Pt had a fasciotomy done on 4/13/22 by Dr. Byers. Pt had a PICC line placed and was sent to [facility] for IV antibiotics. Pt is unable to walk due to the pain. Pt's father took him from [facility] and brought him to [place] for "better treatment". Pt only received antibiotics in [place]. For the past 2 months, pt had let the wound close on its own. Per medical records, Dr. Byers's nurse tried to call multiple times for a follow up appointment. Pt returned to [place] on 7/4/22. Pt denies drainage or swelling to the wound. Pt reports pain is better today then when he first left [place], but it is a small improvement. Pt reports he is unable to move his foot fully. The patient denies fever, SOB, or any other symptoms at this time. PMHx of hyperkalemia. PSHx of fasciotomy, debridement of right lower extremity, and replacement of wound vacuum- assisted closure device.     The history is provided by the patient, medical records and a friend.     Review of patient's allergies indicates:   Allergen Reactions    Shrimp      Past Medical History:   Diagnosis Date    Allergy     AR    Asthma     mild intermittent    Hyperkalemia 4/14/2022     Past Surgical History:   Procedure Laterality Date    DEBRIDEMENT OF LOWER EXTREMITY Right 4/18/2022    Procedure: DEBRIDEMENT, LOWER EXTREMITY;  Surgeon: Leonardo Byers MD;  [facility];  Service: Orthopedics;  Laterality: Right;    DEBRIDEMENT OF LOWER EXTREMITY Right " 5/5/2022    Procedure: DEBRIDEMENT, LOWER EXTREMITY;  Surgeon: Leonardo Byers MD;  Location: [facility] OR;  Service: Orthopedics;  Laterality: Right;    FASCIOTOMY Right 4/13/2022    Procedure: FASCIOTOMY;  Surgeon: Leonardo Byers MD;  Location: [facility] OR;  Service: Orthopedics;  Laterality: Right;    FASCIOTOMY Right 4/25/2022    Procedure: FASCIOTOMY;  Surgeon: Leonarod Byers MD;  Location: [facility] OR;  Service: Orthopedics;  Laterality: Right;    REMOVAL OF FOREIGN BODY FROM FOOT Left 6/24/2020    Procedure: REMOVAL, FOREIGN BODY, FOOT;  Surgeon: Dani Garcia MD;  Location: [facility] OR;  Service: Orthopedics;  Laterality: Left;    REPLACEMENT OF WOUND VACUUM-ASSISTED CLOSURE DEVICE Right 5/5/2022    Procedure: REPLACEMENT, WOUND VAC;  Surgeon: Leonardo Byers MD;  Location: [facility] OR;  Service: Orthopedics;  Laterality: Right;     Family History   Problem Relation Age of Onset    Asthma Brother     Emphysema Maternal Grandmother     Hyperlipidemia Maternal Grandmother     Asthma Maternal Grandmother     Arthritis Maternal Grandmother     COPD Maternal Grandmother     Asthma Brother      Social History     Tobacco Use    Smoking status: Never Smoker    Smokeless tobacco: Never Used   Substance Use Topics    Alcohol use: Yes     Comment: last night    Drug use: Yes     Frequency: 2.0 times per week     Types: Marijuana     Comment: yesterday     Review of Systems   Constitutional: Negative for fever.   Respiratory: Negative for shortness of breath.    Genitourinary: Negative for flank pain.   Musculoskeletal: Positive for gait problem and myalgias.        + decrease movement in right foot   Skin: Positive for wound.        - drainage   - swelling   Neurological: Negative for weakness.   Psychiatric/Behavioral: Negative for confusion.       Physical Exam     Initial Vitals [07/26/22 1808]   BP Pulse Resp Temp SpO2   138/84 100 18 100 °F (37.8 °C) 98 %      MAP       --          Physical Exam    Nursing note and vitals reviewed.  Constitutional: He appears well-developed and well-nourished. He is not diaphoretic. No distress.   Cardiovascular: Intact distal pulses.   Pulses:       Dorsalis pedis pulses are 2+ on the right side.        Posterior tibial pulses are 2+ on the right side.   Musculoskeletal:         General: Tenderness present. No edema. Normal range of motion.      Comments: Sensation intact to light touch. Pt is unable to plantar flex. Pt can dorsiflex against resistance. Well healing incision see picture below.      Neurological: He is alert and oriented to person, place, and time. He has normal strength. No sensory deficit.   Skin: Skin is warm and dry. No rash and no abscess noted. No erythema.   Psychiatric: He has a normal mood and affect.                 ED Course   Procedures  Labs Reviewed - No data to display       Imaging Results    None          Medications - No data to display  Medical Decision Making:   History:   Old Medical Records: I decided to obtain old medical records.       APC / Resident Notes:   Urgent evaluation of a 21-year-old male who presents for re-evaluation.  He was seen here a few months ago and had a fasciotomy secondary to compartment syndrome.  He was discharged to [facility] where he left against medical advice and went to [place].  He denied obtaining any treatment in [place].  He is here for follow-up because he cannot plantar flex the foot and reports pain.  Wound is well healing.  No emergent need for evaluation.  Recommend he follow-up with Neurology and Orthopedics.  No sign of infection. Discussed results with patient. Return precautions given. Based on my clinical evaluation, I do not appreciate any immediate, emergent, or life threatening condition or etiology that warrants additional workup today and feel that the patient can be discharged with close follow up care.  Patient is to follow up with their primary care provider. Case was  discussed with Dr. Acevedo who has evaluated the patieant and  is in agreement with the plan of care. All questions answered.        Scribe Attestation:   Scribe #1: I performed the above scribed service and the documentation accurately describes the services I performed. I attest to the accuracy of the note.              I, Chrissy Joya PA-C, personally performed the services described in this documentation. All medical record entries made by the scribe were at my direction and in my presence.  I have reviewed the chart and agree that the record reflects my personal performance and is accurate and complete. Chrissy Joya PA-C.  8:39 PM 07/26/2022      Clinical Impression:   Final diagnoses:  [M21.371] Right foot drop (Primary)  [M79.604] Pain of right lower extremity          ED Disposition Condition    Discharge Stable        ED Prescriptions     Medication Sig Dispense Start Date End Date Auth. Provider    gabapentin (NEURONTIN) 100 MG capsule Take 1 capsule (100 mg total) by mouth 3 (three) times daily. for 14 days 42 capsule 7/26/2022 8/9/2022 Chrissy Joya PA-C        Follow-up Information     Follow up With Specialties Details Why Contact Info    Leonardo Byers MD Orthopedic Surgery, Surgery   [address] [place] [address] [address] [place] [address]  [phone]      Leighton Chauhan MD Vascular Neurology, Neurology   [address] [facility]  [place] [address]  [phone]      [facility] Emergency Medicine  As needed [address] [facility] [address] [phone]           Chrissy Joya PA-C  07/26/22 2041

## 2022-07-26 NOTE — DISCHARGE INSTRUCTIONS
Follow up with neurology and orthopedics.  For worsening symptoms, chest pain, shortness of breath, increased abdominal pain, high grade fever, stroke or stroke like symptoms, immediately go to the nearest Emergency Room or call 911 as soon as possible.

## 2022-07-27 ENCOUNTER — TELEPHONE (OUTPATIENT)
Dept: ORTHOPEDICS | Facility: CLINIC | Age: 22
End: 2022-07-27
Payer: MEDICAID

## 2022-07-27 NOTE — TELEPHONE ENCOUNTER
----- Message from Sunil Gonzalez MA sent at 7/27/2022  8:39 AM CDT -----  Contact: [facility]  Referral in system from [facility] for M21.371 (ICD-10-CM) - Right foot drop.  Please review clinical notes as patient was last seen by Dr. Byers in 5/2022.  Tried to call patient but no answer.    Thanks,  Sunil

## 2022-08-02 NOTE — PROGRESS NOTES
ED Navigator attempted to contact patient on 3 or more separate occasions, patient is unable to reach. ED Navigator to close encounter at this time.    Deana Maddox  ED Navigator

## 2022-08-09 ENCOUNTER — PATIENT OUTREACH (OUTPATIENT)
Dept: EMERGENCY MEDICINE | Facility: HOSPITAL | Age: 22
End: 2022-08-09
Payer: MEDICAID

## 2022-08-09 NOTE — PROGRESS NOTES
Deana Maddox  ED Navigator  Emergency Department    Project: [facility] ED Navigator  Role: Community Health Worker    Date: 08/09/2022  Patient Name: Agus Horan  MRN: [id number]  PCP: Primary Doctor No    Assessment:     Agus Horan is a 22 y.o. male who has presented to ED for wound check. Patient has visited the ED 1 times in the past 3 months. Patient did not contact PCP.     ED Navigator Initial Assessment    ED Navigator Enrollment Documentation  Consent to Services  Does patient consent to completing the assessment?: Yes  Contact  Method of Initial Contact: Phone  Transportation  Does the patient have issues with Transportation?: Yes  Does the patient have transportation to and from healthcare appointments?: Yes  Can family, friends, or others help?: Yes  Lack of transportation to appts, pharmacy, etc.?: No  What type of assistance is needed?: Standard (RTA/MITS)  What is available in their region?: Medicaid Medical Transportation  Insurance Coverage  Do you have coverage/adequate coverage?: Yes  Type/kind of coverage: Medicaid/[employer]  Is patient able to afford co-pays/deductibles?: Yes  Is patient able to afford HME or supplies?: Yes  Does patient have an established [facility] PCP?: No  Does patient need assistance finding a PCP?: No  Does the patient have a lack of adequate coverage?: No  Specialist Appointment  Did the patient come to the ED to see a specialist?: No  Does the patient have a pending specialist referral?: No  Does the patient have a specialist appointment made?: No  PCP Follow Up Appointment  Has the patient had an appointment with a primary care provider in the past year?: No  Does the patient have a follow up appontment with a PCP?: No  Why does the patient not have a follow up scheduled?: No established [facility]/outside PCP  Would you like an [facility] PCP?: No  Medications  Is patient able to afford medication?: Yes  Is patient unable to get medication due to lack of  transportation?: No  Psychological  Does the patient have psycho-social concerns?: No  Food  Does the patient have concerns about food?: Yes  What concerns does the patient have regarding food?: Lack of food  Communication/Education  Does the patient have limited English proficiency/English not primary language?: No  Does patient have low literacy and/or low health literacy?: Yes  Does patient have concerns with care?: No  Does patient have dissatisfaction with care?: No  Other Financial Concerns  Does the patient have immediate financial distress?: No  Does the patient have general financial concerns?: Yes  Other Social Barriers/Concerns  Does the patient have any additional barriers or concerns?: Unable to afford utilities  Primary Barrier  Barriers identified: Cognitive barrier (health literacy, language and communication, etc.)  Root Cause of ED Utilization: Patient Knowledge/Low Health Literacy  Plan to address Patient Knowledge/Low Health Literacy: Provided information for [facility] On [facility] 24/7 Nurse triage line [phone] or 1-[facility] ([phone])  Next steps: Provided Education  Was education/educational materials provided surrounding PCP services/creating a medical home?: Yes Was education verbal or written?: Written   Was education/educational materials provided surrounding low cost, healthy foods?: Yes Was education verbal or written?: Written   Was education/educational materials provided surrounding other items? If so, use comment to explain.: No    Plan: Utility payment assistance resource given for their region  Expected Date of Follow Up 1: 8/23/22         Social History     Socioeconomic History    Marital status: Single   Tobacco Use    Smoking status: Never Smoker    Smokeless tobacco: Never Used   Substance and Sexual Activity    Alcohol use: Yes     Comment: last night    Drug use: Yes     Frequency: 2.0 times per week     Types: Marijuana     Comment: yesterday   Social History  Narrative    ** Merged History Encounter **         Lives with mom, 2 brothers.  No smokers.  +Dogs/chickens.  7th grader.     Social Determinants of Health     Financial Resource Strain: High Risk    Difficulty of Paying Living Expenses: Hard   Food Insecurity: Food Insecurity Present    Worried About Running Out of Food in the Last Year: Sometimes true    Ran Out of Food in the Last Year: Sometimes true   Transportation Needs: Unmet Transportation Needs    Lack of Transportation (Medical): Yes    Lack of Transportation (Non-Medical): Yes   Physical Activity: Insufficiently Active    Days of Exercise per Week: 7 days    Minutes of Exercise per Session: 10 min   Stress: No Stress Concern Present    Feeling of Stress : Not at all   Social Connections: Unknown    Frequency of Communication with Friends and Family: Never    Frequency of Social Gatherings with Friends and Family: Never    Marital Status: Never    Housing Stability: High Risk    Unable to Pay for Housing in the Last Year: Yes    Unstable Housing in the Last Year: No       Plan:   ED Navigator spoke with patient on the telephone and completed initial enrollment.  Patient stated he lives with a roommate, and due to being out of work, he needs help with bills and food.  Patient does not have transportation and relies upon his roommate for help.  Patient was not aware of Medicaid Medical transportation assistance.  Patient stated he does not have a PCP.  He stated he tried to follow up with an Orthopedic doctor as recommended at the ER, but the provider they sent him to was not taking any Medicaid patients.  Patient would like a list of other providers he could see.  Patient is not a smoker.  Patient was given education on ([facility] at the Right Level information, [facility] Virtual [facility] information, and Heart healthy diet tips), [facility] Nurse Triage line, food stamp application, list of food pantries, Medicaid Medical Transportation,  list of organizations that offer financial assistance with daily living expenses, and list of Orthopedic providers from the [employer] web site.

## 2022-09-21 ENCOUNTER — HOSPITAL ENCOUNTER (EMERGENCY)
Facility: HOSPITAL | Age: 22
Discharge: HOME | End: 2022-09-22
Attending: EMERGENCY MEDICINE
Payer: MEDICAID

## 2022-09-21 DIAGNOSIS — T40.601A OPIATE OVERDOSE, ACCIDENTAL OR UNINTENTIONAL, INITIAL ENCOUNTER: ICD-10-CM

## 2022-09-21 DIAGNOSIS — T50.901A ACCIDENTAL DRUG OVERDOSE, INITIAL ENCOUNTER: Primary | ICD-10-CM

## 2022-09-21 PROCEDURE — 99282 EMERGENCY DEPT VISIT SF MDM: CPT

## 2022-09-22 VITALS
SYSTOLIC BLOOD PRESSURE: 130 MMHG | DIASTOLIC BLOOD PRESSURE: 78 MMHG | TEMPERATURE: 98 F | HEART RATE: 89 BPM | BODY MASS INDEX: 22.22 KG/M2 | OXYGEN SATURATION: 97 % | WEIGHT: 150 LBS | HEIGHT: 69 IN | RESPIRATION RATE: 14 BRPM

## 2022-09-22 RX ORDER — NALOXONE HYDROCHLORIDE 4 MG/.1ML
SPRAY NASAL
Qty: 1 EACH | Refills: 11 | Status: SHIPPED | OUTPATIENT
Start: 2022-09-22 | End: 2024-03-27

## 2022-09-22 NOTE — ED PROVIDER NOTES
"Encounter Date: 9/21/2022       History     Chief Complaint   Patient presents with    Drug Overdose     "Took 1 30mg oxycodone"      Patient presents complaining of drug overdose.  Patient states he took oxycodone just prior to arrival and upon EMS arrival they found patient to be somnolent and unresponsive with agonal respirations.  Patient received Narcan in the field and immediately awoke.  He arrives to the ER awake and alert.  He denies being suicidal.  He states he used recreationally.  At the worst symptoms are severe.  He has not had this before.  Nothing makes it better worse.    Review of patient's allergies indicates:   Allergen Reactions    Shrimp      Past Medical History:   Diagnosis Date    Allergy     AR    Asthma     mild intermittent    Hyperkalemia 4/14/2022     Past Surgical History:   Procedure Laterality Date    DEBRIDEMENT OF LOWER EXTREMITY Right 4/18/2022    Procedure: DEBRIDEMENT, LOWER EXTREMITY;  Surgeon: Leonardo Byers MD;  Location: [facility] OR;  Service: Orthopedics;  Laterality: Right;    DEBRIDEMENT OF LOWER EXTREMITY Right 5/5/2022    Procedure: DEBRIDEMENT, LOWER EXTREMITY;  Surgeon: Leonardo Byers MD;  Location: [facility] OR;  Service: Orthopedics;  Laterality: Right;    FASCIOTOMY Right 4/13/2022    Procedure: FASCIOTOMY;  Surgeon: Leonardo Byers MD;  Location: [facility] OR;  Service: Orthopedics;  Laterality: Right;    FASCIOTOMY Right 4/25/2022    Procedure: FASCIOTOMY;  Surgeon: Leonardo Byers MD;  Location: [facility] OR;  Service: Orthopedics;  Laterality: Right;    REMOVAL OF FOREIGN BODY FROM FOOT Left 6/24/2020    Procedure: REMOVAL, FOREIGN BODY, FOOT;  Surgeon: Dani Garcia MD;  Location: [facility] OR;  Service: Orthopedics;  Laterality: Left;    REPLACEMENT OF WOUND VACUUM-ASSISTED CLOSURE DEVICE Right 5/5/2022    Procedure: REPLACEMENT, WOUND VAC;  Surgeon: Leonardo Byers MD;  Location: [facility] OR;  Service: Orthopedics;  Laterality: Right;     Family " History   Problem Relation Age of Onset    Asthma Brother     Emphysema Maternal Grandmother     Hyperlipidemia Maternal Grandmother     Asthma Maternal Grandmother     Arthritis Maternal Grandmother     COPD Maternal Grandmother     Asthma Brother      Social History     Tobacco Use    Smoking status: Never    Smokeless tobacco: Never   Substance Use Topics    Alcohol use: Yes     Comment: last night    Drug use: Yes     Frequency: 2.0 times per week     Types: Marijuana     Comment: yesterday     Review of Systems   All other systems reviewed and are negative.    Physical Exam     Initial Vitals [09/22/22 0009]   BP Pulse Resp Temp SpO2   (!) 160/78 99 15 98 °F (36.7 °C) 99 %      MAP       --         Physical Exam    Nursing note and vitals reviewed.  Constitutional: He appears well-developed and well-nourished. He is not diaphoretic. No distress.   Pleasant, polite.   HENT:   Head: Normocephalic and atraumatic.   Eyes: EOM are normal.   Neck: Neck supple.   Normal range of motion.  Cardiovascular:  Normal rate, regular rhythm, normal heart sounds and intact distal pulses.           Pulmonary/Chest: Breath sounds normal. No respiratory distress.   Musculoskeletal:      Cervical back: Normal range of motion and neck supple.     Neurological: He is alert.   Skin: Skin is warm and dry.   Psychiatric: He has a normal mood and affect. His behavior is normal. Judgment and thought content normal.       ED Course   Procedures  Labs Reviewed - No data to display       Imaging Results    None          Medications - No data to display  Medical Decision Making:   Initial Assessment:   No apparent distress  Differential Diagnosis:   Opiate overdose  ED Management:  Patient required 2 hours of monitoring in the emergency department secondary to Narcan in the field.  In the ER he showed no further evidence of apnea, respiratory depression or decreased mental status.  He required no oxygen therapy.  He was given prescription for  Narcan from the emergency department and was counseled to avoid using opiates.  He was given follow-up clinic appointment with [facility].  Patient be discharged stable condition.  Detailed return precautions discussed.                        Clinical Impression:   Final diagnoses:  [T50.901A] Accidental drug overdose, initial encounter (Primary)  [T40.601A] Opiate overdose, accidental or unintentional, initial encounter        ED Disposition Condition    Discharge Stable          ED Prescriptions       Medication Sig Dispense Start Date End Date Auth. Provider    naloxone (NARCAN) 4 mg/actuation Spry 4mg by nasal route as needed for opioid overdose; may repeat every 2-3 minutes in alternating nostrils until medical help arrives. Call 911 1 each 9/22/2022 -- Reed Mane MD          Follow-up Information       Follow up With Specialties Details Why Contact Info    [facility] Schedule an appointment as soon as possible for a visit in 1 week  [address] [facility] [address]  [phone]               Reed Mane MD  09/22/22 9516     yes

## 2024-03-26 ENCOUNTER — HOSPITAL ENCOUNTER (INPATIENT)
Facility: HOSPITAL | Age: 24
LOS: 3 days | Discharge: HOME | DRG: 872 | End: 2024-03-29
Attending: EMERGENCY MEDICINE | Admitting: INTERNAL MEDICINE
Payer: MEDICAID

## 2024-03-26 DIAGNOSIS — M79.606 LEG PAIN: ICD-10-CM

## 2024-03-26 DIAGNOSIS — R65.10 SIRS (SYSTEMIC INFLAMMATORY RESPONSE SYNDROME): ICD-10-CM

## 2024-03-26 DIAGNOSIS — M79.89 LEG SWELLING: ICD-10-CM

## 2024-03-26 DIAGNOSIS — A41.9 SEPSIS, DUE TO UNSPECIFIED ORGANISM, UNSPECIFIED WHETHER ACUTE ORGAN DYSFUNCTION PRESENT: Primary | ICD-10-CM

## 2024-03-26 DIAGNOSIS — R00.0 TACHYCARDIA: ICD-10-CM

## 2024-03-26 DIAGNOSIS — L03.115 CELLULITIS OF RIGHT LOWER EXTREMITY: ICD-10-CM

## 2024-03-26 DIAGNOSIS — R07.9 CHEST PAIN: ICD-10-CM

## 2024-03-26 PROBLEM — I15.1 HYPERTENSION SECONDARY TO OTHER RENAL DISORDERS: Status: ACTIVE | Noted: 2022-05-12

## 2024-03-26 PROBLEM — I10 HYPERTENSION: Status: ACTIVE | Noted: 2024-03-26

## 2024-03-26 LAB
ALBUMIN SERPL BCP-MCNC: 3.9 G/DL (ref 3.5–5.2)
ALLENS TEST: NORMAL
ALP SERPL-CCNC: 107 U/L (ref 55–135)
ALT SERPL W/O P-5'-P-CCNC: 34 U/L (ref 10–44)
ANION GAP SERPL CALC-SCNC: 12 MMOL/L (ref 8–16)
AST SERPL-CCNC: 30 U/L (ref 10–40)
BACTERIA #/AREA URNS HPF: ABNORMAL /HPF
BASOPHILS # BLD AUTO: 0.04 K/UL (ref 0–0.2)
BASOPHILS NFR BLD: 0.3 % (ref 0–1.9)
BILIRUB SERPL-MCNC: 0.4 MG/DL (ref 0.1–1)
BILIRUB UR QL STRIP: NEGATIVE
BUN SERPL-MCNC: 16 MG/DL (ref 6–20)
CALCIUM SERPL-MCNC: 9.5 MG/DL (ref 8.7–10.5)
CHLORIDE SERPL-SCNC: 101 MMOL/L (ref 95–110)
CK SERPL-CCNC: 358 U/L (ref 20–200)
CLARITY UR: CLEAR
CO2 SERPL-SCNC: 24 MMOL/L (ref 23–29)
COLOR UR: YELLOW
CREAT SERPL-MCNC: 1.2 MG/DL (ref 0.5–1.4)
DELSYS: NORMAL
DIFFERENTIAL METHOD BLD: ABNORMAL
EOSINOPHIL # BLD AUTO: 0 K/UL (ref 0–0.5)
EOSINOPHIL NFR BLD: 0.3 % (ref 0–8)
ERYTHROCYTE [DISTWIDTH] IN BLOOD BY AUTOMATED COUNT: 11.6 % (ref 11.5–14.5)
ERYTHROCYTE [SEDIMENTATION RATE] IN BLOOD BY WESTERGREN METHOD: 16 MM/H
EST. GFR  (NO RACE VARIABLE): >60 ML/MIN/1.73 M^2
FIO2: 21
GLUCOSE SERPL-MCNC: 85 MG/DL (ref 70–110)
GLUCOSE UR QL STRIP: NEGATIVE
GROUP A STREP, MOLECULAR: NEGATIVE
HCT VFR BLD AUTO: 43.6 % (ref 40–54)
HGB BLD-MCNC: 14.2 G/DL (ref 14–18)
HGB UR QL STRIP: NEGATIVE
HYALINE CASTS #/AREA URNS LPF: 2 /LPF
IMM GRANULOCYTES # BLD AUTO: 0.09 K/UL (ref 0–0.04)
IMM GRANULOCYTES NFR BLD AUTO: 0.6 % (ref 0–0.5)
INFLUENZA A, MOLECULAR: NEGATIVE
INFLUENZA B, MOLECULAR: NEGATIVE
KETONES UR QL STRIP: NEGATIVE
LDH SERPL L TO P-CCNC: 1.15 MMOL/L (ref 0.5–2.2)
LEUKOCYTE ESTERASE UR QL STRIP: NEGATIVE
LYMPHOCYTES # BLD AUTO: 1.1 K/UL (ref 1–4.8)
LYMPHOCYTES NFR BLD: 7.4 % (ref 18–48)
MCH RBC QN AUTO: 30 PG (ref 27–31)
MCHC RBC AUTO-ENTMCNC: 32.6 G/DL (ref 32–36)
MCV RBC AUTO: 92 FL (ref 82–98)
MICROSCOPIC COMMENT: ABNORMAL
MODE: NORMAL
MONOCYTES # BLD AUTO: 1.3 K/UL (ref 0.3–1)
MONOCYTES NFR BLD: 8.8 % (ref 4–15)
NEUTROPHILS # BLD AUTO: 12.4 K/UL (ref 1.8–7.7)
NEUTROPHILS NFR BLD: 82.6 % (ref 38–73)
NITRITE UR QL STRIP: NEGATIVE
NRBC BLD-RTO: 0 /100 WBC
PH UR STRIP: 6 [PH] (ref 5–8)
PLATELET # BLD AUTO: 214 K/UL (ref 150–450)
PMV BLD AUTO: 9.7 FL (ref 9.2–12.9)
POTASSIUM SERPL-SCNC: 3.5 MMOL/L (ref 3.5–5.1)
PROCALCITONIN SERPL IA-MCNC: 0.19 NG/ML (ref 0–0.5)
PROT SERPL-MCNC: 7.7 G/DL (ref 6–8.4)
PROT UR QL STRIP: ABNORMAL
RBC # BLD AUTO: 4.73 M/UL (ref 4.6–6.2)
RBC #/AREA URNS HPF: 0 /HPF (ref 0–4)
SAMPLE: NORMAL
SARS-COV-2 RDRP RESP QL NAA+PROBE: NEGATIVE
SITE: NORMAL
SODIUM SERPL-SCNC: 137 MMOL/L (ref 136–145)
SP GR UR STRIP: 1.03 (ref 1–1.03)
SPECIMEN SOURCE: NORMAL
SQUAMOUS #/AREA URNS HPF: 1 /HPF
URN SPEC COLLECT METH UR: ABNORMAL
UROBILINOGEN UR STRIP-ACNC: NEGATIVE EU/DL
WBC # BLD AUTO: 14.99 K/UL (ref 3.9–12.7)
WBC #/AREA URNS HPF: 1 /HPF (ref 0–5)

## 2024-03-26 PROCEDURE — 81000 URINALYSIS NONAUTO W/SCOPE: CPT | Performed by: PHYSICIAN ASSISTANT

## 2024-03-26 PROCEDURE — G0378 HOSPITAL OBSERVATION PER HR: HCPCS

## 2024-03-26 PROCEDURE — 11000001 HC ACUTE MED/SURG PRIVATE ROOM

## 2024-03-26 PROCEDURE — 63600175 PHARM REV CODE 636 W HCPCS: Performed by: EMERGENCY MEDICINE

## 2024-03-26 PROCEDURE — 84145 PROCALCITONIN (PCT): CPT | Performed by: PHYSICIAN ASSISTANT

## 2024-03-26 PROCEDURE — 25000003 PHARM REV CODE 250: Performed by: EMERGENCY MEDICINE

## 2024-03-26 PROCEDURE — U0002 COVID-19 LAB TEST NON-CDC: HCPCS | Performed by: PHYSICIAN ASSISTANT

## 2024-03-26 PROCEDURE — 99285 EMERGENCY DEPT VISIT HI MDM: CPT | Mod: 25

## 2024-03-26 PROCEDURE — 82550 ASSAY OF CK (CPK): CPT | Performed by: EMERGENCY MEDICINE

## 2024-03-26 PROCEDURE — 93010 ELECTROCARDIOGRAM REPORT: CPT | Mod: ,,, | Performed by: GENERAL PRACTICE

## 2024-03-26 PROCEDURE — 99900035 HC TECH TIME PER 15 MIN (STAT)

## 2024-03-26 PROCEDURE — 85025 COMPLETE CBC W/AUTO DIFF WBC: CPT | Performed by: PHYSICIAN ASSISTANT

## 2024-03-26 PROCEDURE — 63600175 PHARM REV CODE 636 W HCPCS: Performed by: PHYSICIAN ASSISTANT

## 2024-03-26 PROCEDURE — 80053 COMPREHEN METABOLIC PANEL: CPT | Performed by: PHYSICIAN ASSISTANT

## 2024-03-26 PROCEDURE — 36415 COLL VENOUS BLD VENIPUNCTURE: CPT | Performed by: PHYSICIAN ASSISTANT

## 2024-03-26 PROCEDURE — 87651 STREP A DNA AMP PROBE: CPT | Performed by: EMERGENCY MEDICINE

## 2024-03-26 PROCEDURE — 87502 INFLUENZA DNA AMP PROBE: CPT | Performed by: PHYSICIAN ASSISTANT

## 2024-03-26 PROCEDURE — 96365 THER/PROPH/DIAG IV INF INIT: CPT

## 2024-03-26 PROCEDURE — 83605 ASSAY OF LACTIC ACID: CPT

## 2024-03-26 PROCEDURE — 87040 BLOOD CULTURE FOR BACTERIA: CPT | Mod: 59 | Performed by: PHYSICIAN ASSISTANT

## 2024-03-26 PROCEDURE — 93005 ELECTROCARDIOGRAM TRACING: CPT

## 2024-03-26 RX ORDER — ACETAMINOPHEN 500 MG
1000 TABLET ORAL
Status: COMPLETED | OUTPATIENT
Start: 2024-03-26 | End: 2024-03-26

## 2024-03-26 RX ADMIN — SODIUM CHLORIDE, POTASSIUM CHLORIDE, SODIUM LACTATE AND CALCIUM CHLORIDE 2286 ML: 600; 310; 30; 20 INJECTION, SOLUTION INTRAVENOUS at 10:03

## 2024-03-26 RX ADMIN — ACETAMINOPHEN 1000 MG: 500 TABLET ORAL at 10:03

## 2024-03-26 RX ADMIN — PIPERACILLIN AND TAZOBACTAM 4.5 G: 4; .5 INJECTION, POWDER, FOR SOLUTION INTRAVENOUS; PARENTERAL at 11:03

## 2024-03-26 NOTE — Clinical Note
Diagnosis: SIRS (systemic inflammatory response syndrome) [438714]   Future Attending Provider: RADHA OLEARY [[id number]]   Place in Observation: [facility] [[id number]]

## 2024-03-27 LAB
ALBUMIN SERPL BCP-MCNC: 3.3 G/DL (ref 3.5–5.2)
ALP SERPL-CCNC: 95 U/L (ref 55–135)
ALT SERPL W/O P-5'-P-CCNC: 29 U/L (ref 10–44)
ANION GAP SERPL CALC-SCNC: 9 MMOL/L (ref 8–16)
AST SERPL-CCNC: 28 U/L (ref 10–40)
BASOPHILS # BLD AUTO: 0.05 K/UL (ref 0–0.2)
BASOPHILS NFR BLD: 0.4 % (ref 0–1.9)
BILIRUB SERPL-MCNC: 0.6 MG/DL (ref 0.1–1)
BUN SERPL-MCNC: 15 MG/DL (ref 6–20)
CALCIUM SERPL-MCNC: 8.8 MG/DL (ref 8.7–10.5)
CHLORIDE SERPL-SCNC: 106 MMOL/L (ref 95–110)
CO2 SERPL-SCNC: 21 MMOL/L (ref 23–29)
CREAT SERPL-MCNC: 1.1 MG/DL (ref 0.5–1.4)
DIFFERENTIAL METHOD BLD: ABNORMAL
EOSINOPHIL # BLD AUTO: 0 K/UL (ref 0–0.5)
EOSINOPHIL NFR BLD: 0.3 % (ref 0–8)
ERYTHROCYTE [DISTWIDTH] IN BLOOD BY AUTOMATED COUNT: 11.7 % (ref 11.5–14.5)
EST. GFR  (NO RACE VARIABLE): >60 ML/MIN/1.73 M^2
GLUCOSE SERPL-MCNC: 113 MG/DL (ref 70–110)
HCT VFR BLD AUTO: 40 % (ref 40–54)
HGB BLD-MCNC: 13 G/DL (ref 14–18)
HIV1+2 IGG SERPL QL IA.RAPID: NORMAL
IMM GRANULOCYTES # BLD AUTO: 0.1 K/UL (ref 0–0.04)
IMM GRANULOCYTES NFR BLD AUTO: 0.7 % (ref 0–0.5)
LYMPHOCYTES # BLD AUTO: 1.3 K/UL (ref 1–4.8)
LYMPHOCYTES NFR BLD: 9.2 % (ref 18–48)
MAGNESIUM SERPL-MCNC: 2 MG/DL (ref 1.6–2.6)
MCH RBC QN AUTO: 29.1 PG (ref 27–31)
MCHC RBC AUTO-ENTMCNC: 32.5 G/DL (ref 32–36)
MCV RBC AUTO: 90 FL (ref 82–98)
MONOCYTES # BLD AUTO: 1.3 K/UL (ref 0.3–1)
MONOCYTES NFR BLD: 9.1 % (ref 4–15)
NEUTROPHILS # BLD AUTO: 11.1 K/UL (ref 1.8–7.7)
NEUTROPHILS NFR BLD: 80.3 % (ref 38–73)
NRBC BLD-RTO: 0 /100 WBC
PHOSPHATE SERPL-MCNC: 1.8 MG/DL (ref 2.7–4.5)
PLATELET # BLD AUTO: 207 K/UL (ref 150–450)
PMV BLD AUTO: 9.5 FL (ref 9.2–12.9)
POTASSIUM SERPL-SCNC: 3.7 MMOL/L (ref 3.5–5.1)
PROT SERPL-MCNC: 6.7 G/DL (ref 6–8.4)
RBC # BLD AUTO: 4.47 M/UL (ref 4.6–6.2)
SODIUM SERPL-SCNC: 136 MMOL/L (ref 136–145)
WBC # BLD AUTO: 13.78 K/UL (ref 3.9–12.7)

## 2024-03-27 PROCEDURE — 25000003 PHARM REV CODE 250: Performed by: INTERNAL MEDICINE

## 2024-03-27 PROCEDURE — 63600175 PHARM REV CODE 636 W HCPCS: Performed by: INTERNAL MEDICINE

## 2024-03-27 PROCEDURE — 86703 HIV-1/HIV-2 1 RESULT ANTBDY: CPT | Performed by: INTERNAL MEDICINE

## 2024-03-27 PROCEDURE — 63600175 PHARM REV CODE 636 W HCPCS: Performed by: NURSE PRACTITIONER

## 2024-03-27 PROCEDURE — 11000001 HC ACUTE MED/SURG PRIVATE ROOM

## 2024-03-27 PROCEDURE — 25000003 PHARM REV CODE 250: Performed by: NURSE PRACTITIONER

## 2024-03-27 PROCEDURE — 96366 THER/PROPH/DIAG IV INF ADDON: CPT

## 2024-03-27 PROCEDURE — 80053 COMPREHEN METABOLIC PANEL: CPT | Performed by: NURSE PRACTITIONER

## 2024-03-27 PROCEDURE — 96367 TX/PROPH/DG ADDL SEQ IV INF: CPT

## 2024-03-27 PROCEDURE — 85025 COMPLETE CBC W/AUTO DIFF WBC: CPT | Performed by: NURSE PRACTITIONER

## 2024-03-27 PROCEDURE — 36415 COLL VENOUS BLD VENIPUNCTURE: CPT | Performed by: INTERNAL MEDICINE

## 2024-03-27 PROCEDURE — 84100 ASSAY OF PHOSPHORUS: CPT | Performed by: NURSE PRACTITIONER

## 2024-03-27 PROCEDURE — 94761 N-INVAS EAR/PLS OXIMETRY MLT: CPT

## 2024-03-27 PROCEDURE — 83735 ASSAY OF MAGNESIUM: CPT | Performed by: NURSE PRACTITIONER

## 2024-03-27 PROCEDURE — 36415 COLL VENOUS BLD VENIPUNCTURE: CPT | Performed by: NURSE PRACTITIONER

## 2024-03-27 RX ORDER — NALOXONE HCL 0.4 MG/ML
0.02 VIAL (ML) INJECTION
Status: DISCONTINUED | OUTPATIENT
Start: 2024-03-27 | End: 2024-03-29 | Stop reason: HOSPADM

## 2024-03-27 RX ORDER — GLUCAGON 1 MG
1 KIT INJECTION
Status: DISCONTINUED | OUTPATIENT
Start: 2024-03-27 | End: 2024-03-29 | Stop reason: HOSPADM

## 2024-03-27 RX ORDER — ONDANSETRON HYDROCHLORIDE 2 MG/ML
8 INJECTION, SOLUTION INTRAVENOUS EVERY 6 HOURS PRN
Status: DISCONTINUED | OUTPATIENT
Start: 2024-03-27 | End: 2024-03-29 | Stop reason: HOSPADM

## 2024-03-27 RX ORDER — SODIUM,POTASSIUM PHOSPHATES 280-250MG
2 POWDER IN PACKET (EA) ORAL
Status: DISCONTINUED | OUTPATIENT
Start: 2024-03-27 | End: 2024-03-29 | Stop reason: HOSPADM

## 2024-03-27 RX ORDER — SIMETHICONE 80 MG
1 TABLET,CHEWABLE ORAL 4 TIMES DAILY PRN
Status: DISCONTINUED | OUTPATIENT
Start: 2024-03-27 | End: 2024-03-29 | Stop reason: HOSPADM

## 2024-03-27 RX ORDER — POLYETHYLENE GLYCOL 3350 17 G/17G
17 POWDER, FOR SOLUTION ORAL DAILY
Status: DISCONTINUED | OUTPATIENT
Start: 2024-03-27 | End: 2024-03-29 | Stop reason: HOSPADM

## 2024-03-27 RX ORDER — SODIUM CHLORIDE 0.9 % (FLUSH) 0.9 %
3 SYRINGE (ML) INJECTION EVERY 12 HOURS PRN
Status: DISCONTINUED | OUTPATIENT
Start: 2024-03-27 | End: 2024-03-29 | Stop reason: HOSPADM

## 2024-03-27 RX ORDER — LANOLIN ALCOHOL/MO/W.PET/CERES
800 CREAM (GRAM) TOPICAL
Status: DISCONTINUED | OUTPATIENT
Start: 2024-03-27 | End: 2024-03-29 | Stop reason: HOSPADM

## 2024-03-27 RX ORDER — ALUMINUM HYDROXIDE, MAGNESIUM HYDROXIDE, AND SIMETHICONE 1200; 120; 1200 MG/30ML; MG/30ML; MG/30ML
30 SUSPENSION ORAL 4 TIMES DAILY PRN
Status: DISCONTINUED | OUTPATIENT
Start: 2024-03-27 | End: 2024-03-29 | Stop reason: HOSPADM

## 2024-03-27 RX ORDER — HYDROCODONE BITARTRATE AND ACETAMINOPHEN 10; 325 MG/1; MG/1
1 TABLET ORAL EVERY 6 HOURS PRN
Status: DISCONTINUED | OUTPATIENT
Start: 2024-03-27 | End: 2024-03-29 | Stop reason: HOSPADM

## 2024-03-27 RX ORDER — ACETAMINOPHEN 325 MG/1
650 TABLET ORAL EVERY 8 HOURS PRN
Status: DISCONTINUED | OUTPATIENT
Start: 2024-03-27 | End: 2024-03-29 | Stop reason: HOSPADM

## 2024-03-27 RX ORDER — TALC
9 POWDER (GRAM) TOPICAL NIGHTLY PRN
Status: DISCONTINUED | OUTPATIENT
Start: 2024-03-27 | End: 2024-03-29 | Stop reason: HOSPADM

## 2024-03-27 RX ORDER — IBUPROFEN 200 MG
16 TABLET ORAL
Status: DISCONTINUED | OUTPATIENT
Start: 2024-03-27 | End: 2024-03-29 | Stop reason: HOSPADM

## 2024-03-27 RX ORDER — ENOXAPARIN SODIUM 100 MG/ML
40 INJECTION SUBCUTANEOUS EVERY 24 HOURS
Status: DISCONTINUED | OUTPATIENT
Start: 2024-03-27 | End: 2024-03-29 | Stop reason: HOSPADM

## 2024-03-27 RX ORDER — ACETAMINOPHEN 325 MG/1
650 TABLET ORAL EVERY 4 HOURS PRN
Status: DISCONTINUED | OUTPATIENT
Start: 2024-03-27 | End: 2024-03-29 | Stop reason: HOSPADM

## 2024-03-27 RX ORDER — IBUPROFEN 200 MG
24 TABLET ORAL
Status: DISCONTINUED | OUTPATIENT
Start: 2024-03-27 | End: 2024-03-29 | Stop reason: HOSPADM

## 2024-03-27 RX ORDER — HYDROCODONE BITARTRATE AND ACETAMINOPHEN 5; 325 MG/1; MG/1
1 TABLET ORAL EVERY 6 HOURS PRN
Status: DISCONTINUED | OUTPATIENT
Start: 2024-03-27 | End: 2024-03-29 | Stop reason: HOSPADM

## 2024-03-27 RX ORDER — FAMOTIDINE 20 MG/1
20 TABLET, FILM COATED ORAL 2 TIMES DAILY
Status: DISCONTINUED | OUTPATIENT
Start: 2024-03-27 | End: 2024-03-29 | Stop reason: HOSPADM

## 2024-03-27 RX ADMIN — VANCOMYCIN HYDROCHLORIDE 1500 MG: 1.5 INJECTION, POWDER, LYOPHILIZED, FOR SOLUTION INTRAVENOUS at 12:03

## 2024-03-27 RX ADMIN — PIPERACILLIN AND TAZOBACTAM 4.5 G: 4; .5 INJECTION, POWDER, FOR SOLUTION INTRAVENOUS; PARENTERAL at 04:03

## 2024-03-27 RX ADMIN — PIPERACILLIN AND TAZOBACTAM 4.5 G: 4; .5 INJECTION, POWDER, FOR SOLUTION INTRAVENOUS; PARENTERAL at 11:03

## 2024-03-27 RX ADMIN — HYDROCODONE BITARTRATE AND ACETAMINOPHEN 1 TABLET: 10; 325 TABLET ORAL at 03:03

## 2024-03-27 RX ADMIN — FAMOTIDINE 20 MG: 20 TABLET, FILM COATED ORAL at 09:03

## 2024-03-27 RX ADMIN — PIPERACILLIN AND TAZOBACTAM 4.5 G: 4; .5 INJECTION, POWDER, FOR SOLUTION INTRAVENOUS; PARENTERAL at 10:03

## 2024-03-27 RX ADMIN — ACETAMINOPHEN 650 MG: 325 TABLET ORAL at 06:03

## 2024-03-27 RX ADMIN — ACETAMINOPHEN 650 MG: 325 TABLET ORAL at 03:03

## 2024-03-27 RX ADMIN — ENOXAPARIN SODIUM 40 MG: 40 INJECTION SUBCUTANEOUS at 06:03

## 2024-03-27 RX ADMIN — HYDROCODONE BITARTRATE AND ACETAMINOPHEN 1 TABLET: 10; 325 TABLET ORAL at 12:03

## 2024-03-27 RX ADMIN — PIPERACILLIN AND TAZOBACTAM 4.5 G: 4; .5 INJECTION, POWDER, FOR SOLUTION INTRAVENOUS; PARENTERAL at 06:03

## 2024-03-27 RX ADMIN — HYDROCODONE BITARTRATE AND ACETAMINOPHEN 1 TABLET: 10; 325 TABLET ORAL at 10:03

## 2024-03-27 NOTE — PROGRESS NOTES
Pharmacokinetic Initial Assessment: IV Vancomycin    Assessment/Plan:    Initiate intravenous vancomycin with loading dose of 1500 mg once followed by a maintenance dose of vancomycin 1000mg IV every 12 hours  Desired empiric serum trough concentration is 10 to 15 mcg/mL  Draw vancomycin trough level 60 min prior to fourth dose on 3/28 at approximately 2300  Pharmacy will continue to follow and monitor vancomycin.      Please contact pharmacy at extension [phone] with any questions regarding this assessment.     Thank you for the consult,   Torrey Mg       Patient brief summary:  Agus Horan is a 23 y.o. male initiated on antimicrobial therapy with IV Vancomycin for treatment of suspected skin & soft tissue infection    Drug Allergies:   Review of patient's allergies indicates:   Allergen Reactions    Shrimp        Actual Body Weight:   76.2 kg    Renal Function:   Estimated Creatinine Clearance: 99.3 mL/min (based on SCr of 1.1 mg/dL).,     Dialysis Method (if applicable):  N/A    CBC (last 72 hours):  Recent Labs   Lab Result Units 03/26/24 2203 03/27/24  0451   WBC K/uL 14.99* 13.78*   Hemoglobin g/dL 14.2 13.0*   Hematocrit % 43.6 40.0   Platelets K/uL 214 207   Gran % % 82.6* 80.3*   Lymph % % 7.4* 9.2*   Mono % % 8.8 9.1   Eosinophil % % 0.3 0.3   Basophil % % 0.3 0.4   Differential Method  Automated Automated       Metabolic Panel (last 72 hours):  Recent Labs   Lab Result Units 03/26/24 2204 03/26/24 2231 03/27/24  0451   Sodium mmol/L 137  --  136   Potassium mmol/L 3.5  --  3.7   Chloride mmol/L 101  --  106   CO2 mmol/L 24  --  21*   Glucose mg/dL 85  --  113*   Glucose, UA   --  Negative  --    BUN mg/dL 16  --  15   Creatinine mg/dL 1.2  --  1.1   Albumin g/dL 3.9  --  3.3*   Total Bilirubin mg/dL 0.4  --  0.6   Alkaline Phosphatase U/L 107  --  95   AST U/L 30  --  28   ALT U/L 34  --  29   Magnesium mg/dL  --   --  2.0   Phosphorus mg/dL  --   --  1.8*       Drug levels (last 3 results):  No  "results for input(s): "VANCOMYCINRA", "VANCORANDOM", "VANCOMYCINPE", "VANCOPEAK", "VANCOMYCINTR", "VANCOTROUGH" in the last 72 hours.    Microbiologic Results:  Microbiology Results (last 7 days)       Procedure Component Value Units Date/Time    Blood culture x two cultures. Draw prior to antibiotics. [[id number]] Collected: 03/26/24 2203    Order Status: Sent Specimen: Blood from Peripheral, Forearm, Right Updated: 03/27/24 0903    Blood culture x two cultures. Draw prior to antibiotics. [[id number]] Collected: 03/26/24 2203    Order Status: Sent Specimen: Blood from Peripheral, Forearm, Left Updated: 03/27/24 0903    Influenza A & B by Molecular [[id number]] Collected: 03/26/24 2159    Order Status: Completed Specimen: Nasopharyngeal Swab Updated: 03/26/24 2238     Influenza A, Molecular Negative     Influenza B, Molecular Negative     Flu A & B Source Nasal swab    Group A Strep, Molecular [[id number]] Collected: 03/26/24 2159    Order Status: Completed Specimen: Throat Updated: 03/26/24 2230     Group A Strep, Molecular Negative     Comment: Arcanobacterium haemolyticum and Beta Streptococcus group C   and G will not be detected by this test method.  Please order   Throat Culture ([id number]) if suspected.                 "

## 2024-03-27 NOTE — ED PROVIDER NOTES
Encounter Date: 3/26/2024       History     Chief Complaint   Patient presents with    Leg Swelling     Pt has swelling in left foot, leg and hip.       HPI  23 y.o.   Co few days of swelling to RLE and fever, chills   Denies injections into legs   Mild frontal ha   Has redness streaking from groin    Past hx sign for fasciotomy due to compartment syndrome on that leg    Review of patient's allergies indicates:   Allergen Reactions    Shrimp      Past Medical History:   Diagnosis Date    Allergy     AR    Asthma     mild intermittent    Hyperkalemia 4/14/2022     Past Surgical History:   Procedure Laterality Date    DEBRIDEMENT OF LOWER EXTREMITY Right 4/18/2022    Procedure: DEBRIDEMENT, LOWER EXTREMITY;  Surgeon: Leonardo Byers MD;  Location: [facility] OR;  Service: Orthopedics;  Laterality: Right;    DEBRIDEMENT OF LOWER EXTREMITY Right 5/5/2022    Procedure: DEBRIDEMENT, LOWER EXTREMITY;  Surgeon: Leonardo Byers MD;  Location: [facility] OR;  Service: Orthopedics;  Laterality: Right;    FASCIOTOMY Right 4/13/2022    Procedure: FASCIOTOMY;  Surgeon: Leonardo Byers MD;  Location: [facility] OR;  Service: Orthopedics;  Laterality: Right;    FASCIOTOMY Right 4/25/2022    Procedure: FASCIOTOMY;  Surgeon: Leonardo Byers MD;  Location: [facility] OR;  Service: Orthopedics;  Laterality: Right;    REMOVAL OF FOREIGN BODY FROM FOOT Left 6/24/2020    Procedure: REMOVAL, FOREIGN BODY, FOOT;  Surgeon: Dani Garcia MD;  Location: [facility] OR;  Service: Orthopedics;  Laterality: Left;    REPLACEMENT OF WOUND VACUUM-ASSISTED CLOSURE DEVICE Right 5/5/2022    Procedure: REPLACEMENT, WOUND VAC;  Surgeon: Leonardo Byers MD;  Location: [facility] OR;  Service: Orthopedics;  Laterality: Right;     Family History   Problem Relation Age of Onset    Asthma Brother     Emphysema Maternal Grandmother     Hyperlipidemia Maternal Grandmother     Asthma Maternal Grandmother     Arthritis Maternal Grandmother     COPD Maternal  Grandmother     Asthma Brother      Social History     Tobacco Use    Smoking status: Never    Smokeless tobacco: Never   Substance Use Topics    Alcohol use: Yes     Comment: last night    Drug use: Yes     Frequency: 2.0 times per week     Types: Marijuana     Comment: yesterday     Review of Systems  All systems were reviewed/examined and were negative except as noted in the HPI.    Physical Exam     Initial Vitals [03/26/24 2139]   BP Pulse Resp Temp SpO2   139/68 (!) 111 18 (!) 102.9 °F (39.4 °C) 98 %      MAP       --         Physical Exam    General: the patient is awake, alert, and in no apparent distress.  Head: normocephalic and atraumatic, sclera are clear  Neck: supple without meningismus  Chest: clear to auscultation bilaterally, no respiratory distress  Heart: tachy r rr  ABD soft, nontender, nondistended, no peritoneal signs  Back nt in the midline  Extremities: warm and well perfused    RLE has medial lymphangitic streaking, no palpable abscesses or tense compartments  Skin: warm and dry  Psych conversant  Neuro: awake, alert, moving all extremities    ED Course   Procedures  Labs Reviewed   CBC W/ AUTO DIFFERENTIAL - Abnormal; Notable for the following components:       Result Value    WBC 14.99 (*)     Immature Granulocytes 0.6 (*)     Gran # (ANC) 12.4 (*)     Immature Grans (Abs) 0.09 (*)     Mono # 1.3 (*)     Gran % 82.6 (*)     Lymph % 7.4 (*)     All other components within normal limits   URINALYSIS, REFLEX TO URINE CULTURE - Abnormal; Notable for the following components:    Protein, UA 1+ (*)     All other components within normal limits    Narrative:     Specimen Source->Urine   CK - Abnormal; Notable for the following components:     (*)     All other components within normal limits   URINALYSIS MICROSCOPIC - Abnormal; Notable for the following components:    Hyaline Casts, UA 2 (*)     All other components within normal limits    Narrative:     Specimen Source->Urine   INFLUENZA  A & B BY MOLECULAR   GROUP A STREP, MOLECULAR   CULTURE, BLOOD   CULTURE, BLOOD   COMPREHENSIVE METABOLIC PANEL   PROCALCITONIN   SARS-COV-2 RNA AMPLIFICATION, QUAL   MAGNESIUM   PHOSPHORUS   CBC W/ AUTO DIFFERENTIAL   COMPREHENSIVE METABOLIC PANEL   ISTAT LACTATE          Imaging Results              US Lower Extremity Veins Right (In process)                      X-Ray Femur Ap/Lat Right (In process)                      X-Ray Chest AP Portable (In process)                      Medications   sodium chloride 0.9% flush 3 mL (has no administration in time range)   enoxaparin injection 40 mg (has no administration in time range)   melatonin tablet 9 mg (has no administration in time range)   ondansetron injection 8 mg (has no administration in time range)   polyethylene glycol packet 17 g (has no administration in time range)   acetaminophen tablet 650 mg (has no administration in time range)   simethicone chewable tablet 80 mg (has no administration in time range)   aluminum-magnesium hydroxide-simethicone 200-200-20 mg/5 mL suspension 30 mL (has no administration in time range)   acetaminophen tablet 650 mg (has no administration in time range)   naloxone 0.4 mg/mL injection 0.02 mg (has no administration in time range)   potassium bicarbonate disintegrating tablet 50 mEq (has no administration in time range)   potassium bicarbonate disintegrating tablet 35 mEq (has no administration in time range)   potassium bicarbonate disintegrating tablet 60 mEq (has no administration in time range)   magnesium oxide tablet 800 mg (has no administration in time range)   magnesium oxide tablet 800 mg (has no administration in time range)   potassium, sodium phosphates 280-160-250 mg packet 2 packet (has no administration in time range)   potassium, sodium phosphates 280-160-250 mg packet 2 packet (has no administration in time range)   potassium, sodium phosphates 280-160-250 mg packet 2 packet (has no administration in time  range)   glucose chewable tablet 16 g (has no administration in time range)   glucose chewable tablet 24 g (has no administration in time range)   glucagon (human recombinant) injection 1 mg (has no administration in time range)   HYDROcodone-acetaminophen  mg per tablet 1 tablet (has no administration in time range)   HYDROcodone-acetaminophen 5-325 mg per tablet 1 tablet (has no administration in time range)   dextrose 10% bolus 125 mL 125 mL (has no administration in time range)   dextrose 10% bolus 250 mL 250 mL (has no administration in time range)   famotidine tablet 20 mg (has no administration in time range)   piperacillin-tazobactam (ZOSYN) 4.5 g in dextrose 5 % in water (D5W) 100 mL IVPB (MB+) (has no administration in time range)   lactated ringers bolus 2,286 mL (0 mLs Intravenous Stopped 3/27/24 0104)   acetaminophen tablet 1,000 mg (1,000 mg Oral Given 3/26/24 2202)   piperacillin-tazobactam (ZOSYN) 4.5 g in dextrose 5 % in water (D5W) 100 mL IVPB (MB+) (0 g Intravenous Stopped 3/26/24 2345)     Medical Decision Making  Amount and/or Complexity of Data Reviewed  Radiology: ordered.    Risk  OTC drugs.      Medical Decision Making:    This is an emergent evaluation of a patient presenting to the ED.  Nursing notes were reviewed.  I personally reviewed, read, and interpreted the ECG and any monitoring strips.  ECG: normal EKG, normal sinus rhythm, unchanged from previous tracings Compared with prior if available.  Read and interpreted by me independently.      I reviewed radiology images personally along with interpretations.  DVT us: neg  Cxr: nad  R femur: nad  I personally reviewed and interpreted the laboratory results.  Lactate ok  Cpk very mildly elev, not c/w rhabdo  Ua no uti  Communicated with another physician regarding patient's care: hosp medicine, reviewed w/u, history of leg surgery, err on side of observation  I decided to obtain and review old medical records, which showed: h/o  fasciotomy    Critical Care Time    Critical care time was provided for 30 minutes exclusive of other billable procedures and teaching time for the treatment of  concern for sepsis   where the potential for death, shock, or further decline was possible.  Critical care time can include documentation, discussion with consultants, developing a care plan, as well as direct patient care.    Aashish Chino MD    /71   Pulse 88   Temp 98.7 °F (37.1 °C) (Oral)   Resp 18   Wt 76.2 kg (168 lb)   SpO2 100%   BMI 24.81 kg/m²      This patient does have evidence of infective focus  My overall impression is  cellulitis .  Source: Skin and Soft Tissue (location R leg)  Antibiotics given-   Antibiotics (72h ago, onward)      Start     Stop Route Frequency Ordered    03/27/24 0500  piperacillin-tazobactam (ZOSYN) 4.5 g in dextrose 5 % in water (D5W) 100 mL IVPB (MB+)         -- IV Every 6 hours (non-standard times) 03/27/24 0057          Latest lactate reviewed-  Recent Labs   Lab 03/26/24  2206   POCLAC 1.15     Organ dysfunction indicated by  none    Fluid challenge Actual Body weight- Patient will receive 30ml/kg actual body weight to calculate fluid bolus for treatment of septic shock.     Post- resuscitation assessment Yes Perfusion exam was performed within 6 hours of septic shock presentation after bolus shows Adequate tissue perfusion assessed by non-invasive monitoring       Will Not start Pressors- Levophed for MAP of 65  Source control achieved by: IV abx                                     Clinical Impression:  Final diagnoses:  [R00.0] Tachycardia  [M79.606] Leg pain  [M79.89] Leg swelling  [R65.10] SIRS (systemic inflammatory response syndrome)  [R07.9] Chest pain          ED Disposition Condition    Observation Stable             Osman Chino MD, LUNA, FACEP  Department of Emergency Medicine       Aashish Chino MD  03/27/24 0231       Aashish Chino MD  03/27/24 0239

## 2024-03-27 NOTE — ED NOTES
Pt resting in bed with eyes closed. Resp even and unlabored. Nadn. Pt aroused to verbal stimuli. Pt report headache 6/10. Pt states leg pain is intermittent, but is better than when he first came in. Pt is febrile at this time. Will give PRN tylenol. Call light within reach

## 2024-03-27 NOTE — SUBJECTIVE & OBJECTIVE
Past Medical History:   Diagnosis Date    Allergy     AR    Asthma     mild intermittent    Hyperkalemia 4/14/2022       Past Surgical History:   Procedure Laterality Date    DEBRIDEMENT OF LOWER EXTREMITY Right 4/18/2022    Procedure: DEBRIDEMENT, LOWER EXTREMITY;  Surgeon: Leonardo Byers MD;  Location: [facility] OR;  Service: Orthopedics;  Laterality: Right;    DEBRIDEMENT OF LOWER EXTREMITY Right 5/5/2022    Procedure: DEBRIDEMENT, LOWER EXTREMITY;  Surgeon: Leonardo Byers MD;  Location: [facility] OR;  Service: Orthopedics;  Laterality: Right;    FASCIOTOMY Right 4/13/2022    Procedure: FASCIOTOMY;  Surgeon: Leonardo Byers MD;  Location: [facility] OR;  Service: Orthopedics;  Laterality: Right;    FASCIOTOMY Right 4/25/2022    Procedure: FASCIOTOMY;  Surgeon: Leonardo Byers MD;  Location: [facility] OR;  Service: Orthopedics;  Laterality: Right;    REMOVAL OF FOREIGN BODY FROM FOOT Left 6/24/2020    Procedure: REMOVAL, FOREIGN BODY, FOOT;  Surgeon: Dani Garcia MD;  Location: [facility] OR;  Service: Orthopedics;  Laterality: Left;    REPLACEMENT OF WOUND VACUUM-ASSISTED CLOSURE DEVICE Right 5/5/2022    Procedure: REPLACEMENT, WOUND VAC;  Surgeon: Leonardo Byers MD;  Location: [facility] OR;  Service: Orthopedics;  Laterality: Right;       Review of patient's allergies indicates:   Allergen Reactions    Shrimp        No current facility-administered medications on file prior to encounter.     Current Outpatient Medications on File Prior to Encounter   Medication Sig    [DISCONTINUED] acetaminophen (TYLENOL) 325 MG tablet Take 2 tablets (650 mg total) by mouth every 6 (six) hours as needed.    [DISCONTINUED] diphenhydrAMINE (BENADRYL) 25 mg capsule Take 1 capsule (25 mg total) by mouth every 6 (six) hours as needed for Itching.    [DISCONTINUED] ferrous gluconate (FERGON) 324 MG tablet Take 1 tablet (324 mg total) by mouth 2 (two) times daily with meals.    [DISCONTINUED] gabapentin (NEURONTIN) 100 MG  capsule Take 1 capsule (100 mg total) by mouth 3 (three) times daily. for 14 days    [DISCONTINUED] hydrALAZINE (APRESOLINE) 25 MG tablet Take 1 tablet (25 mg total) by mouth every 8 (eight) hours.    [DISCONTINUED] hydrOXYzine pamoate (VISTARIL) 25 MG Cap Take 1 capsule (25 mg total) by mouth every 8 (eight) hours as needed.    [DISCONTINUED] metoprolol succinate (TOPROL-XL) 25 MG 24 hr tablet Take 1 tablet (25 mg total) by mouth once daily.    [DISCONTINUED] naloxone (NARCAN) 4 mg/actuation Spry 4mg by nasal route as needed for opioid overdose; may repeat every 2-3 minutes in alternating nostrils until medical help arrives. Call 911    [DISCONTINUED] naloxone (NARCAN) 4 mg/actuation Spry 4mg by nasal route as needed for opioid overdose; may repeat every 2-3 minutes in alternating nostrils until medical help arrives. Call 911    [DISCONTINUED] oxyCODONE-acetaminophen (PERCOCET) 5-325 mg per tablet Take 1 tablet by mouth every 4 (four) hours as needed.     Family History       Problem Relation (Age of Onset)    Arthritis Maternal Grandmother    Asthma Brother, Maternal Grandmother, Brother    COPD Maternal Grandmother    Emphysema Maternal Grandmother    Hyperlipidemia Maternal Grandmother          Tobacco Use    Smoking status: Never    Smokeless tobacco: Never   Substance and Sexual Activity    Alcohol use: Yes     Comment: last night    Drug use: Yes     Frequency: 2.0 times per week     Types: Marijuana     Comment: yesterday    Sexual activity: Not on file     Review of Systems   Constitutional:  Positive for chills and fever.   HENT:  Negative for congestion and sore throat.    Eyes:  Negative for visual disturbance.   Respiratory:  Negative for cough and shortness of breath.    Cardiovascular:  Negative for chest pain and palpitations.   Gastrointestinal:  Negative for abdominal pain, constipation, diarrhea, nausea and vomiting.   Endocrine: Negative for cold intolerance and heat intolerance.   Genitourinary:   Negative for dysuria and hematuria.   Musculoskeletal:  Positive for arthralgias and myalgias.   Skin:  Positive for color change and wound. Negative for rash.   Neurological:  Negative for tremors and seizures.   Hematological:  Negative for adenopathy. Does not bruise/bleed easily.   All other systems reviewed and are negative.    Objective:     Vital Signs (Most Recent):  Temp: 98.7 °F (37.1 °C) (03/26/24 2306)  Pulse: 88 (03/27/24 0002)  Resp: 18 (03/27/24 0002)  BP: 131/71 (03/27/24 0002)  SpO2: 100 % (03/27/24 0002) Vital Signs (24h Range):  Temp:  [98.7 °F (37.1 °C)-102.9 °F (39.4 °C)] 98.7 °F (37.1 °C)  Pulse:  [] 88  Resp:  [18] 18  SpO2:  [97 %-100 %] 100 %  BP: (123-139)/(62-71) 131/71     Weight: 76.2 kg (168 lb)  Body mass index is 24.81 kg/m².     Physical Exam  Vitals and nursing note reviewed.   Constitutional:       General: He is awake. He is not in acute distress.     Appearance: Normal appearance. He is well-developed. He is not ill-appearing.   HENT:      Head: Normocephalic and atraumatic.      Nose: Nose normal. No septal deviation.   Eyes:      Conjunctiva/sclera: Conjunctivae normal.      Pupils: Pupils are equal, round, and reactive to light.   Neck:      Thyroid: No thyroid mass.      Vascular: No JVD.      Trachea: No tracheal tenderness or tracheal deviation.   Cardiovascular:      Rate and Rhythm: Normal rate and regular rhythm.      Heart sounds: Normal heart sounds, S1 normal and S2 normal. No murmur heard.     No friction rub. No gallop.   Pulmonary:      Effort: Pulmonary effort is normal.      Breath sounds: Normal breath sounds. No decreased breath sounds, wheezing, rhonchi or rales.   Abdominal:      General: Bowel sounds are normal. There is no distension.      Palpations: Abdomen is soft. There is no hepatomegaly, splenomegaly or mass.      Tenderness: There is no abdominal tenderness.   Skin:     General: Skin is warm and dry.      Capillary Refill: Capillary refill  takes less than 2 seconds.      Findings: Ecchymosis and erythema present. No rash.          Neurological:      General: No focal deficit present.      Mental Status: He is alert and oriented to person, place, and time.      Cranial Nerves: No cranial nerve deficit.      Sensory: No sensory deficit.   Psychiatric:         Mood and Affect: Mood normal.         Behavior: Behavior normal. Behavior is cooperative.              CRANIAL NERVES     CN III, IV, VI   Pupils are equal, round, and reactive to light.       Significant Labs: All pertinent labs within the past 24 hours have been reviewed.  CBC:   Recent Labs   Lab 03/26/24 2203   WBC 14.99*   HGB 14.2   HCT 43.6        CMP:   Recent Labs   Lab 03/26/24 2204      K 3.5      CO2 24   GLU 85   BUN 16   CREATININE 1.2   CALCIUM 9.5   PROT 7.7   ALBUMIN 3.9   BILITOT 0.4   ALKPHOS 107   AST 30   ALT 34   ANIONGAP 12       Urine Studies:   Recent Labs   Lab 03/26/24 2231   COLORU Yellow   APPEARANCEUA Clear   PHUR 6.0   SPECGRAV 1.030   PROTEINUA 1+*   GLUCUA Negative   KETONESU Negative   BILIRUBINUA Negative   OCCULTUA Negative   NITRITE Negative   UROBILINOGEN Negative   LEUKOCYTESUR Negative   RBCUA 0   WBCUA 1   BACTERIA None   SQUAMEPITHEL 1   HYALINECASTS 2*          03/26/24 2301  Procalcitonin  Collected: 03/26/24 2203  Final result  Specimen: Blood    Procalcitonin 0.19 ng/mL             03/26/24 2301  CPK  Collected: 03/26/24 2204  Final result  Specimen: Blood     High  U/L                       03/26/24 2209  ISTAT Lactate  Collected: 03/26/24 2206  Final result  Specimen: Blood    POC Lactate 1.15 mmol/L Allens Test N/A   Rate 16 DelSys Room Air   Sample VENOUS Mode SPONT   Site Other FiO2 21        Significant Imaging: I have reviewed all pertinent imaging results/findings within the past 24 hours.  Imaging Results              US Lower Extremity Veins Right (In process)    No evidence of DVT                  X-Ray  Femur Ap/Lat Right (In process)    No acute findings                  X-Ray Chest AP Portable (In process)    No acute findings

## 2024-03-27 NOTE — ED NOTES
Pt resting in bed. Nadn. Pt given pillow for comfort. Call light within reach. Pt made aware that he will be boarding in ER.pt verbalized understanding. vss

## 2024-03-27 NOTE — ED NOTES
Pt aaox4 from home c/o redness, swelling and pain to right lower leg. Pt also reports right foot pain. Pt reports left leg pain. Pt denies cp. Pt states that he has been having a sore throat and feeling dehydrated. Ed work-up pending. Friend at bedside

## 2024-03-27 NOTE — H&P
Critical access hospital Medicine  History & Physical    Patient Name: Agus Horan  MRN: [id number]  Patient Class: OP- Observation  Admission Date: 3/26/2024  Attending Physician: Terrance Bentley MD   Primary Care Provider: [facility]         Patient information was obtained from patient, past medical records, and ER records.     Subjective:     Principal Problem:SIRS (systemic inflammatory response syndrome)    Chief Complaint:   Chief Complaint   Patient presents with    Leg Swelling     Pt has swelling in left foot, leg and hip.          HPI:   Luca Horan is a 23 year old male with a past medical history of traumatic compartment syndrome with subsequent fasciotomy, who presented to the ED with a complaint of leg redness and fever. He states the redness and swelling have been present for a few days, and he has noticed some streaking redness to his thigh. He denies trauma or injury, and denies injecting anything into leg. He states he noticed a purplish area to the right dorsal foot as well. He denies other complaint.    ED work up included a CBC, which shows a WBC of 1499. CMP and urinalysis were unremarkable. CPK elevated at 358. Procalcitonin at 0.19. Xray's of the leg were unremarkable. US of the right lower extremity to rule out DVT were also negative. He was initiated on zosyn in the ED. Hospital Medicine consulted for admission and further management.    Past Medical History:   Diagnosis Date    Allergy     AR    Asthma     mild intermittent    Hyperkalemia 4/14/2022       Past Surgical History:   Procedure Laterality Date    DEBRIDEMENT OF LOWER EXTREMITY Right 4/18/2022    Procedure: DEBRIDEMENT, LOWER EXTREMITY;  Surgeon: Leonardo Byers MD;  Location: [facility] OR;  Service: Orthopedics;  Laterality: Right;    DEBRIDEMENT OF LOWER EXTREMITY Right 5/5/2022    Procedure: DEBRIDEMENT, LOWER EXTREMITY;  Surgeon: Leonardo Byers MD;  Location: [facility] OR;  Service: Orthopedics;   Laterality: Right;    FASCIOTOMY Right 4/13/2022    Procedure: FASCIOTOMY;  Surgeon: Leonardo Byers MD;  Location: [facility] OR;  Service: Orthopedics;  Laterality: Right;    FASCIOTOMY Right 4/25/2022    Procedure: FASCIOTOMY;  Surgeon: Leonardo Byers MD;  Location: [facility] OR;  Service: Orthopedics;  Laterality: Right;    REMOVAL OF FOREIGN BODY FROM FOOT Left 6/24/2020    Procedure: REMOVAL, FOREIGN BODY, FOOT;  Surgeon: Dani Garcia MD;  Location: [facility] OR;  Service: Orthopedics;  Laterality: Left;    REPLACEMENT OF WOUND VACUUM-ASSISTED CLOSURE DEVICE Right 5/5/2022    Procedure: REPLACEMENT, WOUND VAC;  Surgeon: Leonardo Byers MD;  Location: [facility] OR;  Service: Orthopedics;  Laterality: Right;       Review of patient's allergies indicates:   Allergen Reactions    Shrimp        No current facility-administered medications on file prior to encounter.     Current Outpatient Medications on File Prior to Encounter   Medication Sig    [DISCONTINUED] acetaminophen (TYLENOL) 325 MG tablet Take 2 tablets (650 mg total) by mouth every 6 (six) hours as needed.    [DISCONTINUED] diphenhydrAMINE (BENADRYL) 25 mg capsule Take 1 capsule (25 mg total) by mouth every 6 (six) hours as needed for Itching.    [DISCONTINUED] ferrous gluconate (FERGON) 324 MG tablet Take 1 tablet (324 mg total) by mouth 2 (two) times daily with meals.    [DISCONTINUED] gabapentin (NEURONTIN) 100 MG capsule Take 1 capsule (100 mg total) by mouth 3 (three) times daily. for 14 days    [DISCONTINUED] hydrALAZINE (APRESOLINE) 25 MG tablet Take 1 tablet (25 mg total) by mouth every 8 (eight) hours.    [DISCONTINUED] hydrOXYzine pamoate (VISTARIL) 25 MG Cap Take 1 capsule (25 mg total) by mouth every 8 (eight) hours as needed.    [DISCONTINUED] metoprolol succinate (TOPROL-XL) 25 MG 24 hr tablet Take 1 tablet (25 mg total) by mouth once daily.    [DISCONTINUED] naloxone (NARCAN) 4 mg/actuation Spry 4mg by nasal route as needed for opioid  overdose; may repeat every 2-3 minutes in alternating nostrils until medical help arrives. Call 911    [DISCONTINUED] naloxone (NARCAN) 4 mg/actuation Spry 4mg by nasal route as needed for opioid overdose; may repeat every 2-3 minutes in alternating nostrils until medical help arrives. Call 911    [DISCONTINUED] oxyCODONE-acetaminophen (PERCOCET) 5-325 mg per tablet Take 1 tablet by mouth every 4 (four) hours as needed.     Family History       Problem Relation (Age of Onset)    Arthritis Maternal Grandmother    Asthma Brother, Maternal Grandmother, Brother    COPD Maternal Grandmother    Emphysema Maternal Grandmother    Hyperlipidemia Maternal Grandmother          Tobacco Use    Smoking status: Never    Smokeless tobacco: Never   Substance and Sexual Activity    Alcohol use: Yes     Comment: last night    Drug use: Yes     Frequency: 2.0 times per week     Types: Marijuana     Comment: yesterday    Sexual activity: Not on file     Review of Systems   Constitutional:  Positive for chills and fever.   HENT:  Negative for congestion and sore throat.    Eyes:  Negative for visual disturbance.   Respiratory:  Negative for cough and shortness of breath.    Cardiovascular:  Negative for chest pain and palpitations.   Gastrointestinal:  Negative for abdominal pain, constipation, diarrhea, nausea and vomiting.   Endocrine: Negative for cold intolerance and heat intolerance.   Genitourinary:  Negative for dysuria and hematuria.   Musculoskeletal:  Positive for arthralgias and myalgias.   Skin:  Positive for color change and wound. Negative for rash.   Neurological:  Negative for tremors and seizures.   Hematological:  Negative for adenopathy. Does not bruise/bleed easily.   All other systems reviewed and are negative.    Objective:     Vital Signs (Most Recent):  Temp: 98.7 °F (37.1 °C) (03/26/24 2306)  Pulse: 88 (03/27/24 0002)  Resp: 18 (03/27/24 0002)  BP: 131/71 (03/27/24 0002)  SpO2: 100 % (03/27/24 0002) Vital Signs  (24h Range):  Temp:  [98.7 °F (37.1 °C)-102.9 °F (39.4 °C)] 98.7 °F (37.1 °C)  Pulse:  [] 88  Resp:  [18] 18  SpO2:  [97 %-100 %] 100 %  BP: (123-139)/(62-71) 131/71     Weight: 76.2 kg (168 lb)  Body mass index is 24.81 kg/m².     Physical Exam  Vitals and nursing note reviewed.   Constitutional:       General: He is awake. He is not in acute distress.     Appearance: Normal appearance. He is well-developed. He is not ill-appearing.   HENT:      Head: Normocephalic and atraumatic.      Nose: Nose normal. No septal deviation.   Eyes:      Conjunctiva/sclera: Conjunctivae normal.      Pupils: Pupils are equal, round, and reactive to light.   Neck:      Thyroid: No thyroid mass.      Vascular: No JVD.      Trachea: No tracheal tenderness or tracheal deviation.   Cardiovascular:      Rate and Rhythm: Normal rate and regular rhythm.      Heart sounds: Normal heart sounds, S1 normal and S2 normal. No murmur heard.     No friction rub. No gallop.   Pulmonary:      Effort: Pulmonary effort is normal.      Breath sounds: Normal breath sounds. No decreased breath sounds, wheezing, rhonchi or rales.   Abdominal:      General: Bowel sounds are normal. There is no distension.      Palpations: Abdomen is soft. There is no hepatomegaly, splenomegaly or mass.      Tenderness: There is no abdominal tenderness.   Skin:     General: Skin is warm and dry.      Capillary Refill: Capillary refill takes less than 2 seconds.      Findings: Ecchymosis and erythema present. No rash.          Neurological:      General: No focal deficit present.      Mental Status: He is alert and oriented to person, place, and time.      Cranial Nerves: No cranial nerve deficit.      Sensory: No sensory deficit.   Psychiatric:         Mood and Affect: Mood normal.         Behavior: Behavior normal. Behavior is cooperative.              CRANIAL NERVES     CN III, IV, VI   Pupils are equal, round, and reactive to light.       Significant Labs: All  pertinent labs within the past 24 hours have been reviewed.  CBC:   Recent Labs   Lab 03/26/24 2203   WBC 14.99*   HGB 14.2   HCT 43.6        CMP:   Recent Labs   Lab 03/26/24 2204      K 3.5      CO2 24   GLU 85   BUN 16   CREATININE 1.2   CALCIUM 9.5   PROT 7.7   ALBUMIN 3.9   BILITOT 0.4   ALKPHOS 107   AST 30   ALT 34   ANIONGAP 12       Urine Studies:   Recent Labs   Lab 03/26/24 2231   COLORU Yellow   APPEARANCEUA Clear   PHUR 6.0   SPECGRAV 1.030   PROTEINUA 1+*   GLUCUA Negative   KETONESU Negative   BILIRUBINUA Negative   OCCULTUA Negative   NITRITE Negative   UROBILINOGEN Negative   LEUKOCYTESUR Negative   RBCUA 0   WBCUA 1   BACTERIA None   SQUAMEPITHEL 1   HYALINECASTS 2*          03/26/24 2301  Procalcitonin  Collected: 03/26/24 2203  Final result  Specimen: Blood    Procalcitonin 0.19 ng/mL             03/26/24 2301  CPK  Collected: 03/26/24 2204  Final result  Specimen: Blood     High  U/L                       03/26/24 2209  ISTAT Lactate  Collected: 03/26/24 2206  Final result  Specimen: Blood    POC Lactate 1.15 mmol/L Allens Test N/A   Rate 16 DelSys Room Air   Sample VENOUS Mode SPONT   Site Other FiO2 21        Significant Imaging: I have reviewed all pertinent imaging results/findings within the past 24 hours.  Imaging Results              US Lower Extremity Veins Right (In process)    No evidence of DVT                  X-Ray Femur Ap/Lat Right (In process)    No acute findings                  X-Ray Chest AP Portable (In process)    No acute findings                 Assessment/Plan:     * SIRS (systemic inflammatory response syndrome)  This patient does have evidence of infective focus  My overall impression is  SIRS . Vital signs were reviewed and noted in progress note.  Antibiotics given-   Antibiotics (From admission, onward)      Start     Stop Route Frequency Ordered    03/27/24 0500  piperacillin-tazobactam (ZOSYN) 4.5 g in dextrose 5 % in water (D5W)  "100 mL IVPB (MB+)         -- IV Every 6 hours (non-standard times) 03/27/24 0057          Cultures were taken-   Microbiology Results (last 7 days)       Procedure Component Value Units Date/Time    Influenza A & B by Molecular [[id number]] Collected: 03/26/24 2159    Order Status: Completed Specimen: Nasopharyngeal Swab Updated: 03/26/24 2238     Influenza A, Molecular Negative     Influenza B, Molecular Negative     Flu A & B Source Nasal swab    Group A Strep, Molecular [[id number]] Collected: 03/26/24 2159    Order Status: Completed Specimen: Throat Updated: 03/26/24 2230     Group A Strep, Molecular Negative     Comment: Arcanobacterium haemolyticum and Beta Streptococcus group C   and G will not be detected by this test method.  Please order   Throat Culture ([id number]) if suspected.         Blood culture x two cultures. Draw prior to antibiotics. [[id number]] Collected: 03/26/24 2203    Order Status: Sent Specimen: Blood from Peripheral, Forearm, Left     Blood culture x two cultures. Draw prior to antibiotics. [[id number]] Collected: 03/26/24 2203    Order Status: Sent Specimen: Blood from Peripheral, Forearm, Right           Latest lactate reviewed, they are-  No results for input(s): "LACTATE" in the last 72 hours.    Organ dysfunction indicated by  none  Source- skin    Source control Achieved by- antibiotics        Cellulitis of right lower extremity  On zosyn in ED  No fluctuance or evidence of abscess noted        VTE Risk Mitigation (From admission, onward)           Ordered     enoxaparin injection 40 mg  Daily         03/27/24 0028     IP VTE HIGH RISK PATIENT  Once         03/27/24 0028     Place sequential compression device  Until discontinued         03/27/24 0028                                  SHON Reddy  Department of Hospital Medicine  [facility] - ED          "

## 2024-03-27 NOTE — FIRST PROVIDER EVALUATION
Emergency Department TeleTriage Encounter Note      CHIEF COMPLAINT    Chief Complaint   Patient presents with    Leg Swelling     Pt has swelling in left foot, leg and hip.         VITAL SIGNS   Initial Vitals [03/26/24 2139]   BP Pulse Resp Temp SpO2   139/68 (!) 111 18 (!) 102.9 °F (39.4 °C) 98 %      MAP       --            ALLERGIES    Review of patient's allergies indicates:   Allergen Reactions    Shrimp        PROVIDER TRIAGE NOTE  This is a teletriage evaluation of a 23 y.o. male presenting to the ED complaining of fever, leg pain and swelling (previous DVT) - meets sirs criteria.     Initial orders will be placed and care will be transferred to an alternate provider when patient is roomed for a full evaluation. Any additional orders and the final disposition will be determined by that provider.         ORDERS  Labs Reviewed   CULTURE, BLOOD   CULTURE, BLOOD   INFLUENZA A & B BY MOLECULAR   CBC W/ AUTO DIFFERENTIAL   COMPREHENSIVE METABOLIC PANEL   URINALYSIS, REFLEX TO URINE CULTURE   PROCALCITONIN   SARS-COV-2 RNA AMPLIFICATION, QUAL       ED Orders (720h ago, onward)      Start Ordered     Status Ordering Provider    03/27/24 0147 03/26/24 2149  POCT Venous Blood Gas (Lactate) #2  Once        Comments: This test should be used for VBGs.  If using this order for other tests (K, creatinine, HCT, PT/INR, lactate etc)  ONLY do so in the case of an emergency or rapid response.Notify Physician if: see parameters below.      Ordered ALPHONSE BOSWELL    03/26/24 2200 03/26/24 2149  lactated ringers bolus 2,286 mL  ED 1 Time         Ordered ALPHONSE BOSWELL    03/26/24 2150 03/26/24 2149  US Lower Extremity Veins Left  1 time imaging         Ordered ALPHONSE BOSWELL    03/26/24 2149 03/26/24 2149  Influenza A & B by Molecular  STAT         Ordered ALPHONSE BOSWELL    03/26/24 2149 03/26/24 2149  COVID-19 Rapid Screening  STAT         Ordered ANDREIA  ALPHONSE ANDERSON.    03/26/24 2148 03/26/24 2149  Procalcitonin  STAT         Ordered ALPHONSE BOSWELL.    03/26/24 2147 03/26/24 2149  ED Preference List Used to Initiate Sepsis Orders  Until discontinued         Ordered ALPHONSE BOSWELL.    03/26/24 2147 03/26/24 2149  Blood culture x two cultures. Draw prior to antibiotics.  Every 15 min      Comments: Aerobic and anaerobic     Order ID Start Status Ordering Provider   [id number] 03/26/24 2147 Ordered ALPHONSE BOSWELL   [id number] 03/26/24 2202 Ordered ALPHONSE BOSWELL.       Ordered ALPHONSE BOSWELL.    03/26/24 2147 03/26/24 2149  CBC auto differential  STAT         Ordered ALPHONSE BOSWELL.    03/26/24 2147 03/26/24 2149  Comprehensive metabolic panel  STAT         Ordered ALPHONSE BOSWELL.    03/26/24 2147 03/26/24 2149  Vital signs  Every 15 min        Comments: Every 15 minutes until SBP greater than 90 or MAP greater than 65, then every 30 minutes times one hour, then every one hour.    Ordered ALPHONSE BOSWELL.    03/26/24 2147 03/26/24 2149  Bed rest  Until discontinued         Ordered ALPHONSE BOSWELL.    03/26/24 2147 03/26/24 2149  Cardiac Monitoring - Adult  Continuous        Comments: Notify Physician If:    Ordered ALPHONSE BOSWELL.    03/26/24 2147 03/26/24 2149  Pulse Oximetry Continuous  Continuous         Ordered ALPHONSE BOSWELL.    03/26/24 2147 03/26/24 2149  Strict intake and output  Until discontinued         Ordered ALPHONSE BOSWELL.    03/26/24 2147 03/26/24 2149  Urinalysis, Reflex to Urine Culture Urine, Clean Catch  STAT         Ordered ALPHONSE BOSWELL.    03/26/24 2147 03/26/24 2149  Saline lock IV  Once         Ordered ALPHONSE BOSWELL.    03/26/24 2147 03/26/24 2149  EKG 12-lead  Once         Ordered ALPHONSE BOSWELL    03/26/24 2147 03/26/24 2149  X-Ray Chest AP Portable  1  time imaging         Ordered ALPHONSE BOSWELL.    03/26/24 2147 03/26/24 2149  POCT Venous Blood Gas (Lactate) #1  Once        Comments: This test should be used for VBGs.  If using this order for other tests (K, creatinine, HCT, PT/INR, lactate etc)  ONLY do so in the case of an emergency or rapid response.Notify Physician if: see parameters below.      Ordered ALPHONSE BOSWELL.    03/26/24 2147 03/26/24 2149  Possible Hospitalization  Once        Comments: For further elaboration on reason for hospitalization.    Ordered ALPHONSE BOSWELL.              Virtual Visit Note: The provider triage portion of this emergency department evaluation and documentation was performed via eEvent, a HIPAA-compliant telemedicine application, in concert with a tele-presenter in the room. A face to face patient evaluation with one of my colleagues will occur once the patient is placed in an emergency department room.      DISCLAIMER: This note was prepared with [employer] voice recognition transcription software. Garbled syntax, mangled pronouns, and other bizarre constructions may be attributed to that software system.

## 2024-03-27 NOTE — ASSESSMENT & PLAN NOTE
"This patient does have evidence of infective focus  My overall impression is  SIRS . Vital signs were reviewed and noted in progress note.  Antibiotics given-   Antibiotics (From admission, onward)      Start     Stop Route Frequency Ordered    03/27/24 0500  piperacillin-tazobactam (ZOSYN) 4.5 g in dextrose 5 % in water (D5W) 100 mL IVPB (MB+)         -- IV Every 6 hours (non-standard times) 03/27/24 0057          Cultures were taken-   Microbiology Results (last 7 days)       Procedure Component Value Units Date/Time    Influenza A & B by Molecular [[id number]] Collected: 03/26/24 2159    Order Status: Completed Specimen: Nasopharyngeal Swab Updated: 03/26/24 2238     Influenza A, Molecular Negative     Influenza B, Molecular Negative     Flu A & B Source Nasal swab    Group A Strep, Molecular [[id number]] Collected: 03/26/24 2159    Order Status: Completed Specimen: Throat Updated: 03/26/24 2230     Group A Strep, Molecular Negative     Comment: Arcanobacterium haemolyticum and Beta Streptococcus group C   and G will not be detected by this test method.  Please order   Throat Culture ([id number]) if suspected.         Blood culture x two cultures. Draw prior to antibiotics. [[id number]] Collected: 03/26/24 2203    Order Status: Sent Specimen: Blood from Peripheral, Forearm, Left     Blood culture x two cultures. Draw prior to antibiotics. [[id number]] Collected: 03/26/24 2203    Order Status: Sent Specimen: Blood from Peripheral, Forearm, Right           Latest lactate reviewed, they are-  No results for input(s): "LACTATE" in the last 72 hours.    Organ dysfunction indicated by  none  Source- skin    Source control Achieved by- antibiotics      "

## 2024-03-27 NOTE — HPI
Luca Horan is a 23 year old male with a past medical history of traumatic compartment syndrome with subsequent fasciotomy, who presented to the ED with a complaint of leg redness and fever. He states the redness and swelling have been present for a few days, and he has noticed some streaking redness to his thigh. He denies trauma or injury, and denies injecting anything into leg. He states he noticed a purplish area to the right dorsal foot as well. He denies other complaint.    ED work up included a CBC, which shows a WBC of 1499. CMP and urinalysis were unremarkable. CPK elevated at 358. Procalcitonin at 0.19. Xray's of the leg were unremarkable. US of the right lower extremity to rule out DVT were also negative. He was initiated on zosyn in the ED. Hospital Medicine consulted for admission and further management.

## 2024-03-27 NOTE — PLAN OF CARE
[facility] - ED  Initial Discharge Assessment       Primary Care Provider: No, Primary Doctor    Admission Diagnosis: SIRS (systemic inflammatory response syndrome) [R65.10]    Admission Date: 3/26/2024  Expected Discharge Date: 3/31/2024    Spoke to pt at bedside in ED. Verified facesheet. No PCP, uninsured- would like to apply for medicaid. No DME. Denies hd/dm. DC plan is home with family once medically clear. CM to follow    Transition of Care Barriers: Unisured    Payor: /     Extended Emergency Contact Information  Primary Emergency Contact: Nathaniel Izquierdo  Mobile Phone: [phone]  Relation: Sister  Preferred language: English   needed? No  Secondary Emergency Contact: Rakan Rodriguez  Mobile Phone: [phone]  Relation: Friend  Preferred language: English   needed? No    Discharge Plan A: Home with family  Discharge Plan B: Home      [employer] [id number] [address] AT [facility] & [address] [address]  Phone: [phone] Fax: [phone]      Initial Assessment (most recent)       Adult Discharge Assessment - 03/27/24 1520          Discharge Assessment    Assessment Type Discharge Planning Assessment     Confirmed/corrected address, phone number and insurance Yes     Confirmed Demographics Correct on Facesheet     Source of Information patient     People in Home parent(s)     Do you expect to return to your current living situation? Yes     Prior to hospitilization cognitive status: Alert/Oriented     Current cognitive status: Alert/Oriented     Walking or Climbing Stairs Difficulty no     Dressing/Bathing Difficulty no     Equipment Currently Used at Home none     Readmission within 30 days? No     Patient currently being followed by outpatient case management? No     Do you currently have service(s) that help you manage your care at home? No     Do you take prescription medications? No     Do you have prescription  coverage? No     How do you get to doctors appointments? car, drives self;family or friend will provide     Are you on dialysis? No     Do you take coumadin? No     Discharge Plan A Home with family     Discharge Plan B Home     DME Needed Upon Discharge  none     Discharge Plan discussed with: Patient     Transition of Care Barriers Unisured

## 2024-03-27 NOTE — NURSING
Nurses Note -- 4 Eyes      3/27/2024   6:23 PM      Skin assessed during: Admit      [x] No Altered Skin Integrity Present    []Prevention Measures Documented      [] Yes- Altered Skin Integrity Present or Discovered   [] LDA Added if Not in Epic (Describe Wound)   [] New Altered Skin Integrity was Present on Admit and Documented in LDA   [] Wound Image Taken    Wound Care Consulted? No    Attending Nurse: Rosa Harp RN    Second RN/Staff Member:     Snow Rico NP

## 2024-03-27 NOTE — ED NOTES
Pt resting in bed. Resp even and unlabored. Pt states headache is better, rates pain 3/10. Nadn. Ccm/bp/o2 monitoring remain in place. Call light within reach. Pt aware of poc.

## 2024-03-28 LAB
ALBUMIN SERPL BCP-MCNC: 3.1 G/DL (ref 3.5–5.2)
ALP SERPL-CCNC: 86 U/L (ref 55–135)
ALT SERPL W/O P-5'-P-CCNC: 28 U/L (ref 10–44)
ANION GAP SERPL CALC-SCNC: 9 MMOL/L (ref 8–16)
AST SERPL-CCNC: 20 U/L (ref 10–40)
BASOPHILS # BLD AUTO: 0.04 K/UL (ref 0–0.2)
BASOPHILS NFR BLD: 0.4 % (ref 0–1.9)
BILIRUB SERPL-MCNC: 0.3 MG/DL (ref 0.1–1)
BUN SERPL-MCNC: 11 MG/DL (ref 6–20)
CALCIUM SERPL-MCNC: 8.9 MG/DL (ref 8.7–10.5)
CHLORIDE SERPL-SCNC: 105 MMOL/L (ref 95–110)
CO2 SERPL-SCNC: 25 MMOL/L (ref 23–29)
CREAT SERPL-MCNC: 1 MG/DL (ref 0.5–1.4)
CRP SERPL-MCNC: 137.9 MG/L (ref 0–8.2)
DIFFERENTIAL METHOD BLD: ABNORMAL
EOSINOPHIL # BLD AUTO: 0.5 K/UL (ref 0–0.5)
EOSINOPHIL NFR BLD: 4.6 % (ref 0–8)
ERYTHROCYTE [DISTWIDTH] IN BLOOD BY AUTOMATED COUNT: 11.7 % (ref 11.5–14.5)
EST. GFR  (NO RACE VARIABLE): >60 ML/MIN/1.73 M^2
GLUCOSE SERPL-MCNC: 83 MG/DL (ref 70–110)
HCT VFR BLD AUTO: 41.1 % (ref 40–54)
HGB BLD-MCNC: 13.2 G/DL (ref 14–18)
IMM GRANULOCYTES # BLD AUTO: 0.02 K/UL (ref 0–0.04)
IMM GRANULOCYTES NFR BLD AUTO: 0.2 % (ref 0–0.5)
LYMPHOCYTES # BLD AUTO: 2.3 K/UL (ref 1–4.8)
LYMPHOCYTES NFR BLD: 22.6 % (ref 18–48)
MAGNESIUM SERPL-MCNC: 2 MG/DL (ref 1.6–2.6)
MCH RBC QN AUTO: 29.3 PG (ref 27–31)
MCHC RBC AUTO-ENTMCNC: 32.1 G/DL (ref 32–36)
MCV RBC AUTO: 91 FL (ref 82–98)
MONOCYTES # BLD AUTO: 1.4 K/UL (ref 0.3–1)
MONOCYTES NFR BLD: 13.7 % (ref 4–15)
NEUTROPHILS # BLD AUTO: 5.8 K/UL (ref 1.8–7.7)
NEUTROPHILS NFR BLD: 58.5 % (ref 38–73)
NRBC BLD-RTO: 0 /100 WBC
PHOSPHATE SERPL-MCNC: 3.2 MG/DL (ref 2.7–4.5)
PLATELET # BLD AUTO: 217 K/UL (ref 150–450)
PMV BLD AUTO: 9.6 FL (ref 9.2–12.9)
POTASSIUM SERPL-SCNC: 4.2 MMOL/L (ref 3.5–5.1)
PROT SERPL-MCNC: 6.8 G/DL (ref 6–8.4)
RBC # BLD AUTO: 4.5 M/UL (ref 4.6–6.2)
SODIUM SERPL-SCNC: 139 MMOL/L (ref 136–145)
VANCOMYCIN TROUGH SERPL-MCNC: 5.6 UG/ML (ref 10–22)
WBC # BLD AUTO: 9.94 K/UL (ref 3.9–12.7)

## 2024-03-28 PROCEDURE — 80053 COMPREHEN METABOLIC PANEL: CPT | Performed by: NURSE PRACTITIONER

## 2024-03-28 PROCEDURE — 63600175 PHARM REV CODE 636 W HCPCS: Performed by: NURSE PRACTITIONER

## 2024-03-28 PROCEDURE — 80202 ASSAY OF VANCOMYCIN: CPT | Performed by: INTERNAL MEDICINE

## 2024-03-28 PROCEDURE — 25000003 PHARM REV CODE 250: Performed by: INTERNAL MEDICINE

## 2024-03-28 PROCEDURE — 36415 COLL VENOUS BLD VENIPUNCTURE: CPT | Performed by: INTERNAL MEDICINE

## 2024-03-28 PROCEDURE — 63600175 PHARM REV CODE 636 W HCPCS: Performed by: INTERNAL MEDICINE

## 2024-03-28 PROCEDURE — 84100 ASSAY OF PHOSPHORUS: CPT | Performed by: NURSE PRACTITIONER

## 2024-03-28 PROCEDURE — 94761 N-INVAS EAR/PLS OXIMETRY MLT: CPT

## 2024-03-28 PROCEDURE — 83735 ASSAY OF MAGNESIUM: CPT | Performed by: NURSE PRACTITIONER

## 2024-03-28 PROCEDURE — 25000003 PHARM REV CODE 250: Performed by: NURSE PRACTITIONER

## 2024-03-28 PROCEDURE — 11000001 HC ACUTE MED/SURG PRIVATE ROOM

## 2024-03-28 PROCEDURE — 85025 COMPLETE CBC W/AUTO DIFF WBC: CPT | Performed by: NURSE PRACTITIONER

## 2024-03-28 PROCEDURE — 25000003 PHARM REV CODE 250

## 2024-03-28 PROCEDURE — 86140 C-REACTIVE PROTEIN: CPT | Performed by: INTERNAL MEDICINE

## 2024-03-28 RX ORDER — SODIUM CHLORIDE 9 MG/ML
INJECTION, SOLUTION INTRAVENOUS CONTINUOUS
Status: DISCONTINUED | OUTPATIENT
Start: 2024-03-28 | End: 2024-03-29

## 2024-03-28 RX ADMIN — PIPERACILLIN AND TAZOBACTAM 4.5 G: 4; .5 INJECTION, POWDER, FOR SOLUTION INTRAVENOUS; PARENTERAL at 10:03

## 2024-03-28 RX ADMIN — VANCOMYCIN HYDROCHLORIDE 1000 MG: 1 INJECTION, POWDER, LYOPHILIZED, FOR SOLUTION INTRAVENOUS at 11:03

## 2024-03-28 RX ADMIN — PIPERACILLIN AND TAZOBACTAM 4.5 G: 4; .5 INJECTION, POWDER, FOR SOLUTION INTRAVENOUS; PARENTERAL at 04:03

## 2024-03-28 RX ADMIN — HYDROCODONE BITARTRATE AND ACETAMINOPHEN 1 TABLET: 10; 325 TABLET ORAL at 07:03

## 2024-03-28 RX ADMIN — FAMOTIDINE 20 MG: 20 TABLET, FILM COATED ORAL at 08:03

## 2024-03-28 RX ADMIN — VANCOMYCIN HYDROCHLORIDE 1000 MG: 1 INJECTION, POWDER, LYOPHILIZED, FOR SOLUTION INTRAVENOUS at 12:03

## 2024-03-28 RX ADMIN — SODIUM CHLORIDE: 9 INJECTION, SOLUTION INTRAVENOUS at 11:03

## 2024-03-28 RX ADMIN — HYDROCODONE BITARTRATE AND ACETAMINOPHEN 1 TABLET: 10; 325 TABLET ORAL at 06:03

## 2024-03-28 NOTE — CARE UPDATE
03/28/24 0751   Patient Assessment/Suction   Level of Consciousness (AVPU) alert   Respiratory Effort Normal;Unlabored   Expansion/Accessory Muscles/Retractions expansion symmetric;no retractions;no use of accessory muscles   Rhythm/Pattern, Respiratory unlabored;pattern regular;depth regular;no shortness of breath reported   PRE-TX-O2   Device (Oxygen Therapy) room air   SpO2 98 %   Pulse Oximetry Type Intermittent   $ Pulse Oximetry - Multiple Charge Pulse Oximetry - Multiple   Pulse 75   Resp 18

## 2024-03-28 NOTE — PLAN OF CARE
Plan of care reviewed with patient. Verbalized understanding. IV intact and patent with no signs of redness or swelling. Tele in place and being monitored. Patient ambulatory in room with standby assistance to the restroom. Slight limp when ambulating. Pain managed with prn medications. Patient alert and able to make needs known. Safety maintained. Call light in reach and instructed to call for assistance. Will continue to monitor.

## 2024-03-28 NOTE — ASSESSMENT & PLAN NOTE
This patient does have evidence of infective focus  My overall impression is  SIRS . Vital signs were reviewed and noted in progress note.  Antibiotics given-   Antibiotics (From admission, onward)    Start     Stop Route Frequency Ordered    03/28/24 0000  vancomycin (VANCOCIN) 1,000 mg in dextrose 5 % (D5W) 250 mL IVPB (Vial-Mate)         -- IV Every 12 hours (non-standard times) 03/27/24 1148    03/27/24 1229  vancomycin - pharmacy to dose  (vancomycin IVPB (PEDS and ADULTS))        See Hyperspace for full Linked Orders Report.    -- IV pharmacy to manage frequency 03/27/24 1130    03/27/24 0500  piperacillin-tazobactam (ZOSYN) 4.5 g in dextrose 5 % in water (D5W) 100 mL IVPB (MB+)         -- IV Every 6 hours (non-standard times) 03/27/24 0057        Cultures were taken-   Microbiology Results (last 7 days)     Procedure Component Value Units Date/Time    Blood culture x two cultures. Draw prior to antibiotics. [[id number]] Collected: 03/26/24 2203    Order Status: Completed Specimen: Blood from Peripheral, Forearm, Left Updated: 03/27/24 1517     Blood Culture, Routine No Growth to date    Narrative:      Aerobic and anaerobic    Blood culture x two cultures. Draw prior to antibiotics. [[id number]] Collected: 03/26/24 2203    Order Status: Completed Specimen: Blood from Peripheral, Forearm, Right Updated: 03/27/24 1517     Blood Culture, Routine No Growth to date    Narrative:      Aerobic and anaerobic    Influenza A & B by Molecular [[id number]] Collected: 03/26/24 2159    Order Status: Completed Specimen: Nasopharyngeal Swab Updated: 03/26/24 2238     Influenza A, Molecular Negative     Influenza B, Molecular Negative     Flu A & B Source Nasal swab    Group A Strep, Molecular [[id number]] Collected: 03/26/24 2159    Order Status: Completed Specimen: Throat Updated: 03/26/24 2230     Group A Strep, Molecular Negative     Comment: Arcanobacterium haemolyticum and Beta Streptococcus group C   and G will not be  "detected by this test method.  Please order   Throat Culture ([id number]) if suspected.             Latest lactate reviewed, they are-  No results for input(s): "LACTATE" in the last 72 hours.    Organ dysfunction indicated by  none  Source- skin    Source control Achieved by- antibiotics      "

## 2024-03-28 NOTE — ASSESSMENT & PLAN NOTE
On IV Zosyn and Vancomycin, will continue for 1 more day. Vanco dosing as per pharmacist.  Deescalate antibiotic by tomorrow.  No fluctuance or evidence of abscess noted

## 2024-03-28 NOTE — PLAN OF CARE
POC/Meds reviewed, pt verbalized understanding. Vitals stable. Afebrile. Remains on room air. IVPB abx administered. Tele In place-NSR. Uses bathroom independently. IS at bedside, instructed on use and return demonstration performed. No complaints of pain. Repositions self. Hourly/Q2hr rounding performed, safety maintained. Bed in lowest position, wheels locked, SR up x2, call light in easy reach. No complaints at this time. Will continue to monitor.

## 2024-03-28 NOTE — PROGRESS NOTES
[facility] Medicine  Progress Note    Patient Name: Agus Horan  MRN: [id number]  Patient Class: IP- Inpatient   Admission Date: 3/26/2024  Length of Stay: 1 days  Attending Physician: Owen Zamoarno MD  Primary Care Provider: Suyapa, Primary Doctor        Subjective:     Principal Problem:SIRS (systemic inflammatory response syndrome)        HPI:  Luca Horan is a 23 year old male with a past medical history of traumatic compartment syndrome with subsequent fasciotomy, who presented to the ED with a complaint of leg redness and fever. He states the redness and swelling have been present for a few days, and he has noticed some streaking redness to his thigh. He denies trauma or injury, and denies injecting anything into leg. He states he noticed a purplish area to the right dorsal foot as well. He denies other complaint.    ED work up included a CBC, which shows a WBC of 1499. CMP and urinalysis were unremarkable. CPK elevated at 358. Procalcitonin at 0.19. Xray's of the leg were unremarkable. US of the right lower extremity to rule out DVT were also negative. He was initiated on zosyn in the ED. Hospital Medicine consulted for admission and further management.    Overview/Hospital Course:  No notes on file    Interval History:  Leg cellulitis appears somewhat better compared to previous day.  Patient receiving intravenous vancomycin and Zosyn antibiotics.  Currently afebrile.  Patient reports improving pain.    Review of Systems   Constitutional:  Positive for chills and fever.   HENT:  Negative for congestion and sore throat.    Eyes:  Negative for visual disturbance.   Respiratory:  Negative for cough and shortness of breath.    Cardiovascular:  Negative for chest pain and palpitations.   Gastrointestinal:  Negative for abdominal pain, constipation, diarrhea, nausea and vomiting.   Endocrine: Negative for cold intolerance and heat intolerance.   Genitourinary:  Negative for dysuria  and hematuria.   Musculoskeletal:  Positive for arthralgias and myalgias.   Skin:  Positive for color change and wound. Negative for rash.   Neurological:  Negative for tremors and seizures.   Hematological:  Negative for adenopathy. Does not bruise/bleed easily.   All other systems reviewed and are negative.    Objective:     Vital Signs (Most Recent):  Temp: 97.4 °F (36.3 °C) (03/28/24 0414)  Pulse: 75 (03/28/24 0751)  Resp: 18 (03/28/24 0751)  BP: 137/82 (03/28/24 0414)  SpO2: 98 % (03/28/24 0751) Vital Signs (24h Range):  Temp:  [97.4 °F (36.3 °C)-98.9 °F (37.2 °C)] 97.4 °F (36.3 °C)  Pulse:  [62-98] 75  Resp:  [16-18] 18  SpO2:  [97 %-99 %] 98 %  BP: (112-179)/(65-84) 137/82     Weight: 76.2 kg (168 lb)  Body mass index is 26.31 kg/m².    Intake/Output Summary (Last 24 hours) at 3/28/2024 0848  Last data filed at 3/27/2024 1826  Gross per 24 hour   Intake 257.38 ml   Output --   Net 257.38 ml         Physical Exam  Vitals and nursing note reviewed.   Constitutional:       General: He is awake. He is not in acute distress.     Appearance: Normal appearance. He is well-developed. He is not ill-appearing.   HENT:      Head: Normocephalic and atraumatic.      Nose: Nose normal. No septal deviation.   Eyes:      Conjunctiva/sclera: Conjunctivae normal.      Pupils: Pupils are equal, round, and reactive to light.   Neck:      Thyroid: No thyroid mass.      Vascular: No JVD.      Trachea: No tracheal tenderness or tracheal deviation.   Cardiovascular:      Rate and Rhythm: Normal rate and regular rhythm.      Heart sounds: Normal heart sounds, S1 normal and S2 normal. No murmur heard.     No friction rub. No gallop.   Pulmonary:      Effort: Pulmonary effort is normal.      Breath sounds: Normal breath sounds. No decreased breath sounds, wheezing, rhonchi or rales.   Abdominal:      General: Bowel sounds are normal. There is no distension.      Palpations: Abdomen is soft. There is no hepatomegaly, splenomegaly or  mass.      Tenderness: There is no abdominal tenderness.   Skin:     General: Skin is warm and dry.      Capillary Refill: Capillary refill takes less than 2 seconds.      Findings: Ecchymosis and erythema present. No rash.          Neurological:      General: No focal deficit present.      Mental Status: He is alert and oriented to person, place, and time.      Cranial Nerves: No cranial nerve deficit.      Sensory: No sensory deficit.   Psychiatric:         Mood and Affect: Mood normal.         Behavior: Behavior normal. Behavior is cooperative.             Significant Labs: All pertinent labs within the past 24 hours have been reviewed.  CBC:   Recent Labs   Lab 03/26/24 2203 03/27/24 0451 03/28/24  0513   WBC 14.99* 13.78* 9.94   HGB 14.2 13.0* 13.2*   HCT 43.6 40.0 41.1    207 217     CMP:   Recent Labs   Lab 03/26/24 2204 03/27/24 0451 03/28/24  0512    136 139   K 3.5 3.7 4.2    106 105   CO2 24 21* 25   GLU 85 113* 83   BUN 16 15 11   CREATININE 1.2 1.1 1.0   CALCIUM 9.5 8.8 8.9   PROT 7.7 6.7 6.8   ALBUMIN 3.9 3.3* 3.1*   BILITOT 0.4 0.6 0.3   ALKPHOS 107 95 86   AST 30 28 20   ALT 34 29 28   ANIONGAP 12 9 9     Microbiology Results (last 7 days)       Procedure Component Value Units Date/Time    Blood culture x two cultures. Draw prior to antibiotics. [[id number]] Collected: 03/26/24 2203    Order Status: Completed Specimen: Blood from Peripheral, Forearm, Left Updated: 03/27/24 1517     Blood Culture, Routine No Growth to date    Narrative:      Aerobic and anaerobic    Blood culture x two cultures. Draw prior to antibiotics. [[id number]] Collected: 03/26/24 2203    Order Status: Completed Specimen: Blood from Peripheral, Forearm, Right Updated: 03/27/24 1517     Blood Culture, Routine No Growth to date    Narrative:      Aerobic and anaerobic    Influenza A & B by Molecular [[id number]] Collected: 03/26/24 2159    Order Status: Completed Specimen: Nasopharyngeal Swab Updated:  03/26/24 2238     Influenza A, Molecular Negative     Influenza B, Molecular Negative     Flu A & B Source Nasal swab    Group A Strep, Molecular [[id number]] Collected: 03/26/24 2159    Order Status: Completed Specimen: Throat Updated: 03/26/24 2230     Group A Strep, Molecular Negative     Comment: Arcanobacterium haemolyticum and Beta Streptococcus group C   and G will not be detected by this test method.  Please order   Throat Culture ([id number]) if suspected.               Significant Imaging:   Imaging Results              US Lower Extremity Veins Right (Final result)  Result time 03/27/24 08:24:43      Final result by Terrance Talbert MD (03/27/24 08:24:43)                   Impression:      No evidence of deep venous thrombosis in the right lower extremity.    The preliminary and final reports are concordant.      Electronically signed by: Terrance Talbert  Date:    03/27/2024  Time:    08:24               Narrative:    EXAMINATION:  US LOWER EXTREMITY VEINS RIGHT    CLINICAL HISTORY:  Other specified soft tissue disorders    TECHNIQUE:  Duplex and color flow Doppler evaluation and graded compression of the right lower extremity veins was performed.    COMPARISON:  05/01/2022    FINDINGS:  Right thigh veins: The common femoral, femoral, popliteal, upper greater saphenous, and deep femoral veins are patent and free of thrombus. The veins are normally compressible and have normal phasic flow and augmentation response.    Right calf veins: The visualized calf veins are patent.    Contralateral CFV: The contralateral (left) common femoral vein is patent and free of thrombus.    Miscellaneous: None                                       X-Ray Femur Ap/Lat Right (Final result)  Result time 03/27/24 08:24:03      Final result by Terrance Talbert MD (03/27/24 08:24:03)                   Impression:      As above.      Electronically signed by: Terrance Talbert  Date:    03/27/2024  Time:    08:24               Narrative:     EXAMINATION:  XR FEMUR 2 VIEW RIGHT    CLINICAL HISTORY:  Other specified soft tissue disorders    TECHNIQUE:  AP and lateral views of the left femur were performed.    COMPARISON:  04/10/2022    FINDINGS:  No fracture or dislocation.  No lytic or blastic lesion.  Soft tissues are unremarkable.                                       X-Ray Chest AP Portable (Final result)  Result time 03/27/24 08:22:59      Final result by Terrance Talbert MD (03/27/24 08:22:59)                   Impression:      No acute radiographic abnormality in the chest.      Electronically signed by: Terrance Talbert  Date:    03/27/2024  Time:    08:22               Narrative:    EXAMINATION:  XR CHEST AP PORTABLE    CLINICAL HISTORY:  Sepsis;.    TECHNIQUE:  Single frontal portable view of the chest was performed.    COMPARISON:  05/11/2022    FINDINGS:  Cardiomediastinal silhouette is within normal limits.Lungs appear grossly clear.  No definite focal consolidation, pleural effusion, or pneumothorax.  Bones are intact.                                      Assessment/Plan:      * SIRS (systemic inflammatory response syndrome)  This patient does have evidence of infective focus  My overall impression is  SIRS . Vital signs were reviewed and noted in progress note.  Antibiotics given-   Antibiotics (From admission, onward)      Start     Stop Route Frequency Ordered    03/28/24 0000  vancomycin (VANCOCIN) 1,000 mg in dextrose 5 % (D5W) 250 mL IVPB (Vial-Mate)         -- IV Every 12 hours (non-standard times) 03/27/24 1148    03/27/24 1229  vancomycin - pharmacy to dose  (vancomycin IVPB (PEDS and ADULTS))        See Hyperspace for full Linked Orders Report.    -- IV pharmacy to manage frequency 03/27/24 1130    03/27/24 0500  piperacillin-tazobactam (ZOSYN) 4.5 g in dextrose 5 % in water (D5W) 100 mL IVPB (MB+)         -- IV Every 6 hours (non-standard times) 03/27/24 0057          Cultures were taken-   Microbiology Results (last 7 days)        "Procedure Component Value Units Date/Time    Blood culture x two cultures. Draw prior to antibiotics. [[id number]] Collected: 03/26/24 2203    Order Status: Completed Specimen: Blood from Peripheral, Forearm, Left Updated: 03/27/24 1517     Blood Culture, Routine No Growth to date    Narrative:      Aerobic and anaerobic    Blood culture x two cultures. Draw prior to antibiotics. [[id number]] Collected: 03/26/24 2203    Order Status: Completed Specimen: Blood from Peripheral, Forearm, Right Updated: 03/27/24 1517     Blood Culture, Routine No Growth to date    Narrative:      Aerobic and anaerobic    Influenza A & B by Molecular [[id number]] Collected: 03/26/24 2159    Order Status: Completed Specimen: Nasopharyngeal Swab Updated: 03/26/24 2238     Influenza A, Molecular Negative     Influenza B, Molecular Negative     Flu A & B Source Nasal swab    Group A Strep, Molecular [[id number]] Collected: 03/26/24 2159    Order Status: Completed Specimen: Throat Updated: 03/26/24 2230     Group A Strep, Molecular Negative     Comment: Arcanobacterium haemolyticum and Beta Streptococcus group C   and G will not be detected by this test method.  Please order   Throat Culture ([id number]) if suspected.               Latest lactate reviewed, they are-  No results for input(s): "LACTATE" in the last 72 hours.    Organ dysfunction indicated by  none  Source- skin    Source control Achieved by- antibiotics        Cellulitis of right lower extremity  On IV Zosyn and Vancomycin, will continue for 1 more day. Vanco dosing as per pharmacist.  Deescalate antibiotic by tomorrow.  No fluctuance or evidence of abscess noted      Patient encouraged to keep affected lower extremity elevated.  Discussed with  regarding discharge planning.  VTE Risk Mitigation (From admission, onward)           Ordered     enoxaparin injection 40 mg  Daily         03/27/24 0028     IP VTE HIGH RISK PATIENT  Once         03/27/24 0028     Place " sequential compression device  Until discontinued         03/27/24 0028                    Discharge Planning   JARAD: 3/30/2024     Code Status: Full Code   Is the patient medically ready for discharge?:     Reason for patient still in hospital (select all that apply): Patient trending condition  Discharge Plan A: Home with family                  Owen Zamorano MD  Department of Hospital Medicine   [place]/Surg

## 2024-03-28 NOTE — SUBJECTIVE & OBJECTIVE
Interval History:  Leg cellulitis appears somewhat better compared to previous day.  Patient receiving intravenous vancomycin and Zosyn antibiotics.  Currently afebrile.  Patient reports improving pain.    Review of Systems   Constitutional:  Positive for chills and fever.   HENT:  Negative for congestion and sore throat.    Eyes:  Negative for visual disturbance.   Respiratory:  Negative for cough and shortness of breath.    Cardiovascular:  Negative for chest pain and palpitations.   Gastrointestinal:  Negative for abdominal pain, constipation, diarrhea, nausea and vomiting.   Endocrine: Negative for cold intolerance and heat intolerance.   Genitourinary:  Negative for dysuria and hematuria.   Musculoskeletal:  Positive for arthralgias and myalgias.   Skin:  Positive for color change and wound. Negative for rash.   Neurological:  Negative for tremors and seizures.   Hematological:  Negative for adenopathy. Does not bruise/bleed easily.   All other systems reviewed and are negative.    Objective:     Vital Signs (Most Recent):  Temp: 97.4 °F (36.3 °C) (03/28/24 0414)  Pulse: 75 (03/28/24 0751)  Resp: 18 (03/28/24 0751)  BP: 137/82 (03/28/24 0414)  SpO2: 98 % (03/28/24 0751) Vital Signs (24h Range):  Temp:  [97.4 °F (36.3 °C)-98.9 °F (37.2 °C)] 97.4 °F (36.3 °C)  Pulse:  [62-98] 75  Resp:  [16-18] 18  SpO2:  [97 %-99 %] 98 %  BP: (112-179)/(65-84) 137/82     Weight: 76.2 kg (168 lb)  Body mass index is 26.31 kg/m².    Intake/Output Summary (Last 24 hours) at 3/28/2024 0848  Last data filed at 3/27/2024 1826  Gross per 24 hour   Intake 257.38 ml   Output --   Net 257.38 ml         Physical Exam  Vitals and nursing note reviewed.   Constitutional:       General: He is awake. He is not in acute distress.     Appearance: Normal appearance. He is well-developed. He is not ill-appearing.   HENT:      Head: Normocephalic and atraumatic.      Nose: Nose normal. No septal deviation.   Eyes:      Conjunctiva/sclera:  Conjunctivae normal.      Pupils: Pupils are equal, round, and reactive to light.   Neck:      Thyroid: No thyroid mass.      Vascular: No JVD.      Trachea: No tracheal tenderness or tracheal deviation.   Cardiovascular:      Rate and Rhythm: Normal rate and regular rhythm.      Heart sounds: Normal heart sounds, S1 normal and S2 normal. No murmur heard.     No friction rub. No gallop.   Pulmonary:      Effort: Pulmonary effort is normal.      Breath sounds: Normal breath sounds. No decreased breath sounds, wheezing, rhonchi or rales.   Abdominal:      General: Bowel sounds are normal. There is no distension.      Palpations: Abdomen is soft. There is no hepatomegaly, splenomegaly or mass.      Tenderness: There is no abdominal tenderness.   Skin:     General: Skin is warm and dry.      Capillary Refill: Capillary refill takes less than 2 seconds.      Findings: Ecchymosis and erythema present. No rash.          Neurological:      General: No focal deficit present.      Mental Status: He is alert and oriented to person, place, and time.      Cranial Nerves: No cranial nerve deficit.      Sensory: No sensory deficit.   Psychiatric:         Mood and Affect: Mood normal.         Behavior: Behavior normal. Behavior is cooperative.             Significant Labs: All pertinent labs within the past 24 hours have been reviewed.  CBC:   Recent Labs   Lab 03/26/24  2203 03/27/24  0451 03/28/24  0513   WBC 14.99* 13.78* 9.94   HGB 14.2 13.0* 13.2*   HCT 43.6 40.0 41.1    207 217     CMP:   Recent Labs   Lab 03/26/24  2204 03/27/24  0451 03/28/24  0512    136 139   K 3.5 3.7 4.2    106 105   CO2 24 21* 25   GLU 85 113* 83   BUN 16 15 11   CREATININE 1.2 1.1 1.0   CALCIUM 9.5 8.8 8.9   PROT 7.7 6.7 6.8   ALBUMIN 3.9 3.3* 3.1*   BILITOT 0.4 0.6 0.3   ALKPHOS 107 95 86   AST 30 28 20   ALT 34 29 28   ANIONGAP 12 9 9     Microbiology Results (last 7 days)       Procedure Component Value Units Date/Time    Blood  culture x two cultures. Draw prior to antibiotics. [[id number]] Collected: 03/26/24 2203    Order Status: Completed Specimen: Blood from Peripheral, Forearm, Left Updated: 03/27/24 1517     Blood Culture, Routine No Growth to date    Narrative:      Aerobic and anaerobic    Blood culture x two cultures. Draw prior to antibiotics. [[id number]] Collected: 03/26/24 2203    Order Status: Completed Specimen: Blood from Peripheral, Forearm, Right Updated: 03/27/24 1517     Blood Culture, Routine No Growth to date    Narrative:      Aerobic and anaerobic    Influenza A & B by Molecular [[id number]] Collected: 03/26/24 2159    Order Status: Completed Specimen: Nasopharyngeal Swab Updated: 03/26/24 2238     Influenza A, Molecular Negative     Influenza B, Molecular Negative     Flu A & B Source Nasal swab    Group A Strep, Molecular [[id number]] Collected: 03/26/24 2159    Order Status: Completed Specimen: Throat Updated: 03/26/24 2230     Group A Strep, Molecular Negative     Comment: Arcanobacterium haemolyticum and Beta Streptococcus group C   and G will not be detected by this test method.  Please order   Throat Culture ([id number]) if suspected.               Significant Imaging:   Imaging Results              US Lower Extremity Veins Right (Final result)  Result time 03/27/24 08:24:43      Final result by Terrance Talbert MD (03/27/24 08:24:43)                   Impression:      No evidence of deep venous thrombosis in the right lower extremity.    The preliminary and final reports are concordant.      Electronically signed by: Terrance Talbert  Date:    03/27/2024  Time:    08:24               Narrative:    EXAMINATION:  US LOWER EXTREMITY VEINS RIGHT    CLINICAL HISTORY:  Other specified soft tissue disorders    TECHNIQUE:  Duplex and color flow Doppler evaluation and graded compression of the right lower extremity veins was performed.    COMPARISON:  05/01/2022    FINDINGS:  Right thigh veins: The common femoral, femoral,  popliteal, upper greater saphenous, and deep femoral veins are patent and free of thrombus. The veins are normally compressible and have normal phasic flow and augmentation response.    Right calf veins: The visualized calf veins are patent.    Contralateral CFV: The contralateral (left) common femoral vein is patent and free of thrombus.    Miscellaneous: None                                       X-Ray Femur Ap/Lat Right (Final result)  Result time 03/27/24 08:24:03      Final result by Terrance Talbert MD (03/27/24 08:24:03)                   Impression:      As above.      Electronically signed by: Terrance Talbert  Date:    03/27/2024  Time:    08:24               Narrative:    EXAMINATION:  XR FEMUR 2 VIEW RIGHT    CLINICAL HISTORY:  Other specified soft tissue disorders    TECHNIQUE:  AP and lateral views of the left femur were performed.    COMPARISON:  04/10/2022    FINDINGS:  No fracture or dislocation.  No lytic or blastic lesion.  Soft tissues are unremarkable.                                       X-Ray Chest AP Portable (Final result)  Result time 03/27/24 08:22:59      Final result by Terrance Talbert MD (03/27/24 08:22:59)                   Impression:      No acute radiographic abnormality in the chest.      Electronically signed by: Terrance Talbert  Date:    03/27/2024  Time:    08:22               Narrative:    EXAMINATION:  XR CHEST AP PORTABLE    CLINICAL HISTORY:  Sepsis;.    TECHNIQUE:  Single frontal portable view of the chest was performed.    COMPARISON:  05/11/2022    FINDINGS:  Cardiomediastinal silhouette is within normal limits.Lungs appear grossly clear.  No definite focal consolidation, pleural effusion, or pneumothorax.  Bones are intact.

## 2024-03-28 NOTE — PROGRESS NOTES
[facility] Medicine  Progress Note    Patient Name: Agus Horan  MRN: [id number]  Patient Class: IP- Inpatient   Admission Date: 3/26/2024  Length of Stay: 1 days  Attending Physician: Owen Zamorano MD  Primary Care Provider: Suyapa, Primary Doctor        Subjective:     Principal Problem:SIRS (systemic inflammatory response syndrome)        HPI:  Luca Horan is a 23 year old male with a past medical history of traumatic compartment syndrome with subsequent fasciotomy, who presented to the ED with a complaint of leg redness and fever. He states the redness and swelling have been present for a few days, and he has noticed some streaking redness to his thigh. He denies trauma or injury, and denies injecting anything into leg. He states he noticed a purplish area to the right dorsal foot as well. He denies other complaint.    ED work up included a CBC, which shows a WBC of 1499. CMP and urinalysis were unremarkable. CPK elevated at 358. Procalcitonin at 0.19. Xray's of the leg were unremarkable. US of the right lower extremity to rule out DVT were also negative. He was initiated on zosyn in the ED. Hospital Medicine consulted for admission and further management.    Overview/Hospital Course:  No notes on file    Interval History:     Review of Systems   Constitutional:  Positive for chills and fever.   HENT:  Negative for congestion and sore throat.    Eyes:  Negative for visual disturbance.   Respiratory:  Negative for cough and shortness of breath.    Cardiovascular:  Negative for chest pain and palpitations.   Gastrointestinal:  Negative for abdominal pain, constipation, diarrhea, nausea and vomiting.   Endocrine: Negative for cold intolerance and heat intolerance.   Genitourinary:  Negative for dysuria and hematuria.   Musculoskeletal:  Positive for arthralgias and myalgias.   Skin:  Positive for color change and wound. Negative for rash.   Neurological:  Negative for tremors and  seizures.   Hematological:  Negative for adenopathy. Does not bruise/bleed easily.   All other systems reviewed and are negative.    Objective:     Vital Signs (Most Recent):  Temp: 97.4 °F (36.3 °C) (03/28/24 0414)  Pulse: 75 (03/28/24 0751)  Resp: 18 (03/28/24 0751)  BP: 137/82 (03/28/24 0414)  SpO2: 98 % (03/28/24 0751) Vital Signs (24h Range):  Temp:  [97.4 °F (36.3 °C)-98.9 °F (37.2 °C)] 97.4 °F (36.3 °C)  Pulse:  [62-98] 75  Resp:  [16-18] 18  SpO2:  [97 %-99 %] 98 %  BP: (112-179)/(65-84) 137/82     Weight: 76.2 kg (168 lb)  Body mass index is 26.31 kg/m².    Intake/Output Summary (Last 24 hours) at 3/28/2024 0848  Last data filed at 3/27/2024 1826  Gross per 24 hour   Intake 257.38 ml   Output --   Net 257.38 ml         Physical Exam  Vitals and nursing note reviewed.   Constitutional:       General: He is awake. He is not in acute distress.     Appearance: Normal appearance. He is well-developed. He is not ill-appearing.   HENT:      Head: Normocephalic and atraumatic.      Nose: Nose normal. No septal deviation.   Eyes:      Conjunctiva/sclera: Conjunctivae normal.      Pupils: Pupils are equal, round, and reactive to light.   Neck:      Thyroid: No thyroid mass.      Vascular: No JVD.      Trachea: No tracheal tenderness or tracheal deviation.   Cardiovascular:      Rate and Rhythm: Normal rate and regular rhythm.      Heart sounds: Normal heart sounds, S1 normal and S2 normal. No murmur heard.     No friction rub. No gallop.   Pulmonary:      Effort: Pulmonary effort is normal.      Breath sounds: Normal breath sounds. No decreased breath sounds, wheezing, rhonchi or rales.   Abdominal:      General: Bowel sounds are normal. There is no distension.      Palpations: Abdomen is soft. There is no hepatomegaly, splenomegaly or mass.      Tenderness: There is no abdominal tenderness.   Skin:     General: Skin is warm and dry.      Capillary Refill: Capillary refill takes less than 2 seconds.      Findings:  Ecchymosis and erythema present. No rash.          Neurological:      General: No focal deficit present.      Mental Status: He is alert and oriented to person, place, and time.      Cranial Nerves: No cranial nerve deficit.      Sensory: No sensory deficit.   Psychiatric:         Mood and Affect: Mood normal.         Behavior: Behavior normal. Behavior is cooperative.             Significant Labs: All pertinent labs within the past 24 hours have been reviewed.  CBC:   Recent Labs   Lab 03/26/24 2203 03/27/24 0451 03/28/24  0513   WBC 14.99* 13.78* 9.94   HGB 14.2 13.0* 13.2*   HCT 43.6 40.0 41.1    207 217     CMP:   Recent Labs   Lab 03/26/24 2204 03/27/24 0451 03/28/24  0512    136 139   K 3.5 3.7 4.2    106 105   CO2 24 21* 25   GLU 85 113* 83   BUN 16 15 11   CREATININE 1.2 1.1 1.0   CALCIUM 9.5 8.8 8.9   PROT 7.7 6.7 6.8   ALBUMIN 3.9 3.3* 3.1*   BILITOT 0.4 0.6 0.3   ALKPHOS 107 95 86   AST 30 28 20   ALT 34 29 28   ANIONGAP 12 9 9     Microbiology Results (last 7 days)       Procedure Component Value Units Date/Time    Blood culture x two cultures. Draw prior to antibiotics. [[id number]] Collected: 03/26/24 2203    Order Status: Completed Specimen: Blood from Peripheral, Forearm, Left Updated: 03/27/24 1517     Blood Culture, Routine No Growth to date    Narrative:      Aerobic and anaerobic    Blood culture x two cultures. Draw prior to antibiotics. [[id number]] Collected: 03/26/24 2203    Order Status: Completed Specimen: Blood from Peripheral, Forearm, Right Updated: 03/27/24 1517     Blood Culture, Routine No Growth to date    Narrative:      Aerobic and anaerobic    Influenza A & B by Molecular [[id number]] Collected: 03/26/24 2159    Order Status: Completed Specimen: Nasopharyngeal Swab Updated: 03/26/24 2238     Influenza A, Molecular Negative     Influenza B, Molecular Negative     Flu A & B Source Nasal swab    Group A Strep, Molecular [[id number]] Collected: 03/26/24 2159     Order Status: Completed Specimen: Throat Updated: 03/26/24 2230     Group A Strep, Molecular Negative     Comment: Arcanobacterium haemolyticum and Beta Streptococcus group C   and G will not be detected by this test method.  Please order   Throat Culture ([id number]) if suspected.               Significant Imaging:   Imaging Results              US Lower Extremity Veins Right (Final result)  Result time 03/27/24 08:24:43      Final result by Terrance Talbert MD (03/27/24 08:24:43)                   Impression:      No evidence of deep venous thrombosis in the right lower extremity.    The preliminary and final reports are concordant.      Electronically signed by: Terrance Talbert  Date:    03/27/2024  Time:    08:24               Narrative:    EXAMINATION:  US LOWER EXTREMITY VEINS RIGHT    CLINICAL HISTORY:  Other specified soft tissue disorders    TECHNIQUE:  Duplex and color flow Doppler evaluation and graded compression of the right lower extremity veins was performed.    COMPARISON:  05/01/2022    FINDINGS:  Right thigh veins: The common femoral, femoral, popliteal, upper greater saphenous, and deep femoral veins are patent and free of thrombus. The veins are normally compressible and have normal phasic flow and augmentation response.    Right calf veins: The visualized calf veins are patent.    Contralateral CFV: The contralateral (left) common femoral vein is patent and free of thrombus.    Miscellaneous: None                                       X-Ray Femur Ap/Lat Right (Final result)  Result time 03/27/24 08:24:03      Final result by Terrance Talbert MD (03/27/24 08:24:03)                   Impression:      As above.      Electronically signed by: Terrance Talbert  Date:    03/27/2024  Time:    08:24               Narrative:    EXAMINATION:  XR FEMUR 2 VIEW RIGHT    CLINICAL HISTORY:  Other specified soft tissue disorders    TECHNIQUE:  AP and lateral views of the left femur were  performed.    COMPARISON:  04/10/2022    FINDINGS:  No fracture or dislocation.  No lytic or blastic lesion.  Soft tissues are unremarkable.                                       X-Ray Chest AP Portable (Final result)  Result time 03/27/24 08:22:59      Final result by Terrance Talbert MD (03/27/24 08:22:59)                   Impression:      No acute radiographic abnormality in the chest.      Electronically signed by: Terrance Talbert  Date:    03/27/2024  Time:    08:22               Narrative:    EXAMINATION:  XR CHEST AP PORTABLE    CLINICAL HISTORY:  Sepsis;.    TECHNIQUE:  Single frontal portable view of the chest was performed.    COMPARISON:  05/11/2022    FINDINGS:  Cardiomediastinal silhouette is within normal limits.Lungs appear grossly clear.  No definite focal consolidation, pleural effusion, or pneumothorax.  Bones are intact.                                      Assessment/Plan:      * SIRS (systemic inflammatory response syndrome)  This patient does have evidence of infective focus  My overall impression is  SIRS . Vital signs were reviewed and noted in progress note.  Antibiotics given-   Antibiotics (From admission, onward)      Start     Stop Route Frequency Ordered    03/28/24 0000  vancomycin (VANCOCIN) 1,000 mg in dextrose 5 % (D5W) 250 mL IVPB (Vial-Mate)         -- IV Every 12 hours (non-standard times) 03/27/24 1148    03/27/24 1229  vancomycin - pharmacy to dose  (vancomycin IVPB (PEDS and ADULTS))        See Hyperspace for full Linked Orders Report.    -- IV pharmacy to manage frequency 03/27/24 1130    03/27/24 0500  piperacillin-tazobactam (ZOSYN) 4.5 g in dextrose 5 % in water (D5W) 100 mL IVPB (MB+)         -- IV Every 6 hours (non-standard times) 03/27/24 0057          Cultures were taken-   Microbiology Results (last 7 days)       Procedure Component Value Units Date/Time    Blood culture x two cultures. Draw prior to antibiotics. [[id number]] Collected: 03/26/24 2203    Order Status:  "Completed Specimen: Blood from Peripheral, Forearm, Left Updated: 03/27/24 1517     Blood Culture, Routine No Growth to date    Narrative:      Aerobic and anaerobic    Blood culture x two cultures. Draw prior to antibiotics. [[id number]] Collected: 03/26/24 2203    Order Status: Completed Specimen: Blood from Peripheral, Forearm, Right Updated: 03/27/24 1517     Blood Culture, Routine No Growth to date    Narrative:      Aerobic and anaerobic    Influenza A & B by Molecular [[id number]] Collected: 03/26/24 2159    Order Status: Completed Specimen: Nasopharyngeal Swab Updated: 03/26/24 2238     Influenza A, Molecular Negative     Influenza B, Molecular Negative     Flu A & B Source Nasal swab    Group A Strep, Molecular [[id number]] Collected: 03/26/24 2159    Order Status: Completed Specimen: Throat Updated: 03/26/24 2230     Group A Strep, Molecular Negative     Comment: Arcanobacterium haemolyticum and Beta Streptococcus group C   and G will not be detected by this test method.  Please order   Throat Culture ([id number]) if suspected.               Latest lactate reviewed, they are-  No results for input(s): "LACTATE" in the last 72 hours.    Organ dysfunction indicated by  none  Source- skin    Source control Achieved by- antibiotics        Cellulitis of right lower extremity  On IV Zosyn and Vancomycin. Vanco dosing as per pharmacist.   No fluctuance or evidence of abscess noted        VTE Risk Mitigation (From admission, onward)           Ordered     enoxaparin injection 40 mg  Daily         03/27/24 0028     IP VTE HIGH RISK PATIENT  Once         03/27/24 0028     Place sequential compression device  Until discontinued         03/27/24 0028                    Discharge Planning   JARAD: 3/31/2024     Code Status: Full Code   Is the patient medically ready for discharge?:     Reason for patient still in hospital (select all that apply): Patient trending condition and Consult recommendations  Discharge Plan A: Home " with family        Owen Zamorano MD  Department of Hospital Medicine   [facility]/Surg

## 2024-03-28 NOTE — CARE UPDATE
03/27/24 1947   Patient Assessment/Suction   Level of Consciousness (AVPU) alert   Respiratory Effort Normal   Expansion/Accessory Muscles/Retractions no retractions;no use of accessory muscles   Rhythm/Pattern, Respiratory unlabored   PRE-TX-O2   Device (Oxygen Therapy) room air   SpO2 98 %   Pulse Oximetry Type Intermittent   $ Pulse Oximetry - Multiple Charge Pulse Oximetry - Multiple   Pulse 87   Resp 18

## 2024-03-29 VITALS
HEIGHT: 67 IN | TEMPERATURE: 98 F | RESPIRATION RATE: 17 BRPM | WEIGHT: 168 LBS | OXYGEN SATURATION: 98 % | BODY MASS INDEX: 26.37 KG/M2 | SYSTOLIC BLOOD PRESSURE: 140 MMHG | DIASTOLIC BLOOD PRESSURE: 79 MMHG | HEART RATE: 82 BPM

## 2024-03-29 PROBLEM — A41.9 SEPSIS: Status: ACTIVE | Noted: 2024-03-26

## 2024-03-29 LAB
ALBUMIN SERPL BCP-MCNC: 3.3 G/DL (ref 3.5–5.2)
ALP SERPL-CCNC: 84 U/L (ref 55–135)
ALT SERPL W/O P-5'-P-CCNC: 30 U/L (ref 10–44)
ANION GAP SERPL CALC-SCNC: 9 MMOL/L (ref 8–16)
AST SERPL-CCNC: 25 U/L (ref 10–40)
BASOPHILS # BLD AUTO: 0.05 K/UL (ref 0–0.2)
BASOPHILS NFR BLD: 0.5 % (ref 0–1.9)
BILIRUB SERPL-MCNC: 0.2 MG/DL (ref 0.1–1)
BUN SERPL-MCNC: 15 MG/DL (ref 6–20)
CALCIUM SERPL-MCNC: 9 MG/DL (ref 8.7–10.5)
CHLORIDE SERPL-SCNC: 104 MMOL/L (ref 95–110)
CO2 SERPL-SCNC: 26 MMOL/L (ref 23–29)
CREAT SERPL-MCNC: 1 MG/DL (ref 0.5–1.4)
DIFFERENTIAL METHOD BLD: ABNORMAL
EOSINOPHIL # BLD AUTO: 0.5 K/UL (ref 0–0.5)
EOSINOPHIL NFR BLD: 5.5 % (ref 0–8)
ERYTHROCYTE [DISTWIDTH] IN BLOOD BY AUTOMATED COUNT: 11.8 % (ref 11.5–14.5)
EST. GFR  (NO RACE VARIABLE): >60 ML/MIN/1.73 M^2
GLUCOSE SERPL-MCNC: 102 MG/DL (ref 70–110)
HCT VFR BLD AUTO: 40.9 % (ref 40–54)
HGB BLD-MCNC: 13.3 G/DL (ref 14–18)
IMM GRANULOCYTES # BLD AUTO: 0.04 K/UL (ref 0–0.04)
IMM GRANULOCYTES NFR BLD AUTO: 0.4 % (ref 0–0.5)
LYMPHOCYTES # BLD AUTO: 2.2 K/UL (ref 1–4.8)
LYMPHOCYTES NFR BLD: 24.1 % (ref 18–48)
MAGNESIUM SERPL-MCNC: 2 MG/DL (ref 1.6–2.6)
MCH RBC QN AUTO: 29.2 PG (ref 27–31)
MCHC RBC AUTO-ENTMCNC: 32.5 G/DL (ref 32–36)
MCV RBC AUTO: 90 FL (ref 82–98)
MONOCYTES # BLD AUTO: 0.9 K/UL (ref 0.3–1)
MONOCYTES NFR BLD: 9.6 % (ref 4–15)
NEUTROPHILS # BLD AUTO: 5.5 K/UL (ref 1.8–7.7)
NEUTROPHILS NFR BLD: 59.9 % (ref 38–73)
NRBC BLD-RTO: 0 /100 WBC
PHOSPHATE SERPL-MCNC: 4.9 MG/DL (ref 2.7–4.5)
PLATELET # BLD AUTO: 243 K/UL (ref 150–450)
PMV BLD AUTO: 9.5 FL (ref 9.2–12.9)
POTASSIUM SERPL-SCNC: 4.8 MMOL/L (ref 3.5–5.1)
PROT SERPL-MCNC: 6.5 G/DL (ref 6–8.4)
RBC # BLD AUTO: 4.56 M/UL (ref 4.6–6.2)
SODIUM SERPL-SCNC: 139 MMOL/L (ref 136–145)
WBC # BLD AUTO: 9.16 K/UL (ref 3.9–12.7)

## 2024-03-29 PROCEDURE — 94761 N-INVAS EAR/PLS OXIMETRY MLT: CPT

## 2024-03-29 PROCEDURE — 80053 COMPREHEN METABOLIC PANEL: CPT | Performed by: NURSE PRACTITIONER

## 2024-03-29 PROCEDURE — 63600175 PHARM REV CODE 636 W HCPCS: Performed by: INTERNAL MEDICINE

## 2024-03-29 PROCEDURE — 85025 COMPLETE CBC W/AUTO DIFF WBC: CPT | Performed by: NURSE PRACTITIONER

## 2024-03-29 PROCEDURE — 25000003 PHARM REV CODE 250: Performed by: NURSE PRACTITIONER

## 2024-03-29 PROCEDURE — 83735 ASSAY OF MAGNESIUM: CPT | Performed by: NURSE PRACTITIONER

## 2024-03-29 PROCEDURE — 25000003 PHARM REV CODE 250: Performed by: INTERNAL MEDICINE

## 2024-03-29 PROCEDURE — 36415 COLL VENOUS BLD VENIPUNCTURE: CPT | Performed by: NURSE PRACTITIONER

## 2024-03-29 PROCEDURE — 84100 ASSAY OF PHOSPHORUS: CPT | Performed by: NURSE PRACTITIONER

## 2024-03-29 PROCEDURE — 63600175 PHARM REV CODE 636 W HCPCS: Performed by: NURSE PRACTITIONER

## 2024-03-29 RX ORDER — HYDROCODONE BITARTRATE AND ACETAMINOPHEN 5; 325 MG/1; MG/1
1 TABLET ORAL EVERY 6 HOURS PRN
Qty: 12 TABLET | Refills: 0 | Status: SHIPPED | OUTPATIENT
Start: 2024-03-29

## 2024-03-29 RX ORDER — AMOXICILLIN AND CLAVULANATE POTASSIUM 875; 125 MG/1; MG/1
1 TABLET, FILM COATED ORAL EVERY 12 HOURS
Qty: 10 TABLET | Refills: 0 | Status: SHIPPED | OUTPATIENT
Start: 2024-03-29 | End: 2024-04-03

## 2024-03-29 RX ADMIN — PIPERACILLIN AND TAZOBACTAM 4.5 G: 4; .5 INJECTION, POWDER, FOR SOLUTION INTRAVENOUS; PARENTERAL at 10:03

## 2024-03-29 RX ADMIN — HYDROCODONE BITARTRATE AND ACETAMINOPHEN 1 TABLET: 10; 325 TABLET ORAL at 08:03

## 2024-03-29 RX ADMIN — VANCOMYCIN HYDROCHLORIDE 1000 MG: 1 INJECTION, POWDER, LYOPHILIZED, FOR SOLUTION INTRAVENOUS at 12:03

## 2024-03-29 RX ADMIN — VANCOMYCIN HYDROCHLORIDE 1000 MG: 1 INJECTION, POWDER, LYOPHILIZED, FOR SOLUTION INTRAVENOUS at 08:03

## 2024-03-29 RX ADMIN — PIPERACILLIN AND TAZOBACTAM 4.5 G: 4; .5 INJECTION, POWDER, FOR SOLUTION INTRAVENOUS; PARENTERAL at 04:03

## 2024-03-29 RX ADMIN — FAMOTIDINE 20 MG: 20 TABLET, FILM COATED ORAL at 08:03

## 2024-03-29 NOTE — NURSING
Pt educated on discharge. Verbalized understanding. PIV and tele removed. Pt tolerated well. Left floor via wheelchair and put into passenger's seat of girlfriend's car safely with belongings in hand.

## 2024-03-29 NOTE — CONSULTS
[facility] Medicine  Telemedicine Consult Note    Patient Name: Agus Horan  MRN: [id number]  Admission Date: 3/26/2024  Hospital Length of Stay: 1 days  Attending Physician: Owen Zamorano MD   Primary Care Provider: Suyapa, Primary Doctor         Thank you for your consult to [facility]. We have reviewed the patient chart. This patient does not meet criteria for [facility] service at this time due to Patient has a condition likely to benefit from bedside evaluation such as history of compartment symdrome.  Will not assume care of the patient at this time.          Libia Sim MD  Department of Hospital Medicine   [facility]/Surg

## 2024-03-29 NOTE — HOSPITAL COURSE
Patient admitted to Hospital Medicine for sepsis from rapidly worsening right lower extremity cellulitis near area of prior fasciotomy. Initiated on intravenous antibiotic therapy.  Affected leg was elevated and closely observed.  Patient remained afebrile.  Cellulitis clinically improved. Cleared to discharge and continue with oral antibiotic. By time of discharge the patient was tolerating a regular diet with improving admission symptoms. Patient seen and examined on day of discharge.    Physical exam on day of discharge:  General - Patient alert and oriented x3 in NAD  CV - Regular rate and rhythm, No Murmur/kesha/rubs  Resp - Lungs CTA Bilaterally, No increased WOB  Extrem-  RLE well-healed fasciotomy lateral calf with mild erythema distal and anterior to the scar, improved from admit pictures

## 2024-03-29 NOTE — DISCHARGE SUMMARY
[facility] Medicine  Discharge Summary      Patient Name: Agus Horan  MRN: [id number]  ALONDRA: [id number]  Patient Class: IP- Inpatient  Admission Date: 3/26/2024  Hospital Length of Stay: 2 days  Discharge Date and Time: 3/29/2024  2:18 PM  Attending Physician: Mathieu Enriquez MD   Discharging Provider: Mathieu Enriquez MD  Primary Care Provider: Suyapa Primary Doctor    Primary Care Team: Networked reference to record PCT     HPI:   Luca Horan is a 23 year old male with a past medical history of traumatic compartment syndrome with subsequent fasciotomy, who presented to the ED with a complaint of leg redness and fever. He states the redness and swelling have been present for a few days, and he has noticed some streaking redness to his thigh. He denies trauma or injury, and denies injecting anything into leg. He states he noticed a purplish area to the right dorsal foot as well. He denies other complaint.    ED work up included a CBC, which shows a WBC of 1499. CMP and urinalysis were unremarkable. CPK elevated at 358. Procalcitonin at 0.19. Xray's of the leg were unremarkable. US of the right lower extremity to rule out DVT were also negative. He was initiated on zosyn in the ED. Hospital Medicine consulted for admission and further management.    * No surgery found *      Hospital Course:   Patient admitted to Hospital Medicine for sepsis from rapidly worsening right lower extremity cellulitis near area of prior fasciotomy. Initiated on intravenous antibiotic therapy.  Affected leg was elevated and closely observed.  Patient remained afebrile.  Cellulitis clinically improved. Cleared to discharge and continue with oral antibiotic. By time of discharge the patient was tolerating a regular diet with improving admission symptoms. Patient seen and examined on day of discharge.    Physical exam on day of discharge:  General - Patient alert and oriented x3 in NAD  CV - Regular rate and  rhythm, No Murmur/kesha/rubs  Resp - Lungs CTA Bilaterally, No increased WOB  Extrem-  RLE well-healed fasciotomy lateral calf with mild erythema distal and anterior to the scar, improved from admit pictures       Goals of Care Treatment Preferences:  Code Status: Full Code      Consults:   Consults (From admission, onward)          Status Ordering Provider     Inpatient virtual consult to Hospital Medicine  Once        Provider:  (Not yet assigned)    Completed SHAI ACUNA            No new Assessment & Plan notes have been filed under this hospital service since the last note was generated.  Service: Hospital Medicine    Final Active Diagnoses:    Diagnosis Date Noted POA    PRINCIPAL PROBLEM:  Sepsis [A41.9] 03/26/2024 Yes    Cellulitis of right lower extremity [L03.115] 03/26/2024 Yes      Problems Resolved During this Admission:       Discharged Condition: good    Disposition: Home or Self Care    Follow Up:   Follow-up Information       [facility]. Schedule an appointment as soon as possible for a visit.    Why: Monday to schedule 1 week hospital follow up  Contact information:  Reyna Rajput   [facility] [address]  [phone]                           Patient Instructions:      Diet Adult Regular     Notify your health care provider if you experience any of the following:  temperature >100.4     Notify your health care provider if you experience any of the following:  persistent nausea and vomiting or diarrhea     Notify your health care provider if you experience any of the following:  severe uncontrolled pain     Notify your health care provider if you experience any of the following:  redness, tenderness, or signs of infection (pain, swelling, redness, odor or green/yellow discharge around incision site)     Notify your health care provider if you experience any of the following:  difficulty breathing or increased cough     Notify your health care provider if you experience any of  the following:  severe persistent headache     Notify your health care provider if you experience any of the following:  worsening rash     Notify your health care provider if you experience any of the following:  persistent dizziness, light-headedness, or visual disturbances     Notify your health care provider if you experience any of the following:  increased confusion or weakness     Activity as tolerated       Significant Diagnostic Studies:       Lab Results   Component Value Date    WBC 9.16 03/29/2024    HGB 13.3 (L) 03/29/2024    HCT 40.9 03/29/2024    MCV 90 03/29/2024     03/29/2024       BMP  Lab Results   Component Value Date     03/29/2024    K 4.8 03/29/2024     03/29/2024    CO2 26 03/29/2024    BUN 15 03/29/2024    CREATININE 1.0 03/29/2024    CALCIUM 9.0 03/29/2024    ANIONGAP 9 03/29/2024    EGFRNORACEVR >60 03/29/2024         Imaging Results              US Lower Extremity Veins Right (Final result)  Result time 03/27/24 08:24:43      Final result by Terrance Talbert MD (03/27/24 08:24:43)                   Impression:      No evidence of deep venous thrombosis in the right lower extremity.    The preliminary and final reports are concordant.      Electronically signed by: Terrance Talbert  Date:    03/27/2024  Time:    08:24               Narrative:    EXAMINATION:  US LOWER EXTREMITY VEINS RIGHT    CLINICAL HISTORY:  Other specified soft tissue disorders    TECHNIQUE:  Duplex and color flow Doppler evaluation and graded compression of the right lower extremity veins was performed.    COMPARISON:  05/01/2022    FINDINGS:  Right thigh veins: The common femoral, femoral, popliteal, upper greater saphenous, and deep femoral veins are patent and free of thrombus. The veins are normally compressible and have normal phasic flow and augmentation response.    Right calf veins: The visualized calf veins are patent.    Contralateral CFV: The contralateral (left) common femoral vein is patent  and free of thrombus.    Miscellaneous: None                                       X-Ray Femur Ap/Lat Right (Final result)  Result time 03/27/24 08:24:03      Final result by Terrance Talbert MD (03/27/24 08:24:03)                   Impression:      As above.      Electronically signed by: Terrance Talbert  Date:    03/27/2024  Time:    08:24               Narrative:    EXAMINATION:  XR FEMUR 2 VIEW RIGHT    CLINICAL HISTORY:  Other specified soft tissue disorders    TECHNIQUE:  AP and lateral views of the left femur were performed.    COMPARISON:  04/10/2022    FINDINGS:  No fracture or dislocation.  No lytic or blastic lesion.  Soft tissues are unremarkable.                                       X-Ray Chest AP Portable (Final result)  Result time 03/27/24 08:22:59      Final result by Terrance Talbert MD (03/27/24 08:22:59)                   Impression:      No acute radiographic abnormality in the chest.      Electronically signed by: Terrance Talbert  Date:    03/27/2024  Time:    08:22               Narrative:    EXAMINATION:  XR CHEST AP PORTABLE    CLINICAL HISTORY:  Sepsis;.    TECHNIQUE:  Single frontal portable view of the chest was performed.    COMPARISON:  05/11/2022    FINDINGS:  Cardiomediastinal silhouette is within normal limits.Lungs appear grossly clear.  No definite focal consolidation, pleural effusion, or pneumothorax.  Bones are intact.                                    Microbiology Results (last 7 days)       Procedure Component Value Units Date/Time    Blood culture x two cultures. Draw prior to antibiotics. [[id number]] Collected: 03/26/24 2203    Order Status: Completed Specimen: Blood from Peripheral, Forearm, Left Updated: 03/29/24 1032     Blood Culture, Routine No Growth to date      No Growth to date      No Growth to date    Narrative:      Aerobic and anaerobic    Blood culture x two cultures. Draw prior to antibiotics. [[id number]] Collected: 03/26/24 2203    Order Status: Completed Specimen:  Blood from Peripheral, Forearm, Right Updated: 03/29/24 1032     Blood Culture, Routine No Growth to date      No Growth to date      No Growth to date    Narrative:      Aerobic and anaerobic    Influenza A & B by Molecular [[id number]] Collected: 03/26/24 2159    Order Status: Completed Specimen: Nasopharyngeal Swab Updated: 03/26/24 2238     Influenza A, Molecular Negative     Influenza B, Molecular Negative     Flu A & B Source Nasal swab    Group A Strep, Molecular [[id number]] Collected: 03/26/24 2159    Order Status: Completed Specimen: Throat Updated: 03/26/24 2230     Group A Strep, Molecular Negative     Comment: Arcanobacterium haemolyticum and Beta Streptococcus group C   and G will not be detected by this test method.  Please order   Throat Culture ([id number]) if suspected.                   Pending Diagnostic Studies:       None           Medications:  Reconciled Home Medications:      Medication List        START taking these medications      amoxicillin-clavulanate 875-125mg 875-125 mg per tablet  Commonly known as: AUGMENTIN  Take 1 tablet by mouth every 12 (twelve) hours. for 5 days     HYDROcodone-acetaminophen 5-325 mg per tablet  Commonly known as: NORCO  Take 1 tablet by mouth every 6 (six) hours as needed for Pain.              Indwelling Lines/Drains at time of discharge:   Lines/Drains/Airways       None                   Time spent on the discharge of patient: 35 minutes         Mathieu Enriquez MD  Department of Hospital Medicine  [facility]/Surg

## 2024-03-29 NOTE — PLAN OF CARE
[facility] - Med/Surg  Discharge Final Note    Primary Care Provider: No, Primary Doctor    Expected Discharge Date: 3/29/2024    Patient cleared for discharge from case management standpoint.  Patient with no PCP at this time.  Patient to contact [facility] to Monday to schedule hospital follow up.  SW gave patient good rx prescription card for prescription cost.    Final Discharge Note (most recent)       Final Note - 03/29/24 1333          Final Note    Assessment Type Final Discharge Note     Anticipated Discharge Disposition Home or Self Care     What phone number can be called within the next 1-3 days to see how you are doing after discharge? [phone]     Hospital Resources/Appts/Education Provided Provided patient/caregiver with written discharge plan information;Provided education on problems/symptoms using teachback;Appointment suggestion unavailable        Post-Acute Status    Post-Acute Authorization Other;Medications     Medication Status Set-up complete/Auth obtained     Other Status No Post-Acute Service Needs     Discharge Delays None known at this time                     Important Message from Medicare             Contact Info       [place] [address] [address] [place] [address]   Phone: [phone]       Next Steps: Schedule an appointment as soon as possible for a visit    Instructions: Monday to schedule 1 week hospital follow up

## 2024-03-29 NOTE — PROGRESS NOTES
Pharmacokinetic Assessment Follow Up: IV Vancomycin    Vancomycin serum concentration assessment(s):    The trough level was drawn correctly and can be used to guide therapy at this time. The measurement is below the desired definitive target range of 10 to 15 mcg/mL.    Vancomycin Regimen Plan:    Change regimen to Vancomycin 1000 mg IV every 8 hours with next serum trough concentration measured at 2330 prior to 4th dose on 03/29/24    Drug levels (last 3 results):  Recent Labs   Lab Result Units 03/28/24  2319   Vancomycin-Trough ug/mL 5.6*       Pharmacy will continue to follow and monitor vancomycin.    Please contact pharmacy at extension [phone] for questions regarding this assessment.    Thank you for the consult,   Donny So       Patient brief summary:  Agus Horan is a 23 y.o. male initiated on antimicrobial therapy with IV Vancomycin for treatment of skin & soft tissue infection    The patient's current regimen is 1000mg q12h    Drug Allergies:   Review of patient's allergies indicates:   Allergen Reactions    Shrimp        Actual Body Weight:   76.2kg    Renal Function:   Estimated Creatinine Clearance: 105.6 mL/min (based on SCr of 1 mg/dL).,     CBC (last 72 hours):  Recent Labs   Lab Result Units 03/26/24 2203 03/27/24 0451 03/28/24  0513   WBC K/uL 14.99* 13.78* 9.94   Hemoglobin g/dL 14.2 13.0* 13.2*   Hematocrit % 43.6 40.0 41.1   Platelets K/uL 214 207 217   Gran % % 82.6* 80.3* 58.5   Lymph % % 7.4* 9.2* 22.6   Mono % % 8.8 9.1 13.7   Eosinophil % % 0.3 0.3 4.6   Basophil % % 0.3 0.4 0.4   Differential Method  Automated Automated Automated       Metabolic Panel (last 72 hours):  Recent Labs   Lab Result Units 03/26/24 2204 03/26/24 2231 03/27/24  0451 03/28/24  0512 03/28/24  0513   Sodium mmol/L 137  --  136 139  --    Potassium mmol/L 3.5  --  3.7 4.2  --    Chloride mmol/L 101  --  106 105  --    CO2 mmol/L 24  --  21* 25  --    Glucose mg/dL 85  --  113* 83  --    Glucose, UA   --   Negative  --   --   --    BUN mg/dL 16  --  15 11  --    Creatinine mg/dL 1.2  --  1.1 1.0  --    Albumin g/dL 3.9  --  3.3* 3.1*  --    Total Bilirubin mg/dL 0.4  --  0.6 0.3  --    Alkaline Phosphatase U/L 107  --  95 86  --    AST U/L 30  --  28 20  --    ALT U/L 34  --  29 28  --    Magnesium mg/dL  --   --  2.0  --  2.0   Phosphorus mg/dL  --   --  1.8*  --  3.2       Vancomycin Administrations:  vancomycin given in the last 96 hours                     vancomycin (VANCOCIN) 1,000 mg in dextrose 5 % (D5W) 250 mL IVPB (Vial-Mate) ()  Restarted 03/28/24 1151     1,000 mg New Bag  1102     1,000 mg New Bag  0042    vancomycin 1,500 mg in dextrose 5 % (D5W) 250 mL IVPB (Vial-Mate) (mg) 1,500 mg New Bag 03/27/24 1223                    Microbiologic Results:  Microbiology Results (last 7 days)       Procedure Component Value Units Date/Time    Blood culture x two cultures. Draw prior to antibiotics. [[id number]] Collected: 03/26/24 2203    Order Status: Completed Specimen: Blood from Peripheral, Forearm, Left Updated: 03/28/24 1032     Blood Culture, Routine No Growth to date      No Growth to date    Narrative:      Aerobic and anaerobic    Blood culture x two cultures. Draw prior to antibiotics. [[id number]] Collected: 03/26/24 2203    Order Status: Completed Specimen: Blood from Peripheral, Forearm, Right Updated: 03/28/24 1032     Blood Culture, Routine No Growth to date      No Growth to date    Narrative:      Aerobic and anaerobic    Influenza A & B by Molecular [[id number]] Collected: 03/26/24 2159    Order Status: Completed Specimen: Nasopharyngeal Swab Updated: 03/26/24 2238     Influenza A, Molecular Negative     Influenza B, Molecular Negative     Flu A & B Source Nasal swab    Group A Strep, Molecular [[id number]] Collected: 03/26/24 2159    Order Status: Completed Specimen: Throat Updated: 03/26/24 2230     Group A Strep, Molecular Negative     Comment: Arcanobacterium haemolyticum and Beta  Streptococcus group C   and G will not be detected by this test method.  Please order   Throat Culture ([id number]) if suspected.

## 2024-03-29 NOTE — PLAN OF CARE
Plan of care reviewed with patient, verbalized understanding. Continuous cardiac monitoring in place. IV intact and patent with IVF infusing as ordered, no s/s infection or infiltration noted to site. ABX administered as ordered. Meds given per MAR. Ambulated with standby assist. Complaints of pain managed with PRN medication. IS at bed side and encouraged use. NAD noted. Purposeful q2hr rounding done. Safety maintained with side rails up x3, bed wheels locked, bed in lowest position, bed alarm set, slip resistant socks in use, and call light with in reach. Patient educated to call for assistance when needed. Patient remains free of falls. No further needs expressed at this time. Will continue to monitor.     Problem: Adult Inpatient Plan of Care  Goal: Plan of Care Review  Outcome: Ongoing, Progressing  Goal: Patient-Specific Goal (Individualized)  Outcome: Ongoing, Progressing  Goal: Absence of Hospital-Acquired Illness or Injury  Outcome: Ongoing, Progressing  Goal: Optimal Comfort and Wellbeing  Outcome: Ongoing, Progressing  Goal: Readiness for Transition of Care  Outcome: Ongoing, Progressing     Problem: Adjustment to Illness (Sepsis/Septic Shock)  Goal: Optimal Coping  Outcome: Ongoing, Progressing     Problem: Bleeding (Sepsis/Septic Shock)  Goal: Absence of Bleeding  Outcome: Ongoing, Progressing     Problem: Glycemic Control Impaired (Sepsis/Septic Shock)  Goal: Blood Glucose Level Within Desired Range  Outcome: Ongoing, Progressing     Problem: Infection Progression (Sepsis/Septic Shock)  Goal: Absence of Infection Signs and Symptoms  Outcome: Ongoing, Progressing     Problem: Nutrition Impaired (Sepsis/Septic Shock)  Goal: Optimal Nutrition Intake  Outcome: Ongoing, Progressing

## 2024-03-29 NOTE — NURSING
Upon hanging ABX, IV noted to be sluggish and resistance met when attempting to flush. Restarted a new PIV and upon completion of IV start, ABX started. Shortly after having just started the new IV, called back to room by patient stating he all of a sudden got nauseous with chest tightness and chest pain. Upon entering room, patient found to be in bathroom. Got patient back in the bed, VSS, and with slow deep breathing patient started feeling better stating the chest pain was improving and nausea was subsiding. Rosanna VEGA DNP notified.

## 2024-04-01 LAB
BACTERIA BLD CULT: NORMAL
BACTERIA BLD CULT: NORMAL
OHS QRS DURATION: 78 MS
OHS QTC CALCULATION: 394 MS

## 2024-04-11 ENCOUNTER — HOSPITAL ENCOUNTER (EMERGENCY)
Facility: HOSPITAL | Age: 24
Discharge: HOME | End: 2024-04-11
Attending: EMERGENCY MEDICINE
Payer: MEDICAID

## 2024-04-11 VITALS
HEART RATE: 95 BPM | TEMPERATURE: 100 F | HEIGHT: 67 IN | DIASTOLIC BLOOD PRESSURE: 75 MMHG | RESPIRATION RATE: 17 BRPM | OXYGEN SATURATION: 99 % | WEIGHT: 160 LBS | BODY MASS INDEX: 25.11 KG/M2 | SYSTOLIC BLOOD PRESSURE: 138 MMHG

## 2024-04-11 DIAGNOSIS — L03.115 CELLULITIS OF RIGHT LEG: Primary | ICD-10-CM

## 2024-04-11 DIAGNOSIS — R52 PAIN: ICD-10-CM

## 2024-04-11 LAB
ALBUMIN SERPL BCP-MCNC: 3.9 G/DL (ref 3.5–5.2)
ALP SERPL-CCNC: 92 U/L (ref 55–135)
ALT SERPL W/O P-5'-P-CCNC: 30 U/L (ref 10–44)
ANION GAP SERPL CALC-SCNC: 7 MMOL/L (ref 8–16)
AST SERPL-CCNC: 27 U/L (ref 10–40)
BASOPHILS # BLD AUTO: 0.03 K/UL (ref 0–0.2)
BASOPHILS NFR BLD: 0.3 % (ref 0–1.9)
BILIRUB SERPL-MCNC: 0.5 MG/DL (ref 0.1–1)
BUN SERPL-MCNC: 11 MG/DL (ref 6–20)
CALCIUM SERPL-MCNC: 9.2 MG/DL (ref 8.7–10.5)
CHLORIDE SERPL-SCNC: 99 MMOL/L (ref 95–110)
CO2 SERPL-SCNC: 28 MMOL/L (ref 23–29)
CREAT SERPL-MCNC: 0.9 MG/DL (ref 0.5–1.4)
DIFFERENTIAL METHOD BLD: ABNORMAL
EOSINOPHIL # BLD AUTO: 0.1 K/UL (ref 0–0.5)
EOSINOPHIL NFR BLD: 0.6 % (ref 0–8)
ERYTHROCYTE [DISTWIDTH] IN BLOOD BY AUTOMATED COUNT: 12.4 % (ref 11.5–14.5)
EST. GFR  (NO RACE VARIABLE): >60 ML/MIN/1.73 M^2
GLUCOSE SERPL-MCNC: 94 MG/DL (ref 70–110)
HCT VFR BLD AUTO: 41.3 % (ref 40–54)
HGB BLD-MCNC: 13 G/DL (ref 14–18)
IMM GRANULOCYTES # BLD AUTO: 0.05 K/UL (ref 0–0.04)
IMM GRANULOCYTES NFR BLD AUTO: 0.5 % (ref 0–0.5)
LACTATE SERPL-SCNC: 0.9 MMOL/L (ref 0.5–1.9)
LYMPHOCYTES # BLD AUTO: 1.4 K/UL (ref 1–4.8)
LYMPHOCYTES NFR BLD: 13.1 % (ref 18–48)
MCH RBC QN AUTO: 29.4 PG (ref 27–31)
MCHC RBC AUTO-ENTMCNC: 31.5 G/DL (ref 32–36)
MCV RBC AUTO: 93 FL (ref 82–98)
MONOCYTES # BLD AUTO: 0.7 K/UL (ref 0.3–1)
MONOCYTES NFR BLD: 6.2 % (ref 4–15)
NEUTROPHILS # BLD AUTO: 8.6 K/UL (ref 1.8–7.7)
NEUTROPHILS NFR BLD: 79.3 % (ref 38–73)
NRBC BLD-RTO: 0 /100 WBC
PLATELET # BLD AUTO: 230 K/UL (ref 150–450)
PMV BLD AUTO: 9.1 FL (ref 9.2–12.9)
POTASSIUM SERPL-SCNC: 4.3 MMOL/L (ref 3.5–5.1)
PROCALCITONIN SERPL IA-MCNC: 0.87 NG/ML (ref 0–0.5)
PROT SERPL-MCNC: 7.3 G/DL (ref 6–8.4)
RBC # BLD AUTO: 4.42 M/UL (ref 4.6–6.2)
SODIUM SERPL-SCNC: 134 MMOL/L (ref 136–145)
WBC # BLD AUTO: 10.89 K/UL (ref 3.9–12.7)

## 2024-04-11 PROCEDURE — 83605 ASSAY OF LACTIC ACID: CPT | Performed by: PHYSICIAN ASSISTANT

## 2024-04-11 PROCEDURE — 96365 THER/PROPH/DIAG IV INF INIT: CPT

## 2024-04-11 PROCEDURE — 85025 COMPLETE CBC W/AUTO DIFF WBC: CPT | Performed by: PHYSICIAN ASSISTANT

## 2024-04-11 PROCEDURE — 99285 EMERGENCY DEPT VISIT HI MDM: CPT | Mod: 25

## 2024-04-11 PROCEDURE — 87040 BLOOD CULTURE FOR BACTERIA: CPT | Mod: 59 | Performed by: NURSE PRACTITIONER

## 2024-04-11 PROCEDURE — 25000003 PHARM REV CODE 250: Performed by: NURSE PRACTITIONER

## 2024-04-11 PROCEDURE — 84145 PROCALCITONIN (PCT): CPT | Performed by: PHYSICIAN ASSISTANT

## 2024-04-11 PROCEDURE — 96367 TX/PROPH/DG ADDL SEQ IV INF: CPT

## 2024-04-11 PROCEDURE — 80053 COMPREHEN METABOLIC PANEL: CPT | Performed by: PHYSICIAN ASSISTANT

## 2024-04-11 PROCEDURE — 36415 COLL VENOUS BLD VENIPUNCTURE: CPT | Performed by: PHYSICIAN ASSISTANT

## 2024-04-11 PROCEDURE — 63600175 PHARM REV CODE 636 W HCPCS: Mod: JZ,JG | Performed by: NURSE PRACTITIONER

## 2024-04-11 RX ORDER — SODIUM CHLORIDE 9 MG/ML
1000 INJECTION, SOLUTION INTRAVENOUS
Status: COMPLETED | OUTPATIENT
Start: 2024-04-11 | End: 2024-04-11

## 2024-04-11 RX ORDER — CLINDAMYCIN PHOSPHATE 900 MG/50ML
900 INJECTION, SOLUTION INTRAVENOUS
Status: COMPLETED | OUTPATIENT
Start: 2024-04-11 | End: 2024-04-11

## 2024-04-11 RX ADMIN — SODIUM CHLORIDE 1000 ML: 9 INJECTION, SOLUTION INTRAVENOUS at 02:04

## 2024-04-11 RX ADMIN — CLINDAMYCIN IN 5 PERCENT DEXTROSE 900 MG: 18 INJECTION, SOLUTION INTRAVENOUS at 04:04

## 2024-04-11 RX ADMIN — DALBAVANCIN 1500 MG: 500 INJECTION, POWDER, FOR SOLUTION INTRAVENOUS at 08:04

## 2024-04-11 NOTE — ED PROVIDER NOTES
Encounter Date: 4/11/2024       History     Chief Complaint   Patient presents with    Cellulitis     Pt has cellulitis to his bottom right leg, was treated and it came back.      Presents with cellulitis to the right lower extremity.  Onset 3 days.  Patient was discharged from [place] [address].  He was treated at that time for cellulitis.  He reports it did get better but has gotten worse in the last 3 days.  Denies fever nausea vomiting or diarrhea.      Review of patient's allergies indicates:   Allergen Reactions    Shrimp      Past Medical History:   Diagnosis Date    Allergy     AR    Asthma     mild intermittent    Hyperkalemia 4/14/2022     Past Surgical History:   Procedure Laterality Date    DEBRIDEMENT OF LOWER EXTREMITY Right 4/18/2022    Procedure: DEBRIDEMENT, LOWER EXTREMITY;  Surgeon: Leonardo Byers MD;  Location: [facility] OR;  Service: Orthopedics;  Laterality: Right;    DEBRIDEMENT OF LOWER EXTREMITY Right 5/5/2022    Procedure: DEBRIDEMENT, LOWER EXTREMITY;  Surgeon: Leonardo Byers MD;  Location: [facility] OR;  Service: Orthopedics;  Laterality: Right;    FASCIOTOMY Right 4/13/2022    Procedure: FASCIOTOMY;  Surgeon: Leonardo Byers MD;  Location: [facility] OR;  Service: Orthopedics;  Laterality: Right;    FASCIOTOMY Right 4/25/2022    Procedure: FASCIOTOMY;  Surgeon: Leonardo Byers MD;  Location: [facility] OR;  Service: Orthopedics;  Laterality: Right;    REMOVAL OF FOREIGN BODY FROM FOOT Left 6/24/2020    Procedure: REMOVAL, FOREIGN BODY, FOOT;  Surgeon: Dani Garcia MD;  Location: [facility] OR;  Service: Orthopedics;  Laterality: Left;    REPLACEMENT OF WOUND VACUUM-ASSISTED CLOSURE DEVICE Right 5/5/2022    Procedure: REPLACEMENT, WOUND VAC;  Surgeon: Leonardo Byers MD;  Location: [facility] OR;  Service: Orthopedics;  Laterality: Right;     Family History   Problem Relation Age of Onset    Asthma Brother     Emphysema Maternal Grandmother     Hyperlipidemia Maternal Grandmother      Asthma Maternal Grandmother     Arthritis Maternal Grandmother     COPD Maternal Grandmother     Asthma Brother      Social History     Tobacco Use    Smoking status: Never    Smokeless tobacco: Never   Substance Use Topics    Alcohol use: Yes     Comment: last night    Drug use: Yes     Frequency: 2.0 times per week     Types: Marijuana     Comment: yesterday     Review of Systems   Constitutional:  Negative for fever.   Respiratory:  Negative for cough, shortness of breath and wheezing.    Cardiovascular:  Negative for chest pain, palpitations and leg swelling.   Gastrointestinal:  Negative for abdominal pain, diarrhea, nausea and vomiting.   Musculoskeletal:  Negative for back pain, gait problem and joint swelling.   Skin:  Positive for color change. Negative for rash and wound.   Neurological:  Negative for weakness.       Physical Exam     Initial Vitals [04/11/24 1244]   BP Pulse Resp Temp SpO2   (!) 154/83 101 17 99.8 °F (37.7 °C) 95 %      MAP       --         Physical Exam    Constitutional: He appears well-developed and well-nourished.   HENT:   Mouth/Throat: Oropharynx is clear and moist.   Eyes: Conjunctivae are normal.   Neck: Neck supple.   Normal range of motion.  Cardiovascular:  Normal rate.           Pulmonary/Chest: Breath sounds normal.   Abdominal: Abdomen is soft. Bowel sounds are normal. He exhibits no distension.   Musculoskeletal:         General: Normal range of motion.      Cervical back: Normal range of motion and neck supple.      Comments: Patient moves all extremities without difficulty.  He can put weight on all of his extremities.  There is no swelling to lower extremity.  See skin for note regarding cellulitis     Neurological: He is alert and oriented to person, place, and time. No sensory deficit. GCS score is 15. GCS eye subscore is 4. GCS verbal subscore is 5. GCS motor subscore is 6.   Skin: Skin is warm. Capillary refill takes less than 2 seconds. There is erythema.   There is  moderate erythema to the right lower extremity.  It is not swollen.  It is warm to the touch.  It is cellulitic in appearance.   Psychiatric: He has a normal mood and affect. Thought content normal.         ED Course   Procedures  Labs Reviewed   CBC W/ AUTO DIFFERENTIAL - Abnormal; Notable for the following components:       Result Value    RBC 4.42 (*)     Hemoglobin 13.0 (*)     MCHC 31.5 (*)     MPV 9.1 (*)     Gran # (ANC) 8.6 (*)     Immature Grans (Abs) 0.05 (*)     Gran % 79.3 (*)     Lymph % 13.1 (*)     All other components within normal limits   COMPREHENSIVE METABOLIC PANEL - Abnormal; Notable for the following components:    Sodium 134 (*)     Anion Gap 7 (*)     All other components within normal limits   PROCALCITONIN - Abnormal; Notable for the following components:    Procalcitonin 0.868 (*)     All other components within normal limits   CULTURE, BLOOD   CULTURE, BLOOD   LACTIC ACID, PLASMA   PROCALCITONIN   LACTIC ACID, PLASMA          Imaging Results              US Lower Extremity Veins Right (Final result)  Result time 04/11/24 18:52:22      Final result by Karely Sloan MD (04/11/24 18:52:22)                   Impression:      No evidence of acute deep venous thrombosis in the right lower extremity veins.      Electronically signed by: Will Sloan MD  Date:    04/11/2024  Time:    18:52               Narrative:    EXAMINATION:  US LOWER EXTREMITY VEINS RIGHT    CLINICAL HISTORY:  Pain, unspecified    TECHNIQUE:  Duplex and color flow Doppler evaluation as well as graded compression of the right lower extremity veins was performed.    COMPARISON:  None    FINDINGS:  Right thigh veins: The common femoral, femoral, popliteal, upper greater saphenous, and deep femoral veins are patent and free of thrombus. The veins are normally compressible and have normal phasic flow and augmentation response.    Right calf veins: The visualized calf veins are patent.    Miscellaneous: There is an  incidentally observed 21 mm morphologically normal right inguinal region/proximal thigh lymph node with a preserved central fatty hilum and no abnormal eccentric cortical thickening.                                       Medications   0.9%  NaCl infusion (0 mLs Intravenous Stopped 4/11/24 1522)   clindamycin in D5W 900 mg/50 mL IVPB 900 mg (0 mg Intravenous Stopped 4/11/24 1721)   dalbavancin (DALVANCE) 1,500 mg in dextrose 5 % (D5W) 325 mL infusion (1,500 mg Intravenous New Bag 4/11/24 2008)     Medical Decision Making  Presents with complaint of right lower leg erythema.  Onset 3 days ago.  Patient has a history of cellulitis on that leg.  He was discharged on the 20/6 from [facility].  He denies fever nausea vomiting or diarrhea.    Amount and/or Complexity of Data Reviewed  Labs: ordered.     Details: His labs are essentially normal.  Discussion of management or test interpretation with external provider(s): Patient was given clindamycin 900 mg IV.  He was also given Dowvance.  I treated this patient as a possible admit.  But the erythema markedly decreased while he was here due to the elevation of that leg.  We feel he can be treated outpatient at home.  He has been given strict instructions to stay off this leg with the exception of going to and from the bathroom.  He can not walk on it has to give elevated all times.  I have asked him to return on Saturday so that I can not again rechecked his leg.  He has agreed to this.  He has been instructed to return sooner if any worsening of symptoms or any other concerns.  At discharge is patient appeared to be in no acute distress.    Risk  Prescription drug management.                                      Clinical Impression:  Final diagnoses:  [R52] Pain  [L03.115] Cellulitis of right leg (Primary)          ED Disposition Condition    Discharge Stable          ED Prescriptions    None       Follow-up Information       Follow up With Specialties  Details Why Contact Info Additional Information    [facility] - Emergency Dept Emergency Medicine In 2 days  [address] [facility] [id number] [phone] 1st floor             Sierra Haywood NP  04/11/24 6235

## 2024-04-11 NOTE — Clinical Note
"Agus Horan (Alex) was seen and treated in our emergency department on 4/11/2024.  He may return to work on 04/14/2024.  Cannot walk on right leg for 4 days.        If you have any questions or concerns, please don't hesitate to call.      LEROY Beltran RN RN    "

## 2024-04-11 NOTE — FIRST PROVIDER EVALUATION
Emergency Department TeleTriage Encounter Note      CHIEF COMPLAINT    Chief Complaint   Patient presents with    Cellulitis     Pt has cellulitis to his bottom right leg, was treated and it came back.        VITAL SIGNS   Initial Vitals [04/11/24 1244]   BP Pulse Resp Temp SpO2   (!) 154/83 101 17 99.8 °F (37.7 °C) 95 %      MAP       --            ALLERGIES    Review of patient's allergies indicates:   Allergen Reactions    Shrimp        PROVIDER TRIAGE NOTE  Patient presents with complaint of redness and swelling to the right lower extremity. He was treated with oral antibiotics and had some improvement, but now it is worse. Denies DM.       ORDERS  Labs Reviewed - No data to display    ED Orders (720h ago, onward)      None              Virtual Visit Note: The provider triage portion of this emergency department evaluation and documentation was performed via [employer], a HIPAA-compliant telemedicine application, in concert with a tele-presenter in the room. A face to face patient evaluation with one of my colleagues will occur once the patient is placed in an emergency department room.      DISCLAIMER: This note was prepared with [employer] voice recognition transcription software. Garbled syntax, mangled pronouns, and other bizarre constructions may be attributed to that software system.

## 2024-04-12 NOTE — DISCHARGE INSTRUCTIONS
Keep your right leg elevated at all times.  No walking on it except to go to the bathroom.  Please return to the ED on Saturday for a recheck of this leg.  Return sooner for any worsening of symptoms or any other concerns.  Is very important that you keep this leg elevated and you do not walk on it except to go to and from the bathroom

## 2024-04-13 ENCOUNTER — HOSPITAL ENCOUNTER (EMERGENCY)
Facility: HOSPITAL | Age: 24
Discharge: HOME | End: 2024-04-13
Attending: EMERGENCY MEDICINE
Payer: MEDICAID

## 2024-04-13 VITALS
HEART RATE: 98 BPM | WEIGHT: 168 LBS | OXYGEN SATURATION: 97 % | SYSTOLIC BLOOD PRESSURE: 152 MMHG | HEIGHT: 67 IN | DIASTOLIC BLOOD PRESSURE: 90 MMHG | RESPIRATION RATE: 16 BRPM | TEMPERATURE: 98 F | BODY MASS INDEX: 26.37 KG/M2

## 2024-04-13 DIAGNOSIS — L03.115 CELLULITIS OF RIGHT LOWER EXTREMITY: Primary | ICD-10-CM

## 2024-04-13 LAB
ALBUMIN SERPL BCP-MCNC: 3.9 G/DL (ref 3.5–5.2)
ALP SERPL-CCNC: 89 U/L (ref 55–135)
ALT SERPL W/O P-5'-P-CCNC: 35 U/L (ref 10–44)
ANION GAP SERPL CALC-SCNC: 7 MMOL/L (ref 8–16)
AST SERPL-CCNC: 40 U/L (ref 10–40)
BASOPHILS # BLD AUTO: 0.06 K/UL (ref 0–0.2)
BASOPHILS NFR BLD: 0.8 % (ref 0–1.9)
BILIRUB SERPL-MCNC: 0.3 MG/DL (ref 0.1–1)
BUN SERPL-MCNC: 14 MG/DL (ref 6–20)
CALCIUM SERPL-MCNC: 9.3 MG/DL (ref 8.7–10.5)
CHLORIDE SERPL-SCNC: 103 MMOL/L (ref 95–110)
CO2 SERPL-SCNC: 27 MMOL/L (ref 23–29)
CREAT SERPL-MCNC: 0.8 MG/DL (ref 0.5–1.4)
DIFFERENTIAL METHOD BLD: ABNORMAL
EOSINOPHIL # BLD AUTO: 0.6 K/UL (ref 0–0.5)
EOSINOPHIL NFR BLD: 7.7 % (ref 0–8)
ERYTHROCYTE [DISTWIDTH] IN BLOOD BY AUTOMATED COUNT: 12 % (ref 11.5–14.5)
EST. GFR  (NO RACE VARIABLE): >60 ML/MIN/1.73 M^2
GLUCOSE SERPL-MCNC: 113 MG/DL (ref 70–110)
HCT VFR BLD AUTO: 37.6 % (ref 40–54)
HGB BLD-MCNC: 12.1 G/DL (ref 14–18)
IMM GRANULOCYTES # BLD AUTO: 0.01 K/UL (ref 0–0.04)
IMM GRANULOCYTES NFR BLD AUTO: 0.1 % (ref 0–0.5)
LYMPHOCYTES # BLD AUTO: 2.3 K/UL (ref 1–4.8)
LYMPHOCYTES NFR BLD: 31.7 % (ref 18–48)
MCH RBC QN AUTO: 29.1 PG (ref 27–31)
MCHC RBC AUTO-ENTMCNC: 32.2 G/DL (ref 32–36)
MCV RBC AUTO: 90 FL (ref 82–98)
MONOCYTES # BLD AUTO: 0.7 K/UL (ref 0.3–1)
MONOCYTES NFR BLD: 9.5 % (ref 4–15)
NEUTROPHILS # BLD AUTO: 3.6 K/UL (ref 1.8–7.7)
NEUTROPHILS NFR BLD: 50.2 % (ref 38–73)
NRBC BLD-RTO: 0 /100 WBC
PLATELET # BLD AUTO: 264 K/UL (ref 150–450)
PMV BLD AUTO: 9.5 FL (ref 9.2–12.9)
POTASSIUM SERPL-SCNC: 3.9 MMOL/L (ref 3.5–5.1)
PROT SERPL-MCNC: 7.1 G/DL (ref 6–8.4)
RBC # BLD AUTO: 4.16 M/UL (ref 4.6–6.2)
SODIUM SERPL-SCNC: 137 MMOL/L (ref 136–145)
WBC # BLD AUTO: 7.15 K/UL (ref 3.9–12.7)

## 2024-04-13 PROCEDURE — 80053 COMPREHEN METABOLIC PANEL: CPT | Performed by: NURSE PRACTITIONER

## 2024-04-13 PROCEDURE — 85025 COMPLETE CBC W/AUTO DIFF WBC: CPT | Performed by: NURSE PRACTITIONER

## 2024-04-13 PROCEDURE — 99283 EMERGENCY DEPT VISIT LOW MDM: CPT

## 2024-04-13 NOTE — DISCHARGE INSTRUCTIONS
As you now know if you will stay off this leg and keep it elevated the redness will go away.  All of your test results are negative including your blood cultures from the other day.  I want you to continue to elevate this leg at this time do it as instructed.  Return to the ED for any worsening of symptoms such as fever swelling increased redness etc.

## 2024-04-13 NOTE — ED PROVIDER NOTES
Encounter Date: 4/13/2024       History     Chief Complaint   Patient presents with    LEG REDNESS     HERE TO F/U , WAS TOLD TO COME BACK TODAY AND SEE MS DELACRUZ     Presents with erythema to his right lower extremity.  Was seen here 2 days ago for cellulitis.  He was treated with dowel Hatch.  He was given strict instructions to stay off that leg.  He was not supposed to ambulate other than to get up and go to the bathroom.  He was to keep it elevated at all times.  He does report that he has not been doing this.  He reports the pain has resolved but the redness is still there.  Last time he was here in the ED I had him elevate that leg and the redness totally cleared.  It continues to be read at present.      Review of patient's allergies indicates:   Allergen Reactions    Shrimp      Past Medical History:   Diagnosis Date    Allergy     AR    Asthma     mild intermittent    Hyperkalemia 4/14/2022     Past Surgical History:   Procedure Laterality Date    DEBRIDEMENT OF LOWER EXTREMITY Right 4/18/2022    Procedure: DEBRIDEMENT, LOWER EXTREMITY;  Surgeon: Leonardo Byers MD;  Location: [facility] OR;  Service: Orthopedics;  Laterality: Right;    DEBRIDEMENT OF LOWER EXTREMITY Right 5/5/2022    Procedure: DEBRIDEMENT, LOWER EXTREMITY;  Surgeon: Leonardo Byers MD;  Location: [facility] OR;  Service: Orthopedics;  Laterality: Right;    FASCIOTOMY Right 4/13/2022    Procedure: FASCIOTOMY;  Surgeon: Leonardo Byers MD;  Location: [facility] OR;  Service: Orthopedics;  Laterality: Right;    FASCIOTOMY Right 4/25/2022    Procedure: FASCIOTOMY;  Surgeon: Leonardo Byers MD;  Location: [facility] OR;  Service: Orthopedics;  Laterality: Right;    REMOVAL OF FOREIGN BODY FROM FOOT Left 6/24/2020    Procedure: REMOVAL, FOREIGN BODY, FOOT;  Surgeon: Dani Garcia MD;  Location: [facility] OR;  Service: Orthopedics;  Laterality: Left;    REPLACEMENT OF WOUND VACUUM-ASSISTED CLOSURE DEVICE Right 5/5/2022    Procedure:  REPLACEMENT, WOUND VAC;  Surgeon: Leonardo Byers MD;  Location: [facility];  Service: Orthopedics;  Laterality: Right;     Family History   Problem Relation Name Age of Onset    Asthma Brother      Emphysema Maternal Grandmother      Hyperlipidemia Maternal Grandmother      Asthma Maternal Grandmother      Arthritis Maternal Grandmother      COPD Maternal Grandmother      Asthma Brother       Social History     Tobacco Use    Smoking status: Never    Smokeless tobacco: Never   Substance Use Topics    Alcohol use: Yes     Comment: last night    Drug use: Yes     Frequency: 2.0 times per week     Types: Marijuana     Comment: yesterday     Review of Systems   Constitutional:  Negative for fever.   Respiratory:  Negative for cough, shortness of breath and wheezing.    Cardiovascular:  Negative for chest pain, palpitations and leg swelling.   Gastrointestinal:  Negative for abdominal pain, diarrhea, nausea and vomiting.   Musculoskeletal:  Negative for back pain, gait problem and joint swelling.   Skin:  Positive for color change. Negative for rash.   Neurological:  Negative for weakness.       Physical Exam     Initial Vitals [04/13/24 1448]   BP Pulse Resp Temp SpO2   (!) 152/90 98 16 97.8 °F (36.6 °C) 97 %      MAP       --         Physical Exam    Constitutional: He appears well-developed and well-nourished.   HENT:   Head: Normocephalic.   Mouth/Throat: Oropharynx is clear and moist.   Eyes: Conjunctivae are normal.   Neck: Neck supple.   Normal range of motion.  Cardiovascular:  Normal rate and regular rhythm.           Pulmonary/Chest: Breath sounds normal.   Musculoskeletal:         General: Normal range of motion.      Cervical back: Normal range of motion and neck supple.      Comments: Patient is ambulatory per self his gait is steady.     Neurological: He is alert and oriented to person, place, and time. No sensory deficit. GCS score is 15. GCS eye subscore is 4. GCS verbal subscore is 5. GCS motor  subscore is 6.   Skin: Skin is warm. Capillary refill takes less than 2 seconds. There is erythema.   There is moderate erythema to the right lower extremity.  I have had the patient sit and elevate his leg.  At discharge the redness had totally resolved.  His toes are warm and mobile.  His gait is steady.  He is ambulatory per self.   Psychiatric: He has a normal mood and affect. Thought content normal.         ED Course   Procedures  Labs Reviewed   CBC W/ AUTO DIFFERENTIAL - Abnormal; Notable for the following components:       Result Value    RBC 4.16 (*)     Hemoglobin 12.1 (*)     Hematocrit 37.6 (*)     Eos # 0.6 (*)     All other components within normal limits   COMPREHENSIVE METABOLIC PANEL - Abnormal; Notable for the following components:    Glucose 113 (*)     Anion Gap 7 (*)     All other components within normal limits          Imaging Results    None          Medications - No data to display  Medical Decision Making  Presents with right lower leg erythema.  He was seen here 2 days ago for same.  He was given dowel Hatch IV at that time.  He was given strict instructions to stay off of the leg and to keep it elevated at all times.  He has not been doing this.  He denies fever.    Amount and/or Complexity of Data Reviewed  Labs: ordered.     Details: His labs are within normal limits.  Discussion of management or test interpretation with external provider(s): Last time he was here and had a negative ultrasound I did not really ultrasound him.  His labs within normal limits.  I was able to look back on his blood cultures and it was negative for growth.  I have instructed him that this labeled never heal he does not stay off of it keep it elevated.  He was here with a friend of his and he is going to try to keep him at home with that leg elevated.  At no time while in the ED did he ever appear to be in any acute distress.  He was ambulatory at discharge.                                      Clinical  Impression:  Final diagnoses:  [L03.115] Cellulitis of right lower extremity (Primary)          ED Disposition Condition    Discharge Stable          ED Prescriptions    None       Follow-up Information       Follow up With Specialties Details Why Contact Info Additional Information    [facility] [place] - Emergency Dept Emergency Medicine In 1 week  [address] [id number]  [phone] 1st floor             Sierra Haywood NP  04/13/24 1345

## 2024-04-16 LAB
BACTERIA BLD CULT: NORMAL
BACTERIA BLD CULT: NORMAL

## (undated) DEVICE — SCRUB DYNA-HEX LIQ 4% CHG 4OZ

## (undated) DEVICE — SOL 9P NACL IRR PIC IL

## (undated) DEVICE — DRAPE STERI U-SHAPED 47X51IN

## (undated) DEVICE — INTERPULSE SET

## (undated) DEVICE — APPLICATOR CHLORAPREP ORN 26ML

## (undated) DEVICE — SEE MEDLINE ITEM 157171

## (undated) DEVICE — GOWN B1 X-LG X-LONG

## (undated) DEVICE — DRESSING GRANUFOAM LARGE VAC

## (undated) DEVICE — PACK LOWER EXTREMITY

## (undated) DEVICE — SEE MEDLINE ITEM 157117

## (undated) DEVICE — ALCOHOL 70% ISOP RUBBING 4OZ

## (undated) DEVICE — BANDAGE MATRIX HK LOOP 6IN 5YD

## (undated) DEVICE — DRAPE C ARM 42 X 120 10/BX

## (undated) DEVICE — SPONGE BULKEE II ABSRB 6X6.75

## (undated) DEVICE — PACK BASIC

## (undated) DEVICE — SUT 2-0 VICRYL / CT-1

## (undated) DEVICE — GLOVE SURG ULTRA TOUCH 7.5

## (undated) DEVICE — SUT ETHILON 3-0 PS2 18 BLK

## (undated) DEVICE — DRAPE PLASTIC U 60X72

## (undated) DEVICE — SEE MEDLINE ITEM 146292

## (undated) DEVICE — COVER SURG LIGHT HANDLE

## (undated) DEVICE — Device

## (undated) DEVICE — PACK SET UP 190 OMC-NS

## (undated) DEVICE — CANISTER INFOV.A.C WOUND 500ML

## (undated) DEVICE — DRESSING TRIACT CNTCT SILV 4X5

## (undated) DEVICE — SCRUB 10% POVIDONE IODINE 4OZ

## (undated) DEVICE — SOL IRR NACL .9% 3000ML

## (undated) DEVICE — SLEEVE SCD EXPRESS CALF MEDIUM

## (undated) DEVICE — GLOVE SURGEONS ULTRA TOUCH 6.5

## (undated) DEVICE — GLOVE SURG ULTRA TOUCH 8

## (undated) DEVICE — SEE MEDLINE ITEM 157131

## (undated) DEVICE — SEE MEDLINE ITEM 157116

## (undated) DEVICE — DRESSING TRANS 4X4 TEGADERM

## (undated) DEVICE — BANDAGE ESMARK ELASTIC ST 6X9

## (undated) DEVICE — ELECTRODE REM PLYHSV RETURN 9

## (undated) DEVICE — TRAY DRY SPONGE SCRUB W FOAM

## (undated) DEVICE — CONTAINER SPECIMEN OR STER 4OZ

## (undated) DEVICE — NDL SAFETY 25G X 1.5 ECLIPSE

## (undated) DEVICE — SUT MONOCRYL 4-0 PS-2

## (undated) DEVICE — DRESSING DERMACEA SPNG 10S

## (undated) DEVICE — STRAP OR TABLE 5IN X 72IN

## (undated) DEVICE — UNDERGLOVES BIOGEL PI SIZE 7.5

## (undated) DEVICE — BRUSH SCRUB HIBICLENS 4%

## (undated) DEVICE — SUT 3-0 VICRYL / LIGAPACK

## (undated) DEVICE — PAD CAST SPECIALIST STRL 6

## (undated) DEVICE — SEE MEDLINE ITEM 157216

## (undated) DEVICE — PENCIL ROCKER SWITCH 10FT CORD

## (undated) DEVICE — MANIFOLD 4 PORT

## (undated) DEVICE — PACK CUSTOM UNIV BASIN SLI

## (undated) DEVICE — GLOVE BIOGEL PIMICRO INDIC 6.5

## (undated) DEVICE — SEE MEDLINE ITEM 146231

## (undated) DEVICE — SPONGE SUPER KERLIX 6X6.75IN

## (undated) DEVICE — NDL HYPO REG 25G X 1 1/2

## (undated) DEVICE — PAD ABD 8X10 STERILE

## (undated) DEVICE — SUT ETHILON 2-0 FS 18IN BLK

## (undated) DEVICE — SUT ETHILON 2-0 FSLX 30 BLK

## (undated) DEVICE — DRESSING VERSA-FOAM W/O TUBE

## (undated) DEVICE — DRESSING ANTIMICROBIAL 1 INCH

## (undated) DEVICE — DRESSING GAUZE OIL EMUL 3X8

## (undated) DEVICE — TOURNIQUET SB QC DP 34X4IN

## (undated) DEVICE — STOCKINETTE IMPERV INTRM 9X44

## (undated) DEVICE — GLOVE SURG ULTRA TOUCH 7

## (undated) DEVICE — TUBE SUCTION YANKAUER HI CAP

## (undated) DEVICE — BLADE SURG CARBON STEEL SZ11

## (undated) DEVICE — DRAPE THYROID WITH ARMBOARD

## (undated) DEVICE — TOWEL OR DISP STRL BLUE 4/PK

## (undated) DEVICE — DRESSING N ADH OIL EMUL 3X3

## (undated) DEVICE — SYR 10CC LUER LOCK

## (undated) DEVICE — DRESSING INFOVAC LARGE BLK

## (undated) DEVICE — BNDG COFLEX FOAM LF2 ST 4X5YD

## (undated) DEVICE — GLOVE BIOGEL PI MICRO INDIC 7

## (undated) DEVICE — SOL PVP-I SCRUB 7.5% 4OZ

## (undated) DEVICE — COVER PROBE 6X48